# Patient Record
Sex: FEMALE | Race: WHITE | Employment: OTHER | URBAN - METROPOLITAN AREA
[De-identification: names, ages, dates, MRNs, and addresses within clinical notes are randomized per-mention and may not be internally consistent; named-entity substitution may affect disease eponyms.]

---

## 2018-08-25 ENCOUNTER — HOSPITAL ENCOUNTER (INPATIENT)
Age: 83
LOS: 5 days | Discharge: NURSING, REHAB OR LONG-TERM CARE | DRG: 544 | End: 2018-08-30
Attending: EMERGENCY MEDICINE | Admitting: INTERNAL MEDICINE
Payer: MEDICARE

## 2018-08-25 ENCOUNTER — APPOINTMENT (OUTPATIENT)
Dept: GENERAL RADIOLOGY | Age: 83
DRG: 544 | End: 2018-08-25
Attending: NURSE PRACTITIONER
Payer: MEDICARE

## 2018-08-25 ENCOUNTER — APPOINTMENT (OUTPATIENT)
Dept: CT IMAGING | Age: 83
DRG: 544 | End: 2018-08-25
Attending: NURSE PRACTITIONER
Payer: MEDICARE

## 2018-08-25 DIAGNOSIS — M25.561 CHRONIC PAIN OF BOTH KNEES: Primary | ICD-10-CM

## 2018-08-25 DIAGNOSIS — G89.29 CHRONIC PAIN OF BOTH KNEES: Primary | ICD-10-CM

## 2018-08-25 DIAGNOSIS — S82.141A CLOSED FRACTURE OF RIGHT TIBIAL PLATEAU, INITIAL ENCOUNTER: ICD-10-CM

## 2018-08-25 DIAGNOSIS — M25.562 CHRONIC PAIN OF BOTH KNEES: Primary | ICD-10-CM

## 2018-08-25 PROBLEM — Z72.0 TOBACCO ABUSE: Status: ACTIVE | Noted: 2018-08-25

## 2018-08-25 PROBLEM — I10 HYPERTENSION: Status: ACTIVE | Noted: 2018-08-25

## 2018-08-25 PROBLEM — S82.143A TIBIAL PLATEAU FRACTURE: Status: ACTIVE | Noted: 2018-08-25

## 2018-08-25 LAB
ALBUMIN SERPL-MCNC: 3.3 G/DL (ref 3.5–5)
ALBUMIN/GLOB SERPL: 0.8 {RATIO} (ref 1.1–2.2)
ALP SERPL-CCNC: 56 U/L (ref 45–117)
ALT SERPL-CCNC: 12 U/L (ref 12–78)
ANION GAP SERPL CALC-SCNC: 5 MMOL/L (ref 5–15)
AST SERPL-CCNC: 17 U/L (ref 15–37)
BASOPHILS # BLD: 0.1 K/UL (ref 0–0.1)
BASOPHILS NFR BLD: 1 % (ref 0–1)
BILIRUB SERPL-MCNC: 0.8 MG/DL (ref 0.2–1)
BUN SERPL-MCNC: 14 MG/DL (ref 6–20)
BUN/CREAT SERPL: 18 (ref 12–20)
CALCIUM SERPL-MCNC: 9.3 MG/DL (ref 8.5–10.1)
CHLORIDE SERPL-SCNC: 100 MMOL/L (ref 97–108)
CO2 SERPL-SCNC: 29 MMOL/L (ref 21–32)
COMMENT, HOLDF: NORMAL
CREAT SERPL-MCNC: 0.77 MG/DL (ref 0.55–1.02)
DIFFERENTIAL METHOD BLD: NORMAL
EOSINOPHIL # BLD: 0.1 K/UL (ref 0–0.4)
EOSINOPHIL NFR BLD: 1 % (ref 0–7)
ERYTHROCYTE [DISTWIDTH] IN BLOOD BY AUTOMATED COUNT: 14.4 % (ref 11.5–14.5)
GLOBULIN SER CALC-MCNC: 4.3 G/DL (ref 2–4)
GLUCOSE SERPL-MCNC: 101 MG/DL (ref 65–100)
HCT VFR BLD AUTO: 39.4 % (ref 35–47)
HGB BLD-MCNC: 12.9 G/DL (ref 11.5–16)
IMM GRANULOCYTES # BLD: 0 K/UL (ref 0–0.04)
IMM GRANULOCYTES NFR BLD AUTO: 0 % (ref 0–0.5)
LYMPHOCYTES # BLD: 2.1 K/UL (ref 0.8–3.5)
LYMPHOCYTES NFR BLD: 19 % (ref 12–49)
MCH RBC QN AUTO: 30.6 PG (ref 26–34)
MCHC RBC AUTO-ENTMCNC: 32.7 G/DL (ref 30–36.5)
MCV RBC AUTO: 93.4 FL (ref 80–99)
MONOCYTES # BLD: 0.9 K/UL (ref 0–1)
MONOCYTES NFR BLD: 8 % (ref 5–13)
NEUTS SEG # BLD: 7.8 K/UL (ref 1.8–8)
NEUTS SEG NFR BLD: 71 % (ref 32–75)
NRBC # BLD: 0 K/UL (ref 0–0.01)
NRBC BLD-RTO: 0 PER 100 WBC
PLATELET # BLD AUTO: 294 K/UL (ref 150–400)
PMV BLD AUTO: 9.8 FL (ref 8.9–12.9)
POTASSIUM SERPL-SCNC: 4.1 MMOL/L (ref 3.5–5.1)
PROT SERPL-MCNC: 7.6 G/DL (ref 6.4–8.2)
RBC # BLD AUTO: 4.22 M/UL (ref 3.8–5.2)
SAMPLES BEING HELD,HOLD: NORMAL
SODIUM SERPL-SCNC: 134 MMOL/L (ref 136–145)
WBC # BLD AUTO: 11 K/UL (ref 3.6–11)

## 2018-08-25 PROCEDURE — 74011250636 HC RX REV CODE- 250/636: Performed by: INTERNAL MEDICINE

## 2018-08-25 PROCEDURE — 85025 COMPLETE CBC W/AUTO DIFF WBC: CPT | Performed by: EMERGENCY MEDICINE

## 2018-08-25 PROCEDURE — 73562 X-RAY EXAM OF KNEE 3: CPT

## 2018-08-25 PROCEDURE — 65270000029 HC RM PRIVATE

## 2018-08-25 PROCEDURE — 74011250637 HC RX REV CODE- 250/637: Performed by: INTERNAL MEDICINE

## 2018-08-25 PROCEDURE — 73700 CT LOWER EXTREMITY W/O DYE: CPT

## 2018-08-25 PROCEDURE — 99284 EMERGENCY DEPT VISIT MOD MDM: CPT

## 2018-08-25 PROCEDURE — 36415 COLL VENOUS BLD VENIPUNCTURE: CPT | Performed by: EMERGENCY MEDICINE

## 2018-08-25 PROCEDURE — 74011250637 HC RX REV CODE- 250/637: Performed by: NURSE PRACTITIONER

## 2018-08-25 PROCEDURE — 80053 COMPREHEN METABOLIC PANEL: CPT | Performed by: EMERGENCY MEDICINE

## 2018-08-25 RX ORDER — ENOXAPARIN SODIUM 100 MG/ML
40 INJECTION SUBCUTANEOUS EVERY 24 HOURS
Status: DISCONTINUED | OUTPATIENT
Start: 2018-08-25 | End: 2018-08-25

## 2018-08-25 RX ORDER — NALOXONE HYDROCHLORIDE 0.4 MG/ML
0.4 INJECTION, SOLUTION INTRAMUSCULAR; INTRAVENOUS; SUBCUTANEOUS AS NEEDED
Status: DISCONTINUED | OUTPATIENT
Start: 2018-08-25 | End: 2018-08-30 | Stop reason: HOSPADM

## 2018-08-25 RX ORDER — SODIUM CHLORIDE 0.9 % (FLUSH) 0.9 %
5-10 SYRINGE (ML) INJECTION EVERY 8 HOURS
Status: DISCONTINUED | OUTPATIENT
Start: 2018-08-25 | End: 2018-08-30 | Stop reason: HOSPADM

## 2018-08-25 RX ORDER — METHOTREXATE 2.5 MG/1
10 TABLET ORAL
Status: DISCONTINUED | OUTPATIENT
Start: 2018-08-28 | End: 2018-08-26

## 2018-08-25 RX ORDER — ONDANSETRON 2 MG/ML
4 INJECTION INTRAMUSCULAR; INTRAVENOUS
Status: DISCONTINUED | OUTPATIENT
Start: 2018-08-25 | End: 2018-08-30 | Stop reason: HOSPADM

## 2018-08-25 RX ORDER — ACETAMINOPHEN 500 MG
2 TABLET ORAL
Status: DISCONTINUED | OUTPATIENT
Start: 2018-08-25 | End: 2018-08-29

## 2018-08-25 RX ORDER — HYDROCODONE BITARTRATE AND ACETAMINOPHEN 10; 325 MG/1; MG/1
1 TABLET ORAL
Status: DISCONTINUED | OUTPATIENT
Start: 2018-08-25 | End: 2018-08-30 | Stop reason: HOSPADM

## 2018-08-25 RX ORDER — HYDROMORPHONE HYDROCHLORIDE 2 MG/ML
1 INJECTION, SOLUTION INTRAMUSCULAR; INTRAVENOUS; SUBCUTANEOUS
Status: DISCONTINUED | OUTPATIENT
Start: 2018-08-25 | End: 2018-08-30 | Stop reason: HOSPADM

## 2018-08-25 RX ORDER — HYDROCODONE BITARTRATE AND ACETAMINOPHEN 5; 325 MG/1; MG/1
1 TABLET ORAL
Status: DISCONTINUED | OUTPATIENT
Start: 2018-08-25 | End: 2018-08-25

## 2018-08-25 RX ORDER — AMLODIPINE BESYLATE 5 MG/1
5 TABLET ORAL DAILY
COMMUNITY
End: 2018-12-10 | Stop reason: SDUPTHER

## 2018-08-25 RX ORDER — DOCUSATE SODIUM 100 MG/1
100 CAPSULE, LIQUID FILLED ORAL 2 TIMES DAILY
Status: DISCONTINUED | OUTPATIENT
Start: 2018-08-25 | End: 2018-08-26

## 2018-08-25 RX ORDER — FOLIC ACID 1 MG/1
1 TABLET ORAL DAILY
Status: DISCONTINUED | OUTPATIENT
Start: 2018-08-26 | End: 2018-08-30 | Stop reason: HOSPADM

## 2018-08-25 RX ORDER — ACETAMINOPHEN 325 MG/1
650 TABLET ORAL
Status: DISCONTINUED | OUTPATIENT
Start: 2018-08-25 | End: 2018-08-30 | Stop reason: HOSPADM

## 2018-08-25 RX ORDER — SODIUM CHLORIDE 0.9 % (FLUSH) 0.9 %
5-10 SYRINGE (ML) INJECTION AS NEEDED
Status: DISCONTINUED | OUTPATIENT
Start: 2018-08-25 | End: 2018-08-30 | Stop reason: HOSPADM

## 2018-08-25 RX ORDER — MORPHINE SULFATE 4 MG/ML
2 INJECTION, SOLUTION INTRAMUSCULAR; INTRAVENOUS
Status: DISCONTINUED | OUTPATIENT
Start: 2018-08-25 | End: 2018-08-25

## 2018-08-25 RX ORDER — OXYCODONE HYDROCHLORIDE 5 MG/1
10 TABLET ORAL
Status: COMPLETED | OUTPATIENT
Start: 2018-08-25 | End: 2018-08-25

## 2018-08-25 RX ORDER — ENOXAPARIN SODIUM 100 MG/ML
40 INJECTION SUBCUTANEOUS EVERY 24 HOURS
Status: DISCONTINUED | OUTPATIENT
Start: 2018-08-26 | End: 2018-08-30 | Stop reason: HOSPADM

## 2018-08-25 RX ORDER — DIGOXIN 125 MCG
0.12 TABLET ORAL DAILY
Status: DISCONTINUED | OUTPATIENT
Start: 2018-08-26 | End: 2018-08-30 | Stop reason: HOSPADM

## 2018-08-25 RX ORDER — ZOLPIDEM TARTRATE 5 MG/1
5 TABLET ORAL
Status: DISCONTINUED | OUTPATIENT
Start: 2018-08-25 | End: 2018-08-30 | Stop reason: HOSPADM

## 2018-08-25 RX ORDER — AMLODIPINE BESYLATE 5 MG/1
5 TABLET ORAL DAILY
Status: DISCONTINUED | OUTPATIENT
Start: 2018-08-26 | End: 2018-08-30 | Stop reason: HOSPADM

## 2018-08-25 RX ORDER — ASPIRIN 325 MG
50000 TABLET, DELAYED RELEASE (ENTERIC COATED) ORAL
COMMUNITY
End: 2020-02-13

## 2018-08-25 RX ADMIN — HYDROCODONE BITARTRATE AND ACETAMINOPHEN 1 TABLET: 10; 325 TABLET ORAL at 20:11

## 2018-08-25 RX ADMIN — HYDROMORPHONE HYDROCHLORIDE 1 MG: 2 INJECTION, SOLUTION INTRAMUSCULAR; INTRAVENOUS; SUBCUTANEOUS at 17:54

## 2018-08-25 RX ADMIN — OXYCODONE HYDROCHLORIDE 10 MG: 5 TABLET ORAL at 14:41

## 2018-08-25 RX ADMIN — DOCUSATE SODIUM 100 MG: 100 CAPSULE, LIQUID FILLED ORAL at 17:53

## 2018-08-25 RX ADMIN — HYDROMORPHONE HYDROCHLORIDE 1 MG: 2 INJECTION, SOLUTION INTRAMUSCULAR; INTRAVENOUS; SUBCUTANEOUS at 22:20

## 2018-08-25 NOTE — ED PROVIDER NOTES
HPI Comments: Pt is a 81 y/o female with a h/o severe RA who presents today with c/o bilateral knee pain. Pt states that she was walking down the steps and heard a cracking sensation. States her knees gave out on her due to weakness and she sat down on the steps. Has been unable to stand up and had to call EMS to help her get up. She has been unable to move or bend her knees since then due to the pain. Did not hit her head. NO other complaints. Patient is a 80 y.o. female presenting with knee pain. The history is provided by the patient. Knee Pain    Pertinent negatives include no numbness and no back pain. Past Medical History:   Diagnosis Date    AAA (abdominal aortic aneurysm) (HCC)     Arthritis     ra, osteoporosis, osteoarthritis    Atrial fibrillation (HCC)     COPD     HTN (hypertension)     [facility]oid [facility]) ([facility] [address]     Smoker     TIA (transient ischemic attack)     TIA 9/30/16       Past Surgical History:   Procedure Laterality Date    HX CHOLECYSTECTOMY      HX HYSTERECTOMY      HX ORTHOPAEDIC      carpal tunnel, wrist surgery         Family History:   Problem Relation Age of Onset    Hypertension Father        Social History     Social History    Marital status: SINGLE     Spouse name: N/A    Number of children: N/A    Years of education: N/A     Occupational History    Not on file. Social History Main Topics    Smoking status: Former Smoker    Smokeless tobacco: Not on file    Alcohol use No    Drug use: No    Sexual activity: Not on file     Other Topics Concern    Not on file     Social History Narrative         ALLERGIES: Codeine    Review of Systems   Constitutional: Negative for chills and fever. Musculoskeletal: Negative for back pain. Bilateral knee and foot pain   Skin: Negative. Neurological: Negative for light-headedness, numbness and headaches. All other systems reviewed and are negative.       Vitals:    08/25/18 1333 08/25/18 1400 08/25/18 1431   BP: 146/71 133/65 146/82   Pulse: 98     Resp: 20     Temp: 97.8 °F (36.6 °C)     SpO2: 96% 96% 94%   Weight: 61.2 kg (135 lb)     Height: 5' 7\" (1.702 m)              Physical Exam   Constitutional: She is oriented to person, place, and time. She appears well-developed and well-nourished. Neck: Normal range of motion. Neck supple. Cardiovascular: Normal rate, regular rhythm and normal heart sounds. Pulmonary/Chest: Effort normal.   Abdominal: Soft. Bowel sounds are normal. There is no tenderness. Musculoskeletal:   Bilateral knees with effusions noted. Right worse than left. Difficult to examine due to the pain. Bilateral feet painful to touch. Pain with any rom. Neurological: She is alert and oriented to person, place, and time. Skin: Skin is warm and dry. Psychiatric: She has a normal mood and affect. Her behavior is normal. Judgment and thought content normal.   Nursing note and vitals reviewed. MDM  Number of Diagnoses or Management Options     Amount and/or Complexity of Data Reviewed  Clinical lab tests: ordered and reviewed  Tests in the radiology section of CPT®: ordered and reviewed    Patient Progress  Patient progress: stable        ED Course     5:26 PM  Pt is a 81 y/o female with RA and severe osteopenia who presented to the ED with c/o bilateral knee pain and unable to bear weight. Did not fall onto her knees. XRs shows severe osteopenia and possible bilateral tibial plateaus fractures. Given her immobility and pain, d/w medicine for admission. Orthopedic surgery consulted and at bedside. Labs ordered for admission and CT scans ordered of BLE to eval the fractures further.    Procedures

## 2018-08-25 NOTE — ED TRIAGE NOTES
Pt arrives via EMS from home after her knees gave out on the stairs and she was unable to get back up. Pt has PMH of Rheumatoid arthritis. Pt denies any falls; daughter and son-in-law caught her and gently set her down.

## 2018-08-25 NOTE — CONSULTS
ORTHOPEDIC CONSULT    Subjective:     Date of Consultation:  August 25, 2018    Referring Physician:  Dr. Tarsha Rhodes is a 80 y.o. female who is being seen for bilateral knee pain. She has a significant past medical history of TIA, paroxysmal A-fib, chronic pain on opioids, smoker, RA, and COPD. She was at home today with her daughter walking down the steps when she felt a pop in her bilateral knees. She was unable to ambulate following this pain and was brought to the ED for further workup. She complains of severe pain in the left and right knee worse with motion. She is on methotrexate weekly and takes hydrocodone/apap 7.5 3-4 x daily. Xrays in the ED show a possibly nondisplaced lateral tibial plateau fracture of the right knee. Patient Active Problem List    Diagnosis Date Noted    Tibial plateau fracture 36/07/3975    TIA (transient ischemic attack) 09/30/2016    Slurred speech 09/30/2016    Paroxysmal A-fib ([address] 09/30/2016    RA (rheumatoid arthritis) ([facility] [address] 09/30/2016    COPD (chronic obstructive pulmonary disease) ([facility] [address] 09/30/2016     Family History   Problem Relation Age of Onset    Hypertension Father       Social History   Substance Use Topics    Smoking status: Former Smoker    Smokeless tobacco: Not on file    Alcohol use No     Past Medical History:   Diagnosis Date    AAA (abdominal aortic aneurysm) (HCC)     Arthritis     ra, osteoporosis, osteoarthritis    Atrial fibrillation (HCC)     COPD     HTN (hypertension)     RA (rheumatoid arthritis) ([facility] [address]     Smoker     TIA (transient ischemic attack)     TIA 9/30/16      Past Surgical History:   Procedure Laterality Date    HX CHOLECYSTECTOMY      HX HYSTERECTOMY      HX ORTHOPAEDIC      carpal tunnel, wrist surgery      Prior to Admission medications    Medication Sig Start Date End Date Taking? Authorizing Provider   amLODIPine (NORVASC) 2.5 mg tablet Take 1 Tab by mouth daily.  10/1/16   Bar Christopher MD   apixaban (ELIQUIS) 2.5 mg tablet Take 1 Tab by mouth every twelve (12) hours. 10/1/16   Abel Bruno MD   folic acid (FOLVITE) 1 mg tablet Take 1 mg by mouth daily. Rayshawn Dorsey MD   methotrexate (RHEUMATREX) 2.5 mg tablet Take 10 mg by mouth every Tuesday. Rayshawn Dorsey MD   ibuprofen (MOTRIN) 400 mg tablet Take 1 Tab by mouth every six (6) hours as needed for Pain. Patient not taking: Reported on 10/4/2016 9/14/13   Constantine Card MD   digoxin (LANOXIN) 0.125 mg tablet Take 0.125 mg by mouth daily. Rayshawn Dorsey MD   HYDROcodone-acetaminophen (NORCO) 7.5-325 mg per tablet Take 1 Tab by mouth every six (6) hours as needed. Rayshawn Dorsey MD     Current Facility-Administered Medications   Medication Dose Route Frequency    sodium chloride (NS) flush 5-10 mL  5-10 mL IntraVENous Q8H    sodium chloride (NS) flush 5-10 mL  5-10 mL IntraVENous PRN    naloxone (NARCAN) injection 0.4 mg  0.4 mg IntraVENous PRN    zolpidem (AMBIEN) tablet 5 mg  5 mg Oral QHS PRN    acetaminophen (TYLENOL) tablet 650 mg  650 mg Oral Q4H PRN    ondansetron (ZOFRAN) injection 4 mg  4 mg IntraVENous Q4H PRN    docusate sodium (COLACE) capsule 100 mg  100 mg Oral BID    [START ON 8/26/2018] enoxaparin (LOVENOX) injection 40 mg  40 mg SubCUTAneous Q24H    HYDROmorphone (PF) (DILAUDID) injection 1 mg  1 mg IntraVENous Q4H PRN    HYDROcodone-acetaminophen (NORCO)  mg tablet 1 Tab  1 Tab Oral Q4H PRN     Current Outpatient Prescriptions   Medication Sig    amLODIPine (NORVASC) 2.5 mg tablet Take 1 Tab by mouth daily.  apixaban (ELIQUIS) 2.5 mg tablet Take 1 Tab by mouth every twelve (12) hours.  folic acid (FOLVITE) 1 mg tablet Take 1 mg by mouth daily.  methotrexate (RHEUMATREX) 2.5 mg tablet Take 10 mg by mouth every Tuesday.  ibuprofen (MOTRIN) 400 mg tablet Take 1 Tab by mouth every six (6) hours as needed for Pain.  (Patient not taking: Reported on 10/4/2016)    digoxin (LANOXIN) 0.125 mg tablet Take 0.125 mg by mouth daily.  HYDROcodone-acetaminophen (NORCO) 7.5-325 mg per tablet Take 1 Tab by mouth every six (6) hours as needed. Allergies   Allergen Reactions    Codeine Nausea and Vomiting        Review of Systems:  Pertinent items are noted in HPI. Objective:     Patient Vitals for the past 8 hrs:   BP Temp Pulse Resp SpO2 Height Weight   18 1431 146/82 - - - 94 % - -   18 1400 133/65 - - - 96 % - -   18 1333 146/71 97.8 °F (36.6 °C) 98 20 96 % 5' 7\" (1.702 m) 61.2 kg (135 lb)     Temp (24hrs), Av.8 °F (36.6 °C), Min:97.8 °F (36.6 °C), Max:97.8 °F (36.6 °C)        EXAM: GEN: Well appearing female. PSYCH:  AAO x 3  MUSC: Examination is extremely difficult due to patient's pain. I am unable to test ROM of the knees or hips. I did a pelvic compression without pain. She has a palpable DP pulse distally bilaterally. Large right knee effusion. Mild left knee effusion. Ligamentous examination can not be tested due to patient apprehension. IMAGING:  LEFT KNEE:  IMPRESSION  IMPRESSION:    1. Severe osteopenia. Subtle subchondral sclerosis and subchondral bone lateral  tibial plateau. Insufficiency fracture cannot be excluded. Palma Gearing RIGHT KNEE:   IMPRESSION:       Severe osteopenia. Possible insufficiency fracture of the lateral tibial  plateau. Data Review   Recent Results (from the past 24 hour(s))   SAMPLES BEING HELD    Collection Time: 18  5:39 PM   Result Value Ref Range    SAMPLES BEING HELD 1SST,1BL,1RED     COMMENT        Add-on orders for these samples will be processed based on acceptable specimen integrity and analyte stability, which may vary by analyte.    CBC WITH AUTOMATED DIFF    Collection Time: 18  5:39 PM   Result Value Ref Range    WBC 11.0 3.6 - 11.0 K/uL    RBC 4.22 3.80 - 5.20 M/uL    HGB 12.9 11.5 - 16.0 g/dL    HCT 39.4 35.0 - 47.0 %    MCV 93.4 80.0 - 99.0 FL    MCH 30.6 26.0 - 34.0 PG    MCHC 32.7 30.0 - 36.5 g/dL    RDW 14.4 11.5 - 14.5 %    PLATELET 342 168 - 703 K/uL    MPV 9.8 8.9 - 12.9 FL    NRBC 0.0 0  WBC    ABSOLUTE NRBC 0.00 0.00 - 0.01 K/uL    NEUTROPHILS 71 32 - 75 %    LYMPHOCYTES 19 12 - 49 %    MONOCYTES 8 5 - 13 %    EOSINOPHILS 1 0 - 7 %    BASOPHILS 1 0 - 1 %    IMMATURE GRANULOCYTES 0 0.0 - 0.5 %    ABS. NEUTROPHILS 7.8 1.8 - 8.0 K/UL    ABS. LYMPHOCYTES 2.1 0.8 - 3.5 K/UL    ABS. MONOCYTES 0.9 0.0 - 1.0 K/UL    ABS. EOSINOPHILS 0.1 0.0 - 0.4 K/UL    ABS. BASOPHILS 0.1 0.0 - 0.1 K/UL    ABS. IMM. GRANS. 0.0 0.00 - 0.04 K/UL    DF AUTOMATED           Assessment/Plan:   A: 1. Rheumatoid arthritis      2. Possible nondisplaced lateral tibial plateau fracture, right knee      3. Possible nondisplaced lateral tibial plateau fracture, left knee      4. Chronic pain    P: 1. Bilateral knee lateral tibial plateau insufficiency fractures: I am going to get CT scans of both of her knees. If there is less than 4mm of displacement this will be treated nonoperatively with knee immobilizer and NWB. We will followup once the CT scan is done. For now she can have a regular diet. She will be made NPO after midnight in case there is displacement greater than above. Regardless of the operative or nonoperative treatment she will likely need SNF placement. We will followup once the CT scans are complete. Pain control will be tough due to chronic opioid use. Ice to both knees. Discussed case with Dr. Duran Hennessy who agrees with plan.      Erika Márquez, [address] [facility]   [facility]  [address] [facility]

## 2018-08-25 NOTE — H&P
[facility]    Hospitalist Admission Note      NAME:  Hernandez Louie   :   1931   MRN:  [id number]     PCP:  Nicole Barnett MD     Date/Time:  2018 7:21 PM          Subjective:     CHIEF COMPLAINT: bilateral knee pain     HISTORY OF PRESENT ILLNESS:     Ms. Chemo Pearson is a 80 y.o.  female with a hx of RA, tobacco abuse, TIA, PAF who presented to the Emergency Department complaining of sudden onset bilateral knee pain. Patient was being moved out of her second floor apartment to live with family due to debility. While walking down stairs, patient had sudden \"pop\" in right knee, took a step and had similar \"pop\" in left knee. Patient was unable to bear weight, was lowered to ground and EMS called. In ED, had questionable insufficiency fractures of both knees. We will admit in concert with our orthopaedic colleagues. Past Medical History:   Diagnosis Date    AAA (abdominal aortic aneurysm) (HCC)     Arthritis     ra, osteoporosis, osteoarthritis    Atrial fibrillation (HCC)     COPD     HTN (hypertension)     RA [facility]oid [facility]) ([facility] [address]     Smoker     TIA (transient ischemic attack)     TIA 16        Past Surgical History:   Procedure Laterality Date    HX CHOLECYSTECTOMY      HX HYSTERECTOMY      HX ORTHOPAEDIC      carpal tunnel, wrist surgery       Social History   Substance Use Topics    Smoking status: Former Smoker    Smokeless tobacco: Not on file    Alcohol use No        Family History   Problem Relation Age of Onset    Hypertension Father       Family hx cannot be fully assessed, due to the admitting conditions    Allergies   Allergen Reactions    Codeine Nausea and Vomiting        Prior to Admission medications    Medication Sig Start Date End Date Taking? Authorizing Provider   amLODIPine (NORVASC) 2.5 mg tablet Take 1 Tab by mouth daily.  10/1/16   David Walton MD   apixaban (ELIQUIS) 2.5 mg tablet Take 1 Tab by mouth every twelve (12) hours. 10/1/16   Carolyne Garcia MD   folic acid (FOLVITE) 1 mg tablet Take 1 mg by mouth daily. Rayshawn Dorsey MD   methotrexate (RHEUMATREX) 2.5 mg tablet Take 10 mg by mouth every Tuesday. Rayshawn Dorsey MD   ibuprofen (MOTRIN) 400 mg tablet Take 1 Tab by mouth every six (6) hours as needed for Pain. Patient not taking: Reported on 10/4/2016 9/14/13   Yvrose Bledsoe MD   digoxin (LANOXIN) 0.125 mg tablet Take 0.125 mg by mouth daily. Rayshawn Dorsey MD   HYDROcodone-acetaminophen (NORCO) 7.5-325 mg per tablet Take 1 Tab by mouth every six (6) hours as needed.     Rayshawn Dorsey MD       Review of Systems:  (bold if positive, if negative)    Gen:  Eyes:  ENT:  CVS:  Pulm:  GI:    :    MS:  Pain, weakness, swellingSkin:  Psych:  Endo:    Hem:  Renal:    Neuro:        Objective:      VITALS:    Vital signs reviewed; most recent are:    Visit Vitals    /72 (BP 1 Location: Left arm, BP Patient Position: At rest)    Pulse (!) 103    Temp 97.6 °F (36.4 °C)    Resp 20    Ht 5' 7\" (1.702 m)    Wt 61.2 kg (135 lb)    SpO2 91%    BMI 21.14 kg/m2     SpO2 Readings from Last 6 Encounters:   08/25/18 91%   10/04/16 97%   10/01/16 94%   09/13/13 94%        No intake or output data in the 24 hours ending 08/25/18 1921     Exam:     Physical Exam:    Gen:  Well-developed, well-nourished, in no acute distress  HEENT:  Pink conjunctivae, PERRL, hearing intact to voice, moist mucous membranes  Neck:  Supple, without masses, thyroid non-tender  Resp:  No accessory muscle use, clear breath sounds without wheezes rales or rhonchi  Card:  No murmurs, normal S1, S2 without thrills, bruits or peripheral edema  Abd:  Soft, non-tender, non-distended, normoactive bowel sounds are present, no palpable organomegaly and no detectable hernias  Lymph:  No cervical or inguinal adenopathy  Musc:  Severe bilateral knee pain preventing any exam of ROM  Skin:  No rashes or ulcers, skin turgor is good  Neuro:  Cranial nerves are grossly intact, no focal motor weakness, follows commands appropriately  Psych:  Good insight, oriented to person, place and time, alert     Labs:    Recent Labs      08/25/18   1739   WBC  11.0   HGB  12.9   HCT  39.4   PLT  294     Recent Labs      08/25/18   1739   NA  134*   K  4.1   CL  100   CO2  29   GLU  101*   BUN  14   CREA  0.77   CA  9.3   ALB  3.3*   TBILI  0.8   SGOT  17   ALT  12     Lab Results   Component Value Date/Time    Glucose (POC) 93 10/01/2016 11:33 AM    Glucose (POC) 91 10/01/2016 07:33 AM     No results for input(s): PH, PCO2, PO2, HCO3, FIO2 in the last 72 hours. No results for input(s): INR in the last 72 hours. No lab exists for component: INREXT  All Micro Results     None          I have reviewed previous records       Assessment and Plan: Active Problems:    Questionable bilateral tibial plateau fractures / Severe bilateral knee pain / Debility. POA. Admit to medicine. Ortho consult to review images and assist with management. CT bilateral lower extremities ordered. Pain control. PT/OT after okay from Ortho. Will likely need SNF. Osteopenia. Likely from smoking/under weight/RA. May benefit from bisphos. Defer to PCP    Paroxysmal A-fib / Hx of transient ischemic attack. On eliquis. Hold this until Ortho determines no intervention. Continue digoxin. RA (rheumatoid arthritis) ([facility] [address] (9/30/2016). Continue folic acid. MTX every tuesday    COPD (chronic obstructive pulmonary disease) ([facility] [address] (9/30/2016). Breathing fine. On RA. Not on any baseline meds. Monitor    Hypertension (8/25/2018). Continue amlodipine    Tobacco abuse (8/25/2018).  Counseled on cessation      Telemetry reviewed:   normal sinus rhythm    Risk of deterioration: medium      Total time spent with patient: 48 [address] [facility] discussed with: Patient, Family, Nursing Staff, Consultant/Specialist and >50% of time spent in counseling and coordination of care    Discussed:  Care Plan and D/C Planning       ___________________________________________________    Attending Physician: Elsy Herrera DO

## 2018-08-25 NOTE — ED NOTES
TRANSFER - OUT REPORT:    Verbal report given to Sylvia(name) on [facility]  being transferred to [facility](unit) for routine progression of care       Report consisted of patients Situation, Background, Assessment and   Recommendations(SBAR). Information from the following report(s) SBAR, Kardex, ED Summary, [facility] and Recent Results was reviewed with the receiving nurse. Lines:   Peripheral IV 08/25/18 Left Forearm (Active)   Site Assessment Clean, dry, & intact 8/25/2018  5:45 PM   Phlebitis Assessment 0 8/25/2018  5:45 PM   Infiltration Assessment 0 8/25/2018  5:45 PM   Dressing Status Clean, dry, & intact 8/25/2018  5:45 PM   Dressing Type Tape;Transparent 8/25/2018  5:45 PM   Hub Color/Line Status Pink;Flushed;Patent 8/25/2018  5:45 PM   Action Taken Blood drawn 8/25/2018  5:45 PM        Opportunity for questions and clarification was provided.       Patient transported with:   [employer]

## 2018-08-25 NOTE — PROGRESS NOTES
Bedside shift change report given to [facility] (oncoming nurse) by Jose Selby RN (offgoing nurse). Report included the following information SBAR, Kardex, Intake/Output, MAR and Recent Results.

## 2018-08-26 ENCOUNTER — APPOINTMENT (OUTPATIENT)
Dept: MRI IMAGING | Age: 83
DRG: 544 | End: 2018-08-26
Attending: PHYSICIAN ASSISTANT
Payer: MEDICARE

## 2018-08-26 PROBLEM — I10 HYPERTENSION: Chronic | Status: ACTIVE | Noted: 2018-08-25

## 2018-08-26 PROBLEM — Z72.0 TOBACCO ABUSE: Chronic | Status: ACTIVE | Noted: 2018-08-25

## 2018-08-26 LAB
ANION GAP SERPL CALC-SCNC: 8 MMOL/L (ref 5–15)
BASOPHILS # BLD: 0.1 K/UL (ref 0–0.1)
BASOPHILS NFR BLD: 1 % (ref 0–1)
BUN SERPL-MCNC: 16 MG/DL (ref 6–20)
BUN/CREAT SERPL: 20 (ref 12–20)
CALCIUM SERPL-MCNC: 8.8 MG/DL (ref 8.5–10.1)
CHLORIDE SERPL-SCNC: 99 MMOL/L (ref 97–108)
CO2 SERPL-SCNC: 28 MMOL/L (ref 21–32)
CREAT SERPL-MCNC: 0.8 MG/DL (ref 0.55–1.02)
DIFFERENTIAL METHOD BLD: ABNORMAL
EOSINOPHIL # BLD: 0.1 K/UL (ref 0–0.4)
EOSINOPHIL NFR BLD: 1 % (ref 0–7)
ERYTHROCYTE [DISTWIDTH] IN BLOOD BY AUTOMATED COUNT: 14.3 % (ref 11.5–14.5)
GLUCOSE SERPL-MCNC: 104 MG/DL (ref 65–100)
HCT VFR BLD AUTO: 37.6 % (ref 35–47)
HGB BLD-MCNC: 11.9 G/DL (ref 11.5–16)
IMM GRANULOCYTES # BLD: 0 K/UL (ref 0–0.04)
IMM GRANULOCYTES NFR BLD AUTO: 0 % (ref 0–0.5)
LYMPHOCYTES # BLD: 1.9 K/UL (ref 0.8–3.5)
LYMPHOCYTES NFR BLD: 17 % (ref 12–49)
MAGNESIUM SERPL-MCNC: 1.6 MG/DL (ref 1.6–2.4)
MCH RBC QN AUTO: 29.8 PG (ref 26–34)
MCHC RBC AUTO-ENTMCNC: 31.6 G/DL (ref 30–36.5)
MCV RBC AUTO: 94.2 FL (ref 80–99)
MONOCYTES # BLD: 0.9 K/UL (ref 0–1)
MONOCYTES NFR BLD: 8 % (ref 5–13)
NEUTS SEG # BLD: 8.1 K/UL (ref 1.8–8)
NEUTS SEG NFR BLD: 73 % (ref 32–75)
NRBC # BLD: 0 K/UL (ref 0–0.01)
NRBC BLD-RTO: 0 PER 100 WBC
PHOSPHATE SERPL-MCNC: 4.2 MG/DL (ref 2.6–4.7)
PLATELET # BLD AUTO: 253 K/UL (ref 150–400)
PMV BLD AUTO: 9.7 FL (ref 8.9–12.9)
POTASSIUM SERPL-SCNC: 4.3 MMOL/L (ref 3.5–5.1)
RBC # BLD AUTO: 3.99 M/UL (ref 3.8–5.2)
RBC MORPH BLD: ABNORMAL
SODIUM SERPL-SCNC: 135 MMOL/L (ref 136–145)
WBC # BLD AUTO: 11.1 K/UL (ref 3.6–11)

## 2018-08-26 PROCEDURE — 97161 PT EVAL LOW COMPLEX 20 MIN: CPT

## 2018-08-26 PROCEDURE — 83735 ASSAY OF MAGNESIUM: CPT | Performed by: INTERNAL MEDICINE

## 2018-08-26 PROCEDURE — 74011250637 HC RX REV CODE- 250/637: Performed by: INTERNAL MEDICINE

## 2018-08-26 PROCEDURE — 36415 COLL VENOUS BLD VENIPUNCTURE: CPT | Performed by: INTERNAL MEDICINE

## 2018-08-26 PROCEDURE — 84100 ASSAY OF PHOSPHORUS: CPT | Performed by: INTERNAL MEDICINE

## 2018-08-26 PROCEDURE — 85025 COMPLETE CBC W/AUTO DIFF WBC: CPT | Performed by: INTERNAL MEDICINE

## 2018-08-26 PROCEDURE — 74011250636 HC RX REV CODE- 250/636: Performed by: INTERNAL MEDICINE

## 2018-08-26 PROCEDURE — 80048 BASIC METABOLIC PNL TOTAL CA: CPT | Performed by: INTERNAL MEDICINE

## 2018-08-26 PROCEDURE — 74011000250 HC RX REV CODE- 250: Performed by: INTERNAL MEDICINE

## 2018-08-26 PROCEDURE — 65270000029 HC RM PRIVATE

## 2018-08-26 PROCEDURE — 97110 THERAPEUTIC EXERCISES: CPT

## 2018-08-26 PROCEDURE — 51798 US URINE CAPACITY MEASURE: CPT

## 2018-08-26 RX ORDER — LIDOCAINE 4 G/100G
2 PATCH TOPICAL EVERY 24 HOURS
Status: DISCONTINUED | OUTPATIENT
Start: 2018-08-26 | End: 2018-08-30 | Stop reason: HOSPADM

## 2018-08-26 RX ORDER — POLYETHYLENE GLYCOL 3350 17 G/17G
17 POWDER, FOR SOLUTION ORAL 2 TIMES DAILY
Status: DISCONTINUED | OUTPATIENT
Start: 2018-08-26 | End: 2018-08-30 | Stop reason: HOSPADM

## 2018-08-26 RX ORDER — AMOXICILLIN 250 MG
1 CAPSULE ORAL 2 TIMES DAILY
Status: DISCONTINUED | OUTPATIENT
Start: 2018-08-26 | End: 2018-08-30 | Stop reason: HOSPADM

## 2018-08-26 RX ADMIN — Medication 10 ML: at 06:30

## 2018-08-26 RX ADMIN — ENOXAPARIN SODIUM 40 MG: 40 INJECTION, SOLUTION INTRAVENOUS; SUBCUTANEOUS at 08:44

## 2018-08-26 RX ADMIN — HYDROMORPHONE HYDROCHLORIDE 1 MG: 2 INJECTION, SOLUTION INTRAMUSCULAR; INTRAVENOUS; SUBCUTANEOUS at 06:30

## 2018-08-26 RX ADMIN — HYDROCODONE BITARTRATE AND ACETAMINOPHEN 1 TABLET: 10; 325 TABLET ORAL at 08:38

## 2018-08-26 RX ADMIN — Medication 1 TABLET: at 11:07

## 2018-08-26 RX ADMIN — POLYETHYLENE GLYCOL 3350 17 G: 17 POWDER, FOR SOLUTION ORAL at 17:42

## 2018-08-26 RX ADMIN — POLYETHYLENE GLYCOL 3350 17 G: 17 POWDER, FOR SOLUTION ORAL at 11:07

## 2018-08-26 RX ADMIN — AMLODIPINE BESYLATE 5 MG: 5 TABLET ORAL at 08:38

## 2018-08-26 RX ADMIN — HYDROMORPHONE HYDROCHLORIDE 1 MG: 2 INJECTION, SOLUTION INTRAMUSCULAR; INTRAVENOUS; SUBCUTANEOUS at 17:41

## 2018-08-26 RX ADMIN — HYDROCODONE BITARTRATE AND ACETAMINOPHEN 1 TABLET: 10; 325 TABLET ORAL at 00:38

## 2018-08-26 RX ADMIN — HYDROMORPHONE HYDROCHLORIDE 1 MG: 2 INJECTION, SOLUTION INTRAMUSCULAR; INTRAVENOUS; SUBCUTANEOUS at 12:18

## 2018-08-26 RX ADMIN — HYDROMORPHONE HYDROCHLORIDE 1 MG: 2 INJECTION, SOLUTION INTRAMUSCULAR; INTRAVENOUS; SUBCUTANEOUS at 02:18

## 2018-08-26 RX ADMIN — DIGOXIN 0.12 MG: 125 TABLET ORAL at 08:43

## 2018-08-26 RX ADMIN — HYDROCODONE BITARTRATE AND ACETAMINOPHEN 1 TABLET: 10; 325 TABLET ORAL at 14:47

## 2018-08-26 RX ADMIN — FOLIC ACID 1 MG: 1 TABLET ORAL at 08:38

## 2018-08-26 RX ADMIN — DOCUSATE SODIUM 100 MG: 100 CAPSULE, LIQUID FILLED ORAL at 08:38

## 2018-08-26 RX ADMIN — HYDROCODONE BITARTRATE AND ACETAMINOPHEN 1 TABLET: 10; 325 TABLET ORAL at 20:08

## 2018-08-26 RX ADMIN — Medication 1 TABLET: at 17:41

## 2018-08-26 NOTE — PROGRESS NOTES
[facility]: MED RECONCILIATION    Comments/Recommendations:   Patient provides a medication list. Family member present assists with the interview. Pharmacist reviewed prescription refill history with Rx Query  Counseled the patient to consider other options to treat upset stomach as bismuth subsalicylate is a salicylate and she takes apixaban. Bismuth subsalicylate also has a drug interaction with methotrexate. The patient very sparingly uses bismuth subsalicylate and will consider other options in the future. The patient takes her hydrocodone/apap every 6 hours while she is awake for pain from RA. Medications added:     · Vitamin D  · Pepto-bismol    Medications removed:    · ibuprofen    Medications adjusted:    · Methotrexate changed to 20 mg every Saturday  · Amlodipine changed to 5 mg daily    Allergies: Codeine    Prior to Admission Medications:     Prior to Admission Medications   Prescriptions Last Dose Informant Patient Reported? Taking? HYDROcodone-acetaminophen (NORCO) 7.5-325 mg per tablet 8/25/2018 at 1045 Self Yes Yes   Sig: Take 1 Tab by mouth every six (6) hours. Every 6 hours while awake   amLODIPine (NORVASC) 5 mg tablet 8/25/2018 at 8am Self Yes Yes   Sig: Take 5 mg by mouth daily. apixaban (ELIQUIS) 2.5 mg tablet 8/25/2018 at 8am Self No Yes   Sig: Take 1 Tab by mouth every twelve (12) hours. bismuth subsalicylate (PEPTO-BISMOL MAXIMUM STRENGTH) 525 mg/15 mL susp 8/25/2018 at 11am Self Yes Yes   Sig: Take 5 mL by mouth every six (6) hours as needed (upset stomach). cholecalciferol (VITAMIN D3) 50,000 unit capsule  Self Yes Yes   Sig: Take 50,000 Units by mouth every seven (7) days. digoxin (LANOXIN) 0.125 mg tablet 8/25/2018 at 8am Self Yes Yes   Sig: Take 0.125 mg by mouth daily. folic acid (FOLVITE) 1 mg tablet 8/25/2018 at 8am Self Yes Yes   Sig: Take 1 mg by mouth daily. methotrexate (RHEUMATREX) 2.5 mg tablet 8/18/2018 Self Yes Yes   Sig: Take 20 mg by mouth Every Saturday. The patient takes 8 tablets which is 20 mg every Saturday      Facility-Administered Medications: None      Thank you,    Conor Santiago, PharmD, BCPS

## 2018-08-26 NOTE — PROGRESS NOTES
Bedside and Verbal shift change report given to Tea GALICIA (oncoming nurse) by Carlos Crowder (offgoing nurse). Report included the following information SBAR, Kardex, Procedure Summary, Intake/Output, MAR and Recent Results.

## 2018-08-26 NOTE — PROGRESS NOTES
Ty Echeverria [facility] [address]  [address] [facility], [address] [place] [address]  [phone]      Medical Progress Note      NAME: Soila Lund   :  1931  MRM:  [id number]    Date/Time: 2018  9:30 AM         Subjective:     Chief Complaint:  Pain: bilateral knees/ankles, severe, constant, relieved with pain meds but returns    ROS:  (bold if positive, if negative)                        Tolerating PT  Tolerating Diet          Objective:       Vitals:          Last 24hrs VS reviewed since prior progress note.  Most recent are:    Visit Vitals    /77 (BP 1 Location: Left arm, BP Patient Position: At rest)    Pulse 76    Temp 97.8 °F (36.6 °C)    Resp 18    Ht 5' 7\" (1.702 m)    Wt 61.2 kg (135 lb)    SpO2 93%    BMI 21.14 kg/m2     SpO2 Readings from Last 6 Encounters:   18 93%   10/04/16 97%   10/01/16 94%   13 94%        No intake or output data in the 24 hours ending 18 0930       Exam:     Physical Exam:    Gen:  Well-developed, well-nourished, frail, eldelry, chronically ill-appearing, in no acute distress  HEENT:  Pink conjunctivae, PERRL, hearing intact to voice, moist mucous membranes  Neck:  Supple, without masses, thyroid non-tender  Resp:  No accessory muscle use, clear breath sounds without wheezes rales or rhonchi  Card:  No murmurs, normal S1, S2 without thrills, bruits or peripheral edema  Abd:  Soft, non-tender, non-distended, normoactive bowel sounds are present, no palpable organomegaly and no detectable hernias  Lymph:  No cervical or inguinal adenopathy  Musc:  No cyanosis or clubbing  Skin:  No rashes or ulcers, skin turgor is good  Neuro:  Cranial nerves are grossly intact, no focal motor weakness, follows commands appropriately  Psych:  Fair insight, oriented to person, place and time, alert    Medications Reviewed: (see below)    Lab Data Reviewed: (see below)    ______________________________________________________________________    Medications:     Current Facility-Administered Medications   Medication Dose Route Frequency    sodium chloride (NS) flush 5-10 mL  5-10 mL IntraVENous Q8H    sodium chloride (NS) flush 5-10 mL  5-10 mL IntraVENous PRN    naloxone (NARCAN) injection 0.4 mg  0.4 mg IntraVENous PRN    zolpidem (AMBIEN) tablet 5 mg  5 mg Oral QHS PRN    acetaminophen (TYLENOL) tablet 650 mg  650 mg Oral Q4H PRN    ondansetron (ZOFRAN) injection 4 mg  4 mg IntraVENous Q4H PRN    docusate sodium (COLACE) capsule 100 mg  100 mg Oral BID    enoxaparin (LOVENOX) injection 40 mg  40 mg SubCUTAneous Q24H    HYDROmorphone (PF) (DILAUDID) injection 1 mg  1 mg IntraVENous Q4H PRN    HYDROcodone-acetaminophen (NORCO)  mg tablet 1 Tab  1 Tab Oral Q4H PRN    amLODIPine (NORVASC) tablet 5 mg  5 mg Oral DAILY    folic acid (FOLVITE) tablet 1 mg  1 mg Oral DAILY    digoxin (LANOXIN) tablet 0.125 mg  0.125 mg Oral DAILY    [START ON 8/28/2018] methotrexate (RHEUMATREX) tablet 10 mg  10 mg Oral every Tuesday    nicotine (NICORETTE) gum 2 mg  2 mg Oral Q2H PRN            Lab Review:     Recent Labs      08/26/18   0439  08/25/18   1739   WBC  11.1*  11.0   HGB  11.9  12.9   HCT  37.6  39.4   PLT  253  294     Recent Labs      08/26/18   0439  08/25/18   1739   NA  135*  134*   K  4.3  4.1   CL  99  100   CO2  28  29   GLU  104*  101*   BUN  16  14   CREA  0.80  0.77   CA  8.8  9.3   MG  1.6   --    PHOS  4.2   --    ALB   --   3.3*   TBILI   --   0.8   SGOT   --   17   ALT   --   12     Lab Results   Component Value Date/Time    Glucose (POC) 93 10/01/2016 11:33 AM    Glucose (POC) 91 10/01/2016 07:33 AM     No results for input(s): PH, PCO2, PO2, HCO3, FIO2 in the last 72 hours. No results for input(s): INR in the last 72 hours.     No lab exists for component: INREXT  Lab Results   Component Value Date/Time    Specimen Description: URINE 09/13/2013 10:35 PM     Lab Results   Component Value Date/Time    Culture result: NO GROWTH 1 DAY 09/13/2013 10:35 PM            Assessment:     Principal Problem:    Tibial plateau fracture (8/09/9887)    Active Problems:    Paroxysmal A-fib ([facility]) (9/30/2016)      RA (rheumatoid arthritis) ([facility]) (9/30/2016)      COPD (chronic obstructive pulmonary disease) ([facility]) (9/30/2016)      Tobacco abuse (8/25/2018)      Hypertension (8/25/2018)           Plan:     Principal Problem:    Tibial plateau fractures, bilateral (8/25/2018)   - prelim on CTs negative but Dr. Rizwan Sagastume reports insufficiency fractures bilaterally and plans to get MRI to see if the are old or new   - continue pain control in the meantime, add lidocaine patches   - using high doses of opiates, add bowel regimen    Active Problems:    Paroxysmal A-fib ([facility] (9/30/2016)   - monitor heart rate      RA (rheumatoid arthritis) ([facility] (9/30/2016)   - continue methotrexate for now, may need to hold pending work up as above depending on intervention      COPD (chronic obstructive pulmonary disease) ([facility] (9/30/2016)   - stable, continue respiratory meds      Tobacco abuse (8/25/2018)   - counseled on cessation, spent 5 minutes (outside of the time documented for this note) solely focused on tobacco cessation counseling       Hypertension (8/25/2018)   - BPokay on meds as above      Total time spent in patient care: 25 minutes                  Care Plan discussed with: Patient, Family, Nursing Staff and Dr. Rizwan Sagastume    Discussed:  Code Status, Care Plan and D/C Planning    Prophylaxis:  Lovenox    Disposition:  SNF/LTC           ___________________________________________________    Attending Physician: Fabiano Tejeda MD

## 2018-08-26 NOTE — PROGRESS NOTES
1305 pt taken down for MRI. 1335 pt returned. Was not able to tolerate being moved to MRI table due to pain. MRI not done.

## 2018-08-26 NOTE — PROGRESS NOTES
Occupational Therapy Note:  Orders acknowledged, chart reviewed, and spoke with nursing. Patient in a great deal of pain when attempting activity with PT earlier today, yelling out with any B LE movement. Patient currently declining OT evaluation. Will continue to follow.   Padmini Ornelas OTR/L

## 2018-08-26 NOTE — PROGRESS NOTES
Will obtain MRI of both knees to evaluate insufficiency fractures (acute versus chronic). Will make further recommendations following this. For now continue with pain management. She is ok to do PROM and AROM of both knees to 90 degrees. NWB on RLE for now. May be WBAT on LLE, but suspect she will not want to ambulate on that extremity as well.       JENNI LymanC  Orthopaedic Surgery PA  [address] [facility]

## 2018-08-26 NOTE — PROGRESS NOTES
Problem: Mobility Impaired (Adult and Pediatric)  Goal: *Acute Goals and Plan of Care (Insert Text)  Physical Therapy Goals  Initiated 8/26/2018  1. Patient will move from supine to sit and sit to supine , scoot up and down and roll side to side in bed with moderate assistance  within 7 day(s). 2.  Patient will transfer from bed to chair and chair to bed with maximal assistance using the least restrictive device within 7 day(s). 3.  Patient will perform sit to stand with maximal assistance within 7 day(s). 4.  Patient will ambulate with maximal assistance for 5 feet with the least restrictive device within 7 day(s). 5.  Patient will perform LE HEP with moderate assistance within 7 days. physical Therapy EVALUATION  Patient: Rosangela Long (61 y.o. female)  Date: 8/26/2018  Primary Diagnosis: Tibial plateau fracture        Precautions:   Fall, NWB (NWB on the R LE, immbolizer on and PROM 0-90)    ASSESSMENT :  Based on the objective data described below, the patient presents with decreased activity tolerance, severe pain, non-functional strength and ROM of bilateral LEs d/t pain, and functional mobility well below baseline following admission for bilateral tibial plateau fractures s/p fall at home. PTA pt was living with her daughter estevan two story home but had not been downstairs in three months. When pt she feel she was in the process of moving to her sons single story home. Typically pt can ambulate with RW to from bathroom. Pt received supine in bed and agreeable to limited pt evaluation. Pt tolerated minimal 0-15 degrees knee flexion bilaterally PROM. Attempted to transfer to EOB however unsuccessful despite Total A x 2 and maximal time to relax. Pt yelling out in pain with any movement of LEs. Do feel a large portion is fear based vs actual pain for injuries. Tolerated bed in modified chair position and left with ice on knees. Pt will need SNF level of rehab at discharge.      Patient will benefit from skilled intervention to address the above impairments. Patients rehabilitation potential is considered to be Guarded  Factors which may influence rehabilitation potential include:   []         None noted  []         Mental ability/status  [x]         Medical condition  [x]         Home/family situation and support systems  []         Safety awareness  [x]         Pain tolerance/management  []         Other:      PLAN :  Recommendations and Planned Interventions:  [x]           Bed Mobility Training             [x]    Neuromuscular Re-Education  [x]           Transfer Training                   []    Orthotic/Prosthetic Training  [x]           Gait Training                         []    Modalities  [x]           Therapeutic Exercises           []    Edema Management/Control  [x]           Therapeutic Activities            [x]    Patient and Family Training/Education  []           Other (comment):    Frequency/Duration: Patient will be followed by physical therapy  daily to address goals. Discharge Recommendations: Marvin Arreguin  Further Equipment Recommendations for Discharge: None     SUBJECTIVE:   Patient stated Don't touch them.     OBJECTIVE DATA SUMMARY:   HISTORY:    Past Medical History:   Diagnosis Date    AAA (abdominal aortic aneurysm) (HCC)     Arthritis     ra, osteoporosis, osteoarthritis    Atrial fibrillation (HCC)     COPD     HTN (hypertension)     [facility]     Smoker     TIA (transient ischemic attack)     TIA 9/30/16     Past Surgical History:   Procedure Laterality Date    HX CHOLECYSTECTOMY      HX HYSTERECTOMY      HX ORTHOPAEDIC      carpal tunnel, wrist surgery     Prior Level of Function/Home Situation: Lives with daughter. Household mobility with RW.  Pt just got a wheelchair  Personal factors and/or comorbidities impacting plan of care: smoker, afib, COPD, arthritis, osteopenia, debility    Home Situation  Home Environment: Private residence  # Steps to Enter: 0  One/Two Story Residence: Two story (hasnt come downstairs in 3 months)  Living Alone: No  Support Systems: Child(jyoti)  Patient Expects to be Discharged to[de-identified] Skilled nursing facility  Current DME Used/Available at Home: Leidy Freitas, delores, Wheelchair    EXAMINATION/PRESENTATION/DECISION MAKING:   Critical Behavior:  Neurologic State: Alert  Orientation Level: Oriented X4  Cognition: Appropriate safety awareness, Follows commands     Hearing: Auditory  Auditory Impairment: None  Skin:    Edema:   Range Of Motion:  AROM: Grossly decreased, non-functional           PROM: Grossly decreased, non-functional (0-15 degrees)           Strength:    Strength: Grossly decreased, non-functional                    Tone & Sensation:   Tone: Normal              Sensation: Intact               Coordination:  Coordination: Grossly decreased, non-functional  Vision:      Functional Mobility:  Bed Mobility:  Rolling: Total assistance;Assist x2  Supine to Sit: Total assistance;Assist x2 (unable to achieve)        Transfers:                             Balance:   Sitting:  (unable to tolerate)  Ambulation/Gait Training:                                                         Stairs: Therapeutic Exercises:    Ankle pumps    Functional Measure:  Tinetti test:    Sitting Balance: 0  Arises: 0  Attempts to Rise: 0  Immediate Standing Balance: 0  Standing Balance: 0  Nudged: 0  Eyes Closed: 0  Turn 360 Degrees - Continuous/Discontinuous: 0  Turn 360 Degrees - Steady/Unsteady: 0  Sitting Down: 0  Balance Score: 0  Indication of Gait: 0  R Step Length/Height: 0  L Step Length/Height: 0  R Foot Clearance: 0  L Foot Clearance: 0  Step Symmetry: 0  Step Continuity: 0  Path: 0  Trunk: 0  Walking Time: 0  Gait Score: 0  Total Score: 0       Tinetti Test and G-code impairment scale:  Percentage of Impairment CH    0%   CI    1-19% CJ    20-39% CK    40-59% CL    60-79% CM    80-99% CN     100%   Tinetti  Score 0-28 28 23-27 17-22 12-16 6-11 1-5 0       Tinetti Tool Score Risk of Falls  <19 = High Fall Risk  19-24 = Moderate Fall Risk  25-28 = Low Fall Risk  Tinetti ME. Performance-Oriented Assessment of Mobility Problems in Elderly Patients. [facility]; [id number]. (Scoring Description: PT Bulletin Feb. 10, 1993)    Older adults: Hemalatha Dee et al, 2009; n = 1000 [facility] elderly evaluated with ABC, NEHAL, ADL, and IADL)  · Mean NEHAL score for males aged 69-68 years = 26.21(3.40)  · Mean NEHAL score for females age 69-68 years = 25.16(4.30)  · Mean NEHAL score for males over 80 years = 23.29(6.02)  · Mean NEHAL score for females over 80 years = 17.20(8.32)         G codes: In compliance with CMSs Claims Based Outcome Reporting, the following G-code set was chosen for this patient based on their primary functional limitation being treated: The outcome measure chosen to determine the severity of the functional limitation was the Tinetti with a score of 0/28 which was correlated with the impairment scale.     ? Mobility - Walking and Moving Around:     - CURRENT STATUS: CN - 100% impaired, limited or restricted    - GOAL STATUS: CM - 80%-99% impaired, limited or restricted    - D/C STATUS:  ---------------To be determined---------------      Physical Therapy Evaluation Charge Determination   History Examination Presentation Decision-Making   HIGH Complexity :3+ comorbidities / personal factors will impact the outcome/ POC  MEDIUM Complexity : 3 Standardized tests and measures addressing body structure, function, activity limitation and / or participation in recreation  LOW Complexity : Stable, uncomplicated  Other outcome measures Tinetti  LOW       Based on the above components, the patient evaluation is determined to be of the following complexity level: LOW     Pain:  Pain Scale 1: (P) Numeric (0 - 10)  Pain Intensity 1: (P) 8  Pain Location 1: (P) Knee  Pain Orientation 1: (P) Left;Right  Pain Description 1: Aching;Constant; Sore  Pain Intervention(s) 1: Medication (see MAR)  Activity Tolerance:   Poor  Please refer to the flowsheet for vital signs taken during this treatment. After treatment:   []         Patient left in no apparent distress sitting up in chair  []         Patient left in no apparent distress in bed  []         Call bell left within reach  []         Nursing notified  []         Caregiver present  []         Bed alarm activated    COMMUNICATION/EDUCATION:   The patients plan of care was discussed with: Registered Nurse. [x]         Fall prevention education was provided and the patient/caregiver indicated understanding. [x]         Patient/family have participated as able in goal setting and plan of care. [x]         Patient/family agree to work toward stated goals and plan of care. []         Patient understands intent and goals of therapy, but is neutral about his/her participation. []         Patient is unable to participate in goal setting and plan of care.     Thank you for this referral.  Penelope Carver, PT   Time Calculation: 25 mins

## 2018-08-27 ENCOUNTER — APPOINTMENT (OUTPATIENT)
Dept: MRI IMAGING | Age: 83
DRG: 544 | End: 2018-08-27
Attending: PHYSICIAN ASSISTANT
Payer: MEDICARE

## 2018-08-27 PROCEDURE — 97165 OT EVAL LOW COMPLEX 30 MIN: CPT | Performed by: OCCUPATIONAL THERAPIST

## 2018-08-27 PROCEDURE — 74011250637 HC RX REV CODE- 250/637: Performed by: INTERNAL MEDICINE

## 2018-08-27 PROCEDURE — 77030018846 HC SOL IRR STRL H20 ICUM -A

## 2018-08-27 PROCEDURE — 74011250636 HC RX REV CODE- 250/636: Performed by: INTERNAL MEDICINE

## 2018-08-27 PROCEDURE — 74011000250 HC RX REV CODE- 250: Performed by: INTERNAL MEDICINE

## 2018-08-27 PROCEDURE — 65270000029 HC RM PRIVATE

## 2018-08-27 PROCEDURE — 97535 SELF CARE MNGMENT TRAINING: CPT | Performed by: OCCUPATIONAL THERAPIST

## 2018-08-27 PROCEDURE — 77030038269 HC DRN EXT URIN PURWCK BARD -A

## 2018-08-27 PROCEDURE — 77030027138 HC INCENT SPIROMETER -A

## 2018-08-27 PROCEDURE — 73721 MRI JNT OF LWR EXTRE W/O DYE: CPT

## 2018-08-27 RX ORDER — LORAZEPAM 2 MG/ML
0.5 INJECTION INTRAMUSCULAR ONCE
Status: COMPLETED | OUTPATIENT
Start: 2018-08-27 | End: 2018-08-27

## 2018-08-27 RX ADMIN — ZOLPIDEM TARTRATE 5 MG: 5 TABLET ORAL at 00:12

## 2018-08-27 RX ADMIN — AMLODIPINE BESYLATE 5 MG: 5 TABLET ORAL at 08:46

## 2018-08-27 RX ADMIN — HYDROMORPHONE HYDROCHLORIDE 1 MG: 2 INJECTION, SOLUTION INTRAMUSCULAR; INTRAVENOUS; SUBCUTANEOUS at 04:49

## 2018-08-27 RX ADMIN — HYDROCODONE BITARTRATE AND ACETAMINOPHEN 1 TABLET: 10; 325 TABLET ORAL at 00:12

## 2018-08-27 RX ADMIN — Medication 10 ML: at 10:17

## 2018-08-27 RX ADMIN — FOLIC ACID 1 MG: 1 TABLET ORAL at 08:45

## 2018-08-27 RX ADMIN — HYDROCODONE BITARTRATE AND ACETAMINOPHEN 1 TABLET: 10; 325 TABLET ORAL at 08:45

## 2018-08-27 RX ADMIN — HYDROCODONE BITARTRATE AND ACETAMINOPHEN 1 TABLET: 10; 325 TABLET ORAL at 18:53

## 2018-08-27 RX ADMIN — Medication 10 ML: at 21:00

## 2018-08-27 RX ADMIN — Medication 10 ML: at 12:02

## 2018-08-27 RX ADMIN — ENOXAPARIN SODIUM 40 MG: 40 INJECTION, SOLUTION INTRAVENOUS; SUBCUTANEOUS at 08:45

## 2018-08-27 RX ADMIN — LORAZEPAM 0.5 MG: 2 INJECTION INTRAMUSCULAR; INTRAVENOUS at 12:02

## 2018-08-27 RX ADMIN — ACETAMINOPHEN 650 MG: 325 TABLET ORAL at 20:57

## 2018-08-27 RX ADMIN — Medication 1 TABLET: at 08:45

## 2018-08-27 RX ADMIN — DIGOXIN 0.12 MG: 125 TABLET ORAL at 08:46

## 2018-08-27 RX ADMIN — HYDROMORPHONE HYDROCHLORIDE 1 MG: 2 INJECTION, SOLUTION INTRAMUSCULAR; INTRAVENOUS; SUBCUTANEOUS at 10:17

## 2018-08-27 RX ADMIN — POLYETHYLENE GLYCOL 3350 17 G: 17 POWDER, FOR SOLUTION ORAL at 08:45

## 2018-08-27 NOTE — PROGRESS NOTES
Patient will need to be premedicated prior to MRI tomorrow due to inability to tolerate transfer to MRI table. We will coordinate with hospitalist team to premedicate patient.       Theodora Lee PA-C  Orthopaedic Surgery PA  [address] [place] [address]

## 2018-08-27 NOTE — PROGRESS NOTES
Orthopaedic Progress Note  Post Op day: * No surgery found *    2018 11:10 AM     Patient: Sonali Morales MRN: [id number]  SSN: [id number]    YOB: 1931  Age: 80 y.o. Sex: female      Admit date:  2018  Date of Surgery:  * No surgery found *   Procedures:    Admitting Physician:  Debra Gupta DO   Surgeon:  * Surgery not found *    Consulting Physician(s): Treatment Team: Attending Provider: Anirudh Srinivasan MD; Consulting Provider: David Mcfarlane MD; Consulting Provider: JAVIER Briceno; Utilization Review: [address]; Care Manager: Lisa Carson    SUBJECTIVE:  Estela Lopez is a 80 y.o. female with severe bilateral knee pain. Pt is on chronic pain medications for her RA. CT reveals bilateral lateral tibial plateau fx with 2 mm max articular surface depression on right. Left LE without significant surface depression. Daughter at bedside. OBJECTIVE:       Physical Exam:  General: Alert, cooperative, no distress. Respiratory: Respirations unlabored  Neurological:  Neurovascular exam within normal limits. Motor: + DF/PF. Musculoskeletal: Bilateral LE with moderate effusions. Did not range knees due to patients request. Lidoderm patches on knees. Calves tender to palpation. Skin is painful to touch throughout. PP +2. + DP/PF  Dressing/Wound:  Clean, dry and intact. No significant erythema or swelling.       Vital Signs:      Patient Vitals for the past 8 hrs:   BP Temp Pulse Resp SpO2   18 0749 126/68 98.1 °F (36.7 °C) 79 16 96 %   18 0503 129/73 98 °F (36.7 °C) 77 20 93 %                                          Temp (24hrs), Av.1 °F (36.7 °C), Min:97.5 °F (36.4 °C), Max:98.6 °F (37 °C)      Labs:        Recent Labs      18   0439   HCT  37.6   HGB  11.9     Lab Results   Component Value Date/Time    Sodium 135 (L) 2018 04:39 AM    Potassium 4.3 2018 04:39 AM    Chloride 99 08/26/2018 04:39 AM    CO2 28 08/26/2018 04:39 AM    Glucose 104 (H) 08/26/2018 04:39 AM    BUN 16 08/26/2018 04:39 AM    Creatinine 0.80 08/26/2018 04:39 AM    Calcium 8.8 08/26/2018 04:39 AM       PT/OT:                Patient mobility  Bed Mobility Training  Rolling: Total assistance  Supine to Sit: Total assistance (unable to tolerate)  Transfer Training  Sit to Stand:  (unable to tolerate sitting)                   ASSESSMENT / PLAN:   Principal Problem:    Tibial plateau fracture (2/39/4296)    Active Problems:    Paroxysmal A-fib ([facility]) (9/30/2016)      RA (rheumatoid arthritis) ([facility]) (9/30/2016)      [facility] (9/30/2016)      Tobacco abuse (8/25/2018)      Hypertension (8/25/2018)          A/P:  Bilateral lateral tibial plateau fx;   Right with 2 mm of articular surface depression/ Left without significant surface depression  Dr. Agnes German suggested asp/inj and subchondroplasty tomorrow afternoon. Discussed in detail with patient/ daughter. Daughter would like to discuss with Dr. Agnes German about more details of surgery. Per Dr. Agnes German, to proceed with MRI of bilateral knees for further evaluation of fractures/ pt will need to be premedicated prior to MRI  PT to work with patient to do transfers only at this time due to patients pain level. Continue to follow along.                Signed By:  Amie Curiel NP    Orthopedic Trauma/ Surgery   [address] [place] [address]

## 2018-08-27 NOTE — PROGRESS NOTES
[facility] [address]  [address] [facility], [address] [place] [address]  [phone]      Medical Progress Note      NAME: Bronwyn Ashby   :  1931  MRM:  [id number]    Date/Time: 2018  10:43 AM       Assessment and Plan:   1. Tibial plateau fractures, bilateral (2018). CT scan: Bilateral lateral tibial plateau insufficiency fractures, more conspicuous on the right with 2 mm maximum articular surface depression. Evaluated by ortho. Pain management                            2.  Paroxysmal A-fib ([facility] (2016). On digoxin                          3.  RA (rheumatoid arthritis) ([facility] (2016). Continue to hold methotrexate. 4.  COPD (chronic obstructive pulmonary disease) ([facility] (2016). Stable                           5.  Tobacco abuse (2018). Counseled. 6.  Hypertension (2018). On amlodipine. Subjective:     Chief Complaint:  Follow up of pt who was admitted with tibial plateau fracture. BL leg pain with movement     ROS:  (bold if positive, if negative)      Tolerating PT  Tolerating Diet        Objective:     Last 24hrs VS reviewed since prior progress note.  Most recent are:    Visit Vitals    /68 (BP 1 Location: Left arm, BP Patient Position: At rest)    Pulse 79    Temp 98.1 °F (36.7 °C)    Resp 16    Ht 5' 7\" (1.702 m)    Wt 61.2 kg (135 lb)    SpO2 96%    Breastfeeding No    BMI 21.14 kg/m2     SpO2 Readings from Last 6 Encounters:   18 96%   10/04/16 97%   10/01/16 94%   13 94%        No intake or output data in the 24 hours ending 18 1043     Physical Exam:    Gen:  Well-developed, well-nourished, in no acute distress  HEENT:  Pink conjunctivae, PERRL, hearing intact to voice, moist mucous membranes  Neck:  Supple, without masses, thyroid non-tender  Resp:  No accessory muscle use, clear breath sounds without wheezes rales or rhonchi  Card:  No murmurs, normal S1, S2 without thrills, bruits or peripheral edema  Abd:  Soft, non-tender, non-distended, normoactive bowel sounds are present, no palpable organomegaly and no detectable hernias  Lymph:  No cervical or inguinal adenopathy  Musc:  No cyanosis or clubbing  Skin:  No rashes or ulcers, skin turgor is good  Neuro:  Cranial nerves are grossly intact, no focal motor weakness, follows commands appropriately  Psych:  Good insight, oriented to person, place and time, alert  __________________________________________________________________  Medications Reviewed: (see below)  Medications:     Current Facility-Administered Medications   Medication Dose Route Frequency    lidocaine (SALONPAS/ASPERCREME) 4 % patch 2 Patch  2 Patch TransDERmal Q24H    senna-docusate (PERICOLACE) 8.6-50 mg per tablet 1 Tab  1 Tab Oral BID    polyethylene glycol (MIRALAX) packet 17 g  17 g Oral BID    sodium chloride (NS) flush 5-10 mL  5-10 mL IntraVENous Q8H    sodium chloride (NS) flush 5-10 mL  5-10 mL IntraVENous PRN    naloxone (NARCAN) injection 0.4 mg  0.4 mg IntraVENous PRN    zolpidem (AMBIEN) tablet 5 mg  5 mg Oral QHS PRN    acetaminophen (TYLENOL) tablet 650 mg  650 mg Oral Q4H PRN    ondansetron (ZOFRAN) injection 4 mg  4 mg IntraVENous Q4H PRN    enoxaparin (LOVENOX) injection 40 mg  40 mg SubCUTAneous Q24H    HYDROmorphone (PF) (DILAUDID) injection 1 mg  1 mg IntraVENous Q4H PRN    HYDROcodone-acetaminophen (NORCO)  mg tablet 1 Tab  1 Tab Oral Q4H PRN    amLODIPine (NORVASC) tablet 5 mg  5 mg Oral DAILY    folic acid (FOLVITE) tablet 1 mg  1 mg Oral DAILY    digoxin (LANOXIN) tablet 0.125 mg  0.125 mg Oral DAILY    nicotine (NICORETTE) gum 2 mg  2 mg Oral Q2H PRN        Lab Data Reviewed: (see below)  Lab Review:     Recent Labs      08/26/18   0439  08/25/18   1739   WBC  11.1*  11.0   HGB  11.9  12.9   HCT  37.6  39.4   PLT  253  294     Recent Labs      08/26/18   0439  08/25/18   1739   NA  135*  134*   K  4.3  4.1   CL  99  100   CO2  28 29   GLU  104*  101*   BUN  16  14   CREA  0.80  0.77   CA  8.8  9.3   MG  1.6   --    PHOS  4.2   --    ALB   --   3.3*   TBILI   --   0.8   SGOT   --   17   ALT   --   12     Lab Results   Component Value Date/Time    Glucose (POC) 93 10/01/2016 11:33 AM    Glucose (POC) 91 10/01/2016 07:33 AM     No results for input(s): PH, PCO2, PO2, HCO3, FIO2 in the last 72 hours. No results for input(s): INR in the last 72 hours. No lab exists for component: INREXT  All Micro Results     None          I have reviewed notes of prior 24hr. Other pertinent lab:       Total time spent with patient: [address] discussed with: Patient, Family, Nursing Staff, Consultant/Specialist and >50% of time spent in counseling and coordination of care    Discussed:  Care Plan    Prophylaxis:  Lovenox    Disposition:  Home w/Family           ___________________________________________________    Attending Physician: Joseph Krabbe, MD

## 2018-08-27 NOTE — PROGRESS NOTES
[id number] aggarwal/ radiologist providing final CT report for BL knees. MD stated pt has BL tibial plateau fractures & that MRI will not be necessary. Will notify hospitalist & ortho. MD Jam Cedillo & Deshaun Kemp NP aware of the previous. 1945: Bedside and Verbal shift change report given to [address] (oncoming nurse) by Luna GALICIA (offgoing nurse). Report included the following information SBAR, Kardex, Intake/Output and Recent Results.

## 2018-08-27 NOTE — PROGRESS NOTES
Problem: Self Care Deficits Care Plan (Adult)  Goal: *Acute Goals and Plan of Care (Insert Text)  Occupational Therapy Goals  Initiated 8/27/2018  1. Patient will perform lower body dressing bed level with maximal assistance using adaptive equipment as needed within 7 day(s). 2.  Patient will perform toilet transfers with maximal assistance using best technique and DME for safety within 7 day(s). 3.  Patient will perform all aspects of toileting with maximal assistance within 7 day(s). 4.  Patient will participate in upper extremity therapeutic exercise/activities with independence for 10 minutes within 7 day(s). 5.  Patient will utilize energy conservation techniques during functional activities with verbal and visual cues within 7 day(s). Occupational Therapy EVALUATION  Patient: Scott Jones (64 y.o. female)  Date: 8/27/2018  Primary Diagnosis: Tibial plateau fracture        Precautions:  Fall, NWB (BLEs, immbolizer on and PROM 0-90)    ASSESSMENT :  Based on the objective data described below, the patient presents with c/o 10/10 BLE pain with touch and movement, decreased strength, endurance, mobility, balance and safety. Pt with fall coming down stairs from 2nd floor apartment with resulting B tibial plateau fxs. She currently requires up to min A for UE ADLs, total A for LE ADLs, toileting and bed mobility. After session spoke with MD who stated potential sx by ortho tomorrow. Based on above at this time pt in too much pain to benefit from OT services. RN and MD aware. MD then stated to hold further therapy until pain better managed and medical POC decided by ortho. Recommend rehab at discharge. Patient will benefit from skilled intervention to address the above impairments.   Patients rehabilitation potential is considered to be Guarded  Factors which may influence rehabilitation potential include:   []             None noted  []             Mental ability/status  [] Medical condition  []             Home/family situation and support systems  []             Safety awareness  [x]             Pain tolerance/management  []             Other:      PLAN :  Recommendations and Planned Interventions:  [x]               Self Care Training                  [x]        Therapeutic Activities  [x]               Functional Mobility Training    []        Cognitive Retraining  [x]               Therapeutic Exercises           [x]        Endurance Activities  [x]               Balance Training                   []        Neuromuscular Re-Education  []               Visual/Perceptual Training     [x]   Home Safety Training  [x]               Patient Education                 [x]        Family Training/Education  []               Other (comment):    Frequency/Duration: Patient will be followed by occupational therapy 3 times a week to address goals. Discharge Recommendations: Rehab  Further Equipment Recommendations for Discharge: TBD     SUBJECTIVE:   Patient stated I would rather be wet then have to move.     OBJECTIVE DATA SUMMARY:   HISTORY:   Past Medical History:   Diagnosis Date    AAA (abdominal aortic aneurysm) (HCC)     Arthritis     ra, osteoporosis, osteoarthritis    Atrial fibrillation (HCC)     COPD     HTN (hypertension)     [facility]) ([facility]     Smoker     TIA (transient ischemic attack)     TIA 9/30/16     Past Surgical History:   Procedure Laterality Date    HX CHOLECYSTECTOMY      HX HYSTERECTOMY      HX ORTHOPAEDIC      carpal tunnel, wrist surgery       Prior Level of Function/Environment/Context: Per pt and her daughter, pt resided alone in a 2nd floor apt where she has not left in >3 months. She amb with RW, is mod I with ADLs and light IADLs. She has not fallen in several years.   Pt was moving to son's home when she fell down her stairs  Home Situation  Home Environment: Private residence (was in 2nd floor apartment- moving to son's home)  # Steps to Enter: 3  Rails to Enter: Yes  One/Two Story Residence: Two story, live on 1st floor  Lift Chair Available: No  Living Alone: No (will be living with her son)  Support Systems: Child(jyoti), Family member(s)  Patient Expects to be Discharged to[de-identified] Rehabilitation facility  Current DME Used/Available at Home: Colonel Jest, rolling, Walker, rollator, Grab bars, Raised toilet seat  Tub or Shower Type: Tub/Shower combination    Hand dominance: Right    EXAMINATION OF PERFORMANCE DEFICITS:  Cognitive/Behavioral Status:  Neurologic State: Alert  Orientation Level: Oriented X4  Cognition: Decreased attention/concentration; Follows commands  Perception: Appears intact  Perseveration: No perseveration noted  Safety/Judgement: Awareness of environment;Decreased awareness of need for assistance;Decreased awareness of need for safety; Fall prevention; Insight into deficits    Hearing: Auditory  Auditory Impairment: None    Vision/Perceptual:    Acuity: Able to read clock/calendar on wall without difficulty         Range of Motion:  AROM: Grossly decreased, non-functional (BLEs)  PROM: Grossly decreased, non-functional (BLEs)                      Strength:  Strength: Grossly decreased, non-functional (BLEs)                Coordination:  Coordination: Generally decreased, functional (UEs)  Fine Motor Skills-Upper: Left Impaired;Right Impaired    Gross Motor Skills-Upper: Left Intact; Right Intact    Tone & Sensation:  Tone: Normal  Sensation: Intact                      Balance:  Sitting:  (unable to tolerate)    Functional Mobility and Transfers for ADLs:  Bed Mobility:  Rolling: Total assistance  Supine to Sit: Total assistance (unable to tolerate)    Transfers:  Sit to Stand:  (unable to tolerate sitting)  Toilet Transfer : Total assistance (bed level only)    ADL Assessment and Intervention:  Feeding: Setup; Additional time (seated in bed)    Oral Facial Hygiene/Grooming: Setup; Additional time (seated in bed)    Bathing:  Moderate assistance; Additional time;Assist x1 (A to reach buttocks and LEs)    Upper Body Dressing: Minimum assistance; Additional time;Assist x1 (bed level- A due to constraints of bed)    Lower Body Dressing: Total assistance    Toileting: Total assistance (pt is incontinent of urine)- pt required bathing and bed linen change due to incontinence during session    Cognitive Retraining  Safety/Judgement: Awareness of environment;Decreased awareness of need for assistance;Decreased awareness of need for safety; Fall prevention; Insight into deficits      Functional Measure:  Barthel Index:    Bathin  Bladder: 5  Bowels: 5  Groomin  Dressin  Feedin  Mobility: 0  Stairs: 0  Toilet Use: 0  Transfer (Bed to Chair and Back): 0  Total: 15       Barthel and G-code impairment scale:  Percentage of impairment CH  0% CI  1-19% CJ  20-39% CK  40-59% CL  60-79% CM  80-99% CN  100%   Barthel Score 0-100 100 99-80 79-60 59-40 20-39 1-19   0   Barthel Score 0-20 20 17-19 13-16 9-12 5-8 1-4 0      The Barthel ADL Index: Guidelines  1. The index should be used as a record of what a patient does, not as a record of what a patient could do. 2. The main aim is to establish degree of independence from any help, physical or verbal, however minor and for whatever reason. 3. The need for supervision renders the patient not independent. 4. A patient's performance should be established using the best available evidence. Asking the patient, friends/relatives and nurses are the usual sources, but direct observation and common sense are also important. However direct testing is not needed. 5. Usually the patient's performance over the preceding 24-48 hours is important, but occasionally longer periods will be relevant. 6. Middle categories imply that the patient supplies over 50 per cent of the effort. 7. Use of aids to be independent is allowed. Silvia Frey., Barthel, D.W. ([id number]). Functional evaluation: the Barthel Index.  [place] [address] (14)2. ALFONSO Lemos, Pepe Novka., Shonda Jamison., Khushi, [address] (1999). Measuring the change indisability after inpatient rehabilitation; comparison of the responsiveness of the Barthel Index and Functional [place] Measure. Journal of Neurology, Neurosurgery, and Psychiatry, 66(4), 269-731. MICHAEL Ivory, SANDIP Underwood, & Mercedes Reddy M.A. (2004.) Assessment of post-stroke quality of life in cost-effectiveness studies: The usefulness of the Barthel Index and the EuroQoL-5D. Quality of Life Research, 13, 447-48       G codes: In compliance with CMSs Claims Based Outcome Reporting, the following G-code set was chosen for this patient based on their primary functional limitation being treated: The outcome measure chosen to determine the severity of the functional limitation was the Barthel Index with a score of 15/100 which was correlated with the impairment scale. ? Self Care:     - CURRENT STATUS: CM - 80%-99% impaired, limited or restricted    - GOAL STATUS: CL - 60%-79% impaired, limited or restricted    - D/C STATUS:  ---------------To be determined---------------     Occupational Therapy Evaluation Charge Determination   History Examination Decision-Making   LOW Complexity : Brief history review  HIGH Complexity : 5 or more performance deficits relating to physical, cognitive , or psychosocial skils that result in activity limitations and / or participation restrictions MEDIUM Complexity : Patient may present with comorbidities that affect occupational performnce.  Miniml to moderate modification of tasks or assistance (eg, physical or verbal ) with assesment(s) is necessary to enable patient to complete evaluation       Based on the above components, the patient evaluation is determined to be of the following complexity level: LOW   Pain:  Pain Scale 1: Numeric (0 - 10)  Pain Intensity 1: 10  Pain Location 1: Knee  Pain Orientation 1: Left;Right  Pain Description 1: Aching; Sore  Pain Intervention(s) 1: Medication (see MAR); Ice  Activity Tolerance:   Poor  Please refer to the flowsheet for vital signs taken during this treatment. After treatment:   [] Patient left in no apparent distress sitting up in chair  [x] Patient left in no apparent distress in bed  [x] Call bell left within reach  [x] Nursing notified  [x] Caregiver present - pt's daughter  [] Bed alarm activated    COMMUNICATION/EDUCATION:   The patients plan of care was discussed with: Physical Therapy Assistant, Registered Nurse and Certified Nursing Assistant/Patient Care Technician. [x] Home safety education was provided and the patient/caregiver indicated understanding. [x] Patient/family have participated as able in goal setting and plan of care. [x] Patient/family agree to work toward stated goals and plan of care. [] Patient understands intent and goals of therapy, but is neutral about his/her participation. [] Patient is unable to participate in goal setting and plan of care. This patients plan of care is appropriate for delegation to [facility].     Thank you for this referral.  Selma Rivas OT  Time Calculation: 28 mins

## 2018-08-27 NOTE — PROGRESS NOTES
Physical therapy    1100 chart reviewed, spoke with RN and Ortho PA. CT scan reveals Bilateral lateral tibial plateau insufficiency fractures, more conspicuous on the right with 2 mm maximum articular surface depression. NWB on BLE. Per RN pt with increased pain and does not tolerate bed mobility well with nursing. Per Dr. Francine Dukes PT today. PT will continue to follow. Wendi Kohler Min

## 2018-08-27 NOTE — PROGRESS NOTES
8/27/2018  10:14 AM  Reason for Admission:   Tibial plateau fracture                  RRAT Score:     20             Do you (patient/family) have any concerns for transition/discharge? Plan for utilizing home health:     TBD. Pt will likely require SNF prior to returning home. Likelihood of readmission? Yellow            Transition of Care Plan:          CM met with pt for assessment and pt's DTR, Mary Prado ([id number]) for assessment. Demographics and PCP were confirmed. DTR reported pt primarily uses [facility] as her PCP had moved to this practice and pt needs a new one. CM provided [facility]. CM provided [facility] information. Pt is a 80year old,  female who was living in a 2nd floor apartment alone, but is moving to live with her grandson, Ericka Kumari, at [address], [address] [facility] [address] (3 exterior steps, 0 interior steps). PTA, pt was able to complete ADLs with the use of a RW and rollator. Pt has prescription drug coverage. DTR had made plans to have a private PCA come into the home for a coupe hours 2 times per week. Pt will mostly be alone during the day, but her RICKY is retired and lives nearby if needed. CM spoke with pt and pt's DTR about pt potentially requiring SNF (per PT note) upon discharge. CM provided DTR with education on SNF rehabs and their benefits. CM provided listing and requested 3 choices in the event SNF is final disposition. Pt has no prior hh, rehab or SNF. Awaiting SNF choices and finalized DC recommendations. Ramirez Burciaga MA    Care Management Interventions  PCP Verified by CM: Yes (Needs new PCP. [facility] listing provided.)  Palliative Care Criteria Met (RRAT>21 & CHF Dx)?: No  Mode of Transport at Discharge:  Other (see comment) (TBD)  MyChart Signup: No  Discharge Durable Medical Equipment: No  Physical Therapy Consult: Yes  Occupational Therapy Consult: Yes  Speech Therapy Consult: No  Current Support Network: Family Lives [facility] (Moving in with grandson.  DTR lives [place])  Confirm Follow Up Transport: Family  Plan discussed with Pt/Family/Caregiver: Yes  Freedom of Choice Offered: Yes  Discharge Location  Discharge Placement: Unable to determine at this time

## 2018-08-28 PROCEDURE — 74011250636 HC RX REV CODE- 250/636: Performed by: NURSE PRACTITIONER

## 2018-08-28 PROCEDURE — 65270000029 HC RM PRIVATE

## 2018-08-28 PROCEDURE — 77030038269 HC DRN EXT URIN PURWCK BARD -A

## 2018-08-28 PROCEDURE — 0S9C3ZZ DRAINAGE OF RIGHT KNEE JOINT, PERCUTANEOUS APPROACH: ICD-10-PCS | Performed by: NURSE PRACTITIONER

## 2018-08-28 PROCEDURE — 74011250637 HC RX REV CODE- 250/637: Performed by: INTERNAL MEDICINE

## 2018-08-28 PROCEDURE — 74011250636 HC RX REV CODE- 250/636: Performed by: INTERNAL MEDICINE

## 2018-08-28 PROCEDURE — 3E0U3BZ INTRODUCTION OF ANESTHETIC AGENT INTO JOINTS, PERCUTANEOUS APPROACH: ICD-10-PCS | Performed by: NURSE PRACTITIONER

## 2018-08-28 PROCEDURE — 74011000250 HC RX REV CODE- 250: Performed by: NURSE PRACTITIONER

## 2018-08-28 PROCEDURE — 0S9D3ZZ DRAINAGE OF LEFT KNEE JOINT, PERCUTANEOUS APPROACH: ICD-10-PCS | Performed by: NURSE PRACTITIONER

## 2018-08-28 PROCEDURE — 76010000093 HC SPECIAL PROCEDURE

## 2018-08-28 PROCEDURE — 97110 THERAPEUTIC EXERCISES: CPT

## 2018-08-28 PROCEDURE — 51798 US URINE CAPACITY MEASURE: CPT

## 2018-08-28 PROCEDURE — 77010033678 HC OXYGEN DAILY

## 2018-08-28 PROCEDURE — 74011000250 HC RX REV CODE- 250: Performed by: INTERNAL MEDICINE

## 2018-08-28 PROCEDURE — 3E0U33Z INTRODUCTION OF ANTI-INFLAMMATORY INTO JOINTS, PERCUTANEOUS APPROACH: ICD-10-PCS | Performed by: NURSE PRACTITIONER

## 2018-08-28 RX ORDER — BUPIVACAINE HYDROCHLORIDE 5 MG/ML
5 INJECTION, SOLUTION EPIDURAL; INTRACAUDAL ONCE
Status: COMPLETED | OUTPATIENT
Start: 2018-08-28 | End: 2018-08-28

## 2018-08-28 RX ORDER — LIDOCAINE HYDROCHLORIDE 10 MG/ML
5 INJECTION, SOLUTION EPIDURAL; INFILTRATION; INTRACAUDAL; PERINEURAL ONCE
Status: COMPLETED | OUTPATIENT
Start: 2018-08-28 | End: 2018-08-28

## 2018-08-28 RX ORDER — TRIAMCINOLONE ACETONIDE 40 MG/ML
80 INJECTION, SUSPENSION INTRA-ARTICULAR; INTRAMUSCULAR ONCE
Status: COMPLETED | OUTPATIENT
Start: 2018-08-28 | End: 2018-08-28

## 2018-08-28 RX ADMIN — ENOXAPARIN SODIUM 40 MG: 40 INJECTION, SOLUTION INTRAVENOUS; SUBCUTANEOUS at 08:42

## 2018-08-28 RX ADMIN — LIDOCAINE HYDROCHLORIDE 5 ML: 10 INJECTION, SOLUTION EPIDURAL; INFILTRATION; INTRACAUDAL; PERINEURAL at 11:00

## 2018-08-28 RX ADMIN — HYDROMORPHONE HYDROCHLORIDE 1 MG: 2 INJECTION, SOLUTION INTRAMUSCULAR; INTRAVENOUS; SUBCUTANEOUS at 11:01

## 2018-08-28 RX ADMIN — Medication 10 ML: at 06:28

## 2018-08-28 RX ADMIN — BUPIVACAINE HYDROCHLORIDE 25 MG: 5 INJECTION, SOLUTION EPIDURAL; INTRACAUDAL; PERINEURAL at 11:00

## 2018-08-28 RX ADMIN — POLYETHYLENE GLYCOL 3350 17 G: 17 POWDER, FOR SOLUTION ORAL at 18:27

## 2018-08-28 RX ADMIN — FOLIC ACID 1 MG: 1 TABLET ORAL at 08:42

## 2018-08-28 RX ADMIN — Medication 1 TABLET: at 08:42

## 2018-08-28 RX ADMIN — Medication 10 ML: at 14:00

## 2018-08-28 RX ADMIN — HYDROCODONE BITARTRATE AND ACETAMINOPHEN 1 TABLET: 10; 325 TABLET ORAL at 01:40

## 2018-08-28 RX ADMIN — TRIAMCINOLONE ACETONIDE 80 MG: 40 INJECTION, SUSPENSION INTRA-ARTICULAR; INTRAMUSCULAR at 11:00

## 2018-08-28 RX ADMIN — LIDOCAINE HYDROCHLORIDE 5 ML: 10 INJECTION, SOLUTION EPIDURAL; INFILTRATION; INTRACAUDAL; PERINEURAL at 12:00

## 2018-08-28 RX ADMIN — HYDROCODONE BITARTRATE AND ACETAMINOPHEN 1 TABLET: 10; 325 TABLET ORAL at 18:27

## 2018-08-28 RX ADMIN — NICOTINE POLACRILEX 2 MG: 2 GUM, CHEWING ORAL at 20:13

## 2018-08-28 RX ADMIN — Medication 1 TABLET: at 18:27

## 2018-08-28 RX ADMIN — NICOTINE POLACRILEX 2 MG: 2 GUM, CHEWING ORAL at 15:35

## 2018-08-28 RX ADMIN — HYDROCODONE BITARTRATE AND ACETAMINOPHEN 1 TABLET: 10; 325 TABLET ORAL at 08:42

## 2018-08-28 RX ADMIN — POLYETHYLENE GLYCOL 3350 17 G: 17 POWDER, FOR SOLUTION ORAL at 08:42

## 2018-08-28 RX ADMIN — HYDROCODONE BITARTRATE AND ACETAMINOPHEN 1 TABLET: 10; 325 TABLET ORAL at 06:28

## 2018-08-28 RX ADMIN — AMLODIPINE BESYLATE 5 MG: 5 TABLET ORAL at 08:41

## 2018-08-28 RX ADMIN — DIGOXIN 0.12 MG: 125 TABLET ORAL at 08:42

## 2018-08-28 RX ADMIN — HYDROCODONE BITARTRATE AND ACETAMINOPHEN 1 TABLET: 10; 325 TABLET ORAL at 14:38

## 2018-08-28 RX ADMIN — Medication 10 ML: at 20:15

## 2018-08-28 NOTE — PROGRESS NOTES
Problem: Mobility Impaired (Adult and Pediatric) Goal: *Acute Goals and Plan of Care (Insert Text) Physical Therapy Goals Initiated 8/26/2018 1. Patient will move from supine to sit and sit to supine , scoot up and down and roll side to side in bed with moderate assistance  within 7 day(s). 2.  Patient will transfer from bed to chair and chair to bed with maximal assistance using the least restrictive device within 7 day(s). 3.  Patient will perform sit to stand with maximal assistance within 7 day(s). 4.  Patient will ambulate with maximal assistance for 5 feet with the least restrictive device within 7 day(s). 5.  Patient will perform LE HEP with moderate assistance within 7 days. physical Therapy TREATMENT Patient: Sarah Valdivia (86 y.o. female) Date: 8/28/2018 Diagnosis: Tibial plateau fracture Tibial plateau fracture Precautions: Fall, NWB (BLEs, immbolizer on and PROM 0-90) Chart, physical therapy assessment, plan of care and goals were reviewed. ASSESSMENT: 
Pt agreeable to participate with limited therapy with max encouragement from daughter and therapist. Pt crying out from pain with lowering knees of bed. She did tolerate ankle pumps, quad sets and glute sets x 20 each. Attempted moving legs toward L edge of bed 1inch, with patient to cue therapist to initiate motion she cried out in pain and declined further attempts. Total A x2 to scoot up in bed with patient crying out. Bolstered with pillows to improve patient comfort. RN notified. Progression toward goals: 
[]    Improving appropriately and progressing toward goals [x]    Improving slowly and progressing toward goals 
[]    Not making progress toward goals and plan of care will be adjusted PLAN: 
Patient continues to benefit from skilled intervention to address the above impairments. Continue treatment per established plan of care. Discharge Recommendations:  Rehab Further Equipment Recommendations for Discharge:  TBD SUBJECTIVE:  
Patient stated Marium Bae can we end this.  OBJECTIVE DATA SUMMARY:  
Critical Behavior: 
Neurologic State: Alert Orientation Level: Oriented X4 Cognition: Follows commands Safety/Judgement: Awareness of environment, Decreased awareness of need for assistance, Decreased awareness of need for safety, Fall prevention, Insight into deficits Functional Mobility Training: 
Bed Mobility: 
Rolling: Total assistance Supine to Sit:  (unable to tolerate) Transfers: 
  
  
     
  
     
  
  
  
  
Balance: 
Sitting:  (unable to tolerate) Ambulation/Gait Training: 
  
  
  
  
  
  
  
  
  
  
  
  
  
  
  
  
  
  
Stairs: 
  
  
   
 
Neuro Re-Education: 
 
Therapeutic Exercises:  
 
Pain: 
Pain Scale 1: Numeric (0 - 10) Pain Intensity 1: 9 Pain Location 1: Knee;Leg 
Pain Orientation 1: Right;Left Pain Description 1: Aching Pain Intervention(s) 1: Medication (see MAR) Activity Tolerance:  
Poor secondary to pain Please refer to the flowsheet for vital signs taken during this treatment. After treatment:  
[]    Patient left in no apparent distress sitting up in chair 
[x]    Patient left in no apparent distress in bed 
[x]    Call bell left within reach [x]    Nursing notified 
[x]    Caregiver present [x]    Bed alarm activated COMMUNICATION/COLLABORATION:  
The patients plan of care was discussed with: Registered Nurse Liza Jorge Time Calculation: 30 mins

## 2018-08-28 NOTE — PROGRESS NOTES
Orthopaedic Progress Note Post Op day: * No surgery found * 2018 10:30 AM  
 
Patient: Curt Blake MRN: [id number]  SSN: [id number] YOB: 1931  Age: 80 y.o. Sex: female Admit date:  2018 Date of Surgery:  * No surgery found * Procedures:   
Admitting Physician:  Boone Avendano DO Surgeon:  * Surgery not found * Consulting Physician(s): Treatment Team: Attending Provider: Joseph Krabbe, MD; Consulting Provider: Layne Frederick MD; Consulting Provider: JAVIER Alfonso; Utilization Review: Ramon Fraire; Care Manager: Rocco Christian SUBJECTIVE: 
  
Curt Blake is a 80 y.o. female with severe complaints of pain in bilateral knees. MRI of bilateral knees completed yesterday. Dr. Blane Cuevas reviewed MRI's. No surgical intervention planned, recommend bilateral knee aspiration and injections. MRI reveals bilateral incomplete stress fx of lateral tibial condyle, bilateral medial meniscus tear, bilateral patellofemoral OA. OBJECTIVE: 
 
  
Physical Exam: 
General: Alert, cooperative, no distress. Respiratory: Respirations unlabored Neurological:  Neurovascular exam within normal limits. Motor: + DF/PF. Musculoskeletal: Bilateral knees with moderate sized effusions. Pt with significant pain with ROM of knees bilateral/ pt refused for me to range her knees. No erythema. Calves soft/ nontender. PP+2. Vital Signs:  
  
Patient Vitals for the past 8 hrs: 
 BP Temp Pulse Resp SpO2  
18 0944 - - - - 95 % 18 0828 116/49 - - - -  
18 0812 114/49 98.4 °F (36.9 °C) 61 16 95 % 18 0630 - 97.7 °F (36.5 °C) - - - Temp (24hrs), Av.2 °F (36.8 °C), Min:97.4 °F (36.3 °C), Max:99.1 °F (37.3 °C) Labs:  
  
 
Recent Labs  
   18 
 8331 HCT  37.6 HGB  11.9 Lab Results Component Value Date/Time  Sodium 135 (L) 2018 04:39 AM  
 Potassium 4.3 08/26/2018 04:39 AM  
 Chloride 99 08/26/2018 04:39 AM  
 CO2 28 08/26/2018 04:39 AM  
 Glucose 104 (H) 08/26/2018 04:39 AM  
 BUN 16 08/26/2018 04:39 AM  
 Creatinine 0.80 08/26/2018 04:39 AM  
 Calcium 8.8 08/26/2018 04:39 AM  
 
 
PT/OT:  
 
  
  
  
 
Patient mobility Bed Mobility Training Rolling: Total assistance Supine to Sit: Total assistance (unable to tolerate) Transfer Training Sit to Stand:  (unable to tolerate sitting) Harini Hernandezpool MRI: Right knee 1. Incomplete stress fracture of lateral tibial condyle. 2. Complex tear body of medial meniscus with loss of meniscal hoop tensile 
integrity. 3. Medial and patellofemoral osteoarthrosis. 4. Large lipohemarthrosis. MRI: Left knee Incomplete insufficiency fracture of lateral tibial condyle with large 
lipohemarthrosis. Complex tearing of body and posterior horn of medial meniscus. Probable tearing of lateral meniscus. Medial and patellofemoral osteoarthrosis. ASSESSMENT / PLAN:  
Principal Problem: 
  Tibial plateau fracture (0/12/4202) Active Problems: 
  Paroxysmal A-fib ([facility] [address] (9/30/2016) RA (rheumatoid arthritis) ([facility] [address] (9/30/2016) COPD (chronic obstructive pulmonary disease) ([facility] [address] (9/30/2016) Tobacco abuse (8/25/2018) Hypertension (8/25/2018) Bilateral incomplete stress fx of lateral tibial condyle, bilateral medial meniscus tear, bilateral patellofemoral OA. No surgical intervention recommended Proceeded with bilateral knee aspiration/ injections. Procedure: After verbal consent for aspiration and under sterile conditions, after thoroughly cleansing skin with betadine and alcohol, I injected 10 ml of Lidocaine 1% for local anesthetic. I proceeded with aspiration from the superior lateral approach of right and then the left knee, aspirated 65 ml of bloody fluid from knees bilaterally. No infectious process noted. Bloody synovial fluid discarded. I then injected pt. With 40mg Kenalog mixed with 2ml 0.5% Marcaine and 2 ml of Lidocaine 1% in the intra-articular aspect of the knees bilaterally without difficulty. Pt. Tolerated the Procedure well. Bleeding controlled. Dressing applied. Patient tolerated procedure well. Pt will remain NWB for 6 weeks. Updated and discussed with Dr. Saintclair Gash- he agrees with above plan. Signed By: 
YASMANI Holder [id number] [facility]

## 2018-08-28 NOTE — PROGRESS NOTES
8/28/2018 
2:37 PM 
CM spoke with pt's DTR, Marcus aSlcedo, via p/c who indicated Pending sale to [facility] and [facility] as preference. CM completed referrals and is awaiting responses.

## 2018-08-28 NOTE — PROGRESS NOTES
Bedside and Verbal shift change report given to Traci Page (oncoming nurse) by Kezia Jasmine (offgoing nurse). Report included the following information SBAR, Kardex, Procedure Summary, Intake/Output and MAR. Patient had been refusing to turn and repositioning. 1200- beside procedure being done fluid withdrawn from bilateral knees and injections.

## 2018-08-28 NOTE — PROGRESS NOTES
Ty Jacki [facility] 
[address] [facility], [address] [place] [address] 
[phone] Medical Progress Note NAME: Gracy Avendaño :  1931 MRM:  [id number] Date/Time: 2018  10:43 AM 
 
  
Assessment and Plan: 1. Tibial plateau fractures, bilateral (2018). CT scan: Bilateral lateral tibial plateau insufficiency fractures, more conspicuous on the right with 2 mm maximum articular surface depression. Evaluated by ortho and no plan for surgical intervention. Pain management and rehab 2.  Paroxysmal A-fib ([facility] [address] (2016). On digoxin 3.  RA (rheumatoid arthritis) ([facility] [address] (2016). Continue to hold methotrexate. 4.  COPD (chronic obstructive pulmonary disease) ([facility] [address] (2016). Stable 5.  Tobacco abuse (2018). Counseled. 6.  Hypertension (2018). On amlodipine. Subjective: Chief Complaint:  Follow up of pt who was admitted with tibial plateau fracture. BL leg pain with movement ROS: 
(bold if positive, if negative) Tolerating PT  Tolerating Diet Objective:  
 
Last 24hrs VS reviewed since prior progress note. Most recent are: 
 
Visit Vitals  /49 (BP 1 Location: Right arm, BP Patient Position: At rest)  Pulse 61  Temp 98.4 °F (36.9 °C)  Resp 16  
 Ht 5' 7\" (1.702 m)  Wt 61.2 kg (135 lb)  SpO2 95%  Breastfeeding No  
 BMI 21.14 kg/m2 SpO2 Readings from Last 6 Encounters:  
18 95% 10/04/16 97% 10/01/16 94% 13 94% O2 Flow Rate (L/min): 2 l/min Intake/Output Summary (Last 24 hours) at 18 1105 Last data filed at 18 1039 Gross per 24 hour Intake              120 ml Output              220 ml Net             -100 ml Physical Exam: 
 
Gen:  Well-developed, well-nourished, in no acute distress HEENT:  Pink conjunctivae, PERRL, hearing intact to voice, moist mucous membranes Neck:  Supple, without masses, thyroid non-tender Resp:  No accessory muscle use, clear breath sounds without wheezes rales or rhonchi 
Card:  No murmurs, normal S1, S2 without thrills, bruits or peripheral edema Abd:  Soft, non-tender, non-distended, normoactive bowel sounds are present, no palpable organomegaly and no detectable hernias Lymph:  No cervical or inguinal adenopathy Musc:  No cyanosis or clubbing Skin:  No rashes or ulcers, skin turgor is good Neuro:  Cranial nerves are grossly intact, no focal motor weakness, follows commands appropriately Psych:  Good insight, oriented to person, place and time, alert 
__________________________________________________________________ Medications Reviewed: (see below) Medications:  
 
Current Facility-Administered Medications Medication Dose Route Frequency  bupivacaine (PF) (MARCAINE) 0.5 % (5 mg/mL) injection 25 mg  5 mL SubCUTAneous ONCE  
 lidocaine (SALONPAS/ASPERCREME) 4 % patch 2 Patch  2 Patch TransDERmal Q24H  
 senna-docusate (PERICOLACE) 8.6-50 mg per tablet 1 Tab  1 Tab Oral BID  polyethylene glycol (MIRALAX) packet 17 g  17 g Oral BID  sodium chloride (NS) flush 5-10 mL  5-10 mL IntraVENous Q8H  
 sodium chloride (NS) flush 5-10 mL  5-10 mL IntraVENous PRN  
 naloxone (NARCAN) injection 0.4 mg  0.4 mg IntraVENous PRN  
 zolpidem (AMBIEN) tablet 5 mg  5 mg Oral QHS PRN  
 acetaminophen (TYLENOL) tablet 650 mg  650 mg Oral Q4H PRN  
 ondansetron (ZOFRAN) injection 4 mg  4 mg IntraVENous Q4H PRN  
 enoxaparin (LOVENOX) injection 40 mg  40 mg SubCUTAneous Q24H  
 HYDROmorphone (PF) (DILAUDID) injection 1 mg  1 mg IntraVENous Q4H PRN  
 HYDROcodone-acetaminophen (NORCO)  mg tablet 1 Tab  1 Tab Oral Q4H PRN  
 amLODIPine (NORVASC) tablet 5 mg  5 mg Oral DAILY  folic acid (FOLVITE) tablet 1 mg  1 mg Oral DAILY  digoxin (LANOXIN) tablet 0.125 mg  0.125 mg Oral DAILY  nicotine (NICORETTE) gum 2 mg  2 mg Oral Q2H PRN Lab Data Reviewed: (see below) Lab Review:  
 
Recent Labs  
   08/26/18 
 0439  08/25/18 
 1739 WBC  11.1*  11.0 HGB  11.9  12.9 HCT  37.6  39.4 PLT  253  294 Recent Labs  
   08/26/18 
 0439  08/25/18 
 1739 NA  135*  134* K  4.3  4.1 CL  99  100 CO2  28  29 GLU  104*  101* BUN  16  14 CREA  0.80  0.77 CA  8.8  9.3 MG  1.6   --   
PHOS  4.2   --   
ALB   --   3.3* TBILI   --   0.8 SGOT   --   17 ALT   --   12 Lab Results Component Value Date/Time Glucose (POC) 93 10/01/2016 11:33 AM  
 Glucose (POC) 91 10/01/2016 07:33 AM  
 
No results for input(s): PH, PCO2, PO2, HCO3, FIO2 in the last 72 hours. No results for input(s): INR in the last 72 hours. No lab exists for component: INREXT, INREXT All Micro Results None I have reviewed notes of prior 24hr. Other pertinent lab: Total time spent with patient: 28 Care Plan discussed with: Patient, Family, Nursing Staff, Consultant/Specialist and >50% of time spent in counseling and coordination of care Discussed:  Care Plan Prophylaxis:  Lovenox Disposition:  Home w/Family 
        
___________________________________________________ Attending Physician: David Solomon MD

## 2018-08-29 PROCEDURE — 65270000029 HC RM PRIVATE

## 2018-08-29 PROCEDURE — 74011250636 HC RX REV CODE- 250/636: Performed by: INTERNAL MEDICINE

## 2018-08-29 PROCEDURE — 74011250637 HC RX REV CODE- 250/637: Performed by: INTERNAL MEDICINE

## 2018-08-29 PROCEDURE — 77030038269 HC DRN EXT URIN PURWCK BARD -A

## 2018-08-29 PROCEDURE — 74011000250 HC RX REV CODE- 250: Performed by: INTERNAL MEDICINE

## 2018-08-29 RX ORDER — IBUPROFEN 200 MG
1 TABLET ORAL DAILY
Status: DISCONTINUED | OUTPATIENT
Start: 2018-08-29 | End: 2018-08-29

## 2018-08-29 RX ORDER — ACETAMINOPHEN 500 MG
2 TABLET ORAL
Status: DISCONTINUED | OUTPATIENT
Start: 2018-08-29 | End: 2018-08-30 | Stop reason: HOSPADM

## 2018-08-29 RX ADMIN — Medication 10 ML: at 13:48

## 2018-08-29 RX ADMIN — DIGOXIN 0.12 MG: 125 TABLET ORAL at 09:21

## 2018-08-29 RX ADMIN — NICOTINE POLACRILEX 2 MG: 2 GUM, CHEWING ORAL at 21:38

## 2018-08-29 RX ADMIN — HYDROCODONE BITARTRATE AND ACETAMINOPHEN 1 TABLET: 10; 325 TABLET ORAL at 09:21

## 2018-08-29 RX ADMIN — HYDROCODONE BITARTRATE AND ACETAMINOPHEN 1 TABLET: 10; 325 TABLET ORAL at 04:43

## 2018-08-29 RX ADMIN — HYDROCODONE BITARTRATE AND ACETAMINOPHEN 1 TABLET: 10; 325 TABLET ORAL at 00:28

## 2018-08-29 RX ADMIN — HYDROMORPHONE HYDROCHLORIDE 1 MG: 2 INJECTION, SOLUTION INTRAMUSCULAR; INTRAVENOUS; SUBCUTANEOUS at 20:09

## 2018-08-29 RX ADMIN — Medication 10 ML: at 21:38

## 2018-08-29 RX ADMIN — HYDROMORPHONE HYDROCHLORIDE 1 MG: 2 INJECTION, SOLUTION INTRAMUSCULAR; INTRAVENOUS; SUBCUTANEOUS at 16:29

## 2018-08-29 RX ADMIN — HYDROCODONE BITARTRATE AND ACETAMINOPHEN 1 TABLET: 10; 325 TABLET ORAL at 13:46

## 2018-08-29 RX ADMIN — FOLIC ACID 1 MG: 1 TABLET ORAL at 09:21

## 2018-08-29 RX ADMIN — AMLODIPINE BESYLATE 5 MG: 5 TABLET ORAL at 09:21

## 2018-08-29 RX ADMIN — ENOXAPARIN SODIUM 40 MG: 40 INJECTION, SOLUTION INTRAVENOUS; SUBCUTANEOUS at 09:52

## 2018-08-29 RX ADMIN — POLYETHYLENE GLYCOL 3350 17 G: 17 POWDER, FOR SOLUTION ORAL at 09:23

## 2018-08-29 RX ADMIN — Medication 1 TABLET: at 09:21

## 2018-08-29 RX ADMIN — HYDROCODONE BITARTRATE AND ACETAMINOPHEN 1 TABLET: 10; 325 TABLET ORAL at 21:38

## 2018-08-29 NOTE — PROGRESS NOTES
Orthopaedic Progress Note 2018 11:07 AM  
 
Patient: Scott Jones MRN: [id number]  SSN: [id number] YOB: 1931  Age: 80 y.o. Sex: female Admit date:  2018 Admitting Physician:  Mendel Stout DO Consulting Physician(s): Treatment Team: Attending Provider: Dinorah Lorenzo MD; Consulting Provider: Livan Dailey MD; Consulting Provider: JAVIER Arango; Utilization Review: Sweta Sepulveda; Care Manager: Vic Bashir SUBJECTIVE: 
  
Scott Jones is a 80 y.o. female is sitting in a chair this morning. She states her pain is minimally better with the injections. She still is not tolerating out of bed and trasnfers to a wheelchair with therapy. No complaints of nausea, vomiting, dizziness, lightheadedness, chest pain, or shortness of breath. OBJECTIVE: 
 
  
Physical Exam: 
General: Alert, cooperative, no distress. Respiratory: Respirations unlabored Neurological:  Neurovascular exam within normal limits. Motor: + DF/PF. Musculoskeletal: Calves soft, supple, non-tender upon palpation. BL knees with small effusions vastly improved since previous examinations. I still am not able to test ROM. I can not test valgus or varus stress as well. Vital Signs:  
  
Patient Vitals for the past 8 hrs: 
 BP Temp Pulse Resp SpO2  
18 0921 - - 66 - -  
18 0852 - - - - 94 % 18 0818 137/59 98.2 °F (36.8 °C) 62 17 94 % 18 0353 133/68 97.5 °F (36.4 °C) 74 20 95 % Temp (24hrs), Av °F (36.7 °C), Min:97.5 °F (36.4 °C), Max:98.2 °F (36.8 °C) Labs:  
  
No results for input(s): HCT, HGB, INR, HCTEXT, HGBEXT in the last 72 hours. No lab exists for component: INREXT Lab Results Component Value Date/Time  Sodium 135 (L) 2018 04:39 AM  
 Potassium 4.3 2018 04:39 AM  
 Chloride 99 2018 04:39 AM  
 CO2 28 2018 04:39 AM  
 Glucose 104 (H) 08/26/2018 04:39 AM  
 BUN 16 08/26/2018 04:39 AM  
 Creatinine 0.80 08/26/2018 04:39 AM  
 Calcium 8.8 08/26/2018 04:39 AM  
 
 
PT/OT:  
 
  
  
  
 
Patient mobility Bed Mobility Training Rolling: Total assistance Supine to Sit:  (unable to tolerate) Transfer Training Sit to Stand:  (unable to tolerate sitting) ASSESSMENT / PLAN:  
Principal Problem: 
  Tibial plateau fracture (6/85/5075) Active Problems: 
  Paroxysmal A-fib ([facility] [address] (9/30/2016) RA (rheumatoid arthritis) ([facility] [address] (9/30/2016) COPD (chronic obstructive pulmonary disease) ([facility] [address] (9/30/2016) Tobacco abuse (8/25/2018) Hypertension (8/25/2018) A: 1. Bilateral lateral tibial plateau insufficiency fractures P: 1. BL Knees:  She will be NWB on her BL LE for 6-8 weeks. She can do PROM of her BL knees to tolerance. I again encouraged smoking cessation to promote faster healing. I recommend vitamin D and calcium supplementation and explained the need for this to her and her daughter today. I went over disposition options today and I recommended a SNF. I do not think they will be able to adequately care for her at home. I encouraged her to be OOB as much as possible. I have significant concerns with her immobility and risk for DVT and exacerbation of pulmonary disease. I spoke with nursing and PT today about the above. We will continue to follow. Signed By: 
JAVIER Andrade [id number] [facility]

## 2018-08-29 NOTE — PROGRESS NOTES
Ty Echeverria [facility] [address] 
[address], [place], [address] [facility] 
[phone] Medical Progress Note NAME: Sonali Morales :  1931 MRM:  [id number] Date/Time: 2018  10:43 AM 
 
  
Assessment and Plan: 1. Tibial plateau fractures, bilateral (2018). CT scan: Bilateral lateral tibial plateau insufficiency fractures, more conspicuous on the right with 2 mm maximum articular surface depression. Evaluated by ortho and no plan for surgical intervention. Pain management and SNF. RECOMMENDED: NWB on her BL LE for 6-8 weeks. She can do PROM of her BL knees to tolerance 2.  Paroxysmal A-fib ([facility] [address] (2016). On digoxin 3.  RA (rheumatoid arthritis) ([facility] [address] (2016). Continue to hold methotrexate. 4.  COPD (chronic obstructive pulmonary disease) ([facility] [address] (2016). Stable 5.  Tobacco abuse (2018). Counseled. 6.  Hypertension (2018). On amlodipine. Subjective: Chief Complaint:  Follow up of pt who was admitted with tibial plateau fracture. BL leg pain with movement ROS: 
(bold if positive, if negative) Tolerating PT  Tolerating Diet Objective:  
 
Last 24hrs VS reviewed since prior progress note. Most recent are: 
 
Visit Vitals  /59 (BP 1 Location: Left arm, BP Patient Position: At rest)  Pulse 66  Temp 98.2 °F (36.8 °C)  Resp 17  Ht 5' 7\" (1.702 m)  Wt 61.2 kg (135 lb)  SpO2 94%  Breastfeeding No  
 BMI 21.14 kg/m2 SpO2 Readings from Last 6 Encounters:  
18 94% 10/04/16 97% 10/01/16 94% 13 94% O2 Flow Rate (L/min): 3 l/min Intake/Output Summary (Last 24 hours) at 18 1138 Last data filed at 18 1038 Gross per 24 hour Intake                0 ml Output             1300 ml Net            -1300 ml Physical Exam: Gen:  Well-developed, well-nourished, in no acute distress HEENT:  Pink conjunctivae, PERRL, hearing intact to voice, moist mucous membranes Neck:  Supple, without masses, thyroid non-tender Resp:  No accessory muscle use, clear breath sounds without wheezes rales or rhonchi 
Card:  No murmurs, normal S1, S2 without thrills, bruits or peripheral edema Abd:  Soft, non-tender, non-distended, normoactive bowel sounds are present, no palpable organomegaly and no detectable hernias Lymph:  No cervical or inguinal adenopathy Musc:  No cyanosis or clubbing Skin:  No rashes or ulcers, skin turgor is good Neuro:  Cranial nerves are grossly intact, no focal motor weakness, follows commands appropriately Psych:  Good insight, oriented to person, place and time, alert 
__________________________________________________________________ Medications Reviewed: (see below) Medications:  
 
Current Facility-Administered Medications Medication Dose Route Frequency  lidocaine (SALONPAS/ASPERCREME) 4 % patch 2 Patch  2 Patch TransDERmal Q24H  
 senna-docusate (PERICOLACE) 8.6-50 mg per tablet 1 Tab  1 Tab Oral BID  polyethylene glycol (MIRALAX) packet 17 g  17 g Oral BID  sodium chloride (NS) flush 5-10 mL  5-10 mL IntraVENous Q8H  
 sodium chloride (NS) flush 5-10 mL  5-10 mL IntraVENous PRN  
 naloxone (NARCAN) injection 0.4 mg  0.4 mg IntraVENous PRN  
 zolpidem (AMBIEN) tablet 5 mg  5 mg Oral QHS PRN  
 acetaminophen (TYLENOL) tablet 650 mg  650 mg Oral Q4H PRN  
 ondansetron (ZOFRAN) injection 4 mg  4 mg IntraVENous Q4H PRN  
 enoxaparin (LOVENOX) injection 40 mg  40 mg SubCUTAneous Q24H  
 HYDROmorphone (PF) (DILAUDID) injection 1 mg  1 mg IntraVENous Q4H PRN  
 HYDROcodone-acetaminophen (NORCO)  mg tablet 1 Tab  1 Tab Oral Q4H PRN  
 amLODIPine (NORVASC) tablet 5 mg  5 mg Oral DAILY  folic acid (FOLVITE) tablet 1 mg  1 mg Oral DAILY  digoxin (LANOXIN) tablet 0.125 mg  0.125 mg Oral DAILY  nicotine (NICORETTE) gum 2 mg  2 mg Oral Q2H PRN Lab Data Reviewed: (see below) Lab Review: No results for input(s): WBC, HGB, HCT, PLT, HGBEXT, HCTEXT, PLTEXT, HGBEXT, HCTEXT, PLTEXT in the last 72 hours. No results for input(s): NA, K, CL, CO2, GLU, BUN, CREA, CA, MG, PHOS, ALB, TBIL, TBILI, SGOT, ALT, INR in the last 72 hours. No lab exists for component: INREXT, INREXT Lab Results Component Value Date/Time Glucose (POC) 93 10/01/2016 11:33 AM  
 Glucose (POC) 91 10/01/2016 07:33 AM  
 
No results for input(s): PH, PCO2, PO2, HCO3, FIO2 in the last 72 hours. No results for input(s): INR in the last 72 hours. No lab exists for component: INREXT, INREXT All Micro Results None I have reviewed notes of prior 24hr. Other pertinent lab: Total time spent with patient: 28 Care Plan discussed with: Patient, Family, Nursing Staff, Consultant/Specialist and >50% of time spent in counseling and coordination of care Discussed:  Care Plan Prophylaxis:  Lovenox Disposition:  Home w/Family 
        
___________________________________________________ Attending Physician: Evelio Cruz MD

## 2018-08-29 NOTE — PROGRESS NOTES
Spiritual Care Assessment/Progress Note [address] 
 
 
NAME: Gianluca De La Cruz      MRN: [id number] AGE: 80 y.o. SEX: female Jewish Affiliation: Holiness  
Language: English  
 
8/29/2018     Total Time (in minutes): 26 Spiritual Assessment begun in [facility] 4M POST SURG ORT 1 through conversation with: 
  
    [x]Patient        [] Family    [] Friend(s) Reason for Consult: Request by staff Spiritual beliefs: (Please include comment if needed) [x] Identifies with a reji tradition:   Holiness  
   [] Supported by a reji community:        
   [] Claims no spiritual orientation:       
   [] Seeking spiritual identity:            
   [] Adheres to an individual form of spirituality:       
   [] Not able to assess:                   
 
    
Identified resources for coping:  
   [] Prayer                           
   [] Music                  [] Guided Imagery 
   [] Family/friends                 [] Pet visits [] Devotional reading                         [] Unknown 
   [] Other:                                        
 
 
Interventions offered during this visit: (See comments for more details) Patient Interventions: Affirmation of emotions/emotional suffering, Affirmation of reji, Catharsis/review of pertinent events in supportive environment, Coping skills reviewed/reinforced, Iconic (affirming the presence of God/Higher Power), Initial/Spiritual assessment, patient floor, Normalization of emotional/spiritual concerns, Prayer (assurance of), Prayer (actual) Plan of Care: 
 
 [] Support spiritual and/or cultural needs  
 [] Support AMD and/or advance care planning process    
 [] Support grieving process 
 [] Coordinate Rites and/or Rituals  
 [] Coordination with community clergy [] No spiritual needs identified at this time 
 [] Detailed Plan of Care below (See Comments)  [] Make referral to Music Therapy 
[] Make referral to Pet Therapy [] Make referral to Addiction services 
[] Make referral to [facility] 
[] Make referral to Spiritual Care Partner 
[] No future visits requested       
[x] Follow up visits as needed Comments:  Responded to Spiritual Care Consult for Miss Natalee Deluca on 4 Post surg Ortho. Her daughter was just leaving. Hurtis Bamberger shared she has a wonderful daughter and great support from nieces and nephews as well. She has a strong Buddhist belief in [place] , she considers herself Mahamed.  She has gone to [employer] all her life. She shared tearfully that her son  last year. Provided spiritual presence as she shared her grief. She requested prayer for the pain to be dramatically reduced. She is unclear about the future as she stated she is 80. .. Provided spiritual support and prayer. Visited by: Josselin De Luna [address] [place] [address] ([id number])

## 2018-08-29 NOTE — PROGRESS NOTES
Physical Therapy Pt received in bed, reporting 8/10 pain after received pain medication. As therapist entered room patient began to cry \"because it is going to hurt\" reviewed ankle pumps, glute sets. Patient would not allow therapist to touch her legs in any attempt for PROM. Attempted to place bed in semi chair position, patient did not tolerate. Will need extensive rehab. Edson Lipscomb

## 2018-08-29 NOTE — PROGRESS NOTES
Bedside and Verbal shift change report given to [id number] [facility] (oncoming nurse) by KERI Yusuf (offgoing nurse). Report given with SBAR, Kardex, OR Summary, Intake/Output, MAR and Recent Results.

## 2018-08-29 NOTE — PROGRESS NOTES
Bedside shift change report given to Sabino Quezada RN (oncoming nurse) by Andres Benitez RN (offgoing nurse). Report included the following information SBAR, Kardex, Procedure Summary, Intake/Output, MAR and Recent Results.

## 2018-08-29 NOTE — PROGRESS NOTES
8/29/2018 12:00 PM 
Cm received denial from [facility], and acceptance from [facility]. Potential DC on 3/30/18.

## 2018-08-30 VITALS
WEIGHT: 135 LBS | TEMPERATURE: 98.4 F | HEART RATE: 56 BPM | OXYGEN SATURATION: 93 % | BODY MASS INDEX: 21.19 KG/M2 | DIASTOLIC BLOOD PRESSURE: 60 MMHG | HEIGHT: 67 IN | SYSTOLIC BLOOD PRESSURE: 129 MMHG | RESPIRATION RATE: 14 BRPM

## 2018-08-30 PROCEDURE — 74011250636 HC RX REV CODE- 250/636: Performed by: INTERNAL MEDICINE

## 2018-08-30 PROCEDURE — 74011250637 HC RX REV CODE- 250/637: Performed by: INTERNAL MEDICINE

## 2018-08-30 PROCEDURE — 74011000250 HC RX REV CODE- 250: Performed by: INTERNAL MEDICINE

## 2018-08-30 PROCEDURE — 77030038269 HC DRN EXT URIN PURWCK BARD -A

## 2018-08-30 RX ORDER — POLYETHYLENE GLYCOL 3350 17 G/17G
17 POWDER, FOR SOLUTION ORAL 2 TIMES DAILY
Qty: 10 EACH | Refills: 0 | Status: SHIPPED
Start: 2018-08-30 | End: 2018-11-24

## 2018-08-30 RX ORDER — HYDROCODONE BITARTRATE AND ACETAMINOPHEN 10; 325 MG/1; MG/1
1 TABLET ORAL
Qty: 10 TAB | Refills: 0 | Status: SHIPPED | OUTPATIENT
Start: 2018-08-30 | End: 2018-11-24

## 2018-08-30 RX ADMIN — HYDROMORPHONE HYDROCHLORIDE 1 MG: 2 INJECTION, SOLUTION INTRAMUSCULAR; INTRAVENOUS; SUBCUTANEOUS at 12:35

## 2018-08-30 RX ADMIN — HYDROMORPHONE HYDROCHLORIDE 1 MG: 2 INJECTION, SOLUTION INTRAMUSCULAR; INTRAVENOUS; SUBCUTANEOUS at 00:26

## 2018-08-30 RX ADMIN — HYDROCODONE BITARTRATE AND ACETAMINOPHEN 1 TABLET: 10; 325 TABLET ORAL at 14:06

## 2018-08-30 RX ADMIN — HYDROCODONE BITARTRATE AND ACETAMINOPHEN 1 TABLET: 10; 325 TABLET ORAL at 04:13

## 2018-08-30 RX ADMIN — Medication 1 TABLET: at 09:15

## 2018-08-30 RX ADMIN — FOLIC ACID 1 MG: 1 TABLET ORAL at 09:15

## 2018-08-30 RX ADMIN — AMLODIPINE BESYLATE 5 MG: 5 TABLET ORAL at 09:15

## 2018-08-30 RX ADMIN — ENOXAPARIN SODIUM 40 MG: 40 INJECTION, SOLUTION INTRAVENOUS; SUBCUTANEOUS at 09:15

## 2018-08-30 RX ADMIN — POLYETHYLENE GLYCOL 3350 17 G: 17 POWDER, FOR SOLUTION ORAL at 09:15

## 2018-08-30 RX ADMIN — HYDROCODONE BITARTRATE AND ACETAMINOPHEN 1 TABLET: 10; 325 TABLET ORAL at 09:15

## 2018-08-30 RX ADMIN — DIGOXIN 0.12 MG: 125 TABLET ORAL at 09:15

## 2018-08-30 NOTE — PROGRESS NOTES
Pt refused to turn in the bed with RN and turn team member. According to Maki, RN patient has refused to work with PT and also has refused turns for most of the day. Patient was reeducated on pressure ulcer prevention and risks and verbalized understanding with teach back. Patient was pulled up in the bed with sliding sheet but still refused to turn. She also refused for her heels to be elevated. 1 - Daughter at bedside was updated with the above information on patient progression and plan of care. She too verbalized understanding with teach back and encouraged her mother to work with PT and also turn at least every 2 hours, with assistance of staff members.

## 2018-08-30 NOTE — PROGRESS NOTES
8/30/2018 
10:12 AM 
Pt discharge noted. Pt was accepted by [facility]. [facility] ambulance transport arranged for 2:00pm. Pt's DTR notified. Nurses, please call report to [phone] - . No additional DC service needs indicated.

## 2018-08-30 NOTE — DISCHARGE SUMMARY
Hospitalist Discharge Summary     Patient ID:    Ayleen Lei  [id number]  80 y.o.  12/7/1931    Admit date: 8/25/2018    Discharge date and time: 8/30/2018    Admission Diagnoses: Tibial plateau fracture    Chronic Diagnoses:    Problem List as of 8/30/2018  Date Reviewed: 8/30/2018          Codes Class Noted - Resolved    * (Principal)Tibial plateau fracture QDD-80-DL: Q50.839O  ICD-9-CM: 823.00  8/25/2018 - Present        Tobacco abuse (Chronic) ICD-10-CM: Z72.0  ICD-9-CM: 305.1  8/25/2018 - Present        Hypertension (Chronic) ICD-10-CM: I10  ICD-9-CM: 401.9  8/25/2018 - Present        TIA (transient ischemic attack) ICD-10-CM: G45.9  ICD-9-CM: 435.9  9/30/2016 - Present        Slurred speech ICD-10-CM: R47.81  ICD-9-CM: 784.59  9/30/2016 - Present        Paroxysmal A-fib (HCC) (Chronic) ICD-10-CM: I48.0  ICD-9-CM: 427.31  9/30/2016 - Present        RA (rheumatoid arthritis) (HCC) (Chronic) ICD-10-CM: M06.9  ICD-9-CM: 714.0  9/30/2016 - Present        COPD (chronic obstructive pulmonary disease) ([facility] (Chronic) ICD-10-CM: J44.9  ICD-9-CM: 272  9/30/2016 - Present              Discharge Medications:   Current Discharge Medication List      START taking these medications    Details   HYDROcodone-acetaminophen (NORCO)  mg tablet Take 1 Tab by mouth every four (4) hours as needed. Max Daily Amount: 6 Tabs. Qty: 10 Tab, Refills: 0    Associated Diagnoses: Closed fracture of right tibial plateau, initial encounter      polyethylene glycol (MIRALAX) 17 gram packet Take 1 Packet by mouth two (2) times a day. Qty: 10 Each, Refills: 0         CONTINUE these medications which have NOT CHANGED    Details   amLODIPine (NORVASC) 5 mg tablet Take 5 mg by mouth daily. cholecalciferol (VITAMIN D3) 50,000 unit capsule Take 50,000 Units by mouth every seven (7) days. bismuth subsalicylate (PEPTO-BISMOL MAXIMUM STRENGTH) 525 mg/15 mL susp Take 5 mL by mouth every six (6) hours as needed (upset stomach). apixaban (ELIQUIS) 2.5 mg tablet Take 1 Tab by mouth every twelve (12) hours. Qty: 60 Tab, Refills: 0      folic acid (FOLVITE) 1 mg tablet Take 1 mg by mouth daily. methotrexate (RHEUMATREX) 2.5 mg tablet Take 20 mg by mouth Every Saturday. The patient takes 8 tablets which is 20 mg every Saturday      digoxin (LANOXIN) 0.125 mg tablet Take 0.125 mg by mouth daily. STOP taking these medications       HYDROcodone-acetaminophen (NORCO) 7.5-325 mg per tablet Comments:   Reason for Stopping: Follow up Care:    1. Leesa Rajput MD in 1-2 weeks  2. ortho    Diet:  Regular Diet    Disposition:  Nursing Home. Advanced Directive:    Discharge Exam:  See today's note. CONSULTATIONS: Orthopedic Surgery    Significant Diagnostic Studies:   No results for input(s): WBC, HGB, HCT, PLT, HGBEXT, HCTEXT, PLTEXT in the last 72 hours. No results for input(s): NA, K, CL, CO2, BUN, CREA, GLU, CA, MG, PHOS, URICA in the last 72 hours. No results for input(s): SGOT, GPT, ALT, AP, TBIL, TBILI, TP, ALB, GLOB, GGT, AML, LPSE in the last 72 hours. No lab exists for component: AMYP, HLPSE  No results for input(s): INR, PTP, APTT in the last 72 hours. No lab exists for component: INREXT   No results for input(s): FE, TIBC, PSAT, FERR in the last 72 hours. No results for input(s): PH, PCO2, PO2 in the last 72 hours. No results for input(s): CPK, CKMB in the last 72 hours. No lab exists for component: TROPONINI  Lab Results   Component Value Date/Time    Glucose (POC) 93 10/01/2016 11:33 AM    Glucose (POC) 91 10/01/2016 07:33 AM             HOSPITAL COURSE & TREATMENT RENDERED:   1. Tibial plateau fractures, KWBCVVALS (3/43/4090). CT scan: Bilateral lateral tibial plateau insufficiency fractures, more conspicuous on the right with 2 mm maximum articular surface depression. Evaluated by ortho and no plan for surgical intervention. Pain management and SNF.  RECOMMENDED: NWB on her BL LE for 6-8 weeks. Rosalie Márquez can do PROM of her BL knees to tolerance      2. Paroxysmal A-fib ([facility] (9/30/2016). On digoxin       3.  RA (rheumatoid arthritis) ([facility] (9/30/2016). Continue to hold methotrexate.       4. COPD (chronic obstructive pulmonary disease) ([facility] (9/30/2016). Stable       5. Tobacco abuse (8/25/2018). Counseled.       6. Hypertension (8/25/2018). On amlodipine.      Discharged in stable condition       Signed:  Tammy Cam MD  8/30/2018  9:17 AM

## 2018-08-30 NOTE — DISCHARGE INSTRUCTIONS
ACUTE DIAGNOSES:  Tibial plateau fracture    CHRONIC MEDICAL DIAGNOSES:  Problem List as of 8/30/2018  Date Reviewed: 8/30/2018          Codes Class Noted - Resolved    * (Principal)Tibial plateau fracture HIU-66-GC: Y06.370Y  ICD-9-CM: 823.00  8/25/2018 - Present        Tobacco abuse (Chronic) ICD-10-CM: Z72.0  ICD-9-CM: 305.1  8/25/2018 - Present        Hypertension (Chronic) ICD-10-CM: I10  ICD-9-CM: 401.9  8/25/2018 - Present        TIA (transient ischemic attack) ICD-10-CM: G45.9  ICD-9-CM: 435.9  9/30/2016 - Present        Slurred speech ICD-10-CM: R47.81  ICD-9-CM: 784.59  9/30/2016 - Present        Paroxysmal A-fib (HCC) (Chronic) ICD-10-CM: I48.0  ICD-9-CM: 427.31  9/30/2016 - Present        RA (rheumatoid arthritis) (HCC) (Chronic) ICD-10-CM: M06.9  ICD-9-CM: 714.0  9/30/2016 - Present        COPD [facility]chronic [facility] disease) [facility] (Chronic) ICD-10-CM: J44.9  ICD-9-CM: 467  9/30/2016 - Present              DISCHARGE MEDICATIONS:          · It is important that you take the medication exactly as they are prescribed. · Keep your medication in the bottles provided by the pharmacist and keep a list of the medication names, dosages, and times to be taken in your wallet. · Do not take other medications without consulting your doctor. DIET:  Regular Diet    ACTIVITY: Activity as tolerated    ADDITIONAL INFORMATION: If you experience any of the following symptoms then please call your primary care physician or return to the emergency room if you cannot get hold of your doctor: Fever, chills, nausea, vomiting, diarrhea, change in mentation, falling, bleeding, shortness of breath. FOLLOW UP CARE:  Dr. Silvia Mosher MD  you are to call and set up an appointment to see them in 2 weeks. Follow-up with orthopedics Dr. Zee Root in 2 week      Information obtained by :  I understand that if any problems occur once I am at home I am to contact my physician.     I understand and acknowledge receipt of the instructions indicated above.                                                                                                                                            Physician's or R.N.'s Signature                                                                  Date/Time                                                                                                                                              Patient or Representative Signature                                                          Date/Time

## 2018-08-30 NOTE — PROGRESS NOTES
Ty Echeverria [facility] 
[address] [facility], [address] [place] [address] 
[phone] Medical Progress Note NAME: Kostas Matthews :  1931 MRM:  [id number] Date/Time: 2018  10:43 AM 
 
  
Assessment and Plan: 1. Tibial plateau fractures, bilateral (2018). CT scan: Bilateral lateral tibial plateau insufficiency fractures, more conspicuous on the right with 2 mm maximum articular surface depression. Evaluated by ortho and no plan for surgical intervention. Pain management and SNF. RECOMMENDED: NWB on her BL LE for 6-8 weeks. She can do PROM of her BL knees to tolerance 2.  Paroxysmal A-fib ([facility] (2016). On digoxin 3.  RA (rheumatoid arthritis) ([facility] (2016). Continue to hold methotrexate. 4.  COPD (chronic obstructive pulmonary disease) ([facility] (2016). Stable 5.  Tobacco abuse (2018). Counseled. 6.  Hypertension (2018). On amlodipine. Subjective: Chief Complaint:  Follow up of pt who was admitted with tibial plateau fracture. BL leg pain with movement ROS: 
(bold if positive, if negative) Tolerating PT  Tolerating Diet Objective:  
 
Last 24hrs VS reviewed since prior progress note. Most recent are: 
 
Visit Vitals  /59 (BP 1 Location: Right arm, BP Patient Position: Head of bed elevated (Comment degrees))  Pulse 64  Temp 98.6 °F (37 °C)  Resp 16  
 Ht 5' 7\" (1.702 m)  Wt 61.2 kg (135 lb)  SpO2 97%  Breastfeeding No  
 BMI 21.14 kg/m2 SpO2 Readings from Last 6 Encounters:  
18 97% 10/04/16 97% 10/01/16 94% 13 94% O2 Flow Rate (L/min): 3 l/min Intake/Output Summary (Last 24 hours) at 18 5867 Last data filed at 18 0531 Gross per 24 hour Intake                0 ml Output              500 ml Net             -500 ml Physical Exam: Gen:  Well-developed, well-nourished, in no acute distress HEENT:  Pink conjunctivae, PERRL, hearing intact to voice, moist mucous membranes Neck:  Supple, without masses, thyroid non-tender Resp:  No accessory muscle use, clear breath sounds without wheezes rales or rhonchi 
Card:  No murmurs, normal S1, S2 without thrills, bruits or peripheral edema Abd:  Soft, non-tender, non-distended, normoactive bowel sounds are present, no palpable organomegaly and no detectable hernias Lymph:  No cervical or inguinal adenopathy Musc:  No cyanosis or clubbing Skin:  No rashes or ulcers, skin turgor is good Neuro:  Cranial nerves are grossly intact, no focal motor weakness, follows commands appropriately Psych:  Good insight, oriented to person, place and time, alert 
__________________________________________________________________ Medications Reviewed: (see below) Medications:  
 
Current Facility-Administered Medications Medication Dose Route Frequency  nicotine (NICORETTE) gum 2 mg  2 mg Oral Q1H PRN  
 lidocaine (SALONPAS/ASPERCREME) 4 % patch 2 Patch  2 Patch TransDERmal Q24H  
 senna-docusate (PERICOLACE) 8.6-50 mg per tablet 1 Tab  1 Tab Oral BID  polyethylene glycol (MIRALAX) packet 17 g  17 g Oral BID  sodium chloride (NS) flush 5-10 mL  5-10 mL IntraVENous Q8H  
 sodium chloride (NS) flush 5-10 mL  5-10 mL IntraVENous PRN  
 naloxone (NARCAN) injection 0.4 mg  0.4 mg IntraVENous PRN  
 zolpidem (AMBIEN) tablet 5 mg  5 mg Oral QHS PRN  
 acetaminophen (TYLENOL) tablet 650 mg  650 mg Oral Q4H PRN  
 ondansetron (ZOFRAN) injection 4 mg  4 mg IntraVENous Q4H PRN  
 enoxaparin (LOVENOX) injection 40 mg  40 mg SubCUTAneous Q24H  
 HYDROmorphone (PF) (DILAUDID) injection 1 mg  1 mg IntraVENous Q4H PRN  
 HYDROcodone-acetaminophen (NORCO)  mg tablet 1 Tab  1 Tab Oral Q4H PRN  
 amLODIPine (NORVASC) tablet 5 mg  5 mg Oral DAILY  folic acid (FOLVITE) tablet 1 mg  1 mg Oral DAILY  digoxin (LANOXIN) tablet 0.125 mg  0.125 mg Oral DAILY Lab Data Reviewed: (see below) Lab Review: No results for input(s): WBC, HGB, HCT, PLT, HGBEXT, HCTEXT, PLTEXT, HGBEXT, HCTEXT, PLTEXT in the last 72 hours. No results for input(s): NA, K, CL, CO2, GLU, BUN, CREA, CA, MG, PHOS, ALB, TBIL, TBILI, SGOT, ALT, INR in the last 72 hours. No lab exists for component: INREXT, INREXT Lab Results Component Value Date/Time Glucose (POC) 93 10/01/2016 11:33 AM  
 Glucose (POC) 91 10/01/2016 07:33 AM  
 
No results for input(s): PH, PCO2, PO2, HCO3, FIO2 in the last 72 hours. No results for input(s): INR in the last 72 hours. No lab exists for component: INREXT, INREXT All Micro Results None I have reviewed notes of prior 24hr. Other pertinent lab: Total time spent with patient: 28 Care Plan discussed with: Patient, Family, Nursing Staff, Consultant/Specialist and >50% of time spent in counseling and coordination of care Discussed:  Care Plan Prophylaxis:  Lovenox Disposition:  Home w/Family 
        
___________________________________________________ Attending Physician: Esha Christy MD

## 2018-08-30 NOTE — PROGRESS NOTES
Report called at 1430 hours and ended at 1448 hours to [address] ISBAR report given to Dimitris Lei RN after given report no questions were asked.

## 2018-11-24 ENCOUNTER — HOSPITAL ENCOUNTER (INPATIENT)
Age: 83
LOS: 5 days | Discharge: HOME | DRG: 872 | End: 2018-11-29
Attending: EMERGENCY MEDICINE | Admitting: INTERNAL MEDICINE
Payer: MEDICARE

## 2018-11-24 ENCOUNTER — APPOINTMENT (OUTPATIENT)
Dept: CT IMAGING | Age: 83
DRG: 872 | End: 2018-11-24
Attending: EMERGENCY MEDICINE
Payer: MEDICARE

## 2018-11-24 ENCOUNTER — APPOINTMENT (OUTPATIENT)
Dept: GENERAL RADIOLOGY | Age: 83
DRG: 872 | End: 2018-11-24
Attending: EMERGENCY MEDICINE
Payer: MEDICARE

## 2018-11-24 DIAGNOSIS — R91.8 LUNG MASS: ICD-10-CM

## 2018-11-24 DIAGNOSIS — C34.12 MALIGNANT NEOPLASM OF UPPER LOBE OF LEFT LUNG (HCC): ICD-10-CM

## 2018-11-24 DIAGNOSIS — A41.9 SEPSIS, DUE TO UNSPECIFIED ORGANISM: Primary | ICD-10-CM

## 2018-11-24 PROBLEM — R65.10 SIRS (SYSTEMIC INFLAMMATORY RESPONSE SYNDROME) (HCC): Status: ACTIVE | Noted: 2018-11-24

## 2018-11-24 PROBLEM — E87.20 LACTIC ACID ACIDOSIS: Status: ACTIVE | Noted: 2018-11-24

## 2018-11-24 LAB
ALBUMIN SERPL-MCNC: 2.7 G/DL (ref 3.5–5)
ALBUMIN/GLOB SERPL: 0.7 {RATIO} (ref 1.1–2.2)
ALP SERPL-CCNC: 62 U/L (ref 45–117)
ALT SERPL-CCNC: 10 U/L (ref 12–78)
ANION GAP SERPL CALC-SCNC: 10 MMOL/L (ref 5–15)
APPEARANCE UR: CLEAR
AST SERPL-CCNC: 15 U/L (ref 15–37)
B PERT DNA SPEC QL NAA+PROBE: NOT DETECTED
BACTERIA URNS QL MICRO: NEGATIVE /HPF
BASOPHILS # BLD: 0.1 K/UL (ref 0–0.1)
BASOPHILS NFR BLD: 0 % (ref 0–1)
BILIRUB SERPL-MCNC: 0.8 MG/DL (ref 0.2–1)
BILIRUB UR QL: NEGATIVE
BUN SERPL-MCNC: 12 MG/DL (ref 6–20)
BUN/CREAT SERPL: 16 (ref 12–20)
C PNEUM DNA SPEC QL NAA+PROBE: NOT DETECTED
CALCIUM SERPL-MCNC: 8.8 MG/DL (ref 8.5–10.1)
CHLORIDE SERPL-SCNC: 101 MMOL/L (ref 97–108)
CO2 SERPL-SCNC: 28 MMOL/L (ref 21–32)
COLOR UR: ABNORMAL
COMMENT, HOLDF: NORMAL
CREAT SERPL-MCNC: 0.73 MG/DL (ref 0.55–1.02)
DIFFERENTIAL METHOD BLD: ABNORMAL
EOSINOPHIL # BLD: 0.3 K/UL (ref 0–0.4)
EOSINOPHIL NFR BLD: 1 % (ref 0–7)
EPITH CASTS URNS QL MICRO: ABNORMAL /LPF
ERYTHROCYTE [DISTWIDTH] IN BLOOD BY AUTOMATED COUNT: 17.8 % (ref 11.5–14.5)
FLUAV AG NPH QL IA: NEGATIVE
FLUAV H1 2009 PAND RNA SPEC QL NAA+PROBE: NOT DETECTED
FLUAV H1 RNA SPEC QL NAA+PROBE: NOT DETECTED
FLUAV H3 RNA SPEC QL NAA+PROBE: NOT DETECTED
FLUAV SUBTYP SPEC NAA+PROBE: NOT DETECTED
FLUBV AG NOSE QL IA: NEGATIVE
FLUBV RNA SPEC QL NAA+PROBE: NOT DETECTED
GLOBULIN SER CALC-MCNC: 4.1 G/DL (ref 2–4)
GLUCOSE SERPL-MCNC: 124 MG/DL (ref 65–100)
GLUCOSE UR STRIP.AUTO-MCNC: NEGATIVE MG/DL
HADV DNA SPEC QL NAA+PROBE: NOT DETECTED
HCOV 229E RNA SPEC QL NAA+PROBE: NOT DETECTED
HCOV HKU1 RNA SPEC QL NAA+PROBE: NOT DETECTED
HCOV NL63 RNA SPEC QL NAA+PROBE: NOT DETECTED
HCOV OC43 RNA SPEC QL NAA+PROBE: NOT DETECTED
HCT VFR BLD AUTO: 37.8 % (ref 35–47)
HGB BLD-MCNC: 11.6 G/DL (ref 11.5–16)
HGB UR QL STRIP: NEGATIVE
HMPV RNA SPEC QL NAA+PROBE: NOT DETECTED
HPIV1 RNA SPEC QL NAA+PROBE: NOT DETECTED
HPIV2 RNA SPEC QL NAA+PROBE: NOT DETECTED
HPIV3 RNA SPEC QL NAA+PROBE: NOT DETECTED
HPIV4 RNA SPEC QL NAA+PROBE: NOT DETECTED
IMM GRANULOCYTES # BLD: 0.2 K/UL (ref 0–0.04)
IMM GRANULOCYTES NFR BLD AUTO: 1 % (ref 0–0.5)
KETONES UR QL STRIP.AUTO: ABNORMAL MG/DL
LACTATE BLD-SCNC: 2.49 MMOL/L (ref 0.4–2)
LACTATE BLD-SCNC: 3.62 MMOL/L (ref 0.4–2)
LACTATE SERPL-SCNC: 1.7 MMOL/L (ref 0.4–2)
LEUKOCYTE ESTERASE UR QL STRIP.AUTO: NEGATIVE
LYMPHOCYTES # BLD: 1.6 K/UL (ref 0.8–3.5)
LYMPHOCYTES NFR BLD: 8 % (ref 12–49)
M PNEUMO DNA SPEC QL NAA+PROBE: NOT DETECTED
MCH RBC QN AUTO: 29.7 PG (ref 26–34)
MCHC RBC AUTO-ENTMCNC: 30.7 G/DL (ref 30–36.5)
MCV RBC AUTO: 96.9 FL (ref 80–99)
MONOCYTES # BLD: 1.9 K/UL (ref 0–1)
MONOCYTES NFR BLD: 9 % (ref 5–13)
MUCOUS THREADS URNS QL MICRO: ABNORMAL /LPF
NEUTS SEG # BLD: 17 K/UL (ref 1.8–8)
NEUTS SEG NFR BLD: 81 % (ref 32–75)
NITRITE UR QL STRIP.AUTO: NEGATIVE
NRBC # BLD: 0 K/UL (ref 0–0.01)
NRBC BLD-RTO: 0 PER 100 WBC
PH UR STRIP: 6 [PH] (ref 5–8)
PLATELET # BLD AUTO: 328 K/UL (ref 150–400)
PMV BLD AUTO: 9.5 FL (ref 8.9–12.9)
POTASSIUM SERPL-SCNC: 4.2 MMOL/L (ref 3.5–5.1)
PROT SERPL-MCNC: 6.8 G/DL (ref 6.4–8.2)
PROT UR STRIP-MCNC: ABNORMAL MG/DL
RBC # BLD AUTO: 3.9 M/UL (ref 3.8–5.2)
RBC #/AREA URNS HPF: ABNORMAL /HPF (ref 0–5)
RSV RNA SPEC QL NAA+PROBE: NOT DETECTED
RV+EV RNA SPEC QL NAA+PROBE: NOT DETECTED
SAMPLES BEING HELD,HOLD: NORMAL
SODIUM SERPL-SCNC: 139 MMOL/L (ref 136–145)
SP GR UR REFRACTOMETRY: 1.02 (ref 1–1.03)
TROPONIN I SERPL-MCNC: <0.05 NG/ML
UR CULT HOLD, URHOLD: NORMAL
UROBILINOGEN UR QL STRIP.AUTO: 0.2 EU/DL (ref 0.2–1)
WBC # BLD AUTO: 21 K/UL (ref 3.6–11)
WBC URNS QL MICRO: ABNORMAL /HPF (ref 0–4)

## 2018-11-24 PROCEDURE — 74011250636 HC RX REV CODE- 250/636: Performed by: EMERGENCY MEDICINE

## 2018-11-24 PROCEDURE — 83605 ASSAY OF LACTIC ACID: CPT

## 2018-11-24 PROCEDURE — 74011000250 HC RX REV CODE- 250: Performed by: INTERNAL MEDICINE

## 2018-11-24 PROCEDURE — 96365 THER/PROPH/DIAG IV INF INIT: CPT

## 2018-11-24 PROCEDURE — 71045 X-RAY EXAM CHEST 1 VIEW: CPT

## 2018-11-24 PROCEDURE — 74177 CT ABD & PELVIS W/CONTRAST: CPT

## 2018-11-24 PROCEDURE — 94640 AIRWAY INHALATION TREATMENT: CPT

## 2018-11-24 PROCEDURE — 96367 TX/PROPH/DG ADDL SEQ IV INF: CPT

## 2018-11-24 PROCEDURE — 71275 CT ANGIOGRAPHY CHEST: CPT

## 2018-11-24 PROCEDURE — 74011250636 HC RX REV CODE- 250/636: Performed by: INTERNAL MEDICINE

## 2018-11-24 PROCEDURE — 65270000029 HC RM PRIVATE

## 2018-11-24 PROCEDURE — 77030011943

## 2018-11-24 PROCEDURE — 93005 ELECTROCARDIOGRAM TRACING: CPT

## 2018-11-24 PROCEDURE — 87798 DETECT AGENT NOS DNA AMP: CPT

## 2018-11-24 PROCEDURE — 51701 INSERT BLADDER CATHETER: CPT

## 2018-11-24 PROCEDURE — 96361 HYDRATE IV INFUSION ADD-ON: CPT

## 2018-11-24 PROCEDURE — 80053 COMPREHEN METABOLIC PANEL: CPT

## 2018-11-24 PROCEDURE — 74011636320 HC RX REV CODE- 636/320: Performed by: RADIOLOGY

## 2018-11-24 PROCEDURE — 74011250637 HC RX REV CODE- 250/637: Performed by: INTERNAL MEDICINE

## 2018-11-24 PROCEDURE — 77030013140 HC MSK NEB VYRM -A

## 2018-11-24 PROCEDURE — 87040 BLOOD CULTURE FOR BACTERIA: CPT

## 2018-11-24 PROCEDURE — 74011250637 HC RX REV CODE- 250/637: Performed by: EMERGENCY MEDICINE

## 2018-11-24 PROCEDURE — 74011000258 HC RX REV CODE- 258: Performed by: INTERNAL MEDICINE

## 2018-11-24 PROCEDURE — 74011000258 HC RX REV CODE- 258: Performed by: EMERGENCY MEDICINE

## 2018-11-24 PROCEDURE — 36415 COLL VENOUS BLD VENIPUNCTURE: CPT

## 2018-11-24 PROCEDURE — 85025 COMPLETE CBC W/AUTO DIFF WBC: CPT

## 2018-11-24 PROCEDURE — 99285 EMERGENCY DEPT VISIT HI MDM: CPT

## 2018-11-24 PROCEDURE — 81001 URINALYSIS AUTO W/SCOPE: CPT

## 2018-11-24 PROCEDURE — 84484 ASSAY OF TROPONIN QUANT: CPT

## 2018-11-24 PROCEDURE — 87804 INFLUENZA ASSAY W/OPTIC: CPT

## 2018-11-24 RX ORDER — LEVOFLOXACIN 5 MG/ML
750 INJECTION, SOLUTION INTRAVENOUS EVERY 24 HOURS
Status: DISCONTINUED | OUTPATIENT
Start: 2018-11-24 | End: 2018-11-24

## 2018-11-24 RX ORDER — BUDESONIDE 0.5 MG/2ML
500 INHALANT ORAL
Status: DISCONTINUED | OUTPATIENT
Start: 2018-11-24 | End: 2018-11-29 | Stop reason: HOSPADM

## 2018-11-24 RX ORDER — ACETAMINOPHEN 325 MG/1
650 TABLET ORAL
Status: DISCONTINUED | OUTPATIENT
Start: 2018-11-24 | End: 2018-11-24

## 2018-11-24 RX ORDER — IPRATROPIUM BROMIDE AND ALBUTEROL SULFATE 2.5; .5 MG/3ML; MG/3ML
3 SOLUTION RESPIRATORY (INHALATION)
Status: DISCONTINUED | OUTPATIENT
Start: 2018-11-24 | End: 2018-11-29 | Stop reason: HOSPADM

## 2018-11-24 RX ORDER — AMLODIPINE BESYLATE 5 MG/1
5 TABLET ORAL DAILY
Status: DISCONTINUED | OUTPATIENT
Start: 2018-11-25 | End: 2018-11-29 | Stop reason: HOSPADM

## 2018-11-24 RX ORDER — LEVOFLOXACIN 5 MG/ML
750 INJECTION, SOLUTION INTRAVENOUS
Status: COMPLETED | OUTPATIENT
Start: 2018-11-24 | End: 2018-11-24

## 2018-11-24 RX ORDER — SODIUM CHLORIDE 9 MG/ML
600 INJECTION, SOLUTION INTRAVENOUS ONCE
Status: COMPLETED | OUTPATIENT
Start: 2018-11-24 | End: 2018-11-24

## 2018-11-24 RX ORDER — HYDROCODONE BITARTRATE AND ACETAMINOPHEN 7.5; 325 MG/1; MG/1
1 TABLET ORAL
COMMUNITY
End: 2018-12-04 | Stop reason: SDUPTHER

## 2018-11-24 RX ORDER — HYDROCODONE BITARTRATE AND ACETAMINOPHEN 7.5; 325 MG/1; MG/1
1 TABLET ORAL
Status: DISCONTINUED | OUTPATIENT
Start: 2018-11-24 | End: 2018-11-29 | Stop reason: HOSPADM

## 2018-11-24 RX ORDER — ARFORMOTEROL TARTRATE 15 UG/2ML
15 SOLUTION RESPIRATORY (INHALATION)
Status: DISCONTINUED | OUTPATIENT
Start: 2018-11-24 | End: 2018-11-29 | Stop reason: HOSPADM

## 2018-11-24 RX ORDER — ONDANSETRON 2 MG/ML
4 INJECTION INTRAMUSCULAR; INTRAVENOUS
Status: DISCONTINUED | OUTPATIENT
Start: 2018-11-24 | End: 2018-11-29 | Stop reason: HOSPADM

## 2018-11-24 RX ORDER — SODIUM CHLORIDE 0.9 % (FLUSH) 0.9 %
5-10 SYRINGE (ML) INJECTION EVERY 8 HOURS
Status: DISCONTINUED | OUTPATIENT
Start: 2018-11-24 | End: 2018-11-29 | Stop reason: HOSPADM

## 2018-11-24 RX ORDER — FOLIC ACID 1 MG/1
1 TABLET ORAL DAILY
Status: DISCONTINUED | OUTPATIENT
Start: 2018-11-24 | End: 2018-11-29 | Stop reason: HOSPADM

## 2018-11-24 RX ORDER — SODIUM CHLORIDE 0.9 % (FLUSH) 0.9 %
5-10 SYRINGE (ML) INJECTION AS NEEDED
Status: DISCONTINUED | OUTPATIENT
Start: 2018-11-24 | End: 2018-11-29 | Stop reason: HOSPADM

## 2018-11-24 RX ORDER — DIGOXIN 125 MCG
0.12 TABLET ORAL DAILY
Status: DISCONTINUED | OUTPATIENT
Start: 2018-11-24 | End: 2018-11-29 | Stop reason: HOSPADM

## 2018-11-24 RX ORDER — ACETAMINOPHEN 325 MG/1
650 TABLET ORAL
Status: DISCONTINUED | OUTPATIENT
Start: 2018-11-24 | End: 2018-11-29 | Stop reason: HOSPADM

## 2018-11-24 RX ORDER — HYDROCODONE BITARTRATE AND ACETAMINOPHEN 10; 325 MG/1; MG/1
1 TABLET ORAL
Status: COMPLETED | OUTPATIENT
Start: 2018-11-24 | End: 2018-11-24

## 2018-11-24 RX ADMIN — IOPAMIDOL 100 ML: 755 INJECTION, SOLUTION INTRAVENOUS at 09:00

## 2018-11-24 RX ADMIN — PIPERACILLIN SODIUM AND TAZOBACTAM SODIUM 3.38 G: 3; .375 INJECTION, POWDER, LYOPHILIZED, FOR SOLUTION INTRAVENOUS at 19:43

## 2018-11-24 RX ADMIN — DOXYCYCLINE 100 MG: 100 INJECTION, POWDER, LYOPHILIZED, FOR SOLUTION INTRAVENOUS at 15:45

## 2018-11-24 RX ADMIN — HYDROCODONE BITARTRATE AND ACETAMINOPHEN 1 TABLET: 7.5; 325 TABLET ORAL at 20:59

## 2018-11-24 RX ADMIN — SODIUM CHLORIDE 1000 ML: 900 INJECTION, SOLUTION INTRAVENOUS at 11:01

## 2018-11-24 RX ADMIN — FOLIC ACID 1 MG: 1 TABLET ORAL at 20:59

## 2018-11-24 RX ADMIN — HYDROCODONE BITARTRATE AND ACETAMINOPHEN 1 TABLET: 10; 325 TABLET ORAL at 12:44

## 2018-11-24 RX ADMIN — LEVOFLOXACIN 750 MG: 5 INJECTION, SOLUTION INTRAVENOUS at 12:13

## 2018-11-24 RX ADMIN — IOPAMIDOL 100 ML: 755 INJECTION, SOLUTION INTRAVENOUS at 12:17

## 2018-11-24 RX ADMIN — PIPERACILLIN SODIUM,TAZOBACTAM SODIUM 3.38 G: 3; .375 INJECTION, POWDER, FOR SOLUTION INTRAVENOUS at 11:40

## 2018-11-24 RX ADMIN — ARFORMOTEROL TARTRATE 15 MCG: 15 SOLUTION RESPIRATORY (INHALATION) at 21:31

## 2018-11-24 RX ADMIN — SODIUM CHLORIDE 600 ML: 900 INJECTION, SOLUTION INTRAVENOUS at 11:32

## 2018-11-24 RX ADMIN — APIXABAN 2.5 MG: 2.5 TABLET, FILM COATED ORAL at 20:59

## 2018-11-24 RX ADMIN — DOXYCYCLINE 100 MG: 100 INJECTION, POWDER, LYOPHILIZED, FOR SOLUTION INTRAVENOUS at 21:00

## 2018-11-24 RX ADMIN — DIGOXIN 0.12 MG: 125 TABLET ORAL at 20:59

## 2018-11-24 RX ADMIN — BUDESONIDE 500 MCG: 0.5 INHALANT RESPIRATORY (INHALATION) at 21:31

## 2018-11-24 RX ADMIN — SODIUM CHLORIDE 500 ML: 900 INJECTION, SOLUTION INTRAVENOUS at 15:43

## 2018-11-24 NOTE — ED TRIAGE NOTES
Patient presents with shortness of breath this morning, and family noted O2 sats 80 at home. Patient with dysuria this week and confusion noted by family this morning.

## 2018-11-24 NOTE — PROGRESS NOTES
Advance Care Planning Note Name: Venita Rios YOB: 1931 MRN: [id number] Admission Date: 11/24/2018  9:44 AM 
 
Date of discussion: 11/24/2018 Active Diagnoses: 
 
Hospital Problems  Date Reviewed: 11/24/2018 Codes Class Noted POA * (Principal) Lung mass ICD-10-CM: R91.8 ICD-9-CM: 786.6  11/24/2018 Unknown Lactic acid acidosis ICD-10-CM: E87.2 ICD-9-CM: 276.2  11/24/2018 Unknown SIRS (systemic inflammatory response syndrome) (HCC) ICD-10-CM: R65.10 ICD-9-CM: 995.90  11/24/2018 Unknown Tobacco abuse (Chronic) ICD-10-CM: Z72.0 ICD-9-CM: 305.1  8/25/2018 Yes Hypertension (Chronic) ICD-10-CM: I10 
ICD-9-CM: 401.9  8/25/2018 Yes Paroxysmal A-fib (HCC) (Chronic) ICD-10-CM: I48.0 ICD-9-CM: 427.31  9/30/2016 Yes RA (rheumatoid arthritis) (HCC) (Chronic) ICD-10-CM: M06.9 ICD-9-CM: 714.0  9/30/2016 Yes COPD (chronic obstructive pulmonary disease) (HCC) (Chronic) ICD-10-CM: J44.9 ICD-9-CM: 405  9/30/2016 Yes These active diagnoses are of sufficient risk that focused discussion on advance care planning is indicated in order to allow the patient to thoughtfully consider personal goals of care, and if situations arise that prevent the ability to personally give input, to ensure appropriate representation of their personal desires for different levels and aggressiveness of care. Discussion:  
 
Persons present and participating in discussion: Venita RiosDmitriy MD, pt daughter Melvin Pearl Discussion: Discussed code status with pt and daughter. Pt clearly expresses a wish not to suffer and does not want to be on a ventilator in any circumstances. Daughter is resistant to this but after further discussion she is in agreement. Time Spent:  
 
Total time spent face-to-face in education and discussion: 17 minutes. Dmitriy Subramanian MD 
11/24/2018 3:20 PM

## 2018-11-24 NOTE — PROGRESS NOTES
[facility]: MED RECONCILIATION Comments/Recommendations: ? Med rec performed via interview with patient and daughter who helps manage medications. Daughter had a list of medications and pharmacist confirmed strengths at [facility]. ? Daughter states patient was discharged from [facility] a few weeks ago and was taking [place] lot of medication there. \" Daughter states patient was discharged and was not given scripts for most of her medications so they were not continued. Daughter states patient was given a script for oxycontin but the patient was \"too loopy\" on this so she did not fill this medication. Family reports patient was also on a medication for anxiety, and Lexapro sounded familiar, however this medication was not continued at discharge due to not receiving a script for it. ? Patient states she was supposed to begin taking Fosamax but has not received a script for it so she has not begun taking it. ? Patient believes she is taking ergocalciferol but is unsure which day of the week she takes it Medications removed: 
 
· Pepto bismol · Miralax--pt was taking while at [facility] but this was not continued at home per daughter Medications adjusted: 
 
· Changed norco to 7.5 mg/325 mg Information obtained from: patient, daughter, med list, [facility] Significant PMH/Disease States:  
Past Medical History:  
Diagnosis Date  AAA (abdominal aortic aneurysm) ([facility]  Arthritis   
 ra, osteoporosis, osteoarthritis  Atrial fibrillation ([facility]  COPD   
 HTN (hypertension)  RA (rheumatoid arthritis) ([facility]  Smoker  TIA (transient ischemic attack) TIA 9/30/16 Chief Complaint for this Admission: Chief Complaint Patient presents with  Fatigue  Urinary Pain  Shortness of Breath Allergies: Codeine Prior to Admission Medications:  
Prior to Admission Medications Prescriptions Last Dose Informant Patient Reported? Taking? amLODIPine (NORVASC) 5 mg tablet 11/23/2018 at AM Self Yes Yes Sig: Take 5 mg by mouth daily. apixaban (ELIQUIS) 2.5 mg tablet 11/23/2018 at PM Self No Yes Sig: Take 1 Tab by mouth every twelve (12) hours. cholecalciferol (VITAMIN D3) 50,000 unit capsule  Self Yes Yes Sig: Take 50,000 Units by mouth every seven (7) days. digoxin (LANOXIN) 0.125 mg tablet 11/23/2018 at AM Self Yes Yes Sig: Take 0.125 mg by mouth daily. folic acid (FOLVITE) 1 mg tablet 11/23/2018 at AM Self Yes Yes Sig: Take 1 mg by mouth daily. methotrexate (RHEUMATREX) 2.5 mg tablet 11/17/2018 Self Yes Yes Sig: Take 20 mg by mouth Every Saturday. The patient takes 8 tablets which is 20 mg every Saturday Facility-Administered Medications: None Gasper Henson, PHARMD   Contact: [phone]

## 2018-11-24 NOTE — ED NOTES
TRANSFER - OUT REPORT: 
 
Verbal report given to 5th floor (name) on Aroldo Maravilla  being transferred to 5th floor(unit) for routine progression of care Report consisted of patients Situation, Background, Assessment and  
Recommendations(SBAR). Information from the following report(s) SBAR, ED Summary, MAR, Recent Results and Cardiac Rhythm NSR was reviewed with the receiving nurse. Lines:  
Peripheral IV 11/24/18 Left Forearm (Active) Site Assessment Clean, dry, & intact 11/24/2018 10:51 AM  
Phlebitis Assessment 0 11/24/2018 10:51 AM  
Infiltration Assessment 0 11/24/2018 10:51 AM  
Dressing Status Clean, dry, & intact 11/24/2018 10:51 AM  
Dressing Type Transparent;Tape 11/24/2018 10:51 AM  
Hub Color/Line Status Patent; Flushed;Pink 11/24/2018 10:51 AM  
Action Taken Blood drawn 11/24/2018 10:51 AM  
Alcohol Cap Used Yes 11/24/2018 10:51 AM  
  
 
Opportunity for questions and clarification was provided. Patient transported with: 
 Monitor Registered Nurse O2 2 lpm

## 2018-11-24 NOTE — ED NOTES
Pt's mentation improved, able to carry on a conversation now, conversing easily with the pharmacist.

## 2018-11-24 NOTE — ED PROVIDER NOTES
80 y.o. female with past medical history significant for RA, A-fib, COPD, AAA, HTN, and TIA who presents from home with chief complaint of SOB. Pt's daughter reports Pt was discharged from rehab 1 week ago after a 2-month stay after bilateral leg fractures, and she began feeling ill w/ SOB 2 days ago. Per Pt's daughter, Pt's breathing seems to be more labored since then; her oxygen saturation was ~80% earlier this morning while she was having SOB. Her HR was \"in the low 100's\" at that time. Pt's daughter also reports Pt c/o feeling hot/cold, dysuria, epigastric abdominal pain, and cough. Per daughter, Pt initially began having confusion 3 months ago, but her confusion has recently worsened. Pt denies prior hx of UTI. There are no other acute medical concerns at this time. PCP: Kamilah Adame MD 
 
Note written by Monalisa Deras, as dictated by Yane Cota MD 10:23 AM 
 
 
 
  
 
Past Medical History:  
Diagnosis Date  AAA (abdominal aortic aneurysm) ([facility]  Arthritis   
 ra, osteoporosis, osteoarthritis  Atrial fibrillation ([facility]  COPD   
 HTN (hypertension)  RA (rheumatoid arthritis) ([facility]  Smoker  TIA (transient ischemic attack) TIA 9/30/16 Past Surgical History:  
Procedure Laterality Date  HX CHOLECYSTECTOMY  HX HYSTERECTOMY  HX ORTHOPAEDIC    
 carpal tunnel, wrist surgery Family History:  
Problem Relation Age of Onset  Hypertension Father Social History Socioeconomic History  Marital status: SINGLE Spouse name: Not on file  Number of children: Not on file  Years of education: Not on file  Highest education level: Not on file Social Needs  Financial resource strain: Not on file  Food insecurity - worry: Not on file  Food insecurity - inability: Not on file  Transportation needs - medical: Not on file  Transportation needs - non-medical: Not on file Occupational History  Not on file Tobacco Use  Smoking status: Former Smoker Substance and Sexual Activity  Alcohol use: No  
 Drug use: No  
 Sexual activity: Not on file Other Topics Concern  Not on file Social History Narrative  Not on file ALLERGIES: Codeine Review of Systems Respiratory: Positive for cough and shortness of breath. Gastrointestinal: Positive for abdominal pain. Genitourinary: Positive for dysuria. Psychiatric/Behavioral: Positive for confusion. All other systems reviewed and are negative. Vitals:  
 11/24/18 1026 11/24/18 1028 11/24/18 1101 11/24/18 1127 BP:   (!) 115/31 Pulse: 99 96 92 Resp: 18 23 22 Temp:    (!) 100.8 °F (38.2 °C) SpO2: (!) 88% 97% 94% Weight:      
Height:      
      
 
Physical Exam  
Constitutional: She is oriented to person, place, and time. Appears thin and frail. HENT:  
Head: Normocephalic and atraumatic. Nasal cannula in place. Eyes: Conjunctivae are normal. No scleral icterus. Neck: Neck supple. No tracheal deviation present. Cardiovascular: Normal rate, regular rhythm, normal heart sounds and intact distal pulses. Exam reveals no gallop and no friction rub. No murmur heard. Pulmonary/Chest:  
Mild crackles over L lung base. Abdominal: Soft. She exhibits no distension. There is tenderness (epigastric, mild). There is no rebound and no guarding. Musculoskeletal: She exhibits no edema. Neurological: She is alert and oriented to person, place, and time. Skin: Skin is warm and dry. No rash noted. Psychiatric: She has a normal mood and affect. Nursing note and vitals reviewed. Note written by Monalisa Schmitt, as dictated by Eliane Hudson MD 10:23 AM 
 
MDM Procedures ED EKG interpretation: 
Rhythm: normal sinus rhythm; Rate (approx.): 94; ST/T wave: non-specific changes; occasional PAC's Note written by Monalisa Schmitt, as dictated by Eliane Hudson MD 9:58 PM 
 
CONSULT NOTE: 
 1:20 PM Miranda Ocampo MD spoke with Dr. Sharmila Greene, Consult for Hospitalist.  Discussed available diagnostic tests and clinical findings. Dr. Sharmila Greene will admit Pt. Total critical care time spent exclusive of procedures:  35 min. Stephanie Rudolph MD 
1:56 PM 
 
Repeat Assessment (Within 6 hours of septic shock presentation):  
Repeat Focused Exam 
 VS: 94, 24, 117/55, 98% Heart/Lung Eval: clear, RRR Capillary Refill: < 2 sec Peripheral Pulse: 2+ Skin exam (color/turgor): normal 
 
 
Repeat Lactate:  pending A/P: dyspnea, some confusion, dysuria - w/up remarkable for elevated lactate, WBC - 21, normal CXR and UA; stable BP, 7.3 cm lung mass; getting 30 cc/kg NS and broad-spectrum AB's.   Admit to hospitalist.  Stephanie Rudolph MD 
1:59 PM

## 2018-11-24 NOTE — ED NOTES
Verbal report given to Kayla(name) ana Wright being transferred to 5th floor(unit) for routine progression of care Report consisted of patient's Situation, Background, Assessment and Recommendations (SBAR) Information from the following report(s)  SBAR, ED Summary, MAR, Recent Results and Cardiac Rhythm NSR was reviewed with the receiving nurse. Opportunity for questions and clarification was provided. Patient transported with:  Monitor O2 @ 2 liters Registered Nurse Last Filed Values: 
Temp: 98.2 °F (36.8 °C) (11/24/18 1412) Pulse (Heart Rate): 78 (11/24/18 1600) Resp Rate: 18 (11/24/18 1600) O2 Sat (%): 94 % (11/24/18 1600) BP: 136/47 (11/24/18 1600) MAP (Monitor): 74 (11/24/18 1600) MAP (Calculated): 88 (11/24/18 0942) Level of Consciousness: Alert (11/24/18 1657) Lab Results Component Value Date/Time WBC 21.0 (H) 11/24/2018 10:50 AM  
 
 
Repeat LA:  Time Due will draw before leaving the ED Blood Cultures Drawn:  yes Fluid Resuscitation:  Total needed 2100, Status completed, amount 2100 All Antibiotics Started:  yes, Dose Due see MAR 
 
VS x 2 post-fluid resuscitation:   yes Vasopressor Infusion:  no  
 
Provider Reassessment needed and notified:  no , Due

## 2018-11-24 NOTE — ED NOTES
Pt very confused during assessment. Per daughter, the pt has no hx of dementia. Pt oriented x3 but continues to repeat questions, asking why she is in the hospital. Pt requires continuous re-orientation.

## 2018-11-24 NOTE — ED NOTES
Code purple called at 1055 due to elevated lactic, elevated WBCs, elevated HR, AMS, and suspected source (urinary pain and recent pneumonia).

## 2018-11-24 NOTE — ED NOTES
Unable to obtain IV access due to pt's confusion, pt pulling oxygen off, tugging at monitors. Will attempt to obtain additional labs and IV access with another staff member.

## 2018-11-24 NOTE — ED NOTES
O2 sats at 89% on room air. Per daughter, pt is supposed to be on home oxygen 2 lpm but the pt \"refuses to wear it. \" Oxygen 2 lpm applied via NC.

## 2018-11-24 NOTE — H&P
[address] [address] 
[phone] Admission History and Physical 
 
 
NAME:  Jonathon Steele :   1931 MRN:  [id number] PCP:  Michael Bronson MD  
 
Date/Time:  2018 Subjective: CHIEF COMPLAINT: sob HISTORY OF PRESENT ILLNESS:    
Ms. Denton Orta is a 80 y.o. with h/o copd, tobacco abuse, AAA, afib, htn who presents with shortness of breath. Pt was recently discharged from the [facility] after a rehab stay for tibial plateau fractures. She notes increased sob and family notes hypoxia down to the 80s at home. Family and pt report a mass was found on imaging (unclear if CT or CXR) about one month ago. States they saw pulmonary and were told this is not cancer. She adamantly refuses to believe findings on CT chest might be cancer. Pt denies cough, sputum, fever. No chest pain Past Medical History:  
Diagnosis Date  AAA (abdominal aortic aneurysm) ([facility] [address]  Arthritis   
 ra, osteoporosis, osteoarthritis  Atrial fibrillation ([facility] [address]  COPD   
 HTN (hypertension)  RA (rheumatoid arthritis) ([facility] [address]  Smoker  TIA (transient ischemic attack) TIA 16 Past Surgical History:  
Procedure Laterality Date  HX CHOLECYSTECTOMY  HX HYSTERECTOMY  HX ORTHOPAEDIC    
 carpal tunnel, wrist surgery Social History Tobacco Use  Smoking status: Former Smoker  Smokeless tobacco: Never Used Substance Use Topics  Alcohol use: No  
  
 
Family History Problem Relation Age of Onset  Hypertension Father Allergies Allergen Reactions  Codeine Nausea and Vomiting Prior to Admission medications Medication Sig Start Date End Date Taking? Authorizing Provider HYDROcodone-acetaminophen (NORCO)  mg tablet Take 1 Tab by mouth every four (4) hours as needed. Max Daily Amount: 6 Tabs.  18   Isabela West MD  
 polyethylene glycol (MIRALAX) 17 gram packet Take 1 Packet by mouth two (2) times a day. 8/30/18   Lucio Mckeon MD  
amLODIPine (NORVASC) 5 mg tablet Take 5 mg by mouth daily. Provider, Historical  
cholecalciferol (VITAMIN D3) 50,000 unit capsule Take 50,000 Units by mouth every seven (7) days. Provider, Historical  
bismuth subsalicylate (PEPTO-BISMOL MAXIMUM STRENGTH) 525 mg/15 mL susp Take 5 mL by mouth every six (6) hours as needed (upset stomach). Provider, Historical  
apixaban (ELIQUIS) 2.5 mg tablet Take 1 Tab by mouth every twelve (12) hours. 10/1/16   Bridge, Gerhardt Anon, MD  
folic acid (FOLVITE) 1 mg tablet Take 1 mg by mouth daily. Rayshawn Dorsey MD  
methotrexate (RHEUMATREX) 2.5 mg tablet Take 20 mg by mouth Every Saturday. The patient takes 8 tablets which is 20 mg every Saturday    Rayshawn Dorsey MD  
digoxin (LANOXIN) 0.125 mg tablet Take 0.125 mg by mouth daily. Rayshawn Dorsey MD  
 
 
 
Review of Systems: 
  
 
Gen:  Eyes:  ENT:  CVS:  Pulm:  dyspneaGI:   
:   
MS:  Skin:  Psych:  Endo:   
Hem:  Renal:   
Neuro:    
 
 
  
Objective: VITALS:   
Vital signs reviewed; most recent are: 
 
Visit Vitals /63 Pulse 84 Temp 98.2 °F (36.8 °C) Resp 15 Ht 5' 7\" (1.702 m) Wt 52.6 kg (116 lb) SpO2 93% BMI 18.17 kg/m² SpO2 Readings from Last 6 Encounters:  
11/24/18 93% 08/30/18 93% 10/04/16 97% 10/01/16 94% 09/13/13 94% No intake or output data in the 24 hours ending 11/24/18 0147 Exam:  
 
Physical Exam: 
 
Gen:  Elderly chronically ill appearing, in no acute distress HEENT:  Pink conjunctivae, PERRL, hearing intact to voice, moist mucous membranes Neck:  Supple, without masses, thyroid non-tender Resp:  No accessory muscle use, clear breath sounds without wheezes rales or rhonchi 
Card:  No murmurs, normal S1, S2 without thrills, bruits or peripheral edema Abd:  Soft, non-tender, non-distended, normoactive bowel sounds are present, no palpable organomegaly Lymph:  No cervical adenopathy Musc:  No cyanosis or clubbing Skin:  No rashes or ulcers, skin turgor is good Neuro:  Cranial nerves 3-12 are grossly intact,  strength is 5/5 bilaterally, dorsi / plantarflexion strength is 5/5 bilaterally, follows commands appropriately Psych:  Alert with good insight. Oriented to person, place, and time Labs: 
 
Recent Labs  
  11/24/18 
1050 WBC 21.0* HGB 11.6 HCT 37.8  Recent Labs  
  11/24/18 
1050   
K 4.2  CO2 28 * BUN 12  
CREA 0.73 CA 8.8 ALB 2.7* SGOT 15 ALT 10* No components found for: [place] No results for input(s): PH, PCO2, PO2, HCO3, FIO2 in the last 72 hours. No results for input(s): INR in the last 72 hours. No lab exists for component: INREXT Ct chest:No evidence of central pulmonary embolus. Suboptimal evaluation of segmental and 
subsegmental pulmonary arteries secondary to respiratory motion artifact. 7.3 
cm left upper lobe pulmonary mass is compatible with malignancy. Associated 
cortical destruction of the left fourth posterior rib. 2.6 cm right adrenal 
nodule. Indeterminate bilateral renal lesions. 5.4 cm abdominal aortic aneurysm, 
with no evidence of rupture. Mild diffuse urinary bladder wall thickening. 
 
--images visualized personally Assessment/Plan:   
  
Principal Problem: 
  Lung mass: 7cm x 4cm mass found on CT chest. Per pt and family a mass was found ~1 month ago; they saw Dr. Chapincito Pierce in clinic and were told this was not cancer. Consult pulmonary Severe sepsis / elevated lactate: 3/4 SIRs criteria POA. With elevated lactic acid; will trend lactic acid. No pna seen on CT chest however will tx empirically for pna. UA normal. Blood cultures sent. Send resp culture if able. ?pneumonia: see above. Send influenza, viral panel and respiratory culture. Start empiric tx for HCAP with zosyn and doxy. Paroxysmal A-fib: rate well controlled currently. Resume digoxin and eliquis RA (rheumatoid arthritis): hold methotrexate while treating for severe sepsis COPD (chronic obstructive pulmonary disease): does not appear to be in acute exacerbation. Start LABA/ICS and duonebs. Hold off on steroids for now Tobacco abuse: quit after last hospital stay. Advised cessation Hypertension: hold norvasc in setting of severe sepsis Surrogate decision maker: daughter Total time spent with patient: 70 Minutes Care Plan discussed with: Patient and Family Discussed:  Care Plan Prophylaxis:  eliquis Probable Disposition:  SNF/LTC 
        
___________________________________________________ Attending Physician: Shilo Ramsey MD

## 2018-11-25 LAB
ANION GAP SERPL CALC-SCNC: 8 MMOL/L (ref 5–15)
ATRIAL RATE: 94 BPM
BASOPHILS # BLD: 0 K/UL (ref 0–0.1)
BASOPHILS NFR BLD: 0 % (ref 0–1)
BUN SERPL-MCNC: 9 MG/DL (ref 6–20)
BUN/CREAT SERPL: 15 (ref 12–20)
CALCIUM SERPL-MCNC: 8 MG/DL (ref 8.5–10.1)
CALCULATED P AXIS, ECG09: 39 DEGREES
CALCULATED R AXIS, ECG10: -16 DEGREES
CALCULATED T AXIS, ECG11: 76 DEGREES
CHLORIDE SERPL-SCNC: 104 MMOL/L (ref 97–108)
CO2 SERPL-SCNC: 27 MMOL/L (ref 21–32)
CREAT SERPL-MCNC: 0.62 MG/DL (ref 0.55–1.02)
DIAGNOSIS, 93000: NORMAL
DIFFERENTIAL METHOD BLD: ABNORMAL
EOSINOPHIL # BLD: 0.3 K/UL (ref 0–0.4)
EOSINOPHIL NFR BLD: 2 % (ref 0–7)
ERYTHROCYTE [DISTWIDTH] IN BLOOD BY AUTOMATED COUNT: 17.2 % (ref 11.5–14.5)
GLUCOSE SERPL-MCNC: 99 MG/DL (ref 65–100)
HCT VFR BLD AUTO: 31 % (ref 35–47)
HGB BLD-MCNC: 9.6 G/DL (ref 11.5–16)
IMM GRANULOCYTES # BLD: 0.1 K/UL (ref 0–0.04)
IMM GRANULOCYTES NFR BLD AUTO: 1 % (ref 0–0.5)
LYMPHOCYTES # BLD: 1.8 K/UL (ref 0.8–3.5)
LYMPHOCYTES NFR BLD: 11 % (ref 12–49)
MCH RBC QN AUTO: 29.5 PG (ref 26–34)
MCHC RBC AUTO-ENTMCNC: 31 G/DL (ref 30–36.5)
MCV RBC AUTO: 95.4 FL (ref 80–99)
MONOCYTES # BLD: 1.1 K/UL (ref 0–1)
MONOCYTES NFR BLD: 7 % (ref 5–13)
NEUTS SEG # BLD: 12.4 K/UL (ref 1.8–8)
NEUTS SEG NFR BLD: 79 % (ref 32–75)
NRBC # BLD: 0 K/UL (ref 0–0.01)
NRBC BLD-RTO: 0 PER 100 WBC
P-R INTERVAL, ECG05: 148 MS
PLATELET # BLD AUTO: 253 K/UL (ref 150–400)
PMV BLD AUTO: 9.3 FL (ref 8.9–12.9)
POTASSIUM SERPL-SCNC: 3.8 MMOL/L (ref 3.5–5.1)
Q-T INTERVAL, ECG07: 330 MS
QRS DURATION, ECG06: 82 MS
QTC CALCULATION (BEZET), ECG08: 412 MS
RBC # BLD AUTO: 3.25 M/UL (ref 3.8–5.2)
SODIUM SERPL-SCNC: 139 MMOL/L (ref 136–145)
VENTRICULAR RATE, ECG03: 94 BPM
WBC # BLD AUTO: 15.7 K/UL (ref 3.6–11)

## 2018-11-25 PROCEDURE — 94640 AIRWAY INHALATION TREATMENT: CPT

## 2018-11-25 PROCEDURE — 74011000250 HC RX REV CODE- 250: Performed by: INTERNAL MEDICINE

## 2018-11-25 PROCEDURE — 74011250636 HC RX REV CODE- 250/636: Performed by: INTERNAL MEDICINE

## 2018-11-25 PROCEDURE — 85025 COMPLETE CBC W/AUTO DIFF WBC: CPT

## 2018-11-25 PROCEDURE — 65270000029 HC RM PRIVATE

## 2018-11-25 PROCEDURE — 74011250637 HC RX REV CODE- 250/637: Performed by: INTERNAL MEDICINE

## 2018-11-25 PROCEDURE — 36415 COLL VENOUS BLD VENIPUNCTURE: CPT

## 2018-11-25 PROCEDURE — 77030038269 HC DRN EXT URIN PURWCK BARD -A

## 2018-11-25 PROCEDURE — 80048 BASIC METABOLIC PNL TOTAL CA: CPT

## 2018-11-25 PROCEDURE — 94664 DEMO&/EVAL PT USE INHALER: CPT

## 2018-11-25 PROCEDURE — 74011000258 HC RX REV CODE- 258: Performed by: INTERNAL MEDICINE

## 2018-11-25 RX ADMIN — PIPERACILLIN SODIUM AND TAZOBACTAM SODIUM 3.38 G: 3; .375 INJECTION, POWDER, LYOPHILIZED, FOR SOLUTION INTRAVENOUS at 12:00

## 2018-11-25 RX ADMIN — Medication 10 ML: at 14:00

## 2018-11-25 RX ADMIN — DOXYCYCLINE 100 MG: 100 INJECTION, POWDER, LYOPHILIZED, FOR SOLUTION INTRAVENOUS at 21:43

## 2018-11-25 RX ADMIN — ARFORMOTEROL TARTRATE 15 MCG: 15 SOLUTION RESPIRATORY (INHALATION) at 07:32

## 2018-11-25 RX ADMIN — PIPERACILLIN SODIUM AND TAZOBACTAM SODIUM 3.38 G: 3; .375 INJECTION, POWDER, LYOPHILIZED, FOR SOLUTION INTRAVENOUS at 21:43

## 2018-11-25 RX ADMIN — ARFORMOTEROL TARTRATE 15 MCG: 15 SOLUTION RESPIRATORY (INHALATION) at 20:18

## 2018-11-25 RX ADMIN — HYDROCODONE BITARTRATE AND ACETAMINOPHEN 1 TABLET: 7.5; 325 TABLET ORAL at 11:08

## 2018-11-25 RX ADMIN — APIXABAN 2.5 MG: 2.5 TABLET, FILM COATED ORAL at 08:20

## 2018-11-25 RX ADMIN — AMLODIPINE BESYLATE 5 MG: 5 TABLET ORAL at 08:20

## 2018-11-25 RX ADMIN — HYDROCODONE BITARTRATE AND ACETAMINOPHEN 1 TABLET: 7.5; 325 TABLET ORAL at 04:06

## 2018-11-25 RX ADMIN — BUDESONIDE 500 MCG: 0.5 INHALANT RESPIRATORY (INHALATION) at 07:32

## 2018-11-25 RX ADMIN — BUDESONIDE 500 MCG: 0.5 INHALANT RESPIRATORY (INHALATION) at 20:18

## 2018-11-25 RX ADMIN — HYDROCODONE BITARTRATE AND ACETAMINOPHEN 1 TABLET: 7.5; 325 TABLET ORAL at 19:02

## 2018-11-25 RX ADMIN — Medication 10 ML: at 21:45

## 2018-11-25 RX ADMIN — PIPERACILLIN SODIUM AND TAZOBACTAM SODIUM 3.38 G: 3; .375 INJECTION, POWDER, LYOPHILIZED, FOR SOLUTION INTRAVENOUS at 03:26

## 2018-11-25 RX ADMIN — DIGOXIN 0.12 MG: 125 TABLET ORAL at 21:44

## 2018-11-25 RX ADMIN — DOXYCYCLINE 100 MG: 100 INJECTION, POWDER, LYOPHILIZED, FOR SOLUTION INTRAVENOUS at 08:20

## 2018-11-25 NOTE — ROUTINE PROCESS
Bedside and Verbal shift change report given to John Zarate RN (oncoming nurse) by Shilo River RN (offgoing nurse). Report included the following information SBAR, Kardex, MAR and Accordion.

## 2018-11-25 NOTE — PROGRESS NOTES
[facility]ver[facility] [address] 
[address] [facility], [address] [place] [address] 
[phone] Medical Progress Note NAME:         Edwin Elaine :        1931 MRM:        [id number] Date:          2018 Subjective: Patient has been seen and examined as a follow up for SOB, lung mass. Chart, labs, diagnostics reviewed. Patient says she feels better. Denies any chest pain or SOB. Minimal cough. No fever Objective: 
 
Vital Signs: 
 
Visit Vitals /62 (BP 1 Location: Right arm, BP Patient Position: At rest) Pulse (!) 56 Temp 98.3 °F (36.8 °C) Resp 18 Ht 5' 7\" (1.702 m) Wt 52.6 kg (116 lb) SpO2 95% BMI 18.17 kg/m² Intake/Output Summary (Last 24 hours) at 2018 1249 Last data filed at 2018 2100 Gross per 24 hour Intake 200 ml Output  Net 200 ml Physical Examination: 
General:   Weak and cachectic looking female, not in acute distress Eyes:   pink conjunctivae, PERRLA with no discharge. ENT:   no ottorrhea or rhinorrhea with dry mucous membranes Neck: no masses, thyroid non-tender and trachea central. 
Pulm:  no accessory muscle use, decreased breath sounds without crackles or wheezes Card:  no JVD or murmurs, has sinus bradycardia, without thrills, bruits or peripheral edema Abd:  Soft, non-tender, non-distended, normoactive bowel sounds with no palpable organomegaly Musc:  No cyanosis, clubbing, atrophy or deformities. Skin:  No rashes, bruising or ulcers. Neuro: Awake and alert. Generally a non focal exam. Follows commands appropriately Psych:  Has a fair insight and is oriented x 3 Current Facility-Administered Medications Medication Dose Route Frequency  sodium chloride (NS) flush 5-10 mL  5-10 mL IntraVENous Q8H  
 sodium chloride (NS) flush 5-10 mL  5-10 mL IntraVENous PRN  
 acetaminophen (TYLENOL) tablet 650 mg  650 mg Oral Q4H PRN  
  ondansetron (ZOFRAN) injection 4 mg  4 mg IntraVENous Q4H PRN  piperacillin-tazobactam (ZOSYN) 3.375 g in 0.9% sodium chloride (MBP/ADV) 100 mL  3.375 g IntraVENous Q8H  
 doxycycline (VIBRAMYCIN) 100 mg in 0.9% sodium chloride (MBP/ADV) 100 mL  100 mg IntraVENous Q12H  
 budesonide (PULMICORT) 500 mcg/2 ml nebulizer suspension  500 mcg Nebulization BID RT  
 arformoterol (BROVANA) neb solution 15 mcg  15 mcg Nebulization BID RT  
 albuterol-ipratropium (DUO-NEB) 2.5 MG-0.5 MG/3 ML  3 mL Nebulization Q6H PRN  
 amLODIPine (NORVASC) tablet 5 mg  5 mg Oral DAILY  apixaban (ELIQUIS) tablet 2.5 mg  2.5 mg Oral Q12H  digoxin (LANOXIN) tablet 0.125 mg  0.125 mg Oral DAILY  folic acid (FOLVITE) tablet 1 mg  1 mg Oral DAILY  HYDROcodone-acetaminophen (NORCO) 7.5-325 mg per tablet 1 Tab  1 Tab Oral Q6H PRN Laboratory data and review: 
 
Recent Labs  
  11/25/18 
0051 11/24/18 
1050 WBC 15.7* 21.0* HGB 9.6* 11.6 HCT 31.0* 37.8  328 Recent Labs  
  11/25/18 
0051 11/24/18 
1050  139  
K 3.8 4.2  101 CO2 27 28 GLU 99 124* BUN 9 12 CREA 0.62 0.73 CA 8.0* 8.8 ALB  --  2.7* SGOT  --  15 ALT  --  10* No components found for: [place] 
 
CTA chest, abdomen - No evidence of central pulmonary embolus. Suboptimal evaluation of segmental and subsegmental pulmonary arteries secondary to respiratory motion artifact. 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 Associated cortical destruction of the left fourth posterior rib. 2.6 cm right adrenal nodule. Indeterminate bilateral renal lesions. 5.4 cm abdominal aortic aneurysm, with no evidence of rupture. Mild diffuse urinary bladder wall thickening. 
  
Assessment and Plan: 
 
Lung mass left upper lung POA: 7cm x 4cm mass found on CT chest concerning for malignancy. Will need a tissue biopsy although the patient is very hesitant to have this done. Hold Eliquis. Pulmonology to review. Severe sepsis / elevated lactate: 3/4 SIRs criteria POA. Suspected due to lung mass above vs underlying infection. Lactate now normal. Resp viral panel neg. Blood cultures neg. Continue empiric IV Doxycycline and Zosyn and follow clinically.  
   
Paroxysmal A-fib: rate well controlled currently. Continue digoxin. May need to hold Eliquis for lung biopsy RA (rheumatoid arthritis): hold methotrexate while treating for suspected. Pain control as needed. COPD (chronic obstructive pulmonary disease): stable. Continue neds as scheduled. Supplemental oxygen as needed. Hypertension: BP low normal. Agree with holding Norvasc in setting of severe sepsis Tobacco abuse: quit after last hospital stay. Advised cessation Total time spent for the patient's care: 35 Minutes Care Plan discussed with: Patient, Family, Nursing Staff and Consultant/Specialist 
 
Discussed:  Care Plan Prophylaxis:  Eliquis Anticipated Disposition:  Home w/Family 
        
___________________________________________________ Attending Physician:   Alhaji Prescott MD

## 2018-11-25 NOTE — CONSULTS
[facility]     Name: Lakia Mistry MRN: [id number]   : 1931 Hospital: [address]   Date: 2018        Impression Plan   1. Abnormal chest CT- likely malignancy  2. COPD  3. Hypoxia  4. Anxiety  5. Tobacco abuse               · Recommend bone biopsy at this point. The mass is larger than seen on [facility] CT  per my measurement and now has bone invasion. Even in the remote chance this is not cancer, she still needs a bx to stain for AFB and fungal elements. A bronch would be very limiting in this case. Pt is in complete denial and states she knows that this is not cancer and that she is petrified of the idea of having a needle stuck in her lung. Her daughter is in the room with her and would like to have the biopsy done. Pt states she will think about it. · Will treat with abx for now- currently on Doxy and Zosyn  · Wean O2 as tolerated  · Pulmicort/Brovana  · Hold eliquis while pt decides  · Discussed with Dr. Citlali Blue               Radiology  ( personally reviewed) CT chest: large YESIKA posterior consolidation with questionable bone invasion. Larger than  at [facility]. ABG No results for input(s): PHI, PO2I, PCO2I in the last 72 hours. Subjective     Cc: abnormal chest CT    81 yo with PMHx of tobacco abuse and COPD presenting with complaints of shortness of breath. CTA showed a left upper lobe posterior mass with posterior rib invasion. Pt did have an abnormal CXR at the [facility] about a month ago and received abx at that time. She saw Dr. Tony Palma with Pulmonary who sent her for a CT chest on  in [facility] system. At that time it showed a left upper lobe consolidation with some air bronchograms through it and Dr. Tony Palma did inform patient that the plan was to treat for infection, but that she would need a follow up CT chest in 6 weeks due to ongoing concerns that there was underlying malignancy.  Pt does not remember this and states that she thought \"everything was fine\". She ended up not taking the course of abx perscribed by Dr. Sunshine Rios for more than 1 day due to stomach upset. She states she has a mild, dry cough. No fevers/chills/sputum production. She did lose wt while at the [employer] due to not liking the food, but appetite is back. Review of Systems:  A comprehensive review of systems was negative except for that written in the HPI. Past Medical History:   Diagnosis Date    AAA (abdominal aortic aneurysm) (HCC)     Arthritis     ra, osteoporosis, osteoarthritis    Atrial fibrillation (HCC)     COPD     HTN (hypertension)     RA [facility]rhe[facility]oid arthritis) ([facility] [address]     Smoker     TIA (transient ischemic attack)     TIA 9/30/16      Past Surgical History:   Procedure Laterality Date    HX CHOLECYSTECTOMY      HX HYSTERECTOMY      HX ORTHOPAEDIC      carpal tunnel, wrist surgery      Prior to Admission medications    Medication Sig Start Date End Date Taking? Authorizing Provider   HYDROcodone-acetaminophen (NORCO) 7.5-325 mg per tablet Take 1 Tab by mouth every six (6) hours as needed for Pain. Yes Provider, Historical   amLODIPine (NORVASC) 5 mg tablet Take 5 mg by mouth daily. Yes Provider, Historical   cholecalciferol (VITAMIN D3) 50,000 unit capsule Take 50,000 Units by mouth every seven (7) days. Yes Provider, Historical   apixaban (ELIQUIS) 2.5 mg tablet Take 1 Tab by mouth every twelve (12) hours. 10/1/16  Yes Amilcar Chau MD   folic acid (FOLVITE) 1 mg tablet Take 1 mg by mouth daily. Yes Rayshawn Dorsey MD   methotrexate (RHEUMATREX) 2.5 mg tablet Take 20 mg by mouth Every Saturday. The patient takes 8 tablets which is 20 mg every Saturday   Yes Rayshawn Dorsey MD   digoxin (LANOXIN) 0.125 mg tablet Take 0.125 mg by mouth daily.    Yes Rayshawn Dorsey MD     Current Facility-Administered Medications   Medication Dose Route Frequency    sodium chloride (NS) flush 5-10 mL  5-10 mL IntraVENous Q8H    piperacillin-tazobactam (ZOSYN) 3.375 g in 0.9% sodium chloride (MBP/ADV) 100 mL  3.375 g IntraVENous Q8H    doxycycline (VIBRAMYCIN) 100 mg in 0.9% sodium chloride (MBP/ADV) 100 mL  100 mg IntraVENous Q12H    budesonide (PULMICORT) 500 mcg/2 ml nebulizer suspension  500 mcg Nebulization BID RT    arformoterol (BROVANA) neb solution 15 mcg  15 mcg Nebulization BID RT    amLODIPine (NORVASC) tablet 5 mg  5 mg Oral DAILY    digoxin (LANOXIN) tablet 0.125 mg  0.125 mg Oral DAILY    folic acid (FOLVITE) tablet 1 mg  1 mg Oral DAILY     Allergies   Allergen Reactions    Codeine Nausea and Vomiting      Social History     Tobacco Use    Smoking status: Former Smoker    Smokeless tobacco: Never Used   Substance Use Topics    Alcohol use: No      Family History   Problem Relation Age of Onset    Hypertension Father           Laboratory: I have personally reviewed the critical care flowsheet and labs. Recent Labs     11/25/18  0051 11/24/18  1050   WBC 15.7* 21.0*   HGB 9.6* 11.6   HCT 31.0* 37.8    328     Recent Labs     11/25/18  0051 11/24/18  1050    139   K 3.8 4.2    101   CO2 27 28   GLU 99 124*   BUN 9 12   CREA 0.62 0.73   CA 8.0* 8.8   ALB  --  2.7*   SGOT  --  15   ALT  --  10*       Objective:     Mode Rate Tidal Volume Pressure FiO2 PEEP                    Vital Signs:     TMAX(24)      Intake/Output:   Last shift:         Last 3 shifts: No intake/output data recorded. RRIOLAST3    Intake/Output Summary (Last 24 hours) at 11/25/2018 1300  Last data filed at 11/24/2018 2100  Gross per 24 hour   Intake 200 ml   Output    Net 200 ml     EXAM:   GENERAL: well developed and in no distress, HEENT:  PERRL, EOMI, no alar flaring or epistaxis, oral mucosa moist without cyanosis, NECK:  no jugular vein distention, no retractions, no thyromegaly or masses, LUNGS: decreased breath sounds billaterally and with rhonchi , HEART:  Regular rate and rhythm with no MGR; no edema is present, ABDOMEN:  soft with no tenderness, bowel sounds present, EXTREMITIES:  warm with no cyanosis, SKIN:  no jaundice or ecchymosis and NEUROLOGIC:  alert and oriented, grossly non-focal    Aakash Locke MD  [facility]

## 2018-11-26 LAB
BASOPHILS # BLD: 0 K/UL (ref 0–0.1)
BASOPHILS NFR BLD: 0 % (ref 0–1)
DIFFERENTIAL METHOD BLD: ABNORMAL
EOSINOPHIL # BLD: 0.6 K/UL (ref 0–0.4)
EOSINOPHIL NFR BLD: 6 % (ref 0–7)
ERYTHROCYTE [DISTWIDTH] IN BLOOD BY AUTOMATED COUNT: 17.6 % (ref 11.5–14.5)
HCT VFR BLD AUTO: 29.4 % (ref 35–47)
HGB BLD-MCNC: 9 G/DL (ref 11.5–16)
IMM GRANULOCYTES # BLD: 0 K/UL (ref 0–0.04)
IMM GRANULOCYTES NFR BLD AUTO: 0 % (ref 0–0.5)
LYMPHOCYTES # BLD: 1.6 K/UL (ref 0.8–3.5)
LYMPHOCYTES NFR BLD: 16 % (ref 12–49)
MCH RBC QN AUTO: 29.4 PG (ref 26–34)
MCHC RBC AUTO-ENTMCNC: 30.6 G/DL (ref 30–36.5)
MCV RBC AUTO: 96.1 FL (ref 80–99)
MONOCYTES # BLD: 1 K/UL (ref 0–1)
MONOCYTES NFR BLD: 9 % (ref 5–13)
NEUTS SEG # BLD: 7.2 K/UL (ref 1.8–8)
NEUTS SEG NFR BLD: 69 % (ref 32–75)
NRBC # BLD: 0 K/UL (ref 0–0.01)
NRBC BLD-RTO: 0 PER 100 WBC
PLATELET # BLD AUTO: 262 K/UL (ref 150–400)
PMV BLD AUTO: 9.5 FL (ref 8.9–12.9)
RBC # BLD AUTO: 3.06 M/UL (ref 3.8–5.2)
WBC # BLD AUTO: 10.4 K/UL (ref 3.6–11)

## 2018-11-26 PROCEDURE — 85025 COMPLETE CBC W/AUTO DIFF WBC: CPT

## 2018-11-26 PROCEDURE — 97530 THERAPEUTIC ACTIVITIES: CPT

## 2018-11-26 PROCEDURE — 65270000029 HC RM PRIVATE

## 2018-11-26 PROCEDURE — 74011000250 HC RX REV CODE- 250: Performed by: INTERNAL MEDICINE

## 2018-11-26 PROCEDURE — 97535 SELF CARE MNGMENT TRAINING: CPT

## 2018-11-26 PROCEDURE — 97116 GAIT TRAINING THERAPY: CPT

## 2018-11-26 PROCEDURE — 36415 COLL VENOUS BLD VENIPUNCTURE: CPT

## 2018-11-26 PROCEDURE — 74011000258 HC RX REV CODE- 258: Performed by: INTERNAL MEDICINE

## 2018-11-26 PROCEDURE — 74011250636 HC RX REV CODE- 250/636: Performed by: INTERNAL MEDICINE

## 2018-11-26 PROCEDURE — 74011250637 HC RX REV CODE- 250/637: Performed by: INTERNAL MEDICINE

## 2018-11-26 PROCEDURE — 97162 PT EVAL MOD COMPLEX 30 MIN: CPT

## 2018-11-26 PROCEDURE — 97166 OT EVAL MOD COMPLEX 45 MIN: CPT

## 2018-11-26 PROCEDURE — 94760 N-INVAS EAR/PLS OXIMETRY 1: CPT

## 2018-11-26 PROCEDURE — 94640 AIRWAY INHALATION TREATMENT: CPT

## 2018-11-26 RX ADMIN — HYDROCODONE BITARTRATE AND ACETAMINOPHEN 1 TABLET: 7.5; 325 TABLET ORAL at 16:35

## 2018-11-26 RX ADMIN — ARFORMOTEROL TARTRATE 15 MCG: 15 SOLUTION RESPIRATORY (INHALATION) at 20:15

## 2018-11-26 RX ADMIN — DOXYCYCLINE 100 MG: 100 INJECTION, POWDER, LYOPHILIZED, FOR SOLUTION INTRAVENOUS at 09:22

## 2018-11-26 RX ADMIN — BUDESONIDE 500 MCG: 0.5 INHALANT RESPIRATORY (INHALATION) at 20:15

## 2018-11-26 RX ADMIN — DIGOXIN 0.12 MG: 125 TABLET ORAL at 20:05

## 2018-11-26 RX ADMIN — ARFORMOTEROL TARTRATE 15 MCG: 15 SOLUTION RESPIRATORY (INHALATION) at 07:34

## 2018-11-26 RX ADMIN — DOXYCYCLINE 100 MG: 100 INJECTION, POWDER, LYOPHILIZED, FOR SOLUTION INTRAVENOUS at 20:05

## 2018-11-26 RX ADMIN — Medication 10 ML: at 20:06

## 2018-11-26 RX ADMIN — FOLIC ACID 1 MG: 1 TABLET ORAL at 09:22

## 2018-11-26 RX ADMIN — Medication 10 ML: at 15:35

## 2018-11-26 RX ADMIN — PIPERACILLIN SODIUM AND TAZOBACTAM SODIUM 3.38 G: 3; .375 INJECTION, POWDER, LYOPHILIZED, FOR SOLUTION INTRAVENOUS at 13:03

## 2018-11-26 RX ADMIN — HYDROCODONE BITARTRATE AND ACETAMINOPHEN 1 TABLET: 7.5; 325 TABLET ORAL at 01:01

## 2018-11-26 RX ADMIN — AMLODIPINE BESYLATE 5 MG: 5 TABLET ORAL at 09:22

## 2018-11-26 RX ADMIN — HYDROCODONE BITARTRATE AND ACETAMINOPHEN 1 TABLET: 7.5; 325 TABLET ORAL at 10:23

## 2018-11-26 RX ADMIN — HYDROCODONE BITARTRATE AND ACETAMINOPHEN 1 TABLET: 7.5; 325 TABLET ORAL at 22:27

## 2018-11-26 RX ADMIN — Medication 10 ML: at 05:59

## 2018-11-26 RX ADMIN — BUDESONIDE 500 MCG: 0.5 INHALANT RESPIRATORY (INHALATION) at 07:34

## 2018-11-26 RX ADMIN — PIPERACILLIN SODIUM AND TAZOBACTAM SODIUM 3.38 G: 3; .375 INJECTION, POWDER, LYOPHILIZED, FOR SOLUTION INTRAVENOUS at 05:55

## 2018-11-26 RX ADMIN — PIPERACILLIN SODIUM AND TAZOBACTAM SODIUM 3.38 G: 3; .375 INJECTION, POWDER, LYOPHILIZED, FOR SOLUTION INTRAVENOUS at 21:59

## 2018-11-26 NOTE — PROGRESS NOTES
[facility] Name: Lakia Mistry MRN: [id number] : 1931 Hospital: [address] Date: 2018 Impression Plan 1. Abnormal chest CT- likely malignancy 2. COPD 3. Hypoxia 4. Anxiety 5. Tobacco abuse · Discussed with Pt and daughter. Will proceed with CT guided lung biopsy. Would like for this to be tomorrow. D/w Dr. Becca Paris and Dr. Estiven Martinez, IR and is amenable for CT guided biopsy. The mass is larger than seen on [facility] CT  per my measurement and now has bone invasion. Even in the remote chance this is not cancer, she still needs a bx to stain for AFB and fungal elements. A bronch would be very limiting in this case. Pt is in complete denial and states she knows that this is not cancer and that she is petrified of the idea of having a needle stuck in her lung. Her daughter is in the room with her and would like to have the biopsy done. Pt states she will think about it. · NPO after midnight · Will treat with abx for now- currently on Doxy and Zosyn · BCx NGTD  · RVP negative · Wean O2 as tolerated; currently on 2L, 93% · Pulmicort/Brovana · Hold eliquis while pt decides · Discussed with Dr. Citlali Blue Radiology ( personally reviewed) CT chest: large YESIKA posterior consolidation with questionable bone invasion. Larger than 11/2 at [facility]. ABG No results for input(s): PHI, PO2I, PCO2I in the last 72 hours. Subjective Review of Systems: Afebrile WBC 15.7 better RVP negative BCx  NGTD Feels breathing is some improved. Main complaint is her right wrist and hand pain from RA. She has a cough that is usually non productive, occasionally has grey sputum production. Denies wheezing. Discussed biopsy with patient and with daughter. Pt would like to proceed with CT guided lung bx. Past Medical History:  
Diagnosis Date  AAA (abdominal aortic aneurysm) ([address]  Arthritis ra, osteoporosis, osteoarthritis  Atrial fibrillation ([facility] [address]  COPD   
 HTN (hypertension)  RA (rheumatoid arthritis) ([facility] [address]  Smoker  TIA (transient ischemic attack) TIA 9/30/16 Past Surgical History:  
Procedure Laterality Date  HX CHOLECYSTECTOMY  HX HYSTERECTOMY  HX ORTHOPAEDIC    
 carpal tunnel, wrist surgery Prior to Admission medications Medication Sig Start Date End Date Taking? Authorizing Provider HYDROcodone-acetaminophen (NORCO) 7.5-325 mg per tablet Take 1 Tab by mouth every six (6) hours as needed for Pain. Yes Provider, Historical  
amLODIPine (NORVASC) 5 mg tablet Take 5 mg by mouth daily. Yes Provider, Historical  
cholecalciferol (VITAMIN D3) 50,000 unit capsule Take 50,000 Units by mouth every seven (7) days. Yes Provider, Historical  
apixaban (ELIQUIS) 2.5 mg tablet Take 1 Tab by mouth every twelve (12) hours. 10/1/16  Yes Redd Chau MD  
folic acid (FOLVITE) 1 mg tablet Take 1 mg by mouth daily. Yes Willian, MD Rayshawn  
methotrexate (RHEUMATREX) 2.5 mg tablet Take 20 mg by mouth Every Saturday. The patient takes 8 tablets which is 20 mg every Saturday   Yes Rayshawn Dorsey MD  
digoxin (LANOXIN) 0.125 mg tablet Take 0.125 mg by mouth daily. Yes Rayshawn Dorsey MD  
 
Current Facility-Administered Medications Medication Dose Route Frequency  sodium chloride (NS) flush 5-10 mL  5-10 mL IntraVENous Q8H  piperacillin-tazobactam (ZOSYN) 3.375 g in 0.9% sodium chloride (MBP/ADV) 100 mL  3.375 g IntraVENous Q8H  
 doxycycline (VIBRAMYCIN) 100 mg in 0.9% sodium chloride (MBP/ADV) 100 mL  100 mg IntraVENous Q12H  
 budesonide (PULMICORT) 500 mcg/2 ml nebulizer suspension  500 mcg Nebulization BID RT  
 arformoterol (BROVANA) neb solution 15 mcg  15 mcg Nebulization BID RT  
 amLODIPine (NORVASC) tablet 5 mg  5 mg Oral DAILY  digoxin (LANOXIN) tablet 0.125 mg  0.125 mg Oral DAILY  folic acid (FOLVITE) tablet 1 mg  1 mg Oral DAILY Allergies Allergen Reactions  Codeine Nausea and Vomiting Social History Tobacco Use  Smoking status: Former Smoker  Smokeless tobacco: Never Used Substance Use Topics  Alcohol use: No  
  
Family History Problem Relation Age of Onset  Hypertension Father Laboratory: I have personally reviewed the critical care flowsheet and labs. Recent Labs  
  18 
0124 18 
0051 18 
1050 WBC 10.4 15.7* 21.0* HGB 9.0* 9.6* 11.6 HCT 29.4* 31.0* 37.8  253 328 Recent Labs  
  18 
0051 18 
1050  139  
K 3.8 4.2  101 CO2 27 28 GLU 99 124* BUN 9 12 CREA 0.62 0.73 CA 8.0* 8.8 ALB  --  2.7* SGOT  --  15 ALT  --  10* Objective: Mode Rate Tidal Volume Pressure FiO2 PEEP Vital Signs:   
 TMAX(24) Intake/Output:  
Last shift:        
Last 3 shifts:  0701 -  1900 In: -  
Out: 150 [Urine:150]RRIOLAST3 Intake/Output Summary (Last 24 hours) at 2018 1026 Last data filed at 2018 2702 Gross per 24 hour Intake 960 ml Output 550 ml Net 410 ml EXAM: 
GENERAL: well developed and in no distress, HEENT:  PERRL, EOMI, no alar flaring or epistaxis, oral mucosa moist without cyanosis, NECK:  no jugular vein distention, no retractions, no thyromegaly or masses, LUNGS: decreased breath sounds billaterally and with rhonchi , HEART:  Regular rate and rhythm with no MGR; no edema is present, ABDOMEN:  soft with no tenderness, bowel sounds present, EXTREMITIES:  warm with no cyanosis, SKIN:  no jaundice or ecchymosis and NEUROLOGIC:  alert and oriented, grossly non-focal 
Shyam [place], NP 
[facility]

## 2018-11-26 NOTE — PROGRESS NOTES
Problem: Mobility Impaired (Adult and Pediatric) Goal: *Acute Goals and Plan of Care (Insert Text) Physical Therapy Goals Initiated 11/26/2018 1. Patient will move from supine to sit and sit to supine  in bed with modified independence within 7 day(s). 2.  Patient will transfer from bed to chair and chair to bed with supervision/set-up using the least restrictive device within 7 day(s). 3.  Patient will perform sit to stand with supervision/set-up within 7 day(s). 4.  Patient will ambulate with supervision/set-up for 50 feet with the least restrictive device within 7 day(s). 5.  Patient will ascend/descend ramp using least restrictive assistive device with minimal assistance/contact guard assist within 7 day(s). physical Therapy EVALUATION Patient: Aleah Ramirez (59 y.o. female) Date: 11/26/2018 Primary Diagnosis: Lung mass Precautions:   Fall, WBAT 
 
ASSESSMENT : 
Based on the objective data described below, the patient presents with shortness of breath and found to have lung mass (multiple) and liver mets. Patient past medical history is significant for recent pathological fracture- bilateral LE with a 2 month SNF stay. She has only been home x1 week. She lives with her grandson in a 2 story home where she resides on the first floor, she has been a household Ambulator since discharge from the SNF. The grandson works during the day, but the daughter works from home and comes to stay at the house. The family and patient are only interested in returning home, they are refusing SNF, they indicate they are pursuing hiring personal care aids. Patient initially resistant to therapy, stating RIght hand pain. Patient was premedicated prior to session, daughter present and able to gently encourage participation. Upon standing- patient is incontinent of bladder. She required transfer to bedside commode and linen and hygiene. She was able to advance her LE and use RW with MIN A. Required 2L NC without session with vitals stable. Patient currently below her previous baseline, she does have multiple new medical complexities, please see MD notes and patient and family with decreased insight into her recent events and possible CA diagnosis. She would most benefit from rehab at discharge, family is declining so she would benefit from home health with 24 hour physical assistance for safety. Patient will benefit from skilled intervention to address the above impairments. Patients rehabilitation potential is considered to be Fair Factors which may influence rehabilitation potential include:  
[]         None noted 
[]         Mental ability/status [x]         Medical condition 
[]         Home/family situation and support systems 
[]         Safety awareness 
[]         Pain tolerance/management 
[]         Other: PLAN : 
Recommendations and Planned Interventions: 
[x]           Bed Mobility Training             [x]    Neuromuscular Re-Education 
[x]           Transfer Training                   []    Orthotic/Prosthetic Training 
[x]           Gait Training                         []    Modalities [x]           Therapeutic Exercises           []    Edema Management/Control 
[x]           Therapeutic Activities            [x]    Patient and Family Training/Education 
[]           Other (comment): Frequency/Duration: Patient will be followed by physical therapy  5 times a week to address goals. Discharge Recommendations: Marvin Arreguin- patient and family decline- recommend home health with 24 hour hands on physical assistance Further Equipment Recommendations for Discharge: owns all needed DME SUBJECTIVE:  
Patient stated i am afraid to touch my oxygen.  OBJECTIVE DATA SUMMARY:  
HISTORY:   
Past Medical History:  
Diagnosis Date  AAA [facility]abdominal aortic [facility]) ([facility] [address]  Arthritis ra, osteoporosis, osteoarthritis  Atrial fibrillation ([facility] [place] [address]  COPD   
 HTN (hypertension)  RA (rheumatoid arthritis) ([facility] [place] [address]  Smoker  TIA (transient ischemic attack) TIA 9/30/16 Past Surgical History:  
Procedure Laterality Date  HX CHOLECYSTECTOMY  HX HYSTERECTOMY  HX ORTHOPAEDIC    
 carpal tunnel, wrist surgery Prior Level of Function/Home Situation: see above Personal factors and/or comorbidities impacting plan of care: see below Home Situation Home Environment: Private residence # Steps to Enter: 2 Rails to Enter: Yes Hand Rails : Right Wheelchair Ramp: Yes One/Two Story Residence: Two story, live on 1st floor Living Alone: No 
Support Systems: Family member(s)(lives with grandson, he works, daughter stays during the day) Patient Expects to be Discharged to[de-identified] Private residence Current DME Used/Available at Home: Walker, rolling, Wheelchair, Commode, bedside, Grab bars Tub or Shower Type: Shower EXAMINATION/PRESENTATION/DECISION MAKING: Critical Behavior: 
Neurologic State: Alert Orientation Level: Oriented X4 Cognition: Follows commands Safety/Judgement: Decreased insight into deficits Hearing: Auditory Auditory Impairment: Hard of hearing, bilateralSkin:  All exposed intact Edema: none noted Range Of Motion: 
AROM: Within functional limits PROM: Within functional limits Strength:   
Strength: Generally decreased, functional 
  
  
  
  
  
  
Tone & Sensation:  
Tone: Normal 
  
  
  
  
Sensation: Intact Coordination: 
Coordination: Generally decreased, functional 
Vision:  
  
Functional Mobility: 
Bed Mobility: 
Rolling: Contact guard assistance Supine to Sit: Contact guard assistance Sit to Supine: Contact guard assistance Scooting: Stand-by assistance Transfers: 
Sit to Stand: Minimum assistance Stand to Sit: Minimum assistance Bed to Chair: Minimum assistance Balance: Sitting: Intact Standing: Impaired Standing - Static: Constant support Standing - Dynamic : PoorAmbulation/Gait Training:Distance (ft): 10 Feet (ft) Assistive Device: Walker, rolling;Gait belt Ambulation - Level of Assistance: Minimal assistance;Assist x1;Additional time Gait Description (WDL): Exceptions to [facility] Gait Abnormalities: Decreased step clearance; Step to gait Stairs: Therapeutic Exercises:  
 
 
Functional Measure: 
Barthel Index: 
 
Bathin Bladder: 0 Bowels: 5 Groomin Dressin Feeding: 10 Mobility: 0 Stairs: 0 Toilet Use: 5 Transfer (Bed to Chair and Back): 10 Total: 40 Barthel and G-code impairment scale: 
Percentage of impairment CH 
0% CI 
1-19% CJ 
20-39% CK 
40-59% CL 
60-79% CM 
80-99% CN 
100% Barthel Score 0-100 100 99-80 79-60 59-40 20-39 1-19 
 0 Barthel Score 0-20 20 17-19 13-16 9-12 5-8 1-4 0 The Barthel ADL Index: Guidelines 1. The index should be used as a record of what a patient does, not as a record of what a patient could do. 2. The main aim is to establish degree of independence from any help, physical or verbal, however minor and for whatever reason. 3. The need for supervision renders the patient not independent. 4. A patient's performance should be established using the best available evidence. Asking the patient, friends/relatives and nurses are the usual sources, but direct observation and common sense are also important. However direct testing is not needed. 5. Usually the patient's performance over the preceding 24-48 hours is important, but occasionally longer periods will be relevant. 6. Middle categories imply that the patient supplies over 50 per cent of the effort. 7. Use of aids to be independent is allowed. Jhonny Boudreaux., Barthel, D.W. (2961). Functional evaluation: the Barthel Index. [address] [facility] (14)2. ALFONSO Molina, Boris Foy, Griffin [facility], [address] (1999). Measuring the change indisability after inpatient rehabilitation; comparison of the responsiveness of the Barthel Index and Functional [place] Measure. Journal of Neurology, Neurosurgery, and Psychiatry, 66(4), 305-709. MICHAEL Vivas, SANDIP Underwood, & Memo Grimm M.A. (2004.) Assessment of post-stroke quality of life in cost-effectiveness studies: The usefulness of the Barthel Index and the EuroQoL-5D. [facility], 13, 840-06 G codes: In compliance with CMSs Claims Based Outcome Reporting, the following G-code set was chosen for this patient based on their primary functional limitation being treated: The outcome measure chosen to determine the severity of the functional limitation was the Barthel Index with a score of 40/100 which was correlated with the impairment scale. ? Mobility - Walking and Moving Around:  
  - CURRENT STATUS: CK - 40%-59% impaired, limited or restricted  - GOAL STATUS: CJ - 20%-39% impaired, limited or restricted  - D/C STATUS:  ---------------To be determined--------------- Physical Therapy Evaluation Charge Determination History Examination Presentation Decision-Making HIGH Complexity :3+ comorbidities / personal factors will impact the outcome/ POC  MEDIUM Complexity : 3 Standardized tests and measures addressing body structure, function, activity limitation and / or participation in recreation  MEDIUM Complexity : Evolving with changing characteristics  Other outcome measures Barthel Index  MEDIUM Based on the above components, the patient evaluation is determined to be of the following complexity level: MEDIUM Pain: 
Pain Scale 1: Numeric (0 - 10) Pain Intensity 1: 8 Pain Location 1: Hand Pain Orientation 1: Right Pain Description 1: Aching Pain Intervention(s) 1: Medication (see MAR) Activity Tolerance:  
fair Please refer to the flowsheet for vital signs taken during this treatment. After treatment:  
[]         Patient left in no apparent distress sitting up in chair 
[x]         Patient left in no apparent distress in bed- in chair position 
[x]         Call bell left within reach [x]         Nursing notified 
[x]         Caregiver present [x]         Bed alarm activated COMMUNICATION/EDUCATION:  
The patients plan of care was discussed with: Registered Nurse. [x]         Fall prevention education was provided and the patient/caregiver indicated understanding. [x]         Patient/family have participated as able in goal setting and plan of care. [x]         Patient/family agree to work toward stated goals and plan of care. []         Patient understands intent and goals of therapy, but is neutral about his/her participation. []         Patient is unable to participate in goal setting and plan of care. Thank you for this referral. 
Livan Phillip, PT, DPT Time Calculation: 34 mins

## 2018-11-26 NOTE — PROGRESS NOTES
Bedside and Verbal shift change report given to GRAYSON Cardoso (oncoming nurse) by Edwin Velazco RN (offgoing nurse). Report included the following information SBAR, Kardex, Intake/Output, MAR and Recent Results.

## 2018-11-26 NOTE — PROGRESS NOTES
Nutrition Assessment: 
 
RECOMMENDATIONS/INTERVENTION(S):  
Continue Regular as able- NPO tomorrow for procedure Monitor PO intakes, weight, Add Ensure compact - TID with meals. ASSESSMENT:  
11/26: 80 yr old female admitted for SOB. PMHx: COPD, tobacco abuse, HTN, AAA, TIA, afib. Pt found to have lung mass- bx tomorrow. Weight loss of 19 lbs (14%) over the last 3 month, significant for time frame. Moderate subcu muscle/fat wasting apparent. PO intakes not meeting needs - <50% of needs x >5 days. No edema noted. BMI 18.2. Labs reviewed. SUBJECTIVE/OBJECTIVE:  
Diet Order: Regular 
% Eaten:  No data found. Pertinent Medications: [x] Reviewed Labs reviewed:  [x] Anthropometrics: Height: 5' 7\" (170.2 cm) Weight: 52.6 kg (116 lb) IBW (%IBW):   ( ) UBW (%UBW):   (  %) BMI: Body mass index is 18.17 kg/m². This BMI is indicative of: 
 
 [x] Underweight    [] Normal    [] Overweight    []  Obesity    []  Extreme Obesity (BMI>40) Estimated Nutrition Needs (Based on): 1240 Kcals/day(BMR(999x1.3)) , 63 g(-74g/day(1.2-1.4g/kg)) Protein Carbohydrate: At Least 130 g/day  Fluids: 1300 mL/day (1mL/kg rounded to 50 mL) Last BM: 11/23  []Active     []Hyperactive  []Hypoactive       [] Absent   BS Skin:    [x] Intact   [] Incision  [] Breakdown   [] DTI   [] Tears/Excoriation/Abrasion  []Edema [] Other: Wt Readings from Last 30 Encounters:  
11/24/18 52.6 kg (116 lb)  
08/25/18 61.2 kg (135 lb)  
08/27/18 61.2 kg (135 lb) 10/04/16 64 kg (141 lb) 10/01/16 65.8 kg (145 lb)  
09/13/13 67.4 kg (148 lb 8 oz) NUTRITION DIAGNOSES:  
Problem:  Inadequate energy intake Etiology: related to decreased ability to maintain appropriate BMI for age Signs/Symptoms: as evidenced by BMI < 23, age >71. BMI 18.2. Weight loss. NUTRITION INTERVENTIONS: 
Meals/Snacks: General/healthful diet   Supplements: Commercial supplement GOAL:  
pt will consume >50% of meals and ONS w/i 2-4 days Cultural, Church, or Ethnic Dietary Needs: None LEARNING NEEDS (Diet, Food/Nutrient-Drug Interaction):  
 [x] None Identified 
 [] Identified and Education Provided/Documented 
 [] Identified and Pt declined/was not appropriate [x] Interdisciplinary Care Plan Reviewed/Documented  
 [x] Discharge Needs:    TBD [] No Nutrition Related Discharge Needs NUTRITION RISK:  
Pt Is At Nutrition Risk  [x] No Nutrition Risk Identified  [] PT SEEN FOR:  
 []  MD Consult: []Calorie Count []Diabetic Diet Education []Diet Education []Electrolyte Management []General Nutrition Management and Supplements []Management of Tube Feeding []TPN Recommendations [x]  RN Referral:  [x]MST score >=2 
   []Enteral/Parenteral Nutrition PTA []Pregnant: Gestational DM or Multigestation  
              [] Pressure Ulcer 
   
[x]  Low BMI      []  Length of Stay       [] Dysphagia Diet     [] Ventilator      [] Follow-Up Previous Recommendations: 
 [] Implemented          [] Not Implemented          [x] Not Applicable Previous Goal: 
 [] Met              [] Progressing Towards Goal              [] Not Progressing Towards Goal   [x] Not Applicable Jered Yuen RD Pager: [phone] Office: [phone]

## 2018-11-26 NOTE — CDMP QUERY
Dear Dr. Karla Strickland, Noted \"cachetic female\" in the progress notes. Can this be further specified as: 
 
=> Cachexia in the setting of severe sepsis and lung cancer requiring vitamin supplement, lab monitoring and regular diet 
=> Other explanation of clinical findings  
=> Clinically Undetermined (no explanation for clinical findings) The medical record reflects the following clinical findings, treatment, and risk factors. Risk Factors:  hx TIA Clinical Indicators: per PN-Weak and cachectic looking female, not in acute distress, albumin 2.7, BMI 18.17 Treatment: folic acid, lab monitoring, regular diet Please clarify and document your clinical opinion in the progress notes and discharge summary including the definitive and/or presumptive diagnosis, (suspected or probable), related to the above clinical findings. Please include clinical findings supporting your diagnosis. Thank You 
Justice Hopkins,MSN,BSN,RN,[facility] 
[phone]

## 2018-11-26 NOTE — PROGRESS NOTES
Problem: Pressure Injury - Risk of 
Goal: *Prevention of pressure injury Document Rom Scale and appropriate interventions in the flowsheet. Outcome: Progressing Towards Goal 
Pressure Injury Interventions: 
  
 
Moisture Interventions: Absorbent underpads, Internal/External urinary devices, Limit adult briefs, Maintain skin hydration (lotion/cream), Minimize layers, Moisture barrier Activity Interventions: PT/OT evaluation, Pressure redistribution bed/mattress(bed type) Mobility Interventions: Float heels, Pressure redistribution bed/mattress (bed type), PT/OT evaluation, Turn and reposition approx. every two hours(pillow and wedges) Nutrition Interventions: Document food/fluid/supplement intake Friction and Shear Interventions: Apply protective barrier, creams and emollients, Lift team/patient mobility team, Minimize layers

## 2018-11-26 NOTE — PROGRESS NOTES
Problem: Self Care Deficits Care Plan (Adult) Goal: *Acute Goals and Plan of Care (Insert Text) Occupational Therapy Goals Initiated 11/26/2018 1. Patient will perform lower body dressing with supervision/set-up within 7 day(s). 2.  Patient will perform upper body dressing with supervision/set-up within 7 day(s). 3.  Patient will perform bathing with supervision/set-up within 7 day(s). 4.  Patient will perform toilet transfers with supervision/set-up within 7 day(s). 5.  Patient will perform all aspects of toileting with supervision/set-up within 7 day(s). 6.  Patient will utilize energy conservation techniques during functional activities with verbal cues within 7 day(s). Occupational Therapy EVALUATION Patient: Cody Lawrence (23 y.o. female) Date: 11/26/2018 Primary Diagnosis: Lung mass Precautions: fall ASSESSMENT : 
Based on the objective data described below, the patient presents with hospital admission secondary to SOB and confusion. Patient work up revealed lung mass and planned biopsy later today. Patient received supine in bed, able to answer questions regarding history, bu noted some repetition with conversation. Patient reports recently in rehab setting secondary to bilateral LE fractures. Patient returned home, where she lives with her grandson, x 3 weeks. Patient daughter present in room and reports she lives  4 doors down from patient and has been \"working from [employer]" at [facility] to be with patient. Patient agreeable to activity with encouragement from daughter and therapists. Patient moves to EOB with CGA. Sit to stand with min assist at RW. Upon standing, patient with urinary incontinence from protective brief to floor. Patient transferred to [facility] for clean up and hygiene. Patient requires mod assist for hygiene. Patient returned to bed with min assist for transfer, and stand by assist for sit to supine.   Patient appears/reports feeling SOB, however O2 sats stable from 94-97% O2 on 2L. Patient requires 24hr assistance if she were to return home. Patient and family plan to hire caregiver for support. If unable to provide, recommend SNF, though patient refuses placement. Patient will benefit from skilled intervention to address the above impairments. Patients rehabilitation potential is considered to be Fair Factors which may influence rehabilitation potential include:  
[]             None noted []             Mental ability/status [x]             Medical condition []             Home/family situation and support systems [x]             Safety awareness []             Pain tolerance/management 
[]             Other: PLAN : 
Recommendations and Planned Interventions: 
[x]               Self Care Training                  [x]        Therapeutic Activities [x]               Functional Mobility Training    []        Cognitive Retraining 
[x]               Therapeutic Exercises           [x]        Endurance Activities [x]               Balance Training                   []        Neuromuscular Re-Education []               Visual/Perceptual Training     [x]   Home Safety Training 
[x]               Patient Education                 [x]        Family Training/Education []               Other (comment): Frequency/Duration: Patient will be followed by occupational therapy 5 times a week to address goals. Discharge Recommendations: Home Health with 24hr assist vs [facility] Further Equipment Recommendations for Discharge: TBD SUBJECTIVE:  
Patient stated I don't like these socks. They hurt my feet.  OBJECTIVE DATA SUMMARY:  
HISTORY:  
Past Medical History:  
Diagnosis Date  AAA (abdominal aortic aneurysm) ([facility]  Arthritis   
 ra, osteoporosis, osteoarthritis  Atrial fibrillation ([facility]  COPD   
 HTN (hypertension)  RA (rheumatoid arthritis) ([facility]  Smoker  TIA (transient ischemic attack) TIA 9/30/16 Past Surgical History:  
Procedure Laterality Date  HX CHOLECYSTECTOMY  HX HYSTERECTOMY  HX ORTHOPAEDIC    
 carpal tunnel, wrist surgery Prior Level of Function/Environment/Context: assist with activity since return home from rehab Occupations in which the patient is/was successful, what are the barriers preventing that success:  
Performance Patterns (routines, roles, habits, and rituals):  
Personal Interests and/or values:  
Expanded or extensive additional review of patient history:  
 
Home Situation Home Environment: Private residence # Steps to Enter: 2 Rails to Enter: Yes Hand Rails : Right Wheelchair Ramp: Yes One/Two Story Residence: Two story, live on 1st floor Living Alone: No 
Support Systems: Family member(s)(lives with grandson, he works, daughter stays during the day) Patient Expects to be Discharged to[de-identified] Private residence Current DME Used/Available at Home: Walker, rolling, Wheelchair, Commode, bedside, Grab bars Tub or Shower Type: Shower Hand dominance: RightEXAMINATION OF PERFORMANCE DEFICITS: 
Cognitive/Behavioral Status: 
Neurologic State: Alert Orientation Level: Oriented X4 Cognition: Follows commands Perception: Appears intact Perseveration: No perseveration noted Safety/Judgement: Decreased insight into deficits Skin: intact as seen Edema: none noted Hearing: Auditory Auditory Impairment: Hard of hearing, bilateral 
Vision/Perceptual:   
    
    
    
  
    
    
  
Range of Motion: 
AROM: Generally decreased, functional 
  
  
  
  
  
  
  
Strength: 
Strength: Generally decreased, functional 
  
  
  
  
Coordination: 
Coordination: Generally decreased, functional 
Fine Motor Skills-Upper: Left Intact; Right Intact Gross Motor Skills-Upper: Left Intact; Right Intact Tone & Sensation: 
Tone: Normal 
Sensation: Intact Balance: 
Sitting: Intact Standing: With support Functional Mobility and Transfers for ADLs:Bed Mobility: 
Supine to Sit: Contact guard assistance Sit to Supine: Stand-by assistance Scooting: Stand-by assistance Transfers: 
Sit to Stand: Minimum assistance Stand to Sit: Minimum assistance; Additional time Bed to Chair: Minimum assistance; Additional time Toilet Transfer : Minimum assistance;Assist x2 ADL Assessment: 
Feeding: Supervision Oral Facial Hygiene/Grooming: Supervision Bathing: Minimum assistance Upper Body Dressing: Contact guard assistance Lower Body Dressing: Moderate assistance Toileting: Moderate assistance ADL Intervention and task modifications: 
  
 
  
 
  
 
  
 
  
 
  
 
  
 
Cognitive Retraining Safety/Judgement: Decreased insight into deficits Therapeutic Exercise: 
  
Functional Measure: 
Barthel Index: 
 
Bathin Bladder: 0 Bowels: 5 Groomin Dressin Feeding: 10 Mobility: 0 Stairs: 0 Toilet Use: 5 Transfer (Bed to Chair and Back): 10 Total: 40 Barthel and G-code impairment scale: 
Percentage of impairment CH 
0% CI 
1-19% CJ 
20-39% CK 
40-59% CL 
60-79% CM 
80-99% CN 
100% Barthel Score 0-100 100 99-80 79-60 59-40 20-39 1-19 
 0 Barthel Score 0-20 20 17-19 13-16 9-12 5-8 1-4 0 The Barthel ADL Index: Guidelines 1. The index should be used as a record of what a patient does, not as a record of what a patient could do. 2. The main aim is to establish degree of independence from any help, physical or verbal, however minor and for whatever reason. 3. The need for supervision renders the patient not independent. 4. A patient's performance should be established using the best available evidence. Asking the patient, friends/relatives and nurses are the usual sources, but direct observation and common sense are also important. However direct testing is not needed.  
5. Usually the patient's performance over the preceding 24-48 hours is important, but occasionally longer periods will be relevant. 6. Middle categories imply that the patient supplies over 50 per cent of the effort. 7. Use of aids to be independent is allowed. Saul Santos., Barthel, D.W. (1754). Functional evaluation: the Barthel Index. [facility] (14)2. ALFONSO Soria, Romel Yost., Robbin Monteiro., [place], [address] [place] [address] Ave (1999). Measuring the change indisability after inpatient rehabilitation; comparison of the responsiveness of the Barthel Index and Functional [place] Measure. Journal of Neurology, Neurosurgery, and Psychiatry, 66(4), 169-950. MICHAEL Null, SANDIP Underwood, & Tera Antony MAnastasiyaA. (2004.) Assessment of post-stroke quality of life in cost-effectiveness studies: The usefulness of the Barthel Index and the EuroQoL-5D. [facility], 13, 071-38 G codes: In compliance with CMSs Claims Based Outcome Reporting, the following G-code set was chosen for this patient based on their primary functional limitation being treated: The outcome measure chosen to determine the severity of the functional limitation was the Barthel Index  with a score of 40/100 which was correlated with the impairment scale. ? Self Care:  
  - CURRENT STATUS: CK - 40%-59% impaired, limited or restricted  - GOAL STATUS: CJ - 20%-39% impaired, limited or restricted  - D/C STATUS:  ---------------To be determined--------------- Occupational Therapy Evaluation Charge Determination History Examination Decision-Making LOW Complexity : Brief history review  MEDIUM Complexity : 3-5 performance deficits relating to physical, cognitive , or psychosocial skils that result in activity limitations and / or participation restrictions MEDIUM Complexity : Patient may present with comorbidities that affect occupational performnce.  Miniml to moderate modification of tasks or assistance (eg, physical or verbal ) with assesment(s) is necessary to enable patient to complete evaluation Based on the above components, the patient evaluation is determined to be of the following complexity level: MEDIUM Pain: 
Pain Scale 1: Numeric (0 - 10) Pain Intensity 1: 8 Pain Location 1: Hand Pain Orientation 1: Right Pain Description 1: Aching Pain Intervention(s) 1: Medication (see MAR) Activity Tolerance: VSS Please refer to the flowsheet for vital signs taken during this treatment. After treatment:  
[] Patient left in no apparent distress sitting up in chair 
[x] Patient left in no apparent distress in bed 
[x] Call bell left within reach [x] Nursing notified 
[x] Caregiver present [x] Bed alarm activated COMMUNICATION/EDUCATION:  
The patients plan of care was discussed with: Physical Therapist and Registered Nurse. [x] Home safety education was provided and the patient/caregiver indicated understanding. [x] Patient/family have participated as able in goal setting and plan of care. [x] Patient/family agree to work toward stated goals and plan of care. [] Patient understands intent and goals of therapy, but is neutral about his/her participation. [] Patient is unable to participate in goal setting and plan of care. This patients plan of care is appropriate for delegation to [facility]. Thank you for this referral. 
Elsa Piña OTR/L Time Calculation: 33 mins

## 2018-11-26 NOTE — PROGRESS NOTES
New Patient First Initial PCP DAWIT appt scheduled with Dr. Irina Bowie on 12/4/2018 at 1:30pm Appt added to AVS. A GINGER Yoon Specialist 
 
I was requested to reschedule New patient appt for an earlier date

## 2018-11-26 NOTE — PROGRESS NOTES
Reason for Admission:   Lung mass RRAT Score:  24 Resources/supports as identified by patient/family:  Pt has a supportive family Top Challenges facing patient (as identified by patient/family and CM): Finances/Medication cost?  Pt has Rx coverage. She uses [facility] on [address]. Pt reports no problems affording her medication. Transportation? Pt relies on family Support system or lack thereof? Pt has a good support system. Her daughter, Rosalinda Iverson, and son-in-law live four houses down from her;  RICKY is retired and able to assist and daughter works from home and has some flexibility. Living arrangements? Pt resides with her grandson, Mamadou Mcgee, granddaughter-in-law and their three year old daughter in a two story house with a ramp. Pt's bedroom is located on the first floor. Self-care/ADLs/Cognition? Pt requires assistance with ADLs. She has a rollator, rw, w/c, bsc and shower chair. Pt has O2 that she uses prn.  Pt's daughter plans on hiring someone to be with her mother when no one else is available to be with her. Current Advanced Directive/Advance Care Plan:  Pt reportedly has a living will; pt's daughter will bring a copy to the hospital.  
                       
Plan for utilizing home health:  Pt is currently open with [facility] [address] for nursing only. Will need a resumption order prior to d/c. Likelihood of readmission: High/red Transition of Care Plan:  Home with home health and family assistance. Note: Pt has been to the [facility] in the past and had a negative experience. Pt does not have a PCP; she has a new pt appointment scheduled with Dr. Otf Dominguez on 1/11/19. CM specialist notified to see if a new pt appointment can be scheduled sooner for pt.   Pt and daughter were provided information on [facility]. Jesenia Marin LCSW Care Management Interventions PCP Verified by CM: Yes(No PCP; CM specialist notified) Discharge Durable Medical Equipment: No 
Physical Therapy Consult: Yes Occupational Therapy Consult: Yes Speech Therapy Consult: No 
Current Support Network: Relative's Home Confirm Follow Up Transport: Family Plan discussed with Pt/Family/Caregiver: Yes Discharge Location Discharge Placement: Home with home health

## 2018-11-26 NOTE — PROGRESS NOTES
Ty Echeverria [facility] 
[address] [facility], [address] [place] [address] 
[phone] Medical Progress Note NAME:         Edwin Elaine :        1931 MRM:        [id number] Date:          2018 Subjective: Patient has been seen and examined as a follow up for SOB, lung mass. Chart, labs, diagnostics reviewed. Patient says she feels better. No chest pain or SOB. Afebrile. Objective: 
 
Vital Signs: 
 
Visit Vitals /56 (BP 1 Location: Right arm, BP Patient Position: At rest;Sitting) Pulse 65 Temp 97.9 °F (36.6 °C) Resp 16 Ht 5' 7\" (1.702 m) Wt 52.6 kg (116 lb) SpO2 93% BMI 18.17 kg/m² Intake/Output Summary (Last 24 hours) at 2018 1044 Last data filed at 2018 4784 Gross per 24 hour Intake 960 ml Output 550 ml Net 410 ml Physical Examination: 
General:   Weak and cachectic looking female, not in acute distress Eyes:   pink conjunctivae, PERRLA with no discharge. ENT:   no ottorrhea or rhinorrhea with dry mucous membranes Pulm: decreased breath sounds without crackles or wheezes Card:  no JVD or murmurs, has sinus bradycardia, without thrills, bruits or peripheral edema Abd:  Soft, non-tender, non-distended, normoactive bowel sounds with no palpable organomegaly Musc:  No cyanosis, clubbing, atrophy or deformities. Skin:  No rashes, bruising or ulcers. Neuro: Awake and alert. Generally a non focal exam.  
Psych:  Has a fair insight and is oriented x 3 Current Facility-Administered Medications Medication Dose Route Frequency  sodium chloride (NS) flush 5-10 mL  5-10 mL IntraVENous Q8H  
 sodium chloride (NS) flush 5-10 mL  5-10 mL IntraVENous PRN  
 acetaminophen (TYLENOL) tablet 650 mg  650 mg Oral Q4H PRN  
 ondansetron (ZOFRAN) injection 4 mg  4 mg IntraVENous Q4H PRN  piperacillin-tazobactam (ZOSYN) 3.375 g in 0.9% sodium chloride (MBP/ADV) 100 mL  3.375 g IntraVENous Q8H  
 doxycycline (VIBRAMYCIN) 100 mg in 0.9% sodium chloride (MBP/ADV) 100 mL  100 mg IntraVENous Q12H  
 budesonide (PULMICORT) 500 mcg/2 ml nebulizer suspension  500 mcg Nebulization BID RT  
 arformoterol (BROVANA) neb solution 15 mcg  15 mcg Nebulization BID RT  
 albuterol-ipratropium (DUO-NEB) 2.5 MG-0.5 MG/3 ML  3 mL Nebulization Q6H PRN  
 amLODIPine (NORVASC) tablet 5 mg  5 mg Oral DAILY  digoxin (LANOXIN) tablet 0.125 mg  0.125 mg Oral DAILY  folic acid (FOLVITE) tablet 1 mg  1 mg Oral DAILY  HYDROcodone-acetaminophen (NORCO) 7.5-325 mg per tablet 1 Tab  1 Tab Oral Q6H PRN Laboratory data and review: 
 
Recent Labs  
  11/26/18 
0124 11/25/18 
0051 11/24/18 
1050 WBC 10.4 15.7* 21.0* HGB 9.0* 9.6* 11.6 HCT 29.4* 31.0* 37.8  253 328 Recent Labs  
  11/25/18 
0051 11/24/18 
1050  139  
K 3.8 4.2  101 CO2 27 28 GLU 99 124* BUN 9 12 CREA 0.62 0.73 CA 8.0* 8.8 ALB  --  2.7* SGOT  --  15 ALT  --  10* No components found for: [place] 
 
CTA chest, abdomen - No evidence of central pulmonary embolus. Suboptimal evaluation of segmental and subsegmental pulmonary arteries secondary to respiratory motion artifact. 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 Associated cortical destruction of the left fourth posterior rib. 2.6 cm right adrenal nodule. Indeterminate bilateral renal lesions. 5.4 cm abdominal aortic aneurysm, with no evidence of rupture. Mild diffuse urinary bladder wall thickening. 
  
Assessment and Plan: 
 
Lung mass left upper lung POA: 7cm x 4cm mass found on CT chest concerning for malignancy. Will need a tissue biopsy. Patient now agreeable. Continue to hold Eliquis. Pulmonology to arrange. Severe sepsis / elevated lactate: 3/4 SIRs criteria POA. Suspected due to lung mass above vs underlying infection.  Lactate now normal. Resp viral panel neg. Blood cultures remain neg. Continue empiric IV Doxycycline and Zosyn and monitor.  
   
Paroxysmal A-fib: rate well controlled currently. Continue digoxin. May need to hold Eliquis for pending tissue biopsy RA (rheumatoid arthritis): hold methotrexate while treating for suspected. Pain control as needed. COPD (chronic obstructive pulmonary disease): stable. Continue neds as scheduled. Supplemental oxygen as needed. Hypertension: BP low normal. Agree with holding Norvasc in setting of severe sepsis Tobacco abuse: quit after last hospital stay. Advised cessation Total time spent for the patient's care: 35 Minutes Care Plan discussed with: Patient, Family, Nursing Staff and Consultant/Specialist 
 
Discussed:  Care Plan Prophylaxis:  Eliquis (on hold for biopsy) Anticipated Disposition:  Home w/Family 
        
___________________________________________________ Attending Physician:   Luanne Lennon MD

## 2018-11-26 NOTE — PROGRESS NOTES
Problem: Falls - Risk of 
Goal: *Absence of Falls Document [facility] Fall Risk and appropriate interventions in the flowsheet. Outcome: Progressing Towards Goal 
Fall Risk Interventions: 
Mobility Interventions: Bed/chair exit alarm, Patient to call before getting OOB Mentation Interventions: Bed/chair exit alarm, Door open when patient unattended, Family/sitter at bedside, Increase mobility, Reorient patient, Update white board Medication Interventions: Bed/chair exit alarm, Patient to call before getting OOB Elimination Interventions: Call light in reach, Bed/chair exit alarm, Patient to call for help with toileting needs

## 2018-11-27 ENCOUNTER — APPOINTMENT (OUTPATIENT)
Dept: GENERAL RADIOLOGY | Age: 83
DRG: 872 | End: 2018-11-27
Attending: RADIOLOGY
Payer: MEDICARE

## 2018-11-27 ENCOUNTER — APPOINTMENT (OUTPATIENT)
Dept: CT IMAGING | Age: 83
DRG: 872 | End: 2018-11-27
Attending: NURSE PRACTITIONER
Payer: MEDICARE

## 2018-11-27 PROBLEM — C34.12 MALIGNANT NEOPLASM OF UPPER LOBE OF LEFT LUNG (HCC): Status: ACTIVE | Noted: 2018-11-27

## 2018-11-27 PROCEDURE — 74011250637 HC RX REV CODE- 250/637: Performed by: INTERNAL MEDICINE

## 2018-11-27 PROCEDURE — 88342 IMHCHEM/IMCYTCHM 1ST ANTB: CPT

## 2018-11-27 PROCEDURE — 77030003503 HC NDL BIOP TISS BD -B

## 2018-11-27 PROCEDURE — 65270000029 HC RM PRIVATE

## 2018-11-27 PROCEDURE — 88341 IMHCHEM/IMCYTCHM EA ADD ANTB: CPT

## 2018-11-27 PROCEDURE — 74011250636 HC RX REV CODE- 250/636

## 2018-11-27 PROCEDURE — 77030038269 HC DRN EXT URIN PURWCK BARD -A

## 2018-11-27 PROCEDURE — 94640 AIRWAY INHALATION TREATMENT: CPT

## 2018-11-27 PROCEDURE — 74011000250 HC RX REV CODE- 250: Performed by: INTERNAL MEDICINE

## 2018-11-27 PROCEDURE — 74011250636 HC RX REV CODE- 250/636: Performed by: INTERNAL MEDICINE

## 2018-11-27 PROCEDURE — 71045 X-RAY EXAM CHEST 1 VIEW: CPT

## 2018-11-27 PROCEDURE — 88333 PATH CONSLTJ SURG CYTO XM 1: CPT

## 2018-11-27 PROCEDURE — 0BBJ3ZX EXCISION OF LEFT LOWER LUNG LOBE, PERCUTANEOUS APPROACH, DIAGNOSTIC: ICD-10-PCS | Performed by: RADIOLOGY

## 2018-11-27 PROCEDURE — 77030014115

## 2018-11-27 PROCEDURE — 74011000258 HC RX REV CODE- 258: Performed by: INTERNAL MEDICINE

## 2018-11-27 PROCEDURE — 88173 CYTOPATH EVAL FNA REPORT: CPT

## 2018-11-27 PROCEDURE — 88305 TISSUE EXAM BY PATHOLOGIST: CPT

## 2018-11-27 PROCEDURE — 77012 CT SCAN FOR NEEDLE BIOPSY: CPT

## 2018-11-27 PROCEDURE — 99153 MOD SED SAME PHYS/QHP EA: CPT

## 2018-11-27 PROCEDURE — 99152 MOD SED SAME PHYS/QHP 5/>YRS: CPT

## 2018-11-27 PROCEDURE — 94760 N-INVAS EAR/PLS OXIMETRY 1: CPT

## 2018-11-27 PROCEDURE — 74011250636 HC RX REV CODE- 250/636: Performed by: RADIOLOGY

## 2018-11-27 RX ORDER — MIDAZOLAM HYDROCHLORIDE 1 MG/ML
5 INJECTION, SOLUTION INTRAMUSCULAR; INTRAVENOUS
Status: DISPENSED | OUTPATIENT
Start: 2018-11-27 | End: 2018-11-27

## 2018-11-27 RX ORDER — FENTANYL CITRATE 50 UG/ML
100 INJECTION, SOLUTION INTRAMUSCULAR; INTRAVENOUS
Status: DISPENSED | OUTPATIENT
Start: 2018-11-27 | End: 2018-11-27

## 2018-11-27 RX ORDER — LIDOCAINE HYDROCHLORIDE 10 MG/ML
INJECTION, SOLUTION EPIDURAL; INFILTRATION; INTRACAUDAL; PERINEURAL
Status: COMPLETED
Start: 2018-11-27 | End: 2018-11-27

## 2018-11-27 RX ORDER — LIDOCAINE HYDROCHLORIDE 10 MG/ML
10 INJECTION, SOLUTION EPIDURAL; INFILTRATION; INTRACAUDAL; PERINEURAL
Status: ACTIVE | OUTPATIENT
Start: 2018-11-27 | End: 2018-11-27

## 2018-11-27 RX ADMIN — PIPERACILLIN SODIUM AND TAZOBACTAM SODIUM 3.38 G: 3; .375 INJECTION, POWDER, LYOPHILIZED, FOR SOLUTION INTRAVENOUS at 05:29

## 2018-11-27 RX ADMIN — MIDAZOLAM 0.5 MG: 1 INJECTION INTRAMUSCULAR; INTRAVENOUS at 11:09

## 2018-11-27 RX ADMIN — DIGOXIN 0.12 MG: 125 TABLET ORAL at 21:37

## 2018-11-27 RX ADMIN — DOXYCYCLINE 100 MG: 100 INJECTION, POWDER, LYOPHILIZED, FOR SOLUTION INTRAVENOUS at 23:45

## 2018-11-27 RX ADMIN — HYDROCODONE BITARTRATE AND ACETAMINOPHEN 1 TABLET: 7.5; 325 TABLET ORAL at 14:31

## 2018-11-27 RX ADMIN — BUDESONIDE 500 MCG: 0.5 INHALANT RESPIRATORY (INHALATION) at 19:35

## 2018-11-27 RX ADMIN — HYDROCODONE BITARTRATE AND ACETAMINOPHEN 1 TABLET: 7.5; 325 TABLET ORAL at 21:37

## 2018-11-27 RX ADMIN — LIDOCAINE HYDROCHLORIDE 10 ML: 10 INJECTION, SOLUTION EPIDURAL; INFILTRATION; INTRACAUDAL; PERINEURAL at 11:56

## 2018-11-27 RX ADMIN — PIPERACILLIN SODIUM AND TAZOBACTAM SODIUM 3.38 G: 3; .375 INJECTION, POWDER, LYOPHILIZED, FOR SOLUTION INTRAVENOUS at 15:21

## 2018-11-27 RX ADMIN — FENTANYL CITRATE 25 MCG: 50 INJECTION, SOLUTION INTRAMUSCULAR; INTRAVENOUS at 10:50

## 2018-11-27 RX ADMIN — MIDAZOLAM 1 MG: 1 INJECTION INTRAMUSCULAR; INTRAVENOUS at 10:38

## 2018-11-27 RX ADMIN — DOXYCYCLINE 100 MG: 100 INJECTION, POWDER, LYOPHILIZED, FOR SOLUTION INTRAVENOUS at 14:06

## 2018-11-27 RX ADMIN — FENTANYL CITRATE 25 MCG: 50 INJECTION, SOLUTION INTRAMUSCULAR; INTRAVENOUS at 10:40

## 2018-11-27 RX ADMIN — Medication 10 ML: at 05:30

## 2018-11-27 RX ADMIN — AMLODIPINE BESYLATE 5 MG: 5 TABLET ORAL at 14:06

## 2018-11-27 RX ADMIN — FOLIC ACID 1 MG: 1 TABLET ORAL at 14:06

## 2018-11-27 RX ADMIN — Medication 10 ML: at 22:00

## 2018-11-27 RX ADMIN — FENTANYL CITRATE 25 MCG: 50 INJECTION, SOLUTION INTRAMUSCULAR; INTRAVENOUS at 11:09

## 2018-11-27 RX ADMIN — MIDAZOLAM 1 MG: 1 INJECTION INTRAMUSCULAR; INTRAVENOUS at 10:50

## 2018-11-27 RX ADMIN — MIDAZOLAM 1 MG: 1 INJECTION INTRAMUSCULAR; INTRAVENOUS at 11:04

## 2018-11-27 RX ADMIN — MIDAZOLAM 1 MG: 1 INJECTION INTRAMUSCULAR; INTRAVENOUS at 11:01

## 2018-11-27 RX ADMIN — BUDESONIDE 500 MCG: 0.5 INHALANT RESPIRATORY (INHALATION) at 08:10

## 2018-11-27 RX ADMIN — ARFORMOTEROL TARTRATE 15 MCG: 15 SOLUTION RESPIRATORY (INHALATION) at 19:35

## 2018-11-27 RX ADMIN — FENTANYL CITRATE 25 MCG: 50 INJECTION, SOLUTION INTRAMUSCULAR; INTRAVENOUS at 11:03

## 2018-11-27 RX ADMIN — ARFORMOTEROL TARTRATE 15 MCG: 15 SOLUTION RESPIRATORY (INHALATION) at 08:10

## 2018-11-27 NOTE — ROUTINE PROCESS
Bedside and Verbal shift change report given to Kortney Ruiz (oncoming nurse) by Shilo River RN (offgoing nurse). Report included the following information SBAR, Kardex, MAR and Accordion.

## 2018-11-27 NOTE — PROGRESS NOTES
7100 Patient arrived via stretcher for CT lung biopsy. NPO verified. IV flushed and patent. S1S2, lungs with crackles noted in left base to mid lung fields. Patient's daughter Marion Benitez and grandson Virgil Castro at bedside. Baseline VSS. Consent obtained. [address] Dr. Mane Gaitan at bedside and consent obtained. Patient taken to CT and placed prone on the table and connected to the monitor. Time out 1102 Start time 1102 End time 1117. Versed 4.5 mg and Fentanyl 100 mcg given for sedation and patient tolerated poor. Dressing to left upper posterior lobe CDI. Xray ordered for 1215 per Dr. Mane Gaitan. 1220 Xray taken at bedside. [address]. Mane Gaitan is at bedside speaking to family. Post xray read as no pneumothorax. 1310 Report given to GRAYSON Myers for transfer back to [facility] with questions asked and answered.

## 2018-11-27 NOTE — PROGRESS NOTES
PHYSICAL THERAPY:Pt was off the floor for a procedure earlier today. Pt and family report she is quite fatigued. PT agreed to follow in AM.

## 2018-11-27 NOTE — PROGRESS NOTES
Per chart review, Pt receiving biopsy today. Family refusing SNF would like Pt to return home with [facility] at discharge. Pt recently open to [facility]. CM will continue to follow-up with discharge planning. Karly 45, BS, [address] [facility]

## 2018-11-27 NOTE — PROGRESS NOTES
Occupational therapy note: 
Orders received, chart reviewed. Attempted to see patient for OT treatment. Patient currently off the floor for biopsy. Will follow up with patient at later time. Rosibel Shafer MS OTR/L

## 2018-11-27 NOTE — CONSULTS
[facility] [place] at [address], [address] [facility] [address] [facility]  [place]: [phone]  F: [phone]      Reason for Visit:   Merlyn Fletcher is a 80 y.o. female who is seen in consultation at the request of Dr. Darien Lam for evaluation of suspected lung cancer. Hematology / Oncology Treatment History:   none      History of Present Illness:     Ms Fauzia Velasquez was admitted on 11/24/2018 from the ED when she presented with c/o SOB. Recent discharge from [id number] [place] [address] for rehab for tibial fx. HX of COPD, tobacco abuse, afib, HTN. ED CT scan shows YESIKA pulmonary mass (7.3 cm); left 4 th pos rib cortical destruction. Indeterminate Bilateral renal lesions and abd aortic aneurysm. Therefore was admitted for further eval and management. Ms Brielle Avalos is surprised by her probable cancer dx. States she has confidence in healing herself; is scared but not scared. Shares that she is not ready to lie and die and would like treatment if offered but wants to discuss it with her family. Uses O2 at home only at night. Not SOB during the day. States is up and independent at home; lives with her grandson; is unable to go up stars any more. Reports smoking hx of 50+ yrs but stopped a few months ago. Reports has just gotten out of rehab for breaking both her legs. No family at bedside. Past Medical History:   Diagnosis Date    AAA (abdominal aortic aneurysm) (HCC)     Arthritis     ra, osteoporosis, osteoarthritis    Atrial fibrillation (HCC)     COPD     HTN (hypertension)     RA (rheumatoid arthritis) ([facility]     Smoker     TIA (transient ischemic attack)     TIA 9/30/16      Past Surgical History:   Procedure Laterality Date    HX CHOLECYSTECTOMY      HX HYSTERECTOMY      HX ORTHOPAEDIC      carpal tunnel, wrist surgery      Social History     Tobacco Use    Smoking status: Former Smoker    Smokeless tobacco: Never Used   Substance Use Topics    Alcohol use:  No Family History   Problem Relation Age of Onset    Hypertension Father      Current Facility-Administered Medications   Medication Dose Route Frequency    lidocaine (PF) (XYLOCAINE) 10 mg/mL (1 %) injection 1 mL  10 mg SubCUTAneous RAD ONCE    sodium chloride (NS) flush 5-10 mL  5-10 mL IntraVENous Q8H    sodium chloride (NS) flush 5-10 mL  5-10 mL IntraVENous PRN    acetaminophen (TYLENOL) tablet 650 mg  650 mg Oral Q4H PRN    ondansetron (ZOFRAN) injection 4 mg  4 mg IntraVENous Q4H PRN    piperacillin-tazobactam (ZOSYN) 3.375 g in 0.9% sodium chloride (MBP/ADV) 100 mL  3.375 g IntraVENous Q8H    doxycycline (VIBRAMYCIN) 100 mg in 0.9% sodium chloride (MBP/ADV) 100 mL  100 mg IntraVENous Q12H    budesonide (PULMICORT) 500 mcg/2 ml nebulizer suspension  500 mcg Nebulization BID RT    arformoterol (BROVANA) neb solution 15 mcg  15 mcg Nebulization BID RT    albuterol-ipratropium (DUO-NEB) 2.5 MG-0.5 MG/3 ML  3 mL Nebulization Q6H PRN    amLODIPine (NORVASC) tablet 5 mg  5 mg Oral DAILY    digoxin (LANOXIN) tablet 0.125 mg  0.125 mg Oral DAILY    folic acid (FOLVITE) tablet 1 mg  1 mg Oral DAILY    HYDROcodone-acetaminophen (NORCO) 7.5-325 mg per tablet 1 Tab  1 Tab Oral Q6H PRN      Allergies   Allergen Reactions    Codeine Nausea and Vomiting        Review of Systems: A complete review of systems was obtained, negative except as described above. Physical Exam:     Visit Vitals  BP 96/40 (BP 1 Location: Left arm, BP Patient Position: At rest)   Pulse (!) 57   Temp 98 °F (36.7 °C)   Resp 23   Ht 5' 7\" (1.702 m)   Wt 116 lb (52.6 kg)   SpO2 96%   BMI 18.17 kg/m²     ECOG PS: 2-3  General: elderly, cachetic; frail;  No distress  Eyes: PERRLA, anicteric sclerae  HENT: Atraumatic with normal appearance of ears and nose; OP clear  Neck: Supple; no thyromegaly   Lymphatic: No cervical, supraclavicular, or axillary adenopathy  Respiratory: anterior CTAB, normal respiratory effort; O2 in use  CV: Normal rate, regular rhythm, no murmurs, no peripheral edema  GI: Soft, nontender, nondistended, no masses, no hepatomegaly, no splenomegaly  MS:  Digits without clubbing or cyanosis. Skin: No rashes, ecchymoses, or petechiae. Normal temperature, turgor, and texture. Neuro/Psych: Alert, oriented, appropriate affect, fair judgment/insight      Results:     Lab Results   Component Value Date/Time    WBC 10.4 11/26/2018 01:24 AM    HGB 9.0 (L) 11/26/2018 01:24 AM    HCT 29.4 (L) 11/26/2018 01:24 AM    PLATELET 289 43/18/2910 01:24 AM    MCV 96.1 11/26/2018 01:24 AM    ABS. NEUTROPHILS 7.2 11/26/2018 01:24 AM     Lab Results   Component Value Date/Time    Sodium 139 11/25/2018 12:51 AM    Potassium 3.8 11/25/2018 12:51 AM    Chloride 104 11/25/2018 12:51 AM    CO2 27 11/25/2018 12:51 AM    Glucose 99 11/25/2018 12:51 AM    BUN 9 11/25/2018 12:51 AM    Creatinine 0.62 11/25/2018 12:51 AM    GFR est AA >60 11/25/2018 12:51 AM    GFR est non-AA >60 11/25/2018 12:51 AM    Calcium 8.0 (L) 11/25/2018 12:51 AM    Glucose (POC) 93 10/01/2016 11:33 AM     Lab Results   Component Value Date/Time    Bilirubin, total 0.8 11/24/2018 10:50 AM    ALT (SGPT) 10 (L) 11/24/2018 10:50 AM    AST (SGOT) 15 11/24/2018 10:50 AM    Alk. phosphatase 62 11/24/2018 10:50 AM    Protein, total 6.8 11/24/2018 10:50 AM    Albumin 2.7 (L) 11/24/2018 10:50 AM    Globulin 4.1 (H) 11/24/2018 10:50 AM     Lab Results   Component Value Date/Time    TSH 7.09 (H) 10/01/2016 07:45 AM     Lab Results   Component Value Date/Time    INR 1.1 10/01/2016 07:45 AM    aPTT 31.7 10/01/2016 07:45 AM     11/24/2018 CTA CHEST/ Abd / pelv  W OR W WO CONT  IMPRESSION:  No evidence of central pulmonary embolus. Suboptimal evaluation of segmental and  subsegmental pulmonary arteries secondary to respiratory motion artifact. 7.3  cm left upper lobe pulmonary mass is compatible with malignancy. Associated  cortical destruction of the left fourth posterior rib.  2.6 cm right adrenal  nodule. Indeterminate bilateral renal lesions. 5.4 cm abdominal aortic aneurysm,  with no evidence of rupture. Mild diffuse urinary bladder wall thickening.     11/27/2018 CT guided left lung bx  Final path pending; prelim adenocarcinoma    Assessment and Recommendations:   1. YESIKA pulmonary mass/ rt adrenal nodule / left 4th posterior rib  Imaging is highly concerning for a primary lung cancer with invasion into the chest wall. Additionally there is an indeterminate right adrenal lesion. Lung biopsy has been completed, prelim results are positive for adenocarcinoma, final results are pending. I reviewed with the patient her likely diagnosis of lung cancer. She initially was not receptive to this, as she is quite skeptical of the diagnosis. However, after further discussion, she seemed to become more accepting of the diagnosis. We reviewed options in general terms, including supportive care alone, vs additional imaging studies (MRI brain, PET/CT) and likely systemic therapy +/- radiation. I am uncertain about her ability to tolerate therapy, given her age and frailty, though she reports she had a good performance status prior to admission. She waffled during our discussion between a desire to not be aggressive, and a desire to be as aggressive as possible. \"I don't want to die! \"  We agreed to discuss further in the morning, and I asked her to see if her daughter can be present for that discussion. It would be helpful to discuss with her, so we can better gauge her performance status at home in order to determine treatment options. For now, I recommend focusing on symptom management while we await pathology results. 2. Sepsis  Blood cultures negative  Abx coverage    3. Anemia  Anemia work up labs added for am    4. COPD  Pulmonary following    5. Afib  On Eliquis and dig. Eliquis was held for biopsy, but could consider resuming this now that biopsy completed.     6. RA  On methotrexate at home; currently on hold  Pain management prn      Patient seen in conjunction with [place] NP.       Signed By: Paola Downs MD

## 2018-11-27 NOTE — PROGRESS NOTES
Ty Echever[facility] [address] [place] [address] 
[address] [facility], [address] [place] [address] 
[phone] Medical Progress Note NAME:         Rosemarie Chaudhari :        1931 MRM:        [id number] Date:          2018 Subjective: Patient has been seen and examined as a follow up for SOB, lung mass. Chart, labs, diagnostics reviewed. She feels well this morning. Minimal SOB. No cough or fever. Objective: 
 
Vital Signs: 
 
Visit Vitals /54 (BP 1 Location: Right arm, BP Patient Position: At rest) Pulse 61 Temp 98.4 °F (36.9 °C) Resp 16 Ht 5' 7\" (1.702 m) Wt 52.6 kg (116 lb) SpO2 94% BMI 18.17 kg/m² Intake/Output Summary (Last 24 hours) at 2018 5061 Last data filed at 2018 3003 Gross per 24 hour Intake 680 ml Output 920 ml Net -240 ml Physical Examination: 
General:   Weak and cachectic looking female, not in acute distress Eyes:   pink conjunctivae, PERRLA with no discharge. ENT:   no ottorrhea or rhinorrhea with dry mucous membranes Pulm: decreased breath sounds without crackles or wheezes Card:  no JVD or murmurs, has sinus bradycardia, without thrills, bruits or peripheral edema Abd:  Soft, non-tender, non-distended, normoactive bowel sounds Musc:  No cyanosis, clubbing, atrophy or deformities. Skin:  No rashes, bruising or ulcers. Neuro: Awake and alert. Generally a non focal exam.  
Psych:  Has a fair insight and is oriented x 3 Current Facility-Administered Medications Medication Dose Route Frequency  sodium chloride (NS) flush 5-10 mL  5-10 mL IntraVENous Q8H  
 sodium chloride (NS) flush 5-10 mL  5-10 mL IntraVENous PRN  
 acetaminophen (TYLENOL) tablet 650 mg  650 mg Oral Q4H PRN  
 ondansetron (ZOFRAN) injection 4 mg  4 mg IntraVENous Q4H PRN  piperacillin-tazobactam (ZOSYN) 3.375 g in 0.9% sodium chloride (MBP/ADV) 100 mL  3.375 g IntraVENous Q8H  
 doxycycline (VIBRAMYCIN) 100 mg in 0.9% sodium chloride (MBP/ADV) 100 mL  100 mg IntraVENous Q12H  
 budesonide (PULMICORT) 500 mcg/2 ml nebulizer suspension  500 mcg Nebulization BID RT  
 arformoterol (BROVANA) neb solution 15 mcg  15 mcg Nebulization BID RT  
 albuterol-ipratropium (DUO-NEB) 2.5 MG-0.5 MG/3 ML  3 mL Nebulization Q6H PRN  
 amLODIPine (NORVASC) tablet 5 mg  5 mg Oral DAILY  digoxin (LANOXIN) tablet 0.125 mg  0.125 mg Oral DAILY  folic acid (FOLVITE) tablet 1 mg  1 mg Oral DAILY  HYDROcodone-acetaminophen (NORCO) 7.5-325 mg per tablet 1 Tab  1 Tab Oral Q6H PRN Laboratory data and review: 
 
Recent Labs  
  11/26/18 
0124 11/25/18 
0051 11/24/18 
1050 WBC 10.4 15.7* 21.0* HGB 9.0* 9.6* 11.6 HCT 29.4* 31.0* 37.8  253 328 Recent Labs  
  11/25/18 
0051 11/24/18 
1050  139  
K 3.8 4.2  101 CO2 27 28 GLU 99 124* BUN 9 12 CREA 0.62 0.73 CA 8.0* 8.8 ALB  --  2.7* SGOT  --  15 ALT  --  10* No components found for: [place] 
 
CTA chest, abdomen - No evidence of central pulmonary embolus. Suboptimal evaluation of segmental and subsegmental pulmonary arteries secondary to respiratory motion artifact. 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 Associated cortical destruction of the left fourth posterior rib. 2.6 cm right adrenal nodule. Indeterminate bilateral renal lesions. 5.4 cm abdominal aortic aneurysm, with no evidence of rupture. Mild diffuse urinary bladder wall thickening. 
  
Assessment and Plan: 
 
Lung mass left upper lung POA: 7cm x 4cm mass found on CT chest concerning for malignancy. She is scheduled for a CT guided lung biopsy today. Continue to hold Eliquis. Follow post procedure. Severe sepsis / elevated lactate: 3/4 SIRs criteria POA. Suspected due to lung mass above vs underlying infection.  Lactate now normal. Resp viral panel neg. Blood cultures remain neg. Continue empiric IV Doxycycline and Zosyn and follow.   
   
Paroxysmal A-fib: rate well controlled currently. Continue digoxin. Continue to hold Eliquis for pending tissue biopsy RA (rheumatoid arthritis): hold methotrexate while treating for suspected. Pain control as needed. COPD (chronic obstructive pulmonary disease): stable. Continue neds as scheduled. Supplemental oxygen as needed. Hypertension: BP stable. Agree with holding Norvasc in setting of severe sepsis Cachexia POA: likely due to poor nutrition, medical illness vs suspected malignancy. Diet as tolerated Tobacco abuse: quit after last hospital stay. Advised cessation already Total time spent for the patient's care: 35 Minutes Care Plan discussed with: Patient, Family, Nursing Staff and Consultant/Specialist 
 
Discussed:  Care Plan Prophylaxis:  Eliquis (on hold for biopsy) Anticipated Disposition:  Home w/Family 
        
___________________________________________________ Attending Physician:   Bradley Nguyen MD

## 2018-11-27 NOTE — PROGRESS NOTES
Nutrition: 
 
Pt refusing Ensure compact. Doesn't understand why she is getting them. Pt with recent weight loss. D/C'd as pt does not want to drink them. Encourage PO intakes. Pt ate 6 spring rolls per RN. [facility], [address] [facility]

## 2018-11-27 NOTE — PROGRESS NOTES
[facility] Name: Daniel Sheets MRN: [id number] : 1931 Hospital: [address] Date: 2018 Impression Plan 1. Abnormal chest CT- likely malignancy 2. COPD 3. Hypoxia 4. Anxiety 5. Tobacco abuse · O2 to keep sats >90% · F/U final cytology · Will treat with abx for now- currently on Doxy and Zosyn · RVP negative · Recheck temperature · Pulmicort/Brovana · Resume home Eliquis Discussed with RN. Radiology ( personally reviewed) CT chest: large YESIKA posterior consolidation with questionable bone invasion. Larger than  at [facility]. ABG No results for input(s): PHI, PO2I, PCO2I in the last 72 hours. Subjective Review of Systems: 
 
Temp low this mornin.2 per chart HR 50-60s BP stable O2 sats 96% on 3L NC 
WBC 10.4 Hgb 9 S/p CT guided biopsy this morning: prelim + for adeno Blood cultures negative thus far Reports that she just doesn't feel well. Breathing feels okay. Resting in bed. Denies pain Past Medical History:  
Diagnosis Date  AAA (abdominal aortic aneurysm) ([facility]  Arthritis   
 ra, osteoporosis, osteoarthritis  Atrial fibrillation ([facility]  COPD   
 HTN (hypertension)  RA (rheumatoid arthritis) ([facility]  Smoker  TIA (transient ischemic attack) TIA 16 Past Surgical History:  
Procedure Laterality Date  HX CHOLECYSTECTOMY  HX HYSTERECTOMY  HX ORTHOPAEDIC    
 carpal tunnel, wrist surgery Prior to Admission medications Medication Sig Start Date End Date Taking? Authorizing Provider HYDROcodone-acetaminophen (NORCO) 7.5-325 mg per tablet Take 1 Tab by mouth every six (6) hours as needed for Pain. Yes Provider, Historical  
amLODIPine (NORVASC) 5 mg tablet Take 5 mg by mouth daily.    Yes Provider, Historical  
cholecalciferol (VITAMIN D3) 50,000 unit capsule Take 50,000 Units by mouth every seven (7) days. Yes Provider, Historical  
apixaban (ELIQUIS) 2.5 mg tablet Take 1 Tab by mouth every twelve (12) hours. 10/1/16  Yes Mary Chau MD  
folic acid (FOLVITE) 1 mg tablet Take 1 mg by mouth daily. Yes Rayshawn Dorsey MD  
methotrexate (RHEUMATREX) 2.5 mg tablet Take 20 mg by mouth Every Saturday. The patient takes 8 tablets which is 20 mg every Saturday   Yes Willian, MD Rayshawn  
digoxin (LANOXIN) 0.125 mg tablet Take 0.125 mg by mouth daily. Yes Willian, MD Rayshawn  
 
Current Facility-Administered Medications Medication Dose Route Frequency  lidocaine (PF) (XYLOCAINE) 10 mg/mL (1 %) injection 1 mL  10 mg SubCUTAneous RAD ONCE  
 sodium chloride (NS) flush 5-10 mL  5-10 mL IntraVENous Q8H  piperacillin-tazobactam (ZOSYN) 3.375 g in 0.9% sodium chloride (MBP/ADV) 100 mL  3.375 g IntraVENous Q8H  
 doxycycline (VIBRAMYCIN) 100 mg in 0.9% sodium chloride (MBP/ADV) 100 mL  100 mg IntraVENous Q12H  
 budesonide (PULMICORT) 500 mcg/2 ml nebulizer suspension  500 mcg Nebulization BID RT  
 arformoterol (BROVANA) neb solution 15 mcg  15 mcg Nebulization BID RT  
 amLODIPine (NORVASC) tablet 5 mg  5 mg Oral DAILY  digoxin (LANOXIN) tablet 0.125 mg  0.125 mg Oral DAILY  folic acid (FOLVITE) tablet 1 mg  1 mg Oral DAILY Allergies Allergen Reactions  Codeine Nausea and Vomiting Social History Tobacco Use  Smoking status: Former Smoker  Smokeless tobacco: Never Used Substance Use Topics  Alcohol use: No  
  
Family History Problem Relation Age of Onset  Hypertension Father Laboratory: I have personally reviewed the critical care flowsheet and labs. Recent Labs  
  11/26/18 
0124 11/25/18 
0051 WBC 10.4 15.7* HGB 9.0* 9.6* HCT 29.4* 31.0*  
 253 Recent Labs  
  11/25/18 
0051   
K 3.8  CO2 27 GLU 99 BUN 9  
CREA 0.62  
CA 8.0* Objective: Mode Rate Tidal Volume Pressure FiO2 PEEP  
 Vital Signs:   
 TMAX(24) Intake/Output:  
Last shift:        
Last 3 shifts: No intake/output data recorded. Catie Pelayo Intake/Output Summary (Last 24 hours) at 11/27/2018 1612 Last data filed at 11/27/2018 5248 Gross per 24 hour Intake 200 ml Output 450 ml Net -250 ml EXAM: 
GENERAL: well developed and in no distress, debilitated HEENT:  PERRL, EOMI, no alar flaring or epistaxis, oral mucosa moist without cyanosis, NECK:  no jugular vein distention, no retractions, no thyromegaly or masses, LUNGS: decreased breath sounds billaterally, HEART:  Regular rate and rhythm with no MGR; no edema is present, ABDOMEN:  soft with no tenderness, bowel sounds present, EXTREMITIES:  warm with no cyanosis, SKIN:  no jaundice or ecchymosis and NEUROLOGIC:  alert and oriented, grossly non-focal 
 
Luis Jon NP 
[facility]

## 2018-11-27 NOTE — PROGRESS NOTES
1940- Bedside and Verbal shift change report given to GRAYSON Mccoy (oncoming nurse) by Yvonne Conde (offgoing nurse). Report included the following information SBAR, Kardex, Intake/Output, MAR and Recent Results.

## 2018-11-28 ENCOUNTER — TELEPHONE (OUTPATIENT)
Dept: ONCOLOGY | Age: 83
End: 2018-11-28

## 2018-11-28 ENCOUNTER — APPOINTMENT (OUTPATIENT)
Dept: MRI IMAGING | Age: 83
DRG: 872 | End: 2018-11-28
Attending: NURSE PRACTITIONER
Payer: MEDICARE

## 2018-11-28 DIAGNOSIS — C34.12 MALIGNANT NEOPLASM OF UPPER LOBE OF LEFT LUNG (HCC): Primary | ICD-10-CM

## 2018-11-28 LAB
FERRITIN SERPL-MCNC: 139 NG/ML (ref 8–252)
FOLATE SERPL-MCNC: 22.9 NG/ML (ref 5–21)
IRON SATN MFR SERPL: 10 % (ref 20–50)
IRON SERPL-MCNC: 14 UG/DL (ref 35–150)
RETICS # AUTO: 0.04 M/UL (ref 0.02–0.08)
RETICS/RBC NFR AUTO: 1.3 % (ref 0.7–2.1)
TIBC SERPL-MCNC: 143 UG/DL (ref 250–450)
VIT B12 SERPL-MCNC: 204 PG/ML (ref 193–986)

## 2018-11-28 PROCEDURE — 74011000258 HC RX REV CODE- 258: Performed by: INTERNAL MEDICINE

## 2018-11-28 PROCEDURE — 77030038269 HC DRN EXT URIN PURWCK BARD -A

## 2018-11-28 PROCEDURE — 97530 THERAPEUTIC ACTIVITIES: CPT

## 2018-11-28 PROCEDURE — 36415 COLL VENOUS BLD VENIPUNCTURE: CPT

## 2018-11-28 PROCEDURE — A9575 INJ GADOTERATE MEGLUMI 0.1ML: HCPCS | Performed by: RADIOLOGY

## 2018-11-28 PROCEDURE — 83540 ASSAY OF IRON: CPT

## 2018-11-28 PROCEDURE — 65270000029 HC RM PRIVATE

## 2018-11-28 PROCEDURE — 94760 N-INVAS EAR/PLS OXIMETRY 1: CPT

## 2018-11-28 PROCEDURE — 74011250636 HC RX REV CODE- 250/636: Performed by: NURSE PRACTITIONER

## 2018-11-28 PROCEDURE — 74011250636 HC RX REV CODE- 250/636: Performed by: RADIOLOGY

## 2018-11-28 PROCEDURE — 82746 ASSAY OF FOLIC ACID SERUM: CPT

## 2018-11-28 PROCEDURE — 74011000250 HC RX REV CODE- 250: Performed by: INTERNAL MEDICINE

## 2018-11-28 PROCEDURE — 70553 MRI BRAIN STEM W/O & W/DYE: CPT

## 2018-11-28 PROCEDURE — 74011250637 HC RX REV CODE- 250/637: Performed by: INTERNAL MEDICINE

## 2018-11-28 PROCEDURE — 82728 ASSAY OF FERRITIN: CPT

## 2018-11-28 PROCEDURE — 82607 VITAMIN B-12: CPT

## 2018-11-28 PROCEDURE — 74011250636 HC RX REV CODE- 250/636: Performed by: INTERNAL MEDICINE

## 2018-11-28 PROCEDURE — 97535 SELF CARE MNGMENT TRAINING: CPT

## 2018-11-28 PROCEDURE — 94640 AIRWAY INHALATION TREATMENT: CPT

## 2018-11-28 PROCEDURE — 97116 GAIT TRAINING THERAPY: CPT

## 2018-11-28 PROCEDURE — 85045 AUTOMATED RETICULOCYTE COUNT: CPT

## 2018-11-28 RX ORDER — LORAZEPAM 2 MG/ML
1 INJECTION, SOLUTION INTRAMUSCULAR; INTRAVENOUS ONCE
Status: DISCONTINUED | OUTPATIENT
Start: 2018-11-28 | End: 2018-11-28 | Stop reason: CLARIF

## 2018-11-28 RX ORDER — GADOTERATE MEGLUMINE 376.9 MG/ML
10 INJECTION INTRAVENOUS
Status: COMPLETED | OUTPATIENT
Start: 2018-11-28 | End: 2018-11-28

## 2018-11-28 RX ORDER — LORAZEPAM 2 MG/ML
0.5 INJECTION INTRAMUSCULAR ONCE
Status: COMPLETED | OUTPATIENT
Start: 2018-11-28 | End: 2018-11-28

## 2018-11-28 RX ORDER — LORAZEPAM 2 MG/ML
1 INJECTION INTRAMUSCULAR ONCE
Status: COMPLETED | OUTPATIENT
Start: 2018-11-28 | End: 2018-11-28

## 2018-11-28 RX ADMIN — HYDROCODONE BITARTRATE AND ACETAMINOPHEN 1 TABLET: 7.5; 325 TABLET ORAL at 11:42

## 2018-11-28 RX ADMIN — PIPERACILLIN SODIUM AND TAZOBACTAM SODIUM 3.38 G: 3; .375 INJECTION, POWDER, LYOPHILIZED, FOR SOLUTION INTRAVENOUS at 01:32

## 2018-11-28 RX ADMIN — IPRATROPIUM BROMIDE AND ALBUTEROL SULFATE 3 ML: .5; 3 SOLUTION RESPIRATORY (INHALATION) at 18:35

## 2018-11-28 RX ADMIN — DOXYCYCLINE 100 MG: 100 INJECTION, POWDER, LYOPHILIZED, FOR SOLUTION INTRAVENOUS at 22:17

## 2018-11-28 RX ADMIN — DOXYCYCLINE 100 MG: 100 INJECTION, POWDER, LYOPHILIZED, FOR SOLUTION INTRAVENOUS at 09:01

## 2018-11-28 RX ADMIN — HYDROCODONE BITARTRATE AND ACETAMINOPHEN 1 TABLET: 7.5; 325 TABLET ORAL at 17:57

## 2018-11-28 RX ADMIN — Medication 10 ML: at 22:27

## 2018-11-28 RX ADMIN — LORAZEPAM 0.5 MG: 2 INJECTION INTRAMUSCULAR; INTRAVENOUS at 19:01

## 2018-11-28 RX ADMIN — FOLIC ACID 1 MG: 1 TABLET ORAL at 09:01

## 2018-11-28 RX ADMIN — Medication 10 ML: at 06:00

## 2018-11-28 RX ADMIN — AMLODIPINE BESYLATE 5 MG: 5 TABLET ORAL at 09:01

## 2018-11-28 RX ADMIN — GADOTERATE MEGLUMINE 10 ML: 376.9 INJECTION INTRAVENOUS at 21:00

## 2018-11-28 RX ADMIN — PIPERACILLIN SODIUM AND TAZOBACTAM SODIUM 3.38 G: 3; .375 INJECTION, POWDER, LYOPHILIZED, FOR SOLUTION INTRAVENOUS at 10:14

## 2018-11-28 RX ADMIN — LORAZEPAM 1 MG: 2 INJECTION INTRAMUSCULAR; INTRAVENOUS at 19:51

## 2018-11-28 RX ADMIN — BUDESONIDE 500 MCG: 0.5 INHALANT RESPIRATORY (INHALATION) at 18:35

## 2018-11-28 RX ADMIN — HYDROCODONE BITARTRATE AND ACETAMINOPHEN 1 TABLET: 7.5; 325 TABLET ORAL at 03:59

## 2018-11-28 NOTE — ROUTINE PROCESS
Bedside shift change report given to [facility] (oncoming nurse) by Darwin Camacho (offgoing nurse). Report included the following information SBAR.

## 2018-11-28 NOTE — TELEPHONE ENCOUNTER
[address]ell at [facility]  [phone]    Order placed for PET/CT to be done as outpatient after discharge, for lung cancer staging.

## 2018-11-28 NOTE — PROGRESS NOTES
Ty Echeverria [facility] 
[address] [facility], [address] [place] [address] 
[phone] Medical Progress Note NAME:         Aden Mueller :        1931 MRM:        [id number] Date:          2018 Subjective: Patient has been seen and examined as a follow up for SOB, lung mass. Chart, labs, diagnostics reviewed. She feels well after the biopsy done yesterday. No chest pain or SOB. Weak. Objective: 
 
Vital Signs: 
 
Visit Vitals /58 (BP 1 Location: Right arm, BP Patient Position: At rest) Pulse (!) 56 Temp 98.1 °F (36.7 °C) Resp 19 Ht 5' 7\" (1.702 m) Wt 52.6 kg (116 lb) SpO2 96% BMI 18.17 kg/m² No intake or output data in the 24 hours ending 18 0935 Physical Examination: 
General:   Weak and cachectic looking female, not in acute distress Eyes:   pink conjunctivae, PERRLA with no discharge. ENT:   no ottorrhea or rhinorrhea with dry mucous membranes Pulm: decreased breath sounds without crackles or wheezes Card:  has sinus bradycardia, without thrills, bruits or peripheral edema Abd:  Soft, non-tender, non-distended, normoactive bowel sounds Musc:  No cyanosis, clubbing, atrophy or deformities. Skin:  No rashes, bruising or ulcers. Neuro: Awake and alert. Generally a non focal exam.  
Psych:  Has a fair insight and is oriented x 3 Current Facility-Administered Medications Medication Dose Route Frequency  LORazepam (ATIVAN) injection 0.5 mg  0.5 mg IntraVENous ONCE  
 sodium chloride (NS) flush 5-10 mL  5-10 mL IntraVENous Q8H  
 sodium chloride (NS) flush 5-10 mL  5-10 mL IntraVENous PRN  
 acetaminophen (TYLENOL) tablet 650 mg  650 mg Oral Q4H PRN  
 ondansetron (ZOFRAN) injection 4 mg  4 mg IntraVENous Q4H PRN  piperacillin-tazobactam (ZOSYN) 3.375 g in 0.9% sodium chloride (MBP/ADV) 100 mL  3.375 g IntraVENous Q8H  
  doxycycline (VIBRAMYCIN) 100 mg in 0.9% sodium chloride (MBP/ADV) 100 mL  100 mg IntraVENous Q12H  
 budesonide (PULMICORT) 500 mcg/2 ml nebulizer suspension  500 mcg Nebulization BID RT  
 arformoterol (BROVANA) neb solution 15 mcg  15 mcg Nebulization BID RT  
 albuterol-ipratropium (DUO-NEB) 2.5 MG-0.5 MG/3 ML  3 mL Nebulization Q6H PRN  
 amLODIPine (NORVASC) tablet 5 mg  5 mg Oral DAILY  digoxin (LANOXIN) tablet 0.125 mg  0.125 mg Oral DAILY  folic acid (FOLVITE) tablet 1 mg  1 mg Oral DAILY  HYDROcodone-acetaminophen (NORCO) 7.5-325 mg per tablet 1 Tab  1 Tab Oral Q6H PRN Laboratory data and review: 
 
Recent Labs  
  11/26/18 
0124 WBC 10.4 HGB 9.0*  
HCT 29.4*  
 No results for input(s): NA, K, CL, CO2, GLU, BUN, CREA, CA, MG, PHOS, ALB, TBIL, SGOT, ALT, INR in the last 72 hours. No lab exists for component: INREXT, INREXT No components found for: [place] 
 
CTA chest, abdomen - No evidence of central pulmonary embolus. Suboptimal evaluation of segmental and subsegmental pulmonary arteries secondary to respiratory motion artifact. 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 Associated cortical destruction of the left fourth posterior rib. 2.6 cm right adrenal nodule. Indeterminate bilateral renal lesions. 5.4 cm abdominal aortic aneurysm, with no evidence of rupture. Mild diffuse urinary bladder wall thickening. 
  
Assessment and Plan: 
 
Lung mass left upper lung POA: 7cm x 4cm mass found on CT chest likely primary lung cancer. She is sp a CT guided lung mass biopsy. Path pending. Getting MRI today for staging. Monitor for any new symptoms. Ambulate as tolerated. Severe sepsis / elevated lactate: 3/4 SIRs criteria POA. Suspected due to lung mass above vs underlying infection. Lactate now normal. Resp viral panel neg. Blood cultures remain neg. DC IV Zosyn and continue Doxycycline.   Monitor    
   
 Paroxysmal A-fib: rate well controlled currently. Continue digoxin and resume Eliquis. RA (rheumatoid arthritis): hold methotrexate while treating for suspected. Pain control as needed. COPD (chronic obstructive pulmonary disease): stable. Continue neds as scheduled. Supplemental oxygen as needed. Hypertension: BP stable. Agree with holding Norvasc in setting of severe sepsis Cachexia POA: likely due to poor nutrition, medical illness vs suspected malignancy. Diet as tolerated Tobacco abuse: quit after last hospital stay. Advised cessation already Total time spent for the patient's care: 35 Minutes Care Plan discussed with: Patient, Family, Nursing Staff and Consultant/Specialist 
 
Discussed:  Care Plan Prophylaxis:  Eliquis Anticipated Disposition:  Home w/Family 
        
___________________________________________________ Attending Physician:   Sola Ackerman MD

## 2018-11-28 NOTE — PROGRESS NOTES
Spiritual Care Assessment/Progress Note [address] 
 
 
NAME: Rhett Burnham      MRN: [id number] AGE: 80 y.o. SEX: female Alevism Affiliation: Muslim  
Language: English  
 
11/28/2018     Total Time (in minutes): 40 Spiritual Assessment begun in [facility]  MED SURG 2 through conversation with: 
  
    [x]Patient        [] Family    [] Friend(s) Reason for Consult: Request by staff Spiritual beliefs: (Please include comment if needed) [x] Identifies with a reji tradition: Edwin    
   [] Supported by a reji community:        
   [] Claims no spiritual orientation:       
   [] Seeking spiritual identity:            
   [] Adheres to an individual form of spirituality:       
   [] Not able to assess:                   
 
    
Identified resources for coping:  
   [x] Prayer                           
   [] Music                  [] Guided Imagery [x] Family/friends                 [] Pet visits [] Devotional reading                         [] Unknown 
   [] Other:                                     
 
 
Interventions offered during this visit: (See comments for more details) Patient Interventions: Affirmation of emotions/emotional suffering, Affirmation of reji, Catharsis/review of pertinent events in supportive environment, Iconic (affirming the presence of God/Higher Power), Initial/Spiritual assessment, patient floor, Life review/legacy, Normalization of emotional/spiritual concerns, Prayer (assurance of), Prayer (actual), Alevism beliefs/image of God discussed Plan of Care: 
 
 [] Support spiritual and/or cultural needs  
 [] Support AMD and/or advance care planning process    
 [] Support grieving process 
 [] Coordinate Rites and/or Rituals  
 [] Coordination with community clergy [] No spiritual needs identified at this time 
 [] Detailed Plan of Care below (See Comments)  [] Make referral to Music Therapy [] Make referral to Pet Therapy    
[] Make referral to Addiction services 
[] Make referral to [facility] 
[] Make referral to Spiritual Care Partner 
[] No future visits requested       
[x] Follow up visits as needed Comments: Responded to request for  on 5 Med Surg. Miss Brad Huber shared the difficult diagnosis she received today. She reflected on some of her life's achievement as well as the enormous loss of her son's death last year. She spoke of her brother's challenges with the same disease prior to his death. She is not ready to die. She wants to have more time with her children and grandchildren. She also knows we are born to die. She has strong belief in God and Narendra. She is not a Zoroastrian going person but has strong reji in the healing power of Narendra. We shared about miracles coming in different ways than we expect. She is determined to try and beat this disease. We also began to have a conversation about the holy candace, [employer] and [employer].  She is comfortable trusting in Jade Magnet at this point in her life. Provided spiritual presence and prayer. Advised of  Availability Visited by: Sunil Love., MS., [address] ([id number])

## 2018-11-28 NOTE — PROGRESS NOTES
Problem: Patient Education: Go to Patient Education Activity Goal: Patient/Family Education 
physical Therapy TREATMENT Patient: Marianela Grullon (70 y.o. female) Date: 11/28/2018 Diagnosis: Lung mass Lung mass Precautions: Fall, WBAT Chart, physical therapy assessment, plan of care and goals were reviewed. ASSESSMENT: 
Pt encouraged by nurse to wok with PT. Pt to sit with mod assist.Pt progressed to CGA sitting on EOB. Pt performed LE exercise with cues. Pt declined mobilizing out of bed to chair. Pt mod assist sit to supine. Pt tolerated treatment fairly well. Continue goals. Progression toward goals: 
[]    Improving appropriately and progressing toward goals 
[]    Improving slowly and progressing toward goals 
[]    Not making progress toward goals and plan of care will be adjusted PLAN: 
Patient continues to benefit from skilled intervention to address the above impairments. Continue treatment per established plan of care. Discharge Recommendations:  Marvin Arreguin Further Equipment Recommendations for Discharge:  SNF vs home with 24 hour  assist   
 
SUBJECTIVE:  
 
 
OBJECTIVE DATA SUMMARY:  
Critical Behavior: 
Neurologic State: Confused Orientation Level: Disoriented to time, Oriented to person, Oriented to place, Oriented to situation Cognition: Follows commands Safety/Judgement: Decreased insight into deficits Functional Mobility Training: 
Bed Mobility: 
  
Supine to Sit: Moderate assistance Sit to Supine: Moderate assistance Balance: 
Sitting: High guardAmbulation/Gait Training: 
  
  
 
Therapeutic Exercises:  
Heel slides ankle pumps. Pain: 
Pain Scale 1: Numeric (0 - 10) Pain Intensity 1: 5 Pain Intervention(s) 1: Repositioned Activity Tolerance:  
Pt tolerated treatment  fairly well. Please refer to the flowsheet for vital signs taken during this treatment. After treatment:  
[]    Patient left in no apparent distress sitting up in chair [x]    Patient left in no apparent distress in bed 
[]    Call bell left within reach 
[]    Nursing notified 
[]    Caregiver present 
[]    Bed alarm activated COMMUNICATION/COLLABORATION:  
The patients plan of care was discussed with: Physical Therapist 
 
Geoff Chance PTA Time Calculation: 23 mins

## 2018-11-28 NOTE — PROGRESS NOTES
Pt has had a CT guided biopsy and final path is pending. Oncology is following Pt and devising plan moving forward. Can stop Zosyn and complete empiric course of doxy. No further pulmonary intervention at this time. We will sing off. Thank you.

## 2018-11-28 NOTE — PROGRESS NOTES
Problem: Self Care Deficits Care Plan (Adult) Goal: *Acute Goals and Plan of Care (Insert Text) Occupational Therapy Goals Initiated 11/26/2018 1. Patient will perform lower body dressing with supervision/set-up within 7 day(s). 2.  Patient will perform upper body dressing with supervision/set-up within 7 day(s). 3.  Patient will perform bathing with supervision/set-up within 7 day(s). 4.  Patient will perform toilet transfers with supervision/set-up within 7 day(s). 5.  Patient will perform all aspects of toileting with supervision/set-up within 7 day(s). 6.  Patient will utilize energy conservation techniques during functional activities with verbal cues within 7 day(s). Occupational Therapy TREATMENT Patient: Aroldo Maravilla (28 y.o. female) Date: 11/28/2018 Diagnosis: Lung mass Lung mass Precautions: Fall, WBAT Chart, occupational therapy assessment, plan of care, and goals were reviewed. ASSESSMENT: 
Patient received in bed, with HOB elevated. Patient reports feeling sad/down and reports hard to believe diagnosis. Patient declines activity reporting she sat at EOB with PT earlier, however she is complaining of pain to left shoulder at biopsy site. Noted patient low in bed. She is agreeable to allow scooting up in bed, but requires max assist to complete. Patient tearful throughout limited activity but with gentle encouragement she is agreeable to light UE exercises. Patient perform 5 reps of shoulder flexion, shoulder shrugs and elbow flexion/extension prior to arrival of meal tray. Assisted patient with set up of meal, opening containers, and cutting food. Patient with limited activity today. Will continue to follow. Progression toward goals: 
[]       Improving appropriately and progressing toward goals [x]       Improving slowly and progressing toward goals 
[]       Not making progress toward goals and plan of care will be adjusted PLAN: 
 Patient continues to benefit from skilled intervention to address the above impairments. Continue treatment per established plan of care. Discharge Recommendations:  Marvin Arreguin- patient refusing If she returns home will require 24hr assist in addition to home health Further Equipment Recommendations for Discharge:  TBD SUBJECTIVE:  
Patient stated It just takes me a while to get it together, and I am not there today.  OBJECTIVE DATA SUMMARY:  
Cognitive/Behavioral Status: 
Neurologic State: Alert Orientation Level: Disoriented to time;Oriented to person;Oriented to place;Oriented to situation Cognition: Follows commands Perception: Appears intact Perseveration: No perseveration noted Functional Mobility and Transfers for ADLs:Bed Mobility: 
Supine to Sit: Moderate assistance Sit to Supine: Moderate assistance Scooting: Maximum assistance;Assist x2 Transfers: 
  
  
  
 
Balance: 
Sitting: High guard ADL Intervention: 
Feeding Feeding Assistance: Supervision/set-up Container Management: Minimum assistance(patient requires assist to open meal containers ) Cutting Food: Minimum assistance Food to Mouth: Supervision/set-up Grooming Grooming Assistance: Supervision/set up Washing Face: Supervision/set-up Washing Hands: Supervision/set-up(with cloth in prep for meal) Neuro Re-Education: 
  
  
  
Therapeutic Exercises:  
Pain: 
Pain Scale 1: Numeric (0 - 10) Pain Intensity 1: 5 Pain Intervention(s) 1: Repositioned Activity Tolerance: VSS Please refer to the flowsheet for vital signs taken during this treatment. After treatment:  
[] Patient left in no apparent distress sitting up in chair 
[x] Patient left in no apparent distress in bed 
[x] Call bell left within reach [x] Nursing notified 
[x] Caregiver present [x] Bed alarm activated COMMUNICATION/COLLABORATION:  
The patients plan of care was discussed with: Physical Therapist and Registered Nurse Maggie Irvin, OTR/L Time Calculation: 25 mins

## 2018-11-28 NOTE — PROGRESS NOTES
[facility] [place] [address] [address], [address] W: [phone]  F: [phone] Reason for Visit:  
Arley Galeazzi is a 80 y.o. female who is seen in consultation at the request of Dr. Cornelius Almaguer for evaluation of suspected lung cancer. Hematology / Oncology Treatment History:  
none History of Present Illness: Ms Frankey Shutters was admitted on 11/24/2018 from the ED when she presented with c/o SOB. Recent discharge from [address] [place] [address] for rehab for tibial fx. HX of COPD, tobacco abuse, afib, HTN. ED CT scan shows YESIKA pulmonary mass (7.3 cm); left 4 th pos rib cortical destruction. Indeterminate Bilateral renal lesions and abd aortic aneurysm. Therefore was admitted for further eval and management. Ms Brenda Cooper is surprised by her probable cancer dx. States she has confidence in healing herself; is scared but not scared. Shares that she is not ready to lie and die and would like treatment if offered but wants to discuss it with her family. Uses O2 at home only at night. Not SOB during the day. States is up and independent at home; lives with her grandson; is unable to go up stars any more. Reports smoking hx of 50+ yrs but stopped a few months ago. Reports has just gotten out of rehab for breaking both her legs. No family at bedside. Past Medical History:  
Diagnosis Date  AAA (abdominal aortic aneurysm) ([address]  Arthritis   
 ra, osteoporosis, osteoarthritis  Atrial fibrillation ([address]  COPD   
 HTN (hypertension)  RA (rheumatoid arthritis) ([address]  Smoker  TIA (transient ischemic attack) TIA 9/30/16 Past Surgical History:  
Procedure Laterality Date  HX CHOLECYSTECTOMY  HX HYSTERECTOMY  HX ORTHOPAEDIC    
 carpal tunnel, wrist surgery Social History Tobacco Use  Smoking status: Former Smoker  Smokeless tobacco: Never Used Substance Use Topics  Alcohol use:  No  
  
 Family History Problem Relation Age of Onset  Hypertension Father Current Facility-Administered Medications Medication Dose Route Frequency  LORazepam (ATIVAN) injection 0.5 mg  0.5 mg IntraVENous ONCE  
 sodium chloride (NS) flush 5-10 mL  5-10 mL IntraVENous Q8H  
 sodium chloride (NS) flush 5-10 mL  5-10 mL IntraVENous PRN  
 acetaminophen (TYLENOL) tablet 650 mg  650 mg Oral Q4H PRN  
 ondansetron (ZOFRAN) injection 4 mg  4 mg IntraVENous Q4H PRN  piperacillin-tazobactam (ZOSYN) 3.375 g in 0.9% sodium chloride (MBP/ADV) 100 mL  3.375 g IntraVENous Q8H  
 doxycycline (VIBRAMYCIN) 100 mg in 0.9% sodium chloride (MBP/ADV) 100 mL  100 mg IntraVENous Q12H  
 budesonide (PULMICORT) 500 mcg/2 ml nebulizer suspension  500 mcg Nebulization BID RT  
 arformoterol (BROVANA) neb solution 15 mcg  15 mcg Nebulization BID RT  
 albuterol-ipratropium (DUO-NEB) 2.5 MG-0.5 MG/3 ML  3 mL Nebulization Q6H PRN  
 amLODIPine (NORVASC) tablet 5 mg  5 mg Oral DAILY  digoxin (LANOXIN) tablet 0.125 mg  0.125 mg Oral DAILY  folic acid (FOLVITE) tablet 1 mg  1 mg Oral DAILY  HYDROcodone-acetaminophen (NORCO) 7.5-325 mg per tablet 1 Tab  1 Tab Oral Q6H PRN Allergies Allergen Reactions  Codeine Nausea and Vomiting Review of Systems: A complete review of systems was obtained, negative except as described above. Physical Exam:  
 
Visit Vitals /66 (BP 1 Location: Right arm, BP Patient Position: At rest) Pulse 61 Temp 97.3 °F (36.3 °C) Resp 19 Ht 5' 7\" (1.702 m) Wt 52.6 kg (116 lb) SpO2 94% BMI 18.17 kg/m² ECOG PS: 2-3 General: elderly, cachetic; frail; No distress Eyes: PERRLA, anicteric sclerae HENT: Atraumatic with normal appearance of ears and nose; OP clear Neck: Supple; no thyromegaly Lymphatic: No cervical, supraclavicular, or axillary adenopathy Respiratory: anterior CTAB, normal respiratory effort; O2 in use CV: Normal rate, regular rhythm, no murmurs, no peripheral edema GI: Soft, nontender, nondistended, no masses, no hepatomegaly, no splenomegaly MS:  Digits without clubbing or cyanosis. Skin: No rashes, ecchymoses, or petechiae. Normal temperature, turgor, and texture. Neuro/Psych: Alert, oriented, appropriate affect, fair judgment/insight Results:  
 
Lab Results Component Value Date/Time WBC 10.4 11/26/2018 01:24 AM  
 HGB 9.0 (L) 11/26/2018 01:24 AM  
 HCT 29.4 (L) 11/26/2018 01:24 AM  
 PLATELET 678 89/27/6793 01:24 AM  
 MCV 96.1 11/26/2018 01:24 AM  
 ABS. NEUTROPHILS 7.2 11/26/2018 01:24 AM  
 
Lab Results Component Value Date/Time Sodium 139 11/25/2018 12:51 AM  
 Potassium 3.8 11/25/2018 12:51 AM  
 Chloride 104 11/25/2018 12:51 AM  
 CO2 27 11/25/2018 12:51 AM  
 Glucose 99 11/25/2018 12:51 AM  
 BUN 9 11/25/2018 12:51 AM  
 Creatinine 0.62 11/25/2018 12:51 AM  
 GFR est AA >60 11/25/2018 12:51 AM  
 GFR est non-AA >60 11/25/2018 12:51 AM  
 Calcium 8.0 (L) 11/25/2018 12:51 AM  
 Glucose (POC) 93 10/01/2016 11:33 AM  
 
Lab Results Component Value Date/Time Bilirubin, total 0.8 11/24/2018 10:50 AM  
 ALT (SGPT) 10 (L) 11/24/2018 10:50 AM  
 AST (SGOT) 15 11/24/2018 10:50 AM  
 Alk. phosphatase 62 11/24/2018 10:50 AM  
 Protein, total 6.8 11/24/2018 10:50 AM  
 Albumin 2.7 (L) 11/24/2018 10:50 AM  
 Globulin 4.1 (H) 11/24/2018 10:50 AM  
 
Lab Results Component Value Date/Time Reticulocyte count 1.3 11/28/2018 12:58 AM  
 Iron % saturation 10 (L) 11/28/2018 12:58 AM  
 TIBC 143 (L) 11/28/2018 12:58 AM  
 Ferritin 139 11/28/2018 12:58 AM  
 Vitamin B12 204 11/28/2018 12:58 AM  
 Folate 22.9 (H) 11/28/2018 12:58 AM  
 TSH 7.09 (H) 10/01/2016 07:45 AM  
 
Lab Results Component Value Date/Time INR 1.1 10/01/2016 07:45 AM  
 aPTT 31.7 10/01/2016 07:45 AM  
 
11/24/2018 CTA CHEST/ Abd / pelv  W OR W WO CONT IMPRESSION: 
 No evidence of central pulmonary embolus. Suboptimal evaluation of segmental and 
subsegmental pulmonary arteries secondary to respiratory motion artifact. 7.3 
cm left upper lobe pulmonary mass is compatible with malignancy. Associated 
cortical destruction of the left fourth posterior rib. 2.6 cm right adrenal 
nodule. Indeterminate bilateral renal lesions. 5.4 cm abdominal aortic aneurysm, 
with no evidence of rupture. Mild diffuse urinary bladder wall thickening. 
  
11/27/2018 CT guided left lung bx Final path pending; prelim adenocarcinoma Assessment and Recommendations:  
1. YESIKA pulmonary mass/ rt adrenal nodule / left 4th posterior rib Imaging is highly concerning for a primary lung cancer with invasion into the chest wall. Additionally there is an indeterminate right adrenal lesion. (11/27) Lung biopsy has been completed, prelim results are positive for adenocarcinoma, final results are pending. Dr Malu Snyder reviewed with the patient and her daughter this am likely diagnosis of lung cancer and treatment options. Ms Fauzia Velasquez would like to pursue treatment, therefore we will proceed on with additional imaging; will obtain MRI brain today and PET/CT as out patient. Remain concerned about her ability to tolerate therapy, given her age and frailty, though she reports and her daughter confirms she had a good performance status prior to admission.  
--11/27 lung bx:await final path 
--11/28 Brain MRI; pre-med ordered per pt 
--will obtain PET/CT as outpatient 
--Follow up appt made for our clinic and placed in discharge summary 2. Sepsis Blood cultures negative Abx coverage 3. Anemia Likely anemia of chronic disease Continue to monitor and transfuse for Hgb < 7 
 
 
4. COPD Pulmonary following 5. Afib On Eliquis and dig. Eliquis was held for biopsy, but could consider resuming this now that biopsy completed. 6. RA On methotrexate at home; currently on hold Pain management prn 
 
 Plan reviewed with Dr Gallo Quick Signed By: Francesco Azar, YASMANI

## 2018-11-28 NOTE — ROUTINE PROCESS
Bedside shift change report given to [facility] (oncoming nurse) by Jenny Recio (offgoing nurse). Report included the following information SBAR.

## 2018-11-28 NOTE — PROGRESS NOTES
Please complete MRI History and Safety Screening Form for this patient Using path intelligence only under Orders Requiring a Screening Form: 
Example: 
Orders Requiring a Screening Form Procedure                    Order Status                      Form Status MRI EXAM                   Active                                In Progress Click on blue hyperlink as shown above to launch MRI screening form Answer all questions completely including Weight, Surgery, and Implant History. Document MUST be \"eSigned\" using a \"Signature Pad\" by the person completing form.  (patient and RN or MD completing form with the patient) Patient cannot be scanned until this form is completed and reviewed in MRI to ensure patient is SAFE and eligible for MRI. Call MRI when this has been successfully completed at [phone]. This must be done under [facility]. Do not use the Nursing Flowsheet.

## 2018-11-29 ENCOUNTER — TELEPHONE (OUTPATIENT)
Dept: ONCOLOGY | Age: 83
End: 2018-11-29

## 2018-11-29 VITALS
RESPIRATION RATE: 16 BRPM | OXYGEN SATURATION: 98 % | DIASTOLIC BLOOD PRESSURE: 50 MMHG | SYSTOLIC BLOOD PRESSURE: 115 MMHG | HEIGHT: 67 IN | WEIGHT: 116 LBS | BODY MASS INDEX: 18.21 KG/M2 | HEART RATE: 61 BPM | TEMPERATURE: 97.9 F

## 2018-11-29 PROBLEM — E87.20 LACTIC ACID ACIDOSIS: Status: RESOLVED | Noted: 2018-11-24 | Resolved: 2018-11-29

## 2018-11-29 PROCEDURE — 74011000258 HC RX REV CODE- 258: Performed by: INTERNAL MEDICINE

## 2018-11-29 PROCEDURE — 94760 N-INVAS EAR/PLS OXIMETRY 1: CPT

## 2018-11-29 PROCEDURE — 77010033678 HC OXYGEN DAILY

## 2018-11-29 PROCEDURE — 74011250637 HC RX REV CODE- 250/637: Performed by: INTERNAL MEDICINE

## 2018-11-29 PROCEDURE — 74011250636 HC RX REV CODE- 250/636: Performed by: INTERNAL MEDICINE

## 2018-11-29 PROCEDURE — 77030038269 HC DRN EXT URIN PURWCK BARD -A

## 2018-11-29 RX ORDER — CITALOPRAM 20 MG/1
10 TABLET, FILM COATED ORAL DAILY
Status: DISCONTINUED | OUTPATIENT
Start: 2018-11-29 | End: 2018-11-29 | Stop reason: HOSPADM

## 2018-11-29 RX ORDER — CITALOPRAM 10 MG/1
10 TABLET ORAL DAILY
Qty: 30 TAB | Refills: 2 | Status: SHIPPED | OUTPATIENT
Start: 2018-11-29 | End: 2018-12-29

## 2018-11-29 RX ADMIN — HYDROCODONE BITARTRATE AND ACETAMINOPHEN 1 TABLET: 7.5; 325 TABLET ORAL at 08:18

## 2018-11-29 RX ADMIN — Medication 10 ML: at 06:00

## 2018-11-29 RX ADMIN — DOXYCYCLINE 100 MG: 100 INJECTION, POWDER, LYOPHILIZED, FOR SOLUTION INTRAVENOUS at 08:18

## 2018-11-29 RX ADMIN — CITALOPRAM HYDROBROMIDE 10 MG: 20 TABLET ORAL at 11:47

## 2018-11-29 RX ADMIN — FOLIC ACID 1 MG: 1 TABLET ORAL at 08:18

## 2018-11-29 RX ADMIN — AMLODIPINE BESYLATE 5 MG: 5 TABLET ORAL at 08:18

## 2018-11-29 RX ADMIN — APIXABAN 2.5 MG: 2.5 TABLET, FILM COATED ORAL at 08:24

## 2018-11-29 RX ADMIN — DIGOXIN 0.12 MG: 125 TABLET ORAL at 01:00

## 2018-11-29 NOTE — DISCHARGE SUMMARY
[facility] [address]  [address] [facility], [address] [place] [address]  Tel: [phone]    Physician Discharge Summary    Patient ID:    Lauren Maza  Age:              80 y.o.    : 1931  MRN:             [id number]     PCP: Zurdo Santana MD     Admit date: 2018    Discharge date: 2018    Principal admission Diagnosis:   Lung mass    Discharge Diagnoses:  Principal Problem:    Malignant neoplasm of upper lobe of left lung ([address] (2018)    COPD (chronic obstructive pulmonary disease) ([facility] (2016)    Paroxysmal A-fib ([facility] (2016)    RA (rheumatoid arthritis) ([facility] (2016)    Tobacco abuse (2018)    Hypertension (2018)    Consults: Pulmonary/Intensive care and Hematology/Oncology    Hospital Course:     Ms. Can Belle is a 80 y.o. admitted to [facility] and treated for the following:    CTA chest, abdomen - No evidence of central pulmonary embolus. Suboptimal evaluation of segmental and subsegmental pulmonary arteries secondary to respiratory motion artifact.  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 Associated cortical destruction of the left fourth posterior rib. 2.6 cm right adrenal nodule. Indeterminate bilateral renal lesions. 5.4 cm abdominal aortic aneurysm, with no evidence of rupture. Mild diffuse urinary bladder wall thickening.     Assessment and Plan:     Malignant neoplasm of upper lobe of left lung ([address] (2018) POA: 7cm x 4cm mass found on CT chest presumed to malignant. She sp a CT guided lung mass biopsy and prelim path showed squamous cell carcinoma. MRI brain showed no metastasis. Seen by Oncology who will follow her up to review final path report and make treatment recommendations.         Severe sepsis / elevated lactate: 3/4 SIRs criteria POA. Suspected due to lung mass above vs underlying infection. Resp viral panel neg. Blood cultures remain neg.  She had completed IV antibiotic course at discharge          Paroxysmal A-fib: rate well controlled currently. Continue digoxin and Eliquis.      RA (rheumatoid arthritis): resume methotrexate, pain control as needed.      COPD (chronic obstructive pulmonary disease): stable.       Hypertension: BP stable. Continue Norvasc      Cachexia POA: likely due to poor nutrition, medical illness vs suspected malignancy. Diet as tolerated     Tobacco abuse: quit after last hospital stay. Advised cessation    Discharge Exam:    Visit Vitals  /58 (BP 1 Location: Right arm, BP Patient Position: At rest)   Pulse (!) 56   Temp 97.9 °F (36.6 °C)   Resp 16   Ht 5' 7\" (1.702 m)   Wt 52.6 kg (116 lb)   SpO2 92%   BMI 18.17 kg/m²        General: weak looking but stable. In no acute distress  Pulm: clear breath sounds without wheezes  Card: no murmurs, normal S1, S2 without thrills, bruits   Abd:    soft, non-tender, normoactive bowel sounds  Skin: no rashes or ulcers, skin turgor is good  Neuro: awake, alert and has a non focal     Activity: Activity as tolerated    Diet: Regular Diet    Current Discharge Medication List      START taking these medications    Details   citalopram (CELEXA) 10 mg tablet Take 1 Tab by mouth daily for 30 days. Qty: 30 Tab, Refills: 2         CONTINUE these medications which have NOT CHANGED    Details   HYDROcodone-acetaminophen (NORCO) 7.5-325 mg per tablet Take 1 Tab by mouth every six (6) hours as needed for Pain. amLODIPine (NORVASC) 5 mg tablet Take 5 mg by mouth daily. cholecalciferol (VITAMIN D3) 50,000 unit capsule Take 50,000 Units by mouth every seven (7) days. apixaban (ELIQUIS) 2.5 mg tablet Take 1 Tab by mouth every twelve (12) hours. Qty: 60 Tab, Refills: 0      folic acid (FOLVITE) 1 mg tablet Take 1 mg by mouth daily. methotrexate (RHEUMATREX) 2.5 mg tablet Take 20 mg by mouth Every Saturday.  The patient takes 8 tablets which is 20 mg every Saturday      digoxin (LANOXIN) 0.125 mg tablet Take 0.125 mg by mouth daily. Follow-up Information     Follow up With Specialties Details Why Contact Info    Ananth Fregoso MD Internal Medicine Go on 12/4/2018 New Patient Hospital follow-up scheduled at 1:30pm ( If you have questions or need to reschedule please call [phone] [place] [address] [place] [address]  [facility] [address] [facility] [address]  [phone]      Elijah Esquivel MD Hematology and Oncology On 12/6/2018 appt at 3 pm [address]  [address] [facility]  [phone]      Dash Aguirre MD Family Practice Go on 11/29/2018 Hospital follow-up scheduled at time ( If you have questions or need to reschedule please call  [place] [address]  [phone]       [address]  This is your Home Health provider. It you do not hear from them within 24-48 hours please contact them directly. [address] [place] [address]  [phone]          Follow-up tests or labs: None    Discharge Condition: Stable    Disposition: home with home health - she declined SNF    Time taken to arrange discharge:  35 minutes.     Signed:  Kim Diaz MD     [facility]  11/29/2018   9:00 AM

## 2018-11-29 NOTE — PROGRESS NOTES
Bedside shift change report given to julia (oncoming nurse) by Soha Kohli (offgoing nurse). Report included the following information SBAR, Procedure Summary, Intake/Output, MAR, Accordion and Recent Results.

## 2018-11-29 NOTE — PROGRESS NOTES
Pharmacist Discharge Medication Reconciliation Discharge Provider:  Dr. Andrew Bloom Discharge Medications: My Medications START taking these medications Instructions Each Dose to Equal Morning Noon Evening Bedtime  
citalopram 10 mg tablet Commonly known as:  Guerda Gil Take 1 Tab by mouth daily for 30 days. 10 mg CONTINUE taking these medications Instructions Each Dose to Equal Morning Noon Evening Bedtime  
amLODIPine 5 mg tablet Commonly known as:  Achilles Bouillon Take 5 mg by mouth daily. 5 mg 
      
apixaban 2.5 mg tablet Commonly known as:  Katia Kos Take 1 Tab by mouth every twelve (12) hours. 2.5 mg 
      
cholecalciferol 50,000 unit capsule Commonly known as:  VITAMIN D3 Take 50,000 Units by mouth every seven (7) days. 95689 Units 
      
digoxin 0.125 mg tablet Commonly known as:  LANOXIN Notes to patient:  Last dose given at 1am on Thursday. Previous hospital doses given at 8pm-10pm in the evenings. Take 0.125 mg by mouth daily. 0.205 mg 
      
folic acid 1 mg tablet Commonly known as:  Romana Take 1 mg by mouth daily. 1 mg 
      
methotrexate 2.5 mg tablet Commonly known as:  Demetra Simons Notes to patient:  Take on Saturday. Take 20 mg by mouth Every Saturday. The patient takes 8 tablets which is 20 mg every Saturday 20 mg NORCO 7.5-325 mg per tablet Generic drug:  HYDROcodone-acetaminophen Take 1 Tab by mouth every six (6) hours as needed for Pain. 1 Tab Where to Get Your Medications Information on where to get these meds will be given to you by the nurse or doctor. Ask your nurse or doctor about these medications · citalopram 10 mg tablet The patient's chart, MAR, and AVS were reviewed by Kristy Ramesh, PharmD Contact: [phone]

## 2018-11-29 NOTE — TELEPHONE ENCOUNTER
[address] at [facility]  [phone]     11/29/18- PDL1- Dylan Bustillo rebobbyested from [id number]- fax confirmation delivery successful. Spoke to Fady Chen confirmed new request received and will be processed.

## 2018-11-29 NOTE — PROGRESS NOTES
1000: Patient's daughter, Jevon Patel, called to discuss transportation home for patient. Per Ms. Smita Gonzalez, she will come  patient at 1pm, her earliest availability. Patient made aware of this plan. 1300: Patient's daughter arrived. Patient cleaned and dressed, patient eating lunch. Patient's daughter and patient given discharge instructions and prescription. Patient and daughter verbalize understanding of home care, medication and follow up. PIV removed. Patient out of unit in wheelchair, daughter driving patient to home.

## 2018-11-29 NOTE — DISCHARGE INSTRUCTIONS
HOSPITALIST DISCHARGE INSTRUCTIONS    NAME:    Judy Crews   :  1931   MRN:  [id number]     Date:     2018    ADMIT DATE: 2018     DISCHARGE DATE: 2018     PRINCIPAL ADMITTING DIAGNOSIS:  Lung mass    DISCHARGE DIAGNOSES:  Principal Problem:    Malignant neoplasm of upper lobe of left lung ([facility] (2018)    Paroxysmal A-fib (HCC) (2016)    RA (rheumatoid arthritis) ([facility] (2016)    COPD (chronic obstructive pulmonary disease) ([place] [address].) (2016)    Tobacco abuse (2018)    Hypertension (2018)    MEDICATIONS:    · It is important that medications are taken exactly as they are prescribed on the discharge medication instructions and keep them your  in the bottles provided by the pharmacist.   · Keep a list of the medication names, dosages, and times to be taken at all times. · Do not take other medications without consulting your doctor. Recommended diet:  Regular Diet    Recommended activity: Activity as tolerated    Post discharge care:    Notify follow up health care provider or return to the emergency department if you cannot get hold of your doctor if you feel worse or experience symptoms similar to those that brought you to hospital    Follow-up Information     Follow up With Specialties Details Why Contact Info    Samir Gregory MD Internal Medicine Go on 2018 New Patient Hospital follow-up scheduled at 1:30pm ( If you have questions or need to reschedule please call [phone] [address] [place] [id number] [facility]  [phone]      Peggy Abrams MD Hematology and Oncology On 2018 appt at 3 pm [address]  [address] [facility]  [phone]            Information obtained by :  I understand that if any problems occur once I am at home I am to contact my physician and I understand and acknowledge receipt of the instructions indicated above. Physician's or R.N.'s Signature                                                                  Date/Time                                                                                                                                              Patient or Representative Signature                                                          Date/Time

## 2018-11-29 NOTE — PROGRESS NOTES
CM notified of discharge. Pt is to discharge home with [facility]. At this time there are no further discharge needs. Care Management Interventions PCP Verified by CM: Yes(No PCP; CM specialist notified) Transition of Care Consult (CM Consult): Home Health [address] [place] [address] Road: No 
Reason Outside [place]: Patient already serviced by other home care/hospice agency Discharge Durable Medical Equipment: No 
Physical Therapy Consult: Yes Occupational Therapy Consult: Yes Speech Therapy Consult: No 
Current Support Network: Relative's Home Confirm Follow Up Transport: Family Plan discussed with Pt/Family/Caregiver: Yes Discharge Location Discharge Placement: Home with home health([facility]) Karly 45, BS, [facility]

## 2018-11-29 NOTE — PROGRESS NOTES
[facility] [place] at [address], [address] [place] [address] W: [phone]  F: [phone] Reason for Visit:  
Karol Del Rio is a 80 y.o. female who is seen in consultation at the request of Dr. Andrew Bloom for evaluation of suspected lung cancer. Hematology / Oncology Treatment History:  
none History of Present Illness: Ms Lorin Ruiz was admitted on 11/24/2018 from the ED when she presented with c/o SOB. Recent discharge from [id number] [place] [address] for rehab for tibial fx. HX of COPD, tobacco abuse, afib, HTN. ED CT scan shows YESIKA pulmonary mass (7.3 cm); left 4 th pos rib cortical destruction. Indeterminate Bilateral renal lesions and abd aortic aneurysm. Therefore was admitted for further eval and management. Ms Galina Jeter is surprised by her probable cancer dx. States she has confidence in healing herself; is scared but not scared. Shares that she is not ready to lie and die and would like treatment if offered but wants to discuss it with her family. Uses O2 at home only at night. Not SOB during the day. States is up and independent at home; lives with her grandson; is unable to go up stars any more. Reports smoking hx of 50+ yrs but stopped a few months ago. Reports has just gotten out of rehab for breaking both her legs. No family at bedside. Interval History:  
 Pt resting; no complaints at present. Eyes closed during most of exam and discussion with daughter at bedside. Reviewed with daughter importance of making sure pt is eating and keeping as active as possible once she is home to give her the maximum performance status possible. Verbalized understanding. Reiterates that her mother wants to have treatment and would like to see her granddaughter graduate high school in 3 yrs. Past Medical History:  
Diagnosis Date  AAA (abdominal aortic aneurysm) ([address]  Arthritis   
 ra, osteoporosis, osteoarthritis  Atrial fibrillation ([facility]  COPD   
 HTN (hypertension)  RA (rheumatoid arthritis) ([facility]  Smoker  TIA (transient ischemic attack) TIA 9/30/16 Past Surgical History:  
Procedure Laterality Date  HX CHOLECYSTECTOMY  HX HYSTERECTOMY  HX ORTHOPAEDIC    
 carpal tunnel, wrist surgery Social History Tobacco Use  Smoking status: Former Smoker  Smokeless tobacco: Never Used Substance Use Topics  Alcohol use: No  
  
Family History Problem Relation Age of Onset  Hypertension Father Current Facility-Administered Medications Medication Dose Route Frequency  citalopram (CELEXA) tablet 10 mg  10 mg Oral DAILY  apixaban (ELIQUIS) tablet 2.5 mg  2.5 mg Oral Q12H  
 sodium chloride (NS) flush 5-10 mL  5-10 mL IntraVENous Q8H  
 sodium chloride (NS) flush 5-10 mL  5-10 mL IntraVENous PRN  
 acetaminophen (TYLENOL) tablet 650 mg  650 mg Oral Q4H PRN  
 ondansetron (ZOFRAN) injection 4 mg  4 mg IntraVENous Q4H PRN  
 doxycycline (VIBRAMYCIN) 100 mg in 0.9% sodium chloride (MBP/ADV) 100 mL  100 mg IntraVENous Q12H  
 budesonide (PULMICORT) 500 mcg/2 ml nebulizer suspension  500 mcg Nebulization BID RT  
 arformoterol (BROVANA) neb solution 15 mcg  15 mcg Nebulization BID RT  
 albuterol-ipratropium (DUO-NEB) 2.5 MG-0.5 MG/3 ML  3 mL Nebulization Q6H PRN  
 amLODIPine (NORVASC) tablet 5 mg  5 mg Oral DAILY  digoxin (LANOXIN) tablet 0.125 mg  0.125 mg Oral DAILY  folic acid (FOLVITE) tablet 1 mg  1 mg Oral DAILY  HYDROcodone-acetaminophen (NORCO) 7.5-325 mg per tablet 1 Tab  1 Tab Oral Q6H PRN Allergies Allergen Reactions  Codeine Nausea and Vomiting Review of Systems: A complete review of systems was obtained, negative except as described above. Physical Exam:  
 
Visit Vitals /50 (BP 1 Location: Right arm, BP Patient Position: At rest) Pulse 61 Temp 97.9 °F (36.6 °C) Resp 16 Ht 5' 7\" (1.702 m) Wt 52.6 kg (116 lb) SpO2 98% BMI 18.17 kg/m² ECOG PS: 2-3 General: elderly, cachetic; frail; No distress Eyes: PERRLA, anicteric sclerae HENT: Atraumatic with normal appearance of ears and nose; OP clear Neck: Supple; no thyromegaly Lymphatic: No cervical, supraclavicular, or axillary adenopathy Respiratory: anterior CTAB, normal respiratory effort; O2 in use CV: Normal rate, regular rhythm, no murmurs, no peripheral edema GI: Soft, nontender, nondistended, no masses, no hepatomegaly, no splenomegaly MS:  Digits without clubbing or cyanosis. Skin: No rashes, ecchymoses, or petechiae. Normal temperature, turgor, and texture. Neuro/Psych: Alert, oriented, appropriate affect, fair judgment/insight Results:  
 
Lab Results Component Value Date/Time WBC 10.4 11/26/2018 01:24 AM  
 HGB 9.0 (L) 11/26/2018 01:24 AM  
 HCT 29.4 (L) 11/26/2018 01:24 AM  
 PLATELET 119 11/26/2963 01:24 AM  
 MCV 96.1 11/26/2018 01:24 AM  
 ABS. NEUTROPHILS 7.2 11/26/2018 01:24 AM  
 
Lab Results Component Value Date/Time Sodium 139 11/25/2018 12:51 AM  
 Potassium 3.8 11/25/2018 12:51 AM  
 Chloride 104 11/25/2018 12:51 AM  
 CO2 27 11/25/2018 12:51 AM  
 Glucose 99 11/25/2018 12:51 AM  
 BUN 9 11/25/2018 12:51 AM  
 Creatinine 0.62 11/25/2018 12:51 AM  
 GFR est AA >60 11/25/2018 12:51 AM  
 GFR est non-AA >60 11/25/2018 12:51 AM  
 Calcium 8.0 (L) 11/25/2018 12:51 AM  
 Glucose (POC) 93 10/01/2016 11:33 AM  
 
Lab Results Component Value Date/Time Bilirubin, total 0.8 11/24/2018 10:50 AM  
 ALT (SGPT) 10 (L) 11/24/2018 10:50 AM  
 AST (SGOT) 15 11/24/2018 10:50 AM  
 Alk. phosphatase 62 11/24/2018 10:50 AM  
 Protein, total 6.8 11/24/2018 10:50 AM  
 Albumin 2.7 (L) 11/24/2018 10:50 AM  
 Globulin 4.1 (H) 11/24/2018 10:50 AM  
 
Lab Results Component Value Date/Time  Reticulocyte count 1.3 11/28/2018 12:58 AM  
 Iron % saturation 10 (L) 11/28/2018 12:58 AM  
 TIBC 143 (L) 11/28/2018 12:58 AM  
 Ferritin 139 11/28/2018 12:58 AM  
 Vitamin B12 204 11/28/2018 12:58 AM  
 Folate 22.9 (H) 11/28/2018 12:58 AM  
 TSH 7.09 (H) 10/01/2016 07:45 AM  
 
Lab Results Component Value Date/Time INR 1.1 10/01/2016 07:45 AM  
 aPTT 31.7 10/01/2016 07:45 AM  
 
11/24/2018 CTA CHEST/ Abd / pelv  W OR W WO CONT IMPRESSION: 
No evidence of central pulmonary embolus. Suboptimal evaluation of segmental and 
subsegmental pulmonary arteries secondary to respiratory motion artifact. 7.3 
cm left upper lobe pulmonary mass is compatible with malignancy. Associated 
cortical destruction of the left fourth posterior rib. 2.6 cm right adrenal 
nodule. Indeterminate bilateral renal lesions. 5.4 cm abdominal aortic aneurysm, 
with no evidence of rupture. Mild diffuse urinary bladder wall thickening. 
  
11/27/2018 CT guided left lung bx Final path pending; prelim path reported adenocarcinoma; however final path squamous cell Specimen Source 1: Left Lung, Core Biopsy w/Touch Preps CYTOLOGIC INTERPRETATION:  
Left Lung, Core Biopsy w/Touch Preps:  
Squamous cell carcinoma (see Comment). General Categorization Positive for malignancy. 11/28/2019 MRI BRAIN 
IMPRESSION:  
1. No evidence of acute intracranial abnormality. No evidence of intracranial metastases. Assessment and Recommendations:  
1. YESIKA pulmonary mass/ rt adrenal nodule / left 4th posterior rib Imaging is highly concerning for a primary lung cancer with invasion into the chest wall. Additionally there is an indeterminate right adrenal lesion. (11/27) Lung biopsy has been completed, prelim results are positive for adenocarcinoma, final results are pending. Dr Kevin Valadez reviewed with the patient and her daughter this am likely diagnosis of lung cancer and treatment options. Ms Donnie Padilla would like to pursue treatment, therefore we will proceed on with additional imaging; will obtain MRI brain today and PET/CT as out patient. Remain concerned about her ability to tolerate therapy, given her age and frailty, though she reports and her daughter confirms she had a good performance status prior to admission.  
--11/27 lung bx:await final path squamous cell  
--11/28 Brain MRI; no evidence of metastatic disease 
--will obtain PET/CT as outpatient 
--Follow up appt made for our clinic and placed in discharge summary 2. Sepsis Blood cultures negative Abx coverage 3. Anemia Likely anemia of chronic disease Continue to monitor and transfuse for Hgb < 7 
 
 
4. COPD Pulmonary following 5. Afib On Eliquis and dig. Eliquis was held for biopsy, but could consider resuming this now that biopsy completed. 6. RA On methotrexate at home; currently on hold Pain management prn Plan reviewed with Dr Itz Pereira Signed By: Ruthy Ayon NP

## 2018-11-30 ENCOUNTER — TELEPHONE (OUTPATIENT)
Dept: ONCOLOGY | Age: 83
End: 2018-11-30

## 2018-11-30 LAB
BACTERIA SPEC CULT: NORMAL
SERVICE CMNT-IMP: NORMAL

## 2018-11-30 NOTE — TELEPHONE ENCOUNTER
[address] at [place] [address]  [phone]    11/30/18- TCM call for hospital discharge 11/29, patient's son-in-law answered, stated he's not with patient right now, but will have her or his wife call the office back. 12:50 PM- Spoke to patient, stated she's doing well since discharge from the hospital.  She confirmed that she's getting up and moving around the house every so often to get exercise, and showered this morning which made her feel better. Patient denies SOB, wearing oxygen at night only. Reviewed that we want her to have a PET scan done Tuesday or Wednesday, then follow up in the office on 12/6 to discuss results. Patient verbalized understanding, no further questions or concerns. Message sent to [employer] requesting that they call patient's daughter, Suze Garcia, to schedule PET.

## 2018-12-01 LAB
BACTERIA SPEC CULT: NORMAL
SERVICE CMNT-IMP: NORMAL

## 2018-12-03 ENCOUNTER — TELEPHONE (OUTPATIENT)
Dept: ONCOLOGY | Age: 83
End: 2018-12-03

## 2018-12-03 PROBLEM — R65.10 SIRS (SYSTEMIC INFLAMMATORY RESPONSE SYNDROME) (HCC): Status: RESOLVED | Noted: 2018-11-24 | Resolved: 2018-12-03

## 2018-12-03 NOTE — TELEPHONE ENCOUNTER
[facility]  [phone]    12/03/18- Phone call placed to patient- VM left to return phone call. Per Minerva Bowen. NP needing to confirm she is not taking methotrexate. Waiting on returned phone from patient.

## 2018-12-03 NOTE — PROGRESS NOTES
[facility] [place] at [facility]  [facility], [address]: [phone]  F: [phone]     Reason for Visit:   Viji Bergeron is a 80 y.o. female who is seen for follow up of non small cell lung cancer. Treatment History:   · CTA Chest and CT A/P 11/24/2018:  7.3 cm left upper lobe pulmonary mass is compatible with malignancy. Associated cortical destruction of the left fourth posterior rib. 2.6 cm right adrenal nodule. Indeterminate bilateral renal lesions. 5.4 cm abdominal aortic aneurysm, with no evidence of rupture. Mild diffuse urinary bladder wall thickening. · CT guided lung biopsy 11/27/2018: squamous cell carcinoma, PDL1 5%  · MRI Brain 11/28/2018: No evidence of acute intracranial abnormality. No evidence of intracranial metastases. · PET/CT 12/4/2018: Large hypermetabolic left upper lobe mass. There is metastatic disease present. Mildly hypermetabolic likely neoplastic spiculated nodule in the right upper lobe. Subtly hypermetabolic foci of irregularity in the left chest wall particularly at the T4 level on the left, T11 and T10 abnormalities on the left as well. Possible right hilar metastatic focus. No infradiaphragmatic metastatic disease is identified. · Stage ANDREY (cT4 cN0 M1 ) Non-small cell lung cancer      History of Present Illness:   Viji Bergeron is a pleasant 80 y.o. female who presents today for hospital follow up. See my inpatient consult notes for details. Since discharge, she reports she has been gaining some weight. Appetite still not great. Having some more back pain, though pain at biopsy site is improved. She feels she is breathing ok. Has not been very active. She is accompanied by her daughter and grandson today.       Past Medical History:   Diagnosis Date    AAA (abdominal aortic aneurysm) (HCC)     Arthritis     ra, osteoporosis, osteoarthritis    Atrial fibrillation (HCC)     COPD     HTN (hypertension)  [facility]     RA (rheumatoid arthritis) (HCC)     Smoker     TIA (transient ischemic attack)     TIA 16      Past Surgical History:   Procedure Laterality Date    HX CHOLECYSTECTOMY      HX HYSTERECTOMY      HX ORTHOPAEDIC      carpal tunnel, wrist surgery      Social History     Tobacco Use    Smoking status: Former Smoker     Years: 50.00     Last attempt to quit: 2018     Years since quittin.2    Smokeless tobacco: Never Used   Substance Use Topics    Alcohol use: No      Family History   Problem Relation Age of Onset    Hypertension Father      Current Outpatient Medications   Medication Sig    HYDROcodone-acetaminophen (NORCO) 7.5-325 mg per tablet Take 1 Tab by mouth every six (6) hours as needed for Pain. Max Daily Amount: 4 Tabs.  amLODIPine (NORVASC) 5 mg tablet Take 5 mg by mouth daily.  cholecalciferol (VITAMIN D3) 50,000 unit capsule Take 50,000 Units by mouth every seven (7) days.  apixaban (ELIQUIS) 2.5 mg tablet Take 1 Tab by mouth every twelve (12) hours.  folic acid (FOLVITE) 1 mg tablet Take 1 mg by mouth daily.  digoxin (LANOXIN) 0.125 mg tablet Take 0.125 mg by mouth daily.  citalopram (CELEXA) 10 mg tablet Take 1 Tab by mouth daily for 30 days. No current facility-administered medications for this visit. Allergies   Allergen Reactions    Codeine Nausea and Vomiting        Review of Systems: A complete review of systems was obtained, negative except as described above.     Physical Exam:     Visit Vitals  /49 (BP 1 Location: Left arm, BP Patient Position: Sitting)   Pulse 76   Temp 97.1 °F (36.2 °C) (Oral)   Resp 18   SpO2 91%     ECOG PS: 3  General: No distress, frail, elderly female  Eyes: PERRLA, anicteric sclerae  HENT: Atraumatic, OP clear  Neck: Supple  Lymphatic: No cervical, supraclavicular, or inguinal adenopathy  Respiratory: CTAB, normal respiratory effort  CV: Normal rate, regular rhythm, no murmurs, no peripheral edema  GI: Soft, nontender, nondistended, no masses, no hepatomegaly, no splenomegaly  MS: Sitting in wheelchair  Skin: No rashes, ecchymoses, or petechiae. Normal temperature, turgor, and texture. Psych: Alert, oriented, appropriate affect, normal judgment/insight    Results:     Lab Results   Component Value Date/Time    WBC 8.9 12/04/2018 03:27 PM    HGB 10.5 (L) 12/04/2018 03:27 PM    HCT 33.6 (L) 12/04/2018 03:27 PM    PLATELET 311 (H) 02/53/0060 03:27 PM    MCV 94 12/04/2018 03:27 PM    ABS. NEUTROPHILS 5.0 12/04/2018 03:27 PM     Lab Results   Component Value Date/Time    Sodium 141 12/04/2018 03:27 PM    Potassium 4.9 12/04/2018 03:27 PM    Chloride 96 12/04/2018 03:27 PM    CO2 32 (H) 12/04/2018 03:27 PM    Glucose 84 12/04/2018 03:27 PM    BUN 12 12/04/2018 03:27 PM    Creatinine 0.48 (L) 12/04/2018 03:27 PM    GFR est  12/04/2018 03:27 PM    GFR est non-AA 89 12/04/2018 03:27 PM    Calcium 9.1 12/04/2018 03:27 PM    Glucose (POC) 93 10/01/2016 11:33 AM     Lab Results   Component Value Date/Time    Bilirubin, total 0.8 11/24/2018 10:50 AM    ALT (SGPT) 10 (L) 11/24/2018 10:50 AM    AST (SGOT) 15 11/24/2018 10:50 AM    Alk. phosphatase 62 11/24/2018 10:50 AM    Protein, total 6.8 11/24/2018 10:50 AM    Albumin 2.7 (L) 11/24/2018 10:50 AM    Globulin 4.1 (H) 11/24/2018 10:50 AM       Records reviewed and summarized above. Pathology report(s) reviewed above. Radiology report(s) reviewed above. Assessment:   1) Non-small cell lung cancer  Stage IV, PDL1 5%  Pathology is consistent with squamous cell carcinoma. PDL1 unfortunately with low expression. Staging studies are concerning for metastatic disease. Case reviewed at thoracic tumor board before we had the PET back, and even without the metastatic disease, thoracic surgery felt that her cancer was not resectable. We had a long talk today about her diagnosis, prognosis, and management options.   Her cancer is not curable and management is with palliative intent. We reviewed the option of supportive care alone. However, the patient very strongly desires some form of treatment for her cancer. My recommendation is for referral to radiation oncology for consideration of palliative radiation to the large mass, which is invading ribs and causing pain. Typical first line palliative systemic therapy would involved Carboplatin, Paclitaxel, and Pembrolizumab. We discussed that her performance status is quite borderline and she is not a good candidate for palliative chemotherapy at this time. However, we can regroup after radiation to reassess her performance status and systemic treatment options. Of note, she has significant RA, which could be worsened by immunotherapy. 2) Anemia of chronic disease  Monitor      3) COPD  Pulmonary following     4) Afib  On Eliquis and dig.   Cardiology following.     5) RA  On methotrexate at home; currently on hold  This will need to be held if she receives systemic therapy for her cancer      Plan:     · Referral to radiation oncology  · Referral to palliative care  ·  to meet with patient today, provide resources and counseling  · Return to see me next month      Signed By: Ayden Matias MD

## 2018-12-04 ENCOUNTER — HOSPITAL ENCOUNTER (OUTPATIENT)
Dept: PET IMAGING | Age: 83
Discharge: HOME | End: 2018-12-04
Attending: INTERNAL MEDICINE
Payer: MEDICARE

## 2018-12-04 ENCOUNTER — HOSPITAL ENCOUNTER (OUTPATIENT)
Dept: LAB | Age: 83
Discharge: HOME | End: 2018-12-04
Payer: MEDICARE

## 2018-12-04 ENCOUNTER — OFFICE VISIT (OUTPATIENT)
Dept: INTERNAL MEDICINE CLINIC | Age: 83
End: 2018-12-04

## 2018-12-04 VITALS
DIASTOLIC BLOOD PRESSURE: 60 MMHG | HEIGHT: 67 IN | HEART RATE: 77 BPM | RESPIRATION RATE: 14 BRPM | WEIGHT: 121 LBS | OXYGEN SATURATION: 88 % | BODY MASS INDEX: 18.99 KG/M2 | SYSTOLIC BLOOD PRESSURE: 104 MMHG | TEMPERATURE: 97.6 F

## 2018-12-04 VITALS — WEIGHT: 116 LBS | HEIGHT: 67 IN | BODY MASS INDEX: 18.21 KG/M2

## 2018-12-04 DIAGNOSIS — Z87.81 S/P TIBIAL FRACTURE: ICD-10-CM

## 2018-12-04 DIAGNOSIS — C34.12 MALIGNANT NEOPLASM OF UPPER LOBE OF LEFT LUNG (HCC): ICD-10-CM

## 2018-12-04 DIAGNOSIS — C34.92 NON-SMALL CELL CANCER OF LEFT LUNG (HCC): ICD-10-CM

## 2018-12-04 DIAGNOSIS — I10 HYPERTENSION, UNSPECIFIED TYPE: Chronic | ICD-10-CM

## 2018-12-04 DIAGNOSIS — I48.0 PAROXYSMAL A-FIB (HCC): Chronic | ICD-10-CM

## 2018-12-04 DIAGNOSIS — M06.9 RHEUMATOID ARTHRITIS, INVOLVING UNSPECIFIED SITE, UNSPECIFIED RHEUMATOID FACTOR PRESENCE: Chronic | ICD-10-CM

## 2018-12-04 DIAGNOSIS — Z76.89 ESTABLISHING CARE WITH NEW DOCTOR, ENCOUNTER FOR: Primary | ICD-10-CM

## 2018-12-04 DIAGNOSIS — I71.40 ABDOMINAL AORTIC ANEURYSM (AAA) WITHOUT RUPTURE: ICD-10-CM

## 2018-12-04 DIAGNOSIS — D64.9 ANEMIA, UNSPECIFIED TYPE: ICD-10-CM

## 2018-12-04 DIAGNOSIS — Z72.0 TOBACCO ABUSE: Chronic | ICD-10-CM

## 2018-12-04 DIAGNOSIS — R30.0 DYSURIA: ICD-10-CM

## 2018-12-04 LAB
BILIRUB UR QL STRIP: NORMAL
GLUCOSE UR-MCNC: NEGATIVE MG/DL
KETONES P FAST UR STRIP-MCNC: NORMAL MG/DL
PH UR STRIP: 5.5 [PH] (ref 4.6–8)
PROT UR QL STRIP: NORMAL
SP GR UR STRIP: 1.03 (ref 1–1.03)
UA UROBILINOGEN AMB POC: NORMAL (ref 0.2–1)
URINALYSIS CLARITY POC: CLEAR
URINALYSIS COLOR POC: NORMAL
URINE BLOOD POC: NEGATIVE
URINE LEUKOCYTES POC: NEGATIVE
URINE NITRITES POC: NEGATIVE

## 2018-12-04 PROCEDURE — 80048 BASIC METABOLIC PNL TOTAL CA: CPT

## 2018-12-04 PROCEDURE — A9552 F18 FDG: HCPCS

## 2018-12-04 PROCEDURE — 36415 COLL VENOUS BLD VENIPUNCTURE: CPT

## 2018-12-04 PROCEDURE — 85025 COMPLETE CBC W/AUTO DIFF WBC: CPT

## 2018-12-04 PROCEDURE — 87086 URINE CULTURE/COLONY COUNT: CPT

## 2018-12-04 RX ORDER — HYDROCODONE BITARTRATE AND ACETAMINOPHEN 7.5; 325 MG/1; MG/1
1 TABLET ORAL
Qty: 60 TAB | Refills: 0 | Status: SHIPPED | OUTPATIENT
Start: 2018-12-04 | End: 2018-12-28 | Stop reason: SDUPTHER

## 2018-12-04 RX ORDER — SODIUM CHLORIDE 0.9 % (FLUSH) 0.9 %
10 SYRINGE (ML) INJECTION
Status: COMPLETED | OUTPATIENT
Start: 2018-12-04 | End: 2018-12-04

## 2018-12-04 RX ADMIN — Medication 10 ML: at 07:55

## 2018-12-04 NOTE — PROGRESS NOTES
Rosemarie Chaudhari is a 80 y.o. female who [facility] She has a history of  
Patient Active Problem List  
Diagnosis Code  TIA (transient ischemic attack) G45.9  Slurred speech R47.81  
 Paroxysmal A-fib ([facility]) I48.0  RA (rheumatoid arthritis) ([facility]) M06.9  COPD (chronic obstructive pulmonary disease) ([facility]) J44.9  Tibial plateau fracture R89.135S  Tobacco abuse Z72.0  Hypertension I10  
 Malignant neoplasm of upper lobe of left lung ([facility]) C34.12  
 Abdominal aortic aneurysm (AAA) without rupture ([facility]) I71.4  Anemia D64.9 Alejandro Thorne Today patient is here to establish care. Previous PCP None. she is switching because establish care. Records are not available for me to review. Dysuria: She has had mild burning with urination for the last several days. No fevers chills no CVA tenderness. UA today is somewhat unremarkable other than appearance of dehydration. We will culture this to make sure. Hospitalization: NSCLCa: This was recently diagnosed after hospitalization. Patient was in the hospital from 11/24-11/29. This was due to sepsis likely secondary to lung mass. CTA showed a left upper lobe mass biopsy showed non-small cell lung cancer. CT is also showed likely adrenal met. Also showed likely involvement of rib. MRI of brain was negative for metastasis. Notes that she was seen by pulmonologist Dr. Tila West several months ago and did have a lesion in the left upper lobe that he want to repeat this month. She had a PET scan done today which has not been resulted yet and she will be meeting with the oncologist on the sixth. On imaging patient was found also have a 5.4 cm abdominal aortic aneurysm. Pain controlled with Norco.  She is currently taking this about every 6 hours. She was previously prescribed this by her rheumatologist.   verified. HH coming several times per week. She is able to get up.   Her main limitation is due to her leg pain RA: Patient has a history of rheumatoid arthritis. She has been seeing Dr Randy Srinivasan with [facility] for this. Currently off MTX to to potential need of therapy for her lung cancer Osteoporosis: discovered after bilateral tibial fracture. (nontraumatic). Afib: History of A. fib. Patient is on digoxin and Eliquis. She has been on this for some time. Eliquis started after TIA last year. Anemia: Iron levels were low during recent hospitalization. Anemia has worsened over the last 6 months. Last hemoglobin was at 9. We will repeat this today. Denies any current signs or symptoms of bleeding. HTN: She is on low-dose calcium channel blocker. Blood pressure is borderline low today. Patient denies any orthostatic type symptoms. Tobacco Abuse: Has quit. Has a >50 pack year history. Social: Was living alone, now living with [place] [id number]. 50+ years of smoking. Weight has increased. Is on first floor. No falls since going home. Patient is quite independent for age. Does get great assistance from her son. Ambulates with a walker Family History: reviewed ROS Review of Systems Constitutional: Negative for chills, fever, malaise/fatigue and weight loss. HENT: Negative for congestion, ear pain, hearing loss, nosebleeds, sinus pain and tinnitus. Eyes: Negative for blurred vision, photophobia, pain and discharge. Respiratory: Positive for shortness of breath (Shortness of breath at her baseline. ). Negative for cough. Cardiovascular: Negative for chest pain, palpitations, orthopnea and leg swelling. Gastrointestinal: Negative for abdominal pain, constipation, diarrhea, heartburn, nausea and vomiting. Genitourinary: Positive for dysuria and urgency. Negative for frequency and hematuria. Musculoskeletal: Positive for back pain and joint pain. Negative for myalgias. Falls: None recently. Skin: Negative for itching and rash. Neurological: Negative. Endo/Heme/Allergies: Does not bruise/bleed easily. Psychiatric/Behavioral: Negative for depression. The patient is not nervous/anxious. Visit Vitals /60 (BP 1 Location: Left arm, BP Patient Position: Sitting) Pulse 77 Temp 97.6 °F (36.4 °C) (Oral) Resp 14 Ht 5' 7\" (1.702 m) Wt 121 lb (54.9 kg) SpO2 (!) 88% BMI 18.95 kg/m² Physical Exam  
Constitutional: She is oriented to person, place, and time. No distress. Thin HENT:  
Head: Normocephalic and atraumatic. Mouth/Throat: No oropharyngeal exudate. Eyes: Conjunctivae are normal. Pupils are equal, round, and reactive to light. No scleral icterus. Neck: Normal range of motion. No JVD present. No thyromegaly present. Cardiovascular: Normal rate and regular rhythm. Exam reveals no gallop and no friction rub. No murmur heard. Pulmonary/Chest: Effort normal. No respiratory distress. Poor air movement. Decreased breath sounds. No egophony. No wheezing noted. Abdominal: Soft. Bowel sounds are normal. She exhibits no distension. There is no tenderness. There is no rebound and no guarding. Musculoskeletal: Normal range of motion. She exhibits no edema. Notes bilateral pain to her legs Neurological: She is alert and oriented to person, place, and time. No cranial nerve deficit. Skin: Skin is dry. No rash noted. Psychiatric: Mood, memory, affect and judgment normal.  
 
 
Current Outpatient Medications Medication Sig  
 HYDROcodone-acetaminophen (NORCO) 7.5-325 mg per tablet Take 1 Tab by mouth every six (6) hours as needed for Pain. Max Daily Amount: 4 Tabs.  citalopram (CELEXA) 10 mg tablet Take 1 Tab by mouth daily for 30 days.  amLODIPine (NORVASC) 5 mg tablet Take 5 mg by mouth daily.  cholecalciferol (VITAMIN D3) 50,000 unit capsule Take 50,000 Units by mouth every seven (7) days.  apixaban (ELIQUIS) 2.5 mg tablet Take 1 Tab by mouth every twelve (12) hours.  folic acid (FOLVITE) 1 mg tablet Take 1 mg by mouth daily.  digoxin (LANOXIN) 0.125 mg tablet Take 0.125 mg by mouth daily. No current facility-administered medications for this visit. Past Medical History:  
Diagnosis Date  [facility]  Arthritis   
 ra, osteoporosis, osteoarthritis  Atrial fibrillation ([facility]  COPD   
 HTN (hypertension)  Lung cancer ([facility]  RA (rheumatoid arthritis) ([facility]  Smoker  TIA (transient ischemic attack) TIA 16 Past Surgical History:  
Procedure Laterality Date  HX CHOLECYSTECTOMY  HX HYSTERECTOMY  HX ORTHOPAEDIC    
 carpal tunnel, wrist surgery Social History Tobacco Use  Smoking status: Former Smoker Years: 50.00 Last attempt to quit: 2018 Years since quittin.2  Smokeless tobacco: Never Used Substance Use Topics  Alcohol use: No  
  
Family History Problem Relation Age of Onset  Hypertension Father Allergies Allergen Reactions  Codeine Nausea and Vomiting Assessment/Plan Diagnoses and all orders for this visit: 1. Establishing care with new doctor, encounter for -no previous primary care provider. Will get specialist notes 2. Non-small cell cancer of left lung ([facility] -recent diagnosis. Patient with a PET scan today. Will be seeing oncology on the sixth of this month. Discussed that they will review her options for treatment. Also discussed quality of life 3. Malignant neoplasm of upper lobe of left lung ([facility] 4. Rheumatoid arthritis, involving unspecified site, unspecified rheumatoid factor presence ([facility] -currently off methotrexate. Has been on this for some time. [facility] 5. Tobacco abuse -has quit smoking. But has a very strong history of this 6. Paroxysmal A-fib ([facility]) -on Eliquis and digoxin 7. Hypertension, unspecified type -borderline low blood pressure.   Patient denies any orthostatic symptoms. Discussed monitoring this 8. Anemia, unspecified type -iron levels mildly low during the hospitalization. We will repeat hemoglobin today. No signs or symptoms of bleeding -     METABOLIC PANEL, BASIC 
-     CBC WITH AUTOMATED DIFF 9. Abdominal aortic aneurysm (AAA) without rupture ([facility] -incidental finding on imaging 10. S/p tibial fracture -still with significant pain. Will refill hydrocodone every 6 hours as needed 
-     HYDROcodone-acetaminophen (NORCO) 7.5-325 mg per tablet; Take 1 Tab by mouth every six (6) hours as needed for Pain. Max Daily Amount: 4 Tabs. 11. Dysuria -noted recently. UA unremarkable. Will send for culture. Patient informed -     AMB POC URINALYSIS DIP STICK AUTO W/O MICRO 
-     CULTURE, URINE Follow-up Disposition: Not on File Danae Joseph MD 
12/4/2018

## 2018-12-04 NOTE — PROGRESS NOTES
1. Have you been to the ER, urgent care clinic since your last visit? Hospitalized since your last visit? yes 2. Have you seen or consulted any other health care providers outside of the [address] [place] [address] since your last visit? Include any pap smears or colon screening. Gabby. [facility].

## 2018-12-05 LAB
BASOPHILS # BLD AUTO: 0.1 X10E3/UL (ref 0–0.2)
BASOPHILS NFR BLD AUTO: 1 %
BUN SERPL-MCNC: 12 MG/DL (ref 8–27)
BUN/CREAT SERPL: 25 (ref 12–28)
CALCIUM SERPL-MCNC: 9.1 MG/DL (ref 8.7–10.3)
CHLORIDE SERPL-SCNC: 96 MMOL/L (ref 96–106)
CO2 SERPL-SCNC: 32 MMOL/L (ref 20–29)
CREAT SERPL-MCNC: 0.48 MG/DL (ref 0.57–1)
EOSINOPHIL # BLD AUTO: 1 X10E3/UL (ref 0–0.4)
EOSINOPHIL NFR BLD AUTO: 11 %
ERYTHROCYTE [DISTWIDTH] IN BLOOD BY AUTOMATED COUNT: 17.1 % (ref 12.3–15.4)
GLUCOSE SERPL-MCNC: 84 MG/DL (ref 65–99)
HCT VFR BLD AUTO: 33.6 % (ref 34–46.6)
HGB BLD-MCNC: 10.5 G/DL (ref 11.1–15.9)
IMM GRANULOCYTES # BLD: 0 X10E3/UL (ref 0–0.1)
IMM GRANULOCYTES NFR BLD: 0 %
LYMPHOCYTES # BLD AUTO: 2.1 X10E3/UL (ref 0.7–3.1)
LYMPHOCYTES NFR BLD AUTO: 23 %
MCH RBC QN AUTO: 29.4 PG (ref 26.6–33)
MCHC RBC AUTO-ENTMCNC: 31.3 G/DL (ref 31.5–35.7)
MCV RBC AUTO: 94 FL (ref 79–97)
MONOCYTES # BLD AUTO: 0.7 X10E3/UL (ref 0.1–0.9)
MONOCYTES NFR BLD AUTO: 8 %
NEUTROPHILS # BLD AUTO: 5 X10E3/UL (ref 1.4–7)
NEUTROPHILS NFR BLD AUTO: 57 %
PLATELET # BLD AUTO: 543 X10E3/UL (ref 150–379)
POTASSIUM SERPL-SCNC: 4.9 MMOL/L (ref 3.5–5.2)
RBC # BLD AUTO: 3.57 X10E6/UL (ref 3.77–5.28)
SODIUM SERPL-SCNC: 141 MMOL/L (ref 134–144)
WBC # BLD AUTO: 8.9 X10E3/UL (ref 3.4–10.8)

## 2018-12-06 ENCOUNTER — DOCUMENTATION ONLY (OUTPATIENT)
Dept: ONCOLOGY | Age: 83
End: 2018-12-06

## 2018-12-06 ENCOUNTER — OFFICE VISIT (OUTPATIENT)
Dept: ONCOLOGY | Age: 83
End: 2018-12-06

## 2018-12-06 VITALS
SYSTOLIC BLOOD PRESSURE: 105 MMHG | HEART RATE: 76 BPM | RESPIRATION RATE: 18 BRPM | TEMPERATURE: 97.1 F | OXYGEN SATURATION: 91 % | DIASTOLIC BLOOD PRESSURE: 49 MMHG

## 2018-12-06 DIAGNOSIS — C34.12 MALIGNANT NEOPLASM OF UPPER LOBE OF LEFT LUNG (HCC): Primary | ICD-10-CM

## 2018-12-06 DIAGNOSIS — I48.0 PAROXYSMAL A-FIB (HCC): Chronic | ICD-10-CM

## 2018-12-06 DIAGNOSIS — J43.9 PULMONARY EMPHYSEMA, UNSPECIFIED EMPHYSEMA TYPE (HCC): Chronic | ICD-10-CM

## 2018-12-06 DIAGNOSIS — M06.9 RHEUMATOID ARTHRITIS, INVOLVING UNSPECIFIED SITE, UNSPECIFIED RHEUMATOID FACTOR PRESENCE: Chronic | ICD-10-CM

## 2018-12-06 LAB — BACTERIA UR CULT: NO GROWTH

## 2018-12-06 NOTE — PROGRESS NOTES
Please inform her and her daughter of negative urine culture.   See me of her urinary issues continue

## 2018-12-07 ENCOUNTER — TELEPHONE (OUTPATIENT)
Dept: INTERNAL MEDICINE CLINIC | Age: 83
End: 2018-12-07

## 2018-12-07 ENCOUNTER — TELEPHONE (OUTPATIENT)
Dept: ONCOLOGY | Age: 83
End: 2018-12-07

## 2018-12-07 DIAGNOSIS — M79.672 PAIN IN BOTH FEET: Primary | ICD-10-CM

## 2018-12-07 DIAGNOSIS — M79.671 PAIN IN BOTH FEET: Primary | ICD-10-CM

## 2018-12-07 RX ORDER — DICLOFENAC SODIUM 10 MG/G
2 GEL TOPICAL 4 TIMES DAILY
Qty: 100 G | Refills: 3 | Status: SHIPPED | OUTPATIENT
Start: 2018-12-07 | End: 2019-02-11

## 2018-12-07 NOTE — PROGRESS NOTES
Oncology Navigator  Psychosocial Assessment    Reason for Assessment:    []Depression  []Anxiety  []Caregiver [place]  [x]Maladaptive Coping with Serious Illness   [x]Other: new dx: lung cancer    Sources of Information:    [x]Patient  [x]Family  [x]Staff  [x]Medical Record    Advance Care Planning:  Advance Care Planning 11/26/2018   Patient's Healthcare Decision Maker is: -   Primary Decision Maker Name -   Confirm Advance Directive Yes, not on file   Does the patient have other document types -       Mental Status:    [x]Alert  []Lethargic  []Unresponsive  Oriented to:  [x]Person  [x]Place  [x]Time  [x]Situation      Barriers to Learning:    []Language  []Developmental  []Cognitive  []Altered Mental Status  []Visual/Hearing Impairment  []Unable to Read/Write  []Motivational   [x]No Barriers Identified  []Other:    Relationship Status:  [x]Single  []  []Significant Other/Life Partner  []  []  []      Living Circumstances:  []Lives Alone  [x]Family/Significant Other in Household  []Roommates  []Children in the Home  []Paid Caregivers  []Assisted Living Facility/Group Home  []Skilled [address]  []Homeless  []Incarcerated  []Environmental/Care Concerns  []Other:    Support System:    [x]Strong  []Fair  []Limited    Financial/Legal Concerns:    []Uninsured  []Limited Income/Resources  []Non-Citizen  [x]No Concerns Identified  []Financial POA:    []Other:    Oriental orthodox/Spiritual/Existential:  []Strong Sense of Spirituality  [x]Involved in [employer]  []Request  Visit  []Expressing Spiritual/Existential Angst  []No Concerns Identified    Coping with Illness:         Patient: Family/Caregiver:   Understanding and Acceptance of Illness/Prognosis  [] []   Strong Sense of Resilience [] []   Self Reflection [] []   Engaged Support System [] []   Does not Readily Discuss Illness [] []   Denial of Terminal Status [x] [x]   Anger [] []   Depression [x] []   Anxiety/Fear [x] [] Bargaining [] []   Recent Diagnosis/Prognosis [x] []   Difficulties with Body Image [] []   Loss of Identity [] []   Excessive Substance Use [] []   Mental Health History [] []   Enmeshed Relationships [] []   History of Loss [] []   Anticipatory Grief [] []   Concern for Complicated Grief [] []   Suicidal Ideation or Plan [] []   Unable to assess [] []                  Narrative: Met with patient, her daughter and grandson, to introduce social work navigator role and supports. Patient and family a tearful during the visit. Patient vocalized a strong desire to live as she has goals she still wants to accomplish. She tells me she's shocked to find out her time left on earth is limited. Her family are supportive and provide her with comfort and reassurance. Patient expressed a desire to speak with her  in the community. Family will help her arrange this. Encouraged patient to take this one day at a time. Patient lives with her grandson in his private residence. Family provide patient with transportation to appointments but tell me it can be difficult as they both work. Explored transportation resource but able to utilize more affordable options ([employer] and [facility]) until she is able to independency tranfers in/out of the car. Patient/family voiced understanding. No additional barriers identified at this times. Reviewed supportive resources including support groups, CRC, and caregiver support. Concern for maladaptive coping and adjustment to diagnosis. Patient and family voiced understanding. Encouraged them to contact me as needed. Referrals:     I.   Transportation    Medicaid ([employer]) []   ACS [facility] []                                    Regional organization  []                                      Financial Assistance/Medication Access    Patient assistance program (Care Card) []   Co-pay assistance  []                                    Leukemia & Lymphoma Society []   [address]  []   [address] []   [facility]  []     Emotional support    Peer support group [x]   Local counseling []                                    Online support group []   Coordination of psychiatry consult []     Goals/Plan:  1. Supportive counseling provided to patient and family. Encouraged patient to contact her  for spiritual support. 2. Reviewed supportive resources for caregivers  3. Explore ACP as desired by patient  4.  Ongoing psychosoical support as needed    -JU Lemus

## 2018-12-07 NOTE — TELEPHONE ENCOUNTER
[employer] at [facility]  [phone]    12/07/18- New referral placed to radiation oncology- new appointment scheduled for Tuesday 11th at 2:00pm with . Vm left for patient- waiting on returned phone call.

## 2018-12-07 NOTE — TELEPHONE ENCOUNTER
Trent Hernandez w/ [facility]  request a return call regarding is there anything patient may take topical for ankle & feet pain.  Domonique's best contact [phone]

## 2018-12-10 RX ORDER — AMLODIPINE BESYLATE 5 MG/1
5 TABLET ORAL DAILY
Qty: 30 TAB | Refills: 2 | Status: SHIPPED | OUTPATIENT
Start: 2018-12-10 | End: 2019-02-11 | Stop reason: SDUPTHER

## 2018-12-10 RX ORDER — FOLIC ACID 1 MG/1
1 TABLET ORAL DAILY
Qty: 30 TAB | Refills: 2 | Status: ON HOLD | OUTPATIENT
Start: 2018-12-10 | End: 2019-07-21

## 2018-12-10 NOTE — TELEPHONE ENCOUNTER
Regarding: Prescription Question  Contact: [phone]  ----- Message from [address] [place] [address] [address], Generic sent at 12/10/2018 11:42 AM EST -----    Dr. Beba Khuory,    We need the following prescriptions refilled:    Eliquis Tab 2.5 - need this tomorrow.   Could you call it in to [facility] - [phone]    Amlodipine Tab 5 MG  Folic Acid 1 MG    If you could fax them to [facility] at [phone] or call [phone]    Thanks,  Toshia Jay

## 2018-12-11 ENCOUNTER — HOSPITAL ENCOUNTER (OUTPATIENT)
Dept: RADIATION THERAPY | Age: 83
Discharge: HOME | End: 2018-12-11

## 2018-12-13 ENCOUNTER — TELEPHONE (OUTPATIENT)
Dept: INTERNAL MEDICINE CLINIC | Age: 83
End: 2018-12-13

## 2018-12-13 NOTE — TELEPHONE ENCOUNTER
----- Message from Liban Weller sent at 12/13/2018  3:25 PM EST -----  Regarding: Dr. Gita Correa: [phone]  Jannie Kang with [facility] is calling to report that the Rx Diclofenac could interfere with Rx Eliquis and Rx Methotrexate. She would like a call back if there will be any changes made.

## 2018-12-28 DIAGNOSIS — Z87.81 S/P TIBIAL FRACTURE: ICD-10-CM

## 2018-12-28 RX ORDER — HYDROCODONE BITARTRATE AND ACETAMINOPHEN 7.5; 325 MG/1; MG/1
1 TABLET ORAL
Qty: 60 TAB | Refills: 0 | Status: SHIPPED | OUTPATIENT
Start: 2018-12-28 | End: 2019-01-11 | Stop reason: ALTCHOICE

## 2018-12-31 ENCOUNTER — DOCUMENTATION ONLY (OUTPATIENT)
Dept: INTERNAL MEDICINE CLINIC | Age: 83
End: 2018-12-31

## 2019-01-11 ENCOUNTER — OFFICE VISIT (OUTPATIENT)
Dept: ONCOLOGY | Age: 84
End: 2019-01-11

## 2019-01-11 VITALS
DIASTOLIC BLOOD PRESSURE: 57 MMHG | OXYGEN SATURATION: 92 % | TEMPERATURE: 96.5 F | SYSTOLIC BLOOD PRESSURE: 111 MMHG | HEART RATE: 64 BPM | RESPIRATION RATE: 20 BRPM

## 2019-01-11 DIAGNOSIS — C34.12 MALIGNANT NEOPLASM OF UPPER LOBE OF LEFT LUNG (HCC): Primary | ICD-10-CM

## 2019-01-11 RX ORDER — OXYCODONE HYDROCHLORIDE 5 MG/1
5 TABLET ORAL
Qty: 60 TAB | Refills: 0 | Status: SHIPPED | OUTPATIENT
Start: 2019-01-11 | End: 2019-02-01 | Stop reason: SDUPTHER

## 2019-01-11 NOTE — PROGRESS NOTES
[facility] [place] at [facility]  [address] [facility] [address], [address]  Jimmie Croft: [phone]  F: [phone]     Reason for Visit:   Natty Etienne is a 80 y.o. female who is seen for follow up of non small cell lung cancer. Treatment History:   · CTA Chest and CT A/P 11/24/2018:  7.3 cm left upper lobe pulmonary mass is compatible with malignancy. Associated cortical destruction of the left fourth posterior rib. 2.6 cm right adrenal nodule. Indeterminate bilateral renal lesions. 5.4 cm abdominal aortic aneurysm, with no evidence of rupture. Mild diffuse urinary bladder wall thickening. · CT guided lung biopsy 11/27/2018: squamous cell carcinoma, PDL1 5%  · MRI Brain 11/28/2018: No evidence of acute intracranial abnormality. No evidence of intracranial metastases. · PET/CT 12/4/2018: Large hypermetabolic left upper lobe mass. There is metastatic disease present. Mildly hypermetabolic likely neoplastic spiculated nodule in the right upper lobe. Subtly hypermetabolic foci of irregularity in the left chest wall particularly at the T4 level on the left, T11 and T10 abnormalities on the left as well. Possible right hilar metastatic focus. No infradiaphragmatic metastatic disease is identified. · Stage ANDREY (cT4 cN0 M1 ) Non-small cell lung cancer  · Radiation to chest with Dr. Carson Myers started 12/27/2018      History of Present Illness:   She returns today for follow up. She is a few weeks into radiation with Dr. Carson Myers. She reports tolerating radiation ok, but needs xanax to get through the treatment. Having some increased pain in her shoulder. Pain medication not helping as well, taking hydrocodone every 6 hours. She has not yet made an appointment with palliative, has been rather overwhelmed. Significant fatigue, doing a bit of walking at the urging of her family, but overall quite sedentary. She reports breathing is ok, stable, but using O2 at night.     She is accompanied by her daughter today. PAST HISTORY: The following sections were reviewed and updated in the EMR as appropriate: PMH, SH, FH, Medications, Allergies. Allergies   Allergen Reactions    Codeine Nausea and Vomiting      Review of Systems: A complete review of systems was obtained, reviewed, and scanned into the EMR. Pertinent findings reviewed above. Physical Exam:     Visit Vitals  /57 (BP 1 Location: Right arm, BP Patient Position: Sitting)   Pulse 64   Temp 96.5 °F (35.8 °C) (Temporal)   Resp 20   SpO2 92%     ECOG PS: 3  General: No distress, frail, elderly female  Eyes: PERRLA, anicteric sclerae  HENT: Atraumatic, OP clear  Neck: Supple  Lymphatic: No cervical, supraclavicular, or inguinal adenopathy  Respiratory: CTAB, normal respiratory effort  CV: Normal rate, regular rhythm, no murmurs, no peripheral edema  GI: Soft, nontender, nondistended, no masses, no hepatomegaly, no splenomegaly  MS: Sitting in wheelchair  Skin: No rashes, ecchymoses, or petechiae. Normal temperature, turgor, and texture. Psych: Alert, oriented, appropriate affect, normal judgment/insight    Results:     Lab Results   Component Value Date/Time    WBC 8.9 12/04/2018 03:27 PM    HGB 10.5 (L) 12/04/2018 03:27 PM    HCT 33.6 (L) 12/04/2018 03:27 PM    PLATELET 535 (H) 75/36/9736 03:27 PM    MCV 94 12/04/2018 03:27 PM    ABS.  NEUTROPHILS 5.0 12/04/2018 03:27 PM     Lab Results   Component Value Date/Time    Sodium 141 12/04/2018 03:27 PM    Potassium 4.9 12/04/2018 03:27 PM    Chloride 96 12/04/2018 03:27 PM    CO2 32 (H) 12/04/2018 03:27 PM    Glucose 84 12/04/2018 03:27 PM    BUN 12 12/04/2018 03:27 PM    Creatinine 0.48 (L) 12/04/2018 03:27 PM    GFR est  12/04/2018 03:27 PM    GFR est non-AA 89 12/04/2018 03:27 PM    Calcium 9.1 12/04/2018 03:27 PM    Glucose (POC) 93 10/01/2016 11:33 AM     Lab Results   Component Value Date/Time    Bilirubin, total 0.8 11/24/2018 10:50 AM    ALT (SGPT) 10 (L) 11/24/2018 10:50 AM    AST (SGOT) 15 11/24/2018 10:50 AM    Alk. 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 No concurrent chemotherapy, given her poor performance status. She is still hopeful that she can get chemotherapy after radiation, but I remain concerned about her poor performance status and ongoing weight loss. Not a good candidate for therapy at this time. We will reevaluate after radiation is completed. She is now agreeable to seeing palliative care. Typical first line palliative systemic therapy would involved Carboplatin, Paclitaxel, and Pembrolizumab. If she is not a candidate for this, we could consider single agent immunotherapy, despite her low PDL1. Of note, she has significant RA, which could be worsened by immunotherapy. 2) Anemia of chronic disease  Monitor      3) COPD  Pulmonary following     4) Afib  On Eliquis and dig. Cardiology following.     5) RA  She is back on her methotrexate. May need to hold this if we start systemic therapy for her cancer. 6) Cancer pain  Worsening. Stop norco, try oxycodone. Palliative referral pending    7) Weight loss  Continues to lose weight.  Encouraged her to increase caloric intake    Plan:     · D/c norco  · Start oxycodone 5mg PRN  · Complete radiation  · Referral to palliative care pending  · Return to see me in about 5-6 weeks, after radiation completed      Signed By: Nay Hamilton MD

## 2019-01-11 NOTE — PROGRESS NOTES
Royal Rodríguez is a 80 y.o. female follow up for lung cancer. 1. Have you been to the ER, urgent care clinic since your last visit? Hospitalized since your last visit? No    2. Have you seen or consulted any other health care providers outside of the [address] [place] [address] since your last visit? Include any pap smears or colon screening. No

## 2019-01-15 ENCOUNTER — NURSE NAVIGATOR (OUTPATIENT)
Dept: PALLATIVE CARE | Age: 84
End: 2019-01-15

## 2019-01-15 NOTE — PROGRESS NOTES
[employer] Palliative Medicine Office  Nursing Note  [phone]  Fax [phone]      Name:  Kat Grover  YOB: 1931    Received outpatient Palliative Medicine referral from Dr. Leon Rodriguez to see pt for symptom management and supportive care. Chart  reviewed. Kat Grover is a 80 y.o. female with squamous cell carcinoma of YESIKA with mets to RUL. Pt's most recent office visit with Dr. Jyothi Christianson was on 1/11/19. See his note for complete HPI, treatment history, and plan. Pt started palliative radiation on 12/27/18. ACP:    None on file                                                                                                                                                       Nurse called pt to introduce Palliative Medicine services and to schedule appt.   No answer, left message.   NICOLASA MooneyN, RN-BC  Clinical Referral Navigator  [phone]

## 2019-01-29 ENCOUNTER — TELEPHONE (OUTPATIENT)
Dept: INTERNAL MEDICINE CLINIC | Age: 84
End: 2019-01-29

## 2019-01-29 ENCOUNTER — NURSE NAVIGATOR (OUTPATIENT)
Dept: PALLATIVE CARE | Age: 84
End: 2019-01-29

## 2019-01-29 NOTE — TELEPHONE ENCOUNTER
Patient's daughter states per patient's Radiation oncologist there's a spot in her ear that's of some concern states theres a possible bed sore on her ear, Patient's daughter declined the apts that are available with PCP , requesting PCP work patient in later in the day some time this week.   Yovani Aparicio can be reached at [phone]

## 2019-01-29 NOTE — PROGRESS NOTES
[facility] Palliative Medicine Office  Nursing Note  [phone]  Fax [phone]     Name:  Ricardo Flores  YOB: 1931     Pt's daughter called back to schedule Palliative Medicine appt. Appt scheduled for 1:30pm on 2/14/19.     Justice Mohr, NICOLASAN, RN  Clinical Referral Navigator

## 2019-02-01 ENCOUNTER — TELEPHONE (OUTPATIENT)
Dept: ONCOLOGY | Age: 84
End: 2019-02-01

## 2019-02-01 DIAGNOSIS — C34.12 MALIGNANT NEOPLASM OF UPPER LOBE OF LEFT LUNG (HCC): ICD-10-CM

## 2019-02-01 RX ORDER — OXYCODONE HYDROCHLORIDE 5 MG/1
5 TABLET ORAL
Qty: 90 TAB | Refills: 0 | Status: SHIPPED | OUTPATIENT
Start: 2019-02-01 | End: 2019-02-05 | Stop reason: SDUPTHER

## 2019-02-01 NOTE — TELEPHONE ENCOUNTER
Eleno Gambino called for pt stating pt is on pain meds but it is not helping with the pain and would like to know if she could be given something else.  Call back number for Eleno Gambino or Mauricio Pinon is [phone]

## 2019-02-01 NOTE — TELEPHONE ENCOUNTER
Check at [facility]  [phone]    02/01/19- Spoke to Willow Zavaleta, he stated patient's taking oxycodone 5 mg every 6 hours, but it's only controlling her pain for 2-3 hours. Okay for patient to increase frequency to take 1 tab every 3 hours as needed, per Sugey Mancia. Prescription refilled and placed at the  for Willow Zavaleta to  this afternoon. No further questions or concerns at this time.

## 2019-02-04 ENCOUNTER — TELEPHONE (OUTPATIENT)
Dept: PALLATIVE CARE | Age: 84
End: 2019-02-04

## 2019-02-04 NOTE — TELEPHONE ENCOUNTER
Called patient to advise/confirm upcoming appt with Dr. Ilana Self on  2/5/19    at 3:30pm at [facility]. No answer so left voicemail. Also advised to please bring in your Drivers License and Insurance Card with you to appointment as well as any prescription pain medication in the original container with you to appt.

## 2019-02-05 ENCOUNTER — TELEPHONE (OUTPATIENT)
Dept: PALLATIVE CARE | Age: 84
End: 2019-02-05

## 2019-02-05 ENCOUNTER — NURSE NAVIGATOR (OUTPATIENT)
Dept: PALLATIVE CARE | Age: 84
End: 2019-02-05

## 2019-02-05 ENCOUNTER — OFFICE VISIT (OUTPATIENT)
Dept: PALLATIVE CARE | Age: 84
End: 2019-02-05

## 2019-02-05 VITALS
WEIGHT: 120.4 LBS | HEIGHT: 67 IN | SYSTOLIC BLOOD PRESSURE: 108 MMHG | DIASTOLIC BLOOD PRESSURE: 60 MMHG | HEART RATE: 74 BPM | RESPIRATION RATE: 16 BRPM | BODY MASS INDEX: 18.9 KG/M2 | OXYGEN SATURATION: 93 % | TEMPERATURE: 96.9 F

## 2019-02-05 DIAGNOSIS — R41.3 MEMORY PROBLEM: ICD-10-CM

## 2019-02-05 DIAGNOSIS — F32.9 REACTIVE DEPRESSION: ICD-10-CM

## 2019-02-05 DIAGNOSIS — F41.9 ANXIETY: ICD-10-CM

## 2019-02-05 DIAGNOSIS — R53.83 OTHER FATIGUE: ICD-10-CM

## 2019-02-05 DIAGNOSIS — R07.89 LEFT-SIDED CHEST WALL PAIN: ICD-10-CM

## 2019-02-05 DIAGNOSIS — G89.29 CHRONIC PAIN OF MULTIPLE JOINTS: Primary | ICD-10-CM

## 2019-02-05 DIAGNOSIS — M25.50 CHRONIC PAIN OF MULTIPLE JOINTS: Primary | ICD-10-CM

## 2019-02-05 DIAGNOSIS — R63.0 POOR APPETITE: ICD-10-CM

## 2019-02-05 DIAGNOSIS — C34.12 MALIGNANT NEOPLASM OF UPPER LOBE OF LEFT LUNG (HCC): ICD-10-CM

## 2019-02-05 RX ORDER — OXYCODONE HYDROCHLORIDE 5 MG/1
5 TABLET ORAL
Qty: 90 TAB | Refills: 0 | Status: SHIPPED | OUTPATIENT
Start: 2019-02-12 | End: 2019-02-28 | Stop reason: SDUPTHER

## 2019-02-05 RX ORDER — AMLODIPINE BESYLATE 5 MG/1
5 TABLET ORAL DAILY
COMMUNITY
End: 2019-07-31

## 2019-02-05 RX ORDER — ALPRAZOLAM 0.25 MG/1
0.25 TABLET ORAL
COMMUNITY
End: 2019-02-05 | Stop reason: SDUPTHER

## 2019-02-05 RX ORDER — ALPRAZOLAM 0.25 MG/1
0.25 TABLET ORAL
Qty: 60 TAB | Refills: 0 | Status: SHIPPED | OUTPATIENT
Start: 2019-02-05 | End: 2019-04-30 | Stop reason: SDUPTHER

## 2019-02-05 RX ORDER — SERTRALINE HYDROCHLORIDE 25 MG/1
25 TABLET, FILM COATED ORAL DAILY
Qty: 30 TAB | Refills: 1 | Status: SHIPPED | OUTPATIENT
Start: 2019-02-05 | End: 2019-02-28

## 2019-02-05 NOTE — PROGRESS NOTES
Palliative Medicine Outpatient Services Bee: [phone] Patient Name: Laney Vela YOB: 1931 Date of Current Visit: 02/08/19 Location of Current Visit:   
[] [facility] Office [x] [facility] Office [] [address] [place] [address] 
[] Home 
[] Other:   
 
Date of Initial Visit: 2/5/19 Requesting Physician: Shanita Gregorio MD 
Primary Care Physician: Seven Orellana MD 
  
 SUMMARY:  
Laney Vela is a 80y.o. year old with stage IV non-small cell lung cancer, who was referred to Palliative Medicine by Dr. Ruel Kellogg for symptom management and supportive care. She was diagnosed in 11/2018 and is receiving radiation therapy. She is a poor candidate for systemic chemotherapy due to her performance status. Her other medical history of note includes memory problems, rheumatoid arthritis and  5.4 cm x 5.2 cm AAA The patients social history includes: she is . She has a daughter, Sindhu Powell, grandson, Ashely Lechuga, and his wife and their 1year old daughter, [place]. Palliative Medicine is addressing the following current patient/family concerns: chronic joint pain due to RA; left chest wall pain due to malignancy; anxiety; depression; fatigue; poor appetite with weight loss; memory problem. Shane Hoyt PALLIATIVE DIAGNOSES:  
 
  ICD-10-CM ICD-9-CM 1. Chronic pain of multiple joints M25.50 719.49 G89.29 338.29 2. Left-sided chest wall pain R07.89 786.52   
3. Anxiety F41.9 300.00 ALPRAZolam (XANAX) 0.25 mg tablet 4. Reactive depression F32.9 300.4 5. Other fatigue R53.83 780.79 6. Poor appetite R63.0 783.0 7. Memory problem R41.3 780.93   
8. Malignant neoplasm of upper lobe of left lung (HCC) C34.12 162.3 oxyCODONE IR (ROXICODONE) 5 mg immediate release tablet PLAN:  
Patient Instructions Dear Laney Vela , It was a pleasure seeing you today in [address] [place] [address] Dr. Christopher Jose. Your stated goal: to continue to enjoy your wonderful great-grandchildren, [place] and [facility] [place] Your described symptoms were: Fatigue: 8 Drowsiness: 8 Depression: 8 Pain: 8 Anxiety: 8 Nausea: 0 Anorexia: 8 Dyspnea: 5 Best Well-Bein Constipation: No  
Other Problem (Comment): 0 This is the plan we talked about: 1. Continue oxycodone 5-mg: take 1 to 2 tabs every 3 hours as needed. 2. Blossom Bergeron and Low Lawler will keep a diary of when you take your oxycodone and bring it to your next visit. 3. Most people who take oxycodone on a regular basis need to take a medicine to avoid constipation. 4. Take senna-docusate sodium (laxative and stool softener): 1 to 2 tabs at bedtime as needed to avoid constipation. 5. Start sertraline 25-mg daily for your depression and anxiety. This can take several weeks before you notice the full effects. 6. Continue alprazolam 0.25-mg 1 hour prior to radiation therapy. 7. You can take 1/2 alprazolam tab at bedtime to help you get to sleep. 8. We talked about eating several small meals a day, eating foods you enjoy. 9. You don't like Ensure or Boost. 
 
10. You can try using a powdered instant breakfast mix ([place] Essential Breakfast) mixed in milk, ice cream, pudding or yogurt as a source of additional calories and nutrients. 11. Thank you for sharing your story with us today. We look forward to meeting again with you and Blossom Bergeron. This is what you have shared with us about Advance Care Planning: 
 
Primary Decision [facility] ([address]):  Susi Flynn Relationship to patient:Daughter Phone [phone] [] Named in a scanned document  
[x] Legal Next of Kin 
[] Guardian 
  
Secondary Decision Maker ([address]):  
Relationship to patient: 
Phone number: 
[] Named in a scanned document  
[] Legal Next of Kin 
[] Guardian 
  
ACP documents you current have include: 
[] Advance Directive or Living Will [] Durable Do Not Resuscitate 
[] Physician Orders for Scope of Treatment (POST) [] Medical Power of  
[] Other The Palliative Medicine Team is here to support you and your family. We will see you again in 2 weeks. Sincerely, Harpal Nolan MD and the Palliative Medicine Team 
 
 
Counseling and Coordination: note routed to PCP, Dr. Georgina Bloch NP 
 
 
 GOALS OF CARE / TREATMENT PREFERENCES:  
[====Goals of Care====] GOALS OF CARE: 
Patient / health care proxy stated goals: continue cancer treatment to live as long as possible TREATMENT PREFERENCES:  
Code Status:  [x] Attempt Resuscitation       [] Do Not Attempt Resuscitation Advance Care Planning: 
[x] The [employer] Interdisciplinary Team has updated the ACP Navigator with [address] and Patient Capacity Primary Decision Maker (Health Care Agent):  Thi Ramos Relationship to patient:Daughter Phone [phone] [] Named in a scanned document  
[x] Legal Next of Kin 
[] Guardian 
  
Secondary Decision Maker ([address]):  
Relationship to patient: 
Phone number: 
[] Named in a scanned document  
[] Legal Next of Kin 
[] Guardian 
  
ACP documents you current have include: 
[] Advance Directive or Living Will 
[] Durable Do Not Resuscitate 
[] Physician Orders for Scope of Treatment (POST) [] Medical Power of  
[] Other 
  
 
Other: 
(If patient appropriate for POST, consider using PALLPOST smart phrase here) The palliative care team has discussed with patient / health care proxy about goals of care / treatment preferences for patient. 
[====Goals of Care====] PRESCRIPTIONS GIVEN:  
 
Medications Ordered Today Medications  ALPRAZolam (XANAX) 0.25 mg tablet Sig: Take 1 Tab by mouth daily as needed for Anxiety (Take 1 hour prior to radiation and 1/2 tab at bedtime). Dispense:  60 Tab Refill:  0  
 sertraline (ZOLOFT) 25 mg tablet Sig: Take 1 Tab by mouth daily. Dispense:  30 Tab Refill:  1  
 oxyCODONE IR (ROXICODONE) 5 mg immediate release tablet Sig: Take 1 Tab by mouth every three (3) hours as needed for Pain. Max Daily Amount: 40 mg. Dispense:  90 Tab Refill:  0  
  
 
 
 FOLLOW UP: Future Appointments Date Time Provider Hilario Barba 2/11/2019 11:30 AM Bonny Hall MD [address] [facility] [address]  
2/14/2019  2:45 PM Bonny Hall MD [address] [facility] [address]  
2/21/2019 11:30 AM Boris Willett MD [address]  
3/1/2019 10:30 AM [facility]  
3/13/2019  9:30 AM [facility] CT 2 [facility]  
  
 
 
 PHYSICIANS INVOLVED IN CARE:  
Patient Care Team: 
Bonny Hall MD as PCP - General (Internal Medicine) Humberto Sauceda MD (Internal Medicine) Marek Wahl MD (Hematology and Oncology) Jeri Dangelo MD (Radiation Oncology) Evaristo Bangura MD as Physician (Palliative Medicine) HISTORY:  
Nursing documentation from date of visit reviewed. Reviewed patient-completed ESAS and advance care planning form. Reviewed patient record in prescription monitoring program. 
 
CHIEF COMPLAINT: pain, not sleeping, memory problems HPI/SUBJECTIVE: The patient is: [x] Verbal / [] Nonverbal  
 
She's not doing too well. She has pain in her hands but she's had that a long time. Her shoulders, knees and ankles hurt, too. She's been taking pain medicine for a long time (Norco) which used to work, then stopped. She's now taking a new pain medicine which seems to work better. She was taking her new pain pill 3 times a day but she's had more pain over the past few days. Her daughter has been giving her oxycodone every 3 hours while she's awake. She's not sleeping well. Last night her daughter gave her 1/2 Xanax and she went right off to sleep and slept throughout the night. She's tired and she's not doing much.  She's lost a lot of weight since last summer (~15#). She's not eating much. Her daughter is trying to force her to eat which makes her mad. She just doesn't feel like eating. She eats a few bites, then feels full. She's been having some trouble with her memory. Her daughter has noticed it and she thinks so, too. She's getting radiation for her cancer. She takes a medicine (Xanax) to help her calm down so she can get her treatment. She's not ready to die. She wants to stay alive for her grandchildren. She sometimes feels short of breath, she uses oxygen at night. Clinical Pain Assessment (nonverbal scale for nonverbal patients):  
[++++ Clinical Pain Assessment++++] [++++Pain Severity++++]: Pain: 8 
[++++Pain Character++++]: dull, aching 
[++++Pain Duration++++]: years for joint pain, months for chest wall pain 
[++++Pain Effect++++]: limits activity, causes emotional distress [++++Pain Factors++++]: no identifiable provoking factors, oxycodone helps [++++Pain Frequency++++]: constant with varying intensity [++++Pain Location++++]: multiple joints, left upper chest 
[++++ Clinical Pain Assessment++++] FUNCTIONAL ASSESSMENT:  
 
Palliative Performance Scale (PPS): PPS: 70 PSYCHOSOCIAL/SPIRITUAL SCREENING:  
 
Any spiritual / Anabaptist concerns: 
[] Yes /  [x] No 
 
Caregiver Burnout: 
[] Yes /  [x] No /  [] No Caregiver Present Anticipatory grief assessment:  
[] Normal  / [] Maladaptive ESAS Anxiety: Anxiety: 8 ESAS Depression: Depression: 8 REVIEW OF SYSTEMS:  
 
The following systems were [x] reviewed / [] unable to be reviewed Systems: constitutional, ears/nose/mouth/throat, respiratory, gastrointestinal, genitourinary, musculoskeletal, integumentary, neurologic, psychiatric, endocrine. Positive findings noted below. Modified ESAS Completed by: provider Fatigue: 8 Drowsiness: 8 Depression: 8 Pain: 8 Anxiety: 8 Nausea: 0 Anorexia: 8 Dyspnea: 5 Best Well-Bein Constipation: No  
 Other Problem (Comment): 0 PHYSICAL EXAM:  
 
Wt Readings from Last 3 Encounters:  
19 120 lb 6.4 oz (54.6 kg) 18 116 lb (52.6 kg) 18 121 lb (54.9 kg) Blood pressure 108/60, pulse 74, temperature 96.9 °F (36.1 °C), temperature source Oral, resp. rate 16, height 5' 7\" (1.702 m), weight 120 lb 6.4 oz (54.6 kg), SpO2 93 %. Last bowel movement: See Nursing Note Constitutional: sitting in wheelchair, looks distressed, daughter at side Eyes: pupils equal, anicteric ENMT: no nasal discharge, moist mucous membranes Cardiovascular: regular rhythm, no peripheral edema Respiratory: breathing not labored, symmetric Gastrointestinal: soft non-tender, +bowel sounds Musculoskeletal: bilateral ulnar deviation R>L Skin: warm, dry Neurologic: alert, following commands, moving all extremities Psychiatric: anxious affect, no hallucinations Other: 
 
 
 HISTORY:  
 
Past Medical History:  
Diagnosis Date  AAA [facility]abdominal [facility]) [facility]  Arthritis   
 ra, osteoporosis, osteoarthritis  Atrial fibrillation ([facility]  COPD   
 HTN (hypertension)  Lung cancer ([facility]  RA (rheumatoid arthritis) ([facility]  Smoker  TIA (transient ischemic attack) TIA 16 Past Surgical History:  
Procedure Laterality Date  HX CHOLECYSTECTOMY  HX HYSTERECTOMY  HX ORTHOPAEDIC    
 carpal tunnel, wrist surgery Family History Problem Relation Age of Onset  Hypertension Father History reviewed, no pertinent family history. Social History Tobacco Use  Smoking status: Former Smoker Years: 50.00 Last attempt to quit: 2018 Years since quittin.4  Smokeless tobacco: Never Used Substance Use Topics  Alcohol use: No  
 
Allergies Allergen Reactions  Codeine Nausea and Vomiting Current Outpatient Medications Medication Sig  
 amLODIPine (NORVASC) 5 mg tablet Take 5 mg by mouth daily.  ALPRAZolam (XANAX) 0.25 mg tablet Take 1 Tab by mouth daily as needed for Anxiety (Take 1 hour prior to radiation and 1/2 tab at bedtime).  sertraline (ZOLOFT) 25 mg tablet Take 1 Tab by mouth daily.  [START ON 2/12/2019] oxyCODONE IR (ROXICODONE) 5 mg immediate release tablet Take 1 Tab by mouth every three (3) hours as needed for Pain. Max Daily Amount: 40 mg.  
 digoxin (LANOXIN) 0.125 mg tablet Take 0.125 mg by mouth daily.  apixaban (ELIQUIS) 2.5 mg tablet Take 1 Tab by mouth every twelve (12) hours for 30 days.  amLODIPine (NORVASC) 5 mg tablet Take 1 Tab by mouth daily for 30 days.  folic acid (FOLVITE) 1 mg tablet Take 1 Tab by mouth daily for 30 days.  diclofenac (VOLTAREN) 1 % gel Apply 2 g to affected area four (4) times daily.  cholecalciferol (VITAMIN D3) 50,000 unit capsule Take 50,000 Units by mouth every seven (7) days. No current facility-administered medications for this visit. LAB DATA REVIEWED:  
 
Lab Results Component Value Date/Time WBC 8.9 12/04/2018 03:27 PM  
 HGB 10.5 (L) 12/04/2018 03:27 PM  
 PLATELET 946 (H) 67/80/9820 03:27 PM  
 
Lab Results Component Value Date/Time Sodium 141 12/04/2018 03:27 PM  
 Potassium 4.9 12/04/2018 03:27 PM  
 Chloride 96 12/04/2018 03:27 PM  
 CO2 32 (H) 12/04/2018 03:27 PM  
 BUN 12 12/04/2018 03:27 PM  
 Creatinine 0.48 (L) 12/04/2018 03:27 PM  
 Calcium 9.1 12/04/2018 03:27 PM  
 Magnesium 1.6 08/26/2018 04:39 AM  
 Phosphorus 4.2 08/26/2018 04:39 AM  
  
Lab Results Component Value Date/Time AST (SGOT) 15 11/24/2018 10:50 AM  
 Alk. phosphatase 62 11/24/2018 10:50 AM  
 Protein, total 6.8 11/24/2018 10:50 AM  
 Albumin 2.7 (L) 11/24/2018 10:50 AM  
 Globulin 4.1 (H) 11/24/2018 10:50 AM  
 
Lab Results Component Value Date/Time INR 1.1 10/01/2016 07:45 AM  
 Prothrombin time 11.1 10/01/2016 07:45 AM  
 aPTT 31.7 10/01/2016 07:45 AM  
  
Lab Results Component Value Date/Time Iron 14 (L) 11/28/2018 12:58 AM  
 TIBC 143 (L) 11/28/2018 12:58 AM  
 Iron % saturation 10 (L) 11/28/2018 12:58 AM  
 Ferritin 139 11/28/2018 12:58 AM  
  
PET-CT 11/28/18: 
HEAD/NECK: No apparent foci of abnormal hypermetabolism. Cerebral evaluation is 
limited by normal intense activity. 
  
CHEST: Left upper lobe mass lesion measures approximately 7 cm in transverse by 
4.3 cm in AP dimension. Small SUV is 10.1 bilateral pleural effusions. There is 
a small hypermetabolic and spiculated nodule in the right upper lobe with 
maximal SUV that is difficult to quantify due to the size of lesion. Right hilar 
lymph node with a maximal SUV of 2.2. There is abnormal hypermetabolic 
appearance in the left chest wall without definitive lytic or blastic lesion 
underlying. 
  
ABDOMEN/PELVIS: Right adrenal mass lesion is not hypermetabolic. Bladder 
diverticulum. Aortic atherosclerotic change with aortic aneurysm 
  
SKELETON: Mild increased hypermetabolic characteristics in the left 10th and 
11th ribs there is mildly hypermetabolic nature to the T4 rib on the left as 
well. 
  
IMPRESSION:  
  
Large hypermetabolic left upper lobe mass. 
  
There is metastatic disease present. Mildly hypermetabolic likely neoplastic spiculated nodule in the right upper 
lobe. Subtly hypermetabolic foci of irregularity in the left chest wall particularly 
at the T4 level on the left, T11 and T10 abnormalities on the left as well. Possible right hilar metastatic focus. CT chest/abdomen/pelvis 11/24/18: 
THYROID: No nodule. MEDIASTINUM: No mass or lymphadenopathy. KATHERINE: No mass or lymphadenopathy. THORACIC AORTA: Atherosclerotic. No dissection or aneurysm. PULMONARY ARTERIES: Main pulmonary artery is normal in caliber. No evidence of 
acute pulmonary emboli through the lobar arterial level. Segmental and 
subsegmental pulmonary arteries are suboptimally evaluated secondary to history 
motion artifact. TRACHEA/BRONCHI: Patent. ESOPHAGUS: No wall thickening or dilatation. HEART: Normal in size. Moderate coronary artery calcifications. PLEURA: Small left pleural effusion. LUNGS: Bilateral dependent atelectasis. Mass in the posterior left upper lobe, 
measuring 7.3 x 4.4 cm on series 2, image 118. LIVER: No mass or biliary dilatation. GALLBLADDER: Surgically absent. SPLEEN: Contains multiple calcified granulomata. PANCREAS: No mass or ductal dilatation. ADRENALS: 2.6 x 1.5 cm indeterminate right adrenal nodule (series 2, image 29). KIDNEYS: 1.4 cm complex cyst versus solid mass in the left kidney (series 2, 
image 10). 2.1 cm complex cyst versus solid mass in the right kidney (series 2, 
image 7). Bilateral simple renal cysts. STOMACH: Unremarkable. SMALL BOWEL: No dilatation or wall thickening. COLON: No dilatation or wall thickening. APPENDIX: Not visualized. PERITONEUM: No ascites or pneumoperitoneum. RETROPERITONEUM: 5.4 x 5.2 cm proximal abdominal aortic aneurysm, with no 
evidence of rupture. REPRODUCTIVE ORGANS: Status post hysterectomy. URINARY BLADDER: Mild diffuse wall thickening. No visualized mass or calculus. BONES: Cortical erosion of the left fourth posterior rib, associated with the 
left upper lobe pulmonary mass. Chronic appearing compression deformities at T9 
and T12. IMPRESSION: 
No evidence of central pulmonary embolus. Suboptimal evaluation of segmental and 
subsegmental pulmonary arteries secondary to respiratory motion artifact. 7.3 
cm left upper lobe pulmonary mass is compatible with malignancy. Associated 
cortical destruction of the left fourth posterior rib. 2.6 cm right adrenal 
nodule. Indeterminate bilateral renal lesions. 5.4 cm abdominal aortic aneurysm, 
with no evidence of rupture.  Mild diffuse urinary bladder wall thickening. 
  
 
 CONTROLLED SUBSTANCES SAFETY ASSESSMENT (IF ON CONTROLLED SUBSTANCES):  
 
Reviewed opioid safety handout:  [] Yes   [] No 
 24 hour opioid dose >150mg morphine equivalent/day:  [] Yes   [] No 
Benzodiazepines:  [] Yes   [] No 
Sleep apnea:  [] Yes   [] No 
Urine Toxicology Testing within last 6 months:  [] Yes   [] No 
History of or new aberrant medication taking behaviors:  [] Yes   [] No 
Has Narcan been prescribed [] Yes   [] No 
 
   
 
Total time:  
Counseling / coordination time:  
> 50% counseling / coordination?:

## 2019-02-05 NOTE — PROGRESS NOTES
Palliative Medicine Office Visit Palliative Medicine Nurse Check In 
[phone] Patient Name: Williams Sanders YOB: 1931 Date of Office Visit: 2/5/2019 Patient states:  Patient has increased generalized pain. From Check In Sheet (scanned in Media): 
Has a medical provider talked with you about cardiopulmonary resuscitation (CPR)? [x] Yes   [] No   [] Unable to obtain Nurse reminder to complete or update ACP FlowSheet: 
 
Is ACP on the Problem List?    [] Yes    [x] No 
IF ACP Document is ON FILE; Nurse to place ACP on Problem List  
 
Is there an ACP Note in Chart Review/Note? [] Yes    [x] No  
If NO: ALERT PROVIDER Advance Care Planning 11/26/2018 Patient's Healthcare Decision Maker is: -  
Primary Decision Maker Name -  
Confirm Advance Directive Yes, not on file Does the patient have other document types -  
Primary Decision [facility] ([address]):  Carol Oneil Relationship to patient:Daughter Phone [phone] [] Named in a scanned document  
[x] Legal Next of Kin 
[] Guardian Secondary Decision Maker (First Alternate Health Care Agent):  
Relationship to patient: 
Phone number: 
[] Named in a scanned document  
[] Legal Next of Kin 
[] Guardian ACP documents you current have include: 
[] Advance Directive or Living Will 
[] Durable Do Not Resuscitate 
[] Physician Orders for Scope of Treatment (POST) [] Medical Power of  
[] Other Is there anything that we should know about you as a person in order to provide you the best care possible?  no 
 
Have you been to the ER, urgent care clinic since your last visit? [] Yes   [x] No   [] Unable to obtain Have you been hospitalized since your last visit? [] Yes   [x] No   [] Unable to obtain Have you seen or consulted any other health care providers outside of the [address] [place] [address] since your last visit? [] Yes   [x] No   [] Unable to obtain Functional status (describe):  
 
Last BM:  Unable to remember  accessed (date): 2/4/19 Bottle review (for opioid pain medication): Patient did not bring any medication. Medication 1:  
Date filled:  
Directions:  
# filled: # left: # pills taking per day: 
Last dose taken: 
 
Medication 2:  
Date filled:  
Directions:  
# filled: # left: # pills taking per day: 
Last dose taken: 
 
Medication 3:  
Date filled:  
Directions:  
# filled: # left: # pills taking per day: 
Last dose taken: 
 
Medication 4:  
Date filled:  
Directions:  
# filled: # left: # pills taking per day: 
Last dose taken:

## 2019-02-05 NOTE — PROGRESS NOTES
Palliative Medicine  Nursing Note  [phone])  Fax [phone]        Clinic Office Visit  Patient Name: Eulalio Jalloh  YOB: 1931 2/5/2019      Advance Care Planning 11/26/2018   Patient's Healthcare Decision Maker is: -   Primary Decision Maker Name -   Confirm Advance Directive Yes, not on file   Does the patient have other document types -     Met with patient and daughter for new outpatient office visit. Provided New Patient Welcome Packet: Includes Opioid Safety, PM brochure and flyer, Magnet with Palliative Medicine Phone number. AVS reviewed with patient, verbalized an understanding of material discussed. Instructions given to patient to call the Palliative Medicine Office at [phone] for any questions or concerns 24 hours a day, 7 days a weeks. Follow up appointment scheduled for 2 weeks. Nurse Navigator will provide a follow up phone call as needed.       Shahana Hays, BSN, RN, [facility]  Palliative Medicine

## 2019-02-05 NOTE — PATIENT INSTRUCTIONS
Dear Darnell Serrano , It was a pleasure seeing you today in [address] [facility]. Your stated goal: to continue to enjoy your wonderful great-grandchildren, [place] and [place] Your described symptoms were: Fatigue: 8 Drowsiness: 8 Depression: 8 Pain: 8 Anxiety: 8 Nausea: 0 Anorexia: 8 Dyspnea: 5 Best Well-Bein Constipation: No  
Other Problem (Comment): 0 This is the plan we talked about: 1. Continue oxycodone 5-mg: take 1 to 2 tabs every 3 hours as needed. 2. Chadd Aranda and Letha Davis will keep a diary of when you take your oxycodone and bring it to your next visit. 3. Most people who take oxycodone on a regular basis need to take a medicine to avoid constipation. 4. Take senna-docusate sodium (laxative and stool softener): 1 to 2 tabs at bedtime as needed to avoid constipation. 5. Start sertraline 25-mg daily for your depression and anxiety. This can take several weeks before you notice the full effects. 6. Continue alprazolam 0.25-mg 1 hour prior to radiation therapy. 7. You can take 1/2 alprazolam tab at bedtime to help you get to sleep. 8. We talked about eating several small meals a day, eating foods you enjoy. 9. You don't like Ensure or Boost. 
 
10. You can try using a powdered instant breakfast mix ([place] Essential Breakfast) mixed in milk, ice cream, pudding or yogurt as a source of additional calories and nutrients. 11. Thank you for sharing your story with us today. We look forward to meeting again with you and Chadd Aranda. This is what you have shared with us about Advance Care Planning: 
 
Primary Decision [facility] ([address]):  Iwona Hill Relationship to patient:Daughter Phone [phone] [] Named in a scanned document  
[x] Legal Next of Kin 
[] Guardian 
  
Secondary Decision Maker ([address]):  
Relationship to patient: 
Phone number: 
[] Named in a scanned document [] Legal Next of Kin 
[] Guardian 
  
ACP documents you current have include: 
[] Advance Directive or Living Will 
[] Durable Do Not Resuscitate 
[] Physician Orders for Scope of Treatment (POST) [] Medical Power of  
[] Other The Palliative Medicine Team is here to support you and your family. We will see you again in 2 weeks. Sincerely, Carina Dang MD and the Palliative Medicine Team

## 2019-02-08 ENCOUNTER — DOCUMENTATION ONLY (OUTPATIENT)
Dept: PALLATIVE CARE | Age: 84
End: 2019-02-08

## 2019-02-08 NOTE — PROGRESS NOTES
Palliative Medicine Outpatient Services  [phone]    Interdisciplinary Note    Palliative Medicine Outpatient Services Team Members:  Jevon Bustillo, RN; Anila Almodovar RN; Pool Gan RN; Bakari Da Silva LPN; Juan Traore LPN; MERLYN Newton; AIRAM CubaW; Yale Skiff, MD; Chantale Douglass MD; Loco Josue NP     Diagnosis: lung cancer with mets, receiving radiation possibly to get chemo being seen for symptoms management and supportive care    Current status summary: new clinic patient    Medication Management: continue oxycodone, start senokot, xanax for sleep    Last admission/ED visit: n/a    Last clinic visit: 2/5/2019    Last home visit: n/a    Action Items:   1.  Follow up in clinic 2/21/2019          Advance Care Planning:  Advance Care Planning 11/26/2018   Patient's Healthcare Decision Maker is: -   Primary Decision Maker Name -   Confirm Advance Directive Yes, not on file   Does the patient have other document types -

## 2019-02-11 ENCOUNTER — TELEPHONE (OUTPATIENT)
Dept: PALLATIVE CARE | Age: 84
End: 2019-02-11

## 2019-02-11 ENCOUNTER — OFFICE VISIT (OUTPATIENT)
Dept: INTERNAL MEDICINE CLINIC | Age: 84
End: 2019-02-11

## 2019-02-11 ENCOUNTER — HOSPITAL ENCOUNTER (OUTPATIENT)
Dept: GENERAL RADIOLOGY | Age: 84
Discharge: HOME | End: 2019-02-11
Attending: INTERNAL MEDICINE
Payer: MEDICARE

## 2019-02-11 VITALS
BODY MASS INDEX: 18.21 KG/M2 | WEIGHT: 116 LBS | HEART RATE: 73 BPM | HEIGHT: 67 IN | OXYGEN SATURATION: 92 % | SYSTOLIC BLOOD PRESSURE: 104 MMHG | RESPIRATION RATE: 16 BRPM | DIASTOLIC BLOOD PRESSURE: 52 MMHG | TEMPERATURE: 98.2 F

## 2019-02-11 DIAGNOSIS — R05.9 COUGH: Primary | ICD-10-CM

## 2019-02-11 DIAGNOSIS — L98.491 ULCER OF EXTERNAL EAR, LIMITED TO BREAKDOWN OF SKIN (HCC): ICD-10-CM

## 2019-02-11 DIAGNOSIS — F32.A DEPRESSION, UNSPECIFIED DEPRESSION TYPE: ICD-10-CM

## 2019-02-11 DIAGNOSIS — R21 RASH: ICD-10-CM

## 2019-02-11 DIAGNOSIS — R05.9 COUGH: ICD-10-CM

## 2019-02-11 PROCEDURE — 71046 X-RAY EXAM CHEST 2 VIEWS: CPT

## 2019-02-11 RX ORDER — MUPIROCIN 20 MG/G
OINTMENT TOPICAL
Refills: 0 | Status: ON HOLD | COMMUNITY
Start: 2019-01-30 | End: 2019-07-21

## 2019-02-11 RX ORDER — GUAIFENESIN 600 MG/1
600 TABLET, EXTENDED RELEASE ORAL 2 TIMES DAILY
Qty: 10 TAB | Refills: 0 | Status: SHIPPED | OUTPATIENT
Start: 2019-02-11 | End: 2019-02-16

## 2019-02-11 RX ORDER — NYSTATIN 100000 [USP'U]/G
POWDER TOPICAL
Refills: 0 | Status: ON HOLD | COMMUNITY
Start: 2019-01-30 | End: 2019-07-21

## 2019-02-11 RX ORDER — BENZONATATE 200 MG/1
200 CAPSULE ORAL
Qty: 21 CAP | Refills: 0 | Status: SHIPPED | OUTPATIENT
Start: 2019-02-11 | End: 2019-02-18

## 2019-02-11 NOTE — PROGRESS NOTES
Jose Ruano is a 80 y.o. female who presents today for Cough and Ear Injury Lavada Saucer She has a history of  
Patient Active Problem List  
Diagnosis Code  TIA (transient ischemic attack) G45.9  Slurred speech R47.81  
 Paroxysmal A-fib ([facility]) I48.0  RA (rheumatoid arthritis) ([facility]) M06.9  COPD (chronic obstructive pulmonary disease) ([facility]) J44.9  Tibial plateau fracture P46.929F  Tobacco abuse Z72.0  Hypertension I10  
 Malignant neoplasm of upper lobe of left lung ([facility]) C34.12  
 Abdominal aortic aneurysm (AAA) without rupture ([facility]) I71.4  Anemia D64.9 Today patient is here for an acute visit. Upper respiratory illness: 
Jose Ruano presents with complaints of post nasal drip, productive cough and hoarseness for 1 weeks. no nausea and no vomiting . she has not had  fever and chills. Patient notes that though she is chronically fatigued she has not any worse than normal.  
Last Friday had last XRT treatment. Symptoms are moderate. Over-the-counter remedies including Nothing. Drinking plenty of fluids: yes Asthma?:  no 
non-smoker Contacts with similar infections: no  
 
Depression has been a bit worse. Recently started on zoloft. She has resumed her methotrexate for rheumatoid arthritis She has continued small ulcers to the top part of her ears. Notes that pressure makes these worse. She is currently putting Bactroban on this. Seems that this has improved. ROS Review of Systems Constitutional: Positive for malaise/fatigue and weight loss. Negative for chills and fever. HENT: Positive for congestion. Respiratory: Positive for cough. Negative for hemoptysis, sputum production, shortness of breath and wheezing. Cardiovascular: Negative for chest pain, palpitations, orthopnea and leg swelling. Gastrointestinal: Negative for abdominal pain, nausea and vomiting. Genitourinary: Negative for dysuria, hematuria and urgency. Musculoskeletal: Positive for joint pain. Skin: Positive for rash. Neurological: Negative. Endo/Heme/Allergies: Does not bruise/bleed easily. Psychiatric/Behavioral: Negative for depression. The patient is not nervous/anxious. Visit Vitals /52 (BP 1 Location: Left arm, BP Patient Position: Sitting) Pulse 73 Temp 98.2 °F (36.8 °C) (Oral) Resp 16 Ht 5' 7\" (1.702 m) Wt 116 lb (52.6 kg) SpO2 92% BMI 18.17 kg/m² Physical Exam  
Constitutional: She is oriented to person, place, and time. She appears well-developed and well-nourished. HENT:  
Head: Normocephalic and atraumatic. Mouth/Throat: Oropharynx is clear and moist.  
Small irritated area to the top of left ear. Ulcer to mid right ear. Tympanic membranes are normal  
Neck: Normal range of motion. Neck supple. Cardiovascular: Normal rate and regular rhythm. No murmur heard. Pulmonary/Chest: Effort normal. No respiratory distress. Decreased breath sounds left upper lobe. No egophony. No wheezing. Distant breath sounds. Abdominal: Soft. Bowel sounds are normal. She exhibits no distension and no mass. There is no tenderness. There is no guarding. Lymphadenopathy:  
  She has no cervical adenopathy. Neurological: She is alert and oriented to person, place, and time. Skin: Skin is warm and dry. Dry and irritated skin to left upper chest and left upper back. Likely due to radiation Psychiatric: She has a normal mood and affect. Her behavior is normal.  
 
 
 
Current Outpatient Medications Medication Sig  
 nystatin (MYCOSTATIN) powder APPLY TO AFFECTED AREA TWICE A DAY  mupirocin (BACTROBAN) 2 % ointment APPLY A SMALL AMOUNT TO THE AFFECTED AREA THREE TIMES A DAY  amLODIPine (NORVASC) 5 mg tablet Take 5 mg by mouth daily.  ALPRAZolam (XANAX) 0.25 mg tablet Take 1 Tab by mouth daily as needed for Anxiety (Take 1 hour prior to radiation and 1/2 tab at bedtime).  [START ON 2019] oxyCODONE IR (ROXICODONE) 5 mg immediate release tablet Take 1 Tab by mouth every three (3) hours as needed for Pain. Max Daily Amount: 40 mg.  
 apixaban (ELIQUIS) 2.5 mg tablet Take 1 Tab by mouth every twelve (12) hours for 30 days.  folic acid (FOLVITE) 1 mg tablet Take 1 Tab by mouth daily for 30 days.  cholecalciferol (VITAMIN D3) 50,000 unit capsule Take 50,000 Units by mouth every seven (7) days.  digoxin (LANOXIN) 0.125 mg tablet Take 0.125 mg by mouth daily.  sertraline (ZOLOFT) 25 mg tablet Take 1 Tab by mouth daily.  diclofenac (VOLTAREN) 1 % gel Apply 2 g to affected area four (4) times daily. No current facility-administered medications for this visit. Past Medical History:  
Diagnosis Date  AAA (abdominal aortic aneurysm) [facility]  Arthritis   
 ra, osteoporosis, osteoarthritis  Atrial fibrillation ([facility]  COPD   
 HTN (hypertension)  Lung cancer ([facility]  RA (rheumatoid arthritis) ([facility]  Smoker  TIA (transient ischemic attack) TIA 16 Past Surgical History:  
Procedure Laterality Date  HX CHOLECYSTECTOMY  HX HYSTERECTOMY  HX ORTHOPAEDIC    
 carpal tunnel, wrist surgery Social History Tobacco Use  Smoking status: Former Smoker Years: 50.00 Last attempt to quit: 2018 Years since quittin.4  Smokeless tobacco: Never Used Substance Use Topics  Alcohol use: No  
  
Family History Problem Relation Age of Onset  Hypertension Father Allergies Allergen Reactions  Codeine Nausea and Vomiting Assessment/Plan Diagnoses and all orders for this visit: 1. Cough -no new infectious symptoms but has a known left upper lobe malignancy. This may be changes due to radiation. Will check chest x-ray and will have a low threshold to place on antibiotics given her immunosuppressed state. PRN Tessalon Perles and Mucinex for the coming days. -     XR CHEST PA LAT; Future 
-     guaiFENesin ER (MUCINEX) 600 mg ER tablet; Take 1 Tab by mouth two (2) times a day for 5 days. -     benzonatate (TESSALON) 200 mg capsule; Take 1 Cap by mouth three (3) times daily as needed for Cough for up to 7 days. 2. Rash -likely secondary to radiation. Continue hydrating lotions 3. Ulcer of external ear, limited to breakdown of skin (HCC) -continue Bactroban ointment 4. Depression, unspecified depression type -mood has been a bit low. Patient has been started on Zoloft. Following with palliative care for this. Follow-up Disposition: Not on File Lary Weiss MD 
2/11/2019

## 2019-02-11 NOTE — TELEPHONE ENCOUNTER
Returned call to pharmacist and provided clarification on Xanax 0.25mg prescription stating to take 1 tab daily as needed for anxiety ( take 1 hour prior to radiation and 1/2 tab at bedtime.)  Pharmacist asked if patient could take max daily dose of 1.5 tabs to assist with radiation and bedtime need. Per Dr. David Richter that patient can take max dose of 1.5 tabs/0.37mg daily as needed for anxiety. Pharmacist verbalized understanding.     Melody Barclay RN  Palliative Medicine

## 2019-02-12 ENCOUNTER — TELEPHONE (OUTPATIENT)
Dept: INTERNAL MEDICINE CLINIC | Age: 84
End: 2019-02-12

## 2019-02-12 NOTE — TELEPHONE ENCOUNTER
R/c and notified of results. Chucky Ground states Pt is feeling better and cancelled f/u on 2/14/19.

## 2019-02-12 NOTE — TELEPHONE ENCOUNTER
----- Message from Carlee Gonzalez sent at 2/12/2019  3:58 PM EST -----  Regarding: Mahsa Forrester MD/ telephone  Serene Campos Pts daughter would like a call back in reference to Pt's chest xray 2/11. Pt's daughter's contact [phone].

## 2019-02-12 NOTE — TELEPHONE ENCOUNTER
Patients daughter Kota Murphy would like a call back regarding results. Following up on nurses call from yesterday.        [phone]

## 2019-02-20 ENCOUNTER — TELEPHONE (OUTPATIENT)
Dept: PALLATIVE CARE | Age: 84
End: 2019-02-20

## 2019-02-20 NOTE — TELEPHONE ENCOUNTER
[facility] is calling to r/s patient's appt for 2/21/19 at [facility]. States she does not have her car that patient can ride in, so appt r/s to 2/28/19 at 11:30am at [facility].

## 2019-02-20 NOTE — TELEPHONE ENCOUNTER
Calling Ms. Christian Antolin to remind of patient appt with Dr. Talia Dexter for 2/21/19 at 11:30am.  Phone is not letting me leave voicemail. Calling cell number - no answer so left voicemail.

## 2019-02-20 NOTE — TELEPHONE ENCOUNTER
Called patient to advise/confirm upcoming appt with Dr. Earline Young on 2/21/19      at 11:30am at [facility]. No answer and unable to leave voicemail. Also advised to please bring in your Drivers License and Insurance Card with you to appointment as well as any prescription pain medication in the original container with you to appt.

## 2019-02-27 ENCOUNTER — TELEPHONE (OUTPATIENT)
Dept: PALLATIVE CARE | Age: 84
End: 2019-02-27

## 2019-02-27 NOTE — TELEPHONE ENCOUNTER
Called patient to advise/confirm upcoming appt with Dr. Renzo Ho on  2/28/19    at 11:30am at [facility].  Muriel Fothergill confirmed. Also advised to please bring in your Drivers License and Insurance Card with you to appointment as well as any prescription pain medication in the original container with you to appt.

## 2019-02-28 ENCOUNTER — OFFICE VISIT (OUTPATIENT)
Dept: PALLATIVE CARE | Age: 84
End: 2019-02-28

## 2019-02-28 VITALS
TEMPERATURE: 97 F | BODY MASS INDEX: 17.55 KG/M2 | HEART RATE: 96 BPM | RESPIRATION RATE: 16 BRPM | HEIGHT: 67 IN | WEIGHT: 111.8 LBS | OXYGEN SATURATION: 93 % | SYSTOLIC BLOOD PRESSURE: 130 MMHG | DIASTOLIC BLOOD PRESSURE: 64 MMHG

## 2019-02-28 DIAGNOSIS — Z71.89 GOALS OF CARE, COUNSELING/DISCUSSION: ICD-10-CM

## 2019-02-28 DIAGNOSIS — R63.0 POOR APPETITE: ICD-10-CM

## 2019-02-28 DIAGNOSIS — C34.12 MALIGNANT NEOPLASM OF UPPER LOBE OF LEFT LUNG (HCC): ICD-10-CM

## 2019-02-28 DIAGNOSIS — F41.9 ANXIETY: ICD-10-CM

## 2019-02-28 DIAGNOSIS — R41.3 MEMORY PROBLEM: ICD-10-CM

## 2019-02-28 DIAGNOSIS — R53.83 OTHER FATIGUE: ICD-10-CM

## 2019-02-28 DIAGNOSIS — M25.50 CHRONIC PAIN OF MULTIPLE JOINTS: Primary | ICD-10-CM

## 2019-02-28 DIAGNOSIS — G89.29 CHRONIC PAIN OF MULTIPLE JOINTS: Primary | ICD-10-CM

## 2019-02-28 RX ORDER — DEXAMETHASONE 2 MG/1
2 TABLET ORAL
Qty: 30 TAB | Refills: 1 | Status: SHIPPED | OUTPATIENT
Start: 2019-02-28 | End: 2019-07-13 | Stop reason: SDUPTHER

## 2019-02-28 RX ORDER — OXYCODONE HYDROCHLORIDE 5 MG/1
5 TABLET ORAL
Qty: 90 TAB | Refills: 0 | Status: SHIPPED | OUTPATIENT
Start: 2019-02-28 | End: 2019-03-26 | Stop reason: SDUPTHER

## 2019-02-28 NOTE — ACP (ADVANCE CARE PLANNING)
Advance Care Planning (ACP) Provider Conversation Snapshot    Date of ACP Conversation: 02/28/19  Persons included in Conversation:  POA  Length of ACP Conversation in minutes:  20 minutes    Authorized Decision Maker (if patient is incapable of making informed decisions): This person is: Other Legally Authorized Decision Maker (e.g. Next of Kin)      Primary Decision MakerRondal Flori Womack - [phone]        For Patients with Decision Making Capacity:   Intermittent ability to participate in complex medical decisions    Conversation Outcomes / Follow-Up Plan:   Entered DNR order (If yes, complete Durable DNR form)     I met with patient's daughter/ legal POA, Collin Worthy, to discuss goals-of-care going forward in her mother's care. Her mother has previously stated that she did not want attempts at CPR and was DNR when she was last in the hospital. DDNR was not signed then. Her mother has also stated that she wishes to receive chemotherapy but her poor performance status may preclude that being done. Her mother recently completed palliative radiation therapy. Shauna Chappell and I discussed hospice as an option to help support her mother and her family if chemotherapy is not a feasible option and will follow-up with that discussion after her mother's scans on 3/13/19.

## 2019-02-28 NOTE — PROGRESS NOTES
Palliative Medicine Outpatient Psychosocial Assessment  Carlos Manuel: [phone] ([phone])      Reason for Assessment:    []Depression  [x]Anxiety  []Caregiver [place]  []Maladaptive Coping with Serious Illness   [x]Other: Stage IV NSCLC    Sources of Information:    []Patient  []Family  []Staff  [x]Medical Record    Narrative:   SW present for Dr. Bess Martinez. Ms. Natty Etienne is an 81 y/o woman whose PMH includes: Stage IV NSCLC; COPD; Afib; RA. She lives with her grandson, Juan Pisano, his wife, and their 15 y/o and 2 y/o daughters, with one more on the way this summer. Pt's daughter, Glen Rangel ([phone]) lives nearby, works from home, and makes sure pt gets to her appointments. 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 Today, pt began by saying she doesn't understand why her appetite is off. Daughter said the family tries to get her to eat and brings her anything they think will help, but she won't eat. Daughter said the family argues with pt about eating and moving around more. \"She sleeps almost all the time. \"  Pt and daughter talked about the 30 days of radiation treatment pt had, which daughter said ended 2/8/19. Pt complained about a sore on her right ear which affects the quality of her sleep. Following the medical assessment, Dr. Janeen Bro asked to speak with pt's daughter as SW remained with pt to continue learning her history. Assessment:  1. Pt presented as talkative and saying she is beating the cancer. She lacked cognitive insight into the effects of the radiation and cancer on her appetite. Daughter also did not express this understanding, as evidenced by her comment about arguments with pt over food and moving around.   2. Pt also presented as forgetful during the conversation, not remembering she mentioned the sore on her ear several times, asking for a prescription each time, and each time reminded she already has a prescription at home.  3. While alone with pt, SW worked to establish rapport, encouraging expression while actively listening. 4. Pt enjoyed sharing life history stories about living most of her adult life in [place] until moving to [facility] in  to be near her daughter. 5. Pt said her son, Jose R Cam,  in November (which daughter later clarified as 2017), at age 54 and became tearful sharing about her son whose 24 y/o daughter still lives in [place] where he was living. SW provided grief counseling and presence for pt. 6. Assessed pt's spiritual background, learning of her [facility] background which included several pastors in the family. [place] pt is not involved with a Scientologist in [facility], but will watch services on TV. Pt said she could not have gotten through the death of her son without her reji. 7. SW acknowledged the changes pt has endured over the past several years, providing supportive counseling and listening. 6. With daughter, SW introduced Palliative Social Work as part of the PM supportive team by providing emotional support, resource referrals, advocacy, assistance with AMDs and counseling. 5. Daughter interested in learning about individuals who provide hired care rather than about agencies in order to save money. SW encouraged daughter to learn about such individuals through word of mouth. Also, SW provided one agency name, [facility], which provides good care while costing less, encouraging daughter to check them out by phone and on line.     Advance Care Planning:    Primary Decision Maker: Sky Santos - Daughter - [phone]  Advance Care Planning 2019   Patient's Healthcare Decision Maker is: Legal Next of Kin   Primary Decision Maker Name -   Confirm Advance Directive None   Patient Would Like to Complete Advance Directive Unable   Does the patient have other document types Do Not Resuscitate       Mental Status:    [x]Alert  []Lethargic  []Unresponsive  Oriented to: [x]Person  [x]Place  []Time  []Situation      Barriers to Learning:    []Language  []Developmental  [x]Cognitive  []Altered Mental Status  []Visual/Hearing Impairment  []Unable to Read/Write  []Motivational   []No Barriers Identified  []Other:    Relationship Status:  []Single  []  []Significant Other/Life Partner  [x]  []  [x]      Living Circumstances:  []Lives Alone  [x]Family/Significant Other in Household  []Roommates  []Children in the Home  []Paid Caregivers  []Assisted Living Facility/Group Home  []Skilled [address]  []Homeless  []Incarcerated  []Environmental/Care Concerns  []Transportation Issues  [] Issues  []Domestic Violence []Other:    Support System:    [x]Strong  []Fair  []Limited    Sleep Habits:   []Poor []Unsatisfactory []Satisfactory []Good [x]Very Good   Specific sleep problems?      Financial/Legal Concerns:    []Uninsured  []Limited Income/Resources  []Non-Citizen  []Utility Issues  []Food Insecurity [x]No Concerns Identified  []Financial POA:    []Other:    Yazdanism/Spiritual/Existential:  [x]Strong Sense of Spirituality  []Involved in [employer]  []Request  Visit  []Expressing Romina Ill  []No Concerns Identified    Coping with Illness:         Patient: Family/Caregiver:   Understanding and Acceptance of Illness/Prognosis  [] []   Strong Sense of Resilience [x] [x]   Self Reflection [] [x]   Engaged Support System [] [x]   Does not Readily Discuss Illness [] []   Denial of Terminal Status   []  (doesn't seem to comprehend) []   Anger [] []   Depression [x] []   Anxiety/Fear [] []   Bargaining [] []   Recent Diagnosis/Prognosis [x] []   Difficulties with Body Image [] []   Loss of Identity [] []   Excessive Substance Use [] []   Mental Health History [] []   Enmeshed Relationships [] []   History of Loss [] [x]   Anticipatory Grief [] [x]   Concern for Complicated Grief [] [x]   Suicidal Ideation or Plan [] [] Caregiver Stress [] [x]   Unable to assess [] []     Goals/Plan:   Follow up with daughter by phone re: hiring a caregiver and assess caregiver stress. Continue with psychosocial support through assessments, links with appropriate resources, and counseling.     Kelly Tamez, ISAC, LMSW, [facility]    Palliative   Respecting Choices ® ACP Facilitator  Social Work Supervisee for [employer]  Palliative Medicine  [phone]

## 2019-02-28 NOTE — PATIENT INSTRUCTIONS
Dear Kat Grover ,    It was a pleasure seeing you today in [facility]. Your stated goal: to continue to enjoy your wonderful great-grandchildren, Lesli and [facility]    Your described symptoms were: Fatigue: 8 Drowsiness: 8   Depression: 8 Pain: 7   Anxiety: 4 Nausea: 0   Anorexia: 8 Dyspnea: 0   Best Well-Bein Constipation: No   Other Problem (Comment): 0       This is the plan we talked about:    1. Continue oxycodone 5-mg: take 1 to 2 pills every 3 hours as needed. 2. You're currently taking 1 pain pill every 3 hours during the day and 2 at bedtime and this is working well to relieve your pain. 3. You stopped the sertraline because it made your appetite worse. 4. Start dexamethasone 2-mg in the morning to help with your appetite. 5. Dexamethasone may also help with your pain your energy and your mood. 6. Continue Xanax 1/2 pill at bedtime. You take a whole pill before you go for scans. 7. You sometimes get anxious during the day but not enough to take Xanax. 8. You see Dr Rodriguez tomorrow and get another scan on 3/13/19 to talk about whether you are strong enough to get chemotherapy. 9. If you are too weak to get chemotherapy, it can cause you more harm than good, causing you to feel sicker without helping the cancer. 11. We talked today about some of the things that are important to you: having your pain controlled, increasing your appetite, and having more energy. 12. Those are important things to keep in mind with any additional treatments you may get. 13. Continue senna-docusate sodium (laxative and stool softener): 1 to 2 tabs at bedtime as needed to avoid constipation.     This is what you have shared with us about Advance Care Planning:    Primary Decision Maker ([address]):  Collin Worthy  Relationship to patient:Daughter  Phone [phone]  [] Named in a scanned document   [x] Legal Next of Kin  [] Guardian     Secondary Decision Maker ([address]):   Relationship to patient:  Phone number:  [] Named in a scanned document   [] Legal Next of Kin  [] Guardian     ACP documents you current have include:  [] Advance Directive or Living Will  [] Durable Do Not Resuscitate  [] Physician Orders for Scope of Treatment (POST)  [] Medical Power of   [] Other      The Palliative Medicine Team is here to support you and your family. We will see you again in 3 to 4 weeks.     Sincerely,      Ernestina Fitzpatrick MD and the Palliative Medicine Team

## 2019-02-28 NOTE — PROGRESS NOTES
Palliative Medicine Office Visit  Palliative Medicine Nurse Check In  [phone]    Patient Name: Nae Bianchi  YOB: 1931      Date of Office Visit: 2/28/2019      Patient states: Patient C/O decreased appetite. From Check In Sheet (scanned in Media):  Has a medical provider talked with you about cardiopulmonary resuscitation (CPR)? [x] Yes   [] No   [] Unable to obtain    Nurse reminder to complete or update ACP FlowSheet:    Is ACP on the Problem List?    [] Yes    [x] No  IF ACP Document is ON FILE; Nurse to place ACP on Problem List     Is there an ACP Note in Chart Review/Note? [] Yes    [x] No   If NO: [address] Planning 11/26/2018   Patient's Healthcare Decision Maker is: -   Primary Decision Maker Name -   Confirm Advance Directive Yes, not on file   Does the patient have other document types -        Primary Decision Maker: Lennox Kelly - [place] - [phone]  Advance Care Planning 2/28/2019   Patient's [address] is: Verbal statement (Legal Next of Kin remains as decision maker)   Primary Decision Maker Name -   Confirm Advance Directive None   Patient Would Like to Complete Advance Directive No   Does the patient have other document types -         Is there anything that we should know about you as a person in order to provide you the best care possible? Have you been to the ER, urgent care clinic since your last visit? [] Yes   [x] No   [] Unable to obtain    Have you been hospitalized since your last visit? [] Yes   [x] No   [] Unable to obtain    Have you seen or consulted any other health care providers outside of the [address] [place] [address] since your last visit?    [] Yes   [x] No   [] Unable to obtain    Functional status (describe):     Last BM:2/26/19     accessed (date): 2/27/19    Bottle review (for opioid pain medication):  Medication 1: oxyCODONE IR (ROXICODONE) 5 mg immediate release tablet       Date filled: 2/13/19  Directions: Take 1 Tab by mouth every three (3) hours as needed for Pain.  Max Daily Amount: 40 mg.              # filled: 90  # left:50   # pills taking per day:6  Last dose taken:11AM    Medication 2:   Date filled:   Directions:   # filled:   # left:   # pills taking per day:  Last dose taken:    Medication 3:   Date filled:   Directions:   # filled:   # left:   # pills taking per day:  Last dose taken:    Medication 4:   Date filled:   Directions:   # filled:   # left:   # pills taking per day:  Last dose taken:

## 2019-02-28 NOTE — PROGRESS NOTES
Palliative Medicine Outpatient Services  Effie: [phone])    Patient Name: Jose Ruano  YOB: 1931    Date of Current Visit: 02/28/19  Location of Current Visit:    [] [facility] Office  [x] [facility] Office  [] [facility] Office  [] Home  [] Other:      Date of Initial Visit: 2/5/19   Requesting Physician: Lalo Rosario MD  Primary Care Physician: Ace Downing MD      SUMMARY:   Jose Ruano is a 80y.o. year old with stage IV non-small cell lung cancer, who was referred to Palliative Medicine by Dr. Wolf Schuster for symptom management and supportive care. She was diagnosed in 11/2018 and is receiving radiation therapy. She is a poor candidate for systemic chemotherapy due to her performance status. Her other medical history of note includes memory problems, rheumatoid arthritis and  5.4 cm x 5.2 cm AAA    The patients social history includes: she is . She has a daughter, Mike Fraga. She lives with grandson, Luna Mancia, and his wife and their 1year old daughter, Alex [place]. Palliative Medicine is addressing the following current patient/family concerns: chronic joint pain due to RA; left chest wall pain due to malignancy; anxiety; depression; fatigue; poor appetite with weight loss; memory problem. , family/caregiver stress. PALLIATIVE DIAGNOSES:       ICD-10-CM ICD-9-CM    1. Chronic pain of multiple joints M25.50 719.49     G89.29 338.29    2. Poor appetite R63.0 783.0    3. Anxiety F41.9 300.00    4. Goals of care, counseling/discussion Z71.89 V65.49    5. Other fatigue R53.83 780.79    6. Memory problem R41.3 780.93    7. Malignant neoplasm of upper lobe of left lung (HCC) C34.12 162.3 oxyCODONE IR (ROXICODONE) 5 mg immediate release tablet          PLAN:   Patient Instructions     Dear Jose Ruano ,    It was a pleasure seeing you today in [facility].     Your stated goal: to continue to enjoy your wonderful great-grandchildren, Lesli and [address] described symptoms were: Fatigue: 8 Drowsiness: 8   Depression: 8 Pain: 7   Anxiety: 4 Nausea: 0   Anorexia: 8 Dyspnea: 0   Best Well-Bein Constipation: No   Other Problem (Comment): 0       This is the plan we talked about:    1. Continue oxycodone 5-mg: take 1 to 2 pills every 3 hours as needed. 2. You're currently taking 1 pain pill every 3 hours during the day and 2 at bedtime and this is working well to relieve your pain. 3. You stopped the sertraline because it made your appetite worse. 4. Start dexamethasone 2-mg in the morning to help with your appetite. 5. Dexamethasone may also help with your pain your energy and your mood. 6. Continue Xanax 1/2 pill at bedtime. You take a whole pill before you go for scans. 7. You sometimes get anxious during the day but not enough to take Xanax. 8. You see Dr Rodriguez tomorrow and get another scan on 3/13/19 to talk about whether you are strong enough to get chemotherapy. 9. If you are too weak to get chemotherapy, it can cause you more harm than good, causing you to feel sicker without helping the cancer. 11. We talked today about some of the things that are important to you: having your pain controlled, increasing your appetite, and having more energy. 12. Those are important things to keep in mind with any additional treatments you may get. 13. Continue senna-docusate sodium (laxative and stool softener): 1 to 2 tabs at bedtime as needed to avoid constipation.     This is what you have shared with us about Advance Care Planning:    Primary Decision Maker ([address]):  Yaneth Paul  Relationship to patient:Daughter  Phone [phone]  [] Named in a scanned document   [x] Legal Next of Kin  [] Guardian     Secondary Decision Maker ([address]):   Relationship to patient:  Phone number:  [] Named in a scanned document   [] Legal Next of Kin  [] Guardian     ACP documents you current have include:  [] Advance Directive or Living Will  [] Durable Do Not Resuscitate  [] Physician Orders for Scope of Treatment (POST)  [] Medical Power of   [] Other      The Palliative Medicine Team is here to support you and your family. We will see you again in 3 to 4 weeks.     Sincerely,      Ernestina Fitzpatrick MD and the Palliative Medicine Team    ADDENDUM -  Please see ACP note from today for details of goals-of-care discussion with patient's daugher/legal POA, Shana Pritchett    Counseling and Coordination: note routed to PCP, Pastor Davis NP       [address] / TREATMENT PREFERENCES:   [====Goals of Care====]  GOALS OF CARE:  Patient / health care proxy stated goals: continue cancer treatment to live as long as possible      TREATMENT PREFERENCES:   Code Status:  [] Attempt Resuscitation       [x] Do Not Attempt Resuscitation    Advance Care Planning:  [x] The Pall Med Interdisciplinary Team has updated the ACP Navigator with [address] and Patient Capacity    Primary Decision Maker ([address]):  Shana Pritchett  Relationship to patient:Daughter  Phone [phone]  [] Named in a scanned document   [x] Legal Next of Kin  [] Guardian     Secondary Decision Maker ([address]):   Relationship to patient:  Phone number:  [] Named in a scanned document   [] Legal Next of Kin  [] Guardian     ACP documents you current have include:  [] Advance Directive or Living Will  [x] Durable Do Not Resuscitate  [] Physician Orders for Scope of Treatment (POST)  [] Medical Power of   [] Other       Other:  (If patient appropriate for POST, consider using PALLPOST smart phrase here)    The palliative care team has discussed with patient / health care proxy about goals of care / treatment preferences for patient.  [====Goals of Care====]         PRESCRIPTIONS GIVEN:     Medications Ordered Today   Medications    dexamethasone (DECADRON) 2 mg tablet     Sig: Take 1 Tab by mouth daily (after breakfast). Dispense:  30 Tab     Refill:  1    oxyCODONE IR (ROXICODONE) 5 mg immediate release tablet     Sig: Take 1 Tab by mouth every three (3) hours as needed for Pain for up to 30 days. Max Daily Amount: 40 mg. Dispense:  90 Tab     Refill:  0           FOLLOW UP:     Future Appointments   Date Time Provider Hilario Barba   3/1/2019 10:30 AM Raddin, Watt Lesches, MD [address] [place] [address],  [address]   3/13/2019  9:30 AM [facility]           PHYSICIANS INVOLVED IN CARE:   Patient Care Team:  Rahul Gusman MD as PCP - General (Internal Medicine)  Gage Brito MD (Internal Medicine)  Raddin, Watt Lesches, MD (Hematology and Oncology)  Aaron Robles MD (Radiation Oncology)  Ada Mora MD as Physician (Palliative Medicine)       HISTORY:   Nursing documentation from date of visit reviewed. Reviewed patient-completed ESAS and advance care planning form. Reviewed patient record in prescription monitoring program.    CHIEF COMPLAINT: pain, not sleeping, memory problems    HPI/SUBJECTIVE:    The patient is: [x] Verbal / [] Nonverbal     Her back pain is better. Her family is giving her an oxycodone every 3 hours while she's awake and 2 at bedtime with 1/2 Xanax. Her right ear feels sore from lying on her right side when she sleeps. Her PCP gave her an antibiotic cream and it's healing but it still hurts. She's not eating, really bothers her and her family. Her family buys her all kinds of foods and shakes but she just doesn't feel like eating. She's losing weight, she's not sure why. It really bothers her and her family. Her family stopped sertraline, made appetite worse    She had a cold recently and coughed up blood once. When she was a child, she was told a cold was getting better if you coughed up blood. She sleeps well at night. She would stay in the bed 23 hours if her family didn't get her up.     She feels sad when she thinks about her family and how hard they're working to take care of her. Her grandson, Kurt Street, is having a hard time with her being sick. She's finished radiation. She goes to see Dr. Ja Marx tomorrow and gets scans again in the . Clinical Pain Assessment (nonverbal scale for nonverbal patients):   [++++ Clinical Pain Assessment++++]  [++++Pain Severity++++]: Pain: 7  [++++Pain Character++++]: dull, aching  [++++Pain Duration++++]: years for joint pain, months for chest wall pain  [++++Pain Effect++++]: limits activity, causes emotional distress  [++++Pain Factors++++]: no identifiable provoking factors, oxycodone helps  [++++Pain Frequency++++]: constant with varying intensity  [++++Pain Location++++]: multiple joints, left upper chest  [++++ Clinical Pain Assessment++++]       FUNCTIONAL ASSESSMENT:     Palliative Performance Scale (PPS):  PPS: 70       PSYCHOSOCIAL/SPIRITUAL SCREENING:     Any spiritual / Church concerns:  [] Yes /  [x] No    Caregiver Burnout:  [] Yes /  [x] No /  [] No Caregiver Present      Anticipatory grief assessment:   [] Normal  / [] Maladaptive       ESAS Anxiety: Anxiety: 4    ESAS Depression: Depression: 8       REVIEW OF SYSTEMS:     The following systems were [x] reviewed / [] unable to be reviewed  Systems: constitutional, ears/nose/mouth/throat, respiratory, gastrointestinal, genitourinary, musculoskeletal, integumentary, neurologic, psychiatric, endocrine. Positive findings noted below. Modified ESAS Completed by: provider   Fatigue: 8 Drowsiness: 8   Depression: 8 Pain: 7   Anxiety: 4 Nausea: 0   Anorexia: 8 Dyspnea: 0   Best Well-Bein Constipation: No   Other Problem (Comment): 0          PHYSICAL EXAM:     Wt Readings from Last 3 Encounters:   19 111 lb 12.8 oz (50.7 kg)   19 116 lb (52.6 kg)   19 120 lb 6.4 oz (54.6 kg)     Blood pressure 130/64, pulse 96, temperature 97 °F (36.1 °C), temperature source Oral, resp.  rate 16, height 5' 7\" (1.702 m), weight 111 lb 12.8 oz (50.7 kg), SpO2 93 %. Last bowel movement: See Nursing Note    Constitutional: ill-appearing, sitting in wheelchair, daughter at side  Eyes: pupils equal, anicteric  ENMT: no nasal discharge, moist mucous membranes  Cardiovascular: regular rhythm, no peripheral edema  Respiratory: breathing not labored, symmetric  Gastrointestinal: soft non-tender, +bowel sounds  Musculoskeletal: bilateral ulnar deviation R>L; bilateral temporal muscle wasting  Skin: warm, dry  Neurologic: alert, following commands, moving all extremities  Psychiatric: anxious affect, no hallucinations  Other:       HISTORY:     Past Medical History:   Diagnosis Date    AAA (abdominal aortic aneurysm) (HCC)     Arthritis     ra, osteoporosis, osteoarthritis    Atrial fibrillation (HCC)     COPD     HTN (hypertension)     [facility]     RA [facility]umatoid arthritis[facility]     Smoker     TIA (transient ischemic attack)     TIA 16      Past Surgical History:   Procedure Laterality Date    HX CHOLECYSTECTOMY      HX HYSTERECTOMY      HX ORTHOPAEDIC      carpal tunnel, wrist surgery      Family History   Problem Relation Age of Onset    Hypertension Father       History reviewed, no pertinent family history. Social History     Tobacco Use    Smoking status: Former Smoker     Years: 50.00     Last attempt to quit: 2018     Years since quittin.5    Smokeless tobacco: Never Used   Substance Use Topics    Alcohol use: No     Allergies   Allergen Reactions    Codeine Nausea and Vomiting      Current Outpatient Medications   Medication Sig    mupirocin (BACTROBAN) 2 % ointment APPLY A SMALL AMOUNT TO THE AFFECTED AREA THREE TIMES A DAY    amLODIPine (NORVASC) 5 mg tablet Take 5 mg by mouth daily.  ALPRAZolam (XANAX) 0.25 mg tablet Take 1 Tab by mouth daily as needed for Anxiety (Take 1 hour prior to radiation and 1/2 tab at bedtime).     sertraline (ZOLOFT) 25 mg tablet Take 1 Tab by mouth daily.  oxyCODONE IR (ROXICODONE) 5 mg immediate release tablet Take 1 Tab by mouth every three (3) hours as needed for Pain. Max Daily Amount: 40 mg.    digoxin (LANOXIN) 0.125 mg tablet Take 0.125 mg by mouth daily.  nystatin (MYCOSTATIN) powder APPLY TO AFFECTED AREA TWICE A DAY    apixaban (ELIQUIS) 2.5 mg tablet Take 1 Tab by mouth every twelve (12) hours for 30 days.  folic acid (FOLVITE) 1 mg tablet Take 1 Tab by mouth daily for 30 days.  cholecalciferol (VITAMIN D3) 50,000 unit capsule Take 50,000 Units by mouth every seven (7) days. No current facility-administered medications for this visit. LAB DATA REVIEWED:     Lab Results   Component Value Date/Time    WBC 8.9 12/04/2018 03:27 PM    HGB 10.5 (L) 12/04/2018 03:27 PM    PLATELET 436 (H) 42/76/8543 03:27 PM     Lab Results   Component Value Date/Time    Sodium 141 12/04/2018 03:27 PM    Potassium 4.9 12/04/2018 03:27 PM    Chloride 96 12/04/2018 03:27 PM    CO2 32 (H) 12/04/2018 03:27 PM    BUN 12 12/04/2018 03:27 PM    Creatinine 0.48 (L) 12/04/2018 03:27 PM    Calcium 9.1 12/04/2018 03:27 PM    Magnesium 1.6 08/26/2018 04:39 AM    Phosphorus 4.2 08/26/2018 04:39 AM      Lab Results   Component Value Date/Time    AST (SGOT) 15 11/24/2018 10:50 AM    Alk. phosphatase 62 11/24/2018 10:50 AM    Protein, total 6.8 11/24/2018 10:50 AM    Albumin 2.7 (L) 11/24/2018 10:50 AM    Globulin 4.1 (H) 11/24/2018 10:50 AM     Lab Results   Component Value Date/Time    INR 1.1 10/01/2016 07:45 AM    Prothrombin time 11.1 10/01/2016 07:45 AM    aPTT 31.7 10/01/2016 07:45 AM      Lab Results   Component Value Date/Time    Iron 14 (L) 11/28/2018 12:58 AM    TIBC 143 (L) 11/28/2018 12:58 AM    Iron % saturation 10 (L) 11/28/2018 12:58 AM    Ferritin 139 11/28/2018 12:58 AM      PET-CT 11/28/18:  HEAD/NECK: No apparent foci of abnormal hypermetabolism.  Cerebral evaluation is  limited by normal intense activity.     CHEST: Left upper lobe mass lesion measures approximately 7 cm in transverse by  4.3 cm in AP dimension. Small SUV is 10.1 bilateral pleural effusions. There is  a small hypermetabolic and spiculated nodule in the right upper lobe with  maximal SUV that is difficult to quantify due to the size of lesion. Right hilar  lymph node with a maximal SUV of 2.2. There is abnormal hypermetabolic  appearance in the left chest wall without definitive lytic or blastic lesion  underlying.     ABDOMEN/PELVIS: Right adrenal mass lesion is not hypermetabolic. Bladder  diverticulum. Aortic atherosclerotic change with aortic aneurysm     SKELETON: Mild increased hypermetabolic characteristics in the left 10th and  11th ribs there is mildly hypermetabolic nature to the T4 rib on the left as  well.     IMPRESSION:      Large hypermetabolic left upper lobe mass.     There is metastatic disease present. Mildly hypermetabolic likely neoplastic spiculated nodule in the right upper  lobe. Subtly hypermetabolic foci of irregularity in the left chest wall particularly  at the T4 level on the left, T11 and T10 abnormalities on the left as well. Possible right hilar metastatic focus. CT chest/abdomen/pelvis 11/24/18:  THYROID: No nodule. MEDIASTINUM: No mass or lymphadenopathy. KATHERINE: No mass or lymphadenopathy. THORACIC AORTA: Atherosclerotic. No dissection or aneurysm. PULMONARY ARTERIES: Main pulmonary artery is normal in caliber. No evidence of  acute pulmonary emboli through the lobar arterial level. Segmental and  subsegmental pulmonary arteries are suboptimally evaluated secondary to history  motion artifact. TRACHEA/BRONCHI: Patent. ESOPHAGUS: No wall thickening or dilatation. HEART: Normal in size. Moderate coronary artery calcifications. PLEURA: Small left pleural effusion. LUNGS: Bilateral dependent atelectasis. Mass in the posterior left upper lobe,  measuring 7.3 x 4.4 cm on series 2, image 118.    LIVER: No mass or biliary dilatation. GALLBLADDER: Surgically absent. SPLEEN: Contains multiple calcified granulomata. PANCREAS: No mass or ductal dilatation. ADRENALS: 2.6 x 1.5 cm indeterminate right adrenal nodule (series 2, image 29). KIDNEYS: 1.4 cm complex cyst versus solid mass in the left kidney (series 2,  image 10). 2.1 cm complex cyst versus solid mass in the right kidney (series 2,  image 7). Bilateral simple renal cysts. STOMACH: Unremarkable. SMALL BOWEL: No dilatation or wall thickening. COLON: No dilatation or wall thickening. APPENDIX: Not visualized. PERITONEUM: No ascites or pneumoperitoneum. RETROPERITONEUM: 5.4 x 5.2 cm proximal abdominal aortic aneurysm, with no  evidence of rupture. REPRODUCTIVE ORGANS: Status post hysterectomy. URINARY BLADDER: Mild diffuse wall thickening. No visualized mass or calculus. BONES: Cortical erosion of the left fourth posterior rib, associated with the  left upper lobe pulmonary mass. Chronic appearing compression deformities at T9  and T12. IMPRESSION:  No evidence of central pulmonary embolus. Suboptimal evaluation of segmental and  subsegmental pulmonary arteries secondary to respiratory motion artifact. 7.3  cm left upper lobe pulmonary mass is compatible with malignancy. Associated  cortical destruction of the left fourth posterior rib. 2.6 cm right adrenal  nodule. Indeterminate bilateral renal lesions. 5.4 cm abdominal aortic aneurysm,  with no evidence of rupture.  Mild diffuse urinary bladder wall thickening.        CONTROLLED SUBSTANCES SAFETY ASSESSMENT (IF ON CONTROLLED SUBSTANCES):     Reviewed opioid safety handout:  [x] Yes   [] No  24 hour opioid dose >150mg morphine equivalent/day:  [] Yes   [x] No  Benzodiazepines:  [x] Yes   [] No  Sleep apnea:  [] Yes   [x] No  Urine Toxicology Testing within last 6 months:  [] Yes   [x] No  History of or new aberrant medication taking behaviors:  [] Yes   [x] No  Has Narcan been prescribed [x] Yes   [] No Total time:   Counseling / coordination time:   > 50% counseling / coordination?:

## 2019-03-01 ENCOUNTER — TELEPHONE (OUTPATIENT)
Dept: ONCOLOGY | Age: 84
End: 2019-03-01

## 2019-03-01 ENCOUNTER — DOCUMENTATION ONLY (OUTPATIENT)
Dept: PALLATIVE CARE | Age: 84
End: 2019-03-01

## 2019-03-01 NOTE — PROGRESS NOTES
Call made to the daughter to check on condition. Patient missed her scheduled Oncology appointment. V/M left advised to please call PM back.

## 2019-03-01 NOTE — TELEPHONE ENCOUNTER
Pts son in law, Paddy Poole called and cancelled pts appointment with Dr. Daja Aleman and stated his wife will call back to reschedule.

## 2019-03-04 ENCOUNTER — TELEPHONE (OUTPATIENT)
Dept: PALLATIVE CARE | Age: 84
End: 2019-03-04

## 2019-03-04 NOTE — TELEPHONE ENCOUNTER
Palliative Social Work Telephone Call    Note:  Follow up call with pt's daughter, Marilia Lopez, re: hiring caregivers. Invited daughter to return the call for support with hiring care and for emotional support.     Yoli De La O, ISAC, LMSW, CCM    Palliative   [employer] ® ACP Facilitator  Social Work Supervisee for [employer]  Palliative Medicine  [phone]

## 2019-03-13 ENCOUNTER — HOSPITAL ENCOUNTER (OUTPATIENT)
Dept: CT IMAGING | Age: 84
Discharge: HOME | End: 2019-03-13
Attending: RADIOLOGY
Payer: MEDICARE

## 2019-03-13 DIAGNOSIS — C34.12 PRIMARY MALIGNANT NEOPLASM OF BRONCHUS OF LEFT UPPER LOBE (HCC): ICD-10-CM

## 2019-03-13 LAB — CREAT BLD-MCNC: 0.7 MG/DL (ref 0.6–1.3)

## 2019-03-13 PROCEDURE — 82565 ASSAY OF CREATININE: CPT

## 2019-03-13 PROCEDURE — 71260 CT THORAX DX C+: CPT

## 2019-03-13 PROCEDURE — 74011636320 HC RX REV CODE- 636/320: Performed by: RADIOLOGY

## 2019-03-13 RX ADMIN — IOPAMIDOL 100 ML: 612 INJECTION, SOLUTION INTRAVENOUS at 09:59

## 2019-03-14 ENCOUNTER — HOSPITAL ENCOUNTER (OUTPATIENT)
Dept: RADIATION THERAPY | Age: 84
Discharge: HOME | End: 2019-03-14

## 2019-03-25 NOTE — PROGRESS NOTES
[facility] [place] [facility] [place] [address] [address] [facility]  Eda Beath: [phone]  F: [phone]     Reason for Visit:   Kat Grover is a 80 y.o. female who is seen for follow up of non small cell lung cancer. Treatment History:   · CTA Chest and CT A/P 11/24/2018:  7.3 cm left upper lobe pulmonary mass is compatible with malignancy. Associated cortical destruction of the left fourth posterior rib. 2.6 cm right adrenal nodule. Indeterminate bilateral renal lesions. 5.4 cm abdominal aortic aneurysm, with no evidence of rupture. Mild diffuse urinary bladder wall thickening. · CT guided lung biopsy 11/27/2018: squamous cell carcinoma, PDL1 5%  · MRI Brain 11/28/2018: No evidence of acute intracranial abnormality. No evidence of intracranial metastases. · PET/CT 12/4/2018: Large hypermetabolic left upper lobe mass. There is metastatic disease present. Mildly hypermetabolic likely neoplastic spiculated nodule in the right upper lobe. Subtly hypermetabolic foci of irregularity in the left chest wall particularly at the T4 level on the left, T11 and T10 abnormalities on the left as well. Possible right hilar metastatic focus. No infradiaphragmatic metastatic disease is identified. · Stage ANDREY (cT4 cN0 M1 ) Non-small cell lung cancer  · Radiation to chest with Dr. Cathleen Goldberg started 12/27/2018 - 2/2019    History of Present Illness:   She returns today for follow up. Completed radiation the first week in February. Reports feeling a little tired. Eating well. Dr. Albert Ceja started her on dexamethasone and it's been helping her appetite per daughter. Denies nausea, diarrhea or constipation. Denies fever/ chills. Admits to productive cough, slightly bloody one day. States she \"hurts everywhere\" from rheumatoid arthritis. She is on methotrexate, using Oxycodone every 3 hours. Pt states \"I want to live, and I'll do what I need to\"    She is accompanied by her daughter today. Pt lives with grandson, who has a downstairs bedroom. Her daughter lives next door on same property. PAST HISTORY: The following sections were reviewed and updated in the EMR as appropriate: PMH, SH, FH, Medications, Allergies. Allergies   Allergen Reactions    Codeine Nausea and Vomiting      Review of Systems: A complete review of systems was obtained, reviewed, and scanned into the EMR. Pertinent findings reviewed above. Physical Exam:     Visit Vitals  /52 (BP 1 Location: Right arm, BP Patient Position: Sitting)   Pulse (!) 50   Temp 97.6 °F (36.4 °C) (Temporal)   Resp 20   SpO2 97%     ECOG PS: 3  General: No distress, frail, elderly female  Eyes: PERRLA, anicteric sclerae  HENT: Atraumatic, OP clear  Neck: Supple  Lymphatic: No cervical, supraclavicular, or inguinal adenopathy  Respiratory: CTAB, normal respiratory effort  CV: Normal rate, regular rhythm, no murmurs, no peripheral edema  GI: Soft, nontender, nondistended, no masses, no hepatomegaly, no splenomegaly  MS: Sitting in wheelchair  Skin: No rashes, ecchymoses, or petechiae. Normal temperature, turgor, and texture. Psych: Alert, oriented, appropriate affect, normal judgment/insight. Results:     Lab Results   Component Value Date/Time    WBC 8.9 12/04/2018 03:27 PM    HGB 10.5 (L) 12/04/2018 03:27 PM    HCT 33.6 (L) 12/04/2018 03:27 PM    PLATELET 943 (H) 14/57/9046 03:27 PM    MCV 94 12/04/2018 03:27 PM    ABS.  NEUTROPHILS 5.0 12/04/2018 03:27 PM     Lab Results   Component Value Date/Time    Sodium 141 12/04/2018 03:27 PM    Potassium 4.9 12/04/2018 03:27 PM    Chloride 96 12/04/2018 03:27 PM    CO2 32 (H) 12/04/2018 03:27 PM    Glucose 84 12/04/2018 03:27 PM    BUN 12 12/04/2018 03:27 PM    Creatinine 0.48 (L) 12/04/2018 03:27 PM    GFR est  12/04/2018 03:27 PM    GFR est non-AA 89 12/04/2018 03:27 PM    Calcium 9.1 12/04/2018 03:27 PM    Glucose (POC) 93 10/01/2016 11:33 AM    Creatinine (POC) 0.7 03/13/2019 09:52 AM Lab Results   Component Value Date/Time    Bilirubin, total 0.8 11/24/2018 10:50 AM    ALT (SGPT) 10 (L) 11/24/2018 10:50 AM    AST (SGOT) 15 11/24/2018 10:50 AM    Alk. phosphatase 62 11/24/2018 10:50 AM    Protein, total 6.8 11/24/2018 10:50 AM    Albumin 2.7 (L) 11/24/2018 10:50 AM    Globulin 4.1 (H) 11/24/2018 10:50 AM     CT chest 3/13/2019: Significant decrease in size in the left upper lobe mass, stable associated bone findings. Nonvisualization of the right upper lobe lesion at this time. 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 No concurrent chemotherapy, given her poor performance status. She is still hopeful that she can get chemotherapy, but I remain concerned about her poor performance status. Not a good candidate for therapy at this time, which would likely do more harm than good. CT scans post radiation reviewed and consistent with response to therapy. Reviewed plan to repeat CT scans in 3 months, and if cancer starts growing we can consider further treatment options, although I would imagine she will continue to be a poor candidate for systemic therapy. She once again declines hospice services. She is being followed by palliative care. 2) Anemia of chronic disease  Monitor      3) COPD  Pulmonary following     4) Afib  On Eliquis and dig. Cardiology following.     5) RA  On Methotrexate per rheumatology. 6) Cancer pain  Continue Oxycodone PRN. Management per Dr. Ashley Angel. Last seen 2/29/2019. Note reviewed, no change to Oxycodone dosing. 7) Weight loss  On Dexamethasone daily per Dr. Ashley Angel with subjective improvement in appetite. 8) Emotional Well Being  No psychosocial concerns identified today. Patient has adequate support.      Plan:     · Continue oxycodone 5mg PRN  · Follow up with Palliative Medicine as scheduled  · Repeat imaging in 3 months  · Return to see me in about 3 months with imaging    Patient was seen in conjunction with Magan Bunch NP.     Signed By: Pia Traore MD

## 2019-03-26 ENCOUNTER — TELEPHONE (OUTPATIENT)
Dept: PALLATIVE CARE | Age: 84
End: 2019-03-26

## 2019-03-26 ENCOUNTER — OFFICE VISIT (OUTPATIENT)
Dept: ONCOLOGY | Age: 84
End: 2019-03-26

## 2019-03-26 ENCOUNTER — NURSE NAVIGATOR (OUTPATIENT)
Dept: PALLATIVE CARE | Age: 84
End: 2019-03-26

## 2019-03-26 VITALS
DIASTOLIC BLOOD PRESSURE: 52 MMHG | HEART RATE: 50 BPM | TEMPERATURE: 97.6 F | SYSTOLIC BLOOD PRESSURE: 123 MMHG | RESPIRATION RATE: 20 BRPM | OXYGEN SATURATION: 97 %

## 2019-03-26 DIAGNOSIS — J43.9 PULMONARY EMPHYSEMA, UNSPECIFIED EMPHYSEMA TYPE (HCC): Chronic | ICD-10-CM

## 2019-03-26 DIAGNOSIS — C34.12 MALIGNANT NEOPLASM OF UPPER LOBE OF LEFT LUNG (HCC): ICD-10-CM

## 2019-03-26 DIAGNOSIS — C34.12 MALIGNANT NEOPLASM OF UPPER LOBE OF LEFT LUNG (HCC): Primary | ICD-10-CM

## 2019-03-26 DIAGNOSIS — M06.9 RHEUMATOID ARTHRITIS, INVOLVING UNSPECIFIED SITE, UNSPECIFIED RHEUMATOID FACTOR PRESENCE: Chronic | ICD-10-CM

## 2019-03-26 DIAGNOSIS — I71.40 ABDOMINAL AORTIC ANEURYSM (AAA) WITHOUT RUPTURE: ICD-10-CM

## 2019-03-26 DIAGNOSIS — I48.0 PAROXYSMAL A-FIB (HCC): Chronic | ICD-10-CM

## 2019-03-26 RX ORDER — OXYCODONE HYDROCHLORIDE 5 MG/1
5 TABLET ORAL
Qty: 120 TAB | Refills: 0 | Status: SHIPPED | OUTPATIENT
Start: 2019-03-26 | End: 2019-03-28 | Stop reason: SDUPTHER

## 2019-03-26 NOTE — TELEPHONE ENCOUNTER
Ms. Agustin Kelly is calling to advise that patient only has 5 pills left. She is requesting a refill of pain medication. Please call so she can  today.

## 2019-03-26 NOTE — PROGRESS NOTES
Williams Sanders is a 80 y.o. female follow up for lung cancer. 1. Have you been to the ER, urgent care clinic since your last visit? Hospitalized since your last visit? No    2. Have you seen or consulted any other health care providers outside of the [address] [place] [address] since your last visit? Include any pap smears or colon screening.  No

## 2019-03-26 NOTE — TELEPHONE ENCOUNTER
Called patient to advise/confirm upcoming appt with Dr. Koko Goel on    3/28/19  at 2:30pm at [facility]. Ms. Demetra Ramos confirmed. Also advised to please bring in your Drivers License and Insurance Card with you to appointment as well as any prescription pain medication in the original container with you to appt. Kel Butts(Attending)

## 2019-03-28 ENCOUNTER — OFFICE VISIT (OUTPATIENT)
Dept: PALLATIVE CARE | Age: 84
End: 2019-03-28

## 2019-03-28 VITALS
SYSTOLIC BLOOD PRESSURE: 132 MMHG | HEART RATE: 60 BPM | TEMPERATURE: 97.7 F | HEIGHT: 67 IN | OXYGEN SATURATION: 93 % | DIASTOLIC BLOOD PRESSURE: 74 MMHG | BODY MASS INDEX: 17.51 KG/M2 | RESPIRATION RATE: 16 BRPM

## 2019-03-28 DIAGNOSIS — G89.29 CHRONIC PAIN OF MULTIPLE JOINTS: Primary | ICD-10-CM

## 2019-03-28 DIAGNOSIS — C34.12 MALIGNANT NEOPLASM OF UPPER LOBE OF LEFT LUNG (HCC): ICD-10-CM

## 2019-03-28 DIAGNOSIS — F32.9 REACTIVE DEPRESSION: ICD-10-CM

## 2019-03-28 DIAGNOSIS — R41.3 MEMORY PROBLEM: ICD-10-CM

## 2019-03-28 DIAGNOSIS — R63.0 POOR APPETITE: ICD-10-CM

## 2019-03-28 DIAGNOSIS — F41.9 ANXIETY: ICD-10-CM

## 2019-03-28 DIAGNOSIS — M25.50 CHRONIC PAIN OF MULTIPLE JOINTS: Primary | ICD-10-CM

## 2019-03-28 RX ORDER — OXYCODONE HYDROCHLORIDE 5 MG/1
5 TABLET ORAL
Qty: 180 TAB | Refills: 0 | Status: SHIPPED | OUTPATIENT
Start: 2019-04-06 | End: 2019-04-30 | Stop reason: SDUPTHER

## 2019-03-28 NOTE — PROGRESS NOTES
Palliative Medicine Outpatient Services  [place]: [phone] ([phone])    Patient Name: Natty Etienne  YOB: 1931    Date of Current Visit: 03/28/19  Location of Current Visit:    [] [facility] Office  [x] [facility] Office  [] [facility] Office  [] Home  [] Other:      Date of Initial Visit: 2/5/19   Requesting Physician: Annabel Resendiz MD  Primary Care Physician: Carolina Carey MD      SUMMARY:   Natty Etienne is a 80y.o. year old with stage IV non-small cell lung cancer, who was referred to Palliative Medicine by Dr. Brayan Hunt for symptom management and supportive care. She was diagnosed in 11/2018 and is receiving radiation therapy. She is a poor candidate for systemic chemotherapy due to her performance status. Her other medical history of note includes memory problems, rheumatoid arthritis and  5.4 cm x 5.2 cm AAA    The patients social history includes: she is . She has a daughter, Greg Damian. She lives with grandson, [facility], and his wife and their 1year old daughter, [facility]. Palliative Medicine is addressing the following current patient/family concerns: chronic joint pain due to RA; left chest wall pain due to malignancy; anxiety; depression; fatigue; poor appetite with weight loss; memory problem. , family/caregiver stress. PALLIATIVE DIAGNOSES:       ICD-10-CM ICD-9-CM    1. Chronic pain of multiple joints M25.50 719.49     G89.29 338.29    2. Poor appetite R63.0 783.0    3. Anxiety F41.9 300.00    4. Reactive depression F32.9 300.4    5. Memory problem R41.3 780.93    6. Malignant neoplasm of upper lobe of left lung (HCC) C34.12 162.3 oxyCODONE IR (ROXICODONE) 5 mg immediate release tablet          PLAN:   Patient Instructions     Dear Natty Etienne ,    It was a pleasure seeing you today in [facility]. Your stated goal: to continue to enjoy your wonderful great-grandchildren, [facility] and [facility]    Your described symptoms were:   Fatigue: 8 Drowsiness: 9   Depression: 8 Pain: 9   Anxiety: 8 Nausea: 0   Anorexia: 6 Dyspnea: 0   Best Well-Bein Constipation: No   Other Problem (Comment): 8       This is the plan we talked about:    1. Continue oxycodone 5-mg: take 1 to 2 pills every 3 hours as needed. You currently take 6 pills a day    2. Start tylenol extra-strength 500-mg: take 2 pills three times a day. This is a good medicine for arthritis pain and it is safe to take with your other medicines. 3. Continue dexamethasone 2-mg in the morning. This helps with your pain and with your appetite. 4. Continue Xanax 1/2 pill at bedtime. You take a whole pill before you go for scans. 5. You sometimes feel anxious during the day but not enough to use your Xanax. 6. You feel depressed at times. You tried taking a medicine for your anxiety and depression called sertraline but stopped due to side effects. 7. You enjoy watching [employer] on your computer    8. Continue senna-docusate sodium (laxative and stool softener): 1 to 2 tabs at bedtime as needed to avoid constipation. 9. You had a CT scan which showed your tumor is smaller! 10. You see Dr. Luis Miguel Luke again in a few months. This is what you have shared with us about Advance Care Planning:    Primary Decision Maker ([address]):  Yaneth Paul  Relationship to patient:Daughter  Phone [phone]  [] Named in a scanned document   [x] Legal Next of Kin  [] Guardian     Secondary Decision Maker ([address]):   Relationship to patient:  Phone number:  [] Named in a scanned document   [] Legal Next of Kin  [] Guardian     ACP documents you current have include:  [] Advance Directive or Living Will  [] Durable Do Not Resuscitate  [] Physician Orders for Scope of Treatment (POST)  [] Medical Power of   [] Other      The Palliative Medicine Team is here to support you and your family.      We will see you again in 3 to 4 weeks.    Sincerely,      Edith Milner MD and the Palliative Medicine Team      Counseling and Coordination: note routed to PCP, Dr. Bhargavi Putnam NP       [address] / TREATMENT PREFERENCES:   [====Goals of Care====]  GOALS OF CARE:  Patient / health care proxy stated goals: continue cancer treatment to live as long as possible      TREATMENT PREFERENCES:   Code Status:  [] Attempt Resuscitation       [x] Do Not Attempt Resuscitation    Advance Care Planning:  [x] The Pall [facility] Interdisciplinary Team has updated the ACP Navigator with [address] and Patient Capacity    Primary Decision Maker ([address]):  Hermilo Allison  Relationship to patient:Daughter  Phone [phone]  [] Named in a scanned document   [x] Legal Next of Kin  [] Guardian     Secondary Decision Maker ([address]):   Relationship to patient:  Phone number:  [] Named in a scanned document   [] Legal Next of Kin  [] Guardian     ACP documents you current have include:  [] Advance Directive or Living Will  [x] Durable Do Not Resuscitate  [] Physician Orders for Scope of Treatment (POST)  [] Medical Power of   [] Other       Other:  (If patient appropriate for POST, consider using PALLPOST smart phrase here)    The palliative care team has discussed with patient / health care proxy about goals of care / treatment preferences for patient.  [====Goals of Care====]         PRESCRIPTIONS GIVEN:     Medications Ordered Today   Medications    oxyCODONE IR (ROXICODONE) 5 mg immediate release tablet     Sig: Take 1 Tab by mouth every three (3) hours as needed for Pain for up to 30 days. Max Daily Amount: 40 mg.      Dispense:  180 Tab     Refill:  0           FOLLOW UP:     Future Appointments   Date Time Provider [facility]   7/3/2019 10:30 AM Adelina Rodriguez MD [address] [facility] IN CARE:   Patient Care Team:  Colt Huffman MD as PCP - General (Internal Medicine)  Tyrone Brand MD (Internal Medicine)  Rene Rodriguez MD (Hematology and Oncology)  Agusto Marques MD (Radiation Oncology)  Ayad Molina MD as Physician (Palliative Medicine)       HISTORY:   Nursing documentation from date of visit reviewed. Reviewed patient-completed ESAS and advance care planning form. Reviewed patient record in prescription monitoring program.    CHIEF COMPLAINT: pain, not sleeping, memory problems    HPI/SUBJECTIVE:    The patient is: [x] Verbal / [] Nonverbal     Her hands and feet have been hurting more for the past 2 days. Her back pain is better. Her family is giving her 1 oxycodone every 3 hours during the day and 2 at bedtime. Sometimes she will ask for another pain pill an hour after getting 1 and they sometimes will give her another one. She's not taking anything else for her pain. Her appetite is much better since starting the appetite medicine (dexamethasone). Her grandson, Letha Davis, will buy her anything she wants to eat. She sleeps well at night after her family gives her 2 oxycodone and 1/2 Xanax. She spends her days watching [employer] on her computer. Her daughter, Chucky Werner, gets frustrated with her sometimes because she won't do what she needs to do, like getting some exercise. She gets anxious sometimes during the day, not too bad. She feels depressed sometimes when she thinks about her cancer. She saw Dr. Michael Eubanks recently. Her last scan showed the tumor is smaller. She knows her cancer can't be cured but she's hoping it won't grow any soon. She's taking it one day at a time.       Clinical Pain Assessment (nonverbal scale for nonverbal patients):   [++++ Clinical Pain Assessment++++]  [++++Pain Severity++++]: Pain: 9  [++++Pain Character++++]: dull, aching  [++++Pain Duration++++]: years for joint pain, months for chest wall pain  [++++Pain Effect++++]: limits activity, causes emotional distress  [++++Pain Factors++++]: no identifiable provoking factors, oxycodone helps  [++++Pain Frequency++++]: constant with varying intensity  [++++Pain Location++++]: multiple joints, left upper chest  [++++ Clinical Pain Assessment++++]       FUNCTIONAL ASSESSMENT:     Palliative Performance Scale (PPS):  PPS: 70       PSYCHOSOCIAL/SPIRITUAL SCREENING:     Any spiritual / Tenriism concerns:  [] Yes /  [x] No    Caregiver Burnout:  [] Yes /  [x] No /  [] No Caregiver Present      Anticipatory grief assessment:   [] Normal  / [] Maladaptive       ESAS Anxiety: Anxiety: 8    ESAS Depression: Depression: 8       REVIEW OF SYSTEMS:     The following systems were [x] reviewed / [] unable to be reviewed  Systems: constitutional, ears/nose/mouth/throat, respiratory, gastrointestinal, genitourinary, musculoskeletal, integumentary, neurologic, psychiatric, endocrine. Positive findings noted below. Modified ESAS Completed by: provider   Fatigue: 8 Drowsiness: 9   Depression: 8 Pain: 9   Anxiety: 8 Nausea: 0   Anorexia: 6 Dyspnea: 0   Best Well-Bein Constipation: No   Other Problem (Comment): 8          PHYSICAL EXAM:     Wt Readings from Last 3 Encounters:   19 111 lb 12.8 oz (50.7 kg)   19 116 lb (52.6 kg)   19 120 lb 6.4 oz (54.6 kg)     Blood pressure 132/74, pulse 60, temperature 97.7 °F (36.5 °C), temperature source Oral, resp. rate 16, height 5' 7\" (1.702 m), SpO2 93 %.   Last bowel movement: See Nursing Note    Constitutional: ill-appearing, sitting in wheelchair, daughter at side  Eyes: pupils equal, anicteric  ENMT: no nasal discharge, moist mucous membranes  Cardiovascular: regular rhythm, no peripheral edema  Respiratory: breathing not labored, symmetric  Gastrointestinal: soft non-tender, +bowel sounds  Musculoskeletal: bilateral ulnar deviation R>L; bilateral temporal muscle wasting  Skin: warm, dry  Neurologic: alert, following commands, moving all extremities  Psychiatric: anxious affect, no hallucinations  Other:       HISTORY:     Past Medical History:   Diagnosis Date    AAA (abdominal aortic aneurysm) (HCC)     Arthritis     ra, osteoporosis, osteoarthritis    Atrial fibrillation (HCC)     COPD     HTN (hypertension)     Lung cancer (HCC)     RA [facility]umatoid arthritis) ([facility] [address]     Smoker     TIA (transient ischemic attack)     TIA 16      Past Surgical History:   Procedure Laterality Date    HX CHOLECYSTECTOMY      HX HYSTERECTOMY      HX ORTHOPAEDIC      carpal tunnel, wrist surgery      Family History   Problem Relation Age of Onset    Hypertension Father       History reviewed, no pertinent family history. Social History     Tobacco Use    Smoking status: Former Smoker     Years: 50.00     Last attempt to quit: 2018     Years since quittin.5    Smokeless tobacco: Never Used   Substance Use Topics    Alcohol use: No     Allergies   Allergen Reactions    Codeine Nausea and Vomiting      Current Outpatient Medications   Medication Sig    oxyCODONE IR (ROXICODONE) 5 mg immediate release tablet Take 1 Tab by mouth every three (3) hours as needed for Pain for up to 30 days. Max Daily Amount: 40 mg.    dexamethasone (DECADRON) 2 mg tablet Take 1 Tab by mouth daily (after breakfast).  amLODIPine (NORVASC) 5 mg tablet Take 5 mg by mouth daily.  ALPRAZolam (XANAX) 0.25 mg tablet Take 1 Tab by mouth daily as needed for Anxiety (Take 1 hour prior to radiation and 1/2 tab at bedtime).  digoxin (LANOXIN) 0.125 mg tablet Take 0.125 mg by mouth daily.  nystatin (MYCOSTATIN) powder APPLY TO AFFECTED AREA TWICE A DAY    mupirocin (BACTROBAN) 2 % ointment APPLY A SMALL AMOUNT TO THE AFFECTED AREA THREE TIMES A DAY    apixaban (ELIQUIS) 2.5 mg tablet Take 1 Tab by mouth every twelve (12) hours for 30 days.  folic acid (FOLVITE) 1 mg tablet Take 1 Tab by mouth daily for 30 days.     cholecalciferol (VITAMIN D3) 50,000 unit capsule Take 50,000 Units by mouth every seven (7) days. No current facility-administered medications for this visit. LAB DATA REVIEWED:     Lab Results   Component Value Date/Time    WBC 8.9 12/04/2018 03:27 PM    HGB 10.5 (L) 12/04/2018 03:27 PM    PLATELET 192 (H) 32/95/8176 03:27 PM     Lab Results   Component Value Date/Time    Sodium 141 12/04/2018 03:27 PM    Potassium 4.9 12/04/2018 03:27 PM    Chloride 96 12/04/2018 03:27 PM    CO2 32 (H) 12/04/2018 03:27 PM    BUN 12 12/04/2018 03:27 PM    Creatinine 0.48 (L) 12/04/2018 03:27 PM    Calcium 9.1 12/04/2018 03:27 PM    Magnesium 1.6 08/26/2018 04:39 AM    Phosphorus 4.2 08/26/2018 04:39 AM      Lab Results   Component Value Date/Time    AST (SGOT) 15 11/24/2018 10:50 AM    Alk. phosphatase 62 11/24/2018 10:50 AM    Protein, total 6.8 11/24/2018 10:50 AM    Albumin 2.7 (L) 11/24/2018 10:50 AM    Globulin 4.1 (H) 11/24/2018 10:50 AM     Lab Results   Component Value Date/Time    INR 1.1 10/01/2016 07:45 AM    Prothrombin time 11.1 10/01/2016 07:45 AM    aPTT 31.7 10/01/2016 07:45 AM      Lab Results   Component Value Date/Time    Iron 14 (L) 11/28/2018 12:58 AM    TIBC 143 (L) 11/28/2018 12:58 AM    Iron % saturation 10 (L) 11/28/2018 12:58 AM    Ferritin 139 11/28/2018 12:58 AM      PET-CT 11/28/18:  HEAD/NECK: No apparent foci of abnormal hypermetabolism. Cerebral evaluation is  limited by normal intense activity.     CHEST: Left upper lobe mass lesion measures approximately 7 cm in transverse by  4.3 cm in AP dimension. Small SUV is 10.1 bilateral pleural effusions. There is  a small hypermetabolic and spiculated nodule in the right upper lobe with  maximal SUV that is difficult to quantify due to the size of lesion. Right hilar  lymph node with a maximal SUV of 2.2. There is abnormal hypermetabolic  appearance in the left chest wall without definitive lytic or blastic lesion  underlying.     ABDOMEN/PELVIS: Right adrenal mass lesion is not hypermetabolic. Bladder  diverticulum.  Aortic atherosclerotic change with aortic aneurysm     SKELETON: Mild increased hypermetabolic characteristics in the left 10th and  11th ribs there is mildly hypermetabolic nature to the T4 rib on the left as  well.     IMPRESSION:      Large hypermetabolic left upper lobe mass.     There is metastatic disease present. Mildly hypermetabolic likely neoplastic spiculated nodule in the right upper  lobe. Subtly hypermetabolic foci of irregularity in the left chest wall particularly  at the T4 level on the left, T11 and T10 abnormalities on the left as well. Possible right hilar metastatic focus. CT chest/abdomen/pelvis 11/24/18:  THYROID: No nodule. MEDIASTINUM: No mass or lymphadenopathy. KATHERINE: No mass or lymphadenopathy. THORACIC AORTA: Atherosclerotic. No dissection or aneurysm. PULMONARY ARTERIES: Main pulmonary artery is normal in caliber. No evidence of  acute pulmonary emboli through the lobar arterial level. Segmental and  subsegmental pulmonary arteries are suboptimally evaluated secondary to history  motion artifact. TRACHEA/BRONCHI: Patent. ESOPHAGUS: No wall thickening or dilatation. HEART: Normal in size. Moderate coronary artery calcifications. PLEURA: Small left pleural effusion. LUNGS: Bilateral dependent atelectasis. Mass in the posterior left upper lobe,  measuring 7.3 x 4.4 cm on series 2, image 118. LIVER: No mass or biliary dilatation. GALLBLADDER: Surgically absent. SPLEEN: Contains multiple calcified granulomata. PANCREAS: No mass or ductal dilatation. ADRENALS: 2.6 x 1.5 cm indeterminate right adrenal nodule (series 2, image 29). KIDNEYS: 1.4 cm complex cyst versus solid mass in the left kidney (series 2,  image 10). 2.1 cm complex cyst versus solid mass in the right kidney (series 2,  image 7). Bilateral simple renal cysts. STOMACH: Unremarkable. SMALL BOWEL: No dilatation or wall thickening. COLON: No dilatation or wall thickening. APPENDIX: Not visualized.   PERITONEUM: No ascites or pneumoperitoneum. RETROPERITONEUM: 5.4 x 5.2 cm proximal abdominal aortic aneurysm, with no  evidence of rupture. REPRODUCTIVE ORGANS: Status post hysterectomy. URINARY BLADDER: Mild diffuse wall thickening. No visualized mass or calculus. BONES: Cortical erosion of the left fourth posterior rib, associated with the  left upper lobe pulmonary mass. Chronic appearing compression deformities at T9  and T12. IMPRESSION:  No evidence of central pulmonary embolus. Suboptimal evaluation of segmental and  subsegmental pulmonary arteries secondary to respiratory motion artifact. 7.3  cm left upper lobe pulmonary mass is compatible with malignancy. Associated  cortical destruction of the left fourth posterior rib. 2.6 cm right adrenal  nodule. Indeterminate bilateral renal lesions. 5.4 cm abdominal aortic aneurysm,  with no evidence of rupture.  Mild diffuse urinary bladder wall thickening.        CONTROLLED SUBSTANCES SAFETY ASSESSMENT (IF ON CONTROLLED SUBSTANCES):     Reviewed opioid safety handout:  [x] Yes   [] No  24 hour opioid dose >150mg morphine equivalent/day:  [] Yes   [x] No  Benzodiazepines:  [x] Yes   [] No  Sleep apnea:  [] Yes   [x] No  Urine Toxicology Testing within last 6 months:  [] Yes   [x] No  History of or new aberrant medication taking behaviors:  [] Yes   [x] No  Has Narcan been prescribed [x] Yes   [] No          Total time:   Counseling / coordination time:   > 50% counseling / coordination?:

## 2019-03-28 NOTE — PATIENT INSTRUCTIONS
Dear Jerson Marshall ,    It was a pleasure seeing you today in [facility]. Your stated goal: to continue to enjoy your wonderful great-grandchildren, Lesli and [facility]    Your described symptoms were: Fatigue: 8 Drowsiness: 9   Depression: 8 Pain: 9   Anxiety: 8 Nausea: 0   Anorexia: 6 Dyspnea: 0   Best Well-Bein Constipation: No   Other Problem (Comment): 8       This is the plan we talked about:    1. Continue oxycodone 5-mg: take 1 to 2 pills every 3 hours as needed. You currently take 6 pills a day    2. Start tylenol extra-strength 500-mg: take 2 pills three times a day. This is a good medicine for arthritis pain and it is safe to take with your other medicines. 3. Continue dexamethasone 2-mg in the morning. This helps with your pain and with your appetite. 4. Continue Xanax 1/2 pill at bedtime. You take a whole pill before you go for scans. 5. You sometimes feel anxious during the day but not enough to use your Xanax. 6. You feel depressed at times. You tried taking a medicine for your anxiety and depression called sertraline but stopped due to side effects. 7. You enjoy watching Carlito Monzon on your computer    8. Continue senna-docusate sodium (laxative and stool softener): 1 to 2 tabs at bedtime as needed to avoid constipation. 9. You had a CT scan which showed your tumor is smaller! 10. You see Dr. Ernesto El again in a few months.     This is what you have shared with us about Advance Care Planning:    Primary Decision Maker ([address]):  Logan Luong  Relationship to patient:Daughter  Phone [phone]  [] Named in a scanned document   [x] Legal Next of Kin  [] Guardian     Secondary Decision Maker ([address]):   Relationship to patient:  Phone number:  [] Named in a scanned document   [] Legal Next of Kin  [] Guardian     ACP documents you current have include:  [] Advance Directive or Living Will  [] Durable Do Not Resuscitate  [] Physician Orders for Scope of Treatment (POST)  [] Medical Power of   [] Other      The Palliative Medicine Team is here to support you and your family. We will see you again in 3 to 4 weeks.     Sincerely,      Pradip Fletcher MD and the Palliative Medicine Team

## 2019-03-28 NOTE — PROGRESS NOTES
Palliative Medicine Office Visit  Palliative Medicine Nurse Check In  [phone]    Patient Name: Ayse Billingsley  YOB: 1931      Date of Office Visit:  3/28/2019      Patient states:  Patient has increased pain to the bilateral lower extremities. From Check In Sheet (scanned in Media):  Has a medical provider talked with you about cardiopulmonary resuscitation (CPR)? [x] Yes   [] No   [] Unable to obtain    Nurse reminder to complete or update ACP FlowSheet:    Is ACP on the Problem List?    [] Yes    [x] No  IF ACP Document is ON FILE; Nurse to place ACP on Problem List     Is there an ACP Note in Chart Review/Note? [] Yes    [x] No   If NO: [address] Planning 2/28/2019   Patient's Healthcare Decision Maker is: Legal Next of Kin   Primary Decision Maker Name -   Confirm Advance Directive None   Patient Would Like to Complete Advance Directive Unable   Does the patient have other document types Do Not Resuscitate       Primary Decision MakerDimas Lambert - Daughter - [phone]  Advance Care Planning 2/28/2019   Patient's [address] is: Legal Next of Kin   Primary Decision Maker Name -   Confirm Advance Directive None   Patient Would Like to Complete Advance Directive Unable   Does the patient have other document types Do Not Resuscitate         Is there anything that we should know about you as a person in order to provide you the best care possible? Have you been to the ER, urgent care clinic since your last visit? [] Yes   [x] No   [] Unable to obtain    Have you been hospitalized since your last visit? [] Yes   [x] No   [] Unable to obtain    Have you seen or consulted any other health care providers outside of the [address] [place] [address] since your last visit?    [] Yes   [x] No   [] Unable to obtain    Functional status (describe):     Last BM:  3/28/2019       accessed (date): 3/27/19    Bottle review (for opioid pain medication): Did not bring medication.   Medication 1:   Date filled:   Directions:   # filled:   # left:   # pills taking per day:  Last dose taken:    Medication 2:   Date filled:   Directions:   # filled:   # left:   # pills taking per day:  Last dose taken:    Medication 3:   Date filled:   Directions:   # filled:   # left:   # pills taking per day:  Last dose taken:    Medication 4:   Date filled:   Directions:   # filled:   # left:   # pills taking per day:  Last dose taken:

## 2019-04-22 ENCOUNTER — TELEPHONE (OUTPATIENT)
Dept: PALLATIVE CARE | Age: 84
End: 2019-04-22

## 2019-04-22 NOTE — TELEPHONE ENCOUNTER
Called patient to advise/confirm upcoming appt with Dr. Paula Horan on   4/23/19   at 3:30pm at [facility]. Mr. Hussein Hernandez confirmed date/time. Also advised to please bring in your Drivers License and Insurance Card with you to appointment as well as any prescription pain medication in the original container with you to appt.

## 2019-04-26 ENCOUNTER — TELEPHONE (OUTPATIENT)
Dept: PALLATIVE CARE | Age: 84
End: 2019-04-26

## 2019-04-26 NOTE — TELEPHONE ENCOUNTER
Called patient to advise/confirm upcoming appt with Dr. Saad David on   4/30/19   at 8:30am at [facility]. No answer so left voicemail. Also advised to please bring in your Drivers License and Insurance Card with you to appointment as well as any prescription pain medication in the original container with you to appt.

## 2019-04-30 ENCOUNTER — NURSE NAVIGATOR (OUTPATIENT)
Dept: PALLATIVE CARE | Age: 84
End: 2019-04-30

## 2019-04-30 ENCOUNTER — OFFICE VISIT (OUTPATIENT)
Dept: PALLATIVE CARE | Age: 84
End: 2019-04-30

## 2019-04-30 ENCOUNTER — HOME HEALTH ADMISSION (OUTPATIENT)
Dept: HOME HEALTH SERVICES | Facility: HOME HEALTH | Age: 84
End: 2019-04-30
Payer: MEDICARE

## 2019-04-30 VITALS
RESPIRATION RATE: 16 BRPM | TEMPERATURE: 98.3 F | BODY MASS INDEX: 17.51 KG/M2 | HEART RATE: 82 BPM | OXYGEN SATURATION: 91 % | SYSTOLIC BLOOD PRESSURE: 134 MMHG | DIASTOLIC BLOOD PRESSURE: 86 MMHG | HEIGHT: 67 IN

## 2019-04-30 DIAGNOSIS — F41.9 ANXIETY: ICD-10-CM

## 2019-04-30 DIAGNOSIS — R63.0 POOR APPETITE: ICD-10-CM

## 2019-04-30 DIAGNOSIS — G89.29 CHRONIC PAIN OF MULTIPLE JOINTS: Primary | ICD-10-CM

## 2019-04-30 DIAGNOSIS — R53.83 OTHER FATIGUE: ICD-10-CM

## 2019-04-30 DIAGNOSIS — F32.9 REACTIVE DEPRESSION: ICD-10-CM

## 2019-04-30 DIAGNOSIS — C34.12 MALIGNANT NEOPLASM OF UPPER LOBE OF LEFT LUNG (HCC): ICD-10-CM

## 2019-04-30 DIAGNOSIS — M25.50 CHRONIC PAIN OF MULTIPLE JOINTS: ICD-10-CM

## 2019-04-30 DIAGNOSIS — G89.29 CHRONIC PAIN OF MULTIPLE JOINTS: ICD-10-CM

## 2019-04-30 DIAGNOSIS — R06.02 SHORTNESS OF BREATH: ICD-10-CM

## 2019-04-30 DIAGNOSIS — M25.50 CHRONIC PAIN OF MULTIPLE JOINTS: Primary | ICD-10-CM

## 2019-04-30 DIAGNOSIS — C34.12 MALIGNANT NEOPLASM OF UPPER LOBE OF LEFT LUNG (HCC): Primary | ICD-10-CM

## 2019-04-30 DIAGNOSIS — R53.1 WEAKNESS: ICD-10-CM

## 2019-04-30 RX ORDER — OXYCODONE HCL 10 MG/1
10 TABLET, FILM COATED, EXTENDED RELEASE ORAL EVERY 12 HOURS
Qty: 60 TAB | Refills: 0 | Status: SHIPPED | OUTPATIENT
Start: 2019-04-30 | End: 2019-05-29 | Stop reason: SDUPTHER

## 2019-04-30 RX ORDER — OXYCODONE HYDROCHLORIDE 5 MG/1
5 TABLET ORAL
Qty: 180 TAB | Refills: 0 | Status: SHIPPED | OUTPATIENT
Start: 2019-04-30 | End: 2019-07-02 | Stop reason: SDUPTHER

## 2019-04-30 RX ORDER — ALPRAZOLAM 0.25 MG/1
TABLET ORAL
Qty: 60 TAB | Refills: 1 | Status: SHIPPED | OUTPATIENT
Start: 2019-04-30 | End: 2019-05-29 | Stop reason: SDUPTHER

## 2019-04-30 RX ORDER — ESCITALOPRAM OXALATE 5 MG/1
5 TABLET ORAL DAILY
Qty: 30 TAB | Refills: 5 | Status: SHIPPED | OUTPATIENT
Start: 2019-04-30 | End: 2019-05-29 | Stop reason: DRUGHIGH

## 2019-04-30 NOTE — PROGRESS NOTES
Palliative Medicine  Nursing Note  [phone]  Fax [phone]        Clinic Office Visit  Patient Name: Hollie Moise  YOB: 1931 4/30/2019      Advance Care Planning 4/30/2019   Patient's Healthcare Decision Maker is: Verbal statement (Legal Next of Kin remains as decision maker)   Primary Decision Maker Name -   Confirm Advance Directive None   Patient Would Like to Complete Advance Directive No   Does the patient have other document types Do Not Resuscitate      Met with patient and daughter today. Discussed order for home PT and patient is very excited about that. Instructed on new medication of Oxycontin-take 1 tab at night for 3-4 nights, and if doing well, may start taking every 12 hours. Daughter verbalized understanding. Order placed for [address] [place] [address] PT to assist with weakness and fatigue. Call to [address] [place] [address] to give update on patient. They will call family to arrange date and time, but possibly today if patient and family willing. AVS reviewed with patient, verbalized an understanding of material discussed. Instructions given to patient to call the Palliative Medicine Office at [address] [place] [address] ([phone]) for any questions or concerns 24 hours a day, 7 days a weeks. Follow up appointment scheduled for 4 weeks. Nurse Navigator will provide a follow up phone call as needed.       Lenon Olszewski, BSN, RN, [facility]  Palliative Medicine

## 2019-04-30 NOTE — PROGRESS NOTES
Palliative Medicine Office Visit  Palliative Medicine Nurse Check In  [phone])    Patient Name: Tesfaye Cabrera  YOB: 1931      Date of Office Visit: 4/30/2019      Patient states:  Patient has not desired to do much the past few weeks. She has difficulty using the hands and feet. From Check In Sheet (scanned in Media):  Has a medical provider talked with you about cardiopulmonary resuscitation (CPR)? [x] Yes   [] No   [] Unable to obtain    Nurse reminder to complete or update ACP FlowSheet:    Is ACP on the Problem List?    [] Yes    [x] No  IF ACP Document is ON FILE; Nurse to place ACP on Problem List     Is there an ACP Note in Chart Review/Note? [] Yes    [x] No   If NO: [address] Planning 3/28/2019   Patient's Healthcare Decision Maker is: Verbal statement (Legal Next of Kin remains as decision maker)   Primary Decision Maker Name -   Confirm Advance Directive None   Patient Would Like to Complete Advance Directive Yes   Does the patient have other document types Do Not Resuscitate        [facility] [phone]  Advance Care Planning 4/30/2019   Patient's Healthcare Decision Maker is: Verbal statement (Legal Next of Kin remains as decision maker)   Primary Decision Maker Name -   Confirm Advance Directive None   Patient Would Like to Complete Advance Directive No   Does the patient have other document types Do Not Resuscitate         Is there anything that we should know about you as a person in order to provide you the best care possible? Have you been to the ER, urgent care clinic since your last visit? [] Yes   [x] No   [] Unable to obtain    Have you been hospitalized since your last visit? [] Yes   [x] No   [] Unable to obtain    Have you seen or consulted any other health care providers outside of the [address] [place] [address] since your last visit?    [] Yes   [x] No   [] Unable to obtain    Functional status (describe):     Last BM:  1 week ago.  accessed (date): 4/29/19    Bottle review (for opioid pain medication):  Medication 1: oxyCODONE IR (ROXICODONE) 5 mg immediate release tablet       Date filled: 4/19/19  Directions: Take 1 Tab by mouth every three (3) hours as needed for Pain for up to 30 days.  Max Daily Amount: 40 mg  # filled: 180  # left: 120  # pills taking per day: 6-7  Last dose taken:6AM    Medication 2:   Date filled:   Directions:   # filled:   # left:   # pills taking per day:  Last dose taken:    Medication 3:   Date filled:   Directions:   # filled:   # left:   # pills taking per day:  Last dose taken:    Medication 4:   Date filled:   Directions:   # filled:   # left:   # pills taking per day:  Last dose taken:

## 2019-04-30 NOTE — PATIENT INSTRUCTIONS
Dealora Fuentes ,    It was a pleasure seeing you today in [facility]. Your stated goal: to continue to enjoy your wonderful great-grandchildren, Lesli and [facility] [place]    Your described symptoms were: Fatigue: 8 Drowsiness: 4   Depression: 8 Pain: 10   Anxiety: 8 Nausea: 0   Anorexia: 5 Dyspnea: 5   Best Well-Bein Constipation: No   Other Problem (Comment): 0       This is the plan we talked about:    1. Pain  -Start oxycodone long-acting 10-mg every 12 hours as your baseline pain medicine  -Start this medicine by taking 1 tab at bedtime for 3 days, then increase to 1 tab twice daily  -Continue oxycodone 5-mg: take 1 to 2 pills every 3 hours as needed for breakthrough pain  -Continue extra strength tylenol 500-mg 2 tabs every 8 hours  -Continue dexamethasone 2-mg in the morning.   -Dexamethasone helps with the inflammatory component of your pain as well as with your appetite.  -You have left elbow bursitis: avoid leaning on that elbow when you're using your computer.  -You can also use an ice pack on the elbow for 10-minute periods 3 to 4 times a day    2. Anxiety and depression  -Start Lexapro 5-mg daily  -It takes 3 to 4 weeks before you will notice any effect from this medicine so it's important to keep taking it every day   -Continue alprazolam 0.25-mg: take 1/2 to 1 tab every 8 hours as needed for anxiety     3. Appetite  -You appetite picked up after starting dexamethasone    4. Shortness of breath  -Sometimes you feel very short of breath and your oxygen level is high  -We call this \"air hunger\"  -Take several slow, deep breaths when this occurs and start using your oxygen  -You can also buy a small fan to blow air across the front of your face     5. Weakness  -You've been in bed for the past week  -I'm ordering home physical therapy for you    6.  Bowels  -Continue senna-docusate sodium (laxative and stool softener): 1 to 2 tabs at bedtime as needed to avoid constipation. 7. Lung cancer  -You have a CT scan scheduled in June  -We talked today about moving that up to next month  -I will touch base with Dr. Indra Galo about this  -You see Dr. Indra Galo again in July    This is what you have shared with us about Advance Care Planning:    Primary Decision Maker ([address]):  Sheron Mario  Relationship to patient:Daughter  Phone [phone]  [] Named in a scanned document   [x] Legal Next of Kin  [] Guardian     Secondary Decision Maker ([address]):   Relationship to patient:  Phone number:  [] Named in a scanned document   [] Legal Next of Kin  [] Guardian     ACP documents you current have include:  [] Advance Directive or Living Will  [] Durable Do Not Resuscitate  [] Physician Orders for Scope of Treatment (POST)  [] Medical Power of   [] Other      The Palliative Medicine Team is here to support you and your family. We will see you again in 3 to 4 weeks.     Sincerely,      Gigi Waggoner MD and the Palliative Medicine Team

## 2019-04-30 NOTE — PROGRESS NOTES
Palliative Medicine Outpatient Services  [place]: [phone] ([phone])    Patient Name: Ricardo Flores  YOB: 1931    Date of Current Visit: 04/30/19  Location of Current Visit:    [] [facility] Office  [x] [facility] Office  [] [facility] Office  [] Home  [] Other:      Date of Initial Visit: 2/5/19   Requesting Physician: Juan F Olivo MD  Primary Care Physician: Viv Carey MD      SUMMARY:   Ricardo Flores is a 80y.o. year old with stage IV non-small cell lung cancer, who was referred to Palliative Medicine by Dr. Neha Graham for symptom management and supportive care. She was diagnosed in 11/2018 treated with radiation therapy. She is a poor candidate for systemic chemotherapy due to her performance status. Her other medical history of note includes memory problems, rheumatoid arthritis and  5.4 cm x 5.2 cm AAA    The patients social history includes: she is . She has a daughter, Jo Mobley. She lives with grandson, Colten Caceres, and his wife and their 1year old daughter, Alex [place]. Palliative Medicine is addressing the following current patient/family concerns: chronic joint pain due to RA; left chest wall pain due to malignancy; anxiety; depression; fatigue; poor appetite with weight loss; memory problem. , family/caregiver stress. PALLIATIVE DIAGNOSES:       ICD-10-CM ICD-9-CM    1. Chronic pain of multiple joints M25.50 719.49 oxyCODONE ER (OXYCONTIN) 10 mg ER tablet    G89.29 338.29    2. Anxiety F41.9 300.00 ALPRAZolam (XANAX) 0.25 mg tablet   3. Reactive depression F32.9 300.4    4. Poor appetite R63.0 783.0    5. Shortness of breath R06.02 786.05    6. Weakness R53.1 780.79    7.  Malignant neoplasm of upper lobe of left lung (HCC) C34.12 162.3 oxyCODONE ER (OXYCONTIN) 10 mg ER tablet      oxyCODONE IR (ROXICODONE) 5 mg immediate release tablet          PLAN:   Patient Instructions     Dear Ricardo Flores ,    It was a pleasure seeing you today in [address] [place] [address] Clinic. Your stated goal: to continue to enjoy your wonderful great-grandchildren, Lesli and [facility]    Your described symptoms were: Fatigue: 8 Drowsiness: 4   Depression: 8 Pain: 10   Anxiety: 8 Nausea: 0   Anorexia: 5 Dyspnea: 5   Best Well-Bein Constipation: No   Other Problem (Comment): 0       This is the plan we talked about:    1. Pain  -Start oxycodone long-acting 10-mg every 12 hours as your baseline pain medicine  -Start this medicine by taking 1 tab at bedtime for 3 days, then increase to 1 tab twice daily  -Continue oxycodone 5-mg: take 1 to 2 pills every 3 hours as needed for breakthrough pain  -Continue extra strength tylenol 500-mg 2 tabs every 8 hours  -Continue dexamethasone 2-mg in the morning.   -Dexamethasone helps with the inflammatory component of your pain as well as with your appetite.  -You have left elbow bursitis: avoid leaning on that elbow when you're using your computer.  -You can also use an ice pack on the elbow for 10-minute periods 3 to 4 times a day    2. Anxiety and depression  -Start Lexapro 5-mg daily  -It takes 3 to 4 weeks before you will notice any effect from this medicine so it's important to keep taking it every day   -Continue alprazolam 0.25-mg: take 1/2 to 1 tab every 8 hours as needed for anxiety     3. Appetite  -You appetite picked up after starting dexamethasone    4. Shortness of breath  -Sometimes you feel very short of breath and your oxygen level is high  -We call this \"air hunger\"  -Take several slow, deep breaths when this occurs and start using your oxygen  -You can also buy a small fan to blow air across the front of your face     5. Weakness  -You've been in bed for the past week  -I'm ordering home physical therapy for you    6. Bowels  -Continue senna-docusate sodium (laxative and stool softener): 1 to 2 tabs at bedtime as needed to avoid constipation.     7. Lung cancer  -You have a CT scan scheduled in   -We talked today about moving that up to next month  -I will touch base with Dr. Moses Sharma about this  -You see Dr. Moses Sharma again in July    This is what you have shared with us about Advance Care Planning:    Primary Decision Maker ([address]):  Anayeli Bangura  Relationship to patient:Daughter  Phone [phone]  [] Named in a scanned document   [x] Legal Next of Kin  [] Guardian     Secondary Decision Maker ([address]):   Relationship to patient:  Phone number:  [] Named in a scanned document   [] Legal Next of Kin  [] Guardian     ACP documents you current have include:  [] Advance Directive or Living Will  [] Durable Do Not Resuscitate  [] Physician Orders for Scope of Treatment (POST)  [] Medical Power of   [] Other      The Palliative Medicine Team is here to support you and your family. We will see you again in 3 to 4 weeks.     Sincerely,      Ángela Caal MD and the Palliative Medicine Team      Counseling and Coordination: note routed to PCP, Dr. Viktoriya Orellana NP       [address] / TREATMENT PREFERENCES:   [====Goals of Care====]  GOALS OF CARE:  Patient / health care proxy stated goals: continue cancer treatment to live as long as possible      TREATMENT PREFERENCES:   Code Status:  [] Attempt Resuscitation       [x] Do Not Attempt Resuscitation    Advance Care Planning:  [x] [facility] Interdisciplinary Team has updated the ACP Navigator with [address] and Patient Capacity    Primary Decision Maker ([address]):  Anayeli Bangura  Relationship to patient:Daughter  Phone [phone]  [] Named in a scanned document   [x] Legal Next of Kin  [] Guardian     Secondary Decision Maker ([address]):   Relationship to patient:  Phone number:  [] Named in a scanned document   [] Legal Next of Kin  [] Guardian     ACP documents you current have include:  [] Advance Directive or Living Will  [x] Durable Do Not Resuscitate  [] Physician Orders for Scope of Treatment (POST)  [] Medical Power of   [] Other       Other:  (If patient appropriate for POST, consider using PALLPOST smart phrase here)    The palliative care team has discussed with patient / health care proxy about goals of care / treatment preferences for patient.  [====Goals of Care====]         PRESCRIPTIONS GIVEN:     Medications Ordered Today   Medications    oxyCODONE ER (OXYCONTIN) 10 mg ER tablet     Sig: Take 1 Tab by mouth every twelve (12) hours for 30 days. Max Daily Amount: 20 mg. Dispense:  60 Tab     Refill:  0    oxyCODONE IR (ROXICODONE) 5 mg immediate release tablet     Sig: Take 1 Tab by mouth every three (3) hours as needed for Pain for up to 30 days. Max Daily Amount: 40 mg. Dispense:  180 Tab     Refill:  0    ALPRAZolam (XANAX) 0.25 mg tablet     Sig: Take 1/2 to 1 tab every 8 hours as needed for anxiety. Indications: Anxiousness associated with Depression     Dispense:  60 Tab     Refill:  1    escitalopram oxalate (LEXAPRO) 5 mg tablet     Sig: Take 1 Tab by mouth daily. Dispense:  30 Tab     Refill:  5           FOLLOW UP:     Future Appointments   Date Time Provider Hilario Barba   6/19/2019 11:00 AM [facility] 1 [facility] STAnastasiya Paredes [place]   7/3/2019 10:30 AM Roxanne Rodriguez MD [facility]           PHYSICIANS INVOLVED IN CARE:   Patient Care Team:  Hayley Day MD as PCP - General (Internal Medicine)  Nena Mayo MD (Internal Medicine)  Roxanne Rodriguez MD (Hematology and Oncology)  Niko Hay MD (Radiation Oncology)  Naty Parker MD as Physician (Palliative Medicine)       HISTORY:   Nursing documentation from date of visit reviewed. Reviewed patient-completed ESAS and advance care planning form. Reviewed patient record in prescription monitoring program.    CHIEF COMPLAINT: pain, weakness    HPI/SUBJECTIVE:    The patient is: [x] Verbal / [] Nonverbal     She's not feeling well. She hurts all the time.  Her back hurts. Her hands hurt. Her elbow hurts. Her feet hurt. It hurts so much when she walks on the floor. She hasn't gotten out of bed in a week. Abrahan Santiago gives her oxycodone every 3 hours, she's been giving her 2 pills at a time for the past week because she's been in so much pain. She usually can get up with John Paul's help but hasn't been able to help at all the past week. She cries all the time. She's depressed. She thinks all the time about her son ( years ago). Sometimes she gets very short of breath. Abrahan Santiago checks her oxygen (usually high) and puts her oxygen on and she tries to take some deep breaths until it finally goes away. She was so bad a few days ago that Abrahan Santiago gave her one of her anxiety pills and she felt better after awhile. Abrahan Santiago usually gives her 1/2 anxiety pill (Xanax) at bedtime, none during the day. She was taking an antidepressant for a short while but john paul stopped it because due to her low appetite. Her appetite has been better since she started the appetite medicine (dexamthasone). She feels very weak and shaky on her feet. She's not sure if this is from her pain or not. She won't go into the shower because she's afraid of falling. Abrahan Santiago is feeling frustrated because of her mother's weakness and not working with her to get out of bed and with showering. She questions whether she can continue to care for her in her home.     Clinical Pain Assessment (nonverbal scale for nonverbal patients):   [++++ Clinical Pain Assessment++++]  [++++Pain Severity++++]: Pain: 10  [++++Pain Character++++]: dull, aching  [++++Pain Duration++++]: years for joint pain, months for chest wall pain  [++++Pain Effect++++]: limits activity, causes emotional distress  [++++Pain Factors++++]: no identifiable provoking factors, oxycodone helps  [++++Pain Frequency++++]: constant with varying intensity  [++++Pain Location++++]: multiple joints, left upper chest  [++++ Clinical Pain Assessment++++]       FUNCTIONAL ASSESSMENT:     Palliative Performance Scale (PPS):  PPS: 70       PSYCHOSOCIAL/SPIRITUAL SCREENING:     Any spiritual / Taoist concerns:  [] Yes /  [x] No    Caregiver Burnout:  [] Yes /  [x] No /  [] No Caregiver Present      Anticipatory grief assessment:   [] Normal  / [] Maladaptive       ESAS Anxiety: Anxiety: 8    ESAS Depression: Depression: 8       REVIEW OF SYSTEMS:     The following systems were [x] reviewed / [] unable to be reviewed  Systems: constitutional, ears/nose/mouth/throat, respiratory, gastrointestinal, genitourinary, musculoskeletal, integumentary, neurologic, psychiatric, endocrine. Positive findings noted below. Modified ESAS Completed by: provider   Fatigue: 8 Drowsiness: 4   Depression: 8 Pain: 10   Anxiety: 8 Nausea: 0   Anorexia: 5 Dyspnea: 5   Best Well-Bein Constipation: No   Other Problem (Comment): 0          PHYSICAL EXAM:     Wt Readings from Last 3 Encounters:   19 111 lb 12.8 oz (50.7 kg)   19 116 lb (52.6 kg)   19 120 lb 6.4 oz (54.6 kg)     Blood pressure 134/86, pulse 82, temperature 98.3 °F (36.8 °C), temperature source Oral, resp. rate 16, height 5' 7\" (1.702 m), SpO2 91 %.   Last bowel movement: See Nursing Note    Constitutional: ill-appearing, sitting in wheelchair, daughter at side  Eyes: pupils equal, anicteric  ENMT: no nasal discharge, moist mucous membranes  Cardiovascular: regular rhythm, no peripheral edema  Respiratory: breathing not labored, symmetric  Gastrointestinal: soft non-tender, +bowel sounds  Musculoskeletal: bilateral ulnar deviation R>L; temporal muscle wasting  Skin: warm, dry  Neurologic: alert, following commands, moving all extremities  Psychiatric: anxious affect, no hallucinations  Other:       HISTORY:     Past Medical History:   Diagnosis Date    AAA (abdominal aortic aneurysm) (HCC)     Arthritis     ra, osteoporosis, osteoarthritis    Atrial fibrillation (HCC)     COPD     HTN (hypertension)     Lung cancer (HCC)     RA [facility]umat[facility] arthritis[facility] ([facility] [address]     Smoker     TIA (transient ischemic attack)     TIA 16      Past Surgical History:   Procedure Laterality Date    HX CHOLECYSTECTOMY      HX HYSTERECTOMY      HX ORTHOPAEDIC      carpal tunnel, wrist surgery      Family History   Problem Relation Age of Onset    Hypertension Father       History reviewed, no pertinent family history. Social History     Tobacco Use    Smoking status: Former Smoker     Years: 50.00     Last attempt to quit: 2018     Years since quittin.6    Smokeless tobacco: Never Used   Substance Use Topics    Alcohol use: No     Allergies   Allergen Reactions    Codeine Nausea and Vomiting      Current Outpatient Medications   Medication Sig    apixaban (ELIQUIS) 5 mg tablet Take 5 mg by mouth daily.  oxyCODONE IR (ROXICODONE) 5 mg immediate release tablet Take 1 Tab by mouth every three (3) hours as needed for Pain for up to 30 days. Max Daily Amount: 40 mg.    dexamethasone (DECADRON) 2 mg tablet Take 1 Tab by mouth daily (after breakfast).  amLODIPine (NORVASC) 5 mg tablet Take 5 mg by mouth daily.  ALPRAZolam (XANAX) 0.25 mg tablet Take 1 Tab by mouth daily as needed for Anxiety (Take 1 hour prior to radiation and 1/2 tab at bedtime).  cholecalciferol (VITAMIN D3) 50,000 unit capsule Take 50,000 Units by mouth every seven (7) days.  digoxin (LANOXIN) 0.125 mg tablet Take 0.125 mg by mouth daily.  nystatin (MYCOSTATIN) powder APPLY TO AFFECTED AREA TWICE A DAY    mupirocin (BACTROBAN) 2 % ointment APPLY A SMALL AMOUNT TO THE AFFECTED AREA THREE TIMES A DAY    apixaban (ELIQUIS) 2.5 mg tablet Take 1 Tab by mouth every twelve (12) hours for 30 days.  folic acid (FOLVITE) 1 mg tablet Take 1 Tab by mouth daily for 30 days. No current facility-administered medications for this visit.            LAB DATA REVIEWED:     Lab Results   Component Value Date/Time    WBC 8.9 12/04/2018 03:27 PM    HGB 10.5 (L) 12/04/2018 03:27 PM    PLATELET 805 (H) 64/45/6058 03:27 PM     Lab Results   Component Value Date/Time    Sodium 141 12/04/2018 03:27 PM    Potassium 4.9 12/04/2018 03:27 PM    Chloride 96 12/04/2018 03:27 PM    CO2 32 (H) 12/04/2018 03:27 PM    BUN 12 12/04/2018 03:27 PM    Creatinine 0.48 (L) 12/04/2018 03:27 PM    Calcium 9.1 12/04/2018 03:27 PM    Magnesium 1.6 08/26/2018 04:39 AM    Phosphorus 4.2 08/26/2018 04:39 AM      Lab Results   Component Value Date/Time    AST (SGOT) 15 11/24/2018 10:50 AM    Alk. phosphatase 62 11/24/2018 10:50 AM    Protein, total 6.8 11/24/2018 10:50 AM    Albumin 2.7 (L) 11/24/2018 10:50 AM    Globulin 4.1 (H) 11/24/2018 10:50 AM     Lab Results   Component Value Date/Time    INR 1.1 10/01/2016 07:45 AM    Prothrombin time 11.1 10/01/2016 07:45 AM    aPTT 31.7 10/01/2016 07:45 AM      Lab Results   Component Value Date/Time    Iron 14 (L) 11/28/2018 12:58 AM    TIBC 143 (L) 11/28/2018 12:58 AM    Iron % saturation 10 (L) 11/28/2018 12:58 AM    Ferritin 139 11/28/2018 12:58 AM      PET-CT 11/28/18:  HEAD/NECK: No apparent foci of abnormal hypermetabolism. Cerebral evaluation is  limited by normal intense activity.     CHEST: Left upper lobe mass lesion measures approximately 7 cm in transverse by  4.3 cm in AP dimension. Small SUV is 10.1 bilateral pleural effusions. There is  a small hypermetabolic and spiculated nodule in the right upper lobe with  maximal SUV that is difficult to quantify due to the size of lesion. Right hilar  lymph node with a maximal SUV of 2.2. There is abnormal hypermetabolic  appearance in the left chest wall without definitive lytic or blastic lesion  underlying.     ABDOMEN/PELVIS: Right adrenal mass lesion is not hypermetabolic. Bladder  diverticulum.  Aortic atherosclerotic change with aortic aneurysm     SKELETON: Mild increased hypermetabolic characteristics in the left 10th and  11th ribs there is mildly hypermetabolic nature to the T4 rib on the left as  well.     IMPRESSION:      Large hypermetabolic left upper lobe mass.     There is metastatic disease present. Mildly hypermetabolic likely neoplastic spiculated nodule in the right upper  lobe. Subtly hypermetabolic foci of irregularity in the left chest wall particularly  at the T4 level on the left, T11 and T10 abnormalities on the left as well. Possible right hilar metastatic focus. CT chest/abdomen/pelvis 11/24/18:  THYROID: No nodule. MEDIASTINUM: No mass or lymphadenopathy. KATHERINE: No mass or lymphadenopathy. THORACIC AORTA: Atherosclerotic. No dissection or aneurysm. PULMONARY ARTERIES: Main pulmonary artery is normal in caliber. No evidence of  acute pulmonary emboli through the lobar arterial level. Segmental and  subsegmental pulmonary arteries are suboptimally evaluated secondary to history  motion artifact. TRACHEA/BRONCHI: Patent. ESOPHAGUS: No wall thickening or dilatation. HEART: Normal in size. Moderate coronary artery calcifications. PLEURA: Small left pleural effusion. LUNGS: Bilateral dependent atelectasis. Mass in the posterior left upper lobe,  measuring 7.3 x 4.4 cm on series 2, image 118. LIVER: No mass or biliary dilatation. GALLBLADDER: Surgically absent. SPLEEN: Contains multiple calcified granulomata. PANCREAS: No mass or ductal dilatation. ADRENALS: 2.6 x 1.5 cm indeterminate right adrenal nodule (series 2, image 29). KIDNEYS: 1.4 cm complex cyst versus solid mass in the left kidney (series 2,  image 10). 2.1 cm complex cyst versus solid mass in the right kidney (series 2,  image 7). Bilateral simple renal cysts. STOMACH: Unremarkable. SMALL BOWEL: No dilatation or wall thickening. COLON: No dilatation or wall thickening. APPENDIX: Not visualized. PERITONEUM: No ascites or pneumoperitoneum. RETROPERITONEUM: 5.4 x 5.2 cm proximal abdominal aortic aneurysm, with no  evidence of rupture.   REPRODUCTIVE ORGANS: Status post hysterectomy. URINARY BLADDER: Mild diffuse wall thickening. No visualized mass or calculus. BONES: Cortical erosion of the left fourth posterior rib, associated with the  left upper lobe pulmonary mass. Chronic appearing compression deformities at T9  and T12. IMPRESSION:  No evidence of central pulmonary embolus. Suboptimal evaluation of segmental and  subsegmental pulmonary arteries secondary to respiratory motion artifact. 7.3  cm left upper lobe pulmonary mass is compatible with malignancy. Associated  cortical destruction of the left fourth posterior rib. 2.6 cm right adrenal  nodule. Indeterminate bilateral renal lesions. 5.4 cm abdominal aortic aneurysm,  with no evidence of rupture.  Mild diffuse urinary bladder wall thickening.        CONTROLLED SUBSTANCES SAFETY ASSESSMENT (IF ON CONTROLLED SUBSTANCES):     Reviewed opioid safety handout:  [x] Yes   [] No  24 hour opioid dose >150mg morphine equivalent/day:  [] Yes   [x] No  Benzodiazepines:  [x] Yes   [] No  Sleep apnea:  [] Yes   [x] No  Urine Toxicology Testing within last 6 months:  [] Yes   [x] No  History of or new aberrant medication taking behaviors:  [] Yes   [x] No  Has Narcan been prescribed [x] Yes   [] No          Total time:   Counseling / coordination time:   > 50% counseling / coordination?:

## 2019-05-01 ENCOUNTER — TELEPHONE (OUTPATIENT)
Dept: PALLATIVE CARE | Age: 84
End: 2019-05-01

## 2019-05-01 ENCOUNTER — HOME CARE VISIT (OUTPATIENT)
Dept: SCHEDULING | Facility: HOME HEALTH | Age: 84
End: 2019-05-01
Payer: MEDICARE

## 2019-05-01 VITALS
TEMPERATURE: 97.7 F | OXYGEN SATURATION: 95 % | HEART RATE: 55 BPM | SYSTOLIC BLOOD PRESSURE: 130 MMHG | RESPIRATION RATE: 18 BRPM | DIASTOLIC BLOOD PRESSURE: 60 MMHG

## 2019-05-01 PROCEDURE — 3331090001 HH PPS REVENUE CREDIT

## 2019-05-01 PROCEDURE — G0151 HHCP-SERV OF PT,EA 15 MIN: HCPCS

## 2019-05-01 PROCEDURE — 400013 HH SOC

## 2019-05-01 PROCEDURE — 3331090002 HH PPS REVENUE DEBIT

## 2019-05-02 PROCEDURE — 3331090002 HH PPS REVENUE DEBIT

## 2019-05-02 PROCEDURE — 3331090001 HH PPS REVENUE CREDIT

## 2019-05-03 ENCOUNTER — HOME CARE VISIT (OUTPATIENT)
Dept: SCHEDULING | Facility: HOME HEALTH | Age: 84
End: 2019-05-03
Payer: MEDICARE

## 2019-05-03 PROCEDURE — G0151 HHCP-SERV OF PT,EA 15 MIN: HCPCS

## 2019-05-03 PROCEDURE — 3331090001 HH PPS REVENUE CREDIT

## 2019-05-03 PROCEDURE — 3331090002 HH PPS REVENUE DEBIT

## 2019-05-04 VITALS
SYSTOLIC BLOOD PRESSURE: 130 MMHG | TEMPERATURE: 98.1 F | DIASTOLIC BLOOD PRESSURE: 70 MMHG | RESPIRATION RATE: 18 BRPM | HEART RATE: 77 BPM | OXYGEN SATURATION: 96 %

## 2019-05-04 PROCEDURE — 3331090001 HH PPS REVENUE CREDIT

## 2019-05-04 PROCEDURE — 3331090002 HH PPS REVENUE DEBIT

## 2019-05-05 PROCEDURE — 3331090002 HH PPS REVENUE DEBIT

## 2019-05-05 PROCEDURE — 3331090001 HH PPS REVENUE CREDIT

## 2019-05-06 ENCOUNTER — HOME CARE VISIT (OUTPATIENT)
Dept: SCHEDULING | Facility: HOME HEALTH | Age: 84
End: 2019-05-06
Payer: MEDICARE

## 2019-05-06 PROCEDURE — 3331090002 HH PPS REVENUE DEBIT

## 2019-05-06 PROCEDURE — G0151 HHCP-SERV OF PT,EA 15 MIN: HCPCS

## 2019-05-06 PROCEDURE — 3331090001 HH PPS REVENUE CREDIT

## 2019-05-07 ENCOUNTER — HOME CARE VISIT (OUTPATIENT)
Dept: SCHEDULING | Facility: HOME HEALTH | Age: 84
End: 2019-05-07
Payer: MEDICARE

## 2019-05-07 VITALS
SYSTOLIC BLOOD PRESSURE: 110 MMHG | HEART RATE: 68 BPM | RESPIRATION RATE: 18 BRPM | DIASTOLIC BLOOD PRESSURE: 65 MMHG | OXYGEN SATURATION: 92 %

## 2019-05-07 VITALS
OXYGEN SATURATION: 97 % | RESPIRATION RATE: 18 BRPM | SYSTOLIC BLOOD PRESSURE: 130 MMHG | HEART RATE: 62 BPM | TEMPERATURE: 97.9 F | DIASTOLIC BLOOD PRESSURE: 72 MMHG

## 2019-05-07 PROCEDURE — 3331090001 HH PPS REVENUE CREDIT

## 2019-05-07 PROCEDURE — 3331090002 HH PPS REVENUE DEBIT

## 2019-05-07 PROCEDURE — G0152 HHCP-SERV OF OT,EA 15 MIN: HCPCS

## 2019-05-08 ENCOUNTER — HOME CARE VISIT (OUTPATIENT)
Dept: SCHEDULING | Facility: HOME HEALTH | Age: 84
End: 2019-05-08
Payer: MEDICARE

## 2019-05-08 VITALS
TEMPERATURE: 98.4 F | HEART RATE: 76 BPM | DIASTOLIC BLOOD PRESSURE: 60 MMHG | SYSTOLIC BLOOD PRESSURE: 109 MMHG | RESPIRATION RATE: 18 BRPM | OXYGEN SATURATION: 98 %

## 2019-05-08 PROCEDURE — G0156 HHCP-SVS OF AIDE,EA 15 MIN: HCPCS

## 2019-05-08 PROCEDURE — 3331090002 HH PPS REVENUE DEBIT

## 2019-05-08 PROCEDURE — 3331090001 HH PPS REVENUE CREDIT

## 2019-05-08 PROCEDURE — G0151 HHCP-SERV OF PT,EA 15 MIN: HCPCS

## 2019-05-09 ENCOUNTER — HOME CARE VISIT (OUTPATIENT)
Dept: SCHEDULING | Facility: HOME HEALTH | Age: 84
End: 2019-05-09
Payer: MEDICARE

## 2019-05-09 VITALS
DIASTOLIC BLOOD PRESSURE: 60 MMHG | SYSTOLIC BLOOD PRESSURE: 105 MMHG | OXYGEN SATURATION: 90 % | HEART RATE: 53 BPM | RESPIRATION RATE: 18 BRPM

## 2019-05-09 PROCEDURE — 3331090001 HH PPS REVENUE CREDIT

## 2019-05-09 PROCEDURE — 3331090002 HH PPS REVENUE DEBIT

## 2019-05-09 PROCEDURE — G0152 HHCP-SERV OF OT,EA 15 MIN: HCPCS

## 2019-05-10 ENCOUNTER — HOME CARE VISIT (OUTPATIENT)
Dept: SCHEDULING | Facility: HOME HEALTH | Age: 84
End: 2019-05-10
Payer: MEDICARE

## 2019-05-10 PROCEDURE — G0151 HHCP-SERV OF PT,EA 15 MIN: HCPCS

## 2019-05-10 PROCEDURE — G0156 HHCP-SVS OF AIDE,EA 15 MIN: HCPCS

## 2019-05-10 PROCEDURE — 3331090001 HH PPS REVENUE CREDIT

## 2019-05-10 PROCEDURE — 3331090002 HH PPS REVENUE DEBIT

## 2019-05-11 VITALS
TEMPERATURE: 97.9 F | SYSTOLIC BLOOD PRESSURE: 100 MMHG | DIASTOLIC BLOOD PRESSURE: 60 MMHG | RESPIRATION RATE: 18 BRPM | HEART RATE: 72 BPM | OXYGEN SATURATION: 93 %

## 2019-05-11 PROCEDURE — 3331090001 HH PPS REVENUE CREDIT

## 2019-05-11 PROCEDURE — 3331090002 HH PPS REVENUE DEBIT

## 2019-05-12 PROCEDURE — 3331090001 HH PPS REVENUE CREDIT

## 2019-05-12 PROCEDURE — 3331090002 HH PPS REVENUE DEBIT

## 2019-05-13 ENCOUNTER — HOME CARE VISIT (OUTPATIENT)
Dept: SCHEDULING | Facility: HOME HEALTH | Age: 84
End: 2019-05-13
Payer: MEDICARE

## 2019-05-13 VITALS
DIASTOLIC BLOOD PRESSURE: 60 MMHG | RESPIRATION RATE: 18 BRPM | OXYGEN SATURATION: 93 % | TEMPERATURE: 98.1 F | SYSTOLIC BLOOD PRESSURE: 102 MMHG | HEART RATE: 73 BPM

## 2019-05-13 PROCEDURE — G0151 HHCP-SERV OF PT,EA 15 MIN: HCPCS

## 2019-05-13 PROCEDURE — 3331090002 HH PPS REVENUE DEBIT

## 2019-05-13 PROCEDURE — 3331090001 HH PPS REVENUE CREDIT

## 2019-05-14 ENCOUNTER — HOME CARE VISIT (OUTPATIENT)
Dept: SCHEDULING | Facility: HOME HEALTH | Age: 84
End: 2019-05-14
Payer: MEDICARE

## 2019-05-14 VITALS
OXYGEN SATURATION: 90 % | RESPIRATION RATE: 18 BRPM | HEART RATE: 65 BPM | SYSTOLIC BLOOD PRESSURE: 110 MMHG | DIASTOLIC BLOOD PRESSURE: 58 MMHG

## 2019-05-14 PROCEDURE — 3331090002 HH PPS REVENUE DEBIT

## 2019-05-14 PROCEDURE — G0152 HHCP-SERV OF OT,EA 15 MIN: HCPCS

## 2019-05-14 PROCEDURE — 3331090001 HH PPS REVENUE CREDIT

## 2019-05-15 ENCOUNTER — HOME CARE VISIT (OUTPATIENT)
Dept: SCHEDULING | Facility: HOME HEALTH | Age: 84
End: 2019-05-15
Payer: MEDICARE

## 2019-05-15 VITALS
TEMPERATURE: 98.2 F | HEART RATE: 62 BPM | SYSTOLIC BLOOD PRESSURE: 120 MMHG | RESPIRATION RATE: 18 BRPM | OXYGEN SATURATION: 93 % | DIASTOLIC BLOOD PRESSURE: 70 MMHG

## 2019-05-15 PROCEDURE — 3331090001 HH PPS REVENUE CREDIT

## 2019-05-15 PROCEDURE — 3331090002 HH PPS REVENUE DEBIT

## 2019-05-15 PROCEDURE — G0151 HHCP-SERV OF PT,EA 15 MIN: HCPCS

## 2019-05-16 PROCEDURE — 3331090001 HH PPS REVENUE CREDIT

## 2019-05-16 PROCEDURE — 3331090002 HH PPS REVENUE DEBIT

## 2019-05-17 ENCOUNTER — HOME CARE VISIT (OUTPATIENT)
Dept: SCHEDULING | Facility: HOME HEALTH | Age: 84
End: 2019-05-17
Payer: MEDICARE

## 2019-05-17 VITALS
DIASTOLIC BLOOD PRESSURE: 65 MMHG | RESPIRATION RATE: 18 BRPM | SYSTOLIC BLOOD PRESSURE: 118 MMHG | HEART RATE: 54 BPM | OXYGEN SATURATION: 97 %

## 2019-05-17 VITALS
OXYGEN SATURATION: 93 % | DIASTOLIC BLOOD PRESSURE: 70 MMHG | HEART RATE: 67 BPM | SYSTOLIC BLOOD PRESSURE: 120 MMHG | RESPIRATION RATE: 18 BRPM | TEMPERATURE: 97.9 F

## 2019-05-17 PROCEDURE — 3331090001 HH PPS REVENUE CREDIT

## 2019-05-17 PROCEDURE — G0151 HHCP-SERV OF PT,EA 15 MIN: HCPCS

## 2019-05-17 PROCEDURE — 3331090002 HH PPS REVENUE DEBIT

## 2019-05-17 PROCEDURE — G0152 HHCP-SERV OF OT,EA 15 MIN: HCPCS

## 2019-05-18 PROCEDURE — 3331090001 HH PPS REVENUE CREDIT

## 2019-05-18 PROCEDURE — 3331090002 HH PPS REVENUE DEBIT

## 2019-05-19 PROCEDURE — 3331090001 HH PPS REVENUE CREDIT

## 2019-05-19 PROCEDURE — 3331090002 HH PPS REVENUE DEBIT

## 2019-05-20 ENCOUNTER — HOME CARE VISIT (OUTPATIENT)
Dept: SCHEDULING | Facility: HOME HEALTH | Age: 84
End: 2019-05-20
Payer: MEDICARE

## 2019-05-20 ENCOUNTER — HOME CARE VISIT (OUTPATIENT)
Dept: HOME HEALTH SERVICES | Facility: HOME HEALTH | Age: 84
End: 2019-05-20
Payer: MEDICARE

## 2019-05-20 PROCEDURE — 3331090002 HH PPS REVENUE DEBIT

## 2019-05-20 PROCEDURE — 3331090001 HH PPS REVENUE CREDIT

## 2019-05-20 PROCEDURE — G0151 HHCP-SERV OF PT,EA 15 MIN: HCPCS

## 2019-05-21 ENCOUNTER — HOME CARE VISIT (OUTPATIENT)
Dept: SCHEDULING | Facility: HOME HEALTH | Age: 84
End: 2019-05-21
Payer: MEDICARE

## 2019-05-21 VITALS
OXYGEN SATURATION: 95 % | HEART RATE: 62 BPM | RESPIRATION RATE: 18 BRPM | TEMPERATURE: 98.1 F | DIASTOLIC BLOOD PRESSURE: 62 MMHG | SYSTOLIC BLOOD PRESSURE: 110 MMHG

## 2019-05-21 VITALS
HEART RATE: 52 BPM | DIASTOLIC BLOOD PRESSURE: 60 MMHG | OXYGEN SATURATION: 92 % | SYSTOLIC BLOOD PRESSURE: 100 MMHG | RESPIRATION RATE: 18 BRPM

## 2019-05-21 PROCEDURE — 3331090001 HH PPS REVENUE CREDIT

## 2019-05-21 PROCEDURE — 3331090002 HH PPS REVENUE DEBIT

## 2019-05-21 PROCEDURE — G0152 HHCP-SERV OF OT,EA 15 MIN: HCPCS

## 2019-05-22 ENCOUNTER — HOME CARE VISIT (OUTPATIENT)
Dept: SCHEDULING | Facility: HOME HEALTH | Age: 84
End: 2019-05-22
Payer: MEDICARE

## 2019-05-22 PROCEDURE — 3331090002 HH PPS REVENUE DEBIT

## 2019-05-22 PROCEDURE — G0151 HHCP-SERV OF PT,EA 15 MIN: HCPCS

## 2019-05-22 PROCEDURE — 3331090001 HH PPS REVENUE CREDIT

## 2019-05-23 ENCOUNTER — HOME CARE VISIT (OUTPATIENT)
Dept: SCHEDULING | Facility: HOME HEALTH | Age: 84
End: 2019-05-23
Payer: MEDICARE

## 2019-05-23 VITALS
RESPIRATION RATE: 18 BRPM | HEART RATE: 71 BPM | TEMPERATURE: 98.1 F | SYSTOLIC BLOOD PRESSURE: 120 MMHG | DIASTOLIC BLOOD PRESSURE: 70 MMHG | OXYGEN SATURATION: 93 %

## 2019-05-23 VITALS
DIASTOLIC BLOOD PRESSURE: 70 MMHG | RESPIRATION RATE: 18 BRPM | TEMPERATURE: 96.8 F | OXYGEN SATURATION: 94 % | SYSTOLIC BLOOD PRESSURE: 120 MMHG | HEART RATE: 64 BPM

## 2019-05-23 VITALS
OXYGEN SATURATION: 97 % | HEART RATE: 50 BPM | RESPIRATION RATE: 18 BRPM | TEMPERATURE: 98.4 F | SYSTOLIC BLOOD PRESSURE: 118 MMHG | DIASTOLIC BLOOD PRESSURE: 65 MMHG

## 2019-05-23 DIAGNOSIS — M06.9 RHEUMATOID ARTHRITIS, INVOLVING UNSPECIFIED SITE, UNSPECIFIED RHEUMATOID FACTOR PRESENCE: Primary | ICD-10-CM

## 2019-05-23 PROCEDURE — 3331090001 HH PPS REVENUE CREDIT

## 2019-05-23 PROCEDURE — 3331090002 HH PPS REVENUE DEBIT

## 2019-05-23 PROCEDURE — G0152 HHCP-SERV OF OT,EA 15 MIN: HCPCS

## 2019-05-23 PROCEDURE — G0151 HHCP-SERV OF PT,EA 15 MIN: HCPCS

## 2019-05-23 RX ORDER — METHOTREXATE 2.5 MG/1
20 TABLET ORAL
Qty: 104 TAB | Refills: 1 | Status: SHIPPED | OUTPATIENT
Start: 2019-05-25 | End: 2020-02-13

## 2019-05-24 PROCEDURE — 3331090002 HH PPS REVENUE DEBIT

## 2019-05-24 PROCEDURE — 3331090001 HH PPS REVENUE CREDIT

## 2019-05-25 PROCEDURE — 3331090002 HH PPS REVENUE DEBIT

## 2019-05-25 PROCEDURE — 3331090001 HH PPS REVENUE CREDIT

## 2019-05-26 PROCEDURE — 3331090001 HH PPS REVENUE CREDIT

## 2019-05-26 PROCEDURE — 3331090002 HH PPS REVENUE DEBIT

## 2019-05-27 ENCOUNTER — HOME CARE VISIT (OUTPATIENT)
Dept: SCHEDULING | Facility: HOME HEALTH | Age: 84
End: 2019-05-27
Payer: MEDICARE

## 2019-05-27 PROCEDURE — 3331090002 HH PPS REVENUE DEBIT

## 2019-05-27 PROCEDURE — 3331090001 HH PPS REVENUE CREDIT

## 2019-05-28 ENCOUNTER — HOME CARE VISIT (OUTPATIENT)
Dept: SCHEDULING | Facility: HOME HEALTH | Age: 84
End: 2019-05-28
Payer: MEDICARE

## 2019-05-28 VITALS
OXYGEN SATURATION: 97 % | SYSTOLIC BLOOD PRESSURE: 118 MMHG | RESPIRATION RATE: 18 BRPM | TEMPERATURE: 98 F | DIASTOLIC BLOOD PRESSURE: 70 MMHG | HEART RATE: 41 BPM

## 2019-05-28 VITALS
HEART RATE: 44 BPM | OXYGEN SATURATION: 93 % | TEMPERATURE: 97.9 F | DIASTOLIC BLOOD PRESSURE: 70 MMHG | RESPIRATION RATE: 18 BRPM | SYSTOLIC BLOOD PRESSURE: 120 MMHG

## 2019-05-28 PROCEDURE — 3331090001 HH PPS REVENUE CREDIT

## 2019-05-28 PROCEDURE — G0151 HHCP-SERV OF PT,EA 15 MIN: HCPCS

## 2019-05-28 PROCEDURE — G0152 HHCP-SERV OF OT,EA 15 MIN: HCPCS

## 2019-05-28 PROCEDURE — 3331090002 HH PPS REVENUE DEBIT

## 2019-05-29 ENCOUNTER — OFFICE VISIT (OUTPATIENT)
Dept: PALLATIVE CARE | Age: 84
End: 2019-05-29

## 2019-05-29 VITALS
TEMPERATURE: 98.2 F | SYSTOLIC BLOOD PRESSURE: 140 MMHG | OXYGEN SATURATION: 96 % | BODY MASS INDEX: 19.43 KG/M2 | DIASTOLIC BLOOD PRESSURE: 62 MMHG | WEIGHT: 123.8 LBS | RESPIRATION RATE: 16 BRPM | HEART RATE: 47 BPM | HEIGHT: 67 IN

## 2019-05-29 DIAGNOSIS — R07.89 LEFT-SIDED CHEST WALL PAIN: ICD-10-CM

## 2019-05-29 DIAGNOSIS — F41.9 ANXIETY: ICD-10-CM

## 2019-05-29 DIAGNOSIS — M25.50 CHRONIC PAIN OF MULTIPLE JOINTS: Primary | ICD-10-CM

## 2019-05-29 DIAGNOSIS — F32.9 REACTIVE DEPRESSION: ICD-10-CM

## 2019-05-29 DIAGNOSIS — G89.29 CHRONIC PAIN OF MULTIPLE JOINTS: Primary | ICD-10-CM

## 2019-05-29 DIAGNOSIS — R06.02 SHORTNESS OF BREATH: ICD-10-CM

## 2019-05-29 DIAGNOSIS — R63.0 POOR APPETITE: ICD-10-CM

## 2019-05-29 DIAGNOSIS — C34.12 MALIGNANT NEOPLASM OF UPPER LOBE OF LEFT LUNG (HCC): ICD-10-CM

## 2019-05-29 PROCEDURE — 3331090001 HH PPS REVENUE CREDIT

## 2019-05-29 PROCEDURE — 3331090002 HH PPS REVENUE DEBIT

## 2019-05-29 RX ORDER — ALPRAZOLAM 0.25 MG/1
0.25 TABLET ORAL
Qty: 60 TAB | Refills: 2 | Status: ON HOLD | OUTPATIENT
Start: 2019-05-29 | End: 2019-09-08

## 2019-05-29 RX ORDER — OXYCODONE HCL 10 MG/1
10 TABLET, FILM COATED, EXTENDED RELEASE ORAL EVERY 12 HOURS
Qty: 60 TAB | Refills: 0 | Status: SHIPPED | OUTPATIENT
Start: 2019-05-29 | End: 2019-07-02 | Stop reason: SDUPTHER

## 2019-05-29 RX ORDER — ESCITALOPRAM OXALATE 10 MG/1
10 TABLET ORAL DAILY
Qty: 30 TAB | Refills: 6 | Status: ON HOLD | OUTPATIENT
Start: 2019-05-29 | End: 2019-07-21

## 2019-05-29 NOTE — PROGRESS NOTES
Palliative Medicine Outpatient Services  [place]: [phone] ([phone])    Patient Name: Janett Alvarez  YOB: 1931    Date of Current Visit: 05/29/19  Location of Current Visit:    [] [facility] Office  [x] [address] Office  [] [facility] Office  [] Home  [] Other:      Date of Initial Visit: 2/5/19   Requesting Physician: Cristofer Lopez MD  Primary Care Physician: Orion Hager MD      SUMMARY:   Janett Alvarez is a 80y.o. year old with stage IV non-small cell lung cancer, who was referred to Palliative Medicine by Dr. Cheyenne Jackson for symptom management and supportive care. She was diagnosed in 11/2018 treated with radiation therapy. She is a poor candidate for systemic chemotherapy due to her performance status. Her other medical history of note includes memory problems, rheumatoid arthritis and  5.4 cm x 5.2 cm AAA    The patients social history includes: she is . She has a daughter, Janessa Daniels. She lives with grandson, Cassy Ashford, and his wife and their 1year old daughter, Alex [place]. Palliative Medicine is addressing the following current patient/family concerns: chronic joint pain due to RA; left chest wall pain due to malignancy; anxiety; depression; fatigue; poor appetite with weight loss; memory problem. , family/caregiver stress. PALLIATIVE DIAGNOSES:       ICD-10-CM ICD-9-CM    1. Chronic pain of multiple joints M25.50 719.49 oxyCODONE ER (OXYCONTIN) 10 mg ER tablet    G89.29 338.29    2. Left-sided chest wall pain R07.89 786.52    3. Anxiety F41.9 300.00 ALPRAZolam (XANAX) 0.25 mg tablet   4. Reactive depression F32.9 300.4    5. Poor appetite R63.0 783.0    6. Shortness of breath R06.02 786.05    7. Malignant neoplasm of upper lobe of left lung (HCC) C34.12 162.3 oxyCODONE ER (OXYCONTIN) 10 mg ER tablet          PLAN:   Patient Instructions     Dear Janett Alvarez ,    It was a pleasure seeing you today in [facility].     Your stated goal: to continue to enjoy your wonderful great-grandchildren,  and  Road described symptoms were: Fatigue: 7 Drowsiness: 5   Depression: 8 Pain: 8   Anxiety: 8 Nausea: 0   Anorexia: 0 Dyspnea: 0   Best Well-Bein Constipation: No   Other Problem (Comment): 0       This is the plan we talked about:    1. Pain  -You have pain in many joints from your rheumatoid arthritis  -You also have pain in your left upper back where the lung cancer is  -Continue oxycodone long-acting 10-mg every 12 hours as your baseline pain medicine  -Continue oxycodone 5-mg: take 1 to 2 pills every 3 hours as needed for breakthrough pain  -Continue extra strength tylenol 500-mg 2 tabs every 8 hours  -Continue dexamethasone 2-mg in the morning.  -Use your heating pad for your back and joint pain  -You can wrap your heating pad around your right hand as this is one of your most painful areas       2. Anxiety and depression  -Increase Lexapro to 10-mg daily  -You can take 2 of the 5-mg pills until you run out, then start the 10-mg tabs  -Continue alprazolam 0.25-mg: take 1/2 to 1 tab every 8 hours as needed for anxiety     3. Appetite  -You appetite picked up after starting dexamethasone  -You've gained 10 pounds since February! 4. Shortness of breath  -This hasn't been a problem for you over the past month    5. Weakness  -You're working hard with the physical therapist and the occupational therapist  -You are now able to get out of bed by yourself    6. Bowels  -Continue senna-docusate sodium (laxative and stool softener): 1 to 2 tabs at bedtime as needed to avoid constipation.     7. Lung cancer  -You have a CT scan scheduled in   -You see Dr. Zari Yanes again in July    This is what you have shared with us about Advance Care Planning:    Primary Decision Maker ([address]):  Colin Cohen  Relationship to patient:Daughter  Phone [phone]  [] Named in a scanned document   [x] Legal Next of Kin  [] Guardian     Secondary Decision Maker ([address]):   Relationship to patient:  Phone number:  [] Named in a scanned document   [] Legal Next of Kin  [] Guardian     ACP documents you current have include:  [] Advance Directive or Living Will  [] Durable Do Not Resuscitate  [] Physician Orders for Scope of Treatment (POST)  [] Medical Power of   [] Other      The Palliative Medicine Team is here to support you and your family. We will see you again in 3 to 4 weeks. Sincerely,      Kamini Montes MD and the Palliative Medicine Team     Rheumatoid Arthritis: Care Instructions  Your Care Instructions    Arthritis is a common health problem in which the joints are inflamed. There are many types of arthritis. In rheumatoid arthritis, the body's own immune system attacks the joints. This causes pain, stiffness, and swelling in the joints, especially in the hands and feet. It can become hard to open jars, write, and do other daily tasks. Sometimes rheumatoid arthritis can also cause bumps to form under the skin. Over time, rheumatoid arthritis can damage and deform joints. Early treatment with medicines may reduce your chances of having a lasting disability. Follow-up care is a key part of your treatment and safety. Be sure to make and go to all appointments, and call your doctor if you are having problems. It's also a good idea to know your test results and keep a list of the medicines you take. How can you care for yourself at home? · If your doctor recommends it, get more exercise. Walking is a good choice. If your knees or ankles hurt, try riding a stationary bike or swimming. · Move each joint gently through its full range of motion once or twice a day. · Rest joints when they are sore or overworked. Short rest breaks may help more than staying in bed. · Reach and stay at a healthy weight. Regular exercise and a healthy diet will help you do this.  Extra weight can strain the joints, especially the knees and hips, and make the pain worse. Losing even a few pounds may help. · Get enough calcium and vitamin D to help prevent osteoporosis, which causes thin bones. Talk to your doctor about how much you should take. · Protect your joints from injury. Do not overuse them. Try to limit or avoid activities that cause joint pain or swelling. Use special kitchen tools and other self-help devices as well as walkers, splints, or canes if needed. · Use heat to ease pain. Take warm showers or baths. Use hot packs or a heating pad set on low. Sleep under a warm electric blanket. · Put ice or a cold pack on the area for 10 to 20 minutes at a time. Put a thin cloth between the ice and your skin. · Take pain medicines exactly as directed. ? If the doctor gave you a prescription medicine for pain, take it as prescribed. ? If you are not taking a prescription pain medicine, ask your doctor if you can take an over-the-counter medicine. · Take an active role in managing your condition. Set up a treatment plan with your doctor, and learn as much as you can about rheumatoid arthritis. This will help you control pain and stay active. When should you call for help? Call your doctor now or seek immediate medical care if:    · You have a fever or a rash along with joint pain.     · You have joint pain that is so severe that you cannot use the joint at all.     · You have sudden swelling, redness, or pain in one or more joints, and you do not know why.     · You have back or neck pain along with weakness in your arms or legs.     · You have a loss of bowel or bladder control.    Watch closely for changes in your health, and be sure to contact your doctor if:    · You have joint pain that lasts for more than 6 weeks.     · You have side effects from your arthritis medicines, such as stomach pain, nausea, heartburn, or dark and tarlike stools. Where can you learn more?   Go to http://andra-ana.info/. Enter K205 in the search box to learn more about \"Rheumatoid Arthritis: Care Instructions. \"  Current as of: Esha 10, 2018  Content Version: 11.9  © 8806-2575 [employer]. Care instructions adapted under license by [employer] (which disclaims liability or warranty for this information). If you have questions about a medical condition or this instruction, always ask your healthcare professional. [address] any warranty or liability for your use of this information. Rheumatoid Arthritis: Care Instructions  Your Care Instructions    Arthritis is a common health problem in which the joints are inflamed. There are many types of arthritis. In rheumatoid arthritis, the body's own immune system attacks the joints. This causes pain, stiffness, and swelling in the joints, especially in the hands and feet. It can become hard to open jars, write, and do other daily tasks. Sometimes rheumatoid arthritis can also cause bumps to form under the skin. Over time, rheumatoid arthritis can damage and deform joints. Early treatment with medicines may reduce your chances of having a lasting disability. Follow-up care is a key part of your treatment and safety. Be sure to make and go to all appointments, and call your doctor if you are having problems. It's also a good idea to know your test results and keep a list of the medicines you take. How can you care for yourself at home? · If your doctor recommends it, get more exercise. Walking is a good choice. If your knees or ankles hurt, try riding a stationary bike or swimming. · Move each joint gently through its full range of motion once or twice a day. · Rest joints when they are sore or overworked. Short rest breaks may help more than staying in bed. · Reach and stay at a healthy weight. Regular exercise and a healthy diet will help you do this.  Extra weight can strain the joints, especially the knees and hips, and make the pain worse. Losing even a few pounds may help. · Get enough calcium and vitamin D to help prevent osteoporosis, which causes thin bones. Talk to your doctor about how much you should take. · Protect your joints from injury. Do not overuse them. Try to limit or avoid activities that cause joint pain or swelling. Use special kitchen tools and other self-help devices as well as walkers, splints, or canes if needed. · Use heat to ease pain. Take warm showers or baths. Use hot packs or a heating pad set on low. Sleep under a warm electric blanket. · Put ice or a cold pack on the area for 10 to 20 minutes at a time. Put a thin cloth between the ice and your skin. · Take pain medicines exactly as directed. ? If the doctor gave you a prescription medicine for pain, take it as prescribed. ? If you are not taking a prescription pain medicine, ask your doctor if you can take an over-the-counter medicine. · Take an active role in managing your condition. Set up a treatment plan with your doctor, and learn as much as you can about rheumatoid arthritis. This will help you control pain and stay active. When should you call for help? Call your doctor now or seek immediate medical care if:    · You have a fever or a rash along with joint pain.     · You have joint pain that is so severe that you cannot use the joint at all.     · You have sudden swelling, redness, or pain in one or more joints, and you do not know why.     · You have back or neck pain along with weakness in your arms or legs.     · You have a loss of bowel or bladder control.    Watch closely for changes in your health, and be sure to contact your doctor if:    · You have joint pain that lasts for more than 6 weeks.     · You have side effects from your arthritis medicines, such as stomach pain, nausea, heartburn, or dark and tarlike stools. Where can you learn more?   Go to http://andra-ana.info/. Enter K205 in the search box to learn more about \"Rheumatoid Arthritis: Care Instructions. \"  Current as of: Esha 10, 2018  Content Version: 11.9  © 3601-8018 [employer]. Care instructions adapted under license by [employer] (which disclaims liability or warranty for this information). If you have questions about a medical condition or this instruction, always ask your healthcare professional. [place] [address] any warranty or liability for your use of this information.           Counseling and Coordination: note routed to PCP, Dr. Lorie Harper NP       GOALS OF CARE / TREATMENT PREFERENCES:   [====Goals of Care====]  GOALS OF CARE:  Patient / health care proxy stated goals: continue cancer treatment to live as long as possible      TREATMENT PREFERENCES:   Code Status:  [] Attempt Resuscitation       [x] Do Not Attempt Resuscitation    Advance Care Planning:  [x] The [facility] Interdisciplinary Team has updated the ACP Navigator with [address] and Patient Capacity    Primary Decision Maker ([address]):  Harley Yoon  Relationship to patient:Daughter  Phone [phone]  [] Named in a scanned document   [x] Legal Next of Kin  [] Guardian     Secondary Decision Maker ([address]):   Relationship to patient:  Phone number:  [] Named in a scanned document   [] Legal Next of Kin  [] Guardian     ACP documents you current have include:  [] Advance Directive or Living Will  [x] Durable Do Not Resuscitate  [] Physician Orders for Scope of Treatment (POST)  [] Medical Power of   [] Other       Other:  (If patient appropriate for POST, consider using PALLPOST smart phrase here)    The palliative care team has discussed with patient / health care proxy about goals of care / treatment preferences for patient.  [====Goals of Care====]         PRESCRIPTIONS GIVEN:     Medications Ordered Today   Medications    apixaban (ELIQUIS) 5 mg tablet     Sig: Take 1 Tab by mouth daily. Dispense:  30 Tab     Refill:  0    oxyCODONE ER (OXYCONTIN) 10 mg ER tablet     Sig: Take 1 Tab by mouth every twelve (12) hours for 30 days. Max Daily Amount: 20 mg. Dispense:  60 Tab     Refill:  0    ALPRAZolam (XANAX) 0.25 mg tablet     Sig: Take 1 Tab by mouth two (2) times daily as needed for Anxiety. Max Daily Amount: 0.5 mg. Indications: Anxiousness associated with Depression     Dispense:  60 Tab     Refill:  2    escitalopram oxalate (LEXAPRO) 10 mg tablet     Sig: Take 1 Tab by mouth daily.      Dispense:  30 Tab     Refill:  6           FOLLOW UP:     Future Appointments   Date Time Provider Department Center   [facility] [place] [address] [facility]   5/30/2019 10:30 AM Joel Perales   5/30/2019 11:00 AM [address]   5/30/2019 To Be Determined [address]   6/4/2019 To Be Determined [facility]   6/4/2019 To Be Determined [address]   6/6/2019 To Be Determined [facility]   6/6/2019 To Be Determined [facility]   6/11/2019 To Be Determined [facility]   6/13/2019 To Be Determined [facility] hospital   6/18/2019 To Be Determined [facility] hospital   6/19/2019 11:00 AM [facility]   6/20/2019 To Be Determined [place] [address] [facility] [facility]   7/3/2019 10:30 AM Armaan Rodriguez MD [facility] IN CARE:   Patient Care Team:  Veronica Hart MD as PCP - General (Internal Medicine)  Mikael Velez MD (Internal Medicine)  Armaan Rodriguez MD (Hematology and Oncology)  Rachel Quijano MD (Radiation Oncology)  Rosa Dodd MD as Physician (Palliative Medicine)       HISTORY:   Nursing documentation from date of visit reviewed. Reviewed patient-completed ESAS and advance care planning form. Reviewed patient record in prescription monitoring program.    CHIEF COMPLAINT: pain    HPI/SUBJECTIVE:    The patient is: [x] Verbal / [] Nonverbal     She's in pain. She always hurts. She isn't sure the new pain medicine (OxyContin) helps but Ericka Casey thinks it has, especially at night. She continues to ask for her pain medicine every 3 hours when she's awake. Ericka Casey stopped giving her the dexamethasone for about a week when she thought it was a duplicate depression medicine. Ericka Casey really noticed a difference in her mother that week, she stopped wanting to get out of bed. She's been getting out of bed by herself since restarting it. She's working with home PT three times a week and home OT twice a week. The PT noticed a lot of improvement and willingness to work when she came yesterday. The OT modified her pen and her toothbrush to make them easier to hold. She also brought some painting supplies to the house. She's not crying as much as she used to but she still feels depressed. She's eating like a horse! She enjoyed a holiday gathering last weekend, really stuffed herself. She's moving her bowels regularly. She hasn't had any episodes of shortness of breath since her last visit. She gets a CT scan next month and sees Dr. Leann Randolph in July.     Clinical Pain Assessment (nonverbal scale for nonverbal patients):   [++++ Clinical Pain Assessment++++]  [++++Pain Severity++++]: Pain: 8  [++++Pain Character++++]: dull, aching  [++++Pain Duration++++]: years for joint pain, months for chest wall pain  [++++Pain Effect++++]: limits activity, causes emotional distress  [++++Pain Factors++++]: no identifiable provoking factors, oxycodone helps  [++++Pain Frequency++++]: constant with varying intensity  [++++Pain Location++++]: multiple joints, left upper chest  [++++ Clinical Pain Assessment++++] FUNCTIONAL ASSESSMENT:     Palliative Performance Scale (PPS):  PPS: 70       PSYCHOSOCIAL/SPIRITUAL SCREENING:     Any spiritual / Alevism concerns:  [] Yes /  [x] No    Caregiver Burnout:  [] Yes /  [x] No /  [] No Caregiver Present      Anticipatory grief assessment:   [] Normal  / [] Maladaptive       ESAS Anxiety: Anxiety: 8    ESAS Depression: Depression: 8       REVIEW OF SYSTEMS:     The following systems were [x] reviewed / [] unable to be reviewed  Systems: constitutional, ears/nose/mouth/throat, respiratory, gastrointestinal, genitourinary, musculoskeletal, integumentary, neurologic, psychiatric, endocrine. Positive findings noted below. Modified ESAS Completed by: provider   Fatigue: 7 Drowsiness: 5   Depression: 8 Pain: 8   Anxiety: 8 Nausea: 0   Anorexia: 0 Dyspnea: 0   Best Well-Bein Constipation: No   Other Problem (Comment): 0          PHYSICAL EXAM:     Wt Readings from Last 3 Encounters:   19 123 lb 12.8 oz (56.2 kg)   19 111 lb 12.8 oz (50.7 kg)   19 116 lb (52.6 kg)     Blood pressure 140/62, pulse (!) 47, temperature 98.2 °F (36.8 °C), temperature source Oral, resp. rate 16, height 5' 7\" (1.702 m), weight 123 lb 12.8 oz (56.2 kg), SpO2 96 %.   Last bowel movement: See Nursing Note    Constitutional: ill-appearing, sitting in wheelchair, daughter and great-granddaughter at side  Eyes: pupils equal, anicteric  ENMT: no nasal discharge, moist mucous membranes  Cardiovascular: regular rhythm, no peripheral edema  Respiratory: breathing not labored, symmetric  Gastrointestinal: soft non-tender, +bowel sounds  Musculoskeletal: bilateral ulnar deviation R>L; temporal muscle wasting  Skin: warm, dry; xerosis   Neurologic: alert, following commands, moving all extremities  Psychiatric: full affect, no hallucinations  Other:       HISTORY:     Past Medical History:   Diagnosis Date    AAA (abdominal aortic aneurysm) (HCC)     Arthritis     ra, osteoporosis, osteoarthritis    [facility]     COPD     HTN (hypertension)     Lung cancer (HCC)     RA (rheumatoid arthritis) ([facility]     Smoker     TIA (transient ischemic attack)     TIA 16      Past Surgical History:   Procedure Laterality Date    HX CHOLECYSTECTOMY      HX HYSTERECTOMY      HX ORTHOPAEDIC      carpal tunnel, wrist surgery      Family History   Problem Relation Age of Onset    Hypertension Father       History reviewed, no pertinent family history. Social History     Tobacco Use    Smoking status: Former Smoker     Years: 50.00     Last attempt to quit: 2018     Years since quittin.7    Smokeless tobacco: Never Used   Substance Use Topics    Alcohol use: No     Allergies   Allergen Reactions    Codeine Nausea and Vomiting      Current Outpatient Medications   Medication Sig    apixaban (ELIQUIS) 5 mg tablet Take 1 Tab by mouth daily.  oxyCODONE ER (OXYCONTIN) 10 mg ER tablet Take 1 Tab by mouth every twelve (12) hours for 30 days. Max Daily Amount: 20 mg.    ALPRAZolam (XANAX) 0.25 mg tablet Take 1 Tab by mouth two (2) times daily as needed for Anxiety. Max Daily Amount: 0.5 mg. Indications: Anxiousness associated with Depression    escitalopram oxalate (LEXAPRO) 10 mg tablet Take 1 Tab by mouth daily.  methotrexate (RHEUMATREX) 2.5 mg tablet Take 8 Tabs by mouth Every Saturday. The patient takes 8 tablets which is 20 mg every Saturday    oxyCODONE IR (ROXICODONE) 5 mg immediate release tablet Take 1 Tab by mouth every three (3) hours as needed for Pain for up to 30 days. Max Daily Amount: 40 mg.    dexamethasone (DECADRON) 2 mg tablet Take 1 Tab by mouth daily (after breakfast).  amLODIPine (NORVASC) 5 mg tablet Take 5 mg by mouth daily.  cholecalciferol (VITAMIN D3) 50,000 unit capsule Take 50,000 Units by mouth every seven (7) days.  digoxin (LANOXIN) 0.125 mg tablet Take 0.125 mg by mouth daily.     nystatin (MYCOSTATIN) powder APPLY TO AFFECTED AREA TWICE A DAY  mupirocin (BACTROBAN) 2 % ointment APPLY A SMALL AMOUNT TO THE AFFECTED AREA THREE TIMES A DAY    apixaban (ELIQUIS) 2.5 mg tablet Take 1 Tab by mouth every twelve (12) hours for 30 days.  folic acid (FOLVITE) 1 mg tablet Take 1 Tab by mouth daily for 30 days. No current facility-administered medications for this visit. LAB DATA REVIEWED:     Lab Results   Component Value Date/Time    WBC 8.9 12/04/2018 03:27 PM    HGB 10.5 (L) 12/04/2018 03:27 PM    PLATELET 162 (H) 53/52/7340 03:27 PM     Lab Results   Component Value Date/Time    Sodium 141 12/04/2018 03:27 PM    Potassium 4.9 12/04/2018 03:27 PM    Chloride 96 12/04/2018 03:27 PM    CO2 32 (H) 12/04/2018 03:27 PM    BUN 12 12/04/2018 03:27 PM    Creatinine 0.48 (L) 12/04/2018 03:27 PM    Calcium 9.1 12/04/2018 03:27 PM    Magnesium 1.6 08/26/2018 04:39 AM    Phosphorus 4.2 08/26/2018 04:39 AM      Lab Results   Component Value Date/Time    AST (SGOT) 15 11/24/2018 10:50 AM    Alk. phosphatase 62 11/24/2018 10:50 AM    Protein, total 6.8 11/24/2018 10:50 AM    Albumin 2.7 (L) 11/24/2018 10:50 AM    Globulin 4.1 (H) 11/24/2018 10:50 AM     Lab Results   Component Value Date/Time    INR 1.1 10/01/2016 07:45 AM    Prothrombin time 11.1 10/01/2016 07:45 AM    aPTT 31.7 10/01/2016 07:45 AM      Lab Results   Component Value Date/Time    Iron 14 (L) 11/28/2018 12:58 AM    TIBC 143 (L) 11/28/2018 12:58 AM    Iron % saturation 10 (L) 11/28/2018 12:58 AM    Ferritin 139 11/28/2018 12:58 AM      PET-CT 11/28/18:  HEAD/NECK: No apparent foci of abnormal hypermetabolism. Cerebral evaluation is  limited by normal intense activity.     CHEST: Left upper lobe mass lesion measures approximately 7 cm in transverse by  4.3 cm in AP dimension. Small SUV is 10.1 bilateral pleural effusions. There is  a small hypermetabolic and spiculated nodule in the right upper lobe with  maximal SUV that is difficult to quantify due to the size of lesion.  Right hilar  lymph node with a maximal SUV of 2.2. There is abnormal hypermetabolic  appearance in the left chest wall without definitive lytic or blastic lesion  underlying.     ABDOMEN/PELVIS: Right adrenal mass lesion is not hypermetabolic. Bladder  diverticulum. Aortic atherosclerotic change with aortic aneurysm     SKELETON: Mild increased hypermetabolic characteristics in the left 10th and  11th ribs there is mildly hypermetabolic nature to the T4 rib on the left as  well.     IMPRESSION:      Large hypermetabolic left upper lobe mass.     There is metastatic disease present. Mildly hypermetabolic likely neoplastic spiculated nodule in the right upper  lobe. Subtly hypermetabolic foci of irregularity in the left chest wall particularly  at the T4 level on the left, T11 and T10 abnormalities on the left as well. Possible right hilar metastatic focus. CT chest/abdomen/pelvis 11/24/18:  THYROID: No nodule. MEDIASTINUM: No mass or lymphadenopathy. KATHERINE: No mass or lymphadenopathy. THORACIC AORTA: Atherosclerotic. No dissection or aneurysm. PULMONARY ARTERIES: Main pulmonary artery is normal in caliber. No evidence of  acute pulmonary emboli through the lobar arterial level. Segmental and  subsegmental pulmonary arteries are suboptimally evaluated secondary to history  motion artifact. TRACHEA/BRONCHI: Patent. ESOPHAGUS: No wall thickening or dilatation. HEART: Normal in size. Moderate coronary artery calcifications. PLEURA: Small left pleural effusion. LUNGS: Bilateral dependent atelectasis. Mass in the posterior left upper lobe,  measuring 7.3 x 4.4 cm on series 2, image 118. LIVER: No mass or biliary dilatation. GALLBLADDER: Surgically absent. SPLEEN: Contains multiple calcified granulomata. PANCREAS: No mass or ductal dilatation. ADRENALS: 2.6 x 1.5 cm indeterminate right adrenal nodule (series 2, image 29). KIDNEYS: 1.4 cm complex cyst versus solid mass in the left kidney (series 2,  image 10).  2.1 cm complex cyst versus solid mass in the right kidney (series 2,  image 7). Bilateral simple renal cysts. STOMACH: Unremarkable. SMALL BOWEL: No dilatation or wall thickening. COLON: No dilatation or wall thickening. APPENDIX: Not visualized. PERITONEUM: No ascites or pneumoperitoneum. RETROPERITONEUM: 5.4 x 5.2 cm proximal abdominal aortic aneurysm, with no  evidence of rupture. REPRODUCTIVE ORGANS: Status post hysterectomy. URINARY BLADDER: Mild diffuse wall thickening. No visualized mass or calculus. BONES: Cortical erosion of the left fourth posterior rib, associated with the  left upper lobe pulmonary mass. Chronic appearing compression deformities at T9  and T12. IMPRESSION:  No evidence of central pulmonary embolus. Suboptimal evaluation of segmental and  subsegmental pulmonary arteries secondary to respiratory motion artifact. 7.3  cm left upper lobe pulmonary mass is compatible with malignancy. Associated  cortical destruction of the left fourth posterior rib. 2.6 cm right adrenal  nodule. Indeterminate bilateral renal lesions. 5.4 cm abdominal aortic aneurysm,  with no evidence of rupture.  Mild diffuse urinary bladder wall thickening.        CONTROLLED SUBSTANCES SAFETY ASSESSMENT (IF ON CONTROLLED SUBSTANCES):     Reviewed opioid safety handout:  [x] Yes   [] No  24 hour opioid dose >150mg morphine equivalent/day:  [] Yes   [x] No  Benzodiazepines:  [x] Yes   [] No  Sleep apnea:  [] Yes   [x] No  Urine Toxicology Testing within last 6 months:  [] Yes   [x] No  History of or new aberrant medication taking behaviors:  [] Yes   [x] No  Has Narcan been prescribed [x] Yes   [] No          Total time:   Counseling / coordination time:   > 50% counseling / coordination?:

## 2019-05-29 NOTE — PROGRESS NOTES
Palliative Medicine Office Visit  Palliative Medicine Nurse Check In  [phone]    Patient Name: Ann Marie Lopez  YOB: 1931      Date of Office Visit:  5/29/2019      Patient states: follow up    From Check In Sheet (scanned in Media):  Has a medical provider talked with you about cardiopulmonary resuscitation (CPR)? [x] Yes   [] No   [] Unable to obtain    Nurse reminder to complete or update ACP FlowSheet:    Is ACP on the Problem List?    [] Yes    [x] No  IF ACP Document is ON FILE; Nurse to place ACP on Problem List     Is there an ACP Note in Chart Review/Note? [] Yes    [x] No   If NO: [address] Planning 4/30/2019   Patient's Healthcare Decision Maker is: Verbal statement (Legal Next of Kin remains as decision maker)   Primary Decision Maker Name -   Confirm Advance Directive None   Patient Would Like to Complete Advance Directive No   Does the patient have other document types Do Not Resuscitate       Primary Decision MakerRnuzhat Diana - Shanta - [phone]  Advance Care Planning 5/29/2019   Patient's [address] is: Verbal statement (Legal Next of Kin remains as decision maker)   Primary Decision Maker Name -   Confirm Advance Directive None   Patient Would Like to Complete Advance Directive No   Does the patient have other document types Do Not Resuscitate         Is there anything that we should know about you as a person in order to provide you the best care possible? Have you been to the ER, urgent care clinic since your last visit? [] Yes   [x] No   [] Unable to obtain    Have you been hospitalized since your last visit? [] Yes   [x] No   [] Unable to obtain    Have you seen or consulted any other health care providers outside of the [address] [place] [address] since your last visit?    [] Yes   [x] No   [] Unable to obtain    Functional status (describe):         Last BM: yesterday     accessed (date): 5/28/19    Bottle review (for opioid pain medication):  Medication 1:   Date filled:   Directions:   # filled:   # left:   # pills taking per day:  Last dose taken:    Medication 2:   Date filled:   Directions:   # filled:   # left:   # pills taking per day:  Last dose taken:    Medication 3:   Date filled:   Directions:   # filled:   # left:   # pills taking per day:  Last dose taken:    Medication 4:   Date filled:   Directions:   # filled:   # left:   # pills taking per day:  Last dose taken:

## 2019-05-30 ENCOUNTER — HOME CARE VISIT (OUTPATIENT)
Dept: SCHEDULING | Facility: HOME HEALTH | Age: 84
End: 2019-05-30
Payer: MEDICARE

## 2019-05-30 ENCOUNTER — HOME CARE VISIT (OUTPATIENT)
Dept: HOME HEALTH SERVICES | Facility: HOME HEALTH | Age: 84
End: 2019-05-30
Payer: MEDICARE

## 2019-05-30 VITALS
SYSTOLIC BLOOD PRESSURE: 140 MMHG | HEART RATE: 47 BPM | RESPIRATION RATE: 18 BRPM | OXYGEN SATURATION: 97 % | TEMPERATURE: 97.5 F | DIASTOLIC BLOOD PRESSURE: 70 MMHG

## 2019-05-30 PROCEDURE — G0152 HHCP-SERV OF OT,EA 15 MIN: HCPCS

## 2019-05-30 PROCEDURE — 3331090002 HH PPS REVENUE DEBIT

## 2019-05-30 PROCEDURE — 3331090001 HH PPS REVENUE CREDIT

## 2019-05-30 PROCEDURE — G0151 HHCP-SERV OF PT,EA 15 MIN: HCPCS

## 2019-05-31 VITALS
SYSTOLIC BLOOD PRESSURE: 140 MMHG | OXYGEN SATURATION: 97 % | TEMPERATURE: 97.7 F | DIASTOLIC BLOOD PRESSURE: 70 MMHG | HEART RATE: 42 BPM | RESPIRATION RATE: 18 BRPM

## 2019-05-31 PROCEDURE — 3331090001 HH PPS REVENUE CREDIT

## 2019-05-31 PROCEDURE — 3331090002 HH PPS REVENUE DEBIT

## 2019-06-01 PROCEDURE — 3331090002 HH PPS REVENUE DEBIT

## 2019-06-01 PROCEDURE — 3331090001 HH PPS REVENUE CREDIT

## 2019-06-02 PROCEDURE — 3331090001 HH PPS REVENUE CREDIT

## 2019-06-02 PROCEDURE — 3331090002 HH PPS REVENUE DEBIT

## 2019-06-03 PROCEDURE — 3331090001 HH PPS REVENUE CREDIT

## 2019-06-03 PROCEDURE — 3331090002 HH PPS REVENUE DEBIT

## 2019-06-04 ENCOUNTER — HOME CARE VISIT (OUTPATIENT)
Dept: SCHEDULING | Facility: HOME HEALTH | Age: 84
End: 2019-06-04
Payer: MEDICARE

## 2019-06-04 VITALS
TEMPERATURE: 98.1 F | OXYGEN SATURATION: 97 % | HEART RATE: 46 BPM | SYSTOLIC BLOOD PRESSURE: 112 MMHG | DIASTOLIC BLOOD PRESSURE: 70 MMHG | RESPIRATION RATE: 18 BRPM

## 2019-06-04 PROCEDURE — 3331090002 HH PPS REVENUE DEBIT

## 2019-06-04 PROCEDURE — 3331090001 HH PPS REVENUE CREDIT

## 2019-06-04 PROCEDURE — G0151 HHCP-SERV OF PT,EA 15 MIN: HCPCS

## 2019-06-05 ENCOUNTER — HOME CARE VISIT (OUTPATIENT)
Dept: SCHEDULING | Facility: HOME HEALTH | Age: 84
End: 2019-06-05
Payer: MEDICARE

## 2019-06-05 VITALS
HEART RATE: 40 BPM | SYSTOLIC BLOOD PRESSURE: 105 MMHG | TEMPERATURE: 98.1 F | OXYGEN SATURATION: 97 % | DIASTOLIC BLOOD PRESSURE: 60 MMHG | RESPIRATION RATE: 18 BRPM

## 2019-06-05 PROCEDURE — 3331090002 HH PPS REVENUE DEBIT

## 2019-06-05 PROCEDURE — 3331090001 HH PPS REVENUE CREDIT

## 2019-06-05 PROCEDURE — G0152 HHCP-SERV OF OT,EA 15 MIN: HCPCS

## 2019-06-06 ENCOUNTER — HOME CARE VISIT (OUTPATIENT)
Dept: SCHEDULING | Facility: HOME HEALTH | Age: 84
End: 2019-06-06
Payer: MEDICARE

## 2019-06-06 VITALS
HEART RATE: 85 BPM | RESPIRATION RATE: 18 BRPM | SYSTOLIC BLOOD PRESSURE: 112 MMHG | TEMPERATURE: 98.3 F | OXYGEN SATURATION: 93 % | DIASTOLIC BLOOD PRESSURE: 70 MMHG

## 2019-06-06 PROCEDURE — 3331090002 HH PPS REVENUE DEBIT

## 2019-06-06 PROCEDURE — 3331090001 HH PPS REVENUE CREDIT

## 2019-06-06 PROCEDURE — G0151 HHCP-SERV OF PT,EA 15 MIN: HCPCS

## 2019-06-06 PROCEDURE — G0152 HHCP-SERV OF OT,EA 15 MIN: HCPCS

## 2019-06-07 VITALS
SYSTOLIC BLOOD PRESSURE: 122 MMHG | OXYGEN SATURATION: 93 % | TEMPERATURE: 97.7 F | DIASTOLIC BLOOD PRESSURE: 70 MMHG | RESPIRATION RATE: 16 BRPM | HEART RATE: 51 BPM

## 2019-06-07 PROCEDURE — 3331090002 HH PPS REVENUE DEBIT

## 2019-06-07 PROCEDURE — 3331090001 HH PPS REVENUE CREDIT

## 2019-06-08 PROCEDURE — 3331090002 HH PPS REVENUE DEBIT

## 2019-06-08 PROCEDURE — 3331090001 HH PPS REVENUE CREDIT

## 2019-06-09 PROCEDURE — 3331090001 HH PPS REVENUE CREDIT

## 2019-06-09 PROCEDURE — 3331090002 HH PPS REVENUE DEBIT

## 2019-06-10 ENCOUNTER — HOME CARE VISIT (OUTPATIENT)
Dept: SCHEDULING | Facility: HOME HEALTH | Age: 84
End: 2019-06-10
Payer: MEDICARE

## 2019-06-10 VITALS
DIASTOLIC BLOOD PRESSURE: 70 MMHG | TEMPERATURE: 98.2 F | SYSTOLIC BLOOD PRESSURE: 140 MMHG | OXYGEN SATURATION: 97 % | RESPIRATION RATE: 18 BRPM | HEART RATE: 46 BPM

## 2019-06-10 PROCEDURE — 3331090001 HH PPS REVENUE CREDIT

## 2019-06-10 PROCEDURE — 3331090002 HH PPS REVENUE DEBIT

## 2019-06-10 PROCEDURE — G0151 HHCP-SERV OF PT,EA 15 MIN: HCPCS

## 2019-06-11 PROCEDURE — 3331090001 HH PPS REVENUE CREDIT

## 2019-06-11 PROCEDURE — 3331090002 HH PPS REVENUE DEBIT

## 2019-06-12 ENCOUNTER — HOME CARE VISIT (OUTPATIENT)
Dept: SCHEDULING | Facility: HOME HEALTH | Age: 84
End: 2019-06-12
Payer: MEDICARE

## 2019-06-12 VITALS
RESPIRATION RATE: 16 BRPM | OXYGEN SATURATION: 97 % | TEMPERATURE: 98.1 F | DIASTOLIC BLOOD PRESSURE: 70 MMHG | HEART RATE: 40 BPM | SYSTOLIC BLOOD PRESSURE: 150 MMHG

## 2019-06-12 PROCEDURE — 3331090002 HH PPS REVENUE DEBIT

## 2019-06-12 PROCEDURE — 3331090001 HH PPS REVENUE CREDIT

## 2019-06-12 PROCEDURE — G0151 HHCP-SERV OF PT,EA 15 MIN: HCPCS

## 2019-06-13 PROCEDURE — 3331090001 HH PPS REVENUE CREDIT

## 2019-06-13 PROCEDURE — 3331090002 HH PPS REVENUE DEBIT

## 2019-06-14 PROCEDURE — 3331090001 HH PPS REVENUE CREDIT

## 2019-06-14 PROCEDURE — 3331090002 HH PPS REVENUE DEBIT

## 2019-06-15 PROCEDURE — 3331090001 HH PPS REVENUE CREDIT

## 2019-06-15 PROCEDURE — 3331090002 HH PPS REVENUE DEBIT

## 2019-06-16 PROCEDURE — 3331090001 HH PPS REVENUE CREDIT

## 2019-06-16 PROCEDURE — 3331090002 HH PPS REVENUE DEBIT

## 2019-06-17 ENCOUNTER — HOME CARE VISIT (OUTPATIENT)
Dept: SCHEDULING | Facility: HOME HEALTH | Age: 84
End: 2019-06-17
Payer: MEDICARE

## 2019-06-17 PROCEDURE — 3331090001 HH PPS REVENUE CREDIT

## 2019-06-17 PROCEDURE — G0151 HHCP-SERV OF PT,EA 15 MIN: HCPCS

## 2019-06-17 PROCEDURE — 3331090002 HH PPS REVENUE DEBIT

## 2019-06-18 VITALS
SYSTOLIC BLOOD PRESSURE: 130 MMHG | RESPIRATION RATE: 18 BRPM | TEMPERATURE: 98.6 F | HEART RATE: 54 BPM | DIASTOLIC BLOOD PRESSURE: 60 MMHG | OXYGEN SATURATION: 97 %

## 2019-06-18 PROCEDURE — 3331090001 HH PPS REVENUE CREDIT

## 2019-06-18 PROCEDURE — 3331090002 HH PPS REVENUE DEBIT

## 2019-06-19 ENCOUNTER — HOSPITAL ENCOUNTER (OUTPATIENT)
Dept: CT IMAGING | Age: 84
Discharge: HOME | End: 2019-06-19
Attending: NURSE PRACTITIONER
Payer: MEDICARE

## 2019-06-19 DIAGNOSIS — C34.12 MALIGNANT NEOPLASM OF UPPER LOBE OF LEFT LUNG (HCC): ICD-10-CM

## 2019-06-19 LAB — CREAT BLD-MCNC: 0.7 MG/DL (ref 0.6–1.3)

## 2019-06-19 PROCEDURE — 74011636320 HC RX REV CODE- 636/320: Performed by: RADIOLOGY

## 2019-06-19 PROCEDURE — 3331090002 HH PPS REVENUE DEBIT

## 2019-06-19 PROCEDURE — 74177 CT ABD & PELVIS W/CONTRAST: CPT

## 2019-06-19 PROCEDURE — 3331090001 HH PPS REVENUE CREDIT

## 2019-06-19 PROCEDURE — 82565 ASSAY OF CREATININE: CPT

## 2019-06-19 RX ADMIN — IOPAMIDOL 100 ML: 755 INJECTION, SOLUTION INTRAVENOUS at 13:45

## 2019-06-20 ENCOUNTER — HOME CARE VISIT (OUTPATIENT)
Dept: SCHEDULING | Facility: HOME HEALTH | Age: 84
End: 2019-06-20
Payer: MEDICARE

## 2019-06-20 VITALS
SYSTOLIC BLOOD PRESSURE: 120 MMHG | DIASTOLIC BLOOD PRESSURE: 62 MMHG | TEMPERATURE: 98.4 F | RESPIRATION RATE: 16 BRPM | HEART RATE: 70 BPM | OXYGEN SATURATION: 93 %

## 2019-06-20 PROCEDURE — G0151 HHCP-SERV OF PT,EA 15 MIN: HCPCS

## 2019-06-20 PROCEDURE — 3331090001 HH PPS REVENUE CREDIT

## 2019-06-20 PROCEDURE — 3331090002 HH PPS REVENUE DEBIT

## 2019-06-24 ENCOUNTER — TELEPHONE (OUTPATIENT)
Dept: PALLATIVE CARE | Age: 84
End: 2019-06-24

## 2019-06-24 ENCOUNTER — TELEPHONE (OUTPATIENT)
Dept: ONCOLOGY | Age: 84
End: 2019-06-24

## 2019-06-24 NOTE — TELEPHONE ENCOUNTER
[employer] at [facility]  [phone]    4:38 PM- Angelique Jacobs NP from dispatch help called to give MD update- CXR obtained today and showed left lower lobe pneumonia. She was started on Levaquin-   informed.

## 2019-06-24 NOTE — TELEPHONE ENCOUNTER
Patients daughter, Jacki Knott, called and stated that patient is having back pain, shortness of breath. Daughter stated that [facility] on call is coming out. Daughter would like to know if someone can give her the results of patients scan before on call gets there.  #  [phone]

## 2019-06-24 NOTE — TELEPHONE ENCOUNTER
CT scans look great with further decrease in size of lung lesion following radiation. No new sites of disease and no sign of infection (PNA) in lungs. Nothing on imaging to explain back pain. Is this new pain? Injury? She is being followed by Dr. Munir Tsang for symptom control, may need medication adjustments if chronic concern. Nothing to explain SOB, may need to have CBC repeated.

## 2019-06-24 NOTE — TELEPHONE ENCOUNTER
[employer] at [facility]  [phone]    06/24/19- Spoke to patient's daughter, informed her of CT results per Lucie Adjutant. [facility] is there now evaluating her, spoke to Sushma Gruber, she reports patient is febrile at 100.4 and more confused. She noted crackles in left lung. [employer] doing a urine sample and chest x-ray. Patient's complaining of new thoracic back pain, took a dose of oxycodone, but may need further imaging. She will call back to update us after the visit. 11:31 AM- Call received from [address] [place] [address] with Edmar Bean, urine came back negative. She's starting patient on Levaquin for possible pneumonia. Patient's O2 sats are 91% on RA, encouraged patient to wear oxygen. Chest x-ray results will be sent to our office once completed. She educated patient's grandson of when to call the office or take patient to the ED. Dr. Jamey Elaine and Lucie Adjutant notified.

## 2019-06-24 NOTE — TELEPHONE ENCOUNTER
Palliative Medicine  Nursing Note  [phone]  Fax [phone]      Telephone Call  Patient Name: Falguni Celis  YOB: 1931 6/24/2019        Primary Decision Maker: Jose Drew - Daughter - [phone]   Advance Care Planning 5/29/2019   Patient's Healthcare Decision Maker is: Verbal statement (Legal Next of Kin remains as decision maker)   Primary Decision Maker Name -   Confirm Advance Directive None   Patient Would Like to Complete Advance Directive No   Does the patient have other document types Do Not Resuscitate     Returned call to patient's daughter. She shared that her mother was just diagnosed with pneumonia and is having an increase in her pain. She continues with her Oxycontin 10mg every 12 hours, and is taking her Oxycodone 5mg around every 4 hours. Discussed that Ms. Kimball can take her Oxycodone every 3 hours as needed. She stated she knew she could, but wanted to make sure it wouldn't be too much. Discussed that her mother is accustomed to taking the Opioids regularly, and every 3 hours as needed will hopefully help the pain, but to call if it does not. Reiterated it was a safe dose to take even with the Tylenol extra strength 2 tabs every 8 hours as needed. She was appreciative. Encouraged them to call for any questions or concerns.     Tony Talavera RN  Palliative Medicine

## 2019-06-28 ENCOUNTER — TELEPHONE (OUTPATIENT)
Dept: PALLATIVE CARE | Age: 84
End: 2019-06-28

## 2019-06-28 NOTE — TELEPHONE ENCOUNTER
Called patient to advise/confirm upcoming appt with Dr. Elizabeth De La Paz on 7/2/19     at   12:30pm at [facility]. Called home number no answer so called cell number and MsAnastasiya Gege Clayton confirmed. Also advised to please bring in your Drivers License and Insurance Card with you to appointment as well as any prescription pain medication in the original container with you to appt.

## 2019-07-01 NOTE — PROGRESS NOTES
[facility] at [place] [address] [facility]  Kevan Neas: [phone]  F: [phone]     Reason for Visit:   Wendy Herrera is a 80 y.o. female who is seen for follow up of non small cell lung cancer. Treatment History:   · CTA Chest and CT A/P 11/24/2018:  7.3 cm left upper lobe pulmonary mass is compatible with malignancy. Associated cortical destruction of the left fourth posterior rib. 2.6 cm right adrenal nodule. Indeterminate bilateral renal lesions. 5.4 cm abdominal aortic aneurysm, with no evidence of rupture. Mild diffuse urinary bladder wall thickening. · CT guided lung biopsy 11/27/2018: squamous cell carcinoma, PDL1 5%  · MRI Brain 11/28/2018: No evidence of acute intracranial abnormality. No evidence of intracranial metastases. · PET/CT 12/4/2018: Large hypermetabolic left upper lobe mass. There is metastatic disease present. Mildly hypermetabolic likely neoplastic spiculated nodule in the right upper lobe. Subtly hypermetabolic foci of irregularity in the left chest wall particularly at the T4 level on the left, T11 and T10 abnormalities on the left as well. Possible right hilar metastatic focus. No infradiaphragmatic metastatic disease is identified. · Stage ANDREY (cT4 cN0 M1 ) Non-small cell lung cancer  · Radiation to chest with Dr. [address]dulio [address] from 12/27/2018 - 2/2019    History of Present Illness:   She was recently seen by Edmar Bean, with a CXR diagnosing her with PNA. She was started on levofloxacin, and is now feeling much better. Improvement in dyspnea. Some pain in back, hands, and feed. Has RA, takes MTX and oxycodone. She is accompanied by her daughter today. Pt lives with grandson, who has a downstairs bedroom. Her daughter lives next door on same property. PAST HISTORY: The following sections were reviewed and updated in the EMR as appropriate: PMH, SH, FH, Medications, Allergies.     Allergies   Allergen Reactions  Codeine Nausea and Vomiting      Review of Systems: A complete review of systems was obtained, reviewed, and scanned into the EMR. Pertinent findings reviewed above. Physical Exam:     Visit Vitals  /53 (BP 1 Location: Left arm, BP Patient Position: Sitting)   Pulse 60   Temp 97.3 °F (36.3 °C) (Temporal)   Resp 14   Ht 5' 7\" (1.702 m)   SpO2 95%   BMI 19.39 kg/m²     ECOG PS: 3  General: No distress, frail, elderly female  Eyes: PERRLA, anicteric sclerae  HENT: Atraumatic, OP clear  Neck: Supple  Lymphatic: No cervical, supraclavicular, or inguinal adenopathy  Respiratory: CTAB, normal respiratory effort  CV: Normal rate, regular rhythm, no murmurs, no peripheral edema  GI: Soft, nontender, nondistended, no masses, no hepatomegaly, no splenomegaly  MS: Sitting in wheelchair  Skin: No rashes, ecchymoses, or petechiae. Normal temperature, turgor, and texture. Psych: Alert, oriented, appropriate affect, normal judgment/insight. Results:     Lab Results   Component Value Date/Time    WBC 8.9 12/04/2018 03:27 PM    HGB 10.5 (L) 12/04/2018 03:27 PM    HCT 33.6 (L) 12/04/2018 03:27 PM    PLATELET 649 (H) 96/06/7334 03:27 PM    MCV 94 12/04/2018 03:27 PM    ABS. NEUTROPHILS 5.0 12/04/2018 03:27 PM     Lab Results   Component Value Date/Time    Sodium 141 12/04/2018 03:27 PM    Potassium 4.9 12/04/2018 03:27 PM    Chloride 96 12/04/2018 03:27 PM    CO2 32 (H) 12/04/2018 03:27 PM    Glucose 84 12/04/2018 03:27 PM    BUN 12 12/04/2018 03:27 PM    Creatinine 0.48 (L) 12/04/2018 03:27 PM    GFR est  12/04/2018 03:27 PM    GFR est non-AA 89 12/04/2018 03:27 PM    Calcium 9.1 12/04/2018 03:27 PM    Glucose (POC) 93 10/01/2016 11:33 AM    Creatinine (POC) 0.7 06/19/2019 01:32 PM     Lab Results   Component Value Date/Time    Bilirubin, total 0.8 11/24/2018 10:50 AM    ALT (SGPT) 10 (L) 11/24/2018 10:50 AM    AST (SGOT) 15 11/24/2018 10:50 AM    Alk.  phosphatase 62 11/24/2018 10:50 AM    Protein, total 6.8 11/24/2018 10:50 AM    Albumin 2.7 (L) 11/24/2018 10:50 AM    Globulin 4.1 (H) 11/24/2018 10:50 AM       CT chest 3/13/2019: Significant decrease in size in the left upper lobe mass, stable associated bone findings. Nonvisualization of the right upper lobe lesion at this time. CT C/A/P 6/19/2019: Further decrease in the posttreatment changes left upper lobe lesion. No new findings otherwise. 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 No concurrent chemotherapy, given her poor performance status. Her disease has responded to radiation based on serial imaging. She is not a good candidate for palliative chemotherapy, given her poor performance status. She may be a candidate for palliative immunotherapy in the future. She has declined hospice services, but she is following with palliative care. Recommend continued surveillance for now, with repeat CT Chest in 3 months. 2) Anemia of chronic disease  Monitor      3) COPD  Pulmonary following     4) Afib  On Eliquis and dig. Cardiology following.     5) RA  On Methotrexate per rheumatology. 6) Cancer pain  Continue Oxycodone PRN. Management per Dr. Akbar Herrera. 7) Weight loss  Improving, on Dexamethasone daily per Dr. Akbar Herrera. 8) PNA  [facility] Completed course of abx with improvement. 9) Back pain  New. Could be part of her RA. Check bone scan to eval for mets. If this is positive, we will bring her back to review systemic therapy options or palliative radiation. 10) Emotional Well Being  No psychosocial concerns identified today. Patient has adequate support.      Plan:     · Labs before next visit: CBC, CMP, iron profile, ferritin  · Bone scan sometime soon  · CT C/A/P in 3 months  · Return to see me in 3 months      Signed By: Elizabeth Hawley MD

## 2019-07-02 ENCOUNTER — OFFICE VISIT (OUTPATIENT)
Dept: PALLATIVE CARE | Age: 84
End: 2019-07-02

## 2019-07-02 VITALS
HEART RATE: 64 BPM | TEMPERATURE: 97.8 F | OXYGEN SATURATION: 96 % | BODY MASS INDEX: 19.39 KG/M2 | DIASTOLIC BLOOD PRESSURE: 61 MMHG | RESPIRATION RATE: 16 BRPM | SYSTOLIC BLOOD PRESSURE: 108 MMHG | HEIGHT: 67 IN

## 2019-07-02 DIAGNOSIS — F41.9 ANXIETY: ICD-10-CM

## 2019-07-02 DIAGNOSIS — G89.29 CHRONIC PAIN OF MULTIPLE JOINTS: Primary | ICD-10-CM

## 2019-07-02 DIAGNOSIS — R06.02 SHORTNESS OF BREATH: ICD-10-CM

## 2019-07-02 DIAGNOSIS — R63.0 POOR APPETITE: ICD-10-CM

## 2019-07-02 DIAGNOSIS — M25.50 CHRONIC PAIN OF MULTIPLE JOINTS: Primary | ICD-10-CM

## 2019-07-02 DIAGNOSIS — R41.3 MEMORY PROBLEM: ICD-10-CM

## 2019-07-02 DIAGNOSIS — C34.12 MALIGNANT NEOPLASM OF UPPER LOBE OF LEFT LUNG (HCC): ICD-10-CM

## 2019-07-02 DIAGNOSIS — F32.9 REACTIVE DEPRESSION: ICD-10-CM

## 2019-07-02 RX ORDER — OXYCODONE HYDROCHLORIDE 5 MG/1
5 TABLET ORAL
Qty: 180 TAB | Refills: 0 | Status: ON HOLD | OUTPATIENT
Start: 2019-07-02 | End: 2019-07-29 | Stop reason: SDUPTHER

## 2019-07-02 RX ORDER — OXYCODONE HCL 10 MG/1
10 TABLET, FILM COATED, EXTENDED RELEASE ORAL EVERY 12 HOURS
Qty: 60 TAB | Refills: 0 | Status: ON HOLD | OUTPATIENT
Start: 2019-07-02 | End: 2019-07-21

## 2019-07-02 NOTE — PATIENT INSTRUCTIONS
Dear Yvrose Lyman ,    It was a pleasure seeing you today in [facility]. You had a CT scan on 19 which showed further decrease in the post-radiation treatment changes in your left upper lung. This is good news! You had no abnormal findings in your bones. You're having more pain in your upper back which is likely due to your rheumatoid arthritis and muscle strain. You had pneumonia a few weeks ago. You feel better after completing a course of antibiotics. You didn't have much of an appetite when you were sick and when [facility] went away for a week but it's picking back up now. You just got the news that your beloved cousin  recently. You are also celebrating the birth of your new great-granddaughter! Your stated goal: to continue to enjoy your wonderful great-grandchildren, [facility] and [facility]    Your described symptoms were: Fatigue: 7 Drowsiness: 7   Depression: 7 Pain: 8   Anxiety: 8 Nausea: 0   Anorexia: 5 Dyspnea: 0   Best Well-Bein Constipation: No   Other Problem (Comment): 0       This is the plan we talked about:    1. Continue oxycodone long-acting 10-mg every 12 hours as your baseline pain medicine    2. Continue oxycodone 5-mg: take 1 to 2 pills every 3 hours as needed for breakthrough pain    3. Continue extra strength tylenol 500-mg 2 tabs every 8 hours    4. Decrease dexamethasone 2-mg to 1/2 tab in the morning. 5. You can use your heating pad for the pain in your upper back. We talked today about having [facility] buy a re-usable heating pillow which you can warm up in the microwave. 6. Coninue Lexapro to 10-mg daily for your depression and anxiety    7. Continue alprazolam 0.25-mg: take 1/2 to 1 tab every 8 hours as needed for anxiety     8. Continue senna-docusate sodium (laxative and stool softener): 1 to 2 tabs at bedtime as needed to avoid constipation.     9. You see Dr. Michelle Alvarenga again tomorrow for a routine follow-up visit    This is what you have shared with us about Advance Care Planning:    Primary Decision Maker ([address]):  Sarkis Berrios  Relationship to patient:Daughter  Phone [phone]  [] Named in a scanned document   [x] Legal Next of Kin  [] Guardian     Secondary Decision Maker ([address]):   Relationship to patient:  Phone number:  [] Named in a scanned document   [] Legal Next of Kin  [] Guardian     ACP documents you current have include:  [] Advance Directive or Living Will  [] Durable Do Not Resuscitate  [] Physician Orders for Scope of Treatment (POST)  [] Medical Power of   [] Other      The Palliative Medicine Team is here to support you and your family. We will see you again in 4 weeks.     Sincerely,      Jaya Veloz MD and the Palliative Medicine Team

## 2019-07-02 NOTE — PROGRESS NOTES
Palliative Medicine Office Visit  Palliative Medicine Nurse Check In  [phone]    Patient Name: Queen Florina  YOB: 1931      Date of Office Visit: 7/2/2019  Check in time:12:35-12:40    Patient states: follow up no issues    From Check In Sheet (scanned in Media):  Has a medical provider talked with you about cardiopulmonary resuscitation (CPR)? [x] Yes   [] No   [] Unable to obtain    Nurse reminder to complete or update ACP FlowSheet:    Is ACP on the Problem List?    [] Yes    [x] No  IF ACP Document is ON FILE; Nurse to place ACP on Problem List     Is there an ACP Note in Chart Review/Note? [] Yes    [x] No   If NO: [address] Planning 5/29/2019   Patient's Healthcare Decision Maker is: Verbal statement (Legal Next of Kin remains as decision maker)   Primary Decision Maker Name -   Confirm Advance Directive None   Patient Would Like to Complete Advance Directive No   Does the patient have other document types Do Not Resuscitate       Is there anything that we should know about you as a person in order to provide you the best care possible? Have you been to the ER, urgent care clinic since your last visit? [] Yes   [x] No   [] Unable to obtain    Have you been hospitalized since your last visit? [] Yes   [x] No   [] Unable to obtain    Have you seen or consulted any other health care providers outside of the [address] [place] [address] since your last visit?    [] Yes   [x] No   [] Unable to obtain    Functional status (describe):       Last BM:  6/30/19     accessed (date): 7/1/19    Bottle review (for opioid pain medication):  Medication 1: Did not bring medication  Date filled:   Directions:   # filled:   # left:   # pills taking per day:  Last dose taken:    Medication 2:   Date filled:   Directions:   # filled:   # left:   # pills taking per day:  Last dose taken:    Medication 3:   Date filled:   Directions:   # filled:   # left:   # pills taking per day:  Last dose taken:    Medication 4:   Date filled:   Directions:   # filled:   # left:   # pills taking per day:  Last dose taken:

## 2019-07-02 NOTE — PROGRESS NOTES
Palliative Medicine Outpatient Services  [place]: [phone] ([phone])    Patient Name: Rhett Burnham  YOB: 1931    Date of Current Visit: 07/02/19  Location of Current Visit:    [] [facility] Office  [x] [facility] Office  [] [address] [place] [address]  [] Home  [] Other:      Date of Initial Visit: 2/5/19   Requesting Physician: Kenisha Fontana MD  Primary Care Physician: Maeve Ellington MD      SUMMARY:   Rhett Burnham is a 80y.o. year old with stage IV non-small cell lung cancer, who was referred to Palliative Medicine by Dr. Nicolle Aguero for symptom management and supportive care. She was diagnosed in 11/2018 treated with radiation therapy. She is a poor candidate for systemic chemotherapy due to her performance status. Her other medical history of note includes memory problems, rheumatoid arthritis and  5.4 cm x 5.2 cm AAA    The patients social history includes: she is . She has a daughter, Emmy Mcmahon. She lives with grandson, Santana Osorio, and his wife and their 1year old daughter, Alex [place]. Palliative Medicine is addressing the following current patient/family concerns: chronic joint pain due to RA; left chest wall pain due to malignancy; anxiety; depression; fatigue; poor appetite with weight loss; memory problem. , family/caregiver stress. PALLIATIVE DIAGNOSES:       ICD-10-CM ICD-9-CM    1. Chronic pain of multiple joints M25.50 719.49 oxyCODONE ER (OXYCONTIN) 10 mg ER tablet    G89.29 338.29    2. Anxiety F41.9 300.00    3. Reactive depression F32.9 300.4    4. Poor appetite R63.0 783.0    5. Shortness of breath R06.02 786.05    6. Memory problem R41.3 780.93    7. Malignant neoplasm of upper lobe of left lung (HCC) C34.12 162.3 oxyCODONE ER (OXYCONTIN) 10 mg ER tablet      oxyCODONE IR (ROXICODONE) 5 mg immediate release tablet          PLAN:   Patient Instructions     Dear Rhett Burnham ,    It was a pleasure seeing you today in [facility].     You had a CT scan on 19 which showed further decrease in the post-radiation treatment changes in your left upper lung. This is good news! You had no abnormal findings in your bones. You're having more pain in your upper back which is likely due to your rheumatoid arthritis and muscle strain. You had pneumonia a few weeks ago. You feel better after completing a course of antibiotics. You didn't have much of an appetite when you were sick and when Yung Sharma went away for a week but it's picking back up now. You just got the news that your beloved cousin  recently. You are also celebrating the birth of your new great-granddaughter! Your stated goal: to continue to enjoy your wonderful great-grandchildren, Lesli and  [place]    Your described symptoms were: Fatigue: 7 Drowsiness: 7   Depression: 7 Pain: 8   Anxiety: 8 Nausea: 0   Anorexia: 5 Dyspnea: 0   Best Well-Bein Constipation: No   Other Problem (Comment): 0       This is the plan we talked about:    1. Continue oxycodone long-acting 10-mg every 12 hours as your baseline pain medicine    2. Continue oxycodone 5-mg: take 1 to 2 pills every 3 hours as needed for breakthrough pain    3. Continue extra strength tylenol 500-mg 2 tabs every 8 hours    4. Decrease dexamethasone 2-mg to 1/2 tab in the morning. 5. You can use your heating pad for the pain in your upper back. We talked today about having Yung Sharma buy a re-usable heating pillow which you can warm up in the microwave. 6. Coninue Lexapro to 10-mg daily for your depression and anxiety    7. Continue alprazolam 0.25-mg: take 1/2 to 1 tab every 8 hours as needed for anxiety     8. Continue senna-docusate sodium (laxative and stool softener): 1 to 2 tabs at bedtime as needed to avoid constipation.     9. You see Dr. Emery Negron again tomorrow for a routine follow-up visit    This is what you have shared with us about Advance Care Planning:    Primary Decision [facility] ([address]):  Mark [place] [employer]ose  Relationship to patient:Daughter  Phone [phone]  [] Named in a scanned document   [x] Legal Next of Kin  [] Guardian     Secondary Decision Maker ([address]):   Relationship to patient:  Phone number:  [] Named in a scanned document   [] Legal Next of Kin  [] Guardian     ACP documents you current have include:  [] Advance Directive or Living Will  [] Durable Do Not Resuscitate  [] Physician Orders for Scope of Treatment (POST)  [] Medical Power of   [] Other      The Palliative Medicine Team is here to support you and your family. We will see you again in 4 weeks.     Sincerely,      Kavin Jara MD and the Palliative Medicine Team           Counseling and Coordination: note routed to PCP     GOALS OF CARE / TREATMENT PREFERENCES:   [====Goals of Care====]  GOALS OF CARE:  Patient / health care proxy stated goals: continue cancer treatment to live as long as possible      TREATMENT PREFERENCES:   Code Status:  [] Attempt Resuscitation       [x] Do Not Attempt Resuscitation    Advance Care Planning:  [x] [facility] Interdisciplinary Team has updated the ACP Navigator with [address] and Patient Capacity    Primary Decision Maker ([address]):  Martin Mcdaniel  Relationship to patient:Daughter  Phone [phone]  [] Named in a scanned document   [x] Legal Next of Kin  [] Guardian     Secondary Decision Maker ([address]):   Relationship to patient:  Phone number:  [] Named in a scanned document   [] Legal Next of Kin  [] Guardian     ACP documents you current have include:  [] Advance Directive or Living Will  [x] Durable Do Not Resuscitate  [] Physician Orders for Scope of Treatment (POST)  [] Medical Power of   [] Other       Other:  (If patient appropriate for POST, consider using PALLPOST smart phrase here)    The palliative care team has discussed with patient / health care proxy about goals of care / treatment preferences for patient.  [====Goals of Care====]         PRESCRIPTIONS GIVEN:     Medications Ordered Today   Medications    oxyCODONE ER (OXYCONTIN) 10 mg ER tablet     Sig: Take 1 Tab by mouth every twelve (12) hours for 30 days. Max Daily Amount: 20 mg. Dispense:  60 Tab     Refill:  0    oxyCODONE IR (ROXICODONE) 5 mg immediate release tablet     Sig: Take 1 Tab by mouth every three (3) hours as needed for Pain for up to 30 days. Max Daily Amount: 40 mg. Dispense:  180 Tab     Refill:  0           FOLLOW UP:     Future Appointments   Date Time Provider Hilario Freda   7/3/2019 10:30 AM Nick Rodriguez MD [facility] SCHED   2019 12:30 PM Daysi Reyes MD [address] [place] [address] Crossing IN CARE:   Patient Care Team:  Sirisha Nguyen MD as PCP - General (Internal Medicine)  Ami Morales MD (Internal Medicine)  Nick Rodriguez MD (Hematology and Oncology)  Chris Daniels MD (Radiation Oncology)  Bri Saldana MD as Physician (Palliative Medicine)       HISTORY:   Nursing documentation from date of visit reviewed. Reviewed patient-completed ESAS and advance care planning form. Reviewed patient record in prescription monitoring program.    CHIEF COMPLAINT: pain    HPI/SUBJECTIVE:    The patient is: [x] Verbal / [] Nonverbal     She's having more pain in your upper back. She had a CT scan a few weeks ago and it looked good! She had pneumonia a few weeks ago. This was diagnosed when the [facility] NP came and ordered a mobile X-ray. She feels better now after taking antibiotics. She didn't have much of an appetite when she was sick. Ericka Silva 81Beth went away for a week and she didn't eat much then either but it's picking back up now. She's moving your bowels regularly. She isn't always making it to the bathroom when she needs to go, sometimes she wets herself before she knows it. She just got the news today that her cousin . They were close when growing up. She also has a new great-granddaughter who was born yesterday. She sees Dr. Arlene Campbell tomorrow. Clinical Pain Assessment (nonverbal scale for nonverbal patients):   [++++ Clinical Pain Assessment++++]  [++++Pain Severity++++]: Pain: 8  [++++Pain Character++++]: dull, aching  [++++Pain Duration++++]: years for joint pain, months for chest wall pain  [++++Pain Effect++++]: limits activity, causes emotional distress  [++++Pain Factors++++]: no identifiable provoking factors, oxycodone helps  [++++Pain Frequency++++]: constant with varying intensity  [++++Pain Location++++]: multiple joints, left upper chest  [++++ Clinical Pain Assessment++++]       FUNCTIONAL ASSESSMENT:     Palliative Performance Scale (PPS):  PPS: 70       PSYCHOSOCIAL/SPIRITUAL SCREENING:     Any spiritual / Sabianism concerns:  [] Yes /  [x] No    Caregiver Burnout:  [] Yes /  [x] No /  [] No Caregiver Present      Anticipatory grief assessment:   [x] Normal  / [] Maladaptive       ESAS Anxiety: Anxiety: 8    ESAS Depression: Depression: 7       REVIEW OF SYSTEMS:     The following systems were [x] reviewed / [] unable to be reviewed  Systems: constitutional, ears/nose/mouth/throat, respiratory, gastrointestinal, genitourinary, musculoskeletal, integumentary, neurologic, psychiatric, endocrine. Positive findings noted below. Modified ESAS Completed by: provider   Fatigue: 7 Drowsiness: 7   Depression: 7 Pain: 8   Anxiety: 8 Nausea: 0   Anorexia: 5 Dyspnea: 0   Best Well-Bein Constipation: No   Other Problem (Comment): 0          PHYSICAL EXAM:     Wt Readings from Last 3 Encounters:   19 123 lb 12.8 oz (56.2 kg)   19 111 lb 12.8 oz (50.7 kg)   19 116 lb (52.6 kg)     Blood pressure 108/61, pulse 64, temperature 97.8 °F (36.6 °C), temperature source Oral, resp. rate 16, height 5' 7\" (1.702 m), SpO2 96 %.   Last bowel movement: See Nursing Note    Constitutional: ill-appearing, sitting in wheelchair, daughter at side  Eyes: pupils equal, anicteric  ENMT: no nasal discharge, moist mucous membranes  Cardiovascular: regular rhythm, no peripheral edema  Respiratory: breathing not labored, symmetric  Gastrointestinal: soft non-tender, +bowel sounds  Musculoskeletal: bilateral ulnar deviation R>L; temporal muscle wasting  Skin: warm, dry; xerosis   Neurologic: alert, following commands, moving all extremities  Psychiatric: full affect, no hallucinations  Other:       HISTORY:     Past Medical History:   Diagnosis Date    AAA (abdominal aortic aneurysm) (HCC)     Arthritis     ra, osteoporosis, osteoarthritis    Atrial fibrillation (HCC)     COPD     HTN (hypertension)     [facility] ([facility] [address]     [facility]     Smoker     TIA (transient ischemic attack)     TIA 16      Past Surgical History:   Procedure Laterality Date    HX CHOLECYSTECTOMY      HX HYSTERECTOMY      HX ORTHOPAEDIC      carpal tunnel, wrist surgery      Family History   Problem Relation Age of Onset    Hypertension Father       History reviewed, no pertinent family history. Social History     Tobacco Use    Smoking status: Former Smoker     Years: 50.00     Last attempt to quit: 2018     Years since quittin.8    Smokeless tobacco: Never Used   Substance Use Topics    Alcohol use: No     Allergies   Allergen Reactions    Codeine Nausea and Vomiting      Current Outpatient Medications   Medication Sig    oxyCODONE ER (OXYCONTIN) 10 mg ER tablet Take 1 Tab by mouth every twelve (12) hours for 30 days. Max Daily Amount: 20 mg.    oxyCODONE IR (ROXICODONE) 5 mg immediate release tablet Take 1 Tab by mouth every three (3) hours as needed for Pain for up to 30 days. Max Daily Amount: 40 mg.    apixaban (ELIQUIS) 5 mg tablet Take 1 Tab by mouth daily.  ALPRAZolam (XANAX) 0.25 mg tablet Take 1 Tab by mouth two (2) times daily as needed for Anxiety. Max Daily Amount: 0.5 mg.  Indications: Anxiousness associated with Depression    escitalopram oxalate (LEXAPRO) 10 mg tablet Take 1 Tab by mouth daily.  methotrexate (RHEUMATREX) 2.5 mg tablet Take 8 Tabs by mouth Every Saturday. The patient takes 8 tablets which is 20 mg every Saturday    dexamethasone (DECADRON) 2 mg tablet Take 1 Tab by mouth daily (after breakfast).  amLODIPine (NORVASC) 5 mg tablet Take 5 mg by mouth daily.  cholecalciferol (VITAMIN D3) 50,000 unit capsule Take 50,000 Units by mouth every seven (7) days.  digoxin (LANOXIN) 0.125 mg tablet Take 0.125 mg by mouth daily.  nystatin (MYCOSTATIN) powder APPLY TO AFFECTED AREA TWICE A DAY    mupirocin (BACTROBAN) 2 % ointment APPLY A SMALL AMOUNT TO THE AFFECTED AREA THREE TIMES A DAY    apixaban (ELIQUIS) 2.5 mg tablet Take 1 Tab by mouth every twelve (12) hours for 30 days.  folic acid (FOLVITE) 1 mg tablet Take 1 Tab by mouth daily for 30 days. No current facility-administered medications for this visit. LAB DATA REVIEWED:     Lab Results   Component Value Date/Time    WBC 8.9 12/04/2018 03:27 PM    HGB 10.5 (L) 12/04/2018 03:27 PM    PLATELET 402 (H) 77/90/9512 03:27 PM     Lab Results   Component Value Date/Time    Sodium 141 12/04/2018 03:27 PM    Potassium 4.9 12/04/2018 03:27 PM    Chloride 96 12/04/2018 03:27 PM    CO2 32 (H) 12/04/2018 03:27 PM    BUN 12 12/04/2018 03:27 PM    Creatinine 0.48 (L) 12/04/2018 03:27 PM    Calcium 9.1 12/04/2018 03:27 PM    Magnesium 1.6 08/26/2018 04:39 AM    Phosphorus 4.2 08/26/2018 04:39 AM      Lab Results   Component Value Date/Time    AST (SGOT) 15 11/24/2018 10:50 AM    Alk.  phosphatase 62 11/24/2018 10:50 AM    Protein, total 6.8 11/24/2018 10:50 AM    Albumin 2.7 (L) 11/24/2018 10:50 AM    Globulin 4.1 (H) 11/24/2018 10:50 AM     Lab Results   Component Value Date/Time    INR 1.1 10/01/2016 07:45 AM    Prothrombin time 11.1 10/01/2016 07:45 AM    aPTT 31.7 10/01/2016 07:45 AM      Lab Results   Component Value Date/Time    Iron 14 (L) 11/28/2018 12:58 AM    TIBC 143 (L) 11/28/2018 12:58 AM    Iron % saturation 10 (L) 11/28/2018 12:58 AM    Ferritin 139 11/28/2018 12:58 AM      PET-CT 11/28/18:  HEAD/NECK: No apparent foci of abnormal hypermetabolism. Cerebral evaluation is  limited by normal intense activity.     CHEST: Left upper lobe mass lesion measures approximately 7 cm in transverse by  4.3 cm in AP dimension. Small SUV is 10.1 bilateral pleural effusions. There is  a small hypermetabolic and spiculated nodule in the right upper lobe with  maximal SUV that is difficult to quantify due to the size of lesion. Right hilar  lymph node with a maximal SUV of 2.2. There is abnormal hypermetabolic  appearance in the left chest wall without definitive lytic or blastic lesion  underlying.     ABDOMEN/PELVIS: Right adrenal mass lesion is not hypermetabolic. Bladder  diverticulum. Aortic atherosclerotic change with aortic aneurysm     SKELETON: Mild increased hypermetabolic characteristics in the left 10th and  11th ribs there is mildly hypermetabolic nature to the T4 rib on the left as  well.     IMPRESSION:      Large hypermetabolic left upper lobe mass.     There is metastatic disease present. Mildly hypermetabolic likely neoplastic spiculated nodule in the right upper  lobe. Subtly hypermetabolic foci of irregularity in the left chest wall particularly  at the T4 level on the left, T11 and T10 abnormalities on the left as well. Possible right hilar metastatic focus. CT chest/abdomen/pelvis 11/24/18:  THYROID: No nodule. MEDIASTINUM: No mass or lymphadenopathy. KATHERINE: No mass or lymphadenopathy. THORACIC AORTA: Atherosclerotic. No dissection or aneurysm. PULMONARY ARTERIES: Main pulmonary artery is normal in caliber. No evidence of  acute pulmonary emboli through the lobar arterial level.  Segmental and  subsegmental pulmonary arteries are suboptimally evaluated secondary to history  motion artifact. TRACHEA/BRONCHI: Patent. ESOPHAGUS: No wall thickening or dilatation. HEART: Normal in size. Moderate coronary artery calcifications. PLEURA: Small left pleural effusion. LUNGS: Bilateral dependent atelectasis. Mass in the posterior left upper lobe,  measuring 7.3 x 4.4 cm on series 2, image 118. LIVER: No mass or biliary dilatation. GALLBLADDER: Surgically absent. SPLEEN: Contains multiple calcified granulomata. PANCREAS: No mass or ductal dilatation. ADRENALS: 2.6 x 1.5 cm indeterminate right adrenal nodule (series 2, image 29). KIDNEYS: 1.4 cm complex cyst versus solid mass in the left kidney (series 2,  image 10). 2.1 cm complex cyst versus solid mass in the right kidney (series 2,  image 7). Bilateral simple renal cysts. STOMACH: Unremarkable. SMALL BOWEL: No dilatation or wall thickening. COLON: No dilatation or wall thickening. APPENDIX: Not visualized. PERITONEUM: No ascites or pneumoperitoneum. RETROPERITONEUM: 5.4 x 5.2 cm proximal abdominal aortic aneurysm, with no  evidence of rupture. REPRODUCTIVE ORGANS: Status post hysterectomy. URINARY BLADDER: Mild diffuse wall thickening. No visualized mass or calculus. BONES: Cortical erosion of the left fourth posterior rib, associated with the  left upper lobe pulmonary mass. Chronic appearing compression deformities at T9  and T12. IMPRESSION:  No evidence of central pulmonary embolus. Suboptimal evaluation of segmental and  subsegmental pulmonary arteries secondary to respiratory motion artifact. 7.3  cm left upper lobe pulmonary mass is compatible with malignancy. Associated  cortical destruction of the left fourth posterior rib. 2.6 cm right adrenal  nodule. Indeterminate bilateral renal lesions. 5.4 cm abdominal aortic aneurysm,  with no evidence of rupture.  Mild diffuse urinary bladder wall thickening.        CONTROLLED SUBSTANCES SAFETY ASSESSMENT (IF ON CONTROLLED SUBSTANCES):     Reviewed opioid safety handout:  [x] Yes   [] No  24 hour opioid dose >150mg morphine equivalent/day:  [] Yes   [x] No  Benzodiazepines:  [x] Yes   [] No  Sleep apnea:  [] Yes   [x] No  Urine Toxicology Testing within last 6 months:  [] Yes   [x] No  History of or new aberrant medication taking behaviors:  [] Yes   [x] No  Has Narcan been prescribed [x] Yes   [] No          Total time:   Counseling / coordination time:   > 50% counseling / coordination?:

## 2019-07-03 ENCOUNTER — OFFICE VISIT (OUTPATIENT)
Dept: ONCOLOGY | Age: 84
End: 2019-07-03

## 2019-07-03 VITALS
RESPIRATION RATE: 14 BRPM | HEIGHT: 67 IN | BODY MASS INDEX: 19.39 KG/M2 | HEART RATE: 60 BPM | DIASTOLIC BLOOD PRESSURE: 53 MMHG | SYSTOLIC BLOOD PRESSURE: 112 MMHG | TEMPERATURE: 97.3 F | OXYGEN SATURATION: 95 %

## 2019-07-03 DIAGNOSIS — C34.12 MALIGNANT NEOPLASM OF UPPER LOBE OF LEFT LUNG (HCC): Primary | ICD-10-CM

## 2019-07-03 DIAGNOSIS — D64.9 ANEMIA, UNSPECIFIED TYPE: ICD-10-CM

## 2019-07-15 RX ORDER — APIXABAN 5 MG/1
TABLET, FILM COATED ORAL
Qty: 30 TAB | Refills: 0 | Status: SHIPPED | OUTPATIENT
Start: 2019-07-15 | End: 2019-07-31

## 2019-07-15 RX ORDER — DEXAMETHASONE 2 MG/1
TABLET ORAL
Qty: 30 TAB | Refills: 1 | Status: SHIPPED | OUTPATIENT
Start: 2019-07-15 | End: 2020-02-13

## 2019-07-20 ENCOUNTER — HOSPITAL ENCOUNTER (EMERGENCY)
Dept: GENERAL RADIOLOGY | Age: 84
Discharge: HOME | DRG: 982 | End: 2019-07-20
Attending: EMERGENCY MEDICINE
Payer: MEDICARE

## 2019-07-20 ENCOUNTER — HOSPITAL ENCOUNTER (INPATIENT)
Age: 84
LOS: 11 days | Discharge: NURSING, REHAB OR LONG-TERM CARE | DRG: 982 | End: 2019-07-31
Attending: EMERGENCY MEDICINE | Admitting: INTERNAL MEDICINE
Payer: MEDICARE

## 2019-07-20 ENCOUNTER — APPOINTMENT (OUTPATIENT)
Dept: CT IMAGING | Age: 84
DRG: 982 | End: 2019-07-20
Attending: EMERGENCY MEDICINE
Payer: MEDICARE

## 2019-07-20 DIAGNOSIS — S30.0XXA TRAUMATIC HEMATOMA OF BUTTOCK, INITIAL ENCOUNTER: ICD-10-CM

## 2019-07-20 DIAGNOSIS — R00.1 BRADYCARDIA: ICD-10-CM

## 2019-07-20 DIAGNOSIS — C34.12 MALIGNANT NEOPLASM OF UPPER LOBE OF LEFT LUNG (HCC): ICD-10-CM

## 2019-07-20 DIAGNOSIS — S70.01XA CONTUSION OF RIGHT HIP, INITIAL ENCOUNTER: ICD-10-CM
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
## 2019-07-20 PROBLEM — D72.829 LEUKOCYTOSIS: Status: ACTIVE | Noted: 2019-07-20

## 2019-07-20 PROBLEM — T14.8XXA HEMATOMA: Status: ACTIVE | Noted: 2019-07-20

## 2019-07-20 LAB
ALBUMIN SERPL-MCNC: 3.4 G/DL (ref 3.5–5)
ALBUMIN/GLOB SERPL: 0.8 {RATIO} (ref 1.1–2.2)
ALP SERPL-CCNC: 50 U/L (ref 45–117)
ALT SERPL-CCNC: 13 U/L (ref 12–78)
AMYLASE SERPL-CCNC: 30 U/L (ref 25–115)
ANION GAP SERPL CALC-SCNC: 8 MMOL/L (ref 5–15)
APTT PPP: 28.7 SEC (ref 22.1–32)
AST SERPL-CCNC: 15 U/L (ref 15–37)
BASOPHILS # BLD: 0.1 K/UL (ref 0–0.1)
BASOPHILS NFR BLD: 0 % (ref 0–1)
BILIRUB SERPL-MCNC: 0.4 MG/DL (ref 0.2–1)
BUN SERPL-MCNC: 24 MG/DL (ref 6–20)
BUN/CREAT SERPL: 29 (ref 12–20)
CALCIUM SERPL-MCNC: 9 MG/DL (ref 8.5–10.1)
CHLORIDE SERPL-SCNC: 105 MMOL/L (ref 97–108)
CO2 SERPL-SCNC: 27 MMOL/L (ref 21–32)
COMMENT, HOLDF: NORMAL
CREAT SERPL-MCNC: 0.83 MG/DL (ref 0.55–1.02)
DIFFERENTIAL METHOD BLD: ABNORMAL
EOSINOPHIL # BLD: 0 K/UL (ref 0–0.4)
EOSINOPHIL NFR BLD: 0 % (ref 0–7)
ERYTHROCYTE [DISTWIDTH] IN BLOOD BY AUTOMATED COUNT: 17.5 % (ref 11.5–14.5)
GLOBULIN SER CALC-MCNC: 4.2 G/DL (ref 2–4)
GLUCOSE SERPL-MCNC: 113 MG/DL (ref 65–100)
HCT VFR BLD AUTO: 37 % (ref 35–47)
HGB BLD-MCNC: 11.5 G/DL (ref 11.5–16)
IMM GRANULOCYTES # BLD AUTO: 0.2 K/UL (ref 0–0.04)
IMM GRANULOCYTES NFR BLD AUTO: 1 % (ref 0–0.5)
INR PPP: 1.1 (ref 0.9–1.1)
LIPASE SERPL-CCNC: 45 U/L (ref 73–393)
LYMPHOCYTES # BLD: 2 K/UL (ref 0.8–3.5)
LYMPHOCYTES NFR BLD: 16 % (ref 12–49)
MAGNESIUM SERPL-MCNC: 1.8 MG/DL (ref 1.6–2.4)
MCH RBC QN AUTO: 29.9 PG (ref 26–34)
MCHC RBC AUTO-ENTMCNC: 31.1 G/DL (ref 30–36.5)
MCV RBC AUTO: 96.1 FL (ref 80–99)
MONOCYTES # BLD: 1.5 K/UL (ref 0–1)
MONOCYTES NFR BLD: 12 % (ref 5–13)
NEUTS SEG # BLD: 8.7 K/UL (ref 1.8–8)
NEUTS SEG NFR BLD: 71 % (ref 32–75)
NRBC # BLD: 0 K/UL (ref 0–0.01)
NRBC BLD-RTO: 0 PER 100 WBC
PHOSPHATE SERPL-MCNC: 3.4 MG/DL (ref 2.6–4.7)
PLATELET # BLD AUTO: 471 K/UL (ref 150–400)
PMV BLD AUTO: 9.4 FL (ref 8.9–12.9)
POTASSIUM SERPL-SCNC: 4.3 MMOL/L (ref 3.5–5.1)
PROT SERPL-MCNC: 7.6 G/DL (ref 6.4–8.2)
PROTHROMBIN TIME: 11 SEC (ref 9–11.1)
RBC # BLD AUTO: 3.85 M/UL (ref 3.8–5.2)
SAMPLES BEING HELD,HOLD: NORMAL
SODIUM SERPL-SCNC: 140 MMOL/L (ref 136–145)
THERAPEUTIC RANGE,PTTT: NORMAL SECS (ref 58–77)
WBC # BLD AUTO: 12.4 K/UL (ref 3.6–11)

## 2019-07-20 PROCEDURE — 85025 COMPLETE CBC W/AUTO DIFF WBC: CPT

## 2019-07-20 PROCEDURE — 83690 ASSAY OF LIPASE: CPT

## 2019-07-20 PROCEDURE — 74011636320 HC RX REV CODE- 636/320: Performed by: RADIOLOGY

## 2019-07-20 PROCEDURE — 93005 ELECTROCARDIOGRAM TRACING: CPT

## 2019-07-20 PROCEDURE — 96375 TX/PRO/DX INJ NEW DRUG ADDON: CPT

## 2019-07-20 PROCEDURE — 86900 BLOOD TYPING SEROLOGIC ABO: CPT

## 2019-07-20 PROCEDURE — 70450 CT HEAD/BRAIN W/O DYE: CPT

## 2019-07-20 PROCEDURE — 74011250636 HC RX REV CODE- 250/636: Performed by: EMERGENCY MEDICINE

## 2019-07-20 PROCEDURE — 74177 CT ABD & PELVIS W/CONTRAST: CPT

## 2019-07-20 PROCEDURE — 65270000029 HC RM PRIVATE

## 2019-07-20 PROCEDURE — 85610 PROTHROMBIN TIME: CPT

## 2019-07-20 PROCEDURE — 86923 COMPATIBILITY TEST ELECTRIC: CPT

## 2019-07-20 PROCEDURE — 82150 ASSAY OF AMYLASE: CPT

## 2019-07-20 PROCEDURE — 85730 THROMBOPLASTIN TIME PARTIAL: CPT

## 2019-07-20 PROCEDURE — 96374 THER/PROPH/DIAG INJ IV PUSH: CPT

## 2019-07-20 PROCEDURE — 96361 HYDRATE IV INFUSION ADD-ON: CPT

## 2019-07-20 PROCEDURE — 71045 X-RAY EXAM CHEST 1 VIEW: CPT

## 2019-07-20 PROCEDURE — 99285 EMERGENCY DEPT VISIT HI MDM: CPT

## 2019-07-20 PROCEDURE — 83735 ASSAY OF MAGNESIUM: CPT

## 2019-07-20 PROCEDURE — 73502 X-RAY EXAM HIP UNI 2-3 VIEWS: CPT

## 2019-07-20 PROCEDURE — 84100 ASSAY OF PHOSPHORUS: CPT

## 2019-07-20 PROCEDURE — 80053 COMPREHEN METABOLIC PANEL: CPT

## 2019-07-20 RX ORDER — FENTANYL CITRATE 50 UG/ML
100 INJECTION, SOLUTION INTRAMUSCULAR; INTRAVENOUS
Status: COMPLETED | OUTPATIENT
Start: 2019-07-20 | End: 2019-07-20

## 2019-07-20 RX ORDER — OXYCODONE AND ACETAMINOPHEN 5; 325 MG/1; MG/1
2 TABLET ORAL
Status: DISCONTINUED | OUTPATIENT
Start: 2019-07-20 | End: 2019-07-21

## 2019-07-20 RX ORDER — ONDANSETRON 2 MG/ML
8 INJECTION INTRAMUSCULAR; INTRAVENOUS
Status: COMPLETED | OUTPATIENT
Start: 2019-07-20 | End: 2019-07-20

## 2019-07-20 RX ORDER — MORPHINE SULFATE 4 MG/ML
2 INJECTION INTRAVENOUS
Status: DISCONTINUED | OUTPATIENT
Start: 2019-07-20 | End: 2019-07-25

## 2019-07-20 RX ADMIN — SODIUM CHLORIDE 1000 ML: 900 INJECTION, SOLUTION INTRAVENOUS at 19:43

## 2019-07-20 RX ADMIN — ONDANSETRON 8 MG: 2 INJECTION INTRAMUSCULAR; INTRAVENOUS at 19:42

## 2019-07-20 RX ADMIN — IOPAMIDOL 100 ML: 755 INJECTION, SOLUTION INTRAVENOUS at 21:13

## 2019-07-20 RX ADMIN — FENTANYL CITRATE 100 MCG: 50 INJECTION, SOLUTION INTRAMUSCULAR; INTRAVENOUS at 19:42

## 2019-07-20 NOTE — ED TRIAGE NOTES
Pt arrives via EMS c/o R hip pain post GLF, per EMS appears to be dislocated. Pt was bending over and states that she fell Pt took 2 oxycodone PTA. Pt does take blood thinners.

## 2019-07-20 NOTE — ED PROVIDER NOTES
80 y.o. female with past medical history significant for RA, OA, osteoporosis, COPD, AAA, HTN, h/o TIA, lung cancer (in remission), s/p hysterectomy, s/p cholecystectomy presents via EMS from home with chief complaint of right hip and buttock pain, onset just PTA after taking a GLF. The pt explains that she was trying to open a mini refridgerator in her bedroom when she suddenly fell and landed forcefully on her right hip/buttock. No head trauma or loss of consciousness. She also reports associated right hip/buttock swelling and right arm stiffness. Per EMS, the pt was found sitting in a chair on arrival. Pt includes that she is on Eliquis. Of note, the pt indicates that she currently lives with her grandson. Pt denies any abd pain, chest pain, headaches, back pain, neck pain, or any other symptoms at this time. There are no other acute medical concerns at this time. Social hx: Patient denies current Tobacco use; quit in 2018. Denies EtOH use. Denies illicit drug abuse. PCP: Vladimir Olguin MD    Note written by Elvin Clayton, as dictated by Lorna Perkins MD 7:30 PM      The history is provided by the patient and the EMS personnel. No  was used.         Past Medical History:   Diagnosis Date    AAA (abdominal aortic aneurysm) (HCC)     Arthritis     ra, osteoporosis, osteoarthritis    Atrial fibrillation (HCC)     COPD     HTN (hypertension)     Lung cancer (HCC)     RA [facility]oid [facility]) [facility] [address]     Smoker     TIA (transient ischemic attack)     TIA 9/30/16       Past Surgical History:   Procedure Laterality Date    HX CHOLECYSTECTOMY      HX HYSTERECTOMY      HX ORTHOPAEDIC      carpal tunnel, wrist surgery         Family History:   Problem Relation Age of Onset    Hypertension Father        Social History     Socioeconomic History    Marital status: SINGLE     Spouse name: Not on file    Number of children: Not on file    Years of education: Not on file    Highest education level: Not on file   Occupational History    Not on file   Social Needs    Financial resource strain: Not on file    Food insecurity:     Worry: Not on file     Inability: Not on file    Transportation needs:     Medical: Not on file     Non-medical: Not on file   Tobacco Use    Smoking status: Former Smoker     Years: 50.00     Last attempt to quit: 2018     Years since quittin.8    Smokeless tobacco: Never Used   Substance and Sexual Activity    Alcohol use: No    Drug use: No    Sexual activity: Not Currently   Lifestyle    Physical activity:     Days per week: Not on file     Minutes per session: Not on file    Stress: Not on file   Relationships    Social connections:     Talks on phone: Not on file     Gets together: Not on file     Attends Jainism service: Not on file     Active member of club or organization: Not on file     Attends meetings of clubs or organizations: Not on file     Relationship status: Not on file    Intimate partner violence:     Fear of current or ex partner: Not on file     Emotionally abused: Not on file     Physically abused: Not on file     Forced sexual activity: Not on file   Other Topics Concern    Not on file   Social History Narrative    Not on file         ALLERGIES: Codeine    Review of Systems   Constitutional: Negative for chills and fever. Respiratory: Negative for shortness of breath. Cardiovascular: Negative for chest pain and leg swelling. Gastrointestinal: Negative for abdominal pain. Musculoskeletal: Positive for arthralgias (right hip), joint swelling (right hip/buttock) and myalgias (right buttock pain and right upper extremity stiffness). Negative for back pain and neck pain. Neurological: Negative for dizziness, syncope (no LOC), light-headedness and headaches. All other systems reviewed and are negative.       Vitals:    19 1922   Resp: 18   Temp: 97.9 °F (36.6 °C)   SpO2: 93%            Physical Exam Nursing note and vitals reviewed. CONSTITUTIONAL: Frail elderly female; in mild distress  HEAD: Normocephalic; atraumatic  EYES: PERRL; EOM intact; conjunctiva and sclera are clear bilaterally. ENT: No rhinorrhea; normal pharynx with no tonsillar hypertrophy; mucous membranes pink/moist, no erythema, no exudate. NECK: Supple; non-tender; no cervical lymphadenopathy  CARD: Normal S1, S2; no murmurs, rubs, or gallops. Regular rate and rhythm. RESP: Normal respiratory effort; breath sounds clear and equal bilaterally; no wheezes, rhonchi, or rales. ABD: Normal bowel sounds; non-distended; non-tender; no palpable organomegaly, no masses, no bruits. Back Exam: moderate swelling and hematoma in right buttock; no vertebral point tenderness, no CVA tenderness. Normal range of motion. EXT: Normal ROM in all four extremities; tender to palpation and swelling with hematoma in right buttock; no deformity; distal pulses are normal, no edema. SKIN: Warm; dry; no rash. NEURO:Alert and oriented x 3, coherent, UCHE-XII grossly intact, sensory and motor are non-focal.        MDM  Number of Diagnoses or Management Options  Contusion of right hip, initial encounter:   Fall, initial encounter:   Traumatic hematoma of buttock, initial encounter:   Diagnosis management comments: Assessment: 19-year-old female who presents to the ED status post fall on chronic anticoagulation with Eliquis for evaluation for left hip injury with gluteal hematoma and pain. She appears hemodynamically stable but in mild to moderate discomfort      Plan: EKG/chest x-ray/lab/x-ray of the right hip/analgesia/CT scan of the head/CT scan of the abdomen and pelvis/serial exam/consult interventional radiology/consult hospitalist/ Monitor and Reevaluate.        Amount and/or Complexity of Data Reviewed  Clinical lab tests: ordered and reviewed  Tests in the radiology section of CPT®: ordered and reviewed  Tests in the medicine section of CPT®: reviewed and ordered  Discussion of test results with the performing providers: yes  Decide to obtain previous medical records or to obtain history from someone other than the patient: yes  Obtain history from someone other than the patient: yes  Review and summarize past medical records: yes  Discuss the patient with other providers: yes  Independent visualization of images, tracings, or specimens: yes    Risk of Complications, Morbidity, and/or Mortality  Presenting problems: moderate  Diagnostic procedures: moderate  Management options: moderate    Critical Care  Total time providing critical care: (Total critical care time spent exclusive of procedures: 45 minutes)    Patient Progress  Patient progress: stable         Procedures    ED EKG interpretation:  Rhythm: atrial fib; and regular . Rate (approx.): 86; Axis: left axis deviation; QRS interval: normal ; ST/T wave: non-specific changes; in  Lead: Diffusely; Other findings: abnormal ekg. This EKG was interpreted by Carisa Velasco MD,ED Provider. XRAY INTERPRETATION (ED MD)  Chest Xray  No acute process seen. Normal heart size. No bony abnormalities. No infiltrate. Deyanira Del Rio MD 08:36 PM    XRAY INTERPRETATION (ED MD)  Xray of Right hip shows no fracture. No subluxation/dislocation. No bony abnormality. Deyanira Del Rio MD 08:36 PM    PROGRESS NOTE:  Pt has been reexamined by Deyanira Del Rio MD all available results have been reviewed with pt and any available family. Pt understands sx, dx, and tx in ED. Care plan has been outlined and questions have been answered. Pt and any available family understands and agrees to need for admission to hospital for further tx not available in ED. Pt is ready for admission. Written by Carisa Velasco MD,  09:37 PM    CONSULT NOTE:  Deyanira Del Rio MD spoke with Dr. Kait Rashid of IR.  1 unit discussed patient's presentation, history, physical assessment, and available diagnostic results.   States that the extravasation in the right buttock is likely secondary to venous phase. Recommends admission and observation with serial H&H. If the patient experience is significant drop or swelling, will do an arteriogram.       CONSULT NOTE:  Gudelia Ann MD spoke with Dr. Fredis Galvan of the adult hospitalist team. Discussed patient's presentation, history, physical assessment, and available diagnostic results.  He will evaluate, write orders and admit the patient to the hospital. 10:37 PM    .

## 2019-07-20 NOTE — Clinical Note
TRANSFER - IN REPORT:  
 
Verbal report received from: floor rn . Report consisted of patient's Situation, Background, Assessment and  
Recommendations(SBAR). Opportunity for questions and clarification was provided. Assessment completed upon patient's arrival to unit and care assumed.

## 2019-07-21 LAB
ANION GAP SERPL CALC-SCNC: 8 MMOL/L (ref 5–15)
APPEARANCE UR: CLEAR
ATRIAL RATE: 87 BPM
BACTERIA URNS QL MICRO: NEGATIVE /HPF
BILIRUB UR QL: NEGATIVE
BUN SERPL-MCNC: 27 MG/DL (ref 6–20)
BUN/CREAT SERPL: 25 (ref 12–20)
CALCIUM SERPL-MCNC: 7.8 MG/DL (ref 8.5–10.1)
CALCULATED R AXIS, ECG10: -17 DEGREES
CALCULATED T AXIS, ECG11: 62 DEGREES
CHLORIDE SERPL-SCNC: 106 MMOL/L (ref 97–108)
CO2 SERPL-SCNC: 25 MMOL/L (ref 21–32)
COLOR UR: NORMAL
CREAT SERPL-MCNC: 1.09 MG/DL (ref 0.55–1.02)
DIAGNOSIS, 93000: NORMAL
EPITH CASTS URNS QL MICRO: NORMAL /LPF
ERYTHROCYTE [DISTWIDTH] IN BLOOD BY AUTOMATED COUNT: 17.6 % (ref 11.5–14.5)
GLUCOSE SERPL-MCNC: 113 MG/DL (ref 65–100)
GLUCOSE UR STRIP.AUTO-MCNC: NEGATIVE MG/DL
HCT VFR BLD AUTO: 24.3 % (ref 35–47)
HCT VFR BLD AUTO: 25 % (ref 35–47)
HGB BLD-MCNC: 7.6 G/DL (ref 11.5–16)
HGB BLD-MCNC: 7.7 G/DL (ref 11.5–16)
HGB BLD-MCNC: 8.4 G/DL (ref 11.5–16)
HGB UR QL STRIP: NEGATIVE
HYALINE CASTS URNS QL MICRO: NORMAL /LPF (ref 0–5)
KETONES UR QL STRIP.AUTO: NEGATIVE MG/DL
LEUKOCYTE ESTERASE UR QL STRIP.AUTO: NEGATIVE
MCH RBC QN AUTO: 29.6 PG (ref 26–34)
MCHC RBC AUTO-ENTMCNC: 30.8 G/DL (ref 30–36.5)
MCV RBC AUTO: 96.2 FL (ref 80–99)
NITRITE UR QL STRIP.AUTO: NEGATIVE
NRBC # BLD: 0 K/UL (ref 0–0.01)
NRBC BLD-RTO: 0 PER 100 WBC
PH UR STRIP: 5 [PH] (ref 5–8)
PLATELET # BLD AUTO: 344 K/UL (ref 150–400)
PMV BLD AUTO: 9.7 FL (ref 8.9–12.9)
POTASSIUM SERPL-SCNC: 4.7 MMOL/L (ref 3.5–5.1)
PROT UR STRIP-MCNC: NEGATIVE MG/DL
Q-T INTERVAL, ECG07: 352 MS
QRS DURATION, ECG06: 72 MS
QTC CALCULATION (BEZET), ECG08: 421 MS
RBC # BLD AUTO: 2.6 M/UL (ref 3.8–5.2)
RBC #/AREA URNS HPF: NORMAL /HPF (ref 0–5)
SODIUM SERPL-SCNC: 139 MMOL/L (ref 136–145)
SP GR UR REFRACTOMETRY: <1.005 (ref 1–1.03)
UA: UC IF INDICATED,UAUC: NORMAL
UROBILINOGEN UR QL STRIP.AUTO: 1 EU/DL (ref 0.2–1)
VENTRICULAR RATE, ECG03: 86 BPM
WBC # BLD AUTO: 12.5 K/UL (ref 3.6–11)
WBC URNS QL MICRO: NORMAL /HPF (ref 0–4)

## 2019-07-21 PROCEDURE — 85018 HEMOGLOBIN: CPT

## 2019-07-21 PROCEDURE — 77030011256 HC DRSG MEPILEX <16IN NO BORD MOLN -A

## 2019-07-21 PROCEDURE — 81001 URINALYSIS AUTO W/SCOPE: CPT

## 2019-07-21 PROCEDURE — 80048 BASIC METABOLIC PNL TOTAL CA: CPT

## 2019-07-21 PROCEDURE — 36415 COLL VENOUS BLD VENIPUNCTURE: CPT

## 2019-07-21 PROCEDURE — 85027 COMPLETE CBC AUTOMATED: CPT

## 2019-07-21 PROCEDURE — 74011250636 HC RX REV CODE- 250/636: Performed by: INTERNAL MEDICINE

## 2019-07-21 PROCEDURE — 74011250637 HC RX REV CODE- 250/637: Performed by: INTERNAL MEDICINE

## 2019-07-21 PROCEDURE — 65270000029 HC RM PRIVATE

## 2019-07-21 RX ORDER — ESCITALOPRAM OXALATE 10 MG/1
10 TABLET ORAL DAILY
Status: DISCONTINUED | OUTPATIENT
Start: 2019-07-22 | End: 2019-07-31 | Stop reason: HOSPADM

## 2019-07-21 RX ORDER — OXYCODONE HYDROCHLORIDE 5 MG/1
5 TABLET ORAL
Status: DISCONTINUED | OUTPATIENT
Start: 2019-07-21 | End: 2019-07-31 | Stop reason: HOSPADM

## 2019-07-21 RX ORDER — FOLIC ACID 1 MG/1
1 TABLET ORAL DAILY
COMMUNITY
End: 2020-02-13

## 2019-07-21 RX ORDER — OXYCODONE HCL 10 MG/1
10 TABLET, FILM COATED, EXTENDED RELEASE ORAL DAILY
Status: DISCONTINUED | OUTPATIENT
Start: 2019-07-22 | End: 2019-07-31 | Stop reason: HOSPADM

## 2019-07-21 RX ORDER — OXYCODONE HCL 10 MG/1
10 TABLET, FILM COATED, EXTENDED RELEASE ORAL
Status: ON HOLD | COMMUNITY
End: 2019-07-29 | Stop reason: SDUPTHER

## 2019-07-21 RX ORDER — AMLODIPINE BESYLATE 5 MG/1
5 TABLET ORAL DAILY
Status: DISCONTINUED | OUTPATIENT
Start: 2019-07-22 | End: 2019-07-21

## 2019-07-21 RX ORDER — OXYCODONE HCL 10 MG/1
10 TABLET, FILM COATED, EXTENDED RELEASE ORAL EVERY 12 HOURS
Status: DISCONTINUED | OUTPATIENT
Start: 2019-07-21 | End: 2019-07-21

## 2019-07-21 RX ORDER — DIGOXIN 125 MCG
0.12 TABLET ORAL DAILY
Status: DISCONTINUED | OUTPATIENT
Start: 2019-07-21 | End: 2019-07-23

## 2019-07-21 RX ORDER — SODIUM CHLORIDE 0.9 % (FLUSH) 0.9 %
5-40 SYRINGE (ML) INJECTION AS NEEDED
Status: DISCONTINUED | OUTPATIENT
Start: 2019-07-21 | End: 2019-07-31 | Stop reason: HOSPADM

## 2019-07-21 RX ORDER — ALPRAZOLAM 0.25 MG/1
0.25 TABLET ORAL
Status: DISCONTINUED | OUTPATIENT
Start: 2019-07-21 | End: 2019-07-31 | Stop reason: HOSPADM

## 2019-07-21 RX ORDER — METHOTREXATE 2.5 MG/1
20 TABLET ORAL
Status: DISCONTINUED | OUTPATIENT
Start: 2019-07-27 | End: 2019-07-27

## 2019-07-21 RX ORDER — SODIUM CHLORIDE 0.9 % (FLUSH) 0.9 %
5-40 SYRINGE (ML) INJECTION EVERY 8 HOURS
Status: DISCONTINUED | OUTPATIENT
Start: 2019-07-21 | End: 2019-07-31 | Stop reason: HOSPADM

## 2019-07-21 RX ORDER — DEXAMETHASONE 4 MG/1
2 TABLET ORAL
Status: DISCONTINUED | OUTPATIENT
Start: 2019-07-21 | End: 2019-07-31 | Stop reason: HOSPADM

## 2019-07-21 RX ADMIN — Medication 10 ML: at 01:57

## 2019-07-21 RX ADMIN — Medication 10 ML: at 22:14

## 2019-07-21 RX ADMIN — OXYCODONE HYDROCHLORIDE AND ACETAMINOPHEN 2 TABLET: 5; 325 TABLET ORAL at 07:57

## 2019-07-21 RX ADMIN — OXYCODONE HYDROCHLORIDE AND ACETAMINOPHEN 2 TABLET: 5; 325 TABLET ORAL at 01:57

## 2019-07-21 RX ADMIN — OXYCODONE HYDROCHLORIDE 5 MG: 5 TABLET ORAL at 23:15

## 2019-07-21 RX ADMIN — DEXAMETHASONE 2 MG: 4 TABLET ORAL at 15:02

## 2019-07-21 RX ADMIN — Medication 10 ML: at 14:00

## 2019-07-21 RX ADMIN — OXYCODONE HYDROCHLORIDE 10 MG: 10 TABLET, FILM COATED, EXTENDED RELEASE ORAL at 15:02

## 2019-07-21 RX ADMIN — MORPHINE SULFATE 2 MG: 4 INJECTION, SOLUTION INTRAMUSCULAR; INTRAVENOUS at 00:36

## 2019-07-21 RX ADMIN — SODIUM CHLORIDE 500 ML: 900 INJECTION, SOLUTION INTRAVENOUS at 12:58

## 2019-07-21 RX ADMIN — DIGOXIN 0.12 MG: 125 TABLET ORAL at 15:41

## 2019-07-21 RX ADMIN — Medication 10 ML: at 06:24

## 2019-07-21 RX ADMIN — ALPRAZOLAM 0.25 MG: 0.25 TABLET ORAL at 23:15

## 2019-07-21 NOTE — PROGRESS NOTES
Bedside and Verbal shift change report given to GRAYSON Cortez (oncoming nurse) by Poly Johnson (offgoing nurse). Report included the following information SBAR, Kardex, ED Summary, Intake/Output, MAR and Recent Results.

## 2019-07-21 NOTE — H&P
[facility]  [address], [facility] [address]  [phone]    Admission History and Physical      NAME:  Katih Baker   :   1931   MRN:  [id number]     PCP:  Dulce Chaparro MD     Date/Time:  2019         Subjective:     CHIEF COMPLAINT: RT gluteal area pain and swelling      HISTORY OF PRESENT ILLNESS:     Ms. [facility] is a 80 y.o.  female who is admitted with RT gluteal hematoma. Ms. [facility] with PMH of afib, AAA, COPD, lung cancer presented to ER c/o RT gluteal pain and swelling which started after a fall this evening. Pt tried to get something from her mini fridge and she fell landing on her RT side. Pt started severe pain on her RT gluteal area, which severe and sharp. Movement makes the pain worse. Past Medical History:   Diagnosis Date    AAA (abdominal aortic aneurysm) (HCC)     Arthritis     ra, osteoporosis, osteoarthritis    Atrial fibrillation (HCC)     COPD     HTN (hypertension)     Lung cancer (HCC)     RA (rheumatoid arthritis) ([facility] [address]     Smoker     TIA (transient ischemic attack)     TIA 16        Past Surgical History:   Procedure Laterality Date    HX CHOLECYSTECTOMY      HX HYSTERECTOMY      HX ORTHOPAEDIC      carpal tunnel, wrist surgery       Social History     Tobacco Use    Smoking status: Former Smoker     Years: 50.00     Last attempt to quit: 2018     Years since quittin.8    Smokeless tobacco: Never Used   Substance Use Topics    Alcohol use: No        Family History   Problem Relation Age of Onset    Hypertension Father         Allergies   Allergen Reactions    Codeine Nausea and Vomiting        Prior to Admission medications    Medication Sig Start Date End Date Taking?  Authorizing Provider   ELIQUIS 5 mg tablet take 1 tablet by mouth once daily 7/15/19  Yes Theron Reyes MD   dexAMETHasone (DECADRON) 2 mg tablet take 1 tablet by mouth once daily after breakfast 7/15/19  Yes Bri Saldana MD   oxyCODONE ER (OXYCONTIN) 10 mg ER tablet Take 1 Tab by mouth every twelve (12) hours for 30 days. Max Daily Amount: 20 mg. 7/2/19 8/1/19 Yes Theron Reyes MD   oxyCODONE IR (ROXICODONE) 5 mg immediate release tablet Take 1 Tab by mouth every three (3) hours as needed for Pain for up to 30 days. Max Daily Amount: 40 mg. 7/2/19 8/1/19 Yes Domingo Reyes MD   ALPRAZolam Latisha Tricia) 0.25 mg tablet Take 1 Tab by mouth two (2) times daily as needed for Anxiety. Max Daily Amount: 0.5 mg. Indications: Anxiousness associated with Depression 5/29/19  Yes Theron Reyes MD   escitalopram oxalate (LEXAPRO) 10 mg tablet Take 1 Tab by mouth daily. 5/29/19  Yes Domingo Reyes MD   methotrexate (RHEUMATREX) 2.5 mg tablet Take 8 Tabs by mouth Every Saturday. The patient takes 8 tablets which is 20 mg every Saturday 5/25/19  Yes Sirisha Nguyen MD   amLODIPine (NORVASC) 5 mg tablet Take 5 mg by mouth daily. Yes Provider, Historical   cholecalciferol (VITAMIN D3) 50,000 unit capsule Take 50,000 Units by mouth every seven (7) days. Yes Provider, Historical   digoxin (LANOXIN) 0.125 mg tablet Take 0.125 mg by mouth daily. Yes Other, MD Rayshawn   nystatin (MYCOSTATIN) powder APPLY TO AFFECTED AREA TWICE A DAY 1/30/19   Provider, Historical   mupirocin (BACTROBAN) 2 % ointment APPLY A SMALL AMOUNT TO THE AFFECTED AREA THREE TIMES A DAY 1/30/19   Provider, Historical   folic acid (FOLVITE) 1 mg tablet Take 1 Tab by mouth daily for 30 days.  12/10/18 2/11/19  iSrisha Nguyen MD         Review of Systems:  (bold if positive, if negative)    Gen:  Eyes:  ENT:  CVS:  Pulm:  GI:    :    MS:  Pain, wSkin:  Psych:  Endo:    Hem:  Renal:    Neuro:            Objective:      VITALS:    Vital signs reviewed; most recent are:    Visit Vitals  /58   Pulse 90   Temp 97.9 °F (36.6 °C)   Resp 18   Ht 5' 7\" (1.702 m)   Wt 56.7 kg (125 lb)   SpO2 93%   BMI 19.58 kg/m²     SpO2 Readings from Last 6 Encounters:   07/20/19 93%   07/03/19 95%   07/02/19 96%   06/20/19 93%   06/17/19 97%   06/12/19 97%        No intake or output data in the 24 hours ending 07/20/19 2565         Exam:     Physical Exam:    Gen:  Well-developed, well-nourished, in no acute distress  HEENT:  Pink conjunctivae, PERRL, hearing intact to voice, moist mucous membranes  Neck:  Supple, without masses, thyroid non-tender  Resp:  No accessory muscle use, clear breath sounds without wheezes rales or rhonchi  Card:  No murmurs, normal S1, S2 without thrills, bruits or peripheral edema  Abd:  Soft, non-tender, non-distended, normoactive bowel sounds are present, no palpable organomegaly and no detectable hernias  Lymph:  No cervical or inguinal adenopathy  Musc:  RT gluteal area swelling and tenderness. Skin:  No rashes or ulcers, skin turgor is good  Neuro:  Cranial nerves are grossly intact, no focal motor weakness, follows commands appropriately  Psych:  Good insight, oriented to person, place and time, alert       Labs:    Recent Labs     07/20/19 1930   WBC 12.4*   HGB 11.5   HCT 37.0   *     Recent Labs     07/20/19 1930      K 4.3      CO2 27   *   BUN 24*   CREA 0.83   CA 9.0   MG 1.8   PHOS 3.4   ALB 3.4*   TBILI 0.4   SGOT 15   ALT 13     Lab Results   Component Value Date/Time    Glucose (POC) 93 10/01/2016 11:33 AM    Glucose (POC) 91 10/01/2016 07:33 AM     No results for input(s): PH, PCO2, PO2, HCO3, FIO2 in the last 72 hours. Recent Labs     07/20/19 1930   INR 1.1       Telemetry reviewed:   AFIB       Assessment/Plan:    1. Hematoma on RT gluteal area(7/20/2019)/ Fall (7/20/2019). Admit to medical. Monitor clinically, pain management. Monitor H/H. Consult general surgery. Fall precaution. PT/OT     2. Paroxysmal A-fib ([facility] [address] (9/30/2016). Hold eliquis. Continue digoxin     3. COPD (chronic obstructive pulmonary disease) ([address] (9/30/2016). Stable. Not wheezing. Continue nebs PRN     4. Hypertension (8/25/2018). Continue home meds after med rec     5. Malignant neoplasm of upper lobe of left lung ([facility] [address] (11/27/2018). S/p RT. Follows with Dr Rodriguez     6. Abdominal aortic aneurysm (AAA) without rupture ([facility] [address] (12/4/2018). outpatient FU     7.  Leukocytosis (7/20/2019). Doubt infection. Likely due to stress.  Check UA       Code status: DNR( advanced directive in chart)          Previous medical records reviewed     Risk of deterioration: medium      Total time spent with patient: [address] discussed with: Patient, Family, Nursing Staff and >50% of time spent in counseling and coordination of care    Discussed:  Care Plan    Prophylaxis:  SCD's    Probable Disposition:  Home w/Family           ___________________________________________________    Attending Physician: Cristina Garces MD

## 2019-07-21 NOTE — PROGRESS NOTES
Spiritual Care Assessment/Progress Note  [address]      NAME: Ankita Winters      MRN: [id number]  AGE: 80 y.o.  SEX: female  Hindu Affiliation: Presybeterian   Language: English     7/21/2019     Total Time (in minutes): 35     Spiritual Assessment begun in [facility] 5M1 MED SURG 1 through conversation with:         [x]Patient        [] Family    [] Friend(s)        Reason for Consult: Palliative Care, Initial/Spiritual Assessment     Spiritual beliefs: (Please include comment if needed)     [x] Identifies with a reji tradition: Latter-day         [] Supported by a reji community:            [] Claims no spiritual orientation:           [] Seeking spiritual identity:                [] Adheres to an individual form of spirituality:           [] Not able to assess:                           Identified resources for coping:      [] Prayer                               [] Music                  [] Guided Imagery     [] Family/friends                 [] Pet visits     [] Devotional reading                         [x] Unknown     [] Other:                                           Interventions offered during this visit: (See comments for more details)    Patient Interventions: Affirmation of emotions/emotional suffering, Affirmation of reji, Catharsis/review of pertinent events in supportive environment, Iconic (affirming the presence of God/Higher Power), Initial/Spiritual assessment, patient floor, Prayer (assurance of), Prayer (actual)           Plan of Care:     [] Support spiritual and/or cultural needs    [] Support AMD and/or advance care planning process      [] Support grieving process   [] Coordinate Rites and/or Rituals    [] Coordination with community clergy   [] No spiritual needs identified at this time   [] Detailed Plan of Care below (See Comments)  [] Make referral to Music Therapy  [] Make referral to Pet Therapy     [] Make referral to Addiction services  [] Make referral to [facility] Passages  [] Make referral to Spiritual Care Partner  [] No future visits requested        [x] Follow up visits as needed     Comments: Initial Palliaitve Care spiritual assessment in 5 Med Surg. Miss Rodger Broderick appeared to recognize me as I have seen her before in the hospital.  She shared about her fall and how uncomfortable she is. RN come in to work with her during visit. Miss Rodger Broderick share belief in God. She tearfully mentioned her son's death a couple of years ago. That has been difficult for her. She ended up repeating herself several times. Provided calming spiritual presence and at her request provided prayer. Consulted with RN's. Chaplains available as needed. Visited by: Lico Moody, MS., [address] [facility] [place] ([phone])       Provided spiritual presence and prayer. Advised of  Availability.

## 2019-07-21 NOTE — PROGRESS NOTES
[facility]: MED RECONCILIATION    Comments/Recommendations:   Patient able to confirm name, , allergies, and preferred pharmacy  Daughter, Cady Kendall was reached via phone to discuss her mother's medications. Patient stated she would feel more comfortable if meds were reviewed with daughter due to her confusion at times. Discussed home medications with daughter; Well aware of medications, doses, and when they were last administered. Medications added:     None    Medications removed:    Escitalopram 10mg PO daily; Stopped taking ~2-3 weeks ago. Bactroban - not using (Rx in )  Nystatin - not using (Rx in )     Medications adjusted:    Oxycodone ER 10mg PO QHS; Change from Oxycodone ER 10mg PO BID as previously reported. Information obtained from: Patient, Daughter, Rx Query    Allergies: Codeine    Prior to Admission Medications:   Prior to Admission Medications   Prescriptions Last Dose Informant Patient Reported? Taking? ALPRAZolam (XANAX) 0.25 mg tablet 2019 at Unknown time  No Yes   Sig: Take 1 Tab by mouth two (2) times daily as needed for Anxiety. Max Daily Amount: 0.5 mg. Indications: Anxiousness associated with Depression   ELIQUIS 5 mg tablet 2019 at Unknown time  No Yes   Sig: take 1 tablet by mouth once daily   amLODIPine (NORVASC) 5 mg tablet 2019 at unknown  Yes Yes   Sig: Take 5 mg by mouth daily. cholecalciferol (VITAMIN D3) 50,000 unit capsule 2019 at Unknown time Self Yes Yes   Sig: Take 50,000 Units by mouth every seven (7) days. Takes on    dexAMETHasone (DECADRON) 2 mg tablet 2019 at Unknown time  No Yes   Sig: take 1 tablet by mouth once daily after breakfast   digoxin (LANOXIN) 0.125 mg tablet 2019 at Unknown time Self Yes Yes   Sig: Take 0.125 mg by mouth daily. folic acid (FOLVITE) 1 mg tablet 2019 at Unknown time  Yes Yes   Sig: Take 1 mg by mouth daily.    methotrexate (RHEUMATREX) 2.5 mg tablet 2019 at Unknown time  No Yes   Sig: Take 8 Tabs by mouth Every Saturday. The patient takes 8 tablets which is 20 mg every Saturday   oxyCODONE ER (OXYCONTIN) 10 mg ER tablet 7/19/2019 at pm  Yes Yes   Sig: Take 10 mg by mouth nightly. oxyCODONE IR (ROXICODONE) 5 mg immediate release tablet 7/20/2019 at Unknown time  No Yes   Sig: Take 1 Tab by mouth every three (3) hours as needed for Pain for up to 30 days. Max Daily Amount: 40 mg. Facility-Administered Medications: None               Tristan Lambert. ALAINA   [address]

## 2019-07-21 NOTE — CONSULTS
Surgery Consult    Subjective:      Queen Florina is a 80 y.o. female who I was asked to evaluate for a right gluteal hematoma. She fell down at home while she was reaching for something in her fridge and landed on her right hip. She came to the ER and had a CT scan which showed a large right gluteal hematoma.      Past Medical History:   Diagnosis Date    AAA (abdominal aortic aneurysm) (HCC)     Arthritis     ra, osteoporosis, osteoarthritis    Atrial fibrillation (HCC)     COPD     HTN (hypertension)     Lung cancer (HCC)     RA [facility]rheumatoid arthritis) ([facility]     Smoker     TIA (transient ischemic attack)     TIA 16     Past Surgical History:   Procedure Laterality Date    HX CHOLECYSTECTOMY      HX HYSTERECTOMY      HX ORTHOPAEDIC      carpal tunnel, wrist surgery      Family History   Problem Relation Age of Onset    Hypertension Father      Social History     Socioeconomic History    Marital status: SINGLE     Spouse name: Not on file    Number of children: Not on file    Years of education: Not on file    Highest education level: Not on file   Tobacco Use    Smoking status: Former Smoker     Years: 50.00     Last attempt to quit: 2018     Years since quittin.8    Smokeless tobacco: Never Used   Substance and Sexual Activity    Alcohol use: No    Drug use: No    Sexual activity: Not Currently      Current Facility-Administered Medications   Medication Dose Route Frequency    sodium chloride (NS) flush 5-40 mL  5-40 mL IntraVENous Q8H    sodium chloride (NS) flush 5-40 mL  5-40 mL IntraVENous PRN    morphine injection 2 mg  2 mg IntraVENous Q4H PRN    oxyCODONE-acetaminophen (PERCOCET) 5-325 mg per tablet 2 Tab  2 Tab Oral Q6H PRN      Allergies   Allergen Reactions    Codeine Nausea and Vomiting       Review of Systems:REVIEW OF SYSTEMS:     []     Unable to obtain  ROS due to  []    mental status change  []    sedated   []    intubated   []    Total of 12 systems reviewed as follows:  Constitutional: negative fever, negative chills, negative weight loss  Eyes:   negative visual changes  ENT:   negative sore throat, tongue or lip swelling  Respiratory:  negative cough, negative dyspnea  Cards:  negative for chest pain, palpitations, lower extremity edema  GI:   negative for nausea, vomiting, diarrhea, and abdominal pain  :  negative for frequency, dysuria  Integument:  negative for rash and pruritus  Heme:  negative for easy bruising and gum/nose bleeding  Musculoskel: negative for myalgias,  back pain and muscle weakness  Neuro:  negative for headaches, dizziness, vertigo  Psych:  negative for feelings of anxiety, depression     Objective:      No data found. Temp (24hrs), Av.8 °F (36.6 °C), Min:97.5 °F (36.4 °C), Max:98.1 °F (36.7 °C)      Physical Exam:  General:  Alert, cooperative, no distress, appears stated age. Eyes:  Conjunctivae/corneas clear. PERRL, EOMs intact. Nose: Nares normal. Septum midline. Mucosa normal. No drainage or sinus tenderness. Mouth/Throat: Lips, mucosa, and tongue normal. Teeth and gums normal.   Neck: Supple, symmetrical, trachea midline, no adenopathy, thyroid: no enlargment/tenderness/nodules, no carotid bruit and no JVD. Back:   Symmetric, no curvature. ROM normal. No CVA tenderness. Lungs:   Clear to auscultation bilaterally. Heart:  Regular rate and rhythm, S1, S2 normal, no murmur, click, rub or gallop. Abdomen:   Soft, non-tender. Bowel sounds normal. No masses,  No organomegaly. Extremities: Extremities normal, atraumatic, no cyanosis or edema. On her right gluteal area there is a large gluteal hematoma with marked tenderness - no skin changes. Pulses: 2+ and symmetric all extremities.    Skin: Skin color, texture, turgor normal. No rashes or lesions   Lymph nodes: Cervical, supraclavicular, and axillary nodes normal.     Labs:   Recent Labs     19  0259   WBC 12.5*   HGB 7.7*   HCT 25.0*    Recent Labs     07/21/19  0259 07/20/19 1930    140   K 4.7 4.3    105   CO2 25 27   * 113*   BUN 27* 24*   CREA 1.09* 0.83   CA 7.8* 9.0   MG  --  1.8   PHOS  --  3.4   ALB  --  3.4*   TBILI  --  0.4   SGOT  --  15   ALT  --  13     Recent Labs     07/20/19 1930   INR 1.1         Assessment:     Right gluteal hematoma      Plan:     I discussed my findings and the findings on CT with patient and her daughter  She does have a large hematoma with tenderness and there has a marked drop in her Hb   I suspect she might need drainage of this - patient is very reluctant to have it done  I recommended observation for 24 hours and reassessment tomorrow - if worse she might need drainage under general anesthesia   Will also monitor her Hb     Signed By: Madelon Councilman, MD     July 21, 2019

## 2019-07-21 NOTE — PROGRESS NOTES
[facility]  [address]  [phone]      Medical Progress Note      NAME: Lazarus Hering   :  1931  MRM:  [id number]    Date/Time: 2019          Subjective:     Chief Complaint:  F/u fall    ROS:  (bold if positive, if negative)      Tolerating PT  Tolerating Diet          Objective:       Vitals:          Last 24hrs VS reviewed since prior progress note. Most recent are:    Visit Vitals  BP 95/54 (BP 1 Location: Right arm, BP Patient Position: At rest)   Pulse 99   Temp 97.5 °F (36.4 °C)   Resp 20   Ht 5' 7\" (1.702 m)   Wt 56.7 kg (125 lb)   SpO2 95%   Breastfeeding?  No   BMI 19.58 kg/m²     SpO2 Readings from Last 6 Encounters:   19 95%   19 95%   19 96%   19 93%   19 97%   19 97%        No intake or output data in the 24 hours ending 19 1212       Exam:     Physical Exam:    Gen:  Well-developed, well-nourished, in no acute distress  HEENT:  Pink conjunctivae, PERRL, hearing intact to voice, moist mucous membranes  Neck:  Supple, without masses, thyroid non-tender  Resp:  No accessory muscle use, clear breath sounds without wheezes rales or rhonchi  Card:  No murmurs, normal S1, S2 without thrills, bruits or peripheral edema  Abd:  Soft, non-tender, non-distended, normoactive bowel sounds are present  Musc:  No cyanosis or clubbing  Skin:  No rashes or ulcers, skin turgor is good  Neuro:  Cranial nerves 3-12 are grossly intact,  strength is 5/5 bilaterally and dorsi / plantarflexion is 5/5 bilaterally, follows commands appropriately  Psych:  Good insight, oriented to person, place and time, alert       Medications Reviewed: (see below)    Lab Data Reviewed: (see below)    ______________________________________________________________________    Medications:     Current Facility-Administered Medications   Medication Dose Route Frequency    sodium chloride (NS) flush 5-40 mL  5-40 mL IntraVENous Q8H    sodium chloride (NS) flush 5-40 mL  5-40 mL IntraVENous PRN    morphine injection 2 mg  2 mg IntraVENous Q4H PRN    oxyCODONE-acetaminophen (PERCOCET) 5-325 mg per tablet 2 Tab  2 Tab Oral Q6H PRN            Lab Review:     Recent Labs     07/21/19  0952 07/21/19  0259 07/20/19  1930   WBC  --  12.5* 12.4*   HGB 7.6* 7.7* 11.5   HCT 24.3* 25.0* 37.0   PLT  --  344 471*     Recent Labs     07/21/19  0259 07/20/19  1930    140   K 4.7 4.3    105   CO2 25 27   * 113*   BUN 27* 24*   CREA 1.09* 0.83   CA 7.8* 9.0   MG  --  1.8   PHOS  --  3.4   ALB  --  3.4*   SGOT  --  15   ALT  --  13   INR  --  1.1     No components found for: [place]         Assessment / Plan:     Hematoma on RT gluteal area / acute blood loss anemia: Hg dropped 4 grams. Send repeat Hg this afternoon. Transfusion consent signed. IV morphine for pain control. Reviewed by general surgery      Paroxysmal A-fib ([facility] (9/30/2016). Hold eliquis. Continue digoxin       COPD (chronic obstructive pulmonary disease) ([facility] (9/30/2016). Stable. Not wheezing. Continue nebs PRN       Hypertension (8/25/2018). Continue home meds after med rec       Malignant neoplasm of upper lobe of left lung ([facility] (11/27/2018). S/p RT. Follows with Dr Rodriguez       Abdominal aortic aneurysm (AAA) without rupture ([facility] (12/4/2018). outpatient FU       Leukocytosis (7/20/2019). Doubt infection. Likely due to stress. Check UA     12:50pm notified by nursing of low/normal BP. Give 500cc bolus and send type and screen.  Nursing to send ua as well      Total time spent with patient: 30 [address] [place] discussed with: Patient and Family    Discussed:  Care Plan    Prophylaxis:  SCD's    Disposition:   PT, OT, RN           ___________________________________________________    Attending Physician: Mae White MD

## 2019-07-21 NOTE — ED NOTES
TRANSFER - OUT REPORT:    Verbal report given to Latosha(name) on Henrine Scriver  being transferred to 503(unit) for routine progression of care       Report consisted of patients Situation, Background, Assessment and   Recommendations(SBAR). Information from the following report(s) SBAR, Kardex, ED Summary, Intake/Output, MAR, Recent Results and Cardiac Rhythm a fib was reviewed with the receiving nurse. Lines:   Peripheral IV 07/20/19 Left Antecubital (Active)   Site Assessment Clean, dry, & intact 7/20/2019  7:33 PM   Phlebitis Assessment 0 7/20/2019  7:33 PM   Infiltration Assessment 0 7/20/2019  7:33 PM   Dressing Type Tape;Transparent 7/20/2019  7:33 PM   Hub Color/Line Status Pink;Patent; Flushed 7/20/2019  7:33 PM        Opportunity for questions and clarification was provided.       Patient transported with:   Registered Nurse

## 2019-07-21 NOTE — ED NOTES
Notified 5th floor Charge Nurse of Room Assignment.  Nurse to call back for report     Relayed message to Willie Trujillo RN   ED Charge Nurse

## 2019-07-21 NOTE — ROUTINE PROCESS
Bedside and Verbal shift change report given to Laura Edouard (oncoming nurse) by Samia Cook (offgoing nurse). Report included the following information SBAR, Kardex, MAR and Recent Results.

## 2019-07-21 NOTE — PROGRESS NOTES
Pt. BP 89/58. RN notifies Dr. Melissa Logan. MD states to give 500ml NS bolus now, and to collect type and screen. Will continue to monitor.

## 2019-07-21 NOTE — PROGRESS NOTES
Physical Therapy: Chart reviewed earlier, and pt given clearance to participate by RN. Later on return, noted pt exhibited low BP 89/58, and Hgb 7.7 Spoke again with RN, and deferral of PT Evaluation is appropriate at this time. Will f/u when medically stable to participate.   Thank you for referral,  Cristiana Garrison, PT MS

## 2019-07-22 LAB
ANION GAP SERPL CALC-SCNC: 3 MMOL/L (ref 5–15)
BUN SERPL-MCNC: 31 MG/DL (ref 6–20)
BUN/CREAT SERPL: 31 (ref 12–20)
CALCIUM SERPL-MCNC: 7.4 MG/DL (ref 8.5–10.1)
CHLORIDE SERPL-SCNC: 106 MMOL/L (ref 97–108)
CO2 SERPL-SCNC: 29 MMOL/L (ref 21–32)
CREAT SERPL-MCNC: 1.01 MG/DL (ref 0.55–1.02)
DIGOXIN SERPL-MCNC: 1.1 NG/ML (ref 0.9–2)
ERYTHROCYTE [DISTWIDTH] IN BLOOD BY AUTOMATED COUNT: 17.8 % (ref 11.5–14.5)
GLUCOSE SERPL-MCNC: 106 MG/DL (ref 65–100)
HCT VFR BLD AUTO: 21.5 % (ref 35–47)
HCT VFR BLD AUTO: 23.7 % (ref 35–47)
HGB BLD-MCNC: 6.8 G/DL (ref 11.5–16)
HGB BLD-MCNC: 7.6 G/DL (ref 11.5–16)
MCH RBC QN AUTO: 30.2 PG (ref 26–34)
MCHC RBC AUTO-ENTMCNC: 31.6 G/DL (ref 30–36.5)
MCV RBC AUTO: 95.6 FL (ref 80–99)
NRBC # BLD: 0 K/UL (ref 0–0.01)
NRBC BLD-RTO: 0 PER 100 WBC
PLATELET # BLD AUTO: 293 K/UL (ref 150–400)
PMV BLD AUTO: 9.7 FL (ref 8.9–12.9)
POTASSIUM SERPL-SCNC: 5.5 MMOL/L (ref 3.5–5.1)
RBC # BLD AUTO: 2.25 M/UL (ref 3.8–5.2)
SODIUM SERPL-SCNC: 138 MMOL/L (ref 136–145)
WBC # BLD AUTO: 22.2 K/UL (ref 3.6–11)

## 2019-07-22 PROCEDURE — 80162 ASSAY OF DIGOXIN TOTAL: CPT

## 2019-07-22 PROCEDURE — P9016 RBC LEUKOCYTES REDUCED: HCPCS

## 2019-07-22 PROCEDURE — 85018 HEMOGLOBIN: CPT

## 2019-07-22 PROCEDURE — 36415 COLL VENOUS BLD VENIPUNCTURE: CPT

## 2019-07-22 PROCEDURE — 74011250637 HC RX REV CODE- 250/637: Performed by: INTERNAL MEDICINE

## 2019-07-22 PROCEDURE — 36430 TRANSFUSION BLD/BLD COMPNT: CPT

## 2019-07-22 PROCEDURE — 65660000000 HC RM CCU STEPDOWN

## 2019-07-22 PROCEDURE — 77030018846 HC SOL IRR STRL H20 ICUM -A

## 2019-07-22 PROCEDURE — 80048 BASIC METABOLIC PNL TOTAL CA: CPT

## 2019-07-22 PROCEDURE — 74011250637 HC RX REV CODE- 250/637: Performed by: NURSE PRACTITIONER

## 2019-07-22 PROCEDURE — 30233N1 TRANSFUSION OF NONAUTOLOGOUS RED BLOOD CELLS INTO PERIPHERAL VEIN, PERCUTANEOUS APPROACH: ICD-10-PCS | Performed by: INTERNAL MEDICINE

## 2019-07-22 PROCEDURE — 76450000000

## 2019-07-22 PROCEDURE — 74011250636 HC RX REV CODE- 250/636: Performed by: INTERNAL MEDICINE

## 2019-07-22 PROCEDURE — 85027 COMPLETE CBC AUTOMATED: CPT

## 2019-07-22 RX ORDER — AMOXICILLIN 250 MG
1 CAPSULE ORAL DAILY
Status: DISCONTINUED | OUTPATIENT
Start: 2019-07-22 | End: 2019-07-31 | Stop reason: HOSPADM

## 2019-07-22 RX ORDER — SODIUM CHLORIDE 9 MG/ML
250 INJECTION, SOLUTION INTRAVENOUS AS NEEDED
Status: DISCONTINUED | OUTPATIENT
Start: 2019-07-22 | End: 2019-07-31 | Stop reason: HOSPADM

## 2019-07-22 RX ORDER — ACETAMINOPHEN 500 MG
1000 TABLET ORAL EVERY 8 HOURS
Status: DISCONTINUED | OUTPATIENT
Start: 2019-07-22 | End: 2019-07-31 | Stop reason: HOSPADM

## 2019-07-22 RX ORDER — FOLIC ACID 1 MG/1
1 TABLET ORAL DAILY
Status: DISCONTINUED | OUTPATIENT
Start: 2019-07-23 | End: 2019-07-31 | Stop reason: HOSPADM

## 2019-07-22 RX ORDER — OXYCODONE HYDROCHLORIDE 5 MG/1
10 TABLET ORAL
Status: DISCONTINUED | OUTPATIENT
Start: 2019-07-22 | End: 2019-07-31 | Stop reason: HOSPADM

## 2019-07-22 RX ORDER — NALOXONE HYDROCHLORIDE 0.4 MG/ML
0.4 INJECTION, SOLUTION INTRAMUSCULAR; INTRAVENOUS; SUBCUTANEOUS
Status: DISCONTINUED | OUTPATIENT
Start: 2019-07-22 | End: 2019-07-31 | Stop reason: HOSPADM

## 2019-07-22 RX ADMIN — SENNOSIDES,DOCUSATE SODIUM 1 TABLET: 8.6; 5 TABLET, FILM COATED ORAL at 16:20

## 2019-07-22 RX ADMIN — ALPRAZOLAM 0.25 MG: 0.25 TABLET ORAL at 23:32

## 2019-07-22 RX ADMIN — ESCITALOPRAM OXALATE 10 MG: 10 TABLET ORAL at 09:12

## 2019-07-22 RX ADMIN — Medication 10 ML: at 21:37

## 2019-07-22 RX ADMIN — DEXAMETHASONE 2 MG: 4 TABLET ORAL at 09:13

## 2019-07-22 RX ADMIN — Medication 10 ML: at 13:12

## 2019-07-22 RX ADMIN — OXYCODONE HYDROCHLORIDE 10 MG: 10 TABLET, FILM COATED, EXTENDED RELEASE ORAL at 20:26

## 2019-07-22 RX ADMIN — ACETAMINOPHEN 1000 MG: 500 TABLET ORAL at 23:32

## 2019-07-22 RX ADMIN — OXYCODONE HYDROCHLORIDE 5 MG: 5 TABLET ORAL at 16:20

## 2019-07-22 RX ADMIN — ACETAMINOPHEN 1000 MG: 500 TABLET ORAL at 16:20

## 2019-07-22 RX ADMIN — OXYCODONE HYDROCHLORIDE 5 MG: 5 TABLET ORAL at 08:52

## 2019-07-22 RX ADMIN — Medication 10 ML: at 06:23

## 2019-07-22 RX ADMIN — OXYCODONE HYDROCHLORIDE 5 MG: 5 TABLET ORAL at 21:35

## 2019-07-22 NOTE — PROGRESS NOTES
Patient's admission database done with patient and patient's daughter. Patient complaining of severe arthritic pain, patient crying out due to pain. Patient's heart rate found to be 60-64 on tele. Patient medicated with pain medication. Primary nurse made aware.

## 2019-07-22 NOTE — CONSULTS
Palliative Medicine Consult  Carlos Manuel: [phone])    Patient Name: Megan Vargas  YOB: 1931    Date of Initial Consult: 2019  Reason for Consult: End stage disease  Requesting Provider: Leisa Ludwig MD   Primary Care Physician: Danuta Del Rio MD     SUMMARY:   Megan Vargas is a 80 y.o. with a past history of RA, OA, osteoporosis, COPD, AAA, HTN, h/o TIA, lung cancer (in remission), s/p hysterectomy, s/p cholecystectomy, who was admitted on 2019 from home with a diagnosis of right leg hematoma. Patient presented to the ED after suffering a fall at home. ED eval revealed hip xray with no fraction, subluxation, or dislocation. Patient admitted for observation, pain management, and serial hemoglobins. Hgb found to have dropped 4 gm and patient received one unit of PRBCs    Surgery consulted for large hematoma and drop in hgb. May need drainage under general anesthesia. Recommended trending hgbs. Current medical issues leading to Palliative Medicine involvement include: care decisions due to progressive decline. SH: , lives with grandson and his family. Daughter lives only a few houses away. Son is .  reviewed:  Patient followed by outpatient Palliative Medicine (Dr. Marlo Palma MD)  30 prescriptions  8 prescribers  Chronic pain medication regimen: Oxycontin ER 10 mg at bedtime (last filled 7/10) and oxycodone IR 5 mg 1-2 tabs every three hours as needed for pain (last filled ). PALLIATIVE DIAGNOSES:   1. Acute right hip/gluteal pain from GLF  2. Chronic pain   3. Physical debility  4. Goals of care  5. DNR  6. ACP  7. Traumatic gluteal hematoma       PLAN:   1. Met with patient and daughter, Usman Justice [facility] and introduced the role of Palliative Medicine. 2. Right hip/gluteal pain due to hematoma with history of chronic pain--> currently 5/10, throbbing, unable to ambulate.  Has received 1 dose of IV morphine 2 mg and 2 doses of oxycodone 5 mg in the past 24 hours. 1. Continue OxyContin 10 mg at bedtime  2. Patient can ask for 5 mg oxycodone IR OR 10 mg oxycodone IR every three hours as needed for pain (per last clinic note)  3. Continue IV morphine 2 mg every 4 hours as needed for severe pain/pain not relieved by oral regimen  4. Schedule tylenol 1 gm every 8 hours  5. K-pad to neck and upper back  6. Bowel regimen ordered - tana colace daily  7. Narcan for respiratory depression/overdose  3. Goals of care--> Discussed current hospitalization and prior level of functioning. Prior to suffer GLF, patient reports that she was independent in ADLs and ambulated with a walked. Reports that she has a first floor bedroom at her grandson's house and avoids steps in an effort to avoid falling. She no longer drives and family takes her to her MD appointments, has been less and active and daughter reports she does not leave the house except for MD appointments. Patient and family understand that hemoglobin is being followed and that surgery would like to avoid surgical drainage if at all possible. Explained that PT/OT will be assessing her and patient very anxious about moving and the pain it will illicit. Daughter concerned that because of this fall, patient will be even less active due to fear of falling again, is eager for patient to work with therapy. Encouraged patient to premedication with pain meds prior to working with therapy. Patient and daughter are hopeful that she will continue to recover and return home. Patient was at the [id number] [facility] for rehab at one point, daughter reports a very negative experience and hopes to avoid SNF stay for rehab at discharge. Will continue to follow. 4. Code status--> DNR; completed DDNR on file signed by patient's daughter in 2/2019.  5. ACP--> No AMD on file, surrogate decision maker is legal next of kin (daughter, Ramin Moore)  6.  Initial consult note routed to primary continuity provider and/or primary health care team members  7. Communicated plan of care with: Palliative [facility] STEW[facility] Team     GOALS OF CARE / TREATMENT PREFERENCES:     GOALS OF CARE:  Patient/Health Care Proxy Stated Goals: (Full restorative measures in an effort to recover)    TREATMENT PREFERENCES:   Code Status: DNR    Advance Care Planning:  [x] The [facility] Interdisciplinary Team has updated the ACP Navigator with Health Care Decision Maker and Patient Capacity      Primary Decision Maker (Active): Larry Forrester Daughter - [phone]  Advance Care Planning 7/22/2019   Patient's Healthcare Decision Maker is: Legal Next of Kin   Primary Decision Maker Name -   Confirm Advance Directive None   Patient Would Like to Complete Advance Directive Unable   Does the patient have other document types Do Not Resuscitate       Medical Interventions: Limited additional interventions     Other Instructions: Other:    As far as possible, the palliative care team has discussed with patient / health care proxy about goals of care / treatment preferences for patient. HISTORY:     History obtained from: patient, daughter, chart    CHIEF COMPLAINT: GLF    HPI/SUBJECTIVE:    The patient is:   [x] Verbal and participatory  [] Non-participatory due to:     Patient presented to the ED after suffering a fall at home. ED eval revealed hip xray with no fraction, subluxation, or dislocation. Patient admitted for observation, pain management, and serial hemoglobins. Hgb found to have dropped 4 gm and patient received one unit of PRBCs    Surgery consulted for large hematoma and drop in hgb. May need drainage under general anesthesia. Recommended trending hgbs.        Clinical Pain Assessment (nonverbal scale for severity on nonverbal patients):   Clinical Pain Assessment  Severity: 5  Location: right hip/buttock  Character: throbbing  Duration: acute  Effect: limited mobility  Factors: movement  Frequency: constant          Duration: for how long has pt been experiencing pain (e.g., 2 days, 1 month, years)  Frequency: how often pain is an issue (e.g., several times per day, once every few days, constant)     FUNCTIONAL ASSESSMENT:     Palliative Performance Scale (PPS):  PPS: 50       PSYCHOSOCIAL/SPIRITUAL SCREENING:     Palliative IDT has assessed this patient for cultural preferences / practices and a referral made as appropriate to needs (Cultural Services, Patient Advocacy, Ethics, etc.)    Any spiritual / Spiritism concerns:  [] Yes /  [x] No    Caregiver Burnout:  [] Yes /  [x] No /  [] No Caregiver Present      Anticipatory grief assessment:   [x] Normal  / [] Maladaptive       ESAS Anxiety: Anxiety: 0    ESAS Depression:          REVIEW OF SYSTEMS:     Positive and pertinent negative findings in ROS are noted above in HPI. The following systems were [x] reviewed / [] unable to be reviewed as noted in HPI  Other findings are noted below. Systems: constitutional, ears/nose/mouth/throat, respiratory, gastrointestinal, genitourinary, musculoskeletal, integumentary, neurologic, psychiatric, endocrine. Positive findings noted below. Modified ESAS Completed by: provider   Fatigue: 8 Drowsiness: 0     Pain: 5   Anxiety: 0 Nausea: 0   Anorexia: 0 Dyspnea: 0     Constipation: No              PHYSICAL EXAM:     From RN flowsheet:  Wt Readings from Last 3 Encounters:   07/20/19 125 lb (56.7 kg)   05/29/19 123 lb 12.8 oz (56.2 kg)   02/28/19 111 lb 12.8 oz (50.7 kg)     Blood pressure 109/42, pulse (!) 47, temperature 97.6 °F (36.4 °C), resp. rate 16, height 5' 7\" (1.702 m), weight 125 lb (56.7 kg), SpO2 98 %, not currently breastfeeding.     Pain Scale 1: Numeric (0 - 10)  Pain Intensity 1: 0     Pain Location 1: Hip  Pain Orientation 1: Right  Pain Description 1: Aching  Pain Intervention(s) 1: Declines  Last bowel movement, if known:     Constitutional: NAD  Eyes: pupils equal, anicteric  ENMT: no nasal discharge, moist mucous membranes  Cardiovascular: regular rhythm, distal pulses intact  Respiratory: breathing not labored, symmetric  Gastrointestinal: soft non-tender, +bowel sounds  Musculoskeletal: no deformity, no tenderness to palpation  Skin: warm, dry  Neurologic: following commands, moving all extremities  Psychiatric: full affect, no hallucinations  Other:       HISTORY:     Principal Problem:    Hematoma (2019)    Active Problems:    Paroxysmal A-fib (HCC) (2016)      COPD (chronic obstructive pulmonary disease) ([address] (2016)      Hypertension (2018)      Malignant neoplasm of upper lobe of left lung ([address] (2018)      Abdominal aortic aneurysm (AAA) without rupture ([address] (2018)      Leukocytosis (2019)      Fall (2019)      Acute right hip pain ()      Chronic pain syndrome ()      Physical debility ()      Goals of care, counseling/discussion ()      Past Medical History:   Diagnosis Date    AAA (abdominal aortic aneurysm) (HCC)     Arthritis     ra, osteoporosis, osteoarthritis    Atrial fibrillation ([address]     COPD     HTN (hypertension)     Lung cancer ([address]     RA (rheumatoid arthritis) ([address]     Smoker     TIA (transient ischemic attack)     TIA 16      Past Surgical History:   Procedure Laterality Date    HX CHOLECYSTECTOMY      HX HYSTERECTOMY      HX ORTHOPAEDIC      carpal tunnel, wrist surgery      Family History   Problem Relation Age of Onset    Hypertension Father       History reviewed, no pertinent family history.   Social History     Tobacco Use    Smoking status: Former Smoker     Years: 50.00     Last attempt to quit: 2018     Years since quittin.9    Smokeless tobacco: Never Used   Substance Use Topics    Alcohol use: No     Allergies   Allergen Reactions    Codeine Nausea and Vomiting      Current Facility-Administered Medications   Medication Dose Route Frequency    0.9% sodium chloride infusion 250 mL  250 mL IntraVENous PRN    [START ON 3/56/0971] folic acid (FOLVITE) tablet 1 mg  1 mg Oral DAILY    oxyCODONE IR (ROXICODONE) tablet 10 mg  10 mg Oral Q3H PRN    acetaminophen (TYLENOL) tablet 1,000 mg  1,000 mg Oral Q8H    senna-docusate (PERICOLACE) 8.6-50 mg per tablet 1 Tab  1 Tab Oral DAILY    naloxone (NARCAN) injection 0.4 mg  0.4 mg IntraVENous EVERY 2 MINUTES AS NEEDED    sodium chloride (NS) flush 5-40 mL  5-40 mL IntraVENous Q8H    sodium chloride (NS) flush 5-40 mL  5-40 mL IntraVENous PRN    ALPRAZolam (XANAX) tablet 0.25 mg  0.25 mg Oral BID PRN    dexAMETHasone (DECADRON) tablet 2 mg  2 mg Oral DAILY AFTER BREAKFAST    digoxin (LANOXIN) tablet 0.125 mg  0.125 mg Oral DAILY    escitalopram oxalate (LEXAPRO) tablet 10 mg  10 mg Oral DAILY    [START ON 7/27/2019] methotrexate (RHEUMATREX) tablet 20 mg  20 mg Oral every Saturday    oxyCODONE IR (ROXICODONE) tablet 5 mg  5 mg Oral Q3H PRN    oxyCODONE ER (OxyCONTIN) tablet 10 mg  10 mg Oral DAILY    morphine injection 2 mg  2 mg IntraVENous Q4H PRN          LAB AND IMAGING FINDINGS:     Lab Results   Component Value Date/Time    WBC 22.2 (H) 07/22/2019 12:45 AM    HGB 6.8 (L) 07/22/2019 12:45 AM    PLATELET 804 58/27/8386 12:45 AM     Lab Results   Component Value Date/Time    Sodium 138 07/22/2019 12:45 AM    Potassium 5.5 (H) 07/22/2019 12:45 AM    Chloride 106 07/22/2019 12:45 AM    CO2 29 07/22/2019 12:45 AM    BUN 31 (H) 07/22/2019 12:45 AM    Creatinine 1.01 07/22/2019 12:45 AM    Calcium 7.4 (L) 07/22/2019 12:45 AM    Magnesium 1.8 07/20/2019 07:30 PM    Phosphorus 3.4 07/20/2019 07:30 PM      Lab Results   Component Value Date/Time    AST (SGOT) 15 07/20/2019 07:30 PM    Alk.  phosphatase 50 07/20/2019 07:30 PM    Protein, total 7.6 07/20/2019 07:30 PM    Albumin 3.4 (L) 07/20/2019 07:30 PM    Globulin 4.2 (H) 07/20/2019 07:30 PM     Lab Results   Component Value Date/Time    INR 1.1 07/20/2019 07:30 PM    Prothrombin time 11.0 07/20/2019 07:30 PM    aPTT 28.7 07/20/2019 07:30 PM      Lab Results   Component Value Date/Time    Iron 14 (L) 11/28/2018 12:58 AM    TIBC 143 (L) 11/28/2018 12:58 AM    Iron % saturation 10 (L) 11/28/2018 12:58 AM    Ferritin 139 11/28/2018 12:58 AM      No results found for: PH, PCO2, PO2  No components found for: GLPOC   No results found for: CPK, CKMB             Total time: 70 min  Counseling / coordination time, spent as noted above: 50 min  > 50% counseling / coordination?: yes    Prolonged service was provided for  []30 min   []75 min in face to face time in the presence of the patient, spent as noted above. Time Start:   Time End:   Note: this can only be billed with 49210 (initial) or 46377 (follow up). If multiple start / stop times, list each separately.

## 2019-07-22 NOTE — ACP (ADVANCE CARE PLANNING)
Advance Care Planning 2019   Patient's Healthcare Decision Maker is: Legal Next of Devin 69   Primary Decision Maker Name Sammytariq Derek, dtr, [phone]   Confirm Advance Directive None   Patient Would Like to Complete Advance Directive Unable   Does the patient have other document types Durable Do Not Resuscitate     Pt does not have AMD in place, dtr Minh Lopez is legal NOK/surrogate decision maker, son  in 2017. DDNR form was completed with outpt Palliative Medicine physician; copy has been scanned into medical record.

## 2019-07-22 NOTE — PROGRESS NOTES
Palliative Medicine      Code Status: DNR      Advance Care Planning 2019   Patient's Healthcare Decision Maker is: Legal Next of Devin 69   Primary Decision Maker Name Yulissa Kelly, dtr, [phone]   Confirm Advance Directive None   Patient Would Like to Complete Advance Directive Unable (periods of confusion/memory impairment, difficulty retaining information)   Does the patient have other document types Durable Do Not Resuscitate       Patient / Family Encounter Documentation    Participants (names): Pt, dtr Catie Manjarrez, Palliative Medicine (NP Anita Aden)    Narrative:  Pt was awake in bed with dtr at bedside. Pt is , has a dtr Catie Manjarrez who lives several houses away, son Edis Daley  in 2017. Pt moved to [facility] from [place] in , worked for an [employer]. Pt lives with grandarnol Bourne, his wife, and their 3 children (including a three week old infant). Andre works during the day, Gloria Connell works from home and has been able to assist as needed, did express concern re: the stress level in the home with the addition of a new baby. Pt has been followed in outpt Palliative Medicine clinic since February, was last seen on 19, stated at that time that her goal was to continue enjoying her great-grandchildren. Pt today was in significant pain, was momentarily tearful, stated she did not want to suffer. Pt has history of pain, reports lidocaine patches have not been effective in the past, dtr reports pt had a cream but did not like the smell, pt reports pain meds given earlier today were not effective. Pt expressed fear/anxiety re: idea of getting out of bed and walking, expressed fear of another fall.   Dtr stated pt will need to be ambulatory before she returns home but shared that they did not have a positive experience at SNF ([facility]) in the past.  Dtr stated Gustavo 61 had provided information re: private caregivers but dtr had misplaced the information. Pt does not have AMD in place, dtr Lenora Childs is legal NOK/surrogate decision maker. DDNR form was completed with outpt Palliative Medicine physician; copy has been scanned into medical record.       Psychosocial Issues Identified/ Resilience Factors:  Pt is having a difficult time coping with her physical pain but also shared of the emotional stress of having to rely on others, expressed fear of being a burden on her family. Pt did not mention son during this visit but has spoken with various staff members in the past about her grief as she chidi with his death. Pt does have good support in place from dtr and grandson. Goals of Care / Plan:  Possible need for short term stay at SNF for strength building; pt was ambulatory prior to admission, is now hesitant to get out of bed due to pain and fear of falling. PT has not yet been able to work with pt (was being transfused earlier). SW left information in room later in day (dtr was no longer present) re: private caregivers/agencies. Will follow for support. Thank you for including Palliative Medicine in the care of Ms. Kimball.     Janis Huff LCSW, [facility]  [phone])

## 2019-07-22 NOTE — PROGRESS NOTES
Ty Echeverria [facility]  [address] [facility], [place], [address] [place] [address]  [phone]      Medical Progress Note      NAME: Bharath Mohan   :  1931  MRM:  [id number]    Date/Time: 2019          Subjective:     Chief Complaint:  F/u fall    ROS:  (bold if positive, if negative)      Tolerating PT  Tolerating Diet          Objective:       Vitals:          Last 24hrs VS reviewed since prior progress note. Most recent are:    Visit Vitals  /48 (BP 1 Location: Right arm)   Pulse (!) 50   Temp 98.6 °F (37 °C)   Resp 17   Ht 5' 7\" (1.702 m)   Wt 56.7 kg (125 lb)   SpO2 96%   Breastfeeding?  No   BMI 19.58 kg/m²     SpO2 Readings from Last 6 Encounters:   19 96%   19 95%   19 96%   19 93%   19 97%   19 97%        No intake or output data in the 24 hours ending 19 1027       Exam:     Physical Exam:    Gen:  Well-developed, well-nourished, in no acute distress  HEENT:  Pink conjunctivae, PERRL, hearing intact to voice, moist mucous membranes  Neck:  Supple, without masses, thyroid non-tender  Resp:  No accessory muscle use, clear breath sounds without wheezes rales or rhonchi  Card:  No murmurs, normal S1, S2 without thrills, bruits or peripheral edema  Abd:  Soft, non-tender, non-distended, normoactive bowel sounds are present  Musc:  No cyanosis or clubbing  Skin:  No rashes or ulcers, skin turgor is good  Neuro:  Cranial nerves 3-12 are grossly intact,  strength is 5/5 bilaterally and dorsi / plantarflexion is 5/5 bilaterally, follows commands appropriately  Psych:  Good insight, oriented to person, alert       Medications Reviewed: (see below)    Lab Data Reviewed: (see below)    ______________________________________________________________________    Medications:     Current Facility-Administered Medications   Medication Dose Route Frequency    0.9% sodium chloride infusion 250 mL  250 mL IntraVENous PRN    sodium chloride (NS) flush 5-40 mL  5-40 mL IntraVENous Q8H    sodium chloride (NS) flush 5-40 mL  5-40 mL IntraVENous PRN    ALPRAZolam (XANAX) tablet 0.25 mg  0.25 mg Oral BID PRN    dexAMETHasone (DECADRON) tablet 2 mg  2 mg Oral DAILY AFTER BREAKFAST    digoxin (LANOXIN) tablet 0.125 mg  0.125 mg Oral DAILY    escitalopram oxalate (LEXAPRO) tablet 10 mg  10 mg Oral DAILY    [START ON 7/27/2019] methotrexate (RHEUMATREX) tablet 20 mg  20 mg Oral every Saturday    oxyCODONE IR (ROXICODONE) tablet 5 mg  5 mg Oral Q3H PRN    oxyCODONE ER (OxyCONTIN) tablet 10 mg  10 mg Oral DAILY    morphine injection 2 mg  2 mg IntraVENous Q4H PRN            Lab Review:     Recent Labs     07/22/19  0045 07/21/19  1357 07/21/19  0952 07/21/19  0259 07/20/19  1930   WBC 22.2*  --   --  12.5* 12.4*   HGB 6.8* 8.4* 7.6* 7.7* 11.5   HCT 21.5*  --  24.3* 25.0* 37.0     --   --  344 471*     Recent Labs     07/22/19  0045 07/21/19  0259 07/20/19  1930    139 140   K 5.5* 4.7 4.3    106 105   CO2 29 25 27   * 113* 113*   BUN 31* 27* 24*   CREA 1.01 1.09* 0.83   CA 7.4* 7.8* 9.0   MG  --   --  1.8   PHOS  --   --  3.4   ALB  --   --  3.4*   SGOT  --   --  15   ALT  --   --  13   INR  --   --  1.1     No components found for: [place]         Assessment / Plan:   88yo F    Hematoma on RT gluteal area / acute blood loss anemia:   Hgb dropped 4 grams. Transfusion consent signed. - Txf 1U and monitor  - IV morphine for pain control.   - general surgery c/s and following      Paroxysmal A-fib ([facility] (9/30/2016). Hold eliquis, digoxin for HR<50       COPD (chronic obstructive pulmonary disease) ([address] (9/30/2016). Stable. Not wheezing.   - Continue nebs PRN       Hypertension (8/25/2018). - Continue home meds       [facility] (11/27/2018). S/p RT.   - Follow up with Dr Rodriguez       Abdominal aortic aneurysm (AAA) without rupture ([facility] (12/4/2018). - outpatient FU       Leukocytosis (7/20/2019). Doubt infection. Likely due to stress.    - monitor        Total time spent with patient: [address] discussed with: Patient and Family    Discussed:  Care Plan    DNR    DVT Prophylaxis:  SCD's     Disposition:   PT, OT, RN           ___________________________________________________    Attending Physician: Lauren Man MD

## 2019-07-22 NOTE — CDMP QUERY
Pt admitted for R gluteal hematoma s/p fall, and noted drop in hemoglobin from admission of 11.5 on 7/20 to 6.8 on 7/22. Surgery notes \"hematoma is large\". Currently she is scheduled for transfusion of one unit of PRBC's. Please further specify if you are treating any of the following.    => Acute blood loss anemia 2/2 large gluteal hematoma  => Dilutional anemia  => Other explanation (please specify)  => Clinically unable to determine. Risk factors: large hematoma, is on Eliquis at home. Clinical Indicators:  Hemoglobin on admission is 11.5, 28 hours later is 6.8. It appears in the interim she has received total of 1.5 L IV fluids for hydration and hypotension. Treatment:  Transfusion of one unit of PRBC's, serial monitoring of lab values. Please clarify and document your clinical opinion in the progress notes and discharge summary including the definitive and/or presumptive diagnosis, (suspected or probable), related to the above clinical findings. Please include clinical findings supporting your diagnosis. Thank you for your time.     Kin Rivera RN, BSN  [phone]

## 2019-07-22 NOTE — PROGRESS NOTES
Physical Therapy:  Orders received and chart reviewed. Patient currently about to start blood transfusion due to Hgb 6.8. We will continue to follow and re-attempt later as able. Thank you.   Malika Portillo PT,DPT,NCS

## 2019-07-22 NOTE — PROGRESS NOTES
General Surgery Daily Progress Note    Patient: Heath Warner MRN: [id number]  SSN: [id number]    YOB: 1931  Age: 80 y.o. Sex: female      Admit Date: 7/20/2019    Subjective:   Patient still very sleepy drowsy - not sure if this is early morning sleepiness. She had received pain meds at 2 am and none since. .    Current Facility-Administered Medications   Medication Dose Route Frequency    0.9% sodium chloride infusion 250 mL  250 mL IntraVENous PRN    sodium chloride (NS) flush 5-40 mL  5-40 mL IntraVENous Q8H    sodium chloride (NS) flush 5-40 mL  5-40 mL IntraVENous PRN    ALPRAZolam (XANAX) tablet 0.25 mg  0.25 mg Oral BID PRN    dexAMETHasone (DECADRON) tablet 2 mg  2 mg Oral DAILY AFTER BREAKFAST    digoxin (LANOXIN) tablet 0.125 mg  0.125 mg Oral DAILY    escitalopram oxalate (LEXAPRO) tablet 10 mg  10 mg Oral DAILY    [START ON 7/27/2019] methotrexate (RHEUMATREX) tablet 20 mg  20 mg Oral every Saturday    oxyCODONE IR (ROXICODONE) tablet 5 mg  5 mg Oral Q3H PRN    oxyCODONE ER (OxyCONTIN) tablet 10 mg  10 mg Oral DAILY    morphine injection 2 mg  2 mg IntraVENous Q4H PRN        Objective:   No intake/output data recorded. No intake/output data recorded. Patient Vitals for the past 8 hrs:   BP Temp Pulse Resp SpO2   07/22/19 0708 106/48 98.6 °F (37 °C) (!) 48 17 96 %   07/22/19 0538 92/58       07/22/19 0414 (!) 99/92 98 °F (36.7 °C) 76 16 97 %       Physical Exam:  General: Alert, cooperative, no distress, appears stated age. Neck:  Supple, symmetrical, trachea midline, no adenopathy, thyroid: no                           enlargement/tenderness/nodules, no carotid bruit and no JVD. Lungs: Clear to auscultation bilaterally. Heart:  Regular rate and rhythm, S1, S2 normal, no murmur, click, rub or gallop. Abdomen: Soft, non-tender. Bowel sounds normal. No masses,  No organomegaly. Extremities: Extremities normal, atraumatic, no cyanosis or edema.  Gluteal area - large hematoma - it now appears more dispersed with discoloration of skin - but not tense -   Skin:  Skin color, texture, turgor normal. No rashes or lesions    Labs:   Recent Labs     07/22/19 0045   WBC 22.2*   HGB 6.8*   HCT 21.5*        Recent Labs     07/22/19 0045 07/20/19  1930      < > 140   K 5.5*   < > 4.3      < > 105   CO2 29   < > 27   *   < > 113*   BUN 31*   < > 24*   CREA 1.01   < > 0.83   CA 7.4*   < > 9.0   MG  --   --  1.8   PHOS  --   --  3.4   ALB  --   --  3.4*   TBILI  --   --  0.4   SGOT  --   --  15   ALT  --   --  13    < > = values in this interval not displayed.      X-ray   Assessment / Plan:   · Gluteal hematoma  · She has dropped her Hb to 6.8  · Agree with transfusion one unit  · Will follow Hb  · No indication for surgery now  · But will need close follow up       Principal Problem:    Hematoma (7/20/2019)    Active Problems:    Paroxysmal A-fib ([address] (9/30/2016)      COPD (chronic obstructive pulmonary disease) ([address] (9/30/2016)      Hypertension (8/25/2018)      Malignant neoplasm of upper lobe of left lung ([address] (11/27/2018)      Abdominal aortic aneurysm (AAA) without rupture ([address] (12/4/2018)      Leukocytosis (7/20/2019)      Fall (7/20/2019)

## 2019-07-22 NOTE — PROGRESS NOTES
Bedside and Verbal shift change report given to GRAYSON Mendoza (oncoming nurse) by Poly Johnson (offgoing nurse). Report included the following information SBAR, Kardex, Procedure Summary, Intake/Output, MAR and Recent Results.

## 2019-07-22 NOTE — PROGRESS NOTES
Nutrition Assessment:    RECOMMENDATIONS/INTERVENTION(S):   1. Continue heart healthy diet. 2. Add Ensure Clear BID. 3. Continue to monitor po intake, wt changes, labs. ASSESSMENT:   7/22: Pt assessed for low BMI. Admitted for hematoma. PMH includes COPD, HTN, a. fib, lung CA (in remission), cholecystectomy. BMI 19.6, underweight for age. No recent wt loss recorded. Pt said she loved the food and ate well for lunch, but lunch tray was in the room and was only ~25% eaten. Discussed intake with RN, says pt is picky and eats <50% of her meals. Per pt, daughter is ordering meals for her. RN says pt loves juice and may benefit from a fortified juice. Will add Ensure Clear BID. No n/v/d or c/s issues. Labs- K 5.5, Ca 7.4, Glu 106. Meds- decadron, OxyContin. Diet Order: Cardiac  % Eaten:    Patient Vitals for the past 72 hrs:   % Diet Eaten   07/22/19 1403 20 %       Pertinent Medications: [x] Reviewed    Labs: [x] Reviewed    Anthropometrics: Height: 5' 7\" (170.2 cm) Weight: 56.7 kg (125 lb)    IBW (%IBW):   ( ) UBW (%UBW):   (  %)      BMI: Body mass index is 19.58 kg/m². This BMI is indicative of:   [x] Underweight    [] Normal    [] Overweight    []  Obesity    []  Extreme Obesity (BMI>40)  Estimated Nutrition Needs (Based on): 9173 Kcals/day(1033 x 1.3 AF (+250)) , 57 g(0.8-1 g/kg) Protein  Carbohydrate: At Least 130 g/day  Fluids: 1594 mL/day (1 ml/kcal)    Last BM:    [x]Active     []Hyperactive  []Hypoactive       [] Absent   BS  Skin:    [x] Intact   [] Incision  [] Breakdown   [] DTI   [] Tears/Excoriation/Abrasion  [x]Edema: 3+ non-pitting RLE [] Other:      Wt Readings from Last 30 Encounters:   07/20/19 56.7 kg (125 lb)   05/29/19 56.2 kg (123 lb 12.8 oz)   02/28/19 50.7 kg (111 lb 12.8 oz)   02/11/19 52.6 kg (116 lb)   02/05/19 54.6 kg (120 lb 6.4 oz)   12/04/18 52.6 kg (116 lb)   12/04/18 54.9 kg (121 lb)   11/24/18 52.6 kg (116 lb)   11/28/18 52.6 kg (115 lb 15.4 oz)   08/25/18 61.2 kg (135 lb) 08/27/18 61.2 kg (135 lb)   10/04/16 64 kg (141 lb)   10/01/16 65.8 kg (145 lb)   09/13/13 67.4 kg (148 lb 8 oz)      NUTRITION DIAGNOSES:   Problem:  Underweight     Etiology: related to hx of inadequate energy intake     Signs/Symptoms: as evidenced by BMI 19.6 (underweight for age)      NUTRITION INTERVENTIONS:  Meals/Snacks: General/healthful diet   Supplements: Commercial supplement              GOAL:   Pt will consume >50% meals/ONS with 0.5 lb wt gain/week next 3-5 days    Cultural, Oriental orthodox, or Ethnic Dietary Needs: None     EDUCATION & DISCHARGE NEEDS:    [x] None Identified   [] Identified and Education Provided/Documented   [] Identified and Pt declined/was not appropriate      [] Interdisciplinary Care Plan Reviewed/Documented    [x] Discharge Needs: Continue heart healthy diet   [] No Nutrition Related Discharge Needs    NUTRITION RISK:   Pt Is At Nutrition Risk  [x]     No Nutrition Risk Identified  []       PT SEEN FOR:    []  MD Consult: []Calorie Count      []Diabetic Diet Education        []Diet Education     []Electrolyte Management     []General Nutrition Management and Supplements     []Management of Tube Feeding     []TPN Recommendations    []  RN Referral:  []MST score >=2     []Enteral/Parenteral Nutrition PTA     []Pregnant: Gestational DM or Multigestation                 [] Pressure Ulcer    [x]  Low BMI      []  Length of Stay       [] Dysphagia Diet         [] Ventilator  []  Follow-up     Previous Recommendations:   [] Implemented          [] Not Implemented          [x] Not Applicable    Previous Goal:   [] Met              [] Progressing Towards Goal              [] Not Progressing Towards Goal   [x] Not Applicable            Lakshmi Medrano  Phone [phone]

## 2019-07-22 NOTE — PROGRESS NOTES
Bedside and Verbal shift change report given to GRAYSON ruiz (oncoming nurse) by Ed Mendoza (offgoing nurse). Report included the following information SBAR, Kardex, ED Summary, Intake/Output, MAR and Recent Results.

## 2019-07-22 NOTE — PROGRESS NOTES
7143:  Orders for blood received. Blood consent on chart was signed yesterday but has not been witnessed. Patient does not know where she is, why she is here, and does not recall discussions about blood or falling at home. Daughter at bedside concerned with her level of confusion. Daughter unaware of discussions regarding blood transfusion and does want to speak to MD. MD contacted for concerns about consent. 0915: At this time, patient is now oriented to self, place, and situation. Daughter present at bedside who does have questions regarding blood transfusion. Awaiting completion of consent document by MD.  Will administer blood once completed.

## 2019-07-22 NOTE — PROGRESS NOTES
Heart rate of 50 this am.  Dr. Cade Marquez notified. Digoxin to be held and patient to be placed on remote telemetry for further monitoring.

## 2019-07-22 NOTE — PROGRESS NOTES
Bedside and Verbal shift change report given to Jelena RN (oncoming nurse) by Wilbur Ferrera RN (offgoing nurse). Report included the following information SBAR, Kardex, MAR, Accordion and Recent Results.

## 2019-07-22 NOTE — PROGRESS NOTES
The BP was rechecked manually at this time and found to be 92/58. The patient at times wants to be uncooperative with vs and turning.

## 2019-07-23 LAB
ABO + RH BLD: NORMAL
ANION GAP SERPL CALC-SCNC: 4 MMOL/L (ref 5–15)
BLD PROD TYP BPU: NORMAL
BLOOD GROUP ANTIBODIES SERPL: NORMAL
BPU ID: NORMAL
BUN SERPL-MCNC: 33 MG/DL (ref 6–20)
BUN/CREAT SERPL: 40 (ref 12–20)
CALCIUM SERPL-MCNC: 7.7 MG/DL (ref 8.5–10.1)
CHLORIDE SERPL-SCNC: 105 MMOL/L (ref 97–108)
CO2 SERPL-SCNC: 30 MMOL/L (ref 21–32)
CREAT SERPL-MCNC: 0.82 MG/DL (ref 0.55–1.02)
CROSSMATCH RESULT,%XM: NORMAL
GLUCOSE SERPL-MCNC: 115 MG/DL (ref 65–100)
POTASSIUM SERPL-SCNC: 4.8 MMOL/L (ref 3.5–5.1)
SODIUM SERPL-SCNC: 139 MMOL/L (ref 136–145)
SPECIMEN EXP DATE BLD: NORMAL
STATUS OF UNIT,%ST: NORMAL
UNIT DIVISION, %UDIV: 0

## 2019-07-23 PROCEDURE — 74011000250 HC RX REV CODE- 250: Performed by: INTERNAL MEDICINE

## 2019-07-23 PROCEDURE — 77030038269 HC DRN EXT URIN PURWCK BARD -A

## 2019-07-23 PROCEDURE — 97530 THERAPEUTIC ACTIVITIES: CPT

## 2019-07-23 PROCEDURE — 94664 DEMO&/EVAL PT USE INHALER: CPT

## 2019-07-23 PROCEDURE — 80048 BASIC METABOLIC PNL TOTAL CA: CPT

## 2019-07-23 PROCEDURE — 94640 AIRWAY INHALATION TREATMENT: CPT

## 2019-07-23 PROCEDURE — 65660000000 HC RM CCU STEPDOWN

## 2019-07-23 PROCEDURE — 74011250637 HC RX REV CODE- 250/637: Performed by: NURSE PRACTITIONER

## 2019-07-23 PROCEDURE — 36415 COLL VENOUS BLD VENIPUNCTURE: CPT

## 2019-07-23 PROCEDURE — 97161 PT EVAL LOW COMPLEX 20 MIN: CPT

## 2019-07-23 PROCEDURE — 74011250637 HC RX REV CODE- 250/637: Performed by: INTERNAL MEDICINE

## 2019-07-23 PROCEDURE — 74011250636 HC RX REV CODE- 250/636: Performed by: INTERNAL MEDICINE

## 2019-07-23 PROCEDURE — 77030029684 HC NEB SM VOL KT MONA -A

## 2019-07-23 RX ORDER — DIGOXIN 125 MCG
0.06 TABLET ORAL DAILY
Status: DISCONTINUED | OUTPATIENT
Start: 2019-07-24 | End: 2019-07-24

## 2019-07-23 RX ADMIN — OXYCODONE HYDROCHLORIDE 5 MG: 5 TABLET ORAL at 14:11

## 2019-07-23 RX ADMIN — ESCITALOPRAM OXALATE 10 MG: 10 TABLET ORAL at 09:17

## 2019-07-23 RX ADMIN — ALPRAZOLAM 0.25 MG: 0.25 TABLET ORAL at 20:25

## 2019-07-23 RX ADMIN — Medication 10 ML: at 21:23

## 2019-07-23 RX ADMIN — ACETAMINOPHEN 1000 MG: 500 TABLET ORAL at 06:45

## 2019-07-23 RX ADMIN — ALBUTEROL SULFATE 1 DOSE: 2.5 SOLUTION RESPIRATORY (INHALATION) at 11:36

## 2019-07-23 RX ADMIN — ACETAMINOPHEN 1000 MG: 500 TABLET ORAL at 23:31

## 2019-07-23 RX ADMIN — FOLIC ACID 1 MG: 1 TABLET ORAL at 09:16

## 2019-07-23 RX ADMIN — DEXAMETHASONE 2 MG: 4 TABLET ORAL at 09:16

## 2019-07-23 RX ADMIN — ACETAMINOPHEN 1000 MG: 500 TABLET ORAL at 14:11

## 2019-07-23 RX ADMIN — SENNOSIDES,DOCUSATE SODIUM 1 TABLET: 8.6; 5 TABLET, FILM COATED ORAL at 09:16

## 2019-07-23 RX ADMIN — OXYCODONE HYDROCHLORIDE 10 MG: 10 TABLET, FILM COATED, EXTENDED RELEASE ORAL at 21:23

## 2019-07-23 RX ADMIN — Medication 10 ML: at 06:45

## 2019-07-23 RX ADMIN — OXYCODONE HYDROCHLORIDE 5 MG: 5 TABLET ORAL at 19:56

## 2019-07-23 RX ADMIN — Medication 10 ML: at 14:11

## 2019-07-23 NOTE — PROGRESS NOTES
Problem: Mobility Impaired (Adult and Pediatric)  Goal: *Acute Goals and Plan of Care (Insert Text)  Description  Physical Therapy Goals  Initiated 7/23/2019  1. Patient will move from supine to sit and sit to supine  in bed with independence within 7 day(s). 2.  Patient will transfer from bed to chair and chair to bed with minimal assistance/contact guard assist using the least restrictive device within 7 day(s). 3.  Patient will perform sit to stand with minimal assistance/contact guard assist within 7 day(s). 4.  Patient will ambulate with minimal assistance/contact guard assist for 25 feet with the least restrictive device within 7 day(s). Outcome: Progressing Towards Goal   PHYSICAL THERAPY EVALUATION  Patient: Lazarus Hering (89 y.o. female)  Date: 7/23/2019  Primary Diagnosis: Hematoma [T14. 8XXA]  Hematoma [T14. 8XXA]        Precautions:   Fall    ASSESSMENT :  Based on the objective data described below, the patient presents with high anxiety/fear, hip pain, generalized weakness, impaired standing balance and functional mobility well below baseline following admission for fall with R hip hematoma. General surgery following closely. Pt has had bradycardia and low BPs since admission. Today pt displayed high fear avoidance and bargaining to avoid any mobility. With maximal time given between transitions and verbal reassurance able to eventually reach sitting EOB with Mod A x 1. Sat with intact balance and able to perform LAQ bilaterally with full ROM. Stood with Mod A x 2 and RW. Strong posterior lean which patient is unable to correct. Requesting to return to bed after brief standing trial. Mod A x 2 to safely transition to supine. At this time pt is not safe to return home and anticipate need for rehab at discharge. Pt has a terrible experience at the [facility] and became tearful when rehab was mentioned. Motivated to regain mobility however anxiety is the biggest rajendra at this time.      Patient will benefit from skilled intervention to address the above impairments. Patients rehabilitation potential is considered to be Fair  Factors which may influence rehabilitation potential include:   ? None noted  ? Mental ability/status  ? Medical condition  ? Home/family situation and support systems  ? Safety awareness  ? Pain tolerance/management  ? Other:      PLAN :  Recommendations and Planned Interventions:  ?           Bed Mobility Training             ? Neuromuscular Re-Education  ? Transfer Training                   ? Orthotic/Prosthetic Training  ? Gait Training                         ? Modalities  ? Therapeutic Exercises           ? Edema Management/Control  ? Therapeutic Activities            ? Patient and Family Training/Education  ? Other (comment):    Frequency/Duration: Patient will be followed by physical therapy  5 times a week to address goals. Discharge Recommendations: Marvin Arreguin and To Be Determined  Further Equipment Recommendations for Discharge: TBD      SUBJECTIVE:   Patient stated I can't do it I just can't do it. Please don't make me.     OBJECTIVE DATA SUMMARY:   HISTORY:    Past Medical History:   Diagnosis Date    AAA (abdominal aortic aneurysm) (HCC)     Arthritis     ra, osteoporosis, osteoarthritis    Atrial fibrillation (HCC)     COPD     HTN (hypertension)     Lung cancer (HCC)     RA (rheumatoid arthritis) ([facility]     Smoker     TIA (transient ischemic attack)     TIA 9/30/16     Past Surgical History:   Procedure Laterality Date    HX CHOLECYSTECTOMY      HX HYSTERECTOMY      HX ORTHOPAEDIC      carpal tunnel, wrist surgery     Prior Level of Function/Home Situation: Lives with family members. Was alone at home during the day as grandson worked.  Ambulated with RW  Personal factors and/or comorbidities impacting plan of care: arthritis, COPD, HTN, RA    Home Situation  Home Environment: Private residence  One/Two Story Residence: Two story, live on 1st floor  Living Alone: No  Support Systems: Family member(s)  Patient Expects to be Discharged to[de-identified] Private residence  Current DME Used/Available at Home: Elana Oas, rolling, Wheelchair    EXAMINATION/PRESENTATION/DECISION MAKING:   Critical Behavior:  Neurologic State: Confused  Orientation Level: Oriented to person, Oriented to place, Disoriented to time, Disoriented to situation        Hearing: Auditory  Auditory Impairment: None  Skin:    Edema:   Range Of Motion:  AROM: Generally decreased, functional                       Strength:    Strength: Generally decreased, functional                    Tone & Sensation:   Tone: Normal              Sensation: Intact               Coordination:  Coordination: Generally decreased, functional  Vision:      Functional Mobility:  Bed Mobility:  Rolling: Moderate assistance  Supine to Sit: Moderate assistance  Sit to Supine: Moderate assistance;Assist x2     Transfers:  Sit to Stand: Moderate assistance;Assist x2  Stand to Sit: Moderate assistance                       Balance:   Sitting: Intact  Standing: Impaired  Standing - Static: Poor  Standing - Dynamic : Poor  Ambulation/Gait Training:                                                         Stairs:               Therapeutic Exercises:       Functional Measure:  Tinetti test:    Sitting Balance: 1  Arises: 0  Attempts to Rise: 0  Immediate Standing Balance: 0  Standing Balance: 0  Nudged: 0  Eyes Closed: 0  Turn 360 Degrees - Continuous/Discontinuous: 0  Turn 360 Degrees - Steady/Unsteady: 0  Sitting Down: 1  Balance Score: 2  Indication of Gait: 0  R Step Length/Height: 0  L Step Length/Height: 0  R Foot Clearance: 0  L Foot Clearance: 0  Step Symmetry: 0  Step Continuity: 0  Path: 0  Trunk: 0  Walking Time: 0  Gait Score: 0  Total Score: 2         Tinetti Tool Score Risk of Falls  <19 = High Fall Risk  19-24 = Moderate Fall Risk  25-28 = Low Fall Risk  Matt MEJIA. Performance-Oriented Assessment of Mobility Problems in Elderly Patients. [facility]; [id number]. (Scoring Description: PT Bulletin Feb. 10, 1993)    Older adults: Dom Chacko et al, 2009; n = 1000 [facility] elderly evaluated with ABC, NEHAL, ADL, and IADL)  · Mean NEHAL score for males aged 69-68 years = 26.21(3.40)  · Mean NEHAL score for females age 69-68 years = 25.16(4.30)  · Mean NEHAL score for males over 80 years = 23.29(6.02)  · Mean NEHAL score for females over 80 years = 17.20(8.32)            Physical Therapy Evaluation Charge Determination   History Examination Presentation Decision-Making   MEDIUM  Complexity : 1-2 comorbidities / personal factors will impact the outcome/ POC  MEDIUM Complexity : 3 Standardized tests and measures addressing body structure, function, activity limitation and / or participation in recreation  LOW Complexity : Stable, uncomplicated  Other outcome measures tinetti  LOW       Based on the above components, the patient evaluation is determined to be of the following complexity level: LOW     Pain:  Pain Scale 1: Numeric (0 - 10)  Pain Intensity 1: 6  Pain Location 1: Hip;Generalized  Pain Orientation 1: Right     Pain Intervention(s) 1: Medication (see MAR)  Activity Tolerance:   Good  Please refer to the flowsheet for vital signs taken during this treatment. After treatment:   ?         Patient left in no apparent distress sitting up in chair  ? Patient left in no apparent distress in bed  ? Call bell left within reach  ? Nursing notified  ? Caregiver present  ? Bed alarm activated    COMMUNICATION/EDUCATION:   The patients plan of care was discussed with: Registered Nurse. ?         Fall prevention education was provided and the patient/caregiver indicated understanding. ? Patient/family have participated as able in goal setting and plan of care.   ?         Patient/family agree to work toward stated goals and plan of care. ?         Patient understands intent and goals of therapy, but is neutral about his/her participation. ? Patient is unable to participate in goal setting and plan of care.     Thank you for this referral.  Kan Shanks, PT   Time Calculation: 40 mins

## 2019-07-23 NOTE — PROGRESS NOTES
Received order for aerosol treatment with Albuterol and Duoneb. Patient awake and in no respiratory distress. Attempted treatment with aerosol mask and patient refuses. Attempted treatment using mouthpiece and after several breaths refuses treatment stating, \"I do not need this treatment & I will not do it\".

## 2019-07-23 NOTE — PROGRESS NOTES
Bedside and Verbal shift change report given to Jelena RN (oncoming nurse) by Carri Traore RN (offgoing nurse). Report included the following information SBAR, Kardex, MAR, Accordion and Recent Results.

## 2019-07-23 NOTE — PROGRESS NOTES
[address] [place] [address]  [phone]      Medical Progress Note      NAME: Ginette Lara   :  1931  MRM:  [id number]    Date/Time: 2019          Subjective:     Chief Complaint:  F/u fall    ROS:  (bold if positive, if negative)      Tolerating PT  Tolerating Diet          Objective:       Vitals:          Last 24hrs VS reviewed since prior progress note. Most recent are:    Visit Vitals  /64   Pulse (!) 42   Temp 97.5 °F (36.4 °C)   Resp 17   Ht 5' 7\" (1.702 m)   Wt 56.7 kg (125 lb)   SpO2 96%   Breastfeeding?  No   BMI 19.58 kg/m²     SpO2 Readings from Last 6 Encounters:   19 96%   19 95%   19 96%   19 93%   19 97%   19 97%            Intake/Output Summary (Last 24 hours) at 2019 0950  Last data filed at 2019 4815  Gross per 24 hour   Intake 322.5 ml   Output 750 ml   Net -427.5 ml          Exam:     Physical Exam:    Gen:  Well-developed, well-nourished, in no acute distress  HEENT:  Pink conjunctivae, PERRL, hearing intact to voice, moist mucous membranes  Neck:  Supple, without masses, thyroid non-tender  Resp:  No accessory muscle use, clear breath sounds without wheezes rales or rhonchi  Card:  No murmurs, normal S1, S2 without thrills, bruits or peripheral edema  Abd:  Soft, non-tender, non-distended, normoactive bowel sounds are present  Musc:  No cyanosis or clubbing  Skin:  No rashes or ulcers, skin turgor is good  Neuro:  Cranial nerves 3-12 are grossly intact,  strength is 5/5 bilaterally and dorsi / plantarflexion is 5/5 bilaterally, follows commands appropriately  Psych:  Good insight, oriented to person, alert       Medications Reviewed: (see below)    Lab Data Reviewed: (see below)    ______________________________________________________________________    Medications:     Current Facility-Administered Medications   Medication Dose Route Frequency    0.9% sodium chloride infusion 250 mL  250 mL IntraVENous PRN    folic acid (FOLVITE) tablet 1 mg  1 mg Oral DAILY    oxyCODONE IR (ROXICODONE) tablet 10 mg  10 mg Oral Q3H PRN    acetaminophen (TYLENOL) tablet 1,000 mg  1,000 mg Oral Q8H    senna-docusate (PERICOLACE) 8.6-50 mg per tablet 1 Tab  1 Tab Oral DAILY    naloxone (NARCAN) injection 0.4 mg  0.4 mg IntraVENous EVERY 2 MINUTES AS NEEDED    sodium chloride (NS) flush 5-40 mL  5-40 mL IntraVENous Q8H    sodium chloride (NS) flush 5-40 mL  5-40 mL IntraVENous PRN    ALPRAZolam (XANAX) tablet 0.25 mg  0.25 mg Oral BID PRN    dexAMETHasone (DECADRON) tablet 2 mg  2 mg Oral DAILY AFTER BREAKFAST    digoxin (LANOXIN) tablet 0.125 mg  0.125 mg Oral DAILY    escitalopram oxalate (LEXAPRO) tablet 10 mg  10 mg Oral DAILY    [START ON 7/27/2019] methotrexate (RHEUMATREX) tablet 20 mg  20 mg Oral every Saturday    oxyCODONE IR (ROXICODONE) tablet 5 mg  5 mg Oral Q3H PRN    oxyCODONE ER (OxyCONTIN) tablet 10 mg  10 mg Oral DAILY    morphine injection 2 mg  2 mg IntraVENous Q4H PRN            Lab Review:     Recent Labs     07/22/19  1509 07/22/19  0045 07/21/19  1357 07/21/19  0952 07/21/19  0259 07/20/19 1930   WBC  --  22.2*  --   --  12.5* 12.4*   HGB 7.6* 6.8* 8.4* 7.6* 7.7* 11.5   HCT 23.7* 21.5*  --  24.3* 25.0* 37.0   PLT  --  293  --   --  344 471*     Recent Labs     07/23/19  0307 07/22/19  0045 07/21/19  0259 07/20/19  1930    138 139 140   K 4.8 5.5* 4.7 4.3    106 106 105   CO2 30 29 25 27   * 106* 113* 113*   BUN 33* 31* 27* 24*   CREA 0.82 1.01 1.09* 0.83   CA 7.7* 7.4* 7.8* 9.0   MG  --   --   --  1.8   PHOS  --   --   --  3.4   ALB  --   --   --  3.4*   SGOT  --   --   --  15   ALT  --   --   --  13   INR  --   --   --  1.1     No components found for: [place]         Assessment / Plan:   88yo F    Hematoma on RT gluteal area / acute blood loss anemia:   Hgb dropped 4 grams. Transfusion consent signed.    S/p Txf 1U and monitor  - IV morphine for pain control.   - general surgery c/s and following      Paroxysmal A-fib ([facility] [address] (9/30/2016). Hold eliquis, digoxin for HR<50       COPD (chronic obstructive pulmonary disease) ([facility] [address] (9/30/2016). Stable. Not wheezing.   - Continue nebs PRN       Hypertension (8/25/2018). - Continue home meds       Malignant neoplasm of upper lobe of left lung ([facility] [address] (11/27/2018). S/p RT.   - Follow up with Dr Rodriguez       Abdominal aortic aneurysm (AAA) without rupture ([facility] [address] (12/4/2018). - outpatient FU       Leukocytosis (7/20/2019). Doubt infection. Likely due to stress.    - monitor        Total time spent with patient: 30 [address] discussed with: Patient and Family    Discussed:  Care Plan    DNR    DVT Prophylaxis:  SCD's     Disposition:   PT, OT, RN           ___________________________________________________    Attending Physician: Lindsey Funez MD

## 2019-07-23 NOTE — PROGRESS NOTES
General Surgery Daily Progress Note    Patient: Otha Spurling MRN: [id number]  SSN: [id number]    YOB: 1931  Age: 80 y.o. Sex: female      Admit Date: 7/20/2019    Subjective:   Patient seen with her daughter. She says her pain is better - slept well last night. Awake and alert today. Current Facility-Administered Medications   Medication Dose Route Frequency    0.9% sodium chloride infusion 250 mL  250 mL IntraVENous PRN    folic acid (FOLVITE) tablet 1 mg  1 mg Oral DAILY    oxyCODONE IR (ROXICODONE) tablet 10 mg  10 mg Oral Q3H PRN    acetaminophen (TYLENOL) tablet 1,000 mg  1,000 mg Oral Q8H    senna-docusate (PERICOLACE) 8.6-50 mg per tablet 1 Tab  1 Tab Oral DAILY    naloxone (NARCAN) injection 0.4 mg  0.4 mg IntraVENous EVERY 2 MINUTES AS NEEDED    sodium chloride (NS) flush 5-40 mL  5-40 mL IntraVENous Q8H    sodium chloride (NS) flush 5-40 mL  5-40 mL IntraVENous PRN    ALPRAZolam (XANAX) tablet 0.25 mg  0.25 mg Oral BID PRN    dexAMETHasone (DECADRON) tablet 2 mg  2 mg Oral DAILY AFTER BREAKFAST    digoxin (LANOXIN) tablet 0.125 mg  0.125 mg Oral DAILY    escitalopram oxalate (LEXAPRO) tablet 10 mg  10 mg Oral DAILY    [START ON 7/27/2019] methotrexate (RHEUMATREX) tablet 20 mg  20 mg Oral every Saturday    oxyCODONE IR (ROXICODONE) tablet 5 mg  5 mg Oral Q3H PRN    oxyCODONE ER (OxyCONTIN) tablet 10 mg  10 mg Oral DAILY    morphine injection 2 mg  2 mg IntraVENous Q4H PRN        Objective:   No intake/output data recorded. 07/21 1901 - 07/23 0700  In: 322.5 [P.O.:40]  Out: 750 [Urine:750]  Patient Vitals for the past 8 hrs:   BP Temp Pulse Resp SpO2   07/23/19 0727   (!) 42     07/23/19 0720 123/64  (!) 41 17 96 %   07/23/19 0700   (!) 39     07/23/19 0340  97.5 °F (36.4 °C)      07/23/19 0337 111/59  (!) 47 18 98 %       Physical Exam:  General: Alert, cooperative, no distress, appears stated age.   Neck:  Supple, symmetrical, trachea midline, no adenopathy, thyroid: no                           enlargement/tenderness/nodules, no carotid bruit and no JVD. Lungs: Clear to auscultation bilaterally. Heart:  Regular rate and rhythm, S1, S2 normal, no murmur, click, rub or gallop. Abdomen: Soft, non-tender. Bowel sounds normal. No masses,  No organomegaly. Extremities: Extremities normal, atraumatic, no cyanosis or edema. Gluteal area - diffuse ecchymosis - not tense - very tender but no evidence of infection or pressure effect on skin   Skin:  Skin color, texture, turgor normal. No rashes or lesions    Labs:   Recent Labs     07/22/19  1509 07/22/19  0045   WBC  --  22.2*   HGB 7.6* 6.8*   HCT 23.7* 21.5*   PLT  --  293     Recent Labs     07/23/19  0307  07/20/19  1930      < > 140   K 4.8   < > 4.3      < > 105   CO2 30   < > 27   *   < > 113*   BUN 33*   < > 24*   CREA 0.82   < > 0.83   CA 7.7*   < > 9.0   MG  --   --  1.8   PHOS  --   --  3.4   ALB  --   --  3.4*   TBILI  --   --  0.4   SGOT  --   --  15   ALT  --   --  13    < > = values in this interval not displayed. X-ray   Assessment / Plan:   · Had a long discussion with patient and her daughter regarding her current situation  · Her pain is better and there is no evidence of pressure effect.    · No indication for surgery  · Hopefully she will improve and will not need surgery  ·     Principal Problem:    Hematoma (7/20/2019)    Active Problems:    Paroxysmal A-fib ([address] (9/30/2016)      COPD (chronic obstructive pulmonary disease) ([address] (9/30/2016)      Hypertension (8/25/2018)      Malignant neoplasm of upper lobe of left lung ([address] (11/27/2018)      Abdominal aortic aneurysm (AAA) without rupture ([address] (12/4/2018)      Leukocytosis (7/20/2019)      Fall (7/20/2019)      Acute right hip pain ()      Chronic pain syndrome ()      Physical debility ()      Goals of care, counseling/discussion ()

## 2019-07-23 NOTE — PROGRESS NOTES
Music Therapy Assessment  [address] [place] [address] [phone]     12/7/1931  80 y.o.  female    Patient Telephone Number: [phone] (home)   Orthodox Affiliation: [facility]   Language: English   Patient Active Problem List    Diagnosis Date Noted    Acute right hip pain     Chronic pain syndrome     Physical debility     Goals of care, counseling/discussion     Hematoma 07/20/2019    Leukocytosis 07/20/2019    Fall 07/20/2019    Abdominal aortic aneurysm (AAA) without rupture ([facility] 12/04/2018    Anemia 12/04/2018    Malignant neoplasm of upper lobe of left lung ([facility] [address] 11/27/2018    Tibial plateau fracture 41/22/4714    Tobacco abuse 08/25/2018    Hypertension 08/25/2018    TIA (transient ischemic attack) 09/30/2016    Slurred speech 09/30/2016    Paroxysmal A-fib ([facility] [address] 09/30/2016    [facility] 09/30/2016    COPD (chronic obstructive pulmonary disease) ([facility] 09/30/2016        Date: 7/23/2019            Total Time (in minutes): 8          SFM 5M1 MED SURG 1    Mental Status:   [x] Alert [  ] Sharonda Osgood [  ]  Confused  [  ] Minimally responsive  [  ] Sleeping    Communication Status: [  ] Impaired Speech [  ] Nonverbal -N/A    Physical Status:   [x] Oxygen in use  [  ] Hard of Hearing [  ] Vision Impaired  [  ] Ambulatory  [  ] Ambulatory with assistance [  ] Non-ambulatory     Music Preferences, Background: Country including [place] [id number]; Joseph Gonzalez; [place]. Pt mentioned about a song called Behind The Closed Door before MT intern exited her room. Clinical Problem to be addressed: Support healthy coping and self-expression, Improve mood    Goal(s) met in session:  Physical/Pain management (Scale of 1-10):    Pre-session rating: Pt denied pain. Post-session rating: N/A: Please see Session Observations below.     [  ] Increased relaxation   [  ] Affected breathing patterns  [  ] Decreased muscle tension   [  ] Decreased agitation  [  ] Affected heart rate    [  ] Increased alertness     Emotional/Psychological:  [  ] Increased self-expression   [  ] Decreased aggressive behavior   [  ] Decreased feelings of stress  [  ] Discussed healthy coping skills     [  ] Improved mood    [  ] Decreased withdrawn behavior     Social:  [  ] Decreased feelings of isolation/loneliness [x] Positive social interaction   [  ] Provided support and/or comfort for family/friends    Spiritual:  [  ] Spiritual support    [  ] Expressed peace  [  ] Expressed reji    [  ] Discussed beliefs    Techniques Utilized (Check all that apply): N/A: Please see Session Observations below. [  ] Procedural support MT [  ] Music for relaxation [  ] Patient preferred music  [  ] Daly analysis  [  ] Song choice  [  ] Music for validation  [  ] Entrainment  [  ] Movement to music [  ] Guided visualization  [  ] Irma Plume  [  ] Patient instrument playing [  ] Candice Ceja writing  [  ] Rozanne Shone along   [  ] Justin Delgado  [  ] Sensory stimulation  [  ] Active Listening  [  ] Music for spiritual support [  ] Making of CDs as gifts    Session Observations: Referral from Anirudh Jay, Palliative NP. Patient (pt) was lying in bed with her eyes closed. This music therapy intern (MT intern) spoke pt's name and gently touch on her shoulder and she appeared to become awake in response to this. MT intern asked pt how she was feeling and about her music preferences and background. Pt shared these saying she likes country and jazz music and that [employer] is her favorite vang. Pt shared that she did not have musical talent but her relative did. MT intern provide active listening and words of validation. Pt declined having music therapy saying this was not a good time though was open to a follow up visit at another day. MT intern expressed understanding and asked pt if MT intern can assist her with anything. Pt asked to have a sip of water and to have the newspaper move to the table, which MT intern assisted her with these. Pt asked MT intern to leave the door open and mentioned that it reminded her of the song called Behind The Closed Door. Pt expressed gratitude for the visit and assistance. Will follow as able.      Alex \"Tamy\" Segun Beltre Music Therapy Intern  Spiritual Care Department  Referral-based service

## 2019-07-23 NOTE — PROGRESS NOTES
CM Note:  Reason for Admission:   Hematoma               RRAT Score:     24             Resources/supports as identified by patient/family:   Very supportive family                Top Challenges facing patient (as identified by patient/family and CM): Finances/Medication cost?    Pt has Medicare and Rx coverage. She gets her medications from [facility] on [address]. Transportation? Family drives              Support system or lack thereof? See above                     Living arrangements? Pt lives with her family on the ground floor of a 2 story house. There are 2 steps with railing to enter. She has her own bathroom  She has a shower chair. Self-care/ADLs/Cognition? PTA pt was independent with ADL's and walked with a RW. She also has nocturnal O2 and a w/c. Pt is alert and oriented with some difficulty recalling some names. Current Advanced Directive/Advance Care Plan: Yes                          Plan for utilizing home health:   She had [facility]                 Transition of Care Plan:                1. PT/OT consults  2. Surgery following  3. F/u with oncology  4. Family to transport to home at d/c  L. Monalisa Law RN    Care Management Interventions  PCP Verified by CM: Yes(Dr. Dawson. NN notified.)  Palliative Care Criteria Met (RRAT>21 & CHF Dx)?: Yes  Palliative Consult Recommended?: Yes  Mode of Transport at Discharge: Other (see comment)(family)  Cjhart Signup: No  Discharge Durable Medical Equipment: No  Physical Therapy Consult: Yes  Occupational Therapy Consult: No  Speech Therapy Consult: No  Current Support Network:  Other(family)  Confirm Follow Up Transport: Family  Plan discussed with Pt/Family/Caregiver: Yes  Discharge Location  Discharge Placement: Home with family assistance

## 2019-07-23 NOTE — ROUTINE PROCESS
Interdisciplinary team rounds were held 7/23/2019 with the following team members:Care Management, Nursing, Nutrition, Pharmacy, Physical Therapy and Physician. Plan of care discussed. See clinical pathway and/or care plan for interventions and desired outcomes. Plan: lower digoxin dose and continue to monitor.

## 2019-07-23 NOTE — PROGRESS NOTES
0730  Bedside and Verbal shift change report given to Claudio Gipson RN (oncoming nurse) by Ese Benson RN (offgoing nurse). Report included the following information SBAR, Kardex, Intake/Output, MAR, Recent Results and Med Rec Status. 0050  Pt bradycardic and heart rate sustaining in the 40s. /58. Pt asymptomatic and no other complaints but pain in her hip. Dr. Marilu Alonso made aware. Will continue to monitor.

## 2019-07-23 NOTE — PROGRESS NOTES
Patient's heart rate has been sustaining most of the morning in the low 40s. Dr. Yong Perales notified; no new orders received.

## 2019-07-24 LAB
ANION GAP SERPL CALC-SCNC: 4 MMOL/L (ref 5–15)
BUN SERPL-MCNC: 31 MG/DL (ref 6–20)
BUN/CREAT SERPL: 38 (ref 12–20)
CALCIUM SERPL-MCNC: 7.6 MG/DL (ref 8.5–10.1)
CHLORIDE SERPL-SCNC: 107 MMOL/L (ref 97–108)
CO2 SERPL-SCNC: 30 MMOL/L (ref 21–32)
COMMENT, HOLDF: NORMAL
CREAT SERPL-MCNC: 0.81 MG/DL (ref 0.55–1.02)
GLUCOSE SERPL-MCNC: 118 MG/DL (ref 65–100)
POTASSIUM SERPL-SCNC: 4.4 MMOL/L (ref 3.5–5.1)
SAMPLES BEING HELD,HOLD: NORMAL
SODIUM SERPL-SCNC: 141 MMOL/L (ref 136–145)

## 2019-07-24 PROCEDURE — 74011250637 HC RX REV CODE- 250/637: Performed by: INTERNAL MEDICINE

## 2019-07-24 PROCEDURE — 80048 BASIC METABOLIC PNL TOTAL CA: CPT

## 2019-07-24 PROCEDURE — 74011250637 HC RX REV CODE- 250/637: Performed by: NURSE PRACTITIONER

## 2019-07-24 PROCEDURE — 36415 COLL VENOUS BLD VENIPUNCTURE: CPT

## 2019-07-24 PROCEDURE — 77030038269 HC DRN EXT URIN PURWCK BARD -A

## 2019-07-24 PROCEDURE — 65660000000 HC RM CCU STEPDOWN

## 2019-07-24 PROCEDURE — 74011250636 HC RX REV CODE- 250/636: Performed by: INTERNAL MEDICINE

## 2019-07-24 RX ADMIN — SENNOSIDES,DOCUSATE SODIUM 1 TABLET: 8.6; 5 TABLET, FILM COATED ORAL at 08:13

## 2019-07-24 RX ADMIN — Medication 10 ML: at 14:57

## 2019-07-24 RX ADMIN — OXYCODONE HYDROCHLORIDE 5 MG: 5 TABLET ORAL at 05:36

## 2019-07-24 RX ADMIN — Medication 10 ML: at 05:36

## 2019-07-24 RX ADMIN — OXYCODONE HYDROCHLORIDE 10 MG: 5 TABLET ORAL at 08:32

## 2019-07-24 RX ADMIN — OXYCODONE HYDROCHLORIDE 5 MG: 5 TABLET ORAL at 00:41

## 2019-07-24 RX ADMIN — ACETAMINOPHEN 1000 MG: 500 TABLET ORAL at 06:46

## 2019-07-24 RX ADMIN — ACETAMINOPHEN 1000 MG: 500 TABLET ORAL at 23:30

## 2019-07-24 RX ADMIN — DEXAMETHASONE 2 MG: 4 TABLET ORAL at 08:13

## 2019-07-24 RX ADMIN — ALPRAZOLAM 0.25 MG: 0.25 TABLET ORAL at 23:29

## 2019-07-24 RX ADMIN — OXYCODONE HYDROCHLORIDE 10 MG: 5 TABLET ORAL at 12:11

## 2019-07-24 RX ADMIN — Medication 10 ML: at 23:30

## 2019-07-24 RX ADMIN — OXYCODONE HYDROCHLORIDE 10 MG: 10 TABLET, FILM COATED, EXTENDED RELEASE ORAL at 20:58

## 2019-07-24 RX ADMIN — FOLIC ACID 1 MG: 1 TABLET ORAL at 08:13

## 2019-07-24 RX ADMIN — ESCITALOPRAM OXALATE 10 MG: 10 TABLET ORAL at 08:13

## 2019-07-24 RX ADMIN — ACETAMINOPHEN 1000 MG: 500 TABLET ORAL at 14:57

## 2019-07-24 NOTE — PROGRESS NOTES
Interdisciplinary team rounds were held 7/24/2019 with the following team members:Care Management, Nursing, Pharmacy, Dietition, Physical Therapy and Physician. Plan of care discussed.    Plan: Home 7/25

## 2019-07-24 NOTE — PROGRESS NOTES
General Surgery Daily Progress Note    Patient: Cherry Burton MRN: [id number]  SSN: [id number]    YOB: 1931  Age: 80 y.o. Sex: female      Admit Date: 7/20/2019    Subjective:   Patient states that she is still having pain and is unable to move on her own. The pain is no better over the last 48 hours. Current Facility-Administered Medications   Medication Dose Route Frequency    albuterol 5mg / ipratropium 0.5mg neb solution  1 Dose Nebulization BID RT    digoxin (LANOXIN) tablet 0.0625 mg  0.0625 mg Oral DAILY    0.9% sodium chloride infusion 250 mL  250 mL IntraVENous PRN    folic acid (FOLVITE) tablet 1 mg  1 mg Oral DAILY    oxyCODONE IR (ROXICODONE) tablet 10 mg  10 mg Oral Q3H PRN    acetaminophen (TYLENOL) tablet 1,000 mg  1,000 mg Oral Q8H    senna-docusate (PERICOLACE) 8.6-50 mg per tablet 1 Tab  1 Tab Oral DAILY    naloxone (NARCAN) injection 0.4 mg  0.4 mg IntraVENous EVERY 2 MINUTES AS NEEDED    sodium chloride (NS) flush 5-40 mL  5-40 mL IntraVENous Q8H    sodium chloride (NS) flush 5-40 mL  5-40 mL IntraVENous PRN    ALPRAZolam (XANAX) tablet 0.25 mg  0.25 mg Oral BID PRN    dexAMETHasone (DECADRON) tablet 2 mg  2 mg Oral DAILY AFTER BREAKFAST    escitalopram oxalate (LEXAPRO) tablet 10 mg  10 mg Oral DAILY    [START ON 7/27/2019] methotrexate (RHEUMATREX) tablet 20 mg  20 mg Oral every Saturday    oxyCODONE IR (ROXICODONE) tablet 5 mg  5 mg Oral Q3H PRN    oxyCODONE ER (OxyCONTIN) tablet 10 mg  10 mg Oral DAILY    morphine injection 2 mg  2 mg IntraVENous Q4H PRN        Objective:   No intake/output data recorded. 07/22 1901 - 07/24 0700  In: 240 [P.O.:240]  Out: 200 [Urine:200]  Patient Vitals for the past 8 hrs:   BP Temp Pulse Resp SpO2   07/24/19 0759 112/53 98 °F (36.7 °C) (!) 49 18 96 %   07/24/19 0700   (!) 48         Physical Exam:  General: Alert, cooperative, no distress, appears stated age.   Neck:  Supple, symmetrical, trachea midline, no adenopathy, thyroid: no                           enlargement/tenderness/nodules, no carotid bruit and no JVD. Lungs: Clear to auscultation bilaterally. Heart:  Regular rate and rhythm, S1, S2 normal, no murmur, click, rub or gallop. Abdomen: Soft, non-tender. Bowel sounds normal. No masses,  No organomegaly. Extremities: Extremities normal, atraumatic, no cyanosis or edema. Large gluteal hematoma with ecchymosis and discoloration of skin - no bogginiess on skin  Skin:  Skin color, texture, turgor normal. No rashes or lesions    Labs:   Recent Labs     07/22/19  1509 07/22/19  0045   WBC  --  22.2*   HGB 7.6* 6.8*   HCT 23.7* 21.5*   PLT  --  293     Recent Labs     07/24/19  0047      K 4.4      CO2 30   *   BUN 31*   CREA 0.81   CA 7.6*     X-ray   Assessment / Plan:   · Gluteal hematoma from fall  · The hematoma has dispersed diffusely through the tissues  · She is not very keen on surgery - and surgery would require general anesthesia and exploration of deep intra muscular planes. In addition, the bleeding diffuses through the different anatomical planes and may not be confined to a specific anatomic plane.  Further primary closure may not be feasible - and she might end up with an open wound   Taking all of these into consideration, it might be best to manage her expectantly,   Will follow her as needed or if these are clinical changes     Principal Problem:    Hematoma (7/20/2019)    Active Problems:    Paroxysmal A-fib ([address] (9/30/2016)      COPD (chronic obstructive pulmonary disease) ([address] (9/30/2016)      Hypertension (8/25/2018)      Malignant neoplasm of upper lobe of left lung ([address] (11/27/2018)      Abdominal aortic aneurysm (AAA) without rupture ([address] (12/4/2018)      Leukocytosis (7/20/2019)      Fall (7/20/2019)      Acute right hip pain ()      Chronic pain syndrome ()      Physical debility ()      Goals of care, counseling/discussion ()

## 2019-07-24 NOTE — PROGRESS NOTES
0730  Bedside and Verbal shift change report given to Lis Elmore RN (oncoming nurse) by Stephanie Han RN (offgoing nurse). Report included the following information SBAR, Kardex, Intake/Output, MAR, Recent Results and Med Rec Status.

## 2019-07-24 NOTE — PROGRESS NOTES
Physical Therapy    1330 Pt received in bed, one hour after receiving pain medications. Pt reports increased pain throughout the day, and declining to participate with OOB activity. Attempted bed level exercises, pt declined after secondary to pain stating \" hurts too much I can't do it today\" PT will continue to follow    Og Romero. Dwight Vargas

## 2019-07-24 NOTE — PROGRESS NOTES
[facility]ver[facility] [address]  [address], [place], [address] [facility]  [phone]      Medical Progress Note      NAME: Bharath Mohan   :  1931  MRM:  [id number]    Date/Time: 2019          Subjective:     Chief Complaint:  F/u fall    Comfortable in bed and HR still low 40, overall she denies any complaints. No cbc today she does not want to be stuck again. Objective:       Vitals:          Last 24hrs VS reviewed since prior progress note. Most recent are:    Visit Vitals  /54 (BP 1 Location: Right arm, BP Patient Position: At rest;Head of bed elevated (Comment degrees))   Pulse (!) 48   Temp 98 °F (36.7 °C)   Resp 18   Ht 5' 7\" (1.702 m)   Wt 56.7 kg (125 lb)   SpO2 91%   Breastfeeding?  No   BMI 19.58 kg/m²     SpO2 Readings from Last 6 Encounters:   19 91%   19 95%   19 96%   19 93%   19 97%   19 97%            Intake/Output Summary (Last 24 hours) at 2019 1250  Last data filed at 2019 0539  Gross per 24 hour   Intake 240 ml   Output    Net 240 ml          Exam:     Physical Exam:    Gen:  Chr ill looking, in no acute distress  HEENT:   moist mucous membranes  Neck:  Supple, without masses, thyroid non-tender  Resp:  No accessory muscle use,    Card:  without thrills, bruits or peripheral edema  Abd:  Soft,   non-distended,    Musc:  No cyanosis or clubbing  Skin:  No rashes or ulcers, skin turgor is good  Neuro:   follows commands appropriately  Psych:    oriented to person and place       Medications Reviewed: (see below)    Lab Data Reviewed: (see below)    ______________________________________________________________________    Medications:     Current Facility-Administered Medications   Medication Dose Route Frequency    albuterol 5mg / ipratropium 0.5mg neb solution  1 Dose Nebulization BID RT    0.9% sodium chloride infusion 250 mL  250 mL IntraVENous PRN    folic acid (FOLVITE) tablet 1 mg  1 mg Oral DAILY    oxyCODONE IR (ROXICODONE) tablet 10 mg  10 mg Oral Q3H PRN    acetaminophen (TYLENOL) tablet 1,000 mg  1,000 mg Oral Q8H    senna-docusate (PERICOLACE) 8.6-50 mg per tablet 1 Tab  1 Tab Oral DAILY    naloxone (NARCAN) injection 0.4 mg  0.4 mg IntraVENous EVERY 2 MINUTES AS NEEDED    sodium chloride (NS) flush 5-40 mL  5-40 mL IntraVENous Q8H    sodium chloride (NS) flush 5-40 mL  5-40 mL IntraVENous PRN    ALPRAZolam (XANAX) tablet 0.25 mg  0.25 mg Oral BID PRN    dexAMETHasone (DECADRON) tablet 2 mg  2 mg Oral DAILY AFTER BREAKFAST    escitalopram oxalate (LEXAPRO) tablet 10 mg  10 mg Oral DAILY    [START ON 7/27/2019] methotrexate (RHEUMATREX) tablet 20 mg  20 mg Oral every Saturday    oxyCODONE IR (ROXICODONE) tablet 5 mg  5 mg Oral Q3H PRN    oxyCODONE ER (OxyCONTIN) tablet 10 mg  10 mg Oral DAILY    morphine injection 2 mg  2 mg IntraVENous Q4H PRN            Lab Review:     Recent Labs     07/22/19  1509 07/22/19  0045 07/21/19  1357   WBC  --  22.2*  --    HGB 7.6* 6.8* 8.4*   HCT 23.7* 21.5*  --    PLT  --  293  --      Recent Labs     07/24/19  0047 07/23/19  0307 07/22/19  0045    139 138   K 4.4 4.8 5.5*    105 106   CO2 30 30 29   * 115* 106*   BUN 31* 33* 31*   CREA 0.81 0.82 1.01   CA 7.6* 7.7* 7.4*     No components found for: [place]         Assessment / Plan:   81yo F admitted with following issues    Hematoma on RT gluteal area / acute blood loss anemia:   Hgb dropped 4 grams. S/p PRBC    monitor hgb    - general surgery recomend conservative care     Paroxysmal A-fib ([facility] (9/30/2016). Hold eliquis,   Hold digoxin as HR consistently <50       COPD (chronic obstructive pulmonary disease) ([facility] (9/30/2016). Stable. Not wheezing.   - Continue nebs PRN       Hypertension (8/25/2018). - Continue home meds       Malignant neoplasm of upper lobe of left lung ([facility] (11/27/2018).  S/p RT.   - Follow up with Dr Rodriguez       Abdominal aortic aneurysm (AAA) without rupture ([address] (12/4/2018). - outpatient FU       Leukocytosis (7/20/2019). Doubt infection. Likely due to stress.    - monitor periodically                         Care Plan discussed with: Patient and Family daughter on phone    DNR    DVT Prophylaxis:  SCD's     Disposition:   PT, OT, RN once medically stable           ___________________________________________________    Attending Physician: Elicia Frey MD

## 2019-07-24 NOTE — PROGRESS NOTES
Bedside shift change report given to [facility] Sonia Martell (oncoming nurse) by Charles Banerjee (offgoing nurse). Report included the following information SBAR, Kardex, MAR, Med Rec Status and Cardiac Rhythm .

## 2019-07-25 ENCOUNTER — DOCUMENTATION ONLY (OUTPATIENT)
Dept: PALLATIVE CARE | Age: 84
End: 2019-07-25

## 2019-07-25 ENCOUNTER — APPOINTMENT (OUTPATIENT)
Dept: NON INVASIVE DIAGNOSTICS | Age: 84
DRG: 982 | End: 2019-07-25
Attending: NURSE PRACTITIONER
Payer: MEDICARE

## 2019-07-25 LAB
ATRIAL RATE: 48 BPM
BASOPHILS # BLD: 0 K/UL (ref 0–0.1)
BASOPHILS NFR BLD: 0 % (ref 0–1)
CALCULATED P AXIS, ECG09: 62 DEGREES
CALCULATED R AXIS, ECG10: 28 DEGREES
CALCULATED T AXIS, ECG11: 86 DEGREES
DIAGNOSIS, 93000: NORMAL
DIFFERENTIAL METHOD BLD: ABNORMAL
ECHO AO ROOT DIAM: 3.71 CM
ECHO AV AREA PEAK VELOCITY: 2.7 CM2
ECHO AV AREA/BSA PEAK VELOCITY: 1.6 CM2/M2
ECHO AV PEAK GRADIENT: 10 MMHG
ECHO AV PEAK VELOCITY: 158.03 CM/S
ECHO LA MAJOR AXIS: 4.64 CM
ECHO LA TO AORTIC ROOT RATIO: 1.25
ECHO LA VOL 2C: 54.21 ML (ref 22–52)
ECHO LA VOL 4C: 33.06 ML (ref 22–52)
ECHO LA VOL BP: 47 ML (ref 22–52)
ECHO LA VOL/BSA BIPLANE: 28.32 ML/M2 (ref 16–28)
ECHO LA VOLUME INDEX A2C: 32.66 ML/M2 (ref 16–28)
ECHO LA VOLUME INDEX A4C: 19.92 ML/M2 (ref 16–28)
ECHO LV INTERNAL DIMENSION DIASTOLIC: 5.11 CM (ref 3.9–5.3)
ECHO LV INTERNAL DIMENSION SYSTOLIC: 2.92 CM
ECHO LV IVSD: 1.21 CM (ref 0.6–0.9)
ECHO LV MASS 2D: 240.9 G (ref 67–162)
ECHO LV MASS INDEX 2D: 145.1 G/M2 (ref 43–95)
ECHO LV POSTERIOR WALL DIASTOLIC: 0.92 CM (ref 0.6–0.9)
ECHO LVOT DIAM: 1.87 CM
ECHO LVOT PEAK GRADIENT: 9.5 MMHG
ECHO LVOT PEAK VELOCITY: 153.92 CM/S
ECHO MV A VELOCITY: 61.73 CM/S
ECHO MV E DECELERATION TIME (DT): 180.9 MS
ECHO MV E VELOCITY: 104.77 CM/S
ECHO MV E/A RATIO: 1.7
ECHO PV MAX VELOCITY: 100.32 CM/S
ECHO PV PEAK GRADIENT: 4 MMHG
ECHO RV INTERNAL DIMENSION: 3.53 CM
ECHO TV REGURGITANT MAX VELOCITY: 375.67 CM/S
ECHO TV REGURGITANT PEAK GRADIENT: 56.5 MMHG
EOSINOPHIL # BLD: 0 K/UL (ref 0–0.4)
EOSINOPHIL NFR BLD: 0 % (ref 0–7)
ERYTHROCYTE [DISTWIDTH] IN BLOOD BY AUTOMATED COUNT: 17.5 % (ref 11.5–14.5)
GLUCOSE BLD STRIP.AUTO-MCNC: 103 MG/DL (ref 65–100)
HCT VFR BLD AUTO: 24.5 % (ref 35–47)
HGB BLD-MCNC: 7.6 G/DL (ref 11.5–16)
IMM GRANULOCYTES # BLD AUTO: 0 K/UL
IMM GRANULOCYTES NFR BLD AUTO: 0 %
LYMPHOCYTES # BLD: 1.7 K/UL (ref 0.8–3.5)
LYMPHOCYTES NFR BLD: 14 % (ref 12–49)
MCH RBC QN AUTO: 29.6 PG (ref 26–34)
MCHC RBC AUTO-ENTMCNC: 31 G/DL (ref 30–36.5)
MCV RBC AUTO: 95.3 FL (ref 80–99)
MONOCYTES # BLD: 0.5 K/UL (ref 0–1)
MONOCYTES NFR BLD: 4 % (ref 5–13)
NEUTS SEG # BLD: 10.2 K/UL (ref 1.8–8)
NEUTS SEG NFR BLD: 82 % (ref 32–75)
NRBC # BLD: 0.03 K/UL (ref 0–0.01)
NRBC BLD-RTO: 0.2 PER 100 WBC
P-R INTERVAL, ECG05: 190 MS
PLATELET # BLD AUTO: 322 K/UL (ref 150–400)
PMV BLD AUTO: 9.2 FL (ref 8.9–12.9)
Q-T INTERVAL, ECG07: 416 MS
QRS DURATION, ECG06: 94 MS
QTC CALCULATION (BEZET), ECG08: 371 MS
RBC # BLD AUTO: 2.57 M/UL (ref 3.8–5.2)
RBC MORPH BLD: ABNORMAL
SERVICE CMNT-IMP: ABNORMAL
TSH SERPL DL<=0.05 MIU/L-ACNC: 2.41 UIU/ML (ref 0.36–3.74)
VENTRICULAR RATE, ECG03: 48 BPM
WBC # BLD AUTO: 12.4 K/UL (ref 3.6–11)

## 2019-07-25 PROCEDURE — 97530 THERAPEUTIC ACTIVITIES: CPT

## 2019-07-25 PROCEDURE — 74011250637 HC RX REV CODE- 250/637: Performed by: INTERNAL MEDICINE

## 2019-07-25 PROCEDURE — 85025 COMPLETE CBC W/AUTO DIFF WBC: CPT

## 2019-07-25 PROCEDURE — 36415 COLL VENOUS BLD VENIPUNCTURE: CPT

## 2019-07-25 PROCEDURE — 97166 OT EVAL MOD COMPLEX 45 MIN: CPT

## 2019-07-25 PROCEDURE — 65660000000 HC RM CCU STEPDOWN

## 2019-07-25 PROCEDURE — 77030038269 HC DRN EXT URIN PURWCK BARD -A

## 2019-07-25 PROCEDURE — 84443 ASSAY THYROID STIM HORMONE: CPT

## 2019-07-25 PROCEDURE — 74011250637 HC RX REV CODE- 250/637: Performed by: NURSE PRACTITIONER

## 2019-07-25 PROCEDURE — 97116 GAIT TRAINING THERAPY: CPT

## 2019-07-25 PROCEDURE — 82962 GLUCOSE BLOOD TEST: CPT

## 2019-07-25 PROCEDURE — 93005 ELECTROCARDIOGRAM TRACING: CPT

## 2019-07-25 PROCEDURE — 93306 TTE W/DOPPLER COMPLETE: CPT

## 2019-07-25 PROCEDURE — 74011250636 HC RX REV CODE- 250/636: Performed by: INTERNAL MEDICINE

## 2019-07-25 RX ADMIN — OXYCODONE HYDROCHLORIDE 10 MG: 5 TABLET ORAL at 05:54

## 2019-07-25 RX ADMIN — ACETAMINOPHEN 1000 MG: 500 TABLET ORAL at 22:13

## 2019-07-25 RX ADMIN — ACETAMINOPHEN 1000 MG: 500 TABLET ORAL at 05:54

## 2019-07-25 RX ADMIN — DEXAMETHASONE 2 MG: 4 TABLET ORAL at 10:28

## 2019-07-25 RX ADMIN — OXYCODONE HYDROCHLORIDE 10 MG: 5 TABLET ORAL at 23:14

## 2019-07-25 RX ADMIN — SENNOSIDES,DOCUSATE SODIUM 1 TABLET: 8.6; 5 TABLET, FILM COATED ORAL at 08:36

## 2019-07-25 RX ADMIN — OXYCODONE HYDROCHLORIDE 5 MG: 5 TABLET ORAL at 17:14

## 2019-07-25 RX ADMIN — ALPRAZOLAM 0.25 MG: 0.25 TABLET ORAL at 08:36

## 2019-07-25 RX ADMIN — FOLIC ACID 1 MG: 1 TABLET ORAL at 08:36

## 2019-07-25 RX ADMIN — OXYCODONE HYDROCHLORIDE 5 MG: 5 TABLET ORAL at 01:05

## 2019-07-25 RX ADMIN — OXYCODONE HYDROCHLORIDE 10 MG: 10 TABLET, FILM COATED, EXTENDED RELEASE ORAL at 20:27

## 2019-07-25 RX ADMIN — ESCITALOPRAM OXALATE 10 MG: 10 TABLET ORAL at 08:36

## 2019-07-25 RX ADMIN — Medication 10 ML: at 22:14

## 2019-07-25 RX ADMIN — Medication 10 ML: at 07:23

## 2019-07-25 RX ADMIN — ALPRAZOLAM 0.25 MG: 0.25 TABLET ORAL at 22:13

## 2019-07-25 RX ADMIN — ACETAMINOPHEN 1000 MG: 500 TABLET ORAL at 14:13

## 2019-07-25 NOTE — PROGRESS NOTES
Palliative Medicine Outpatient Services  [phone]    Interdisciplinary Note    Palliative Medicine Outpatient Services Team Members:  Anu Robertson, RN; aKm Elizabeth RN; Verito Packer RN; Aubree Rivers LPN; Virgil Subramanian LPN; MERLYN Owen; AIRAM KruegerW; Marlen Glez MD; Baudilio Jimenez MD; Víctor Kirby NP     Diagnosis: Stage IV non-small cell lung cancer    Current status summary: Active clinic patient    Medication Management: Medications reviewed    Last admission/ED visit: Admitted on 7/20/19 for hematoma. Last clinic visit: 7/2/19    Last home visit: N/A    Action Items:   1. Follow up with PM on 7/30/19.           Advance Care Planning:  Advance Care Planning 7/22/2019   Patient's Healthcare Decision Maker is: Legal Next of Kin   Primary Decision Maker Name -   Confirm Advance Directive None   Patient Would Like to Complete Advance Directive Unable   Does the patient have other document types Do Not Resuscitate

## 2019-07-25 NOTE — PROGRESS NOTES
I received notification from [facility] that pt was discharged from their services in June,2019. If home health is prescribed,pt will need new orders. I am following pt for home health versus SNF orders.     James Bazzi

## 2019-07-25 NOTE — PROGRESS NOTES
7845: Patient arrives to unit after RRT from 5th floor for asymptomatic bradycardia. HR upon arrival to ICU is 60. Patient remains asymptomatic. /46. Patient denies pain, CP, SOB at this time. Patient is alert to self and place, follows commands. Patient is forgetful, repeats same questions asked after they have been answered. Patient is demanding and talkative at this time. Appears SB 1 degree AV block on monitor. 12 lead EKG done and reviewed at nurses station with cardiology NP. No new orders at this time. Will monitor on telemetry. Assessment complete see flowsheet. No acute distress noted. Primary Nurse Salma Phillips and Kathi Pearson RN performed a dual skin assessment on this patient No impairment noted  Rom score is see flowsheet. Patient noted to have 2 skin tears to Right arm covered with mepilex, large bruise noted to R hip area from fall PTA.   0950: Orthostatics ordered. Patient is unable to stand up at this time due to hip injury from fall PTA. Will attempt to get when PT works with patient. 1025: Bedside shift change report given to Nicol Kennedy RN (oncoming nurse) by Obdulia Mendoza RN (offgoing nurse). Report included the following information SBAR, Intake/Output, MAR, Accordion, Recent Results, Med Rec Status and Cardiac Rhythm SB 1 Av block.

## 2019-07-25 NOTE — PROGRESS NOTES
Palliative Medicine Consult  Carlos Manuel: [phone])    Patient Name: Memo Lee  YOB: 1931    Date of Initial Consult: 2019  Reason for Consult: End stage disease  Requesting Provider: Amanda Guthrie MD   Primary Care Physician: Valdimir Olguin MD     SUMMARY:   Memo Lee is a 80 y.o. with a past history of RA, OA, osteoporosis, COPD, AAA, HTN, h/o TIA, lung cancer (in remission), s/p hysterectomy, s/p cholecystectomy, who was admitted on 2019 from home with a diagnosis of right leg hematoma. Patient presented to the ED after suffering a fall at home. ED eval revealed hip xray with no fraction, subluxation, or dislocation. Patient admitted for observation, pain management, and serial hemoglobins. Hgb found to have dropped 4 gm and patient received one unit of PRBCs    Surgery consulted for large hematoma and drop in hgb. May need drainage under general anesthesia. Recommended trending hgbs. Current medical issues leading to Palliative Medicine involvement include: care decisions due to progressive decline. SH: , lives with grandson and his family. Daughter lives only a few houses away. Son is .  reviewed:  Patient followed by outpatient Palliative Medicine (Dr. Lyndsey Thibodeaux MD)  30 prescriptions  8 prescribers  Chronic pain medication regimen: Oxycontin ER 10 mg at bedtime (last filled 7/10) and oxycodone IR 5 mg 1-2 tabs every three hours as needed for pain (last filled ). Interim History:  --> Hemoglobin stabilized and patient did not require surgery. Pain still limiting participation in PT. RRT called overnight for bradycardia (HR down to the 30's)   PALLIATIVE DIAGNOSES:   1. Acute right hip/gluteal pain from GLF  2. Chronic pain   3. Physical debility  4. Goals of care  5. DNR  6. ACP  7. Traumatic gluteal hematoma       PLAN:   1. Met with patient and daughter, Berta Farrell with Rosario Michel LCSW in follow-up.   2. Right hip/gluteal pain due to hematoma with history of chronic pain--> currently 0/10 at rest but worsens with activity. Has received 2 doses of oxycodone 5 mg and 2 doses of oxycodone 10 mg the past 24 hours. Has not required IV morphine. 1. Continue OxyContin 10 mg at bedtime  2. Continue 5 mg oxycodone IR OR 10 mg oxycodone IR every three hours as needed for pain   3. Will discontinue IV morphine  4. Continue scheduled tylenol 1 gm every 8 hours  5. K-pad to neck and upper back  6. Bowel regimen ordered - tana colace daily  7. Narcan for respiratory depression/overdose  3. Goals of care--> Discussed overnight event and patient and daughter concerned that bradycardia may require pacemaker placement. States \"I am 80years old but I want to live for another 88\". Plan for echo today. Patient still reports that pain and anxiety about falling are the limiting factors with participation with therapy, patient and daughter worried that with continued inability to participate patient will not be able to return home. Encouraged patient to premedicate with pain medication prior to working with therapy, to discuss fears with the therapist so that they understand your concerns. Patient and daughter remain hopeful that patient will recover and be able to return home. 4. Code status--> DNR; completed DDNR on file signed by patient's daughter in 2/2019.  5. ACP--> No AMD on file, surrogate decision maker is legal next of kin (daughter, Hilda Naval)  6. Initial consult note routed to primary continuity provider and/or primary health care team members  7.  Communicated plan of care with: Palliative S[facility] STEWARD [facility] Team     GOALS OF CARE / TREATMENT PREFERENCES:     GOALS OF CARE:  Patient/Health Care Proxy Stated Goals: (Full restorative measures in an effort to recover)    TREATMENT PREFERENCES:   Code Status: DNR    Advance Care Planning:  [x] [facility] Interdisciplinary Team has updated the ACP Navigator with Ga and Patient Capacity      Primary Decision Maker (Active): Shila Chaidez - Daughter - [phone]  Advance Care Planning 7/22/2019   Patient's Healthcare Decision Maker is: Legal Next of Kin   Primary Decision Maker Name -   Confirm Advance Directive None   Patient Would Like to Complete Advance Directive Unable   Does the patient have other document types Do Not Resuscitate       Medical Interventions: Limited additional interventions     Other Instructions: Other:    As far as possible, the palliative care team has discussed with patient / health care proxy about goals of care / treatment preferences for patient. HISTORY:     History obtained from: patient, daughter, chart    CHIEF COMPLAINT: GLF    HPI/SUBJECTIVE:    The patient is:   [x] Verbal and participatory  [] Non-participatory due to:     Patient presented to the ED after suffering a fall at home. ED eval revealed hip xray with no fraction, subluxation, or dislocation. Patient admitted for observation, pain management, and serial hemoglobins. Hgb found to have dropped 4 gm and patient received one unit of PRBCs    Surgery consulted for large hematoma and drop in hgb. May need drainage under general anesthesia. Recommended trending hgbs.     07/25--> Hemoglobin stabilized and patient did not require surgery. Pain still limiting participation in PT. RRT called overnight for bradycardia (HR down to the 30's). Currently sitting in bed eating breakfast. Denies pain or shortness of breath.     Clinical Pain Assessment (nonverbal scale for severity on nonverbal patients):   Clinical Pain Assessment  Severity: 5  Location: right hip/buttock  Character: throbbing  Duration: acute  Effect: limited mobility  Factors: movement  Frequency: constant     Activity (Movement): Lying quietly, normal position    Duration: for how long has pt been experiencing pain (e.g., 2 days, 1 month, years)  Frequency: how often pain is an issue (e.g., several times per day, once every few days, constant)     FUNCTIONAL ASSESSMENT:     Palliative Performance Scale (PPS):  PPS: 50       PSYCHOSOCIAL/SPIRITUAL SCREENING:     Palliative IDT has assessed this patient for cultural preferences / practices and a referral made as appropriate to needs (Cultural Services, Patient Advocacy, Ethics, etc.)    Any spiritual / Jainism concerns:  [] Yes /  [x] No    Caregiver Burnout:  [] Yes /  [x] No /  [] No Caregiver Present      Anticipatory grief assessment:   [x] Normal  / [] Maladaptive       ESAS Anxiety: Anxiety: 0    ESAS Depression:          REVIEW OF SYSTEMS:     Positive and pertinent negative findings in ROS are noted above in HPI. The following systems were [x] reviewed / [] unable to be reviewed as noted in HPI  Other findings are noted below. Systems: constitutional, ears/nose/mouth/throat, respiratory, gastrointestinal, genitourinary, musculoskeletal, integumentary, neurologic, psychiatric, endocrine. Positive findings noted below. Modified ESAS Completed by: provider   Fatigue: 8 Drowsiness: 0     Pain: 5   Anxiety: 0 Nausea: 0   Anorexia: 0 Dyspnea: 0     Constipation: No     Stool Occurrence(s): 0        PHYSICAL EXAM:     From RN flowsheet:  Wt Readings from Last 3 Encounters:   07/20/19 125 lb (56.7 kg)   05/29/19 123 lb 12.8 oz (56.2 kg)   02/28/19 111 lb 12.8 oz (50.7 kg)     Blood pressure 118/41, pulse (!) 45, temperature 97.7 °F (36.5 °C), resp. rate 16, height 5' 7\" (1.702 m), weight 125 lb (56.7 kg), SpO2 (!) 89 %, not currently breastfeeding.     Pain Scale 1: Numeric (0 - 10)  Pain Intensity 1: 0  Pain Onset 1: ongoing  Pain Location 1: Back  Pain Orientation 1: Right  Pain Description 1: Sharp  Pain Intervention(s) 1: Medication (see MAR)  Last bowel movement, if known:     Constitutional: NAD  Eyes: pupils equal, anicteric  ENMT: no nasal discharge, moist mucous membranes  Cardiovascular: regular rhythm, distal pulses intact  Respiratory: breathing not labored, symmetric  Gastrointestinal: soft non-tender, +bowel sounds  Musculoskeletal: no deformity, no tenderness to palpation  Skin: warm, dry  Neurologic: following commands, moving all extremities  Psychiatric: full affect, no hallucinations  Other:       HISTORY:     Principal Problem:    Hematoma (2019)    Active Problems:    Paroxysmal A-fib ([address] (2016)      COPD (chronic obstructive pulmonary disease) ([address] (2016)      Hypertension (2018)      Malignant neoplasm of upper lobe of left lung ([address] (2018)      Abdominal aortic aneurysm (AAA) without rupture ([address] (2018)      Leukocytosis (2019)      Fall (2019)      Acute right hip pain ()      Chronic pain syndrome ()      Physical debility ()      Goals of care, counseling/discussion ()      Past Medical History:   Diagnosis Date    AAA (abdominal aortic aneurysm) (HCC)     Arthritis     ra, osteoporosis, osteoarthritis    Atrial fibrillation ([address]     COPD     HTN (hypertension)     Lung cancer ([address]     RA (rheumatoid arthritis) ([address]     Smoker     TIA (transient ischemic attack)     TIA 16      Past Surgical History:   Procedure Laterality Date    HX CHOLECYSTECTOMY      HX HYSTERECTOMY      HX ORTHOPAEDIC      carpal tunnel, wrist surgery      Family History   Problem Relation Age of Onset    Hypertension Father       History reviewed, no pertinent family history.   Social History     Tobacco Use    Smoking status: Former Smoker     Years: 50.00     Last attempt to quit: 2018     Years since quittin.9    Smokeless tobacco: Never Used   Substance Use Topics    Alcohol use: No     Allergies   Allergen Reactions    Codeine Nausea and Vomiting      Current Facility-Administered Medications   Medication Dose Route Frequency    albuterol 5mg / ipratropium 0.5mg neb solution  1 Dose Nebulization Q4H PRN    0.9% sodium chloride infusion 250 mL  250 mL IntraVENous PRN    folic acid (FOLVITE) tablet 1 mg  1 mg Oral DAILY    oxyCODONE IR (ROXICODONE) tablet 10 mg  10 mg Oral Q3H PRN    acetaminophen (TYLENOL) tablet 1,000 mg  1,000 mg Oral Q8H    senna-docusate (PERICOLACE) 8.6-50 mg per tablet 1 Tab  1 Tab Oral DAILY    naloxone (NARCAN) injection 0.4 mg  0.4 mg IntraVENous EVERY 2 MINUTES AS NEEDED    sodium chloride (NS) flush 5-40 mL  5-40 mL IntraVENous Q8H    sodium chloride (NS) flush 5-40 mL  5-40 mL IntraVENous PRN    ALPRAZolam (XANAX) tablet 0.25 mg  0.25 mg Oral BID PRN    dexAMETHasone (DECADRON) tablet 2 mg  2 mg Oral DAILY AFTER BREAKFAST    escitalopram oxalate (LEXAPRO) tablet 10 mg  10 mg Oral DAILY    [START ON 7/27/2019] methotrexate (RHEUMATREX) tablet 20 mg  20 mg Oral every Saturday    oxyCODONE IR (ROXICODONE) tablet 5 mg  5 mg Oral Q3H PRN    oxyCODONE ER (OxyCONTIN) tablet 10 mg  10 mg Oral DAILY    morphine injection 2 mg  2 mg IntraVENous Q4H PRN          LAB AND IMAGING FINDINGS:     Lab Results   Component Value Date/Time    WBC 12.4 (H) 07/25/2019 01:05 AM    HGB 7.6 (L) 07/25/2019 01:05 AM    PLATELET 657 97/67/5482 01:05 AM     Lab Results   Component Value Date/Time    Sodium 141 07/24/2019 12:47 AM    Potassium 4.4 07/24/2019 12:47 AM    Chloride 107 07/24/2019 12:47 AM    CO2 30 07/24/2019 12:47 AM    BUN 31 (H) 07/24/2019 12:47 AM    Creatinine 0.81 07/24/2019 12:47 AM    Calcium 7.6 (L) 07/24/2019 12:47 AM    Magnesium 1.8 07/20/2019 07:30 PM    Phosphorus 3.4 07/20/2019 07:30 PM      Lab Results   Component Value Date/Time    AST (SGOT) 15 07/20/2019 07:30 PM    Alk.  phosphatase 50 07/20/2019 07:30 PM    Protein, total 7.6 07/20/2019 07:30 PM    Albumin 3.4 (L) 07/20/2019 07:30 PM    Globulin 4.2 (H) 07/20/2019 07:30 PM     Lab Results   Component Value Date/Time    INR 1.1 07/20/2019 07:30 PM    Prothrombin time 11.0 07/20/2019 07:30 PM    aPTT 28.7 07/20/2019 07:30 PM      Lab Results   Component Value Date/Time    Iron 14 (L) 11/28/2018 12:58 AM    TIBC 143 (L) 11/28/2018 12:58 AM    Iron % saturation 10 (L) 11/28/2018 12:58 AM    Ferritin 139 11/28/2018 12:58 AM      No results found for: PH, PCO2, PO2  No components found for: GLPOC   No results found for: CPK, CKMB             Total time: 25 min  Counseling / coordination time, spent as noted above: 20 min  > 50% counseling / coordination?: yes    Prolonged service was provided for  []30 min   []75 min in face to face time in the presence of the patient, spent as noted above. Time Start:   Time End:   Note: this can only be billed with 50147 (initial) or 45642 (follow up). If multiple start / stop times, list each separately.

## 2019-07-25 NOTE — PROGRESS NOTES
07/25/19 0758   Chart and Patient  Assessment   Pulmonary History 3   Surgical History 0   Chest X-ray 0   Respiratory Pattern 0   Mental Status 0   Breath Sounds 0   Cough 0   Level of Activity/Mobility 1   Respiratory Assessment Total Score 4

## 2019-07-25 NOTE — PROGRESS NOTES
Palliative Medicine    Code Status: DNR    Advance Care Planning 2019   Patient's Healthcare Decision Maker is: Legal Next of Devin 69   Primary Decision Maker Name BERNARDO Valladares, [phone]   Confirm Advance Directive None   Patient Would Like to Complete Advance Directive Unable (periods of confusion/memory impairment, difficulty retaining information)   Does the patient have other document types Durable Do Not Resuscitate       Patient / Family Encounter Documentation    Participants (names): Pt, dtr Ramon Hunt, Palliative Medicine (NP Joe Dykes)    Narrative:  Met with pt and dtr in IVCU; Rapid Response had been called earlier this morning for asymptomatic bradycardia, pt transferred from 5th floor. Pt was awake in bed eating breakfast at time of visit, expressed hope that she will not need a pacemaker, shared that her brother had complications with his device 20+ yrs ago. Pt initially stated she has not refused PT/OT but then acknowledged that she had declined to participate yesterday. Questioned whether hesitation was due to pain or anxiety/fear of falling; pt indicated both are contributing factors. Pt reports she typically takes meds for pain and anxiety at bedtime when home, reports dtr or grandson assist with administration of meds. Psychosocial Issues Identified/ Resilience Factors: Stress of current hospitalization/coping with symptoms related to fall (pain, anxiety). Dtr is supportive/encouraging. Goals of Care / Plan: Discussed plan of premedicating for pain/anxiety as needed before working with PT/OT. Cardiology was in earlier. Palliative team will continue to follow. Thank you for including Palliative Medicine in the care of Ms. Kimball.     Janis Huff LCSW, [facility]  [phone]

## 2019-07-25 NOTE — PROGRESS NOTES
Problem: Mobility Impaired (Adult and Pediatric)  Goal: *Acute Goals and Plan of Care (Insert Text)  Description  Physical Therapy Goals  Initiated 7/23/2019  1. Patient will move from supine to sit and sit to supine  in bed with independence within 7 day(s). 2.  Patient will transfer from bed to chair and chair to bed with minimal assistance/contact guard assist using the least restrictive device within 7 day(s). 3.  Patient will perform sit to stand with minimal assistance/contact guard assist within 7 day(s). 4.  Patient will ambulate with minimal assistance/contact guard assist for 25 feet with the least restrictive device within 7 day(s). Outcome: Progressing Towards Goal  Note:   PHYSICAL THERAPY TREATMENT  Patient: Cherry Burton (80 y.o. female)  Date: 7/25/2019  Diagnosis: Hematoma [T14. 8XXA]  Hematoma [T14. 8XXA] Hematoma       Precautions: Fall  Chart, physical therapy assessment, plan of care and goals were reviewed. ASSESSMENT:  Pt received in bed, max encouragement to participate with physical therapy. Max A to come to sitting EOB. Mod A x2 for sit<>stand from elevated surface using RW. Demonstrates severe posterior lean upon standing, slight correction with verbal cues. Side stepping toward HOB with mod A x 2. Pt with increased pain in R hip throughout session, adamantly declined attempt to transfer to chair. Pt returned to supine, bed placed in semi chair position. Pt fearful of falling again with increased axiety with all mobility tasks. Recommend rehab  Progression toward goals:  ?    Improving appropriately and progressing toward goals  ? Improving slowly and progressing toward goals  ? Not making progress toward goals and plan of care will be adjusted     PLAN:  Patient continues to benefit from skilled intervention to address the above impairments. Continue treatment per established plan of care.   Discharge Recommendations:  [address] Recommendations for Discharge:  none      SUBJECTIVE:   Patient stated i am not getting up , I am not going to that chair, therapy always wants you to move.     OBJECTIVE DATA SUMMARY:   Critical Behavior:  Neurologic State: Alert, Confused, Eyes open spontaneously  Orientation Level: Oriented to person, Oriented to place, Disoriented to situation, Disoriented to time  Cognition: Follows commands, Memory loss, Recognition of people/places     Functional Mobility Training:  Bed Mobility:  Rolling: Minimum assistance; Additional time  Supine to Sit: Maximum assistance;Assist x2; Additional time  Sit to Supine: Maximum assistance;Assist x2           Transfers:  Sit to Stand: Moderate assistance;Assist x2; Additional time  Stand to Sit: Moderate assistance;Assist x2                             Balance:  Sitting: Intact  Standing: Impaired; With support  Standing - Static: Poor  Standing - Dynamic : Poor  Ambulation/Gait Training:  Distance (ft): 3 Feet (ft)     Ambulation - Level of Assistance: Moderate assistance;Assist x2; Additional time        Gait Abnormalities: Decreased step clearance; Step to gait                                      Stairs:              Neuro Re-Education:    Therapeutic Exercises:     Pain:  Pain Scale 1: Numeric (0 - 10)  Pain Intensity 1: 0  Pain Location 1: Back     Pain Description 1: Sharp  Pain Intervention(s) 1: Medication (see MAR)  Activity Tolerance:   fair  Please refer to the flowsheet for vital signs taken during this treatment. After treatment:   ?    Patient left in no apparent distress sitting up in chair  ? Patient left in no apparent distress in bed  ? Call bell left within reach  ? Nursing notified  ? Caregiver present  ?     Bed alarm activated    COMMUNICATION/COLLABORATION:   The patients plan of care was discussed with: Occupational Therapist and Registered Nurse [place]ito Lora   Time Calculation: 24 mins

## 2019-07-25 NOTE — PROGRESS NOTES
TRANSFER - OUT REPORT:    Verbal report given to ICU RN (name) on Megan Vargas  being transferred to ICU (unit) for change in patient condition(HR in 30s)       Report consisted of patients Situation, Background, Assessment and   Recommendations(SBAR). Information from the following report(s) SBAR, Kardex and MAR was reviewed with the receiving nurse. Lines:   Peripheral IV 07/21/19 Left Arm (Active)   Site Assessment Clean, dry, & intact 7/24/2019  3:00 PM   Phlebitis Assessment 0 7/24/2019  3:00 PM   Infiltration Assessment 0 7/24/2019  3:00 PM   Dressing Status Clean, dry, & intact 7/24/2019  3:00 PM   Dressing Type Tape;Transparent 7/24/2019  3:00 PM   Hub Color/Line Status Pink 7/24/2019  3:00 PM   Action Taken Open ports on tubing capped 7/24/2019  3:00 PM   Alcohol Cap Used Yes 7/24/2019  3:00 PM        Opportunity for questions and clarification was provided.       Patient transported with:   Monitor  Registered Nurse        8:10 Daughter notified of patient's transfer

## 2019-07-25 NOTE — PROGRESS NOTES
Ty Echeverria [facility]  [address] [facility], [address] [place] [address]  [phone]      Medical Progress Note      NAME: Bharath Mohan   :  1931  MRM:  [id number]    Date/Time: 2019          Subjective:     Chief Complaint:  F/u fall    Rapid response called as her HR went to low 30. When I came she was alert and talking asymptomatic. BP stable. HR later improved to 45. Has not got digoxin last few days. After d/w staff will move her to tele bed and   Get cardiology eval       Objective:       Vitals:          Last 24hrs VS reviewed since prior progress note. Most recent are:    Visit Vitals  /58   Pulse (!) 48   Temp 97.5 °F (36.4 °C)   Resp 16   Ht 5' 7\" (1.702 m)   Wt 56.7 kg (125 lb)   SpO2 91%   Breastfeeding?  No   BMI 19.58 kg/m²     SpO2 Readings from Last 6 Encounters:   19 91%   19 95%   19 96%   19 93%   19 97%   19 97%            Intake/Output Summary (Last 24 hours) at 2019 0758  Last data filed at 2019 1833  Gross per 24 hour   Intake    Output 600 ml   Net -600 ml          Exam:     Physical Exam:    Gen:  Chr ill looking, in no acute distress  HEENT:   moist mucous membranes  Resp:  No accessory muscle use,    Card:  no peripheral edema  Abd:   non-distended,    Musc:  No cyanosis or clubbing  Skin:  No rashes or ulcers,    Neuro:   follows commands appropriately  Psych:   Normal affect       Medications Reviewed: (see below)    Lab Data Reviewed: (see below)    ______________________________________________________________________    Medications:     Current Facility-Administered Medications   Medication Dose Route Frequency    albuterol 5mg / ipratropium 0.5mg neb solution  1 Dose Nebulization BID RT    0.9% sodium chloride infusion 250 mL  250 mL IntraVENous PRN    folic acid (FOLVITE) tablet 1 mg  1 mg Oral DAILY    oxyCODONE IR (ROXICODONE) tablet 10 mg  10 mg Oral Q3H PRN    acetaminophen (TYLENOL) tablet 1,000 mg  1,000 mg Oral Q8H    senna-docusate (PERICOLACE) 8.6-50 mg per tablet 1 Tab  1 Tab Oral DAILY    naloxone (NARCAN) injection 0.4 mg  0.4 mg IntraVENous EVERY 2 MINUTES AS NEEDED    sodium chloride (NS) flush 5-40 mL  5-40 mL IntraVENous Q8H    sodium chloride (NS) flush 5-40 mL  5-40 mL IntraVENous PRN    ALPRAZolam (XANAX) tablet 0.25 mg  0.25 mg Oral BID PRN    dexAMETHasone (DECADRON) tablet 2 mg  2 mg Oral DAILY AFTER BREAKFAST    escitalopram oxalate (LEXAPRO) tablet 10 mg  10 mg Oral DAILY    [START ON 7/27/2019] methotrexate (RHEUMATREX) tablet 20 mg  20 mg Oral every Saturday    oxyCODONE IR (ROXICODONE) tablet 5 mg  5 mg Oral Q3H PRN    oxyCODONE ER (OxyCONTIN) tablet 10 mg  10 mg Oral DAILY    morphine injection 2 mg  2 mg IntraVENous Q4H PRN            Lab Review:     Recent Labs     07/25/19  0105 07/22/19  1509   WBC 12.4*  --    HGB 7.6* 7.6*   HCT 24.5* 23.7*     --      Recent Labs     07/24/19  0047 07/23/19  0307    139   K 4.4 4.8    105   CO2 30 30   * 115*   BUN 31* 33*   CREA 0.81 0.82   CA 7.6* 7.7*     No components found for: [place]         Assessment / Plan:   79yo F admitted with following issues    Hematoma on RT gluteal area / acute blood loss anemia:   Hgb dropped 4 grams. S/p PRBC    monitor hgb  Stable >7   - general surgery recomend conservative care     Paroxysmal A-fib now consistently bradycardic with low as 30   off digoxin last few days  Get cardiology eval   EKG  Off eliquis as hematoma and fall      COPD (chronic obstructive pulmonary disease) ([facility] (9/30/2016). Stable. Not wheezing.   - Continue nebs PRN       Hypertension (8/25/2018). - Continue home meds       Malignant neoplasm of upper lobe of left lung ([facility] (11/27/2018). S/p RT.   - Follow up with Dr Rodriguez       Abdominal aortic aneurysm (AAA) without rupture ([facility] (12/4/2018). - outpatient FU       Leukocytosis (7/20/2019). Doubt infection. Likely due to stress. - monitor periodically wbc 12                     Care Plan discussed with: Patient and Family daughter on phone(answered all her concerns and questions), nursing staff    DNR    DVT Prophylaxis:  SCD's     Disposition:   PT, OT, RN once medically stable           ___________________________________________________    Attending Physician: Mansoor Mazariegos MD

## 2019-07-25 NOTE — PROGRESS NOTES
Problem: Self Care Deficits Care Plan (Adult)  Goal: *Acute Goals and Plan of Care (Insert Text)  Description  Occupational Therapy Goals  Initiated 7/25/2019  1. Patient will perform lower body dressing with moderate assistance  within 7 day(s). 2.  Patient will perform upper body dressing at EOB with supervision/set-up within 7 day(s). 3.  Patient will perform sit <> stand to participate in ADL tasks  with minimal assistancewithin 7 day(s). 4.  Patient will perform toilet transfers to [facility] with moderate assistance  within 7 day(s). 5.  Patient will participate in upper extremity therapeutic exercise/activities with supervision/set-up for 8 minutes within 7 day(s). Outcome: Progressing Towards Goal   OCCUPATIONAL THERAPY EVALUATION  Patient: Sol Abrams (32 y.o. female)  Date: 7/25/2019  Primary Diagnosis: Hematoma [T14. 8XXA]  Hematoma [T14. 8XXA]        Precautions:  Fall    ASSESSMENT :  Based on the objective data described below, the patient presents with impaired functional balance, mobility, activity tolerance, and pain in R hip impacting her ability to complete ADL tasks. She was admitted following a fall at home with R hip hematoma. She lives with grandson who works during the day so she is alone. She ambulated at RW level and reports being mod indep with ADL tasks. Denies previous falls other than one prior to admission. She has poor safety awareness, fearful of falling, and poor insight to the situation. Initially resistant to therapy but with continued encouragement agreeable for assessment of orthostatics. At this time she is min A for UB care, total A for LB care, mod Ax 2 for self care transfers and total A for toileting. Orthostatics stable. Recommend SNF for additional skilled OT services. Patient will benefit from skilled intervention to address the above impairments.   Patients rehabilitation potential is considered to be Good  Factors which may influence rehabilitation potential include:   ? None noted  ? Mental ability/status  ? Medical condition  ? Home/family situation and support systems  ? Safety awareness  ? Pain tolerance/management  ? Other:      PLAN :  Recommendations and Planned Interventions:  ?               Self Care Training                  ? Therapeutic Activities  ? Functional Mobility Training    ? Cognitive Retraining  ? Therapeutic Exercises           ? Endurance Activities  ? Balance Training                   ? Neuromuscular Re-Education  ? Visual/Perceptual Training     ? Home Safety Training  ? Patient Education                 ? Family Training/Education  ? Other (comment):    Frequency/Duration: Patient will be followed by occupational therapy 5 times a week to address goals. Discharge Recommendations: Skilled Nursing Facility  Further Equipment Recommendations for Discharge: TBD SNF      SUBJECTIVE:   Patient stated I don't want to fall.     OBJECTIVE DATA SUMMARY:   HISTORY:   Past Medical History:   Diagnosis Date    AAA (abdominal aortic aneurysm) (HCC)     Arthritis     ra, osteoporosis, osteoarthritis    Atrial fibrillation (HCC)     COPD     HTN (hypertension)     Lung cancer (HCC)     RA (rheumatoid arthritis) ([facility] [address]     Smoker     TIA (transient ischemic attack)     TIA 9/30/16     Past Surgical History:   Procedure Laterality Date    HX CHOLECYSTECTOMY      HX HYSTERECTOMY      HX ORTHOPAEDIC      carpal tunnel, wrist surgery       Prior Level of Function/Environment/Context: Home with grandson.   See above assessment section   Occupations in which the patient is/was successful, what are the barriers preventing that success:   Performance Patterns (routines, roles, habits, and rituals):   Personal Interests and/or values:   Expanded or extensive additional review of patient history:     Home Situation  Home Environment: Private residence  One/Two Story Residence: Two story, live on 1st floor  Living Alone: No  Support Systems: (grandson, works during the day)  Patient Expects to be Discharged to[de-identified] Private residence  Current DME Used/Available at Home: [employer], [address]:  Cognitive/Behavioral Status:  Neurologic State: Alert;Confused  Orientation Level: Oriented to person;Oriented to place; Disoriented to time;Disoriented to situation  Cognition: Memory loss; Follows commands; Impaired decision making     Hearing: Auditory  Auditory Impairment: None    Vision/Perceptual:                                     Range of Motion:  AROM: Generally decreased, functional                         Strength:  Strength: Generally decreased, functional                Coordination:  Coordination: Generally decreased, functional  Fine Motor Skills-Upper: Left Intact; Right Intact(mild impairments with hx of RA)    Gross Motor Skills-Upper: Left Intact; Right Intact    Tone & Sensation:  Tone: Normal  Sensation: Intact                      Balance:  Sitting: Intact  Standing: Impaired; With support  Standing - Static: Poor  Standing - Dynamic : Poor    Functional Mobility and Transfers for ADLs:  Bed Mobility:  Rolling: Minimum assistance; Additional time  Supine to Sit: Maximum assistance;Assist x2; Additional time  Sit to Supine: Maximum assistance;Assist x2    Transfers:  Sit to Stand: Moderate assistance;Assist x2; Additional time  Stand to Sit: Moderate assistance;Assist x2  Bed to Chair: (patientrefused)    ADL Assessment:  Feeding: Setup    Oral Facial Hygiene/Grooming: Setup(bed level only)    Bathing: Maximum assistance    Upper Body Dressing: Minimum assistance    Lower Body Dressing: Total assistance    Toileting:  Total assistance                ADL Intervention and task modifications:  Feeding  Feeding Assistance: Set-up       Lower Body Dressing Assistance  Socks: Total assistance (dependent)         Cognitive Retraining  Orientation Retraining: Situation  Organizing/Sequencing: Breaking task down  Following Commands: Follows one step commands/directions    Patient instructed and indicated understanding the benefits of maintaining activity tolerance, functional mobility, and independence with self care tasks during acute stay  to ensure safe return home and to baseline. Encouraged patient to increase frequency and duration OOB, be out of bed for all meals, perform daily ADLs (as approved by RN/MD regarding bathing etc), and performing functional mobility to/from bathroom. Patient eating lunch, encouraged up to chair to finish. Patient resistant to chair however agreeable with attempting to stand with education and encouragement. Additional time needed through out. Supine > sit with mod Ax 2, static sitting with SBA, and sit <> stand with mod Ax 2 with RW with brief side stepping. Max AX 2 to return to bed. She is limited secondary to poor insight to situation, fear of falling, pain and confusion. Patient able to follow commands and oriented x 2 however inconsistent with basic logic through out session. Returned to bed and set up to complete lunch. Functional Measure:  Barthel Index:    Bathin  Bladder: 5  Bowels: 5  Groomin  Dressin  Feedin  Mobility: 0  Stairs: 0  Toilet Use: 0  Transfer (Bed to Chair and Back): 5  Total: 25/100        Percentage of impairment   0%   1-19%   20-39%   40-59%   60-79%   80-99%   100%   Barthel Score 0-100 100 99-80 79-60 59-40 20-39 1-19   0     The Barthel ADL Index: Guidelines  1. The index should be used as a record of what a patient does, not as a record of what a patient could do. 2. The main aim is to establish degree of independence from any help, physical or verbal, however minor and for whatever reason.   3. The need for supervision renders the patient not independent. 4. A patient's performance should be established using the best available evidence. Asking the patient, friends/relatives and nurses are the usual sources, but direct observation and common sense are also important. However direct testing is not needed. 5. Usually the patient's performance over the preceding 24-48 hours is important, but occasionally longer periods will be relevant. 6. Middle categories imply that the patient supplies over 50 per cent of the effort. 7. Use of aids to be independent is allowed. Barabara Files., Barthel, D.W. ([id number]). Functional evaluation: the Barthel Index. [address] [facility] (14)2. Kishor Hernandez maryan ALFONSO Engel, Juliet Baird., Marylou Carey., Khushi, [address] [place] [address] Ave (1999). Measuring the change indisability after inpatient rehabilitation; comparison of the responsiveness of the Barthel Index and Functional Lane Measure. Journal of Neurology, Neurosurgery, and Psychiatry, 66(4), 496-668. NO BenjaminJ.A, SANDIP Underwood, & Paty Elena M.A. (2004.) Assessment of post-stroke quality of life in cost-effectiveness studies: The usefulness of the Barthel Index and the EuroQoL-5D. Quality of Life Research, 15, 860-26         Occupational Therapy Evaluation Charge Determination   History Examination Decision-Making   MEDIUM Complexity : Expanded review of history including physical, cognitive and psychosocial  history  MEDIUM Complexity : 3-5 performance deficits relating to physical, cognitive , or psychosocial skils that result in activity limitations and / or participation restrictions MEDIUM Complexity : Patient may present with comorbidities that affect occupational performnce.  Miniml to moderate modification of tasks or assistance (eg, physical or verbal ) with assesment(s) is necessary to enable patient to complete evaluation       Based on the above components, the patient evaluation is determined to be of the following complexity level: MEDIUM  Pain:  Pain Scale 1: Numeric (0 - 10)  Pain Intensity 1: 0  Pain Location 1: Back     Pain Description 1: Sharp  Pain Intervention(s) 1: Medication (see MAR)    Activity Tolerance:   Vitals:    07/25/19 1330 07/25/19 1336 07/25/19 1341 07/25/19 1513   BP: 136/43 128/51 138/58 110/41   BP 1 Location:    Right arm   BP Patient Position: Supine Sitting Standing At rest   Pulse: (!) 46 61  (!) 57   Resp:    14   Temp:    Comment: Pt sleeping at this time. SpO2: 95% 93% 95% 96%   Weight:       Height:           After treatment:   ? Patient left in no apparent distress sitting up in chair  ? Patient left in no apparent distress in bed  ? Call bell left within reach  ? Nursing notified  ? Caregiver present  ? Bed alarm activated    COMMUNICATION/EDUCATION:   The patients plan of care was discussed with: Physical Therapy Assistant and Registered Nurse.  ? Home safety education was provided and the patient/caregiver indicated understanding.-- poor carry over and impaired STM  ? Patient/family have participated as able in goal setting and plan of care. ? Patient/family agree to work toward stated goals and plan of care. ? Patient understands intent and goals of therapy, but is neutral about his/her participation. ? Patient is unable to participate in goal setting and plan of care. This patients plan of care is appropriate for delegation to [facility].     Thank you for this referral.  Rosemary Hurtado OT  Time Calculation: 24 mins

## 2019-07-25 NOTE — PROGRESS NOTES
Patient sleeping on and off. C/o with each movement or touch. Continues sarah when sleeping and rising when awake and talking. Denies nausea or dizziness.

## 2019-07-25 NOTE — PROGRESS NOTES
EKG done, HR between 50-55, Dr. Xiong Forward will order transfer to telemetry and cardiology consult

## 2019-07-25 NOTE — CONSULTS
Kristin Carroll MD [facility] [address]  Office [phone]      Date of  Admission: 7/20/2019  7:19 PM       Assessment/Plan:   · Hx PAF/SB w/ PAC's: hemodynamically stable. Has been off digoxin for several days. Dig level 7/22/19 was 1.1  RRT called this am for HR 30's when sleeping. Review of tele shows bradycardia since admission. Does not want to resume eliquis. TSH/ECHO pending. Ck orthostatic BP's. Will get records from Dr Geovanny Meehan office. Continue to monitor on tele. Per discussion with her dtr, she did indicate to her that she did black out. · GLF PTA: per pt she did not experience dizziness,  Lightheadedness. No LOC. · AAA: not interested in surgery. · Anemia: hgb 7.6  · Gluteal hematoma: surgery following, conservative mgt. PT/OT  · DNR:   · Lung ca : followed by Dr. Bola Johns          Thank you for allowing us to participate in Carlee Alfaro care. We will be happy to follow along. Please call for any questions. Consult requested by Manny Barahona MD for *Hematoma [O27. 8XXA]  Hematoma [T14. 8XXA]    Subjective:  Carlee Alfaro is a 80 y.o.   female  with PMH of L lung ca, anemia, PAF followed by Dr. Geovanny Meehan, COPD, HTN, smoker who was admitted for [facility]. She sustained a R gluteal hematoma. Pt tried to get something from her mini fridge and she fell landing on her RT side. Pt started severe pain on her RT gluteal area, which severe and sharp. The patient denies chest pain, dependent edema, diaphoresis, HERMAN, orthopnea, palpitations, PND, shortness of breath, claudication or syncope. Cardiac risk factors    HTN   Smoker  Post menopausal    CT abdomen: Large right gluteal hematoma with active extravasation. Incidental hepatic steatosis, granulomatous disease, AAA, indeterminate thoracic  and lumbar compression fractures.     Cardiographics:   Telemetry:   SB    EKG Results     Procedure 720 Value Units Date/Time    EKG, 12 LEAD, INITIAL [[id number]]     Order Status:  Completed     EKG, 12 LEAD, INITIAL [[id number]] Collected:  07/20/19 2001    Order Status:  Completed Updated:  07/21/19 2117     Ventricular Rate 86 BPM      Atrial Rate 87 BPM      QRS Duration 72 ms      Q-T Interval 352 ms      QTC Calculation (Bezet) 421 ms      Calculated R Axis -17 degrees      Calculated T Axis 62 degrees      Diagnosis --     Atrial fibrillation  Nonspecific ST abnormality  Abnormal ECG  When compared with ECG of 24-NOV-2018 09:53, Atrial fibrillation has replaced   Sinus rhythm  Nonspecific T wave abnormality, improved in Anterolateral leads  Confirmed by Judit SHELTON, Alex Gauthier ([id number]) on 7/21/2019 9:17:00 PM              Cardiac Testing/ Procedures: A. Cardiac Cath/PCI:   B.ECHO/MARIA D:   C.StressNuclear/Stress ECHO/Stress test:   D.Vascular:   E. EP:   F. Miscellaneous    SOCIAL HX:  , lives with grandson, former smoker       Patient Active Problem List    Diagnosis Date Noted    Acute right hip pain     Chronic pain syndrome     Physical debility     Goals of care, counseling/discussion     Hematoma 07/20/2019    Leukocytosis 07/20/2019    Fall 07/20/2019    Abdominal aortic aneurysm (AAA) without rupture ([address] 12/04/2018    Anemia 12/04/2018    Malignant neoplasm of upper lobe of left lung ([address] 11/27/2018    Tibial plateau fracture 05/13/9204    Tobacco abuse 08/25/2018    Hypertension 08/25/2018    TIA (transient ischemic attack) 09/30/2016    Slurred speech 09/30/2016    Paroxysmal A-fib ([address] 09/30/2016    RA (rheumatoid arthritis) ([address] 09/30/2016    COPD (chronic obstructive pulmonary disease) ([address] 09/30/2016      Past Medical History:   Diagnosis Date    AAA (abdominal aortic aneurysm) (HCC)     Arthritis     ra, osteoporosis, osteoarthritis    Atrial fibrillation (HCC)     COPD     HTN (hypertension)     Lung cancer (HCC)     RA (rheumatoid arthritis) ([address]     Smoker     TIA (transient ischemic attack)     TIA 9/30/16      Past Surgical History:   Procedure Laterality Date    HX CHOLECYSTECTOMY      HX HYSTERECTOMY      HX ORTHOPAEDIC      carpal tunnel, wrist surgery     Allergies   Allergen Reactions    Codeine Nausea and Vomiting      Current Facility-Administered Medications   Medication Dose Route Frequency    albuterol 5mg / ipratropium 0.5mg neb solution  1 Dose Nebulization Q4H PRN    0.9% sodium chloride infusion 250 mL  250 mL IntraVENous PRN    folic acid (FOLVITE) tablet 1 mg  1 mg Oral DAILY    oxyCODONE IR (ROXICODONE) tablet 10 mg  10 mg Oral Q3H PRN    acetaminophen (TYLENOL) tablet 1,000 mg  1,000 mg Oral Q8H    senna-docusate (PERICOLACE) 8.6-50 mg per tablet 1 Tab  1 Tab Oral DAILY    naloxone (NARCAN) injection 0.4 mg  0.4 mg IntraVENous EVERY 2 MINUTES AS NEEDED    sodium chloride (NS) flush 5-40 mL  5-40 mL IntraVENous Q8H    sodium chloride (NS) flush 5-40 mL  5-40 mL IntraVENous PRN    ALPRAZolam (XANAX) tablet 0.25 mg  0.25 mg Oral BID PRN    dexAMETHasone (DECADRON) tablet 2 mg  2 mg Oral DAILY AFTER BREAKFAST    escitalopram oxalate (LEXAPRO) tablet 10 mg  10 mg Oral DAILY    [START ON 7/27/2019] methotrexate (RHEUMATREX) tablet 20 mg  20 mg Oral every Saturday    oxyCODONE IR (ROXICODONE) tablet 5 mg  5 mg Oral Q3H PRN    oxyCODONE ER (OxyCONTIN) tablet 10 mg  10 mg Oral DAILY    morphine injection 2 mg  2 mg IntraVENous Q4H PRN          Review of Symptoms:  Constitutional: positive for fatigue  ENT: negative   Respiratory: negative for dyspnea on exertion  Gastrointestinal: negative for nausea and vomiting  Genitourinary: no dysuria, hematuria, frequency   Musculoskeletal:positive for muscle weakness  Neurological: positive for memory problems  Other systems reviewed and negative except as above.   Social History     Socioeconomic History    Marital status: SINGLE     Spouse name: Not on file    Number of children: Not on file    Years of education: Not on file    Highest education level: Not on file   Tobacco Use    Smoking status: Former Smoker     Years: 50.00     Last attempt to quit: 2018     Years since quittin.9    Smokeless tobacco: Never Used   Substance and Sexual Activity    Alcohol use: No    Drug use: No    Sexual activity: Not Currently     Family History   Problem Relation Age of Onset    Hypertension Father          Physical Exam    Visit Vitals  /46   Pulse (!) 57   Temp 97.5 °F (36.4 °C)   Resp 17   Ht 5' 7\" (1.702 m)   Wt 125 lb (56.7 kg)   SpO2 91%   Breastfeeding? No   BMI 19.58 kg/m²     General: awake, alert, and oriented. MAEx4. No acute distress   HEENT Exam:     Normocephalic, atraumatic. Sclera anicteric . Neck: supple  Lung Exam:     Not  dyspneic. Respirations unlabored. O2 @ 91% room air Sat: . Lungs: No wheezes, crackles, rhonchi, or rubs heard on auscultation. Heart Exam:       PMI nondisplaced,  Rate: Rhythm: regular     No JVD, no carotid bruits, No HJR      Abdomen Exam:      soft, nontender. + Bowel sounds. No palpable masses; organomegaly. No bruits or pulsatile mass. : voiding   Extremities Exam:      Atraumatic. No edema.     Vascular Exam:      radial,  dorsalis pedis, posterior tibial, are equal and strong bilaterally     Neuro exam:  No focal deficits   Psy Exam: Normal affect, does not appear anxious or agitatied        Recent Results (from the past 12 hour(s))   CBC WITH AUTOMATED DIFF    Collection Time: 19  1:05 AM   Result Value Ref Range    WBC 12.4 (H) 3.6 - 11.0 K/uL    RBC 2.57 (L) 3.80 - 5.20 M/uL    HGB 7.6 (L) 11.5 - 16.0 g/dL    HCT 24.5 (L) 35.0 - 47.0 %    MCV 95.3 80.0 - 99.0 FL    MCH 29.6 26.0 - 34.0 PG    MCHC 31.0 30.0 - 36.5 g/dL    RDW 17.5 (H) 11.5 - 14.5 %    PLATELET 333 930 - 981 K/uL    MPV 9.2 8.9 - 12.9 FL    NRBC 0.2 (H) 0  WBC    ABSOLUTE NRBC 0.03 (H) 0.00 - 0.01 K/uL    NEUTROPHILS 82 (H) 32 - 75 %    LYMPHOCYTES 14 12 - 49 % MONOCYTES 4 (L) 5 - 13 %    EOSINOPHILS 0 0 - 7 %    BASOPHILS 0 0 - 1 %    IMMATURE GRANULOCYTES 0 %    ABS. NEUTROPHILS 10.2 (H) 1.8 - 8.0 K/UL    ABS. LYMPHOCYTES 1.7 0.8 - 3.5 K/UL    ABS. MONOCYTES 0.5 0.0 - 1.0 K/UL    ABS. EOSINOPHILS 0.0 0.0 - 0.4 K/UL    ABS. BASOPHILS 0.0 0.0 - 0.1 K/UL    ABS. IMM.  GRANS. 0.0 K/UL    DF MANUAL      RBC COMMENTS ANISOCYTOSIS  1+       GLUCOSE, POC    Collection Time: 07/25/19  7:55 AM   Result Value Ref Range    Glucose (POC) 103 (H) 65 - 100 mg/dL    Performed by Patrica Hawkins MS [facility]

## 2019-07-25 NOTE — PROGRESS NOTES
Occupational Therapy    Orders received, chart reviewed and patient evaluated by occupational therapy. Recommend patient to discharge to SNF pending progression with skilled acute occupational therapy. Recommend with nursing patient to complete as able in order to maintain strength, endurance and independence: OOB to chair 2x/day or bed in chair position, bed level ADLs with supervision/setup and mobilizing to the [facility] with x2 assist vs bed pan. Thank you for your assistance. Full evaluation to follow.      Maira Cole OT

## 2019-07-25 NOTE — ROUTINE PROCESS
Bedside and Verbal shift change report given to Radha Gottlieb RN (oncoming nurse) by Luna RN (offgoing nurse).  Report included the following information SBAR, Kardex, Intake/Output, Recent Results and Cardiac Rhythm SB.

## 2019-07-26 ENCOUNTER — HOME HEALTH ADMISSION (OUTPATIENT)
Dept: HOME HEALTH SERVICES | Facility: HOME HEALTH | Age: 84
End: 2019-07-26

## 2019-07-26 ENCOUNTER — TELEPHONE (OUTPATIENT)
Dept: PALLATIVE CARE | Age: 84
End: 2019-07-26

## 2019-07-26 ENCOUNTER — APPOINTMENT (OUTPATIENT)
Dept: GENERAL RADIOLOGY | Age: 84
DRG: 982 | End: 2019-07-26
Attending: INTERNAL MEDICINE
Payer: MEDICARE

## 2019-07-26 LAB
BACTERIA SPEC CULT: NORMAL
BACTERIA SPEC CULT: NORMAL
SERVICE CMNT-IMP: NORMAL

## 2019-07-26 PROCEDURE — C1893 INTRO/SHEATH, FIXED,NON-PEEL: HCPCS | Performed by: INTERNAL MEDICINE

## 2019-07-26 PROCEDURE — 74011000250 HC RX REV CODE- 250: Performed by: INTERNAL MEDICINE

## 2019-07-26 PROCEDURE — 74011636320 HC RX REV CODE- 636/320: Performed by: INTERNAL MEDICINE

## 2019-07-26 PROCEDURE — 02HK3JZ INSERTION OF PACEMAKER LEAD INTO RIGHT VENTRICLE, PERCUTANEOUS APPROACH: ICD-10-PCS | Performed by: INTERNAL MEDICINE

## 2019-07-26 PROCEDURE — 77030002933 HC SUT MCRYL J&J -A: Performed by: INTERNAL MEDICINE

## 2019-07-26 PROCEDURE — C1785 PMKR, DUAL, RATE-RESP: HCPCS | Performed by: INTERNAL MEDICINE

## 2019-07-26 PROCEDURE — 74011250636 HC RX REV CODE- 250/636: Performed by: INTERNAL MEDICINE

## 2019-07-26 PROCEDURE — 77030018673: Performed by: INTERNAL MEDICINE

## 2019-07-26 PROCEDURE — 77030038269 HC DRN EXT URIN PURWCK BARD -A

## 2019-07-26 PROCEDURE — 99153 MOD SED SAME PHYS/QHP EA: CPT | Performed by: INTERNAL MEDICINE

## 2019-07-26 PROCEDURE — 77030018547 HC SUT ETHBND1 J&J -B: Performed by: INTERNAL MEDICINE

## 2019-07-26 PROCEDURE — 71046 X-RAY EXAM CHEST 2 VIEWS: CPT

## 2019-07-26 PROCEDURE — 74011250637 HC RX REV CODE- 250/637: Performed by: INTERNAL MEDICINE

## 2019-07-26 PROCEDURE — 65660000000 HC RM CCU STEPDOWN

## 2019-07-26 PROCEDURE — 77030012935 HC DRSG AQUACEL BMS -B: Performed by: INTERNAL MEDICINE

## 2019-07-26 PROCEDURE — 33208 INSRT HEART PM ATRIAL & VENT: CPT | Performed by: INTERNAL MEDICINE

## 2019-07-26 PROCEDURE — 74011000272 HC RX REV CODE- 272: Performed by: INTERNAL MEDICINE

## 2019-07-26 PROCEDURE — 77030037713 HC CLOSR DEV INCIS ZIP STRY -B: Performed by: INTERNAL MEDICINE

## 2019-07-26 PROCEDURE — 74011250637 HC RX REV CODE- 250/637: Performed by: NURSE PRACTITIONER

## 2019-07-26 PROCEDURE — 65270000029 HC RM PRIVATE

## 2019-07-26 PROCEDURE — 74011250636 HC RX REV CODE- 250/636

## 2019-07-26 PROCEDURE — 02H63JZ INSERTION OF PACEMAKER LEAD INTO RIGHT ATRIUM, PERCUTANEOUS APPROACH: ICD-10-PCS | Performed by: INTERNAL MEDICINE

## 2019-07-26 PROCEDURE — 99152 MOD SED SAME PHYS/QHP 5/>YRS: CPT | Performed by: INTERNAL MEDICINE

## 2019-07-26 PROCEDURE — A4565 SLINGS: HCPCS | Performed by: INTERNAL MEDICINE

## 2019-07-26 PROCEDURE — 77030031139 HC SUT VCRL2 J&J -A: Performed by: INTERNAL MEDICINE

## 2019-07-26 PROCEDURE — 77030018729 HC ELECTRD DEFIB PAD CARD -B: Performed by: INTERNAL MEDICINE

## 2019-07-26 PROCEDURE — C1898 LEAD, PMKR, OTHER THAN TRANS: HCPCS | Performed by: INTERNAL MEDICINE

## 2019-07-26 PROCEDURE — 0JH606Z INSERTION OF PACEMAKER, DUAL CHAMBER INTO CHEST SUBCUTANEOUS TISSUE AND FASCIA, OPEN APPROACH: ICD-10-PCS | Performed by: INTERNAL MEDICINE

## 2019-07-26 PROCEDURE — 77030020186 HC BOOT HL PROTCT SAGE -B

## 2019-07-26 DEVICE — LEAD PCMKR CAPSUR FIX NOVUS 52 --: Type: IMPLANTABLE DEVICE | Status: FUNCTIONAL

## 2019-07-26 DEVICE — IPG W3DR01 AZURE S DR MRI WL USA
Type: IMPLANTABLE DEVICE | Status: FUNCTIONAL
Brand: AZURE™ S DR MRI SURESCAN™

## 2019-07-26 DEVICE — LEAD 5076-58 MRI US RCMCRD
Type: IMPLANTABLE DEVICE | Status: FUNCTIONAL
Brand: CAPSUREFIX NOVUS MRI™ SURESCAN®

## 2019-07-26 RX ORDER — CEFAZOLIN SODIUM 1 G/3ML
INJECTION, POWDER, FOR SOLUTION INTRAMUSCULAR; INTRAVENOUS AS NEEDED
Status: DISCONTINUED | OUTPATIENT
Start: 2019-07-26 | End: 2019-07-26 | Stop reason: HOSPADM

## 2019-07-26 RX ORDER — HYDROCODONE BITARTRATE AND ACETAMINOPHEN 5; 325 MG/1; MG/1
1 TABLET ORAL
Status: DISCONTINUED | OUTPATIENT
Start: 2019-07-26 | End: 2019-07-31 | Stop reason: HOSPADM

## 2019-07-26 RX ORDER — MIDAZOLAM HYDROCHLORIDE 1 MG/ML
INJECTION, SOLUTION INTRAMUSCULAR; INTRAVENOUS AS NEEDED
Status: DISCONTINUED | OUTPATIENT
Start: 2019-07-26 | End: 2019-07-26 | Stop reason: HOSPADM

## 2019-07-26 RX ORDER — CEPHALEXIN 250 MG/1
500 CAPSULE ORAL 3 TIMES DAILY
Status: DISCONTINUED | OUTPATIENT
Start: 2019-07-27 | End: 2019-07-27 | Stop reason: SDUPTHER

## 2019-07-26 RX ORDER — GENTAMICIN SULFATE 80 MG/100ML
80 INJECTION, SOLUTION INTRAVENOUS ONCE
Status: COMPLETED | OUTPATIENT
Start: 2019-07-26 | End: 2019-07-26

## 2019-07-26 RX ORDER — LIDOCAINE HYDROCHLORIDE AND EPINEPHRINE 10; 10 MG/ML; UG/ML
INJECTION, SOLUTION INFILTRATION; PERINEURAL AS NEEDED
Status: DISCONTINUED | OUTPATIENT
Start: 2019-07-26 | End: 2019-07-26 | Stop reason: HOSPADM

## 2019-07-26 RX ORDER — SODIUM CHLORIDE 0.9 % (FLUSH) 0.9 %
5-40 SYRINGE (ML) INJECTION EVERY 8 HOURS
Status: DISCONTINUED | OUTPATIENT
Start: 2019-07-26 | End: 2019-07-31 | Stop reason: HOSPADM

## 2019-07-26 RX ORDER — DIPHENHYDRAMINE HYDROCHLORIDE 50 MG/ML
INJECTION, SOLUTION INTRAMUSCULAR; INTRAVENOUS AS NEEDED
Status: DISCONTINUED | OUTPATIENT
Start: 2019-07-26 | End: 2019-07-26 | Stop reason: HOSPADM

## 2019-07-26 RX ORDER — BUPIVACAINE HYDROCHLORIDE 5 MG/ML
INJECTION, SOLUTION EPIDURAL; INTRACAUDAL AS NEEDED
Status: DISCONTINUED | OUTPATIENT
Start: 2019-07-26 | End: 2019-07-26 | Stop reason: HOSPADM

## 2019-07-26 RX ORDER — CEFAZOLIN SODIUM/WATER 2 G/20 ML
1 SYRINGE (ML) INTRAVENOUS EVERY 8 HOURS
Status: COMPLETED | OUTPATIENT
Start: 2019-07-26 | End: 2019-07-27

## 2019-07-26 RX ORDER — FENTANYL CITRATE 50 UG/ML
INJECTION, SOLUTION INTRAMUSCULAR; INTRAVENOUS AS NEEDED
Status: DISCONTINUED | OUTPATIENT
Start: 2019-07-26 | End: 2019-07-26 | Stop reason: HOSPADM

## 2019-07-26 RX ORDER — SODIUM CHLORIDE 0.9 % (FLUSH) 0.9 %
5-40 SYRINGE (ML) INJECTION AS NEEDED
Status: DISCONTINUED | OUTPATIENT
Start: 2019-07-26 | End: 2019-07-31 | Stop reason: HOSPADM

## 2019-07-26 RX ORDER — ACETAMINOPHEN 325 MG/1
650 TABLET ORAL
Status: DISCONTINUED | OUTPATIENT
Start: 2019-07-26 | End: 2019-07-31 | Stop reason: HOSPADM

## 2019-07-26 RX ORDER — HEPARIN SODIUM 200 [USP'U]/100ML
INJECTION, SOLUTION INTRAVENOUS
Status: COMPLETED | OUTPATIENT
Start: 2019-07-26 | End: 2019-07-26

## 2019-07-26 RX ORDER — ONDANSETRON 2 MG/ML
4 INJECTION INTRAMUSCULAR; INTRAVENOUS
Status: DISCONTINUED | OUTPATIENT
Start: 2019-07-26 | End: 2019-07-31 | Stop reason: HOSPADM

## 2019-07-26 RX ADMIN — DEXAMETHASONE 2 MG: 4 TABLET ORAL at 09:56

## 2019-07-26 RX ADMIN — Medication 10 ML: at 06:23

## 2019-07-26 RX ADMIN — ALPRAZOLAM 0.25 MG: 0.25 TABLET ORAL at 09:55

## 2019-07-26 RX ADMIN — ACETAMINOPHEN 1000 MG: 500 TABLET ORAL at 06:23

## 2019-07-26 RX ADMIN — OXYCODONE HYDROCHLORIDE 5 MG: 5 TABLET ORAL at 09:56

## 2019-07-26 RX ADMIN — SENNOSIDES,DOCUSATE SODIUM 1 TABLET: 8.6; 5 TABLET, FILM COATED ORAL at 09:56

## 2019-07-26 RX ADMIN — FOLIC ACID 1 MG: 1 TABLET ORAL at 09:56

## 2019-07-26 RX ADMIN — Medication 10 ML: at 20:59

## 2019-07-26 RX ADMIN — ACETAMINOPHEN 1000 MG: 500 TABLET ORAL at 16:08

## 2019-07-26 RX ADMIN — OXYCODONE HYDROCHLORIDE 10 MG: 10 TABLET, FILM COATED, EXTENDED RELEASE ORAL at 20:58

## 2019-07-26 RX ADMIN — HYDROCODONE BITARTRATE AND ACETAMINOPHEN 1 TABLET: 5; 325 TABLET ORAL at 18:46

## 2019-07-26 RX ADMIN — ESCITALOPRAM OXALATE 10 MG: 10 TABLET ORAL at 09:56

## 2019-07-26 RX ADMIN — Medication 10 ML: at 15:13

## 2019-07-26 RX ADMIN — Medication 1 G: at 20:58

## 2019-07-26 NOTE — PROGRESS NOTES
Bedside and Verbal shift change report given to Fred Brooks RN (oncoming nurse) by Matilda Bellamy RN (offgoing nurse). Report included the following information SBAR, Kardex, MAR, Accordion and Recent Results.

## 2019-07-26 NOTE — TELEPHONE ENCOUNTER
Admitted in July had pacemaker put in. Nic Baez with [facility] called and left voicemail,    Wanted to know if they can send orders to Dr. Jaycee Best to follow patient for home health orders. Left message asking for nurse to call back to advise.

## 2019-07-26 NOTE — CONSULTS
HISTORY OF PRESENTING ILLNESS      Sameer Reeder is a 80 y.o. female with with irreversible bradycardia despite drug washout, syncope of unclear etiology, hypertension, COPD, lower extremity hematoma. Electrophysiology is consulted due to bradycardia.         ACTIVE PROBLEM LIST     Patient Active Problem List    Diagnosis Date Noted    Acute right hip pain     Chronic pain syndrome     Physical debility     Goals of care, counseling/discussion     Hematoma 07/20/2019    Leukocytosis 07/20/2019    Fall 07/20/2019    Abdominal aortic aneurysm (AAA) without rupture ([facility] [address] 12/04/2018    Anemia 12/04/2018    Malignant neoplasm of upper lobe of left lung ([facility] [address] 11/27/2018    Tibial plateau fracture 54/91/8762    Tobacco abuse 08/25/2018    Hypertension 08/25/2018    TIA (transient ischemic attack) 09/30/2016    Slurred speech 09/30/2016    Paroxysmal A-fib ([facility] [address] 09/30/2016    RA (rheumatoid arthritis) ([facility] 09/30/2016    COPD (chronic obstructive pulmonary disease) (HCC) 09/30/2016           MEDICATIONS     Current Facility-Administered Medications   Medication Dose Route Frequency    albuterol 5mg / ipratropium 0.5mg neb solution  1 Dose Nebulization Q4H PRN    0.9% sodium chloride infusion 250 mL  250 mL IntraVENous PRN    folic acid (FOLVITE) tablet 1 mg  1 mg Oral DAILY    oxyCODONE IR (ROXICODONE) tablet 10 mg  10 mg Oral Q3H PRN    acetaminophen (TYLENOL) tablet 1,000 mg  1,000 mg Oral Q8H    senna-docusate (PERICOLACE) 8.6-50 mg per tablet 1 Tab  1 Tab Oral DAILY    naloxone (NARCAN) injection 0.4 mg  0.4 mg IntraVENous EVERY 2 MINUTES AS NEEDED    sodium chloride (NS) flush 5-40 mL  5-40 mL IntraVENous Q8H    sodium chloride (NS) flush 5-40 mL  5-40 mL IntraVENous PRN    ALPRAZolam (XANAX) tablet 0.25 mg  0.25 mg Oral BID PRN    dexAMETHasone (DECADRON) tablet 2 mg  2 mg Oral DAILY AFTER BREAKFAST    escitalopram oxalate (LEXAPRO) tablet 10 mg  10 mg Oral DAILY    [START ON 2019] methotrexate (RHEUMATREX) tablet 20 mg  20 mg Oral every Saturday    oxyCODONE IR (ROXICODONE) tablet 5 mg  5 mg Oral Q3H PRN    oxyCODONE ER (OxyCONTIN) tablet 10 mg  10 mg Oral DAILY           PAST MEDICAL HISTORY     Past Medical History:   Diagnosis Date    AAA (abdominal aortic aneurysm) (HCC)     Arthritis     ra, osteoporosis, osteoarthritis    Atrial fibrillation (HCC)     COPD     HTN (hypertension)     Lung cancer (HCC)     RA [facility]oid [facility]) [facility]     Smoker     TIA (transient ischemic attack)     TIA 16           PAST SURGICAL HISTORY     Past Surgical History:   Procedure Laterality Date    HX CHOLECYSTECTOMY      HX HYSTERECTOMY      HX ORTHOPAEDIC      carpal tunnel, wrist surgery          ALLERGIES     Allergies   Allergen Reactions    Codeine Nausea and Vomiting          FAMILY HISTORY     Family History   Problem Relation Age of Onset    Hypertension Father     negative for cardiac disease       SOCIAL HISTORY     Social History     Socioeconomic History    Marital status: SINGLE     Spouse name: Not on file    Number of children: Not on file    Years of education: Not on file    Highest education level: Not on file   Tobacco Use    Smoking status: Former Smoker     Years: 50.00     Last attempt to quit: 2018     Years since quittin.9    Smokeless tobacco: Never Used   Substance and Sexual Activity    Alcohol use: No    Drug use: No    Sexual activity: Not Currently         MEDICATIONS     Current Facility-Administered Medications   Medication Dose Route Frequency    albuterol 5mg / ipratropium 0.5mg neb solution  1 Dose Nebulization Q4H PRN    0.9% sodium chloride infusion 250 mL  250 mL IntraVENous PRN    folic acid (FOLVITE) tablet 1 mg  1 mg Oral DAILY    oxyCODONE IR (ROXICODONE) tablet 10 mg  10 mg Oral Q3H PRN    acetaminophen (TYLENOL) tablet 1,000 mg  1,000 mg Oral Q8H    senna-docusate (PERICOLACE) 8.6-50 mg per tablet 1 Tab 1 Tab Oral DAILY    naloxone (NARCAN) injection 0.4 mg  0.4 mg IntraVENous EVERY 2 MINUTES AS NEEDED    sodium chloride (NS) flush 5-40 mL  5-40 mL IntraVENous Q8H    sodium chloride (NS) flush 5-40 mL  5-40 mL IntraVENous PRN    ALPRAZolam (XANAX) tablet 0.25 mg  0.25 mg Oral BID PRN    dexAMETHasone (DECADRON) tablet 2 mg  2 mg Oral DAILY AFTER BREAKFAST    escitalopram oxalate (LEXAPRO) tablet 10 mg  10 mg Oral DAILY    [START ON 7/27/2019] methotrexate (RHEUMATREX) tablet 20 mg  20 mg Oral every Saturday    oxyCODONE IR (ROXICODONE) tablet 5 mg  5 mg Oral Q3H PRN    oxyCODONE ER (OxyCONTIN) tablet 10 mg  10 mg Oral DAILY       I have reviewed the nurses notes, vitals, problem list, allergy list, medical history, family, social history and medications. REVIEW OF SYMPTOMS      General: Pt denies excessive weight gain or loss. Pt is able to conduct ADL's  HEENT: Denies blurred vision, headaches, hearing loss, epistaxis and difficulty swallowing. Respiratory: Denies cough, congestion, shortness of breath, HERMAN, wheezing or stridor. Cardiovascular: Denies precordial pain, palpitations, edema or PND  Gastrointestinal: Denies poor appetite, indigestion, abdominal pain or blood in stool  Genitourinary: Denies hematuria, dysuria, increased urinary frequency  Musculoskeletal: Denies joint pain or swelling from muscles or joints  Neurologic: Denies tremor, paresthesias, headache, or sensory motor disturbance  Psychiatric: Denies confusion, insomnia, depression  Integumentray: Denies rash, itching or ulcers. Hematologic: Denies easy bruising, bleeding       PHYSICAL EXAMINATION      Vitals:    07/26/19 0406 07/26/19 0700 07/26/19 0800 07/26/19 0900   BP: 127/59  137/44    Pulse: (!) 57 (!) 50 (!) 49 (!) 45   Resp: 18  15 12   Temp: 97.9 °F (36.6 °C)  97.7 °F (36.5 °C)    SpO2: 96%  93% 95%   Weight:       Height:         General: Well developed, in no acute distress.   HEENT: No jaundice, oral mucosa moist, no oral ulcers  Neck: Supple, no stiffness, no lymphadenopathy, supple  Heart:  Normal S1/S2 negative S3 or S4. Regular, no murmur, gallop or rub, no jugular venous distention  Respiratory: Clear bilaterally x 4, no wheezing or rales  Abdomen:   Soft, non-tender, bowel sounds are active.   Extremities:  No edema, normal cap refill, no cyanosis. Musculoskeletal: No clubbing, no deformities  Neuro: A&Ox3, speech clear, gait stable, cooperative, no focal neurologic deficits  Skin: Skin color is normal. No rashes or lesions. Non diaphoretic, moist.  Vascular: 2+ pulses symmetric in all extremities       DIAGNOSTIC DATA      TELEMETRY: sinus bradycarda       LABORATORY DATA      Lab Results   Component Value Date/Time    WBC 12.4 (H) 07/25/2019 01:05 AM    HGB 7.6 (L) 07/25/2019 01:05 AM    HCT 24.5 (L) 07/25/2019 01:05 AM    PLATELET 829 52/15/3489 01:05 AM    MCV 95.3 07/25/2019 01:05 AM      Lab Results   Component Value Date/Time    Sodium 141 07/24/2019 12:47 AM    Potassium 4.4 07/24/2019 12:47 AM    Chloride 107 07/24/2019 12:47 AM    CO2 30 07/24/2019 12:47 AM    Anion gap 4 (L) 07/24/2019 12:47 AM    Glucose 118 (H) 07/24/2019 12:47 AM    BUN 31 (H) 07/24/2019 12:47 AM    Creatinine 0.81 07/24/2019 12:47 AM    BUN/Creatinine ratio 38 (H) 07/24/2019 12:47 AM    GFR est AA >60 07/24/2019 12:47 AM    GFR est non-AA >60 07/24/2019 12:47 AM    Calcium 7.6 (L) 07/24/2019 12:47 AM    Bilirubin, total 0.4 07/20/2019 07:30 PM    AST (SGOT) 15 07/20/2019 07:30 PM    Alk. phosphatase 50 07/20/2019 07:30 PM    Protein, total 7.6 07/20/2019 07:30 PM    Albumin 3.4 (L) 07/20/2019 07:30 PM    Globulin 4.2 (H) 07/20/2019 07:30 PM    A-G Ratio 0.8 (L) 07/20/2019 07:30 PM    ALT (SGPT) 13 07/20/2019 07:30 PM           ASSESSMENT      1. Bradycardia  2. Syncope  3. Hematoma  4. COPD  5.  Hypertension         PLAN     Plan for dual chamber pacemaker        Thank you, Audelia Lee MD and Dr. Zain Fairbanks for involving me in the care of this extraordinarily pleasant female. Please do not hesitate to contact me for further questions/concerns.          Laura Griffin MD  Cardiac Electrophysiology / Cardiology    [address] [address], [address] [facility], [address]  [address]  [phone] / [phone] Fax   [phone] / [phone] Fax

## 2019-07-26 NOTE — PROGRESS NOTES
Nutrition Assessment:    RECOMMENDATIONS/INTERVENTION(S):   1. Continue heart healthy diet. 2. Recommend magic cup BID (580 kcal, 18 gm protein) to promote PO intake. 3. Encourage/ monitor PO intake. 4. Continue to monitor GI function, wt changes, labs. ASSESSMENT:   7/26: Pt seen for f/u. PPM today. Spoke with daughter in room, pt asleep. Daughter says pt is a 'fussy eater', has gotten more picky as she gets older. Meal intake ~50% per daughter, per EMR meal intake 20 - 75%. Pt ordered for Ensure clear BID, does not like. Will trial magic cup. Pt without n/v. No note of GI distress. LBM 7/23, on bowel regimen. Skin without PI. Labs - BUN 31 H, Ca 7.6 L. Meds - folic acid, senna-docusate. 7/22: Pt assessed for low BMI. Admitted for hematoma. PMH includes COPD, HTN, a. fib, lung CA (in remission), cholecystectomy. BMI 19.6, underweight for age. No recent wt loss recorded. Pt said she loved the food and ate well for lunch, but lunch tray was in the room and was only ~25% eaten. Discussed intake with RN, says pt is picky and eats <50% of her meals. Per pt, daughter is ordering meals for her. RN says pt loves juice and may benefit from a fortified juice. Will add Ensure Clear BID. No n/v/d or c/s issues. Labs- K 5.5, Ca 7.4, Glu 106. Meds- decadron, OxyContin. Diet Order: Cardiac  % Eaten:    Patient Vitals for the past 72 hrs:   % Diet Eaten   07/25/19 1428 75 %   07/25/19 1110 75 %       Pertinent Medications: [x] Reviewed    Labs: [x] Reviewed    Anthropometrics: Height: 5' 7\" (170.2 cm) Weight: 57 kg (125 lb 10.6 oz)    IBW (%IBW):   ( ) UBW (%UBW):   (  %)      BMI: Body mass index is 19.68 kg/m². This BMI is indicative of:   [x] Underweight    [] Normal    [] Overweight    []  Obesity    []  Extreme Obesity (BMI>40)  Estimated Nutrition Needs (Based on): 7038 Kcals/day(1033 x 1.3 AF (+250)) , 57 g(0.8-1 g/kg) Protein  Carbohydrate:  At Least 130 g/day  Fluids: 1594 mL/day (1 ml/kcal)    Last BM: 7/23   [x]Active     []Hyperactive  []Hypoactive       [] Absent   BS  Skin:    [x] Intact   [] Incision  [] Breakdown   [] DTI   [] Tears/Excoriation/Abrasion  [x]Edema: trace LLE; trace RLE [] Other: Wt Readings from Last 30 Encounters:   07/25/19 57 kg (125 lb 10.6 oz)   05/29/19 56.2 kg (123 lb 12.8 oz)   02/28/19 50.7 kg (111 lb 12.8 oz)   02/11/19 52.6 kg (116 lb)   02/05/19 54.6 kg (120 lb 6.4 oz)   12/04/18 52.6 kg (116 lb)   12/04/18 54.9 kg (121 lb)   11/24/18 52.6 kg (116 lb)   11/28/18 52.6 kg (115 lb 15.4 oz)   08/25/18 61.2 kg (135 lb)   08/27/18 61.2 kg (135 lb)   10/04/16 64 kg (141 lb)   10/01/16 65.8 kg (145 lb)   09/13/13 67.4 kg (148 lb 8 oz)      NUTRITION DIAGNOSES:   Problem:  Underweight     Etiology: related to hx of inadequate energy intake     Signs/Symptoms: as evidenced by BMI 19.6 (underweight for age)       7/26: Nutrition Dx continues.      NUTRITION INTERVENTIONS:  Meals/Snacks: General/healthful diet   Supplements: Commercial supplement              GOAL:   Pt will consume >50% meals + ONS with 0.5 lb wt gain/ week within 3-5 days    Cultural, Caodaism, or Ethnic Dietary Needs: None     EDUCATION & DISCHARGE NEEDS:    [x] None Identified   [] Identified and Education Provided/Documented   [] Identified and Pt declined/was not appropriate      [x] Interdisciplinary Care Plan Reviewed/Documented    [x] Discharge Needs: Continue heart healthy diet   [] No Nutrition Related Discharge Needs    NUTRITION RISK:   Pt Is At Nutrition Risk  [x]     No Nutrition Risk Identified  []       PT SEEN FOR:    []  MD Consult: []Calorie Count      []Diabetic Diet Education        []Diet Education     []Electrolyte Management     []General Nutrition Management and Supplements     []Management of Tube Feeding     []TPN Recommendations    []  RN Referral:  []MST score >=2     []Enteral/Parenteral Nutrition PTA     []Pregnant: Gestational DM or Multigestation                 [] Pressure Ulcer    []  Low BMI      []  Length of Stay       [] Dysphagia Diet         [] Ventilator  [x]  Follow-up     Previous Recommendations:   [x] Implemented          [] Not Implemented          [] Not Applicable    Previous Goal:   [] Met              [x] Progressing Towards Goal              [] Not Progressing Towards Goal   [] Not Applicable            Elayne Warren, RDN  Phone [phone]

## 2019-07-26 NOTE — PROGRESS NOTES
Went twice to see the patient  On both occasions, patient was off the floor  No specific surgical issues documented in the notes  Will see her as needed.

## 2019-07-26 NOTE — PROGRESS NOTES
I met with pt and her daughter,Rosemarie(cell [phone]). Pt lives with her son. Son works M-F until 6 pm.  [facility]. I discussed with daughter that pt will not be able to use her walker post pacemaker placement until she is fully healed. Daughter's [facility] choice for SNF is [facility]. Clinicals faxed through [employer] with choice letter signed. A back-up plan is for daughter to hire assistants from [employer] An agency the outpatient Palliative physician gave her . The aides would assist pt during the day while son is @ work. Then,pt would have [facility] for SN,PT,OT and HHA. Referral with order sent to [facility] through the cc link. Daughter is hopeful for SNF until pt. Heals completely from pacemaker placement. Choice letter placed in chart to be scanned. Case Management will continue to follow pt for discharge needs.     Birdie العراقي

## 2019-07-26 NOTE — PROCEDURES
Cardiac Electrophysiology Report      PATIENT INFORMATION     Patient Name: Queen Florina MRN: [id number]    Study Date: 2019    YOB: 1931  Age: 80 y.o. Gender: female      Procedure:  Cynthia Renate    Referring Physician:  Blanca Miller MD and Dr. Navid Castaneda     Duty Name   Electrophysiologist Mono Collado MD   Monitor Wanda Suh RN; Karlos Hdz RN   Circulator Dragan Dee RN       PATIENT HISTORY     Queen Florina is a 80 y.o. female with with irreversible bradycardia despite drug washout, syncope of unclear etiology, hypertension, COPD, lower extremity hematoma. Electrophysiology is consulted due to bradycardia. PROCEDURE     The patient was brought to the Cardiac Electrophysiology laboratory in a post-absorptive, fasting state. Informed consent was obtained. A peripheral IV was in place. Continuous electrocardiographic, blood pressure, O2 saturation and  CO2 monitoring was initiated. Intravenous antibiotics were administered pre-operatively. Self-adhesive cardioversion patches were positioned on the chest.  Conscious sedation was effectuated according to protocol. The patient was then prepped and draped in the usual sterile fashion. A left subclavian venogram was performed and demonstrated patency of the vein. A 50/50 mixture of lidocaine (1%) with epinephrine and bupivicaine (0.5%) was utilized for local anesthesia. An incision was performed medial to the left delto-pectoral groove. Sharp and blunt dissection was carried down to the level of the pre-pectoralis fascia. Hemostasis was maintained with electrocautery. Extra-thoracic, subclavian venous access was obtained twice and sheaths were placed using the modified Seldinger technique. Permanent pace/sense leads were advanced under fluoroscopic guidance and positioned in the right atrium and ventricle where stable pacing and sensing characteristics were encountered. The sheaths were peeled away and the leads were anchored to the pre-pectoralis fascia using O-ethibond non-absorbable suture material. A device pocket was then fashioned and flushed copiously with antibiotic solution. A pacemaker generator was connected to the leads and the generator was placed into the pocket. The device was secured to the pocket using O-ethibond, non-absorbable suture material. The pocket was then closed in three layers using 2-O vicryl, 3-O monocryl absorbable suture material and a zipline. The skin was closed using a sub-cuticular technique. A bio-occlusive dressing were applied to the skin. The patient remained hemodynamically stable, tolerated the procedure well and was transferred in stable condition. There were no immediate complications encountered during the procedure. There was minimal blood loss and no specimen were removed. LEAD & GENERATOR DATA       Model # Serial #   Generator Medtronic [id number]   Atrial Lead Medtronic 1773 [id number]   Ventricular Lead Medtronic 7709 [id number]       PACE/SENSE DATA      Sensed Wave (mV) Threshold (V) Impedance (Ohms)   Atrium 2.75    Ventricle 9.0 1.6 703       FINAL PROGRAMMING     Mode Lower Rate (ppm) Upper Rate (ppm)   AAIR-DDDR 60 130       MEDICATION SUMMARY     Medication Route Unit Total   Gentamycin IV mg 80   Ancef IV grams 2         CONCLUSIONS     1. Successful implantation of a dual-chamber pacemaker. 2. IV antibiotics x 24 hours for 3 doses followed by Keflex 500 mg po tid x 5 days. 3. Monitor overnight on telemetry. 4. CXR (PA & lateral) and device interrogation in AM.  5. Possible discharge in AM.  6. Wound check in EP clinic in 10 days. 7. Follow up in EP clinic in 1 month or earlier if necessary. 8. Follow up with  Raina Card MD and Dr. Roosevelt Perez as scheduled.         Thank you, Raina Card MD and Dr. Kenyon Hart for involving me in the care of this extraordinarily pleasant female.        Fabian King MD  Cardiac Electrophysiology / Cardiology    [facility]  [address] [facility], [address], [address]  [place]         David Narayanan  [phone] / [phone] Fax      [phone] / [phone] Fax

## 2019-07-26 NOTE — PROGRESS NOTES
TRANSFER - IN REPORT:    Verbal report received from Amanda, [facility] (name) on Munir Doctor  being received from ICU (unit) for routine progression of care      Report consisted of patients Situation, Background, Assessment and   Recommendations(SBAR). Information from the following report(s) SBAR, ED Summary, Intake/Output, MAR and Accordion was reviewed with the receiving nurse. Opportunity for questions and clarification was provided. Assessment completed upon patients arrival to unit and care assumed.

## 2019-07-26 NOTE — PROGRESS NOTES
Problem: Falls - Risk of  Goal: *Absence of Falls  Description  Document Gerardo Rossi Fall Risk and appropriate interventions in the flowsheet. Outcome: Progressing Towards Goal  Note:   Fall Risk Interventions:  Mobility Interventions: Bed/chair exit alarm, Patient to call before getting OOB    Mentation Interventions: Bed/chair exit alarm    Medication Interventions: Bed/chair exit alarm, Patient to call before getting OOB, Teach patient to arise slowly    Elimination Interventions: Bed/chair exit alarm, Call light in reach    History of Falls Interventions: Bed/chair exit alarm         Problem: Patient Education: Go to Patient Education Activity  Goal: Patient/Family Education  Outcome: Progressing Towards Goal     Problem: Pressure Injury - Risk of  Goal: *Prevention of pressure injury  Description  Document Rom Scale and appropriate interventions in the flowsheet. Outcome: Progressing Towards Goal  Note:   Pressure Injury Interventions:  Sensory Interventions: Assess changes in LOC, Assess need for specialty bed, Float heels, Keep linens dry and wrinkle-free, Minimize linen layers, Pressure redistribution bed/mattress (bed type), Turn and reposition approx. every two hours (pillows and wedges if needed)    Moisture Interventions: Absorbent underpads, Apply protective barrier, creams and emollients, Check for incontinence Q2 hours and as needed, Internal/External urinary devices, Limit adult briefs, Maintain skin hydration (lotion/cream), Minimize layers    Activity Interventions: Increase time out of bed, Pressure redistribution bed/mattress(bed type), PT/OT evaluation    Mobility Interventions: HOB 30 degrees or less, Pressure redistribution bed/mattress (bed type), PT/OT evaluation, Turn and reposition approx.  every two hours(pillow and wedges)    Nutrition Interventions: Document food/fluid/supplement intake    Friction and Shear Interventions: Apply protective barrier, creams and emollients, Feet elevated on foot rest, HOB 30 degrees or less, Lift sheet, Lift team/patient mobility team, Minimize layers                Problem: Patient Education: Go to Patient Education Activity  Goal: Patient/Family Education  Outcome: Progressing Towards Goal     Problem: Patient Education: Go to Patient Education Activity  Goal: Patient/Family Education  Outcome: Progressing Towards Goal     Problem: Pain  Goal: *Control of Pain  Outcome: Progressing Towards Goal     Problem: Patient Education: Go to Patient Education Activity  Goal: Patient/Family Education  Outcome: Progressing Towards Goal     Problem: Syncope  Goal: *Absence of injury  Outcome: Progressing Towards Goal  Goal: Decrease or eliminate episodes of syncope  Outcome: Progressing Towards Goal     Problem: Patient Education: Go to Patient Education Activity  Goal: Patient/Family Education  Outcome: Progressing Towards Goal     Problem: Arrhythmia Pathway (Adult)  Goal: *Absence of arrhythmia  Outcome: Progressing Towards Goal  Note:   PPM placed today     Problem: Patient Education: Go to Patient Education Activity  Goal: Patient/Family Education  Outcome: Progressing Towards Goal

## 2019-07-26 NOTE — PROGRESS NOTES
0730 - Bedside and Verbal shift change report given to Randi Haskins RN (oncoming nurse) by Satya Romero RN (offgoing nurse). Report included the following information SBAR, Kardex, Intake/Output, MAR, Accordion, Recent Results, Med Rec Status and Cardiac Rhythm SB 40-50s, dips into 30s, MD aware. 1130 - Pt JUDAH for PPM.    1250 - Per EP lab pt's procedure is almost complete and she will be returning to her room shortly. Dual chamber PPM placed and current rhythm is A Fib.    1400 - Attempted to call report to 5th floor, RN to call back. 1410 - TRANSFER - OUT REPORT:    Verbal report given to [facility] RN (name) on [facility]  being transferred to 5th floor (unit) for routine progression of care       Report consisted of patients Situation, Background, Assessment and   Recommendations(SBAR). Information from the following report(s) SBAR, Kardex, Intake/Output, MAR, Accordion, Recent Results, Med Rec Status and Cardiac Rhythm A Fib s/p dual chamber PPM placement was reviewed with the receiving nurse. Lines:   Peripheral IV 07/21/19 Left Arm (Active)   Site Assessment Clean, dry, & intact 7/26/2019  3:54 AM   Phlebitis Assessment 0 7/26/2019  3:54 AM   Infiltration Assessment 0 7/26/2019  3:54 AM   Dressing Status Clean, dry, & intact 7/26/2019  3:54 AM   Dressing Type Tape;Transparent 7/26/2019  3:54 AM   Hub Color/Line Status Pink;Flushed;Patent 7/26/2019  3:54 AM   Action Taken Open ports on tubing capped 7/26/2019  3:54 AM   Alcohol Cap Used Yes 7/26/2019  3:54 AM        Opportunity for questions and clarification was provided.       Patient transported with:   Monitor

## 2019-07-26 NOTE — TELEPHONE ENCOUNTER
Returned call to Escobar Moreira with [facility]. She wants Dr Sherine Ray to know the patient will benefit from skilled OT PT services once discharged. She would like to know if Dr Sherine Ray would be alright with the plan of care orders like previous. If there are any questions she will be available on Monday. Message sent to Dr Sherine Ray to make aware.

## 2019-07-26 NOTE — PROGRESS NOTES
Bedside and Verbal shift change report given to [facility], RN (oncoming nurse) by Drake Howe RN (offgoing nurse). Report included the following information SBAR, Kardex, MAR, Accordion and Recent Results.

## 2019-07-26 NOTE — PROGRESS NOTES
[facility]chever[facility] [address]  [address] [facility], [address] [place] [address]  [phone]      Medical Progress Note      NAME: Ankita Winters   :  1931  MRM:  [id number]    Date/Time: 2019          Subjective:     Chief Complaint:  F/u fall  For PPM today as remains sarah low 30 overnight. Objective:       Vitals:          Last 24hrs VS reviewed since prior progress note. Most recent are:    Visit Vitals  /44 (BP 1 Location: Right arm, BP Patient Position: At rest)   Pulse (!) 45   Temp 97.7 °F (36.5 °C)   Resp 12   Ht 5' 7\" (1.702 m)   Wt 57 kg (125 lb 10.6 oz)   SpO2 95%   Breastfeeding?  No   BMI 19.68 kg/m²     SpO2 Readings from Last 6 Encounters:   19 95%   19 95%   19 96%   19 93%   19 97%   19 97%            Intake/Output Summary (Last 24 hours) at 2019 1250  Last data filed at 2019 6200  Gross per 24 hour   Intake 750 ml   Output 1500 ml   Net -750 ml          Exam:     Physical Exam:    Gen:  Chr ill looking, in no acute distress  HEENT:   moist mucous membranes  Resp:  No accessory muscle use,    Card:  no peripheral edema  Abd:   non-distended,    Musc:  No cyanosis or clubbing  Skin:  No rashes or ulcers,    Neuro:   follows commands appropriately  Psych:   Normal affect       Medications Reviewed: (see below)    Lab Data Reviewed: (see below)    ______________________________________________________________________    Medications:     Current Facility-Administered Medications   Medication Dose Route Frequency    sodium chloride (NS) flush 5-40 mL  5-40 mL IntraVENous Q8H    sodium chloride (NS) flush 5-40 mL  5-40 mL IntraVENous PRN    acetaminophen (TYLENOL) tablet 650 mg  650 mg Oral Q4H PRN    HYDROcodone-acetaminophen (NORCO) 5-325 mg per tablet 1 Tab  1 Tab Oral Q4H PRN    ondansetron (ZOFRAN) injection 4 mg  4 mg IntraVENous Q4H PRN    ceFAZolin (ANCEF) 2 g/20 mL in sterile water IV syringe  1 g IntraVENous Q8H    ceFAZolin (ANCEF) injection    PRN    diphenhydrAMINE (BENADRYL) injection    PRN    midazolam (VERSED) injection    PRN    fentaNYL citrate (PF) injection    PRN    lidocaine-EPINEPHrine (XYLOCAINE) 1 %-1:100,000 injection    PRN    bupivacaine (PF) (MARCAINE) 0.5 % (5 mg/mL) injection    PRN    iopamidol (ISOVUE-370) 76 % injection    PRN    heparinized saline 2 units/mL infusion    CONTINUOUS    bacitracin 50,000 Units in sodium chloride irrigation 0.9 % 500 mL irrigation solution    PRN    albuterol 5mg / ipratropium 0.5mg neb solution  1 Dose Nebulization Q4H PRN    0.9% sodium chloride infusion 250 mL  250 mL IntraVENous PRN    folic acid (FOLVITE) tablet 1 mg  1 mg Oral DAILY    oxyCODONE IR (ROXICODONE) tablet 10 mg  10 mg Oral Q3H PRN    acetaminophen (TYLENOL) tablet 1,000 mg  1,000 mg Oral Q8H    senna-docusate (PERICOLACE) 8.6-50 mg per tablet 1 Tab  1 Tab Oral DAILY    naloxone (NARCAN) injection 0.4 mg  0.4 mg IntraVENous EVERY 2 MINUTES AS NEEDED    sodium chloride (NS) flush 5-40 mL  5-40 mL IntraVENous Q8H    sodium chloride (NS) flush 5-40 mL  5-40 mL IntraVENous PRN    ALPRAZolam (XANAX) tablet 0.25 mg  0.25 mg Oral BID PRN    dexAMETHasone (DECADRON) tablet 2 mg  2 mg Oral DAILY AFTER BREAKFAST    escitalopram oxalate (LEXAPRO) tablet 10 mg  10 mg Oral DAILY    [START ON 7/27/2019] methotrexate (RHEUMATREX) tablet 20 mg  20 mg Oral every Saturday    oxyCODONE IR (ROXICODONE) tablet 5 mg  5 mg Oral Q3H PRN    oxyCODONE ER (OxyCONTIN) tablet 10 mg  10 mg Oral DAILY            Lab Review:     Recent Labs     07/25/19  0105   WBC 12.4*   HGB 7.6*   HCT 24.5*        Recent Labs     07/24/19  0047      K 4.4      CO2 30   *   BUN 31*   CREA 0.81   CA 7.6*     No components found for: [place]         Assessment / Plan:   81yo F admitted with following issues    Hematoma on RT gluteal area / acute blood loss anemia:   Hgb dropped 4 grams.    S/p PRBC monitor hgb  Stable >7   - general surgery recomend conservative care  Stable now     Paroxysmal A-fib now consistently bradycardic with low as 30   off digoxin last few days  Cards on case  Per EP for PPM today 7/26/19   Off eliquis as hematoma and fall  TSH ok      COPD (chronic obstructive pulmonary disease) ([facility] [address] (9/30/2016). Stable. Not wheezing.   - Continue nebs PRN       Hypertension (8/25/2018). - Continue home meds       Malignant neoplasm of upper lobe of left lung ([facility] [address] (11/27/2018). S/p RT.   - Follow up with Dr Rodriguez       Abdominal aortic aneurysm (AAA) without rupture ([facility] [address] (12/4/2018). - outpatient FU       Leukocytosis (7/20/2019). Doubt infection. Likely due to stress.    - monitor periodically wbc                       DNR    DVT Prophylaxis:  SCD's     Disposition:   PT, OT, RN once medically stable           ___________________________________________________    Attending Physician: Opal Lesches, MD

## 2019-07-26 NOTE — TELEPHONE ENCOUNTER
Called patient to advise/confirm upcoming appt with Dr. Nayely Navas on  7/30/19    at 7/30/19 at 12:30pm at [facility]. No answer so left voicemail on cell number, calling home  No option to leave voicemail. Also advised to please bring in your Drivers License and Insurance Card with you to appointment as well as any prescription pain medication in the original container with you to appt. While putting message in, Ms. Tevin Chadwick called me back stating to cancel appt pt would be either in hospital or in rehab. Advised her to call office to r/s appt when she knew patient was coming out of rehab. Will advised nurse.

## 2019-07-26 NOTE — PROGRESS NOTES
Pt HR in 30s time to time occasionally.  made aware. No new order received at this time. Pt is resting in bed without distress at this time. Sitter at bedside. Will continue to monitor.

## 2019-07-26 NOTE — PROGRESS NOTES
Cardiology Progress Note         NAME:  Nam Arriaga   :   1931   MRN:   [id number]     Assessment/Plan:   1. Symptomatic bradycardia: plan for PPM today. Discussed at length with pt/dtr. 2. HTN: stable   3. COPD  4. R gluteal hematoma s/p fall:        Subjective:   Nam Arriaga is a 80 y.o.   female  with PMH of L lung ca, anemia, PAF followed by Dr. Geri Granger, COPD, HTN, smoker who was admitted for [facility]. She sustained a R gluteal hematoma. Pt tried to get something from her mini fridge and she fell landing on her RT side. Pt started severe pain on her RT gluteal area, which severe and sharp. Per dtr pt told her she \"blacked out\"      Overnight events:  Bradycardic to 30's        Cardiac ROS: Patient denies any exertional chest pain, dyspnea, palpitations, syncope, orthopnea, edema or paroxysmal nocturnal dyspnea. Previous Cardiac Eval  19   ECHO ADULT COMPLETE 2019    Narrative · Left Ventricle: Normal systolic function (ejection fraction normal). Upper normal wall thickness. Estimated left ventricular ejection fraction   is 61 - 65%. · Mitral Valve: Mild mitral annular calcification. Mild mitral valve   regurgitation. · Tricuspid Valve: Mild to moderate tricuspid valve regurgitation is   present. · Pulmonic Valve: Mild pulmonic valve regurgitation is present. · Pericardium: Pericardial effusion. Signed by: Terrance Mckenna MD         Review of Systems: No nausea, indigestion, vomiting, pain, cough, sputum. No bleeding. NPO. Objective:     Visit Vitals  /59 (BP 1 Location: Right arm, BP Patient Position: At rest)   Pulse (!) 50   Temp 97.9 °F (36.6 °C)   Resp 18   Ht 5' 7\" (1.702 m)   Wt 125 lb 10.6 oz (57 kg)   SpO2 96%   Breastfeeding? No   BMI 19.68 kg/m²      O2 Device: Room air    Temp (24hrs), Av °F (36.7 °C), Min:97.8 °F (36.6 °C), Max:98.1 °F (36.7 °C)      No intake/output data recorded.      1901 - 07/26 0700  In: 1470 [P.O.:1470]  Out: 1500 [Urine:1500]  TELE: SB    General: AAOx3 cooperative, no acute distress. [employer]   HEENT: Atraumatic. Pink and moist.  Anicteric sclerae. Neck : Supple, no thyromegaly. Lungs: CTA bilaterally. No wheezing/rhonchi/rales. Heart: Regular rhythm, no murmur, no rubs, no gallops. No JVD. No carotid bruits. Abdomen: Soft, non-distended, non-tender. + Bowel sounds. Extremities: No edema, R gluteal hematoma  Neurologic: Grossly intact. Alert and oriented X 3. No acute neurological distress. Psych: Fair insight. Not anxious or agitated. Care Plan discussed with:    Comments   Patient x    Family  x dtr   RN x    Care Manager                    Consultant:  manuel VILLAGRAN       Data Review:     No lab exists for component: ITNL   No results for input(s): CPK, CKMB, TROIQ in the last 72 hours. Recent Labs     07/25/19  0105 07/24/19  0047   NA  --  141   K  --  4.4   CL  --  107   CO2  --  30   BUN  --  31*   CREA  --  0.81   GLU  --  118*   WBC 12.4*  --    HGB 7.6*  --    HCT 24.5*  --      --      No results for input(s): INR, PTP, APTT in the last 72 hours.     No lab exists for component: INREXT    Medications reviewed  Current Facility-Administered Medications   Medication Dose Route Frequency    albuterol 5mg / ipratropium 0.5mg neb solution  1 Dose Nebulization Q4H PRN    0.9% sodium chloride infusion 250 mL  250 mL IntraVENous PRN    folic acid (FOLVITE) tablet 1 mg  1 mg Oral DAILY    oxyCODONE IR (ROXICODONE) tablet 10 mg  10 mg Oral Q3H PRN    acetaminophen (TYLENOL) tablet 1,000 mg  1,000 mg Oral Q8H    senna-docusate (PERICOLACE) 8.6-50 mg per tablet 1 Tab  1 Tab Oral DAILY    naloxone (NARCAN) injection 0.4 mg  0.4 mg IntraVENous EVERY 2 MINUTES AS NEEDED    sodium chloride (NS) flush 5-40 mL  5-40 mL IntraVENous Q8H    sodium chloride (NS) flush 5-40 mL  5-40 mL IntraVENous PRN    ALPRAZolam (XANAX) tablet 0.25 mg  0.25 mg Oral BID PRN    dexAMETHasone (DECADRON) tablet 2 mg  2 mg Oral DAILY AFTER BREAKFAST    escitalopram oxalate (LEXAPRO) tablet 10 mg  10 mg Oral DAILY    [START ON 7/27/2019] methotrexate (RHEUMATREX) tablet 20 mg  20 mg Oral every Saturday    oxyCODONE IR (ROXICODONE) tablet 5 mg  5 mg Oral Q3H PRN    oxyCODONE ER (OxyCONTIN) tablet 10 mg  10 mg Oral DAILY         Meghan Brewster NP

## 2019-07-27 LAB
ANION GAP SERPL CALC-SCNC: 5 MMOL/L (ref 5–15)
BASOPHILS # BLD: 0 K/UL (ref 0–0.1)
BASOPHILS NFR BLD: 0 % (ref 0–1)
BUN SERPL-MCNC: 23 MG/DL (ref 6–20)
BUN/CREAT SERPL: 28 (ref 12–20)
CALCIUM SERPL-MCNC: 8 MG/DL (ref 8.5–10.1)
CHLORIDE SERPL-SCNC: 105 MMOL/L (ref 97–108)
CO2 SERPL-SCNC: 30 MMOL/L (ref 21–32)
CREAT SERPL-MCNC: 0.81 MG/DL (ref 0.55–1.02)
DIFFERENTIAL METHOD BLD: ABNORMAL
EOSINOPHIL # BLD: 0.3 K/UL (ref 0–0.4)
EOSINOPHIL NFR BLD: 2 % (ref 0–7)
ERYTHROCYTE [DISTWIDTH] IN BLOOD BY AUTOMATED COUNT: 18.3 % (ref 11.5–14.5)
GLUCOSE SERPL-MCNC: 79 MG/DL (ref 65–100)
HCT VFR BLD AUTO: 28.6 % (ref 35–47)
HGB BLD-MCNC: 8.8 G/DL (ref 11.5–16)
IMM GRANULOCYTES # BLD AUTO: 0 K/UL
IMM GRANULOCYTES NFR BLD AUTO: 0 %
LYMPHOCYTES # BLD: 1.9 K/UL (ref 0.8–3.5)
LYMPHOCYTES NFR BLD: 13 % (ref 12–49)
MCH RBC QN AUTO: 29.3 PG (ref 26–34)
MCHC RBC AUTO-ENTMCNC: 30.8 G/DL (ref 30–36.5)
MCV RBC AUTO: 95.3 FL (ref 80–99)
METAMYELOCYTES NFR BLD MANUAL: 3 %
MONOCYTES # BLD: 1.4 K/UL (ref 0–1)
MONOCYTES NFR BLD: 10 % (ref 5–13)
MYELOCYTES NFR BLD MANUAL: 1 %
NEUTS SEG # BLD: 10.2 K/UL (ref 1.8–8)
NEUTS SEG NFR BLD: 71 % (ref 32–75)
NRBC # BLD: 0.09 K/UL (ref 0–0.01)
NRBC BLD-RTO: 0.6 PER 100 WBC
PLATELET # BLD AUTO: 374 K/UL (ref 150–400)
PMV BLD AUTO: 8.9 FL (ref 8.9–12.9)
POTASSIUM SERPL-SCNC: 4.7 MMOL/L (ref 3.5–5.1)
RBC # BLD AUTO: 3 M/UL (ref 3.8–5.2)
RBC MORPH BLD: ABNORMAL
RBC MORPH BLD: ABNORMAL
SODIUM SERPL-SCNC: 140 MMOL/L (ref 136–145)
WBC # BLD AUTO: 14.3 K/UL (ref 3.6–11)

## 2019-07-27 PROCEDURE — 74011250637 HC RX REV CODE- 250/637: Performed by: INTERNAL MEDICINE

## 2019-07-27 PROCEDURE — 85025 COMPLETE CBC W/AUTO DIFF WBC: CPT

## 2019-07-27 PROCEDURE — 74011250637 HC RX REV CODE- 250/637: Performed by: NURSE PRACTITIONER

## 2019-07-27 PROCEDURE — 74011250636 HC RX REV CODE- 250/636: Performed by: INTERNAL MEDICINE

## 2019-07-27 PROCEDURE — 65660000000 HC RM CCU STEPDOWN

## 2019-07-27 PROCEDURE — 80048 BASIC METABOLIC PNL TOTAL CA: CPT

## 2019-07-27 PROCEDURE — 97530 THERAPEUTIC ACTIVITIES: CPT

## 2019-07-27 PROCEDURE — 36415 COLL VENOUS BLD VENIPUNCTURE: CPT

## 2019-07-27 PROCEDURE — 97116 GAIT TRAINING THERAPY: CPT

## 2019-07-27 PROCEDURE — 97164 PT RE-EVAL EST PLAN CARE: CPT

## 2019-07-27 RX ORDER — CEPHALEXIN 250 MG/1
500 CAPSULE ORAL EVERY 8 HOURS
Status: DISCONTINUED | OUTPATIENT
Start: 2019-07-27 | End: 2019-07-31 | Stop reason: HOSPADM

## 2019-07-27 RX ORDER — CEPHALEXIN 250 MG/1
500 CAPSULE ORAL EVERY 6 HOURS
Status: DISCONTINUED | OUTPATIENT
Start: 2019-07-28 | End: 2019-07-27 | Stop reason: DRUGHIGH

## 2019-07-27 RX ADMIN — Medication 10 ML: at 13:54

## 2019-07-27 RX ADMIN — ACETAMINOPHEN 1000 MG: 500 TABLET ORAL at 14:56

## 2019-07-27 RX ADMIN — DEXAMETHASONE 2 MG: 4 TABLET ORAL at 08:35

## 2019-07-27 RX ADMIN — OXYCODONE HYDROCHLORIDE 10 MG: 10 TABLET, FILM COATED, EXTENDED RELEASE ORAL at 21:32

## 2019-07-27 RX ADMIN — HYDROCODONE BITARTRATE AND ACETAMINOPHEN 1 TABLET: 5; 325 TABLET ORAL at 12:07

## 2019-07-27 RX ADMIN — ACETAMINOPHEN 1000 MG: 500 TABLET ORAL at 00:15

## 2019-07-27 RX ADMIN — Medication 10 ML: at 21:33

## 2019-07-27 RX ADMIN — ACETAMINOPHEN 1000 MG: 500 TABLET ORAL at 23:28

## 2019-07-27 RX ADMIN — ESCITALOPRAM OXALATE 10 MG: 10 TABLET ORAL at 08:35

## 2019-07-27 RX ADMIN — Medication 1 G: at 13:53

## 2019-07-27 RX ADMIN — HYDROCODONE BITARTRATE AND ACETAMINOPHEN 1 TABLET: 5; 325 TABLET ORAL at 04:16

## 2019-07-27 RX ADMIN — CEPHALEXIN 500 MG: 250 CAPSULE ORAL at 21:33

## 2019-07-27 RX ADMIN — Medication 10 ML: at 07:11

## 2019-07-27 RX ADMIN — ACETAMINOPHEN 1000 MG: 500 TABLET ORAL at 07:10

## 2019-07-27 RX ADMIN — Medication 1 G: at 04:04

## 2019-07-27 RX ADMIN — FOLIC ACID 1 MG: 1 TABLET ORAL at 08:35

## 2019-07-27 RX ADMIN — SENNOSIDES,DOCUSATE SODIUM 1 TABLET: 8.6; 5 TABLET, FILM COATED ORAL at 08:35

## 2019-07-27 RX ADMIN — OXYCODONE HYDROCHLORIDE 10 MG: 5 TABLET ORAL at 17:50

## 2019-07-27 NOTE — PROGRESS NOTES
Bedside shift change report given to Bernice (oncoming nurse) by Yamil Burgos (offgoing nurse). Report included the following information SBAR, Kardex, OR Summary, Procedure Summary, Intake/Output, MAR and Recent Results.

## 2019-07-27 NOTE — PROGRESS NOTES
Physical Therapy Note    Patient is eating breakfast. Will re-attempt re-evaluation after.     Thank you,  Quincy JOHNSONT

## 2019-07-27 NOTE — PROGRESS NOTES
Cardiology Progress Note         NAME:  Kathi Baker   :   1931   MRN:   [id number]     Assessment/Plan:   1. Symptomatic bradycardia: s/p ppm yesterday. cxr looks good. interogation ok. Mild swelling and ttp, cont to monitor for hematoma. Off eliquis due to gluteal hematoma. Kelflex x 5 days   2. HTN: stable   3. COPD  4. R gluteal hematoma s/p fall: hold eliquis       Subjective:   Kathi Baker is a 80 y.o.   female  with PMH of L lung ca, anemia, PAF followed by Dr. Diana Jj, COPD, HTN, smoker who was admitted for [facility]. She sustained a R gluteal hematoma. Pt tried to get something from her mini fridge and she fell landing on her RT side. Pt started severe pain on her RT gluteal area, which severe and sharp. Per dtr pt told her she \"blacked out\"      S/p ppm.  Pain over left axillary pocket. Cardiac ROS: Patient denies any exertional chest pain, dyspnea, palpitations, syncope, orthopnea, edema or paroxysmal nocturnal dyspnea. Previous Cardiac Eval  19   ECHO ADULT COMPLETE 2019    Narrative · Left Ventricle: Normal systolic function (ejection fraction normal). Upper normal wall thickness. Estimated left ventricular ejection fraction   is 61 - 65%. · Mitral Valve: Mild mitral annular calcification. Mild mitral valve   regurgitation. · Tricuspid Valve: Mild to moderate tricuspid valve regurgitation is   present. · Pulmonic Valve: Mild pulmonic valve regurgitation is present. · Pericardium: Pericardial effusion. Signed by: Dewey Gleason MD         Review of Systems: No nausea, indigestion, vomiting, pain, cough, sputum. No bleeding. NPO. Objective:     Visit Vitals  /69 (BP 1 Location: Right arm, BP Patient Position: At rest)   Pulse 66   Temp 98.8 °F (37.1 °C)   Resp 16   Ht 5' 7\" (1.702 m)   Wt 125 lb 10.6 oz (57 kg)   SpO2 96%   Breastfeeding?  No   BMI 19.68 kg/m²      O2 Device: Room air    Temp (24hrs), Av.9 °F (36.6 °C), Min:97.5 °F (36.4 °C), Max:98.8 °F (37.1 °C)      No intake/output data recorded.  1901 -  0700  In: 270 [P.O.:270]  Out: 1700 [Urine:1700]  TELE: SB    General: AAOx3 cooperative, no acute distress. [place]   HEENT: Atraumatic. Pink and moist.  Anicteric sclerae. Neck : Supple, no thyromegaly. Lungs: CTA bilaterally. No wheezing/rhonchi/rales. Heart: Regular rhythm, no murmur, no rubs, no gallops. No JVD. No carotid bruits. Abdomen: Soft, non-distended, non-tender. + Bowel sounds. Extremities: No edema, R gluteal hematoma  Neurologic: Grossly intact. Alert and oriented X 3. No acute neurological distress. Psych: Fair insight. Not anxious or agitated. Care Plan discussed with:    Comments   Patient x    Family  x dtr   RN x    Care Manager                    Consultant:  x EP       Data Review:     No lab exists for component: ITNL   No results for input(s): CPK, CKMB, TROIQ in the last 72 hours. Recent Labs     19  0410 19  0105     --    K 4.7  --      --    CO2 30  --    BUN 23*  --    CREA 0.81  --    GLU 79  --    WBC 14.3* 12.4*   HGB 8.8* 7.6*   HCT 28.6* 24.5*    322     No results for input(s): INR, PTP, APTT in the last 72 hours.     No lab exists for component: INREXT, INREXT    Medications reviewed  Current Facility-Administered Medications   Medication Dose Route Frequency    cephALEXin (KEFLEX) capsule 500 mg  500 mg Oral Q8H    sodium chloride (NS) flush 5-40 mL  5-40 mL IntraVENous Q8H    sodium chloride (NS) flush 5-40 mL  5-40 mL IntraVENous PRN    acetaminophen (TYLENOL) tablet 650 mg  650 mg Oral Q4H PRN    HYDROcodone-acetaminophen (NORCO) 5-325 mg per tablet 1 Tab  1 Tab Oral Q4H PRN    ondansetron (ZOFRAN) injection 4 mg  4 mg IntraVENous Q4H PRN    albuterol 5mg / ipratropium 0.5mg neb solution  1 Dose Nebulization Q4H PRN    0.9% sodium chloride infusion 250 mL  250 mL IntraVENous PRN    folic acid (FOLVITE) tablet 1 mg  1 mg Oral DAILY    oxyCODONE IR (ROXICODONE) tablet 10 mg  10 mg Oral Q3H PRN    acetaminophen (TYLENOL) tablet 1,000 mg  1,000 mg Oral Q8H    senna-docusate (PERICOLACE) 8.6-50 mg per tablet 1 Tab  1 Tab Oral DAILY    naloxone (NARCAN) injection 0.4 mg  0.4 mg IntraVENous EVERY 2 MINUTES AS NEEDED    sodium chloride (NS) flush 5-40 mL  5-40 mL IntraVENous Q8H    sodium chloride (NS) flush 5-40 mL  5-40 mL IntraVENous PRN    ALPRAZolam (XANAX) tablet 0.25 mg  0.25 mg Oral BID PRN    dexAMETHasone (DECADRON) tablet 2 mg  2 mg Oral DAILY AFTER BREAKFAST    escitalopram oxalate (LEXAPRO) tablet 10 mg  10 mg Oral DAILY    oxyCODONE IR (ROXICODONE) tablet 5 mg  5 mg Oral Q3H PRN    oxyCODONE ER (OxyCONTIN) tablet 10 mg  10 mg Oral DAILY         Albaro Simental DO

## 2019-07-27 NOTE — PROGRESS NOTES
Problem: Mobility Impaired (Adult and Pediatric)  Goal: *Acute Goals and Plan of Care (Insert Text)  Description  Physical Therapy Goals  Re-eval 7/27  1. Patient will move from supine to sit and sit to supine  in bed with independence within 7 day(s). 2.  Patient will transfer from bed to chair and chair to bed with minimal assistance/contact guard assist using the least restrictive device within 7 day(s). 3.  Patient will perform sit to stand with minimal assistance/contact guard assist within 7 day(s). 4.  Patient will ambulate with minimal assistance/contact guard assist for 25 feet with the least restrictive device within 7 day(s). Initiated 7/23/2019  1. Patient will move from supine to sit and sit to supine  in bed with independence within 7 day(s). 2.  Patient will transfer from bed to chair and chair to bed with minimal assistance/contact guard assist using the least restrictive device within 7 day(s). 3.  Patient will perform sit to stand with minimal assistance/contact guard assist within 7 day(s). 4.  Patient will ambulate with minimal assistance/contact guard assist for 25 feet with the least restrictive device within 7 day(s). Outcome: Progressing Towards Goal   PHYSICAL THERAPY REEVALUATION  Patient: Nam Arriaga (73 y.o. female)  Date: 7/27/2019  Primary Diagnosis: Hematoma [T14. 8XXA]  Hematoma [T14. 8XXA]  Procedure(s) (LRB):  INSERT PPM DUAL (N/A) 1 Day Post-Op   Precautions: Fall(L UE pacemaker)  Chart, physical therapy assessment, plan of care and goals were reviewed. ASSESSMENT :  Based on the objective data described below, the patient presents with decreased strength, decreased ROM, and decreased endurance S/P fall with hematoma. Patient is being re-evaluated post-op day 1 pacemaker placement. On evaluation patient is received supine in bed. Per RN patient has refused to wear a sling post-op, per patient she was never offered a sling.  Patient education is provided on L UE pacemaker precautions. Patient requires maximal encouragement to participate in any activity. Patient is SBA for supine to sit adhering to L UE precautions. She is Max A to scoot to EOB secondary to precautions. Patient repeatedly attempts to use L UE to push and is provided VC and tactile cues to prevent weight bearing. Patient reports forgetting precautions despite therapist providing them at the time of contraindicated movements. Patient is Min A x2 for sit to stand with bed elevated. She demonstrates fear of falling. She is provided R UE HHA and Mod A x2 at gait belt in order to stand pivot to the bedside commode. Patient is able to provide her own hygiene with R UE in standing provided supplies and Mod Ax2. She performs a second stand pivot to come to rest in a chair. Extensive and repetitive patient education is provided on the importance of mobility in order to maintain strength, prevent pneumonia, and increase independence. Patient verbalizes understanding well but continues to bargain with therapist for decreased activity. Patient will require SNF rehab prior to returning home secondary to increased need for assistance for all mobility. Patient will benefit from skilled intervention to address the above impairments. Patients rehabilitation potential is considered to be Good  Factors which may influence rehabilitation potential include:   ? None noted  ? Mental ability/status  ? Medical condition  ? Home/family situation and support systems  ? Safety awareness  ? Pain tolerance/management  ? Other:      PLAN :  Recommendations and Planned Interventions:  ?             Bed Mobility Training             ? Neuromuscular Re-Education  ? Transfer Training                   ? Orthotic/Prosthetic Training  ? Gait Training                         ? Modalities  ?              Therapeutic Exercises           ? Edema Management/Control  ? Therapeutic Activities            ? Patient and Family Training/Education  ? Other (comment):  Frequency/Duration: Patient will be followed by physical therapy 5 times a week to address goals. Discharge Recommendations: Skilled Nursing Facility  Further Equipment Recommendations for Discharge: TBD by SNF      SUBJECTIVE:   Patient stated I like to be in control.     OBJECTIVE DATA SUMMARY:     Past Medical History:   Diagnosis Date    AAA (abdominal aortic aneurysm) (HCC)     Arthritis     ra, osteoporosis, osteoarthritis    Atrial fibrillation (HCC)     COPD     HTN (hypertension)     Lung cancer (HCC)     RA (rheumatoid arthritis) ([facility] [address]     Smoker     TIA (transient ischemic attack)     TIA 9/30/16     Past Surgical History:   Procedure Laterality Date    HX CHOLECYSTECTOMY      HX HYSTERECTOMY      HX ORTHOPAEDIC      carpal tunnel, wrist surgery     Hospital course since last seen and reason for reevaluation: pacemaker placement   Critical Behavior:  Neurologic State: Alert  Orientation Level: Oriented to person, Oriented to place  Cognition: Impaired decision making, Memory loss     Skin:    Strength:    Strength: Generally decreased, functional                      Tone & Sensation:                                  Range Of Motion:  AROM: Generally decreased, functional(L side pacemaker)                       Coordination:       Functional Mobility:  Bed Mobility:     Supine to Sit: Modified independent     Scooting: Maximum assistance  Transfers:  Sit to Stand: Moderate assistance;Assist x2  Stand to Sit: Moderate assistance;Assist x2  Stand Pivot Transfers: Moderate assistance                 Balance:   Sitting: Intact  Standing: Intact; With support  Standing - Static: Fair  Standing - Dynamic : Poor  Ambulation/Gait Training:  Distance (ft): 5 Feet (ft)     Ambulation - Level of Assistance:  Moderate assistance        Gait Abnormalities: Decreased step clearance; Step to gait        Base of Support: Widened     Speed/Ev: Pace decreased (<100 feet/min); Shuffled  Step Length: Right shortened;Left shortened                    Therapeutic Exercises:       Functional Measure:  Tinetti test:    Sitting Balance: 1  Arises: 0  Attempts to Rise: 0  Immediate Standing Balance: 1  Standing Balance: 1  Nudged: 0  Eyes Closed: 0  Turn 360 Degrees - Continuous/Discontinuous: 0  Turn 360 Degrees - Steady/Unsteady: 1  Sitting Down: 0  Balance Score: 4  Indication of Gait: 0  R Step Length/Height: 0  L Step Length/Height: 0  R Foot Clearance: 1  L Foot Clearance: 1  Step Symmetry: 1  Step Continuity: 1  Path: 1    Walking Time: 0  Gait Score: 6  Total Score: 10         Tinetti Tool Score Risk of Falls  <19 = High Fall Risk  19-24 = Moderate Fall Risk  25-28 = Low Fall Risk  Tinetti ME. Performance-Oriented Assessment of Mobility Problems in Elderly Patients. [facility]; [id number]. (Scoring Description: PT Bulletin Feb. 10, 1993)    Older adults: Javon Diaz et al, 2009; n = 1000 [facility] elderly evaluated with ABC, NEHAL, ADL, and IADL)  · Mean NEHAL score for males aged 69-68 years = 26.21(3.40)  · Mean NEHAL score for females age 69-68 years = 25.16(4.30)  · Mean NEHAL score for males over 80 years = 23.29(6.02)  · Mean NEHAL score for females over 80 years = 17.20(8.32)         Pain:                    Activity Tolerance:   Patient demonstrates poor activity tolerance with the need for maximal encouragement for limited mobility   Please refer to the flowsheet for vital signs taken during this treatment. After treatment:   ?  Patient left in no apparent distress sitting up in chair  ? Patient left in no apparent distress in bed  ? Call bell left within reach  ? Nursing notified  ? Caregiver present  ?   Chair alarm activated    COMMUNICATION/EDUCATION:   The patients plan of care was discussed with: Registered Nurse and Certified Nursing Assistant/Patient Care Technician. ?  Fall prevention education was provided and the patient/caregiver indicated understanding. ? Patient/family have participated as able in goal setting and plan of care. ?  Patient/family agree to work toward stated goals and plan of care. ?  Patient understands intent and goals of therapy, but is neutral about his/her participation. ? Patient is unable to participate in goal setting and plan of care.     Thank you for this referral.  Mario Lorenzana, PT   Time Calculation: 52 mins

## 2019-07-27 NOTE — PROGRESS NOTES
7/27/2019  4:13 PM  Referral to [facility] still pending. Likely will not receive response until Monday. Cm to continue to monitor.

## 2019-07-27 NOTE — PROGRESS NOTES
[facility]    Medical Progress Note      NAME: Memo Lee   :  1931  MRM:  [id number]    Date/Time: 2019  2:28 PM          Assessment and Plan:     Bradycardia / Paroxysmal A-fib / Hypertension - Nhi Adam despite stopping digoxin. Cardiology consult appreciated. PPM placed . Prerna-op Ancef/Keflex. Pulse better. Off Eliquis due to internal bleeding. Hold methotrexate due to acute wound. Acute blood loss anemia / Hematoma on RT gluteal area / Acute right hip pain - POA, due to trauma on eliquis. Required transfusion to keep Hb>7.  general surgery recomend conservative care    Debilitated patient / Physical debility / Fall / Chronic pain syndrome - Fall precautions. PT/OT eval.  Needs SNF. Decadron. Would benefit from weaning off oxycontin    COPD (chronic obstructive pulmonary disease) - Stable. Not wheezing. Continue nebs PRN      Malignant neoplasm of upper lobe of left lung - Stable. Follow up with Dr Rodriguez      Abdominal aortic aneurysm (AAA) without rupture - Outpatient FU     Leukocytosis - POA, Doubt infection. Dementia / Anxiety - No official Dx but appears to me on interview. Would benefit from outpatient neurosych eval.  Continue lexapro. Xanax is a poor choice. Subjective:     Chief Complaint:  Pain everywhere. High anxiety. Arm with PPM, feet with any tough, stomach without nausea. ROS:  (bold if positive, if negative)    Abd PainTolerating minimal PT Tolerating some Diet        Objective:     Last 24hrs VS reviewed since prior progress note. Most recent are:    Visit Vitals  /69 (BP 1 Location: Right arm, BP Patient Position: At rest)   Pulse 66   Temp 98.8 °F (37.1 °C)   Resp 16   Ht 5' 7\" (1.702 m)   Wt 57 kg (125 lb 10.6 oz)   SpO2 96%   Breastfeeding?  No   BMI 19.68 kg/m²     SpO2 Readings from Last 6 Encounters:   19 96%   19 95%   19 96%   19 93%   19 97%   19 97%            Intake/Output Summary (Last 24 hours) at 7/27/2019 1428  Last data filed at 7/26/2019 1435  Gross per 24 hour   Intake    Output 450 ml   Net -450 ml        Physical Exam:    Gen:  Thin, frail, in no acute distress  HEENT:  Pink conjunctivae, PERRL, hearing intact to voice, moist mucous membranes  Neck:  Supple, without masses, thyroid non-tender  Resp:  No accessory muscle use, clear breath sounds without wheezes rales or rhonchi  Card:  No murmurs, normal S1, S2 without thrills, bruits or peripheral edema  Abd:  Soft, non-tender, non-distended, normoactive bowel sounds are present, no mass  Lymph:  No cervical or inguinal adenopathy  Musc:  No cyanosis or clubbing  Skin:  No rashes or ulcers, skin turgor is good  Neuro:  Cranial nerves are grossly intact, general motor weakness, follows commands vaguely  Psych:  Poor insight, oriented to person, place     Telemetry reviewed:   normal sinus rhythm, paced  __________________________________________________________________  Medications Reviewed: (see below)  Medications:     Current Facility-Administered Medications   Medication Dose Route Frequency    cephALEXin (KEFLEX) capsule 500 mg  500 mg Oral Q8H    sodium chloride (NS) flush 5-40 mL  5-40 mL IntraVENous Q8H    sodium chloride (NS) flush 5-40 mL  5-40 mL IntraVENous PRN    acetaminophen (TYLENOL) tablet 650 mg  650 mg Oral Q4H PRN    HYDROcodone-acetaminophen (NORCO) 5-325 mg per tablet 1 Tab  1 Tab Oral Q4H PRN    ondansetron (ZOFRAN) injection 4 mg  4 mg IntraVENous Q4H PRN    albuterol 5mg / ipratropium 0.5mg neb solution  1 Dose Nebulization Q4H PRN    0.9% sodium chloride infusion 250 mL  250 mL IntraVENous PRN    folic acid (FOLVITE) tablet 1 mg  1 mg Oral DAILY    oxyCODONE IR (ROXICODONE) tablet 10 mg  10 mg Oral Q3H PRN    acetaminophen (TYLENOL) tablet 1,000 mg  1,000 mg Oral Q8H    senna-docusate (PERICOLACE) 8.6-50 mg per tablet 1 Tab  1 Tab Oral DAILY    naloxone (NARCAN) injection 0.4 mg  0.4 mg IntraVENous EVERY 2 MINUTES AS NEEDED    sodium chloride (NS) flush 5-40 mL  5-40 mL IntraVENous Q8H    sodium chloride (NS) flush 5-40 mL  5-40 mL IntraVENous PRN    ALPRAZolam (XANAX) tablet 0.25 mg  0.25 mg Oral BID PRN    dexAMETHasone (DECADRON) tablet 2 mg  2 mg Oral DAILY AFTER BREAKFAST    escitalopram oxalate (LEXAPRO) tablet 10 mg  10 mg Oral DAILY    oxyCODONE IR (ROXICODONE) tablet 5 mg  5 mg Oral Q3H PRN    oxyCODONE ER (OxyCONTIN) tablet 10 mg  10 mg Oral DAILY        Lab Data Reviewed: (see below)  Lab Review:     Recent Labs     07/27/19  0410 07/25/19  0105   WBC 14.3* 12.4*   HGB 8.8* 7.6*   HCT 28.6* 24.5*    322     Recent Labs     07/27/19  0410      K 4.7      CO2 30   GLU 79   BUN 23*   CREA 0.81   CA 8.0*     Lab Results   Component Value Date/Time    Glucose (POC) 103 (H) 07/25/2019 07:55 AM    Glucose (POC) 93 10/01/2016 11:33 AM    Glucose (POC) 91 10/01/2016 07:33 AM     No results for input(s): PH, PCO2, PO2, HCO3, FIO2 in the last 72 hours. No results for input(s): INR in the last 72 hours. No lab exists for component: INREXT  All Micro Results     Procedure Component Value Units Date/Time    CULTURE, MRSA [[id number]] Collected:  07/25/19 0824    Order Status:  Completed Specimen:  Nares Updated:  07/26/19 1252     Special Requests: NO SPECIAL REQUESTS        Culture result: MRSA NOT PRESENT                   Screening of patient nares for MRSA is for surveillance purposes and, if positive, to facilitate isolation considerations in high risk settings. It is not intended for automatic decolonization interventions per se as regimens are not sufficiently effective to warrant routine use. Other pertinent lab: none    Total time spent with patient: 39 Minutes I reviewed chart, notes, data and current medications in the medical record. I have examined and treated the patient at bedside during this period.                  Care Plan discussed with: Patient, Care Manager, Nursing Staff, Consultant/Specialist and >50% of time spent in counseling and coordination of care    Discussed:  Care Plan and D/C Planning    Prophylaxis:  H2B/PPI    Disposition:  SNF/LTC           ___________________________________________________    Attending Physician: Tucker Paredes MD

## 2019-07-27 NOTE — PROGRESS NOTES
Keflex dose changed from 500 mg PO Q6H to 500 mg PO Q8H for CrCl 30-50 mL/min per protocol    Thank you,  Edmar Lindo

## 2019-07-27 NOTE — PROGRESS NOTES
visited patient, Linh Mccarthy, for a follow up Palliative Care Visit. Linh Mccarthy was awake, alert, and sitting up in bed.  introduced herself and extended support through active listening and pastoral presence. Linh Mccarthy openly discussed her health concerns and the fall she had taken at home. She currently lives with her son and his wife.  remained a supportive presence as Linh Mccarthy discussed other concerns she is having.  collaborated with her nurse regarding these needs. Christopher Anthony discussed her family support and how much she is loved by her extended family. Her face and countenance changed to delight and patrick, when she talked about her newest grandchild. Gabriela disengaged in the conversation as the PT came into the room. Gabriela thanked the  for stopping by and is aware of the 's availability.  will follow up as able and/or needed. Page Brown. Jbe Linares.      Paging Service: [phone])

## 2019-07-27 NOTE — PROGRESS NOTES
Bedside and Verbal shift change report given to Kush Manley  (oncoming nurse) by Germain Jimenez  (offgoing nurse). Report included the following information SBAR and Kardex. No bruits; no thyromegaly or nodules

## 2019-07-28 PROCEDURE — 74011250637 HC RX REV CODE- 250/637: Performed by: INTERNAL MEDICINE

## 2019-07-28 PROCEDURE — 65660000000 HC RM CCU STEPDOWN

## 2019-07-28 PROCEDURE — 74011250637 HC RX REV CODE- 250/637: Performed by: NURSE PRACTITIONER

## 2019-07-28 PROCEDURE — 74011250636 HC RX REV CODE- 250/636: Performed by: INTERNAL MEDICINE

## 2019-07-28 RX ADMIN — FOLIC ACID 1 MG: 1 TABLET ORAL at 09:11

## 2019-07-28 RX ADMIN — Medication 10 ML: at 22:02

## 2019-07-28 RX ADMIN — ESCITALOPRAM OXALATE 10 MG: 10 TABLET ORAL at 09:11

## 2019-07-28 RX ADMIN — ALPRAZOLAM 0.25 MG: 0.25 TABLET ORAL at 16:06

## 2019-07-28 RX ADMIN — ALPRAZOLAM 0.25 MG: 0.25 TABLET ORAL at 09:11

## 2019-07-28 RX ADMIN — Medication 10 ML: at 05:24

## 2019-07-28 RX ADMIN — CEPHALEXIN 500 MG: 250 CAPSULE ORAL at 13:16

## 2019-07-28 RX ADMIN — Medication 20 ML: at 22:01

## 2019-07-28 RX ADMIN — ACETAMINOPHEN 1000 MG: 500 TABLET ORAL at 14:35

## 2019-07-28 RX ADMIN — CEPHALEXIN 500 MG: 250 CAPSULE ORAL at 22:01

## 2019-07-28 RX ADMIN — HYDROCODONE BITARTRATE AND ACETAMINOPHEN 1 TABLET: 5; 325 TABLET ORAL at 16:06

## 2019-07-28 RX ADMIN — OXYCODONE HYDROCHLORIDE 5 MG: 5 TABLET ORAL at 03:45

## 2019-07-28 RX ADMIN — CEPHALEXIN 500 MG: 250 CAPSULE ORAL at 05:40

## 2019-07-28 RX ADMIN — OXYCODONE HYDROCHLORIDE 10 MG: 10 TABLET, FILM COATED, EXTENDED RELEASE ORAL at 22:00

## 2019-07-28 RX ADMIN — SENNOSIDES,DOCUSATE SODIUM 1 TABLET: 8.6; 5 TABLET, FILM COATED ORAL at 09:11

## 2019-07-28 RX ADMIN — ACETAMINOPHEN 1000 MG: 500 TABLET ORAL at 06:01

## 2019-07-28 RX ADMIN — Medication 20 ML: at 14:00

## 2019-07-28 RX ADMIN — DEXAMETHASONE 2 MG: 4 TABLET ORAL at 09:11

## 2019-07-28 RX ADMIN — ALPRAZOLAM 0.25 MG: 0.25 TABLET ORAL at 22:01

## 2019-07-28 RX ADMIN — HYDROCODONE BITARTRATE AND ACETAMINOPHEN 1 TABLET: 5; 325 TABLET ORAL at 19:56

## 2019-07-28 NOTE — PROGRESS NOTES
[facility]    Medical Progress Note      NAME: Babs Garrison   :  1931  MRM:  [id number]    Date/Time: 2019  2:28 PM          Assessment and Plan:     Bradycardia / Paroxysmal A-fib / Hypertension - Sujey Chappell despite stopping digoxin. Cardiology consult appreciated. PPM placed . Prerna-op Ancef/Keflex. Pulse better. Off Eliquis due to internal bleeding. Hold methotrexate due to acute wound. Acute blood loss anemia / Hematoma on RT gluteal area / Acute right hip pain - POA, due to trauma on eliquis. Required transfusion to keep Hb>7. General surgery recomend conservative care    Debilitated patient / Physical debility / Fall / Chronic pain syndrome - Fall precautions. PT/OT eval.  Needs SNF. Her discharge has been delayed by 2 days over the weekend, awaiting SNF decision and insurance approval. Continue low dose decadron. Would benefit from weaning off oxycontin, as this worsens her balance and safety    Dementia / Anxiety - No official Dx but appears to me on interview. Narcotics cloud her mentation as well. Would benefit from outpatient neurosych eval.  Continue lexapro. Xanax and narcotics are a poor choice for chronic management. Magali Alexander COPD (chronic obstructive pulmonary disease) - Stable. Not wheezing. Continue nebs PRN      Malignant neoplasm of upper lobe of left lung - Stable. Follow up with Dr Rodriguez      Abdominal aortic aneurysm (AAA) without rupture - Outpatient FU     Leukocytosis - POA, Doubt infection. Subjective:     Chief Complaint:  Persistent mild agitation, delirium. Intermittent complaints of general pain. Anxiety. ROS:  (bold if positive, if negative)    Tolerating minimal PT Tolerating some Diet        Objective:     Last 24hrs VS reviewed since prior progress note.  Most recent are:    Visit Vitals  /77 (BP 1 Location: Right arm, BP Patient Position: At rest)   Pulse 60   Temp 97.5 °F (36.4 °C)   Resp 18   Ht 5' 7\" (1.702 m)   Wt 57 kg (125 lb 10.6 oz)   SpO2 95%   Breastfeeding?  No   BMI 19.68 kg/m²     SpO2 Readings from Last 6 Encounters:   07/28/19 95%   07/03/19 95%   07/02/19 96%   06/20/19 93%   06/17/19 97%   06/12/19 97%            Intake/Output Summary (Last 24 hours) at 7/28/2019 1010  Last data filed at 7/27/2019 2252  Gross per 24 hour   Intake    Output 100 ml   Net -100 ml        Physical Exam:    Gen:  Thin, frail, in no acute distress  HEENT:  Pink conjunctivae, PERRL, hearing intact to voice, moist mucous membranes  Neck:  Supple, without masses, thyroid non-tender  Resp:  No accessory muscle use, clear breath sounds without wheezes rales or rhonchi  Card:  No murmurs, normal S1, S2 without thrills, bruits or peripheral edema  Abd:  Soft, non-tender, non-distended, normoactive bowel sounds are present, no mass  Lymph:  No cervical or inguinal adenopathy  Musc:  No cyanosis or clubbing  Skin:  No rashes or ulcers, skin turgor is good  Neuro:  Cranial nerves are grossly intact, general motor weakness, follows commands vaguely  Psych:  Poor insight, oriented to person, place     Telemetry reviewed:   normal sinus rhythm, paced  __________________________________________________________________  Medications Reviewed: (see below)  Medications:     Current Facility-Administered Medications   Medication Dose Route Frequency    cephALEXin (KEFLEX) capsule 500 mg  500 mg Oral Q8H    sodium chloride (NS) flush 5-40 mL  5-40 mL IntraVENous Q8H    sodium chloride (NS) flush 5-40 mL  5-40 mL IntraVENous PRN    acetaminophen (TYLENOL) tablet 650 mg  650 mg Oral Q4H PRN    HYDROcodone-acetaminophen (NORCO) 5-325 mg per tablet 1 Tab  1 Tab Oral Q4H PRN    ondansetron (ZOFRAN) injection 4 mg  4 mg IntraVENous Q4H PRN    albuterol 5mg / ipratropium 0.5mg neb solution  1 Dose Nebulization Q4H PRN    0.9% sodium chloride infusion 250 mL  250 mL IntraVENous PRN    folic acid (FOLVITE) tablet 1 mg  1 mg Oral DAILY    oxyCODONE IR (ROXICODONE) tablet 10 mg  10 mg Oral Q3H PRN    acetaminophen (TYLENOL) tablet 1,000 mg  1,000 mg Oral Q8H    senna-docusate (PERICOLACE) 8.6-50 mg per tablet 1 Tab  1 Tab Oral DAILY    naloxone (NARCAN) injection 0.4 mg  0.4 mg IntraVENous EVERY 2 MINUTES AS NEEDED    sodium chloride (NS) flush 5-40 mL  5-40 mL IntraVENous Q8H    sodium chloride (NS) flush 5-40 mL  5-40 mL IntraVENous PRN    ALPRAZolam (XANAX) tablet 0.25 mg  0.25 mg Oral BID PRN    dexAMETHasone (DECADRON) tablet 2 mg  2 mg Oral DAILY AFTER BREAKFAST    escitalopram oxalate (LEXAPRO) tablet 10 mg  10 mg Oral DAILY    oxyCODONE IR (ROXICODONE) tablet 5 mg  5 mg Oral Q3H PRN    oxyCODONE ER (OxyCONTIN) tablet 10 mg  10 mg Oral DAILY        Lab Data Reviewed: (see below)  Lab Review:     Recent Labs     07/27/19  0410   WBC 14.3*   HGB 8.8*   HCT 28.6*        Recent Labs     07/27/19  0410      K 4.7      CO2 30   GLU 79   BUN 23*   CREA 0.81   CA 8.0*     Lab Results   Component Value Date/Time    Glucose (POC) 103 (H) 07/25/2019 07:55 AM    Glucose (POC) 93 10/01/2016 11:33 AM    Glucose (POC) 91 10/01/2016 07:33 AM     No results for input(s): PH, PCO2, PO2, HCO3, FIO2 in the last 72 hours. No results for input(s): INR in the last 72 hours. No lab exists for component: Mireille Graham  All Micro Results     Procedure Component Value Units Date/Time    CULTURE, MRSA [[id number]] Collected:  07/25/19 0884    Order Status:  Completed Specimen:  Nares Updated:  07/26/19 1252     Special Requests: NO SPECIAL REQUESTS        Culture result: MRSA NOT PRESENT                   Screening of patient nares for MRSA is for surveillance purposes and, if positive, to facilitate isolation considerations in high risk settings. It is not intended for automatic decolonization interventions per se as regimens are not sufficiently effective to warrant routine use.                 Other pertinent lab: none    Total time spent with patient: 28 Minutes I reviewed chart, notes, data and current medications in the medical record. I have examined and treated the patient at bedside during this period.                  Care Plan discussed with: Patient, Care Manager, Nursing Staff, Consultant/Specialist and >50% of time spent in counseling and coordination of care    Discussed:  Care Plan and D/C Planning    Prophylaxis:  H2B/PPI    Disposition:  SNF/LTC           ___________________________________________________    Attending Physician: Donya Abraham MD

## 2019-07-28 NOTE — PROGRESS NOTES
Bedside and Verbal shift change report given to Raul Armenta  (oncoming nurse) by Tameka Bennett  (offgoing nurse). Report included the following information SBAR and Kardex.

## 2019-07-29 LAB
ANION GAP SERPL CALC-SCNC: 3 MMOL/L (ref 5–15)
BUN SERPL-MCNC: 22 MG/DL (ref 6–20)
BUN/CREAT SERPL: 34 (ref 12–20)
CALCIUM SERPL-MCNC: 7.9 MG/DL (ref 8.5–10.1)
CHLORIDE SERPL-SCNC: 107 MMOL/L (ref 97–108)
CO2 SERPL-SCNC: 30 MMOL/L (ref 21–32)
CREAT SERPL-MCNC: 0.64 MG/DL (ref 0.55–1.02)
ERYTHROCYTE [DISTWIDTH] IN BLOOD BY AUTOMATED COUNT: 19.2 % (ref 11.5–14.5)
GLUCOSE SERPL-MCNC: 94 MG/DL (ref 65–100)
HCT VFR BLD AUTO: 28 % (ref 35–47)
HGB BLD-MCNC: 8.6 G/DL (ref 11.5–16)
MAGNESIUM SERPL-MCNC: 1.9 MG/DL (ref 1.6–2.4)
MCH RBC QN AUTO: 29.9 PG (ref 26–34)
MCHC RBC AUTO-ENTMCNC: 30.7 G/DL (ref 30–36.5)
MCV RBC AUTO: 97.2 FL (ref 80–99)
NRBC # BLD: 0 K/UL (ref 0–0.01)
NRBC BLD-RTO: 0 PER 100 WBC
PLATELET # BLD AUTO: 386 K/UL (ref 150–400)
PMV BLD AUTO: 9.1 FL (ref 8.9–12.9)
POTASSIUM SERPL-SCNC: 4 MMOL/L (ref 3.5–5.1)
PROCALCITONIN SERPL-MCNC: <0.1 NG/ML
RBC # BLD AUTO: 2.88 M/UL (ref 3.8–5.2)
SODIUM SERPL-SCNC: 140 MMOL/L (ref 136–145)
WBC # BLD AUTO: 14.4 K/UL (ref 3.6–11)

## 2019-07-29 PROCEDURE — 74011250637 HC RX REV CODE- 250/637: Performed by: INTERNAL MEDICINE

## 2019-07-29 PROCEDURE — 36415 COLL VENOUS BLD VENIPUNCTURE: CPT

## 2019-07-29 PROCEDURE — 85027 COMPLETE CBC AUTOMATED: CPT

## 2019-07-29 PROCEDURE — 87040 BLOOD CULTURE FOR BACTERIA: CPT

## 2019-07-29 PROCEDURE — 84145 PROCALCITONIN (PCT): CPT

## 2019-07-29 PROCEDURE — 83735 ASSAY OF MAGNESIUM: CPT

## 2019-07-29 PROCEDURE — 97168 OT RE-EVAL EST PLAN CARE: CPT

## 2019-07-29 PROCEDURE — 65660000000 HC RM CCU STEPDOWN

## 2019-07-29 PROCEDURE — 97530 THERAPEUTIC ACTIVITIES: CPT

## 2019-07-29 PROCEDURE — 74011250636 HC RX REV CODE- 250/636: Performed by: INTERNAL MEDICINE

## 2019-07-29 PROCEDURE — 74011250637 HC RX REV CODE- 250/637: Performed by: NURSE PRACTITIONER

## 2019-07-29 PROCEDURE — 80048 BASIC METABOLIC PNL TOTAL CA: CPT

## 2019-07-29 RX ORDER — AMOXICILLIN 250 MG
1 CAPSULE ORAL DAILY
Qty: 20 TAB | Refills: 0 | Status: SHIPPED | OUTPATIENT
Start: 2019-07-29 | End: 2019-08-18

## 2019-07-29 RX ORDER — OXYCODONE HCL 10 MG/1
10 TABLET, FILM COATED, EXTENDED RELEASE ORAL EVERY 12 HOURS
Qty: 20 TAB | Refills: 0 | Status: SHIPPED | OUTPATIENT
Start: 2019-07-29 | End: 2019-08-08

## 2019-07-29 RX ORDER — CEPHALEXIN 500 MG/1
500 CAPSULE ORAL EVERY 8 HOURS
Qty: 9 CAP | Refills: 0 | Status: SHIPPED | OUTPATIENT
Start: 2019-07-29 | End: 2019-07-30

## 2019-07-29 RX ORDER — OXYCODONE HYDROCHLORIDE 5 MG/1
5 TABLET ORAL
Qty: 10 TAB | Refills: 0 | Status: SHIPPED | OUTPATIENT
Start: 2019-07-29 | End: 2019-08-28

## 2019-07-29 RX ORDER — ACETAMINOPHEN 500 MG
500 TABLET ORAL
Qty: 10 TAB | Refills: 0 | Status: SHIPPED | OUTPATIENT
Start: 2019-07-29 | End: 2019-08-08

## 2019-07-29 RX ADMIN — ESCITALOPRAM OXALATE 10 MG: 10 TABLET ORAL at 09:12

## 2019-07-29 RX ADMIN — SENNOSIDES,DOCUSATE SODIUM 1 TABLET: 8.6; 5 TABLET, FILM COATED ORAL at 09:12

## 2019-07-29 RX ADMIN — ACETAMINOPHEN 1000 MG: 500 TABLET ORAL at 22:33

## 2019-07-29 RX ADMIN — CEPHALEXIN 500 MG: 250 CAPSULE ORAL at 22:33

## 2019-07-29 RX ADMIN — OXYCODONE HYDROCHLORIDE 10 MG: 10 TABLET, FILM COATED, EXTENDED RELEASE ORAL at 20:22

## 2019-07-29 RX ADMIN — Medication 10 ML: at 05:33

## 2019-07-29 RX ADMIN — VANCOMYCIN HYDROCHLORIDE 1250 MG: 10 INJECTION, POWDER, LYOPHILIZED, FOR SOLUTION INTRAVENOUS at 11:46

## 2019-07-29 RX ADMIN — ACETAMINOPHEN 1000 MG: 500 TABLET ORAL at 15:31

## 2019-07-29 RX ADMIN — HYDROCODONE BITARTRATE AND ACETAMINOPHEN 1 TABLET: 5; 325 TABLET ORAL at 13:59

## 2019-07-29 RX ADMIN — CEPHALEXIN 500 MG: 250 CAPSULE ORAL at 13:59

## 2019-07-29 RX ADMIN — ACETAMINOPHEN 1000 MG: 500 TABLET ORAL at 07:01

## 2019-07-29 RX ADMIN — Medication 10 ML: at 14:01

## 2019-07-29 RX ADMIN — CEPHALEXIN 500 MG: 250 CAPSULE ORAL at 05:33

## 2019-07-29 RX ADMIN — Medication 10 ML: at 22:32

## 2019-07-29 RX ADMIN — FOLIC ACID 1 MG: 1 TABLET ORAL at 09:13

## 2019-07-29 RX ADMIN — OXYCODONE HYDROCHLORIDE 5 MG: 5 TABLET ORAL at 11:17

## 2019-07-29 RX ADMIN — DEXAMETHASONE 2 MG: 4 TABLET ORAL at 09:13

## 2019-07-29 RX ADMIN — OXYCODONE HYDROCHLORIDE 5 MG: 5 TABLET ORAL at 18:22

## 2019-07-29 NOTE — PROGRESS NOTES
Problem: Mobility Impaired (Adult and Pediatric)  Goal: *Acute Goals and Plan of Care (Insert Text)  Description  Physical Therapy Goals  Re-eval 7/27  1. Patient will move from supine to sit and sit to supine  in bed with independence within 7 day(s). 2.  Patient will transfer from bed to chair and chair to bed with minimal assistance/contact guard assist using the least restrictive device within 7 day(s). 3.  Patient will perform sit to stand with minimal assistance/contact guard assist within 7 day(s). 4.  Patient will ambulate with minimal assistance/contact guard assist for 25 feet with the least restrictive device within 7 day(s). Initiated 7/23/2019  1. Patient will move from supine to sit and sit to supine  in bed with independence within 7 day(s). 2.  Patient will transfer from bed to chair and chair to bed with minimal assistance/contact guard assist using the least restrictive device within 7 day(s). 3.  Patient will perform sit to stand with minimal assistance/contact guard assist within 7 day(s). 4.  Patient will ambulate with minimal assistance/contact guard assist for 25 feet with the least restrictive device within 7 day(s). Outcome: Progressing Towards Goal  Note:   PHYSICAL THERAPY TREATMENT  Patient: Michael Morocho (38 y.o. female)  Date: 7/29/2019  Diagnosis: Hematoma [T14. 8XXA]  Hematoma [T14. 8XXA] Hematoma  Procedure(s) (LRB):  INSERT PPM DUAL (N/A) 3 Days Post-Op  Precautions: Fall(L UE pacemaker)  Chart, physical therapy assessment, plan of care and goals were reviewed. ASSESSMENT:  Pt received in bed, agreeable to sit EOB with CGA. Verbal cues to maintain pacemaker precautions. Max A x2 for sit<>stand, increased anxiety transfers,very fearful of falling, demonstrating severed posterior lean requiring Max A., And max verbal cues for upright posture Pt requested to transfer to [facility]. (+) large BM.  Pt declined to transfer to chair, requesting to return to bed.  Progression toward goals:  ?    Improving appropriately and progressing toward goals  ? Improving slowly and progressing toward goals  ? Not making progress toward goals and plan of care will be adjusted     PLAN:  Patient continues to benefit from skilled intervention to address the above impairments. Continue treatment per established plan of care. Discharge Recommendations:  Skilled Nursing Facility  Further Equipment Recommendations for Discharge:  none      SUBJECTIVE:   Patient stated Vitaly Ty really don;t want to do this.     OBJECTIVE DATA SUMMARY:   Critical Behavior:  Neurologic State: Confused, Alert  Orientation Level: Oriented to person  Cognition: Memory loss     Functional Mobility Training:  Bed Mobility:     Supine to Sit: Contact guard assistance  Sit to Supine: Contact guard assistance           Transfers:  Sit to Stand: Maximum assistance;Assist x2  Stand to Sit: Maximum assistance;Assist x2        Bed to Chair: Maximum assistance; Additional time;Assist x2                    Balance:  Sitting: Intact  Standing: Impaired  Standing - Static: Poor  Standing - Dynamic : Poor  Ambulation/Gait Training:                          Base of Support: Narrowed                             Stairs:              Neuro Re-Education:    Therapeutic Exercises:     Pain:  Pain Scale 1: Numeric (0 - 10)  Pain Intensity 1: 5  Pain Location 1: Leg;Chest(hematoma on right hip and pacemake site on chest)  Pain Orientation 1: Lower;Right;Upper;Left  Pain Description 1: Constant; Throbbing  Pain Intervention(s) 1: Refused; Therapeutic presence; Therapeutic touch  Activity Tolerance:   good  Please refer to the flowsheet for vital signs taken during this treatment. After treatment:   ?    Patient left in no apparent distress sitting up in chair  ? Patient left in no apparent distress in bed  ? Call bell left within reach  ? Nursing notified  ? Caregiver present  ?     Bed alarm activated    COMMUNICATION/COLLABORATION:   The patients plan of care was discussed with: Registered Nurse    Nunu Damon PTA   Time Calculation: 24 mins

## 2019-07-29 NOTE — PROGRESS NOTES
Problem: Self Care Deficits Care Plan (Adult)  Goal: *Acute Goals and Plan of Care (Insert Text)  Description  Occupational Therapy Goals  Re evaluation following pacer placement. 7/29/2019  1. Patient will perform lower body dressing with moderate assistance  within 7 day(s). 2.  Patient will perform upper body dressing at EOB with supervision/set-up within 7 day(s). 3.  Patient will perform sit <> stand to participate in ADL tasks  with minimal assistancewithin 7 day(s). 4.  Patient will perform toilet transfers to [facility] with moderate assistance  within 7 day(s). 5.  Patient will participate in upper extremity therapeutic exercise/activities with supervision/set-up for 8 minutes within 7 day(s). 6. Patient will follow precautions for LUE after pacemaker placement with min verbal cues within 7 days. Initiated 7/25/2019  1. Patient will perform lower body dressing with moderate assistance  within 7 day(s). 2.  Patient will perform upper body dressing at EOB with supervision/set-up within 7 day(s). 3.  Patient will perform sit <> stand to participate in ADL tasks  with minimal assistancewithin 7 day(s). 4.  Patient will perform toilet transfers to [facility] with moderate assistance  within 7 day(s). 5.  Patient will participate in upper extremity therapeutic exercise/activities with supervision/set-up for 8 minutes within 7 day(s). Outcome: Progressing Towards Goal   OCCUPATIONAL THERAPY REEVALUATION  Patient: Yvrose Lyman (71 y.o. female)  Date: 7/29/2019  Diagnosis: Hematoma [T14. 8XXA]  Hematoma [T14. 8XXA] Hematoma  Procedure(s) (LRB):  INSERT PPM DUAL (N/A) 3 Days Post-Op  Precautions: Fall(L UE pacemaker)    ASSESSMENT :  Based on the objective data described below, the patient presents with reevaluation following placement of pacemaker. Patient received supine in bed with HOB elevated slightly. Patient reluctant for activity but with encouragement, agreeable to EOB.   Patient requires max assist x 2 for sit to stand and with increased anxiety and fear of falling. Patient noted with posterior lean and requires max assist to correct. Patient is able to improve activity with distraction. She voices need for toileting and max assist x 2 to [facility]. Patient with large bowel movement and requires max assist for hygiene in standing, but with good effort in sitting for self cleaning. Patient returned to bed, despite encouragement for OOB. Patient will benefit from skilled intervention to address the above impairments. Patients rehabilitation potential is considered to be Good  Factors which may influence rehabilitation potential include:   ? None noted  ? Mental ability/status  ? Medical condition  ? Home/family situation and support systems  ? Safety awareness  ? Pain tolerance/management  ? Other:      PLAN :  Recommendations and Planned Interventions:  ?                  Self Care Training                  ? Therapeutic Activities  ? Functional Mobility Training    ? Cognitive Retraining  ? Therapeutic Exercises           ? Endurance Activities  ? Balance Training                   ? Neuromuscular Re-Education  ? Visual/Perceptual Training     ? Home Safety Training  ? Patient Education                 ? Family Training/Education  ? Other (comment):    Frequency/Duration: Patient will be followed by occupational therapy 5 times a week to address goals. Discharge Recommendations: Skilled Nursing Facility  Further Equipment Recommendations for Discharge: TBD      SUBJECTIVE:   Patient stated I dont want to sit in the chair, but I will gladly sit on the edge of the bed.     OBJECTIVE DATA SUMMARY:   Hospital course since last seen and reason for reevaluation: pacemaker placement   Cognitive/Behavioral Status:  Neurologic State: Alert  Orientation Level: Oriented to person  Cognition: Memory loss;Poor safety awareness;Decreased command following  Perception: Appears intact     Safety/Judgement: Awareness of environment  Skin: bruising R hip  Edema: R hip  Vision/Perceptual:                                     Range of Motion:  UE generally decreased, functional                            Strength:  Generally decreased, functional                 Coordination: Tone & Sensation:  Normal tone, sensation intact                            Balance:  Sitting: Intact  Standing: Impaired  Standing - Static: Poor  Standing - Dynamic : Poor    Functional Mobility and Transfers for ADLs:  Bed Mobility:  Supine to Sit: Contact guard assistance  Sit to Supine: Contact guard assistance    Transfers:  Sit to Stand: Maximum assistance;Assist x2      Bed to Chair: Maximum assistance; Additional time;Assist x2    ADL Assessment:                                          ADL Intervention:  Feeding  Drink to Mouth: Stand-by assistance                        Lower Body Dressing Assistance  Socks: Total assistance (dependent)  Leg Crossed Method Used: No  Position Performed: Supine    Toileting  Toileting Assistance: Maximum assistance  Bowel Hygiene: Maximum assistance  Clothing Management: Maximum assistance    Cognitive Retraining  Safety/Judgement: Awareness of environment    Therapeutic Exercise:    Functional Measure:  Barthel Index:    Bathin  Bladder: 5  Bowels: 5  Groomin  Dressin  Feedin  Mobility: 0  Stairs: 0  Toilet Use: 0  Transfer (Bed to Chair and Back): 5  Total: 25/100        Percentage of impairment   0%   1-19%   20-39%   40-59%   60-79%   80-99%   100%   Barthel Score 0-100 100 99-80 79-60 59-40 20-39 1-19   0     The Barthel ADL Index: Guidelines  1.  The index should be used as a record of what a patient does, not as a record of what a patient could do. 2. The main aim is to establish degree of independence from any help, physical or verbal, however minor and for whatever reason. 3. The need for supervision renders the patient not independent. 4. A patient's performance should be established using the best available evidence. Asking the patient, friends/relatives and nurses are the usual sources, but direct observation and common sense are also important. However direct testing is not needed. 5. Usually the patient's performance over the preceding 24-48 hours is important, but occasionally longer periods will be relevant. 6. Middle categories imply that the patient supplies over 50 per cent of the effort. 7. Use of aids to be independent is allowed. Jaspal Pena., Barthel, D.W. (9856). Functional evaluation: the Barthel Index. [facility] (14)2. Gloria Lim maryan ALFONSO Engel, Mohinder Mac, Kumar Enamorado., [place], [address] [place] [address] (1999). Measuring the change indisability after inpatient rehabilitation; comparison of the responsiveness of the Barthel Index and Functional [place] Measure. Journal of Neurology, Neurosurgery, and Psychiatry, 66(4), 090-578. Alexa Avila, N.J.A, Elena Ford,  RADHA.J.M, & Elsi Nava M.A. (2004.) Assessment of post-stroke quality of life in cost-effectiveness studies: The usefulness of the Barthel Index and the EuroQoL-5D.  Quality of Life Research, 15, 514-10         Occupational Therapy Evaluation Charge Determination   History Examination Decision-Making   LOW Complexity : Brief history review  LOW Complexity : 1-3 performance deficits relating to physical, cognitive , or psychosocial skils that result in activity limitations and / or participation restrictions  LOW Complexity : No comorbidities that affect functional and no verbal or physical assistance needed to complete eval tasks       Based on the above components, the patient evaluation is determined to be of the following complexity level: LOW Pain:  Pain Scale 1: Numeric (0 - 10)  Pain Intensity 1: 5  Pain Location 1: Leg;Chest(hematoma on right hip and pacemake site on chest)  Pain Orientation 1: Lower;Right;Upper;Left  Pain Description 1: Constant; Throbbing  Pain Intervention(s) 1: Refused; Therapeutic presence; Therapeutic touch  Activity Tolerance:   VSS  Please refer to the flowsheet for vital signs taken during this treatment. After treatment:   ? Patient left in no apparent distress sitting up in chair  ? Patient left in no apparent distress in bed  ? Call bell left within reach  ? Nursing notified  ? Caregiver present  ? Bed alarm activated    COMMUNICATION/EDUCATION:   The patients plan of care was discussed with: Physical Therapist and Registered Nurse.  ? Home safety education was provided and the patient/caregiver indicated understanding. ? Patient/family have participated as able in goal setting and plan of care. ?    Patient/family agree to work toward stated goals and plan of care. ?    Patient understands intent and goals of therapy, but is neutral about his/her participation. ? Patient is unable to participate in goal setting and plan of care. This patients plan of care is appropriate for delegation to [facility].     Thank you for this referral.  Yael Somers, OTR/L  Time Calculation: 23 mins

## 2019-07-29 NOTE — PROGRESS NOTES
SUBJECTIVE      Patient reporting significant pain at pacemaker site and noted to have mild-moderate sized hematoma that is firm. Noted to have leukocytosis and started on vancomyin IV empirically.        ACTIVE PROBLEM LIST     Patient Active Problem List    Diagnosis Date Noted    Acute right hip pain     Chronic pain syndrome     Physical debility     Goals of care, counseling/discussion     Hematoma 07/20/2019    Leukocytosis 07/20/2019    Fall 07/20/2019    Abdominal aortic aneurysm (AAA) without rupture ([facility] 12/04/2018    Anemia 12/04/2018    Malignant neoplasm of upper lobe of left lung ([facility] 11/27/2018    Tibial plateau fracture 98/93/8843    Tobacco abuse 08/25/2018    Hypertension 08/25/2018    TIA (transient ischemic attack) 09/30/2016    Slurred speech 09/30/2016    Paroxysmal A-fib ([facility] 09/30/2016    RA (rheumatoid arthritis) ([facility] 09/30/2016    COPD (chronic obstructive pulmonary disease) ([facility]) 09/30/2016          MEDICATIONS     Current Facility-Administered Medications   Medication Dose Route Frequency    vancomycin (VANCOCIN) 1,250 mg in 0.9% sodium chloride 250 mL IVPB  1,250 mg IntraVENous ONCE    [START ON 7/30/2019] vancomycin (VANCOCIN) 1,000 mg in 0.9% sodium chloride (MBP/ADV) 250 mL  1,000 mg IntraVENous Q24H    cephALEXin (KEFLEX) capsule 500 mg  500 mg Oral Q8H    sodium chloride (NS) flush 5-40 mL  5-40 mL IntraVENous Q8H    sodium chloride (NS) flush 5-40 mL  5-40 mL IntraVENous PRN    acetaminophen (TYLENOL) tablet 650 mg  650 mg Oral Q4H PRN    HYDROcodone-acetaminophen (NORCO) 5-325 mg per tablet 1 Tab  1 Tab Oral Q4H PRN    ondansetron (ZOFRAN) injection 4 mg  4 mg IntraVENous Q4H PRN    albuterol 5mg / ipratropium 0.5mg neb solution  1 Dose Nebulization Q4H PRN    0.9% sodium chloride infusion 250 mL  250 mL IntraVENous PRN    folic acid (FOLVITE) tablet 1 mg  1 mg Oral DAILY    oxyCODONE IR (ROXICODONE) tablet 10 mg  10 mg Oral Q3H PRN    acetaminophen (TYLENOL) tablet 1,000 mg  1,000 mg Oral Q8H    senna-docusate (PERICOLACE) 8.6-50 mg per tablet 1 Tab  1 Tab Oral DAILY    naloxone (NARCAN) injection 0.4 mg  0.4 mg IntraVENous EVERY 2 MINUTES AS NEEDED    sodium chloride (NS) flush 5-40 mL  5-40 mL IntraVENous Q8H    sodium chloride (NS) flush 5-40 mL  5-40 mL IntraVENous PRN    ALPRAZolam (XANAX) tablet 0.25 mg  0.25 mg Oral BID PRN    dexAMETHasone (DECADRON) tablet 2 mg  2 mg Oral DAILY AFTER BREAKFAST    escitalopram oxalate (LEXAPRO) tablet 10 mg  10 mg Oral DAILY    oxyCODONE IR (ROXICODONE) tablet 5 mg  5 mg Oral Q3H PRN    oxyCODONE ER (OxyCONTIN) tablet 10 mg  10 mg Oral DAILY          PAST MEDICAL HISTORY     Past Medical History:   Diagnosis Date    AAA (abdominal aortic aneurysm) (HCC)     Arthritis     ra, osteoporosis, osteoarthritis    Atrial fibrillation (HCC)     COPD     HTN (hypertension)     Lung cancer (HCC)     [facility]     Smoker     TIA (transient ischemic attack)     TIA 16           PAST SURGICAL HISTORY     Past Surgical History:   Procedure Laterality Date    HX CHOLECYSTECTOMY      HX HYSTERECTOMY      HX ORTHOPAEDIC      carpal tunnel, wrist surgery          ALLERGIES     Allergies   Allergen Reactions    Codeine Nausea and Vomiting          FAMILY HISTORY     Family History   Problem Relation Age of Onset    Hypertension Father     negative for cardiac disease     SOCIAL HISTORY     Social History     Socioeconomic History    Marital status: SINGLE     Spouse name: Not on file    Number of children: Not on file    Years of education: Not on file    Highest education level: Not on file   Tobacco Use    Smoking status: Former Smoker     Years: 50.00     Last attempt to quit: 2018     Years since quittin.9    Smokeless tobacco: Never Used   Substance and Sexual Activity    Alcohol use: No    Drug use: No    Sexual activity: Not Currently MEDICATIONS     Current Facility-Administered Medications   Medication Dose Route Frequency    vancomycin (VANCOCIN) 1,250 mg in 0.9% sodium chloride 250 mL IVPB  1,250 mg IntraVENous ONCE    [START ON 7/30/2019] vancomycin (VANCOCIN) 1,000 mg in 0.9% sodium chloride (MBP/ADV) 250 mL  1,000 mg IntraVENous Q24H    cephALEXin (KEFLEX) capsule 500 mg  500 mg Oral Q8H    sodium chloride (NS) flush 5-40 mL  5-40 mL IntraVENous Q8H    sodium chloride (NS) flush 5-40 mL  5-40 mL IntraVENous PRN    acetaminophen (TYLENOL) tablet 650 mg  650 mg Oral Q4H PRN    HYDROcodone-acetaminophen (NORCO) 5-325 mg per tablet 1 Tab  1 Tab Oral Q4H PRN    ondansetron (ZOFRAN) injection 4 mg  4 mg IntraVENous Q4H PRN    albuterol 5mg / ipratropium 0.5mg neb solution  1 Dose Nebulization Q4H PRN    0.9% sodium chloride infusion 250 mL  250 mL IntraVENous PRN    folic acid (FOLVITE) tablet 1 mg  1 mg Oral DAILY    oxyCODONE IR (ROXICODONE) tablet 10 mg  10 mg Oral Q3H PRN    acetaminophen (TYLENOL) tablet 1,000 mg  1,000 mg Oral Q8H    senna-docusate (PERICOLACE) 8.6-50 mg per tablet 1 Tab  1 Tab Oral DAILY    naloxone (NARCAN) injection 0.4 mg  0.4 mg IntraVENous EVERY 2 MINUTES AS NEEDED    sodium chloride (NS) flush 5-40 mL  5-40 mL IntraVENous Q8H    sodium chloride (NS) flush 5-40 mL  5-40 mL IntraVENous PRN    ALPRAZolam (XANAX) tablet 0.25 mg  0.25 mg Oral BID PRN    dexAMETHasone (DECADRON) tablet 2 mg  2 mg Oral DAILY AFTER BREAKFAST    escitalopram oxalate (LEXAPRO) tablet 10 mg  10 mg Oral DAILY    oxyCODONE IR (ROXICODONE) tablet 5 mg  5 mg Oral Q3H PRN    oxyCODONE ER (OxyCONTIN) tablet 10 mg  10 mg Oral DAILY       I have reviewed the nurses notes, vitals, problem list, allergy list, medical history, family, social history and medications.        REVIEW OF SYMPTOMS      General: generalized weakness  HEENT: Denies blurred vision, headaches, hearing loss, epistaxis and difficulty swallowing. Respiratory: Denies cough, congestion, shortness of breath, HERMAN, wheezing or stridor. Cardiovascular: Denies precordial pain, palpitations, edema or PND but has pain at pacemaker site  Gastrointestinal: Denies poor appetite, indigestion, abdominal pain or blood in stool  Genitourinary: Denies hematuria, dysuria, increased urinary frequency  Musculoskeletal: Denies joint pain or swelling from muscles or joints  Neurologic: Denies tremor, paresthesias, headache, or sensory motor disturbance  Psychiatric: Denies confusion, insomnia, depression  Integumentray: Denies rash, itching or ulcers. Hematologic: Denies easy bruising, bleeding       PHYSICAL EXAMINATION      Vitals:    07/28/19 2039 07/29/19 0700 07/29/19 0743 07/29/19 1156   BP:   139/68 130/58   Pulse:  63 60 66   Resp: 16  18 18   Temp:   97.9 °F (36.6 °C) 97.9 °F (36.6 °C)   SpO2: 95%  95% 91%   Weight:       Height:         General: Elderly very frail female  HEENT: No jaundice, oral mucosa moist, no oral ulcers  Neck: Supple, no stiffness, no lymphadenopathy, supple  Heart:  Normal S1/S2 negative S3 or S4. Regular, no murmur, gallop or rub, no jugular venous distention; pacemaker implant site with swelling/tenderness to slight touch  Respiratory: Clear bilaterally x 4, no wheezing or rales  Abdomen:   Soft, non-tender, bowel sounds are active.   Extremities:  No edema, normal cap refill, no cyanosis. Musculoskeletal: No clubbing, no deformities  Neuro: A&Ox3, speech clear, gait stable, cooperative, no focal neurologic deficits  Skin: Skin color is normal. No rashes or lesions.  Non diaphoretic, moist.  Vascular: 2+ pulses symmetric in all extremities           LABORATORY DATA      Lab Results   Component Value Date/Time    WBC 14.4 (H) 07/29/2019 02:31 AM    HGB 8.6 (L) 07/29/2019 02:31 AM    HCT 28.0 (L) 07/29/2019 02:31 AM    PLATELET 607 71/44/7997 02:31 AM    MCV 97.2 07/29/2019 02:31 AM      Lab Results   Component Value Date/Time Sodium 140 07/29/2019 02:31 AM    Potassium 4.0 07/29/2019 02:31 AM    Chloride 107 07/29/2019 02:31 AM    CO2 30 07/29/2019 02:31 AM    Anion gap 3 (L) 07/29/2019 02:31 AM    Glucose 94 07/29/2019 02:31 AM    BUN 22 (H) 07/29/2019 02:31 AM    Creatinine 0.64 07/29/2019 02:31 AM    BUN/Creatinine ratio 34 (H) 07/29/2019 02:31 AM    GFR est AA >60 07/29/2019 02:31 AM    GFR est non-AA >60 07/29/2019 02:31 AM    Calcium 7.9 (L) 07/29/2019 02:31 AM    Bilirubin, total 0.4 07/20/2019 07:30 PM    AST (SGOT) 15 07/20/2019 07:30 PM    Alk. phosphatase 50 07/20/2019 07:30 PM    Protein, total 7.6 07/20/2019 07:30 PM    Albumin 3.4 (L) 07/20/2019 07:30 PM    Globulin 4.2 (H) 07/20/2019 07:30 PM    A-G Ratio 0.8 (L) 07/20/2019 07:30 PM    ALT (SGPT) 13 07/20/2019 07:30 PM           ASSESSMENT      1. Bradycardia  2. Syncope  3. Hematoma  4. COPD  5. Hypertension   6. Pacemaker   A. Dual chamber   B. Left sided   C. [employer]  7. Pacemaker pocket hematoma  8. Leukocytosis         PLAN     Timing of symptoms are more consistent with hematoma causing pain rather than acute infection at site. Opted to hold off on removing dressing for now given tenderness at site. Would treat with ice packs to site for now (20 minutes QID) and monitor for continued enlargement. If continues to enlarge, will possibly require hematoma evacuation; hopeful to avoid this however. Will follow closely.          Karon Edmonds MD  Cardiac Electrophysiology / Cardiology    [facility]  [address] [facility], [facility], [address] [place], [place] [address]Linda menjivar[address]  [phone] / [phone] Fax   [phone] / [phone] Fax

## 2019-07-29 NOTE — PROGRESS NOTES
Bedside and Verbal shift change report given to Tian (oncoming nurse) by Nabila Fowler (offgoing nurse). Report included the following information SBAR, Kardex, ED Summary, OR Summary, Procedure Summary, Intake/Output, MAR and Recent Results.

## 2019-07-29 NOTE — PROGRESS NOTES
[facility]    Medical Progress Note      NAME: Rasheeda    :  1931  MRM:  [id number]    Date/Time: 2019  2:28 PM          Assessment and Plan:     Bradycardia / Paroxysmal A-fib / Hypertension - Christine Ortega despite stopping digoxin. Cardiology consult appreciated. PPM placed . Had ivana-op Ancef/Keflex but today severe point tenderness and surrounding erythema. I fear infection and will start vancomycin. Check blood cx and procalcitonin. Rate is better. Off Eliquis due to internal bleeding. Hold methotrexate due to acute wound and potential infection. Acute blood loss anemia / Hematoma on RT gluteal area / Acute right hip pain - POA, due to trauma on eliquis. Required transfusion to keep Hb>7. General surgery recomend conservative care    Debilitated patient / Physical debility / Fall / Chronic pain syndrome - Fall precautions. PT/OT eval.  Needs SNF. Her discharge has been delayed by 2 days over the weekend, awaiting SNF decision and insurance approval. Continue low dose decadron. Would benefit from weaning off oxycontin, as this worsens her balance and safety    Dementia / Anxiety - No official Dx but appears to me on interview. Narcotics cloud her mentation as well. Would benefit from outpatient neurosych eval.  Continue lexapro. Xanax and narcotics are a poor choice for chronic management. Dom Serrano COPD (chronic obstructive pulmonary disease) - Stable. Not wheezing. Continue nebs PRN      Malignant neoplasm of upper lobe of left lung - Stable. Follow up with Dr Rodriguez      Abdominal aortic aneurysm (AAA) without rupture - Outpatient FU     Leukocytosis - POA, Doubt infection. Subjective:     Chief Complaint:  Occasional delirium. Intermittent complaints of general pain, plus worsening pain at [facility] [place] site. Anxiety.     ROS:  (bold if positive, if negative)    Tolerating minimal PT Tolerating some Diet        Objective:     Last 24hrs VS reviewed since prior progress note. Most recent are:    Visit Vitals  /68 (BP 1 Location: Right arm, BP Patient Position: At rest)   Pulse 60   Temp 97.9 °F (36.6 °C)   Resp 18   Ht 5' 7\" (1.702 m)   Wt 57 kg (125 lb 10.6 oz)   SpO2 95%   Breastfeeding?  No   BMI 19.68 kg/m²     SpO2 Readings from Last 6 Encounters:   07/29/19 95%   07/03/19 95%   07/02/19 96%   06/20/19 93%   06/17/19 97%   06/12/19 97%            Intake/Output Summary (Last 24 hours) at 7/29/2019 0919  Last data filed at 7/28/2019 1652  Gross per 24 hour   Intake 540 ml   Output    Net 540 ml        Physical Exam:    Gen:  Thin, frail, in mild acute distress  HEENT:  Pink conjunctivae, PERRL, hearing intact to voice, moist mucous membranes  Neck:  Supple, without masses, thyroid non-tender  Resp:  No accessory muscle use, clear breath sounds without wheezes rales or rhonchi  Card:  No murmurs, normal S1, S2 without thrills, bruits or peripheral edema  Abd:  Soft, non-tender, non-distended, normoactive bowel sounds are present, no mass  Lymph:  No cervical or inguinal adenopathy  Musc:  No cyanosis or clubbing  Skin:  No rashes or ulcers, skin turgor is good  Neuro:  Cranial nerves are grossly intact, general motor weakness, follows commands vaguely  Psych:  Poor insight, oriented to person, place     Telemetry reviewed:   normal sinus rhythm, paced  __________________________________________________________________  Medications Reviewed: (see below)  Medications:     Current Facility-Administered Medications   Medication Dose Route Frequency    vancomycin (VANCOCIN) 1,500 mg in 0.9% sodium chloride 500 mL IVPB  1,500 mg IntraVENous Q24H    cephALEXin (KEFLEX) capsule 500 mg  500 mg Oral Q8H    sodium chloride (NS) flush 5-40 mL  5-40 mL IntraVENous Q8H    sodium chloride (NS) flush 5-40 mL  5-40 mL IntraVENous PRN    acetaminophen (TYLENOL) tablet 650 mg  650 mg Oral Q4H PRN    HYDROcodone-acetaminophen (NORCO) 5-325 mg per tablet 1 Tab  1 Tab Oral Q4H PRN    ondansetron (ZOFRAN) injection 4 mg  4 mg IntraVENous Q4H PRN    albuterol 5mg / ipratropium 0.5mg neb solution  1 Dose Nebulization Q4H PRN    0.9% sodium chloride infusion 250 mL  250 mL IntraVENous PRN    folic acid (FOLVITE) tablet 1 mg  1 mg Oral DAILY    oxyCODONE IR (ROXICODONE) tablet 10 mg  10 mg Oral Q3H PRN    acetaminophen (TYLENOL) tablet 1,000 mg  1,000 mg Oral Q8H    senna-docusate (PERICOLACE) 8.6-50 mg per tablet 1 Tab  1 Tab Oral DAILY    naloxone (NARCAN) injection 0.4 mg  0.4 mg IntraVENous EVERY 2 MINUTES AS NEEDED    sodium chloride (NS) flush 5-40 mL  5-40 mL IntraVENous Q8H    sodium chloride (NS) flush 5-40 mL  5-40 mL IntraVENous PRN    ALPRAZolam (XANAX) tablet 0.25 mg  0.25 mg Oral BID PRN    dexAMETHasone (DECADRON) tablet 2 mg  2 mg Oral DAILY AFTER BREAKFAST    escitalopram oxalate (LEXAPRO) tablet 10 mg  10 mg Oral DAILY    oxyCODONE IR (ROXICODONE) tablet 5 mg  5 mg Oral Q3H PRN    oxyCODONE ER (OxyCONTIN) tablet 10 mg  10 mg Oral DAILY        Lab Data Reviewed: (see below)  Lab Review:     Recent Labs     07/29/19 0231 07/27/19  0410   WBC 14.4* 14.3*   HGB 8.6* 8.8*   HCT 28.0* 28.6*    374     Recent Labs     07/29/19 0231 07/27/19  0410    140   K 4.0 4.7    105   CO2 30 30   GLU 94 79   BUN 22* 23*   CREA 0.64 0.81   CA 7.9* 8.0*   MG 1.9  --      Lab Results   Component Value Date/Time    Glucose (POC) 103 (H) 07/25/2019 07:55 AM    Glucose (POC) 93 10/01/2016 11:33 AM    Glucose (POC) 91 10/01/2016 07:33 AM     No results for input(s): PH, PCO2, PO2, HCO3, FIO2 in the last 72 hours. No results for input(s): INR in the last 72 hours.     No lab exists for component: [place]  All Micro Results     Procedure Component Value Units Date/Time    CULTURE, BLOOD, PAIRED [[id number]]     Order Status:  Sent Specimen:  Blood     CULTURE, MRSA [[id number]] Collected:  07/25/19 0824    Order Status:  Completed Specimen:  Nares Updated:  07/26/19 1252     Special Requests: NO SPECIAL REQUESTS        Culture result: MRSA NOT PRESENT                   Screening of patient nares for MRSA is for surveillance purposes and, if positive, to facilitate isolation considerations in high risk settings. It is not intended for automatic decolonization interventions per se as regimens are not sufficiently effective to warrant routine use. Other pertinent lab: none    Total time spent with patient: 35 Minutes I reviewed chart, notes, data and current medications in the medical record. I have examined and treated the patient at bedside during this period.                  Care Plan discussed with: Patient, Care Manager, Nursing Staff, Consultant/Specialist and >50% of time spent in counseling and coordination of care    Discussed:  Care Plan and D/C Planning    Prophylaxis:  H2B/PPI    Disposition:  SNF/LTC           ___________________________________________________    Attending Physician: Devon Conway MD

## 2019-07-29 NOTE — PROGRESS NOTES
7/29/2019  12:48 PM    [facility] sent on 7/26/19, CM called to check on status of referral, left a voicemail message for admissions coordinator Shilpa Chambers requesting a return call with an update.   JU Horowitz

## 2019-07-29 NOTE — PROGRESS NOTES
[facility] Dosing Services: Antimicrobial Stewardship Daily Doc    Automatic consult for antibiotic dosing of Vancomycin by Dr. Nima Henderson  Indication: Skin and soft tissue infection;  PPM placed 7/26. Had tana-op Ancef/Keflex but today severe point tenderness and surrounding erythema, therefore adding Vanc. Blood cx and procal pending. Day of Therapy: 1    Ht Readings from Last 1 Encounters:   07/25/19 170.2 cm (67\")        Wt Readings from Last 1 Encounters:   07/25/19 57 kg (125 lb 10.6 oz)          Vancomycin therapy:  Loading dose: 1250mg IV x 1 dose at 1100  Maintenance dose: 1000(mg) every 24 hours   Dose calculated to approximate a therapeutic trough of 10-15mcg/mL. Last trough level: New start   Plan for level / Adjustment in Therapy: Plan for trough prior to 3rd dose  Dose administration notes:   New start    Date Dose & Interval Measured (mcg/mL) Extrapolated (mcg/mL)   ? ? ? ?   ? ? ? ?   ? ? ? ? Other Antimicrobial   (not dosed by pharmacist) Keflex 500mg PO Q8hrs to stop 8/1 at 1400   Cultures 7/29 Blood pending   Serum Creatinine Lab Results   Component Value Date/Time    Creatinine 0.64 07/29/2019 02:31 AM    Creatinine (POC) 0.7 06/19/2019 01:32 PM         Creatinine Clearance Estimated Creatinine Clearance: 55.7 mL/min (based on SCr of 0.64 mg/dL).      Temp Temp: 97.9 °F (36.6 °C)       WBC Lab Results   Component Value Date/Time    WBC 14.4 (H) 07/29/2019 02:31 AM        H/H Lab Results   Component Value Date/Time    HGB 8.6 (L) 07/29/2019 02:31 AM        Platelets    Lab Results   Component Value Date/Time    PLATELET 046 45/23/7876 02:31 AM              Pharmacist Kely Reyna

## 2019-07-29 NOTE — PROGRESS NOTES
Bedside and Verbal shift change report given to Jacob Prado RN (oncoming nurse) by Ilana Serrano RN (offgoing nurse). Report included the following information SBAR, Kardex, Procedure Summary, Intake/Output, MAR and Recent Results.

## 2019-07-30 PROCEDURE — 97530 THERAPEUTIC ACTIVITIES: CPT

## 2019-07-30 PROCEDURE — 74011250637 HC RX REV CODE- 250/637: Performed by: INTERNAL MEDICINE

## 2019-07-30 PROCEDURE — 74011250636 HC RX REV CODE- 250/636: Performed by: INTERNAL MEDICINE

## 2019-07-30 PROCEDURE — 74011250637 HC RX REV CODE- 250/637: Performed by: NURSE PRACTITIONER

## 2019-07-30 PROCEDURE — 65660000000 HC RM CCU STEPDOWN

## 2019-07-30 RX ORDER — CEPHALEXIN 500 MG/1
500 CAPSULE ORAL EVERY 8 HOURS
Qty: 6 CAP | Refills: 0 | Status: SHIPPED | OUTPATIENT
Start: 2019-07-30 | End: 2019-08-01

## 2019-07-30 RX ADMIN — ACETAMINOPHEN 1000 MG: 500 TABLET ORAL at 08:49

## 2019-07-30 RX ADMIN — OXYCODONE HYDROCHLORIDE 10 MG: 5 TABLET ORAL at 17:10

## 2019-07-30 RX ADMIN — ACETAMINOPHEN 1000 MG: 500 TABLET ORAL at 15:12

## 2019-07-30 RX ADMIN — SENNOSIDES,DOCUSATE SODIUM 1 TABLET: 8.6; 5 TABLET, FILM COATED ORAL at 08:44

## 2019-07-30 RX ADMIN — Medication 10 ML: at 06:57

## 2019-07-30 RX ADMIN — ALPRAZOLAM 0.25 MG: 0.25 TABLET ORAL at 16:22

## 2019-07-30 RX ADMIN — FOLIC ACID 1 MG: 1 TABLET ORAL at 08:44

## 2019-07-30 RX ADMIN — OXYCODONE HYDROCHLORIDE 10 MG: 5 TABLET ORAL at 10:04

## 2019-07-30 RX ADMIN — ESCITALOPRAM OXALATE 10 MG: 10 TABLET ORAL at 08:44

## 2019-07-30 RX ADMIN — CEPHALEXIN 500 MG: 250 CAPSULE ORAL at 22:51

## 2019-07-30 RX ADMIN — Medication 10 ML: at 16:24

## 2019-07-30 RX ADMIN — CEPHALEXIN 500 MG: 250 CAPSULE ORAL at 06:53

## 2019-07-30 RX ADMIN — OXYCODONE HYDROCHLORIDE 10 MG: 5 TABLET ORAL at 13:48

## 2019-07-30 RX ADMIN — ACETAMINOPHEN 1000 MG: 500 TABLET ORAL at 22:51

## 2019-07-30 RX ADMIN — CEPHALEXIN 500 MG: 250 CAPSULE ORAL at 15:12

## 2019-07-30 RX ADMIN — OXYCODONE HYDROCHLORIDE 10 MG: 10 TABLET, FILM COATED, EXTENDED RELEASE ORAL at 20:39

## 2019-07-30 RX ADMIN — DEXAMETHASONE 2 MG: 4 TABLET ORAL at 08:44

## 2019-07-30 RX ADMIN — VANCOMYCIN HYDROCHLORIDE 1000 MG: 1 INJECTION, POWDER, LYOPHILIZED, FOR SOLUTION INTRAVENOUS at 12:47

## 2019-07-30 RX ADMIN — OXYCODONE HYDROCHLORIDE 10 MG: 5 TABLET ORAL at 02:29

## 2019-07-30 RX ADMIN — OXYCODONE HYDROCHLORIDE 10 MG: 5 TABLET ORAL at 06:59

## 2019-07-30 NOTE — PROGRESS NOTES
[facility]    Medical Progress Note      NAME: Meaghan Patterson   :  1931  MRM:  [id number]    Date/Time: 2019  8:53 AM        Assessment and Plan:     Bradycardia / Paroxysmal A-fib / Hypertension - [place] persisted after admission despite stopping digoxin. Cardiology consult appreciated. PPM placed . Had ivana-op Ancef/Keflex but  severe point tenderness and surrounding erythema. I feared infection and started vancomycin. So far NGTD on blood cx and negative procalcitonin. So hopefully no infection, and she can go to rehab now if cleared by cardiology. Heart ate is stable with pacer. Off Eliquis due to internal bleeding. Holding methotrexate due to acute wound and potential infection. Acute blood loss anemia / Hematoma on RT gluteal area / Acute right hip pain - POA, due to trauma on eliquis. Required transfusion to keep Hb>7. General surgery recomend conservative care. She is gradually improving    Debilitated patient / Physical debility / Fall / Chronic pain syndrome - Fall precautions. PT/OT eval.  Needs SNF. Her discharge has been delayed by 3 days due to the weekend, awaiting SNF decision and insurance approval. Continue low dose decadron. Would benefit from weaning off oxycontin, as this worsens her balance and safety    Dementia / Anxiety - No official Dx but appears to me on interview. Narcotics cloud her mentation as well. Would benefit from outpatient neurosych eval.  Continue lexapro. Xanax and narcotics are a poor choice for chronic management. Carolina Murphy COPD (chronic obstructive pulmonary disease) - Stable. Not wheezing. Continue nebs PRN      Malignant neoplasm of upper lobe of left lung - Stable. Follow up with Dr Rodriguez      Abdominal aortic aneurysm (AAA) without rupture - Outpatient FU     Leukocytosis - POA, Doubt infection. Subjective:     Chief Complaint:  Fewer Intermittent complaints of general pain, plus improvement of pain at [facility] site. Anxiety. ROS:  (bold if positive, if negative)    Tolerating minimal PT Tolerating some Diet        Objective:     Last 24hrs VS reviewed since prior progress note. Most recent are:    Visit Vitals  /67 (BP 1 Location: Right arm)   Pulse 60   Temp 98.3 °F (36.8 °C)   Resp 16   Ht 5' 7\" (1.702 m)   Wt 57 kg (125 lb 10.6 oz)   SpO2 94%   Breastfeeding? No   BMI 19.68 kg/m²     SpO2 Readings from Last 6 Encounters:   07/30/19 94%   07/03/19 95%   07/02/19 96%   06/20/19 93%   06/17/19 97%   06/12/19 97%            Intake/Output Summary (Last 24 hours) at 7/30/2019 0853  Last data filed at 7/30/2019 0152  Gross per 24 hour   Intake 350 ml   Output 150 ml   Net 200 ml        Physical Exam:    Gen:  Thin, frail, in mild acute distress  HEENT:  Pink conjunctivae, PERRL, hearing intact to voice, moist mucous membranes  Neck:  Supple, without masses, thyroid non-tender  Resp:  No accessory muscle use, clear breath sounds without wheezes rales or rhonchi  Card:  No murmurs, normal S1, S2 without thrills, bruits or peripheral edema  Abd:  Soft, non-tender, non-distended, normoactive bowel sounds are present, no mass  Lymph:  No cervical or inguinal adenopathy  Musc:  No cyanosis or clubbing  Skin:  No rashes or ulcers, skin turgor is good  Neuro:  Cranial nerves are grossly intact, general motor weakness, follows commands   Psych:   Moderate insight, oriented to person, place     Telemetry reviewed:   normal sinus rhythm, paced  __________________________________________________________________  Medications Reviewed: (see below)  Medications:     Current Facility-Administered Medications   Medication Dose Route Frequency    vancomycin (VANCOCIN) 1,000 mg in 0.9% sodium chloride (MBP/ADV) 250 mL  1,000 mg IntraVENous Q24H    cephALEXin (KEFLEX) capsule 500 mg  500 mg Oral Q8H    sodium chloride (NS) flush 5-40 mL  5-40 mL IntraVENous Q8H    sodium chloride (NS) flush 5-40 mL  5-40 mL IntraVENous PRN    acetaminophen (TYLENOL) tablet 650 mg  650 mg Oral Q4H PRN    HYDROcodone-acetaminophen (NORCO) 5-325 mg per tablet 1 Tab  1 Tab Oral Q4H PRN    ondansetron (ZOFRAN) injection 4 mg  4 mg IntraVENous Q4H PRN    albuterol 5mg / ipratropium 0.5mg neb solution  1 Dose Nebulization Q4H PRN    0.9% sodium chloride infusion 250 mL  250 mL IntraVENous PRN    folic acid (FOLVITE) tablet 1 mg  1 mg Oral DAILY    oxyCODONE IR (ROXICODONE) tablet 10 mg  10 mg Oral Q3H PRN    acetaminophen (TYLENOL) tablet 1,000 mg  1,000 mg Oral Q8H    senna-docusate (PERICOLACE) 8.6-50 mg per tablet 1 Tab  1 Tab Oral DAILY    naloxone (NARCAN) injection 0.4 mg  0.4 mg IntraVENous EVERY 2 MINUTES AS NEEDED    sodium chloride (NS) flush 5-40 mL  5-40 mL IntraVENous Q8H    sodium chloride (NS) flush 5-40 mL  5-40 mL IntraVENous PRN    ALPRAZolam (XANAX) tablet 0.25 mg  0.25 mg Oral BID PRN    dexAMETHasone (DECADRON) tablet 2 mg  2 mg Oral DAILY AFTER BREAKFAST    escitalopram oxalate (LEXAPRO) tablet 10 mg  10 mg Oral DAILY    oxyCODONE IR (ROXICODONE) tablet 5 mg  5 mg Oral Q3H PRN    oxyCODONE ER (OxyCONTIN) tablet 10 mg  10 mg Oral DAILY        Lab Data Reviewed: (see below)  Lab Review:     Recent Labs     07/29/19  0231   WBC 14.4*   HGB 8.6*   HCT 28.0*        Recent Labs     07/29/19  0231      K 4.0      CO2 30   GLU 94   BUN 22*   CREA 0.64   CA 7.9*   MG 1.9     Lab Results   Component Value Date/Time    Glucose (POC) 103 (H) 07/25/2019 07:55 AM    Glucose (POC) 93 10/01/2016 11:33 AM    Glucose (POC) 91 10/01/2016 07:33 AM     No results for input(s): PH, PCO2, PO2, HCO3, FIO2 in the last 72 hours. No results for input(s): INR in the last 72 hours.     No lab exists for component: [place]  All Micro Results     Procedure Component Value Units Date/Time    CULTURE, BLOOD, PAIRED [[id number]] Collected:  07/29/19 1046    Order Status:  Completed Specimen:  Blood Updated:  07/30/19 5661 Special Requests: NO SPECIAL REQUESTS        Culture result: NO GROWTH AFTER 17 HOURS       CULTURE, MRSA [[id number]] Collected:  07/25/19 0824    Order Status:  Completed Specimen:  Nares Updated:  07/26/19 1252     Special Requests: NO SPECIAL REQUESTS        Culture result: MRSA NOT PRESENT                   Screening of patient nares for MRSA is for surveillance purposes and, if positive, to facilitate isolation considerations in high risk settings. It is not intended for automatic decolonization interventions per se as regimens are not sufficiently effective to warrant routine use. Other pertinent lab: none    Total time spent with patient: 35 Minutes I reviewed chart, notes, data and current medications in the medical record. I have examined and treated the patient at bedside during this period.                  Care Plan discussed with: Patient, Care Manager, Nursing Staff, Consultant/Specialist and >50% of time spent in counseling and coordination of care    Discussed:  Care Plan and D/C Planning    Prophylaxis:  H2B/PPI    Disposition:  SNF/LTC           ___________________________________________________    Attending Physician: Deisy Harvey MD

## 2019-07-30 NOTE — ROUTINE PROCESS
Interdisciplinary team rounds were held 7/30/2019 with the following team members:Care Management, Nursing, Nutrition, Pharmacy, Physical Therapy and Physician. Plan of care discussed. See clinical pathway and/or care plan for interventions and desired outcomes. Plan: waiting for acceptance. Possible dc tomorrow.

## 2019-07-30 NOTE — PROGRESS NOTES
Cardiology Progress Note       5th floor   NAME:  Rhett Burnham   :   1931   MRN:   [id number]     Assessment/Plan:   1. S/P PPM for symptomatic bradycardia: cont po kefelx to complelte 5 day course. Stop Vanc. OP follow up for wound ck as previously scheduled on 19. [address] Gabbie Martell for d/c to rehab           Subjective:   Yesterday patient reporting significant pain at pacemaker site and noted to have mild-moderate sized hematoma that is firm. Noted to have leukocytosis and started on vancomyin IV empirically. Today pain is better per pt localized in axilla and pacer incision.            Cardiac ROS: Patient denies any exertional chest pain, dyspnea, palpitations, syncope, orthopnea, edema or paroxysmal nocturnal dyspnea. Previous Cardiac Eval  19   ECHO ADULT COMPLETE 2019    Narrative · Left Ventricle: Normal systolic function (ejection fraction normal). Upper normal wall thickness. Estimated left ventricular ejection fraction   is 61 - 65%. · Mitral Valve: Mild mitral annular calcification. Mild mitral valve   regurgitation. · Tricuspid Valve: Mild to moderate tricuspid valve regurgitation is   present. · Pulmonic Valve: Mild pulmonic valve regurgitation is present. · Pericardium: Pericardial effusion. Signed by: Feliciano Degroot MD         Review of Systems: No nausea, indigestion, vomiting, cough, sputum. No bleeding. Taking po. Objective:     Visit Vitals  /67 (BP 1 Location: Right arm)   Pulse 60   Temp 98.3 °F (36.8 °C)   Resp 16   Ht 5' 7\" (1.702 m)   Wt 125 lb 10.6 oz (57 kg)   SpO2 94%   Breastfeeding? No   BMI 19.68 kg/m²      O2 Device: Room air    Temp (24hrs), Av.9 °F (36.6 °C), Min:97.7 °F (36.5 °C), Max:98.3 °F (36.8 °C)      No intake/output data recorded.  1901 -  0700  In: 350 [P.O.:100; I.V.:250]  Out: 150 [Urine:150]  TELE: paced 60     General: AAOx3 cooperative, no acute distress.   HEENT: Atraumatic. Pink and moist.  Anicteric sclerae. Neck : Supple,    Lungs: CTA bilaterally. No wheezing/rhonchi/rales. Heart: Regular rhythm, no murmur, Pacer site L chest with mild erythema that extends to L of PPM dressing, mild erythema R axilla, firm hematoma under bioocclusive dsg. Abdomen: Soft, non-distended, non-tender. + Bowel sounds. Extremities: No edema  Neurologic: Grossly intact. Alert and oriented X 3. No acute neurological distress. Psych: Fair insight. Anxious        Care Plan discussed with:    Comments   Patient x    Family      RN     Care Manager                    Consultant:  manuel attending       Data Review:     No lab exists for component: ITNL   No results for input(s): CPK, CKMB, TROIQ in the last 72 hours. Recent Labs     07/29/19  0231      K 4.0      CO2 30   BUN 22*   CREA 0.64   GLU 94   MG 1.9   WBC 14.4*   HGB 8.6*   HCT 28.0*        No results for input(s): INR, PTP, APTT in the last 72 hours.     No lab exists for component: INREXT    Medications reviewed  Current Facility-Administered Medications   Medication Dose Route Frequency    vancomycin (VANCOCIN) 1,000 mg in 0.9% sodium chloride (MBP/ADV) 250 mL  1,000 mg IntraVENous Q24H    cephALEXin (KEFLEX) capsule 500 mg  500 mg Oral Q8H    sodium chloride (NS) flush 5-40 mL  5-40 mL IntraVENous Q8H    sodium chloride (NS) flush 5-40 mL  5-40 mL IntraVENous PRN    acetaminophen (TYLENOL) tablet 650 mg  650 mg Oral Q4H PRN    HYDROcodone-acetaminophen (NORCO) 5-325 mg per tablet 1 Tab  1 Tab Oral Q4H PRN    ondansetron (ZOFRAN) injection 4 mg  4 mg IntraVENous Q4H PRN    albuterol 5mg / ipratropium 0.5mg neb solution  1 Dose Nebulization Q4H PRN    0.9% sodium chloride infusion 250 mL  250 mL IntraVENous PRN    folic acid (FOLVITE) tablet 1 mg  1 mg Oral DAILY    oxyCODONE IR (ROXICODONE) tablet 10 mg  10 mg Oral Q3H PRN    acetaminophen (TYLENOL) tablet 1,000 mg  1,000 mg Oral Q8H    senna-docusate (PERICOLACE) 8.6-50 mg per tablet 1 Tab  1 Tab Oral DAILY    naloxone (NARCAN) injection 0.4 mg  0.4 mg IntraVENous EVERY 2 MINUTES AS NEEDED    sodium chloride (NS) flush 5-40 mL  5-40 mL IntraVENous Q8H    sodium chloride (NS) flush 5-40 mL  5-40 mL IntraVENous PRN    ALPRAZolam (XANAX) tablet 0.25 mg  0.25 mg Oral BID PRN    dexAMETHasone (DECADRON) tablet 2 mg  2 mg Oral DAILY AFTER BREAKFAST    escitalopram oxalate (LEXAPRO) tablet 10 mg  10 mg Oral DAILY    oxyCODONE IR (ROXICODONE) tablet 5 mg  5 mg Oral Q3H PRN    oxyCODONE ER (OxyCONTIN) tablet 10 mg  10 mg Oral DAILY         Hollie Velasco NP

## 2019-07-30 NOTE — DISCHARGE SUMMARY
Physician Discharge Summary     Patient ID:  Bernice De La Rosa  [id number]  62 y.o.  12/7/1931    Admit date: 7/20/2019    Discharge date and time: 7/31/2019    Admission Diagnoses: Hematoma [T14. 8XXA]  Hematoma [T14. 8XXA]    Discharge Diagnoses:    Principal Diagnosis   Hematoma                                             Other Diagnoses    Paroxysmal A-fib ([facility] [address] (9/30/2016)    COPD (chronic obstructive pulmonary disease) ([facility] [address] [facility] (9/30/2016)    Hypertension (8/25/2018)    Malignant neoplasm of upper lobe of left lung ([facility] [address] (11/27/2018)    Abdominal aortic aneurysm (AAA) without rupture ([facility] [address] (12/4/2018)    Leukocytosis (7/20/2019)    Fall (7/20/2019)    Acute right hip pain ()    Chronic pain syndrome ()    Physical debility ()    Goals of care, counseling/discussion ()     Hospital Course:  Bradycardia / Paroxysmal A-fib / Hypertension - Therese Hugo persisted after admission despite stopping digoxin. Cardiology consult appreciated. PPM placed 7/26. Heart rate is stable with pacer. Off Eliquis due to internal bleeding.      Shoulder pain - Post op pain at site of PPM, with redness. Had ivana-op Ancef/Keflex, but 7/29 severe point tenderness and surrounding erythema. I feared infection and started vancomycin. 1/4 bottles with GPC in clusters on blood cx drawn 7/29, but negative procalcitonin. A MRSA screen had been negative. Site less painful and red this AM.  So hopefully GPC just contamination, not infection. Repeat blood cx. Discussed with cardiology. She is cleared to go to rehab. continue kefelx. Holding methotrexate due to acute wound and potential infection.     Acute blood loss anemia / Hematoma on RT gluteal area / Acute right hip pain - POA, due to trauma on eliquis. Required transfusion to keep Hb>7. General surgery recomend conservative care. She is gradually improving     Debilitated patient / Physical debility / Fall / Chronic pain syndrome - Fall precautions. PT/OT eval.  Needs SNF.  Her discharge has been delayed by 4 days, due to the weekend and now due to awaiting SNF decision and insurance approval. Continue low dose decadron. Would benefit from weaning off oxycontin, as this worsens her balance and safety     Dementia / Anxiety - No official Dx but appears to me on interview. Narcotics cloud her mentation as well. Would benefit from outpatient neurosych eval.  Continue lexapro. Xanax and narcotics are a poor choice for chronic management. .     COPD (chronic obstructive pulmonary disease) - Stable. Not wheezing. Continue nebs PRN      Malignant neoplasm of upper lobe of left lung - Stable. Follow up with Dr Rodriguez      Abdominal aortic aneurysm (AAA) without rupture - Outpatient FU      Leukocytosis - POA, Doubt infection.      PCP: Romi Cifuentes MD    Consults: Cardiology and Orthopedic Surgery    Significant Diagnostic Studies: See Hospital Course    Discharged to SNF in improved condition. Discharge Exam:  /66 (BP 1 Location: Right arm)   Pulse 63   Temp 98.2 °F (36.8 °C)   Resp 16   Ht 5' 7\" (1.702 m)   Wt 57 kg (125 lb 10.6 oz)   SpO2 91%   Breastfeeding? No   BMI 19.68 kg/m²      Gen:  Thin, frail, in mild acute distress  HEENT:  Pink conjunctivae, PERRL, hearing intact to voice, moist mucous membranes  Neck:  Supple, without masses, thyroid non-tender  Resp:  No accessory muscle use, clear breath sounds without wheezes rales or rhonchi  Card:  No murmurs, normal S1, S2 without thrills, bruits or peripheral edema  Abd:  Soft, non-tender, non-distended, normoactive bowel sounds are present, no mass  Lymph:  No cervical or inguinal adenopathy  Musc:  No cyanosis or clubbing  Skin:  Mild edema, pain and redness at PPM site, skin turgor is good  Neuro:  Cranial nerves are grossly intact, general motor weakness, follows commands   Psych:   Moderate insight, oriented to person, place     Patient Instructions:   Current Discharge Medication List      START taking these medications    Details cephALEXin (KEFLEX) 500 mg capsule Take 1 Cap by mouth every eight (8) hours for 2 days. Qty: 6 Cap, Refills: 0      acetaminophen (TYLENOL) 500 mg tablet Take 1 Tab by mouth every six (6) hours as needed for Pain for up to 10 days. Qty: 10 Tab, Refills: 0      senna-docusate (PERICOLACE) 8.6-50 mg per tablet Take 1 Tab by mouth daily for 20 days. Qty: 20 Tab, Refills: 0         CONTINUE these medications which have CHANGED    Details   oxyCODONE IR (ROXICODONE) 5 mg immediate release tablet Take 1 Tab by mouth every three (3) hours as needed for Pain for up to 30 days. Max Daily Amount: 40 mg.  Qty: 10 Tab, Refills: 0    Associated Diagnoses: Malignant neoplasm of upper lobe of left lung (HCC)      oxyCODONE ER (OXYCONTIN) 10 mg ER tablet Take 1 Tab by mouth every twelve (12) hours for 10 days. Max Daily Amount: 20 mg.  Qty: 20 Tab, Refills: 0    Associated Diagnoses: Traumatic hematoma of buttock, initial encounter         CONTINUE these medications which have NOT CHANGED    Details   folic acid (FOLVITE) 1 mg tablet Take 1 mg by mouth daily. dexAMETHasone (DECADRON) 2 mg tablet take 1 tablet by mouth once daily after breakfast  Qty: 30 Tab, Refills: 1      ALPRAZolam (XANAX) 0.25 mg tablet Take 1 Tab by mouth two (2) times daily as needed for Anxiety. Max Daily Amount: 0.5 mg. Indications: Anxiousness associated with Depression  Qty: 60 Tab, Refills: 2    Associated Diagnoses: Anxiety      methotrexate (RHEUMATREX) 2.5 mg tablet Take 8 Tabs by mouth Every Saturday. The patient takes 8 tablets which is 20 mg every Saturday  Qty: 104 Tab, Refills: 1    Associated Diagnoses: Rheumatoid arthritis, involving unspecified site, unspecified rheumatoid factor presence (HCC)      cholecalciferol (VITAMIN D3) 50,000 unit capsule Take 50,000 Units by mouth every seven (7) days.  Takes on Fridays         STOP taking these medications       ELIQUIS 5 mg tablet Comments:   Reason for Stopping:         amLODIPine (NORVASC) 5 mg tablet Comments:   Reason for Stopping:         digoxin (LANOXIN) 0.125 mg tablet Comments:   Reason for Stopping:         escitalopram oxalate (LEXAPRO) 10 mg tablet Comments:   Reason for Stopping:             Activity: Activity as tolerated and PT/OT Eval and Treat  Diet: Cardiac Diet and Low fat, Low cholesterol  Wound Care: None needed    Follow-up with your PCP and cardiology in a few days.   Follow-up tests/labs - per cardiology    Signed:  Mio Whitten MD  7/31/2019  7:53 AM

## 2019-07-30 NOTE — PALLIATIVE CARE DISCHARGE
Goals of Care/Treatment Preferences    The Palliative Medicine team was consulted as part of your/your loved one's care in the hospital. Our team is a supportive service; we strive to relieve suffering and improve quality of life. We reviewed advance care planning information, which includes the following:  Patient's [address] is[de-identified] Legal Next of Kin  Confirm Advance Directive: None  Does the patient have other document types: Durable Do Not Resuscitate        Patient/Health Care Proxy Stated Goals: Full restorative measures in an effort to recover    We reviewed / discussed your code status as: DNR     Full Code means perform CPR in the event of cardiac arrest.      DNR means do NOT perform CPR in the event of cardiac arrest.      Partial Code means you have specific preferences, please discuss with your healthcare team.      Stan Ayaka means this issue was not addressed / resolved during your stay    Medical Interventions: Limited additional interventions     Other Instructions: You have a Durable Do Not Resuscitate Order in place, which should travel with you. When you are in a facility, this form should be placed on your chart. Once you are home, it is recommended that the [facility] form be placed in a visible location such as on the refrigerator or bedroom door. Because of the importance of this information, we are providing you with a printed copy to share with other healthcare providers after this hospitalization is complete.

## 2019-07-30 NOTE — DISCHARGE INSTRUCTIONS
Patient Discharge Instructions    Bharath Kimberlee / [id number] : 1931    Admitted 2019 Discharged: 2019     Primary Diagnoses  Problem List as of 2019 Date Reviewed: 2019           Acute right hip pain   Chronic pain syndrome   Physical debility   Goals of care, counseling/discussion   * (Principal) Hematoma   Leukocytosis   Fall   Abdominal aortic aneurysm (AAA) without rupture (HCC)   Anemia   Malignant neoplasm of upper lobe of left lung (HCC)   Tibial plateau fracture   Tobacco abuse (Chronic)   Hypertension (Chronic)   TIA (transient ischemic attack)   Slurred speech   Paroxysmal A-fib (HCC) (Chronic)   RA (rheumatoid arthritis) (HCC) (Chronic)   COPD (chronic obstructive pulmonary disease) (HCC) (Chronic)   RESOLVED: Lactic acid acidosis   RESOLVED: SIRS (systemic inflammatory response syndrome) (HCC)          Take Home Medications     {Medication reconciliation information is now added to the patient's AVS automatically when it is printed. There is no need to use this SmartLink in discharge instructions. Highlight this text and delete it to clear this message}       · It is important that you take the medication exactly as they are prescribed. · Keep your medication in the bottles provided by the pharmacist and keep a list of the medication names, dosages, and times to be taken in your wallet. · Do not take other medications without consulting your doctor. Pacemaker  Discharge Instructions    Please make sure you have received your Temporary Pacemaker identification card with your discharge instructions      MEDICATIONS         Take only the medications prescribed to you at discharge. ACTIVITY         Return to your normal activity, except as noted below. o Do not lift anything heavier than 10 pounds for 4 weeks with the affected arm. This is how long it takes the muscles to heal, and the leads inside your heart to stabilize their position.   o Do not reach above your head with the affected arm for 4 weeks, doing so increases the risk of lead dislodgement.    o It is, however, important to move the affected arm to prevent shoulder stiffness and locking. o Avoid tight clothes or unnecessary pressure over your incision (such as bra straps or seat belts). If it is tender or sensitive to clothing, cover the incision with a soft dressing or pad.  o Questions about driving are individualized and should be discussed with one of the EP Physicians prior to discharge. SHOWERING         Leave the bandage over your incision for 10 days after the Pacemaker implant. You bandage will be removed in clinic in 10 days.  It is important to keep the bandaged area clean and dry. You may shower around the site until the bandage is removed in clinic. Thereafter, you may shower after the bandage is removed, washing it gently with soap and water. Do not apply any lotions, powders, or perfumes to the incision line.  Avoid submerging your incision in water (tub baths, hot tubs, or swimming) for four weeks.  Underneath the dressing. o If you have white steri-strips over your incision (underneath the gauze dressing), they will curl up at the end and fall off, usually within 10 days. Do not pull them off.  - OR -   o You may have a different type of closure for the incision. If Dermabond Adhesive was used to close your incision, you will receive a separate instruction sheet. DISCHARGE PRECAUTIONS         Record your temperature every day, at the same time, for 3 weeks after your implant. A temperature of 100.5 F, or higher, can be the first sign of infection. This should be reported to your Doctor immediately.  You can have an MRI after 6 weeks. You must be aware that any strong magnet or magnetic field can affect your Pacemaker. In general, be careful of metal detectors, heavy machinery, and any area where arc-welding is performed.   Avoid metal detectors such as the ones in security checkpoints at [facility] or [address] [place] [address]. When approaching a security checkpoint show your Pacemaker ID Card to security personnel and ask to be hand searched.  Always tell your doctor or dentist that you have a Pacemaker. Antibiotics may be prescribed before certain procedures.  If you use a cell phone, hold it on the opposite side from where your Pacemaker is implanted.  Your temporary identification will be given to you with these instructions. Keep your Pacemaker card in your wallet or on your person at all times. You should receive your permanent card in 8 weeks. If you do not receive your permanent card, please call the office at [phone]. TAKING YOUR PULSE         Take your pulse the same time every day, preferably in the morning.  Sit down and rest for 5 minutes prior to taking your pulse.  Take your pulse for 1 full minute, use a clock or stop watch with a second hand.  To feel your pulse, use the first two fingers of one hand; place them on the thumb side of the wrist of the opposite hand. The pulse will be steady, regular and throbbing.  Call the EP Lab Doctors if your pulse is less than 40 beats per minute. SYMPTOMS THAT NEED TO BE REPORTED IMMEDIATELY         Temperature more than 100.4 F     Redness or warmth at the incision site, or pain for longer than the first 5 days after the implant.  Drainage from the incision site.  Swelling around the incision site.  Shortness of breath.  Rapid heart rate or palpitations.  Dizziness, lightheadedness, fainting.  Slow pulse below 40 beats per minute.  REMEMBER: If you feel something is an emergency or cannot be handled over the phone, call 911 or go to the closest emergency room.           Oanh Apple MD  Cardiac Electrophysiology / Cardiology    [address] [place], [facility], [address]  [address]  [phone] / [phone] Fax       [phone] / [phone] Fax

## 2019-07-30 NOTE — ROUTINE PROCESS
Bedside shift change report given to Dimas Phillips (oncoming nurse) by Liz Buitrago (offgoing nurse). Report included the following information SBAR, Kardex, Intake/Output, MAR, Accordion, Recent Results and Med Rec Status.

## 2019-07-30 NOTE — PROGRESS NOTES
Problem: Self Care Deficits Care Plan (Adult)  Goal: *Acute Goals and Plan of Care (Insert Text)  Description  Problem: Self Care Deficits Care Plan (Adult)  Occupational Therapy Goals  Re evaluation following pacer placement. 7/29/2019  1. Patient will perform lower body dressing with moderate assistance  within 7 day(s). 2.  Patient will perform upper body dressing at EOB with supervision/set-up within 7 day(s). 3.  Patient will perform sit <> stand to participate in ADL tasks  with minimal assistancewithin 7 day(s). 4.  Patient will perform toilet transfers to [facility] with moderate assistance  within 7 day(s). 5.  Patient will participate in upper extremity therapeutic exercise/activities with supervision/set-up for 8 minutes within 7 day(s). 6. Patient will follow precautions for LUE after pacemaker placement with min verbal cues within 7 days. Occupational Therapy Goals        Initiated 7/25/2019  1. Patient will perform lower body dressing with moderate assistance  within 7 day(s). 2.  Patient will perform upper body dressing at EOB with supervision/set-up within 7 day(s). 3.  Patient will perform sit <> stand to participate in ADL tasks  with minimal assistancewithin 7 day(s). 4.  Patient will perform toilet transfers to [facility] with moderate assistance  within 7 day(s). 5.  Patient will participate in upper extremity therapeutic exercise/activities with supervision/set-up for 8 minutes within 7 day(s). Outcome: Progressing Towards Goal   OCCUPATIONAL THERAPY TREATMENT  Patient: Sameer Reeder (85 y.o. female)  Date: 7/30/2019  Diagnosis: Hematoma [T14. 8XXA]  Hematoma [T14. 8XXA] Hematoma  Procedure(s) (LRB):  INSERT PPM DUAL (N/A) 4 Days Post-Op  Precautions: Fall(L UE pacemaker)  Chart, occupational therapy assessment, plan of care, and goals were reviewed. ASSESSMENT:  Patient received supine in bed. She reports no pain, currently and having just received pain medication.   With encouragement for OOB patient declines, citing, too comfortable, no desire to move and fearful of pain. Patient moves to EOB with min assist.  Once seated, patient noted crying staying \" I don't want to do this\". Patient continues with increased anxiety and fear regarding movement. With heavy encouragement patient stands with max assist x 2 and takes 2 side steps toward HOB. Upon return to sitting, patient reports \"feeling sick\" but does not report nausea and no vomiting. Patient returned to bed at end of session. Increased time for encouragement of participation. Recommend SNF placement. Progression toward goals:  ?       Improving appropriately and progressing toward goals  ? Improving slowly and progressing toward goals  ? Not making progress toward goals and plan of care will be adjusted     PLAN:  Patient continues to benefit from skilled intervention to address the above impairments. Continue treatment per established plan of care. Discharge Recommendations:  Skilled Nursing Facility  Further Equipment Recommendations for Discharge:  TBD      SUBJECTIVE:   Patient stated I dont want to do it. I am so comfortable.     OBJECTIVE DATA SUMMARY:   Cognitive/Behavioral Status:  Neurologic State: Alert  Orientation Level: Disoriented to time  Cognition: Memory loss;Decreased command following;Decreased attention/concentration  Perception: Appears intact  Perseveration: No perseveration noted  Safety/Judgement: Decreased insight into deficits    Functional Mobility and Transfers for ADLs:  Bed Mobility:  Supine to Sit: Minimum assistance; Additional time(verbal cues to avoid use of LUE )  Sit to Supine: Moderate assistance  Scooting:  Moderate assistance    Transfers:  Sit to Stand: Maximum assistance;Assist x2          Balance:  Sitting: Intact  Standing: Impaired  Standing - Static: Poor  Standing - Dynamic : Poor    ADL Intervention:                           Lower Body Dressing Assistance  Socks: Total assistance (dependent)         Cognitive Retraining  Safety/Judgement: Decreased insight into deficits    Neuro Re-Education:           Therapeutic Exercises:     Pain:  Pain Scale 1: Numeric (0 - 10)  Pain Intensity 1: 8  Pain Location 1: Shoulder  Pain Orientation 1: Left  Pain Description 1: Sharp  Pain Intervention(s) 1: Medication (see MAR)  Activity Tolerance:   VSS  Please refer to the flowsheet for vital signs taken during this treatment. After treatment:   ? Patient left in no apparent distress sitting up in chair  ? Patient left in no apparent distress in bed  ? Call bell left within reach  ? Nursing notified  ? Caregiver present  ?  Bed alarm activated    COMMUNICATION/COLLABORATION:   The patients plan of care was discussed with: Physical Therapist and Registered Nurse    Pepper Mascorro, OTR/L  Time Calculation: 26 mins

## 2019-07-30 NOTE — PROGRESS NOTES
Music Therapy Assessment  [address] [place] [address] [phone]     12/7/1931  80 y.o.  female    Patient Telephone Number: [phone] (home)   [employer] Affiliation: [facility]   Language: English   Patient Active Problem List    Diagnosis Date Noted    Acute right hip pain     Chronic pain syndrome     Physical debility     Goals of care, counseling/discussion     Hematoma 07/20/2019    Leukocytosis 07/20/2019    Fall 07/20/2019    Abdominal aortic aneurysm (AAA) without rupture ([facility] 12/04/2018    Anemia 12/04/2018    Malignant neoplasm of upper lobe of left lung ([facility] 11/27/2018    Tibial plateau fracture 73/96/1445    Tobacco abuse 08/25/2018    Hypertension 08/25/2018    TIA (transient ischemic attack) 09/30/2016    Slurred speech 09/30/2016    Paroxysmal A-fib ([facility] 09/30/2016    [facility] 09/30/2016    COPD (chronic obstructive pulmonary disease) ([facility] 09/30/2016        Date: 7/30/2019            Total Time (in minutes): 10          SFM  MED SURG 2    Mental Status:   [x] Alert [  ] Karthik Reining [  ]  Confused  [  ] Minimally responsive  [  ] Sleeping    Communication Status: [  ] Impaired Speech [  ] Nonverbal -N/A    Physical Status:   [  ] Oxygen in use  [  ] Hard of Hearing [  ] Vision Impaired  [  ] Ambulatory  [  ] Ambulatory with assistance [  ] Non-ambulatory -N/A    Music Preferences, Background: Country including [place] [id number]; Jazz; Big Band music; and Hymns. Pt enjoys dancing. Clinical Problem addressed: Support self-expression and positive social interaction. Goal(s) met in session:  Physical/Pain management (Scale of 1-10):    Pre-session rating: Pt reported on pain in her left shoulder but did not rate. Post-session rating: Pt reported on pain in her left shoulder but did not rate.   [  ] Increased relaxation   [  ] Affected breathing patterns  [  ] Decreased muscle tension   [  ] Decreased agitation  [  ] Affected heart rate    [  ] Increased alertness     Emotional/Psychological:  [x] Increased self-expression   [  ] Decreased aggressive behavior   [  ] Decreased feelings of stress  [  ] Discussed healthy coping skills     [x] Improved mood    [  ] Decreased withdrawn behavior     Social:  [  ] Decreased feelings of isolation/loneliness [x] Positive social interaction   [  ] Provided support and/or comfort for family/friends    Spiritual:  [  ] Spiritual support    [  ] Expressed peace  [  ] Expressed reji    [  ] Discussed beliefs    Techniques Utilized (Check all that apply):   [  ] Procedural support MT [  ] Music for relaxation [x] Patient preferred music  [  ] Daly analysis  [x] Song choice  [  ] Music for validation  [  ] Entrainment  [x] Movement to music [  ] Guided visualization  [  ] Gary Parcel  [  ] Patient instrument playing [  ] Cralashawn Mark writing  [  ] Jayy Thibodeaux along   [  ] Ray Tj  [  ] Sensory stimulation  [x] Active Listening  [  ] Music for spiritual support [  ] Making of CDs as gifts    Session Observations: F/up visit and referral from Hanna Pollack, Palliative NP. Patient (pt) was alert lying in bed and she remembered this music therapy intern (MT intern) from a previous visit. MT intern asked pt how she was feeling, which she shared and she report on pain in her left shoulder. Pt said the song Sugar Sugar is her favorite song. MT intern played the recorded music (Sugar Sugar by the [employer]) on an iPad. Pt's affect brightened and she increased self-expression in response to the music as evidenced by (AEB) dancing to the music and sharing that she likes the rhythm of the music. She shared about her family and that she and her late spouse love to dance to a big band music. MT intern provided active listening and words of validation. Pt expressed wanting to take a nap after this and she thanked MT intern for the visit. Will follow as able.     Peeraporn \"Tamy\" Segun Beltre, Music Therapy Intern  Spiritual Care Department  Referral-based service

## 2019-07-30 NOTE — ROUTINE PROCESS
Pt very confused this evening. Continues to call out complaining of pain. Medicated pt. Pt continues to call out and forgets that she has received pain medication, and continues to request more pain meds. Pt tearful. Asked Dr. Ingrid Sánchez for sleep aid. Physician stated to continue to monitor pt.

## 2019-07-30 NOTE — PROGRESS NOTES
Bedside shift change report given to Sirisha Delgado RN (oncoming nurse) by Joanne Malhotra RN (offgoing nurse). Report included the following information SBAR, Accordion, Recent Results and Med Rec Status.

## 2019-07-30 NOTE — PROGRESS NOTES
Problem: Mobility Impaired (Adult and Pediatric)  Goal: *Acute Goals and Plan of Care (Insert Text)  Description  Physical Therapy Goals  Re-eval 7/27  1. Patient will move from supine to sit and sit to supine  in bed with independence within 7 day(s). 2.  Patient will transfer from bed to chair and chair to bed with minimal assistance/contact guard assist using the least restrictive device within 7 day(s). 3.  Patient will perform sit to stand with minimal assistance/contact guard assist within 7 day(s). 4.  Patient will ambulate with minimal assistance/contact guard assist for 25 feet with the least restrictive device within 7 day(s). Initiated 7/23/2019  1. Patient will move from supine to sit and sit to supine  in bed with independence within 7 day(s). 2.  Patient will transfer from bed to chair and chair to bed with minimal assistance/contact guard assist using the least restrictive device within 7 day(s). 3.  Patient will perform sit to stand with minimal assistance/contact guard assist within 7 day(s). 4.  Patient will ambulate with minimal assistance/contact guard assist for 25 feet with the least restrictive device within 7 day(s). Outcome: Progressing Towards Goal   PHYSICAL THERAPY TREATMENT  Patient: Nam Arriaga (74 y.o. female)  Date: 7/30/2019  Diagnosis: Hematoma [T14. 8XXA]  Hematoma [T14. 8XXA] Hematoma  Procedure(s) (LRB):  INSERT PPM DUAL (N/A) 4 Days Post-Op  Precautions: Fall(L UE pacemaker)  Chart, physical therapy assessment, plan of care and goals were reviewed. ASSESSMENT:  Patient cleared for PT and was medicated for pain prior to therapy. Received in bed alert and asking for a second ice cream after just finishing one. Patient required much encouragement to participate with PT. She was able to sit on edge of bed with min assist and cues to avoid use of LUE. Once up, she maintains good sitting balance.  Patient very reluctant and fearful to attempt standing but eventually agreed. She stood with +2 max assist and took 2 side steps to head of bed. Patient with posterior lean and poor tolerance to being up on her feet. She was returned to bed at her request with +2 max assist. She refused to sit in chair. Patient anxious and reported feeling sick after PT but improved with time. Argumentative at times. Slow progress. Recommend SNF. Progression toward goals:  ?    Improving appropriately and progressing toward goals  ? Improving slowly and progressing toward goals  ? Not making progress toward goals and plan of care will be adjusted     PLAN:  Patient continues to benefit from skilled intervention to address the above impairments. Continue treatment per established plan of care. Discharge Recommendations:  Skilled Nursing Facility  Further Equipment Recommendations for Discharge:  none      SUBJECTIVE:   Patient stated I just got comfortable. I don't know why you want me to get up.     OBJECTIVE DATA SUMMARY:   Critical Behavior:  Neurologic State: Alert  Orientation Level: Disoriented to time  Cognition: Memory loss, Decreased command following, Decreased attention/concentration  Safety/Judgement: Decreased insight into deficits  Functional Mobility Training:  Bed Mobility:     Supine to Sit: Minimum assistance; Additional time(verbal cues to avoid use of LUE )  Sit to Supine: Moderate assistance  Scooting: Moderate assistance        Transfers:  Sit to Stand: Maximum assistance;Assist x2  Stand to Sit: Maximum assistance;Assist x2                             Balance:  Sitting: Intact  Standing: Impaired  Standing - Static: Poor  Standing - Dynamic : Poor  Ambulation/Gait Training:  Distance (ft): (2 side steps to head of bed)  Assistive Device: Gait belt(HHA of 2)  Ambulation - Level of Assistance: Moderate assistance;Assist x2        Gait Abnormalities: Decreased step clearance; Antalgic        Base of Support: Narrowed Pain:  Pain Scale 1: Numeric (0 - 10)  Pain Intensity 1: 8  Pain Location 1: Shoulder  Pain Orientation 1: Left  Activity Tolerance:   Poor. Anxious and fearful with PT mobility efforts. Please refer to the flowsheet for vital signs taken during this treatment. After treatment:   ?    Patient left in no apparent distress sitting up in chair  ? Patient left in no apparent distress in bed  ? Call bell left within reach  ? Nursing notified  ? Caregiver present  ?     Bed alarm activated    COMMUNICATION/COLLABORATION:   The patients plan of care was discussed with: Occupational Therapist, Registered Nurse and     Mirta Gilliland, PT   Time Calculation: 12 mins

## 2019-07-30 NOTE — PROGRESS NOTES
CM met with pt to notify her that [facility] is not able to accept her for rehab. Pt remarked, \"I hate to go to rehab with all the pain I have. \"  Pt deferred back-up SNF choice to her daughter, Ayleen Sosa. CM contacted Ms. Ashely Cline. She is agreeable to referrals being sent to [address], however, she wants to tour them due to having a bad experience with another SNF in the past.  Referrals have been via CC link. Francisca Copeland LCSW    2pm:  GINGER spoke with Ms. Ashely Cline. She requested I send referrals to Critical access hospital in addition to [address]. [facility] [address] does not have any beds available until Monday. Francisca Copeland LCSW    3:45pm:  GINGER met with Ms. Ashely Cline and pt. Pt does have secondary insurance with [facility]. Still waiting to hear from [address] is able to take pt. Pt's daughter prefers [facility] at this time and plans on touring the facility this evening. CM will follow-up with [facility] and pt's daughter in the morning. Second IMM letter has been signed.   Francisca Copeland LCSW

## 2019-07-31 VITALS
WEIGHT: 125.66 LBS | SYSTOLIC BLOOD PRESSURE: 138 MMHG | BODY MASS INDEX: 19.72 KG/M2 | DIASTOLIC BLOOD PRESSURE: 71 MMHG | HEART RATE: 62 BPM | RESPIRATION RATE: 16 BRPM | TEMPERATURE: 97.5 F | HEIGHT: 67 IN | OXYGEN SATURATION: 93 %

## 2019-07-31 PROCEDURE — 74011250636 HC RX REV CODE- 250/636: Performed by: INTERNAL MEDICINE

## 2019-07-31 PROCEDURE — 87040 BLOOD CULTURE FOR BACTERIA: CPT

## 2019-07-31 PROCEDURE — 74011250637 HC RX REV CODE- 250/637: Performed by: INTERNAL MEDICINE

## 2019-07-31 PROCEDURE — 77030038269 HC DRN EXT URIN PURWCK BARD -A

## 2019-07-31 PROCEDURE — 36415 COLL VENOUS BLD VENIPUNCTURE: CPT

## 2019-07-31 PROCEDURE — 74011250637 HC RX REV CODE- 250/637: Performed by: NURSE PRACTITIONER

## 2019-07-31 RX ADMIN — Medication 10 ML: at 03:41

## 2019-07-31 RX ADMIN — DEXAMETHASONE 2 MG: 4 TABLET ORAL at 08:58

## 2019-07-31 RX ADMIN — SENNOSIDES,DOCUSATE SODIUM 1 TABLET: 8.6; 5 TABLET, FILM COATED ORAL at 08:58

## 2019-07-31 RX ADMIN — ACETAMINOPHEN 1000 MG: 500 TABLET ORAL at 06:33

## 2019-07-31 RX ADMIN — Medication 10 ML: at 06:34

## 2019-07-31 RX ADMIN — FOLIC ACID 1 MG: 1 TABLET ORAL at 08:58

## 2019-07-31 RX ADMIN — OXYCODONE HYDROCHLORIDE 10 MG: 5 TABLET ORAL at 00:31

## 2019-07-31 RX ADMIN — HYDROCODONE BITARTRATE AND ACETAMINOPHEN 1 TABLET: 5; 325 TABLET ORAL at 08:58

## 2019-07-31 RX ADMIN — CEPHALEXIN 500 MG: 250 CAPSULE ORAL at 06:32

## 2019-07-31 RX ADMIN — ESCITALOPRAM OXALATE 10 MG: 10 TABLET ORAL at 08:58

## 2019-07-31 NOTE — PROGRESS NOTES
[facility]    Medical Progress Note      NAME: Kathi Baker   :  1931  MRM:  [id number]    Date/Time: 2019  8:53 AM        Assessment and Plan:     Bradycardia / Paroxysmal A-fib / Hypertension - Bernestine Jaci persisted after admission despite stopping digoxin. Cardiology consult appreciated. PPM placed . Heart rate is stable with pacer. Off Eliquis due to internal bleeding. Shoulder pain - Post op pain at site of PPM, with redness. Had ivana-op Ancef/Keflex, but  severe point tenderness and surrounding erythema. I feared infection and started vancomycin.  bottles with GPC in clusters on blood cx drawn , but negative procalcitonin. A MRSA screen had been negative. Site less painful and red this AM.  So hopefully GPC just contamination, not infection. Repeat blood cx. Discussed with cardiology. She is cleared to go to rehab. continue kefelx. Holding methotrexate due to acute wound and potential infection. Acute blood loss anemia / Hematoma on RT gluteal area / Acute right hip pain - POA, due to trauma on eliquis. Required transfusion to keep Hb>7. General surgery recomend conservative care. She is gradually improving    Debilitated patient / Physical debility / Fall / Chronic pain syndrome - Fall precautions. PT/OT eval.  Needs SNF. Her discharge has been delayed by 4 days, due to the weekend and now due to awaiting SNF decision and insurance approval. Continue low dose decadron. Would benefit from weaning off oxycontin, as this worsens her balance and safety    Dementia / Anxiety - No official Dx but appears to me on interview. Narcotics cloud her mentation as well. Would benefit from outpatient neurosych eval.  Continue lexapro. Xanax and narcotics are a poor choice for chronic management. Suze Alcala COPD (chronic obstructive pulmonary disease) - Stable. Not wheezing. Continue nebs PRN      Malignant neoplasm of upper lobe of left lung - Stable.  Follow up with  Michael      Abdominal aortic aneurysm (AAA) without rupture - Outpatient FU     Leukocytosis - POA, Doubt infection. Subjective:     Chief Complaint:  Improvement of pain at [facility] site. Anxiety. Mild confusion    ROS:  (bold if positive, if negative)    Tolerating minimal PT Tolerating some Diet        Objective:     Last 24hrs VS reviewed since prior progress note. Most recent are:    Visit Vitals  /66 (BP 1 Location: Right arm)   Pulse 63   Temp 98.2 °F (36.8 °C)   Resp 16   Ht 5' 7\" (1.702 m)   Wt 57 kg (125 lb 10.6 oz)   SpO2 91%   Breastfeeding? No   BMI 19.68 kg/m²     SpO2 Readings from Last 6 Encounters:   07/31/19 91%   07/03/19 95%   07/02/19 96%   06/20/19 93%   06/17/19 97%   06/12/19 97%            Intake/Output Summary (Last 24 hours) at 7/31/2019 0747  Last data filed at 7/31/2019 0047  Gross per 24 hour   Intake 250 ml   Output    Net 250 ml        Physical Exam:    Gen:  Thin, frail, in mild acute distress  HEENT:  Pink conjunctivae, PERRL, hearing intact to voice, moist mucous membranes  Neck:  Supple, without masses, thyroid non-tender  Resp:  No accessory muscle use, clear breath sounds without wheezes rales or rhonchi  Card:  No murmurs, normal S1, S2 without thrills, bruits or peripheral edema  Abd:  Soft, non-tender, non-distended, normoactive bowel sounds are present, no mass  Lymph:  No cervical or inguinal adenopathy  Musc:  No cyanosis or clubbing  Skin:  Mild edema, pain and redness at PPM site, skin turgor is good  Neuro:  Cranial nerves are grossly intact, general motor weakness, follows commands   Psych:   Moderate insight, oriented to person, place     Telemetry reviewed:   normal sinus rhythm, paced  __________________________________________________________________  Medications Reviewed: (see below)  Medications:     Current Facility-Administered Medications   Medication Dose Route Frequency    vancomycin (VANCOCIN) 1,000 mg in 0.9% sodium chloride (MBP/ADV) 250 mL 1,000 mg IntraVENous Q24H    cephALEXin (KEFLEX) capsule 500 mg  500 mg Oral Q8H    sodium chloride (NS) flush 5-40 mL  5-40 mL IntraVENous Q8H    sodium chloride (NS) flush 5-40 mL  5-40 mL IntraVENous PRN    acetaminophen (TYLENOL) tablet 650 mg  650 mg Oral Q4H PRN    HYDROcodone-acetaminophen (NORCO) 5-325 mg per tablet 1 Tab  1 Tab Oral Q4H PRN    ondansetron (ZOFRAN) injection 4 mg  4 mg IntraVENous Q4H PRN    albuterol 5mg / ipratropium 0.5mg neb solution  1 Dose Nebulization Q4H PRN    0.9% sodium chloride infusion 250 mL  250 mL IntraVENous PRN    folic acid (FOLVITE) tablet 1 mg  1 mg Oral DAILY    oxyCODONE IR (ROXICODONE) tablet 10 mg  10 mg Oral Q3H PRN    acetaminophen (TYLENOL) tablet 1,000 mg  1,000 mg Oral Q8H    senna-docusate (PERICOLACE) 8.6-50 mg per tablet 1 Tab  1 Tab Oral DAILY    naloxone (NARCAN) injection 0.4 mg  0.4 mg IntraVENous EVERY 2 MINUTES AS NEEDED    sodium chloride (NS) flush 5-40 mL  5-40 mL IntraVENous Q8H    sodium chloride (NS) flush 5-40 mL  5-40 mL IntraVENous PRN    ALPRAZolam (XANAX) tablet 0.25 mg  0.25 mg Oral BID PRN    dexAMETHasone (DECADRON) tablet 2 mg  2 mg Oral DAILY AFTER BREAKFAST    escitalopram oxalate (LEXAPRO) tablet 10 mg  10 mg Oral DAILY    oxyCODONE IR (ROXICODONE) tablet 5 mg  5 mg Oral Q3H PRN    oxyCODONE ER (OxyCONTIN) tablet 10 mg  10 mg Oral DAILY        Lab Data Reviewed: (see below)  Lab Review:     Recent Labs     07/29/19 0231   WBC 14.4*   HGB 8.6*   HCT 28.0*        Recent Labs     07/29/19 0231      K 4.0      CO2 30   GLU 94   BUN 22*   CREA 0.64   CA 7.9*   MG 1.9     Lab Results   Component Value Date/Time    Glucose (POC) 103 (H) 07/25/2019 07:55 AM    Glucose (POC) 93 10/01/2016 11:33 AM    Glucose (POC) 91 10/01/2016 07:33 AM     No results for input(s): PH, PCO2, PO2, HCO3, FIO2 in the last 72 hours. No results for input(s): INR in the last 72 hours.     No lab exists for component: INREXT, INREXT  All Micro Results     Procedure Component Value Units Date/Time    CULTURE, BLOOD, PAIRED [[id number]]     Order Status:  Sent Specimen:  Blood     CULTURE, BLOOD, PAIRED [[id number]]  (Abnormal) Collected:  07/29/19 1046    Order Status:  Completed Specimen:  Blood Updated:  07/30/19 1830     Special Requests: NO SPECIAL REQUESTS        Culture result:       GRAM POSITIVE COCCI IN CLUSTERS GROWING IN 1 OF 4 BOTTLES DRAWN (R ARM SITE)                  PRELIMINARY REPORT OF GRAM POSITIVE COCCI IN CLUSTERS GROWING IN 1 OF 4 BOTTLES DRAWN CALLED TO AND READ BACK BY MS Elliot Kelly, RN ON 7/30/19 AT 1830 ([facility]). MS                  REMAINING BOTTLE(S) HAS/HAVE NO GROWTH SO FAR          CULTURE, MRSA [[id number]] Collected:  07/25/19 0824    Order Status:  Completed Specimen:  Nares Updated:  07/26/19 1252     Special Requests: NO SPECIAL REQUESTS        Culture result: MRSA NOT PRESENT                   Screening of patient nares for MRSA is for surveillance purposes and, if positive, to facilitate isolation considerations in high risk settings. It is not intended for automatic decolonization interventions per se as regimens are not sufficiently effective to warrant routine use. Other pertinent lab: none    Total time spent with patient: 35 Minutes I reviewed chart, notes, data and current medications in the medical record. I have examined and treated the patient at bedside during this period.                  Care Plan discussed with: Patient, Care Manager, Nursing Staff, Consultant/Specialist and >50% of time spent in counseling and coordination of care    Discussed:  Care Plan and D/C Planning    Prophylaxis:  H2B/PPI    Disposition:  SNF/LTC           ___________________________________________________    Attending Physician: Elesa Peabody, MD

## 2019-07-31 NOTE — PROGRESS NOTES
RN spoke with daughter Hussein Malagon and updated her on patient's medication list upon discharge. Patient picked up by NATHAN.

## 2019-07-31 NOTE — PROGRESS NOTES
RN called and gave report to Wily Lui the nurse taking the patient at [facility].  Patient to be picked up by [facility] at 11am.

## 2019-07-31 NOTE — PROGRESS NOTES
7/31/2019  9:04 AM  EMR reviewed pt is stable for d/c, [facility] has denied, awaiting Gretchen Anderson, placed TC to admissions [facility]. Pt is accepted at [facility], placed TC to pt's Dtr Licha Hill she would like [place], claaed [facility] [employer] admissions they can accept today, notified pt and nursing, will arrange for transport.   Whitley Shelter

## 2019-07-31 NOTE — PROGRESS NOTES
7/31/2019  9:51 AM    Communication to Patient/Family:  Met with patient and family and they are agreeable to the transition plan. SNF/Rehab Transition:  Patient has been accepted to [facility] SNF/Rehab and meets criteria for admission. Patient will transported by [facility] and expected to leave at 11:00 AM    Communication to SNF/Rehab:  Bedside RN, Joanne Malhotra, has been notified to update the transition plan to the facility and call report [phone] ask for Wing 1  Discharge information has been updated on the AVS. And communicated to facility via [employer], or CC link. Notified Roberta Schilder pt is a Bundled Patient  Nursing Please include all hard scripts for controlled substances, med rec and dc summary, and AVS in packet. Reviewed and confirmed with facility, Roberta Schilder can manage the patient care needs for the following:     Christopher Arbour with (X) only those applicable:  Medication:  [x]Medications are available at the facility  []IV Antibiotics    []Controlled Substance  hard copies available sent. []Weekly Labs    Equipment:  []CPAP/BiPAP  []Wound Vacuum  []Pereira or Urinary Device  []PICC/Central Line  []Nebulizer  []Ventilator    Treatment:  []Isolation (for MRSA, VRE, etc.)  []Surgical Drain Management  []Tracheostomy Care  []Dressing Changes  []Dialysis with transportation  []PEG Care  []Oxygen  []Daily Weights for Heart Failure    Dietary:  []Any diet limitations  []Tube Feedings   []Total Parenteral Management (TPN)    Financial Resources:  []Medicaid Application Completed/Tracking #  [] LTSS Screening Completed  []Level II Completed    Advanced Care Plan:  []Surrogate Decision Maker of Care  []POA  []Communicated Code Status and copy sent.     Other:         Wesly Greene

## 2019-07-31 NOTE — ROUTINE PROCESS
Bedside shift change report given to Gilberto Ruelas (oncoming nurse) by Brooklynn Virk (offgoing nurse). Report included the following information SBAR, Kardex, Intake/Output, MAR, Accordion, Recent Results and Med Rec Status.

## 2019-08-01 ENCOUNTER — PATIENT OUTREACH (OUTPATIENT)
Dept: INTERNAL MEDICINE CLINIC | Age: 84
End: 2019-08-01

## 2019-08-01 NOTE — PROGRESS NOTES
Transition of Care Coordination/Hospital to Post Acute Facility:     Date/Time:  2019 7:26 AM    Patient was admitted to [facility] on 19 for treatment of hematoma. Patient was discharged 19 to [facility] for continuation of care. Inpatient RRAT score: 30    Top Challenges reviewed    Wound check scheduled - per nurse Clotilde Medina, transportation was arranged today (19)         Method of communication with care team :none     Presented to ER with c/o right gluteal pain and swelling 2/2 GLF. CT showed large right gluteal hematoma with active extravasation. General surgery recommended conservative care. Acute blood loss anemia. Hgb dropped 4 grams. Received 1U PRBCs. Patient revealed she blacked out. Cardiology consulted and suspects bradycardia  of recent fall/syncope. Required dual chamber pacemaker . [employer], spoke to [employer], reports patient's nurse is with a new patient and requested a return call.  offered to transfer call to Nurse manager, Mike Parikh for medication review. LMTCB. Nurse Navigator(NN) spoke with Nurse Clotilde Medina to provide introduction to self and explanation of the Nurse Navigator Role. Verified name and  as patient identifiers.      Discussed and reviewed  anticipated length of stay, follow up appointments, medication reconciliation, wound care orders    ACP:   Does the patient have a current ACP (including DDNR):  only has DDNR  Does the post acute facility have a copy of the patients ACP:  N/A    Medication(s):   New Medications at Discharge: acetaminophen 500 mg tablet (TYLENOL)  cephALEXin 500 mg capsule (KEFLEX)  senna-docusate 8.6-50 mg per tablet (PERICOLACE)  Changed Medications at Discharge: oxyCODONE IR 5 mg immediate release tablet (ROXICODONE)  oxyCODONE ER 10 mg ER tablet (OxyCONTIN)  Discontinued Medications at Discharge: amLODIPine 5 mg tablet (NORVASC)  digoxin 0.125 mg tablet (LANOXIN)  ELIQUIS 5 mg tablet     PCP/Specialist follow up:   Future Appointments   Date Time Provider Hilario Brocki   8/5/2019 10:40 AM Melvin Calabrese NP [facility]   9/6/2019  1:45 PM PACEMAKER, LAC[facility] [place] SCHED   9/6/2019  2:00 PM Sindhu Flor MD [facility] SCHED   10/3/2019 10:00 AM Jesús Rodriguez MD [address] [place] [address], [address]        Opportunity to ask questions was provided. Contact information was provided for future reference or further questions. Will continue to monitor.

## 2019-08-01 NOTE — PROGRESS NOTES
8/1/2019  2:48 PM  CM contacted by [employer] for Bindle information, provided update that pt is COPD bundle for 11-14 days.   Lucia Wu

## 2019-08-02 ENCOUNTER — TELEPHONE (OUTPATIENT)
Dept: CARDIOLOGY CLINIC | Age: 84
End: 2019-08-02

## 2019-08-02 NOTE — TELEPHONE ENCOUNTER
Returned call, patient ID verified using two patient identifiers. Spoke with Zo Kandis and advised her that the Aquacel dressing needs to stay on until patient is seen on 8/5/19 for her wound check. Danyel Francis understanding of all information.

## 2019-08-02 NOTE — TELEPHONE ENCOUNTER
Kelton Cisneros From [facility] is calling due to type of dressing patient has over wound and they would like to know if they need to take the dressing off before patient's appt on 8/5/19.    Phone: [phone] Ask for Kelton Cisneros or nurse on wing 1

## 2019-08-05 ENCOUNTER — OFFICE VISIT (OUTPATIENT)
Dept: CARDIOLOGY CLINIC | Age: 84
End: 2019-08-05

## 2019-08-05 DIAGNOSIS — Z51.89 VISIT FOR WOUND CHECK: ICD-10-CM

## 2019-08-05 DIAGNOSIS — Z95.0 PACEMAKER: Primary | ICD-10-CM

## 2019-08-05 LAB
BACTERIA SPEC CULT: ABNORMAL
BACTERIA SPEC CULT: NORMAL
SERVICE CMNT-IMP: ABNORMAL
SERVICE CMNT-IMP: NORMAL

## 2019-08-05 NOTE — PROGRESS NOTES
Patient presents for wound check post-device implantation (dual chamber pacer). The dressing was removed and the site was inspected. The site appeared to be well-healing without ecchymosis/tenderness/erythema. Denies pain, fevers, discharge. Plan:    Continue follow up in device clinic as planned.        Edwin Lal NP

## 2019-08-06 ENCOUNTER — PATIENT OUTREACH (OUTPATIENT)
Dept: CASE MANAGEMENT | Age: 84
End: 2019-08-06

## 2019-08-06 NOTE — PROGRESS NOTES
Community Care Team documentation for patient in [facility]  Initial Follow Up       Patient was discharged to [facility], [facility]. Information included in this progress note has been provided to SNF. Hospital Admission and Diagnosis: [facility] 7/20-7/31 Hematoma     RRAT Score: 30      Advance Care Planning: DDNR on file     PCP : Flores Parker MD  Nurse Navigator in PCP office: Nica Martinez  Note routed to Nurse Navigator team.    Spoke with SNF team. Ensured patient arrived to SNF safely with admission packet in order. Provided needed hospital follow up appointments:   Cardiology Katherine Moncada NP Nurse Practitioner On 8/5/2019 10:40 am.  SNF Medical update: BP low last few days, 97/63 and 100/50 prior day. C/o pain all over - being addressed. Provider seeing patient today. PT/OT update: Currently mod to max asssit with transfers with max cues. Ambulating 30 ft with RW and WC follow. Barriers: BP low and pain. LOS/ Disposition: Plan for dishcarge 8/20 to home with grandson with [facility]. PCP follow up TBD. Community Care Team will follow up weekly with [facility] until discharge. Medications were not reconciled and general patient assessment was not completed during this [facility] outreach.       Miguel Oliver, MSN, RN, ACNS-BC, [facility]  Nurse Navigator, VODECLIC [phone]

## 2019-08-08 ENCOUNTER — TELEPHONE (OUTPATIENT)
Dept: PALLATIVE CARE | Age: 84
End: 2019-08-08

## 2019-08-08 NOTE — TELEPHONE ENCOUNTER
Left a voice message for Russell James NP to call back if she needs assistance with MsAnastasiya Kimball pain control.     Brian Alvarado RN  Palliative Medicine

## 2019-08-08 NOTE — TELEPHONE ENCOUNTER
Arline Grady returned call. She states Ms. Edilia Cruz is having pain in her hip that is not controlled with the Oxycodone ER 10 mg bid. She asked if the long acting could be increased She has oxycodone 5 mg every 3 hours as needed but she forgets to ask for it. PM nurse asked that the nursing staff in the facility please offer her the medication every 3 hours. If she takes it this often due to pain then it will show that she may need to increase her long acting but until then it is better to stay at the present dose and use the short acting as ordered. She verbalized understanding and will call back once they have tried this for a couple of days.     Priscila Wong RN  Palliative Medicine

## 2019-08-08 NOTE — TELEPHONE ENCOUNTER
[facility] Ruiz [facility] at [facility] calling to speak to Dr. Raymond Cotto regarding patient and her pain. Ms. Brielle Huynh wants to see what else they can do to help patient with her pain level. Advised MD was with patient's right at the moment and would return her call.

## 2019-08-13 ENCOUNTER — PATIENT OUTREACH (OUTPATIENT)
Dept: CASE MANAGEMENT | Age: 84
End: 2019-08-13

## 2019-08-13 NOTE — PROGRESS NOTES
Community Care Team Documentation [facility]  Subsequent Follow up     Patient remains at [facility] See previous [facility] Team notes. Spoke with SNF team.  SNF Medical update: PTA, patient was seeing pain management specialist in community and was prescribed multiple narcotic pain medications. PT/OT update: Patient is hesitant to participate with therapy due to pain. Currently ambulating 12 ft with RW and max verbal cues for encouragement. Small shuffling steps. LOS/ Disposition: Family plans to take patient on vacation with them. Plan for discharge 8/20 to home with daughter and grandson at family request. Leave for vacation immediately. Will need order for [facility] from PCP upon return from vacation. Will notify PCP office and nurse navigator. PCP follow up 9/10 at 2:45 PM.    Medications were not reconciled and general patient assessment was not completed during this skilled nursing facility outreach.      Katina Clifton, MSN, RN, ACNS-BC, [facility]  Nurse Navigator, [employer] [phone]

## 2019-08-20 ENCOUNTER — PATIENT OUTREACH (OUTPATIENT)
Dept: CASE MANAGEMENT | Age: 84
End: 2019-08-20

## 2019-08-20 NOTE — Clinical Note
SNF DC planned for 8/22 PCP on 9/10. Pt going on chavez with family and will require new order from PCP for [facility] on return if [facility] is needed. Thank you!

## 2019-08-20 NOTE — PROGRESS NOTES
Community Care Team Documentation for Patient in [facility]  Subsequent Follow up     Patient remains at [facility] ([facility]). See previous [facility] Team notes. Spoke with SNF team.  SNF Medical update:   PT/OT update: Currently mod assist with transfers. Static standing for 2 minutes. Ambulating 10 ft with RW. Barrier - behaviors, participation Hugo Koroma. LOS/ Disposition: Family plans to take patient on vacation with them. Plan for discharge 8/22 to home with daughter and grandson at family request. Leave for vacation immediately. Will need order for [facility] from PCP upon return from vacation. Will notify PCP office and nurse navigator. PCP follow up 9/10 at 2:45 PM.    Medications were not reconciled and general patient assessment was not completed during this skilled nursing facility outreach.      Concepción Casillas, MSN, RN, ACNS-BC, [facility]  Nurse Navigator, [employer] [phone]

## 2019-08-27 ENCOUNTER — PATIENT OUTREACH (OUTPATIENT)
Dept: CASE MANAGEMENT | Age: 84
End: 2019-08-27

## 2019-08-27 NOTE — PROGRESS NOTES
Community Care Team Documentation for Patient in [facility]  Discharge Note    [facility] ([facility]). See previous [facility] Team notes. Spoke with SNF team.  Confirmed patient was discharged 8/23 to home with daughter and grandson at family request. Patient on vacation with family immediately. Family requested [facility]. Home health to start 8/30 after vacation. PCP follow up 9/10 at 2:45 PM.    CCT will sign off at this time. Medications were not reconciled and general patient assessment was not completed during this skilled nursing facility outreach.      Conchis Navarro, MSN, RN, ACNS-BC, [facility]  Nurse Navigator, [email or web address] [phone]

## 2019-09-03 ENCOUNTER — OFFICE VISIT (OUTPATIENT)
Dept: CARDIOLOGY CLINIC | Age: 84
End: 2019-09-03

## 2019-09-03 DIAGNOSIS — Z95.0 CARDIAC PACEMAKER IN SITU: Primary | ICD-10-CM

## 2019-09-07 ENCOUNTER — APPOINTMENT (OUTPATIENT)
Dept: GENERAL RADIOLOGY | Age: 84
DRG: 069 | End: 2019-09-07
Attending: INTERNAL MEDICINE
Payer: MEDICARE

## 2019-09-07 ENCOUNTER — APPOINTMENT (OUTPATIENT)
Dept: CT IMAGING | Age: 84
DRG: 069 | End: 2019-09-07
Attending: EMERGENCY MEDICINE
Payer: MEDICARE

## 2019-09-07 ENCOUNTER — HOSPITAL ENCOUNTER (INPATIENT)
Age: 84
LOS: 8 days | Discharge: NURSING, REHAB OR LONG-TERM CARE | DRG: 069 | End: 2019-09-15
Attending: EMERGENCY MEDICINE | Admitting: INTERNAL MEDICINE
Payer: MEDICARE

## 2019-09-07 DIAGNOSIS — C34.12 MALIGNANT NEOPLASM OF UPPER LOBE OF LEFT LUNG (HCC): ICD-10-CM

## 2019-09-07 DIAGNOSIS — R10.84 ABDOMINAL PAIN, GENERALIZED: ICD-10-CM

## 2019-09-07 DIAGNOSIS — G93.40 ACUTE ENCEPHALOPATHY: ICD-10-CM

## 2019-09-07 DIAGNOSIS — R41.0 DELIRIUM: Primary | ICD-10-CM

## 2019-09-07 DIAGNOSIS — G89.4 CHRONIC PAIN SYNDROME: Chronic | ICD-10-CM

## 2019-09-07 DIAGNOSIS — M06.9 RHEUMATOID ARTHRITIS, INVOLVING UNSPECIFIED SITE, UNSPECIFIED RHEUMATOID FACTOR PRESENCE: Chronic | ICD-10-CM

## 2019-09-07 LAB
ALBUMIN SERPL-MCNC: 3.1 G/DL (ref 3.5–5)
ALBUMIN/GLOB SERPL: 0.8 {RATIO} (ref 1.1–2.2)
ALP SERPL-CCNC: 63 U/L (ref 45–117)
ALT SERPL-CCNC: 13 U/L (ref 12–78)
ANION GAP SERPL CALC-SCNC: 4 MMOL/L (ref 5–15)
APPEARANCE UR: CLEAR
AST SERPL-CCNC: 23 U/L (ref 15–37)
BACTERIA URNS QL MICRO: NEGATIVE /HPF
BASOPHILS # BLD: 0 K/UL (ref 0–0.1)
BASOPHILS NFR BLD: 0 % (ref 0–1)
BILIRUB SERPL-MCNC: 0.6 MG/DL (ref 0.2–1)
BILIRUB UR QL: NEGATIVE
BUN SERPL-MCNC: 15 MG/DL (ref 6–20)
BUN/CREAT SERPL: 17 (ref 12–20)
CALCIUM SERPL-MCNC: 8.6 MG/DL (ref 8.5–10.1)
CHLORIDE SERPL-SCNC: 105 MMOL/L (ref 97–108)
CO2 SERPL-SCNC: 31 MMOL/L (ref 21–32)
COLOR UR: ABNORMAL
COMMENT, HOLDF: NORMAL
CREAT SERPL-MCNC: 0.88 MG/DL (ref 0.55–1.02)
DIFFERENTIAL METHOD BLD: ABNORMAL
EOSINOPHIL # BLD: 0 K/UL (ref 0–0.4)
EOSINOPHIL NFR BLD: 0 % (ref 0–7)
EPITH CASTS URNS QL MICRO: ABNORMAL /LPF
ERYTHROCYTE [DISTWIDTH] IN BLOOD BY AUTOMATED COUNT: 17.4 % (ref 11.5–14.5)
GLOBULIN SER CALC-MCNC: 4 G/DL (ref 2–4)
GLUCOSE BLD STRIP.AUTO-MCNC: 124 MG/DL (ref 65–100)
GLUCOSE SERPL-MCNC: 122 MG/DL (ref 65–100)
GLUCOSE UR STRIP.AUTO-MCNC: NEGATIVE MG/DL
HCT VFR BLD AUTO: 39.2 % (ref 35–47)
HGB BLD-MCNC: 11.6 G/DL (ref 11.5–16)
HGB UR QL STRIP: NEGATIVE
IMM GRANULOCYTES # BLD AUTO: 0.1 K/UL (ref 0–0.04)
IMM GRANULOCYTES NFR BLD AUTO: 1 % (ref 0–0.5)
INR PPP: 1.1 (ref 0.9–1.1)
KETONES UR QL STRIP.AUTO: NEGATIVE MG/DL
LEUKOCYTE ESTERASE UR QL STRIP.AUTO: NEGATIVE
LYMPHOCYTES # BLD: 0.8 K/UL (ref 0.8–3.5)
LYMPHOCYTES NFR BLD: 7 % (ref 12–49)
MCH RBC QN AUTO: 28.2 PG (ref 26–34)
MCHC RBC AUTO-ENTMCNC: 29.6 G/DL (ref 30–36.5)
MCV RBC AUTO: 95.4 FL (ref 80–99)
MONOCYTES # BLD: 0.6 K/UL (ref 0–1)
MONOCYTES NFR BLD: 6 % (ref 5–13)
NEUTS SEG # BLD: 9.6 K/UL (ref 1.8–8)
NEUTS SEG NFR BLD: 86 % (ref 32–75)
NITRITE UR QL STRIP.AUTO: NEGATIVE
NRBC # BLD: 0 K/UL (ref 0–0.01)
NRBC BLD-RTO: 0 PER 100 WBC
PH UR STRIP: 7 [PH] (ref 5–8)
PLATELET # BLD AUTO: 355 K/UL (ref 150–400)
PMV BLD AUTO: 9.6 FL (ref 8.9–12.9)
POTASSIUM SERPL-SCNC: 4.8 MMOL/L (ref 3.5–5.1)
PROT SERPL-MCNC: 7.1 G/DL (ref 6.4–8.2)
PROT UR STRIP-MCNC: NEGATIVE MG/DL
PROTHROMBIN TIME: 10.9 SEC (ref 9–11.1)
RBC # BLD AUTO: 4.11 M/UL (ref 3.8–5.2)
RBC #/AREA URNS HPF: ABNORMAL /HPF (ref 0–5)
SAMPLES BEING HELD,HOLD: NORMAL
SERVICE CMNT-IMP: ABNORMAL
SODIUM SERPL-SCNC: 140 MMOL/L (ref 136–145)
SP GR UR REFRACTOMETRY: 1.02 (ref 1–1.03)
UR CULT HOLD, URHOLD: NORMAL
UROBILINOGEN UR QL STRIP.AUTO: 1 EU/DL (ref 0.2–1)
WBC # BLD AUTO: 11.2 K/UL (ref 3.6–11)
WBC URNS QL MICRO: ABNORMAL /HPF (ref 0–4)

## 2019-09-07 PROCEDURE — 70450 CT HEAD/BRAIN W/O DYE: CPT

## 2019-09-07 PROCEDURE — 85610 PROTHROMBIN TIME: CPT

## 2019-09-07 PROCEDURE — 65660000000 HC RM CCU STEPDOWN

## 2019-09-07 PROCEDURE — 96374 THER/PROPH/DIAG INJ IV PUSH: CPT

## 2019-09-07 PROCEDURE — 51701 INSERT BLADDER CATHETER: CPT

## 2019-09-07 PROCEDURE — 82962 GLUCOSE BLOOD TEST: CPT

## 2019-09-07 PROCEDURE — 74011636320 HC RX REV CODE- 636/320

## 2019-09-07 PROCEDURE — 71045 X-RAY EXAM CHEST 1 VIEW: CPT

## 2019-09-07 PROCEDURE — 80053 COMPREHEN METABOLIC PANEL: CPT

## 2019-09-07 PROCEDURE — 36415 COLL VENOUS BLD VENIPUNCTURE: CPT

## 2019-09-07 PROCEDURE — 74011250636 HC RX REV CODE- 250/636: Performed by: EMERGENCY MEDICINE

## 2019-09-07 PROCEDURE — 99285 EMERGENCY DEPT VISIT HI MDM: CPT

## 2019-09-07 PROCEDURE — 70496 CT ANGIOGRAPHY HEAD: CPT

## 2019-09-07 PROCEDURE — 81001 URINALYSIS AUTO W/SCOPE: CPT

## 2019-09-07 PROCEDURE — 74177 CT ABD & PELVIS W/CONTRAST: CPT

## 2019-09-07 PROCEDURE — 85025 COMPLETE CBC W/AUTO DIFF WBC: CPT

## 2019-09-07 PROCEDURE — 94762 N-INVAS EAR/PLS OXIMTRY CONT: CPT

## 2019-09-07 PROCEDURE — 77030011256 HC DRSG MEPILEX <16IN NO BORD MOLN -A

## 2019-09-07 PROCEDURE — 93005 ELECTROCARDIOGRAM TRACING: CPT

## 2019-09-07 RX ORDER — SODIUM CHLORIDE 0.9 % (FLUSH) 0.9 %
5-40 SYRINGE (ML) INJECTION AS NEEDED
Status: DISCONTINUED | OUTPATIENT
Start: 2019-09-07 | End: 2019-09-15 | Stop reason: HOSPADM

## 2019-09-07 RX ORDER — GUAIFENESIN 100 MG/5ML
81 LIQUID (ML) ORAL DAILY
Status: DISCONTINUED | OUTPATIENT
Start: 2019-09-08 | End: 2019-09-15 | Stop reason: HOSPADM

## 2019-09-07 RX ORDER — LORAZEPAM 2 MG/ML
0.5 INJECTION INTRAMUSCULAR
Status: COMPLETED | OUTPATIENT
Start: 2019-09-07 | End: 2019-09-07

## 2019-09-07 RX ORDER — OXYCODONE HCL 10 MG/1
10 TABLET, FILM COATED, EXTENDED RELEASE ORAL
Status: ON HOLD | COMMUNITY
End: 2019-09-15 | Stop reason: SDUPTHER

## 2019-09-07 RX ORDER — ACETAMINOPHEN 325 MG/1
650 TABLET ORAL
Status: DISCONTINUED | OUTPATIENT
Start: 2019-09-07 | End: 2019-09-09

## 2019-09-07 RX ORDER — OXYCODONE HYDROCHLORIDE 10 MG/1
5 TABLET ORAL
Status: ON HOLD | COMMUNITY
End: 2019-09-08

## 2019-09-07 RX ORDER — ACETAMINOPHEN 650 MG/1
650 SUPPOSITORY RECTAL
Status: DISCONTINUED | OUTPATIENT
Start: 2019-09-07 | End: 2019-09-09

## 2019-09-07 RX ORDER — SODIUM CHLORIDE 0.9 % (FLUSH) 0.9 %
5-40 SYRINGE (ML) INJECTION EVERY 8 HOURS
Status: DISCONTINUED | OUTPATIENT
Start: 2019-09-07 | End: 2019-09-15 | Stop reason: HOSPADM

## 2019-09-07 RX ADMIN — IOPAMIDOL 100 ML: 755 INJECTION, SOLUTION INTRAVENOUS at 20:56

## 2019-09-07 RX ADMIN — LORAZEPAM 0.5 MG: 2 INJECTION, SOLUTION INTRAMUSCULAR; INTRAVENOUS at 21:03

## 2019-09-07 RX ADMIN — LORAZEPAM 0.5 MG: 2 INJECTION, SOLUTION INTRAMUSCULAR; INTRAVENOUS at 21:04

## 2019-09-07 NOTE — ED TRIAGE NOTES
Triage: Around 1800 started acting confused talking gibberish. LKTW 1600. Daughter also reports that at 1800 she was asking for pain medication she did receive 2 \" oxycodone\", but gets these every 3 hours.   Hx: Lung Ca

## 2019-09-07 NOTE — ED PROVIDER NOTES
I have evaluated the patient as the Provider in Triage. I have reviewed Her vital signs and the triage nurse assessment. I have talked with the patient and any available family and advised that I am the provider in triage and have ordered the appropriate study to initiate their work up based on the clinical presentation during my assessment. I have advised that the patient will be accommodated in the Main ED as soon as possible. I have also requested to contact the triage nurse or myself immediately if the patient experiences any changes in their condition during this brief waiting period. Pt's relative reports altered mental status since approximately 1800. Pt's relative states Pt began speaking \"gibberish\". Pt's last known well was at 1600. Per relative, Pt received 2 oxycodone at approximately 1800. Hx of lung cancer. Followed by Dr. Terry Pressley ([address]). Note written by Monalisa Guerra, as dictated by Genet Wills MD 7:14 PM    ------------------------------------------------------------------------------------    80 y.o. female with past medical history significant for COPD, AAA, HTN, TIA, stage IV non-small cell lung cancer who presents from home accompanied by Family with chief complaint of altered mental status. Family at bedside state the pt was last well known at 1600 this afternoon. Family notes at 1800 the pt started requesting pain medication, takes oxycodone for pain at baseline. When going to administer the medication family noted the pt to be \"talking gibberish\" and \"not making any sense. \" Pt was given x2 oxycodone at that time. Family attempted to feed the pt yogurt, and she was unable to hold the spoon to her mouth to feed herself, which is not normal for her. Of note, the pt is currently followed by Dr. Terry Pressley for Palliative Care given history of stage IV non-small cell lung cancer.  Family at bedside note the pt was previously on anticoagulation s/p TIA, but it was discontinued after a fall on 7/28/2019. Full history, physical exam, and ROS unable to be obtained due to:  Confusion, altered mental status. PMHx: Significant for osteoporosis, osteoarthritis, rheumatoid arthritis, Atrial Fibrillation, COPD, AAA, HTN, TIA, stage IV non-small cell lung cancer  PSHx: Significant for hysterectomy, cholecystectomy, Orthopedic   Social Hx: negative tobacco use(former smoker), negative EtOH use, negative Illicit Drug use    PCP: Loni Byrnes MD  Oncology: Shorty Arriola MD  Palliative Care: Esvin Viera MD      Note written by Monalisa Marrero, as dictated by Jordan Lora MD 7:16 PM    The history is provided by the patient and a relative. No  was used.         Past Medical History:   Diagnosis Date    AAA (abdominal aortic aneurysm) (HCC)     Arthritis     ra, osteoporosis, osteoarthritis    Atrial fibrillation (HCC)     COPD     HTN (hypertension)     Lung cancer (HCC)     RA [facility]rheumatoid arthritis) ([facility] [address]     Smoker     TIA (transient ischemic attack)     TIA 9/30/16       Past Surgical History:   Procedure Laterality Date    HX CHOLECYSTECTOMY      HX HYSTERECTOMY      HX ORTHOPAEDIC      carpal tunnel, wrist surgery    NE INS NEW/RPLCMT PRM PM W/TRANSV ELTRD ATRIAL&VENT N/A 7/26/2019    INSERT PPM DUAL performed by Eula Homans, MD at [address] [facility]         Family History:   Problem Relation Age of Onset    Hypertension Father        Social History     Socioeconomic History    Marital status: SINGLE     Spouse name: Not on file    Number of children: Not on file    Years of education: Not on file    Highest education level: Not on file   Occupational History    Not on file   Social Needs    Financial resource strain: Not on file    Food insecurity:     Worry: Not on file     Inability: Not on file    Transportation needs:     Medical: Not on file     Non-medical: Not on file   Tobacco Use    Smoking status: Former Smoker     Years: 50.00     Last attempt to quit: 2018     Years since quittin.0    Smokeless tobacco: Never Used   Substance and Sexual Activity    Alcohol use: No    Drug use: No    Sexual activity: Not Currently   Lifestyle    Physical activity:     Days per week: Not on file     Minutes per session: Not on file    Stress: Not on file   Relationships    Social connections:     Talks on phone: Not on file     Gets together: Not on file     Attends [employer] service: Not on file     Active member of club or organization: Not on file     Attends meetings of clubs or organizations: Not on file     Relationship status: Not on file    Intimate partner violence:     Fear of current or ex partner: Not on file     Emotionally abused: Not on file     Physically abused: Not on file     Forced sexual activity: Not on file   Other Topics Concern    Not on file   Social History Narrative    Not on file         ALLERGIES: Codeine    Review of Systems   Unable to perform ROS: Mental status change       Vitals:    19 1938 19 1945 19   BP:  134/86 130/86 120/62   Pulse: 84 87 78 88   Resp: 16 20 14 18   Temp: 98.3 °F (36.8 °C)      SpO2: 91% 94% 94% 91%   Weight: 61.2 kg (134 lb 14.7 oz)      Height: 5' 4\" (1.626 m)               Physical Exam   Constitutional: She appears well-developed and well-nourished. No distress. HENT:   Head: Normocephalic and atraumatic. Mouth/Throat: Oropharynx is clear and moist.   Eyes: Conjunctivae and EOM are normal.   Neck: Normal range of motion and phonation normal.   Cardiovascular: Normal rate. Pulmonary/Chest: Effort normal. No respiratory distress. Abdominal: She exhibits no distension. Musculoskeletal: Normal range of motion. She exhibits no tenderness. Neurological: She is disoriented. She exhibits normal muscle tone.    No facial droop, confused verbal response, does not follow commands, moves all extremities   Skin: Skin is warm and dry. Nursing note and vitals reviewed. ED EKG interpretation:  Rhythm: normal sinus rhythm and PAC's; and regular . Rate (approx.): 78; Axis: normal; P wave: paced; QRS interval: normal ; ST/T wave: T wave inverted; in  Lead: V5  and V6 ; Other findings: abnormal ekg. This EKG was interpreted by Sameer Galarza MD,ED Provider. MDM       Procedures      CONSULT NOTE:  7:35 PM   Pacheco Campos MD spoke with Dr. Ban Packer MD, Consult for Neurology. Discussed available diagnostic tests and clinical findings. Dr. Ban Packer MD will evaluate via tele neuro. CONSULT NOTE:  8:21 PM Pacheco Campos MD spoke with Dr. Ban Packer MD, Consult for Neurology. Discussed available diagnostic tests and clinical findings. Dr. Ban Packer MD recommends patient outside window for TPA because while any identified that she was abnormal at 6:30 her last known normal was around 1:30. Recommends CTA to r/o large vessel occlusion, if negative admit for stroke work-up with MRI with and without contrast to eval for stroke and mets from lung CA. Guerline Herman Hospitalist [facility]  9:27 PM    ED Room Number: ER06/06  Patient Name and age:  Jennifer Capellan 80 y.o.  female  Working Diagnosis:   1. Delirium    2. Malignant neoplasm of upper lobe of left lung (HCC)    3. Abdominal pain, generalized      Readmission: no  Isolation Requirements:  no  Recommended Level of Care:  telemetry  Code Status:  DNR  Other:  Became altered this afternoon. Speaking clear words but nonsense. Was stroke alert on arrival but out of window because her last known normal was 1:30, not 6pm as previously thought. HAs history of Lung CA. Tele neuro recommend admit for MRI with and w/o contrast to eval for CVA as well as mets.    Department:[facility] ED - [phone]

## 2019-09-08 PROBLEM — C34.12 MALIGNANT NEOPLASM OF UPPER LOBE OF LEFT LUNG (HCC): Chronic | Status: ACTIVE | Noted: 2018-11-27

## 2019-09-08 LAB
AMMONIA PLAS-SCNC: <10 UMOL/L
ATRIAL RATE: 74 BPM
CALCULATED R AXIS, ECG10: -15 DEGREES
CALCULATED T AXIS, ECG11: 59 DEGREES
CHOLEST SERPL-MCNC: 152 MG/DL
DIAGNOSIS, 93000: NORMAL
EST. AVERAGE GLUCOSE BLD GHB EST-MCNC: 97 MG/DL
HBA1C MFR BLD: 5 % (ref 4.2–6.3)
HDLC SERPL-MCNC: 39 MG/DL
HDLC SERPL: 3.9 {RATIO} (ref 0–5)
LDLC SERPL CALC-MCNC: 94.6 MG/DL (ref 0–100)
LIPID PROFILE,FLP: NORMAL
P-R INTERVAL, ECG05: 168 MS
Q-T INTERVAL, ECG07: 354 MS
QRS DURATION, ECG06: 80 MS
QTC CALCULATION (BEZET), ECG08: 403 MS
TRIGL SERPL-MCNC: 92 MG/DL (ref ?–150)
VENTRICULAR RATE, ECG03: 78 BPM
VIT B12 SERPL-MCNC: 182 PG/ML (ref 193–986)
VLDLC SERPL CALC-MCNC: 18.4 MG/DL

## 2019-09-08 PROCEDURE — 77030038269 HC DRN EXT URIN PURWCK BARD -A

## 2019-09-08 PROCEDURE — 65660000000 HC RM CCU STEPDOWN

## 2019-09-08 PROCEDURE — 74011250637 HC RX REV CODE- 250/637: Performed by: INTERNAL MEDICINE

## 2019-09-08 PROCEDURE — 82140 ASSAY OF AMMONIA: CPT

## 2019-09-08 PROCEDURE — 83036 HEMOGLOBIN GLYCOSYLATED A1C: CPT

## 2019-09-08 PROCEDURE — 82607 VITAMIN B-12: CPT

## 2019-09-08 PROCEDURE — 80061 LIPID PANEL: CPT

## 2019-09-08 PROCEDURE — 74011250636 HC RX REV CODE- 250/636: Performed by: INTERNAL MEDICINE

## 2019-09-08 PROCEDURE — 77030040361 HC SLV COMPR DVT MDII -B

## 2019-09-08 PROCEDURE — 36415 COLL VENOUS BLD VENIPUNCTURE: CPT

## 2019-09-08 RX ORDER — DEXAMETHASONE 4 MG/1
2 TABLET ORAL DAILY
Status: DISCONTINUED | OUTPATIENT
Start: 2019-09-08 | End: 2019-09-12

## 2019-09-08 RX ORDER — POLYETHYLENE GLYCOL 3350 17 G/17G
17 POWDER, FOR SOLUTION ORAL
Status: DISCONTINUED | OUTPATIENT
Start: 2019-09-08 | End: 2019-09-15 | Stop reason: HOSPADM

## 2019-09-08 RX ORDER — MORPHINE SULFATE 4 MG/ML
2 INJECTION INTRAVENOUS ONCE
Status: COMPLETED | OUTPATIENT
Start: 2019-09-08 | End: 2019-09-08

## 2019-09-08 RX ORDER — ALPRAZOLAM 0.25 MG/1
0.25 TABLET ORAL 2 TIMES DAILY
Status: DISCONTINUED | OUTPATIENT
Start: 2019-09-08 | End: 2019-09-15 | Stop reason: HOSPADM

## 2019-09-08 RX ORDER — OXYCODONE HYDROCHLORIDE 5 MG/1
5 TABLET ORAL
Status: DISCONTINUED | OUTPATIENT
Start: 2019-09-08 | End: 2019-09-15 | Stop reason: HOSPADM

## 2019-09-08 RX ORDER — ACETAMINOPHEN 500 MG
500 TABLET ORAL
Status: ON HOLD | COMMUNITY
End: 2020-02-17 | Stop reason: SDUPTHER

## 2019-09-08 RX ORDER — OXYCODONE HYDROCHLORIDE 5 MG/1
10 TABLET ORAL
COMMUNITY
End: 2019-09-15

## 2019-09-08 RX ORDER — OXYCODONE HCL 10 MG/1
10 TABLET, FILM COATED, EXTENDED RELEASE ORAL DAILY
Status: DISCONTINUED | OUTPATIENT
Start: 2019-09-08 | End: 2019-09-15 | Stop reason: HOSPADM

## 2019-09-08 RX ORDER — ALPRAZOLAM 0.25 MG/1
0.25 TABLET ORAL ONCE
Status: COMPLETED | OUTPATIENT
Start: 2019-09-08 | End: 2019-09-09

## 2019-09-08 RX ORDER — HYDROMORPHONE HYDROCHLORIDE 1 MG/ML
0.5 INJECTION, SOLUTION INTRAMUSCULAR; INTRAVENOUS; SUBCUTANEOUS ONCE
Status: COMPLETED | OUTPATIENT
Start: 2019-09-08 | End: 2019-09-08

## 2019-09-08 RX ORDER — OXYCODONE HCL 10 MG/1
10 TABLET, FILM COATED, EXTENDED RELEASE ORAL EVERY 12 HOURS
Status: DISCONTINUED | OUTPATIENT
Start: 2019-09-08 | End: 2019-09-08

## 2019-09-08 RX ORDER — AMOXICILLIN 250 MG
1 CAPSULE ORAL 2 TIMES DAILY
Status: DISCONTINUED | OUTPATIENT
Start: 2019-09-08 | End: 2019-09-15 | Stop reason: HOSPADM

## 2019-09-08 RX ORDER — ALPRAZOLAM 0.25 MG/1
0.25 TABLET ORAL 2 TIMES DAILY
Status: ON HOLD | COMMUNITY
End: 2019-09-15 | Stop reason: SDUPTHER

## 2019-09-08 RX ADMIN — SENNOSIDES,DOCUSATE SODIUM 1 TABLET: 8.6; 5 TABLET, FILM COATED ORAL at 12:02

## 2019-09-08 RX ADMIN — ASPIRIN 81 MG 81 MG: 81 TABLET ORAL at 07:40

## 2019-09-08 RX ADMIN — ALPRAZOLAM 0.25 MG: 0.25 TABLET ORAL at 12:01

## 2019-09-08 RX ADMIN — OXYCODONE HYDROCHLORIDE 5 MG: 5 TABLET ORAL at 12:02

## 2019-09-08 RX ADMIN — DEXAMETHASONE 2 MG: 4 TABLET ORAL at 12:02

## 2019-09-08 RX ADMIN — ALPRAZOLAM 0.25 MG: 0.25 TABLET ORAL at 17:55

## 2019-09-08 RX ADMIN — Medication 10 ML: at 00:23

## 2019-09-08 RX ADMIN — OXYCODONE HYDROCHLORIDE 5 MG: 5 TABLET ORAL at 17:55

## 2019-09-08 RX ADMIN — OXYCODONE HYDROCHLORIDE 5 MG: 5 TABLET ORAL at 20:57

## 2019-09-08 RX ADMIN — OXYCODONE HYDROCHLORIDE 5 MG: 5 TABLET ORAL at 15:06

## 2019-09-08 RX ADMIN — OXYCODONE HYDROCHLORIDE 10 MG: 10 TABLET, FILM COATED, EXTENDED RELEASE ORAL at 20:57

## 2019-09-08 RX ADMIN — ACETAMINOPHEN 650 MG: 325 TABLET ORAL at 16:36

## 2019-09-08 RX ADMIN — POLYETHYLENE GLYCOL 3350 17 G: 17 POWDER, FOR SOLUTION ORAL at 22:20

## 2019-09-08 RX ADMIN — Medication 10 ML: at 05:32

## 2019-09-08 RX ADMIN — SENNOSIDES,DOCUSATE SODIUM 1 TABLET: 8.6; 5 TABLET, FILM COATED ORAL at 17:56

## 2019-09-08 RX ADMIN — Medication 10 ML: at 13:02

## 2019-09-08 RX ADMIN — HYDROMORPHONE HYDROCHLORIDE 0.5 MG: 1 INJECTION, SOLUTION INTRAMUSCULAR; INTRAVENOUS; SUBCUTANEOUS at 05:32

## 2019-09-08 RX ADMIN — OXYCODONE HYDROCHLORIDE 10 MG: 10 TABLET, FILM COATED, EXTENDED RELEASE ORAL at 07:40

## 2019-09-08 RX ADMIN — OXYCODONE HYDROCHLORIDE 5 MG: 5 TABLET ORAL at 09:00

## 2019-09-08 RX ADMIN — Medication 10 ML: at 19:45

## 2019-09-08 RX ADMIN — MORPHINE SULFATE 2 MG: 4 INJECTION, SOLUTION INTRAMUSCULAR; INTRAVENOUS at 03:57

## 2019-09-08 NOTE — ROUTINE PROCESS
TRANSFER - OUT REPORT:    Verbal report given to Chelsea Durham RN(name) on Dinorah Estevez  being transferred to Lesli CALDERON RN(unit) for routine progression of care       Report consisted of patients Situation, Background, Assessment and   Recommendations(SBAR). Information from the following report(s) SBAR, ED Summary and MAR was reviewed with the receiving nurse. Lines:   Peripheral IV 09/07/19 Left Antecubital (Active)   Site Assessment Clean, dry, & intact 9/7/2019  8:22 PM   Phlebitis Assessment 0 9/7/2019  8:22 PM   Infiltration Assessment 0 9/7/2019  8:22 PM   Dressing Status Clean, dry, & intact 9/7/2019  8:22 PM   Dressing Type Transparent 9/7/2019  8:22 PM   Hub Color/Line Status Patent 9/7/2019  8:22 PM   Action Taken Blood drawn 9/7/2019  8:22 PM        Opportunity for questions and clarification was provided.       Patient transported with:   Monitor  Registered Nurse

## 2019-09-08 NOTE — ED NOTES
Appears asleep, respirations even and unlabored, no signs of distress noted  Daughter remains at bedside

## 2019-09-08 NOTE — PROGRESS NOTES
Physical Therapy: Due to change in mentation and significant pain despite frequent medication, RN, and daughter prefer to defer PT Evaluation today. Will f/u tomorrow and proceed as indicated.  Thank you for referral.

## 2019-09-08 NOTE — ROUTINE PROCESS
2330: Bedside and Verbal shift change report given to Wes Plata RN (oncoming nurse) by Gloria Anderson RN (offgoing nurse). Report included the following information SBAR, Kardex, Procedure Summary, Intake/Output, MAR, Accordion, Recent Results and Cardiac Rhythm NSR, Apaced, PVCs.

## 2019-09-08 NOTE — PROGRESS NOTES
Ty [address]i [address] [place] [address]  [address] [facility], [address] [place] [address]  [phone]      Medical Progress Note      NAME: Munir Gallardo   :  1931  MRM:  [id number]    Date/Time: 2019  9:00 AM         Subjective:     Chief Complaint:  Pain: will not answer specific questions or tell me more about her pain, cannot locate it, just rocks back and forth moaning    ROS:  (bold if positive, if negative)    Unable to obtain          Objective:       Vitals:          Last 24hrs VS reviewed since prior progress note.  Most recent are:    Visit Vitals  /72 (BP 1 Location: Right arm, BP Patient Position: At rest;Lying left side)   Pulse 90   Temp 99.4 °F (37.4 °C)   Resp 22   Ht 5' 4\" (1.626 m)   Wt 60.6 kg (133 lb 11.2 oz)   SpO2 90%   BMI 22.95 kg/m²     SpO2 Readings from Last 6 Encounters:   19 90%   19 93%   19 95%   19 96%   19 93%   19 97%            Intake/Output Summary (Last 24 hours) at 2019 0900  Last data filed at 2019 0532  Gross per 24 hour   Intake 30 ml   Output    Net 30 ml          Exam:     Physical Exam:    Gen:  Well-developed, frail, elderly, chronically ill-appearing, in  acute pain  HEENT:  Pink conjunctivae, PERRL, hearing intact to voice, moist mucous membranes  Neck:  Supple, without masses, thyroid non-tender  Resp:  No accessory muscle use, clear breath sounds without wheezes rales or rhonchi  Card:  No murmurs, normal S1, S2 without thrills, bruits or peripheral edema  Abd:  Soft, non-tender, non-distended, normoactive bowel sounds are present, no palpable organomegaly and no detectable hernias  Lymph:  No cervical or inguinal adenopathy  Musc:  No cyanosis or clubbing  Skin:  No rashes or ulcers, skin turgor is good  Neuro:  Cranial nerves are grossly intact, no focal motor weakness, does not follow commands appropriately  Psych:  Poor insight, oriented to person, but not place or time, alert       Telemetry reviewed: normal sinus rhythm    Medications Reviewed: (see below)    Lab Data Reviewed: (see below)    ______________________________________________________________________    Medications:     Current Facility-Administered Medications   Medication Dose Route Frequency    oxyCODONE ER (OxyCONTIN) tablet 10 mg  10 mg Oral Q12H    oxyCODONE IR (ROXICODONE) tablet 5 mg  5 mg Oral Q3H    sodium chloride (NS) flush 5-40 mL  5-40 mL IntraVENous Q8H    sodium chloride (NS) flush 5-40 mL  5-40 mL IntraVENous PRN    acetaminophen (TYLENOL) tablet 650 mg  650 mg Oral Q4H PRN    Or    acetaminophen (TYLENOL) solution 650 mg  650 mg Per NG tube Q4H PRN    Or    acetaminophen (TYLENOL) suppository 650 mg  650 mg Rectal Q4H PRN    aspirin chewable tablet 81 mg  81 mg Oral DAILY            Lab Review:     Recent Labs     09/07/19 1939   WBC 11.2*   HGB 11.6   HCT 39.2        Recent Labs     09/07/19 1939      K 4.8      CO2 31   *   BUN 15   CREA 0.88   CA 8.6   ALB 3.1*   TBILI 0.6   SGOT 23   ALT 13   INR 1.1     Lab Results   Component Value Date/Time    Glucose (POC) 124 (H) 09/07/2019 07:17 PM    Glucose (POC) 103 (H) 07/25/2019 07:55 AM    Glucose (POC) 93 10/01/2016 11:33 AM    Glucose (POC) 91 10/01/2016 07:33 AM     No results for input(s): PH, PCO2, PO2, HCO3, FIO2 in the last 72 hours. Recent Labs     09/07/19 1939   INR 1.1     Lab Results   Component Value Date/Time    Specimen Description: URINE 09/13/2013 10:35 PM     Lab Results   Component Value Date/Time    Culture result: NO GROWTH 5 DAYS 07/31/2019 09:13 AM    Culture result: (A) 07/29/2019 10:46 AM     STAPHYLOCOCCUS SPECIES, COAGULASE NEGATIVE GROWING IN 1 OF 4 BOTTLES DRAWN (R ARM SITE)    Culture result: (A) 07/29/2019 10:46 AM     PRELIMINARY REPORT OF GRAM POSITIVE COCCI IN CLUSTERS GROWING IN 1 OF 4 BOTTLES DRAWN CALLED TO AND READ BACK BY MS Herb Elaine RN ON 7/30/19 AT 1830 ([facility]).  MS    Culture result: REMAINING BOTTLE(S) HAS/HAVE NO GROWTH IN 5 DAYS 07/29/2019 10:46 AM            Assessment:     Principal Problem:    TIA (transient ischemic attack) (9/30/2016)    Active Problems:    Paroxysmal A-fib ([address] (9/30/2016)      RA (rheumatoid arthritis) (HCC) (9/30/2016)      COPD (chronic obstructive pulmonary disease) ([address] (9/30/2016)      Hypertension (8/25/2018)      Malignant neoplasm of upper lobe of left lung ([address] (11/27/2018)      Leukocytosis (7/20/2019)      Chronic pain syndrome ()      Acute encephalopathy (9/7/2019)           Plan:     Principal Problem:    TIA (transient ischemic attack) (9/30/2016)   - vs CVA   - Neurology to see   - she is not a good candidate for any intervention, in my opinion   - PT/OT/Speech to assess    Active Problems:    Paroxysmal A-fib ([address] (9/30/2016)   - no longer on chronic anticoagulation and agree she is a poor candidate      RA (rheumatoid arthritis) ([address] (9/30/2016)   - on chronic steroids      COPD (chronic obstructive pulmonary disease) ([address] (9/30/2016)   - continue respiratory meds, on chronic steroids      Hypertension (8/25/2018)   - BP okay, not on meds      Malignant neoplasm of upper lobe of left lung ([address] (11/27/2018)   - not a candidate for additional treatment due to poor performance status      Leukocytosis (7/20/2019)   - chronically elevated due to steroid use      Chronic pain syndrome ()   - continue home pain meds      Acute encephalopathy (9/7/2019)   - possible due to TIA/CVA, have found no other reversible cause   - see ACP note for further details, daughter repeatedly asked what else we can do to work this up despite multiple attempts on my part to explain that we often cannot find a specific cause      Total time spent in patient care: 35 minutes                  Care Plan discussed with: Patient, Family and Nursing Staff    Discussed:  Code Status, Care Plan and D/C Planning    Prophylaxis:  SCD's    Disposition: TBD           ___________________________________________________    Attending Physician: MD Jayden Mishra

## 2019-09-08 NOTE — PROGRESS NOTES
Care Plan Summary  Problem: Falls - Risk of  Goal: *Absence of Falls  Description  Document Neelima Silva Fall Risk and appropriate interventions in the flowsheet. Outcome: Progressing Towards Goal  Note:   Fall Risk Interventions:  Mobility Interventions: Assess mobility with egress test, Bed/chair exit alarm, Patient to call before getting OOB, PT Consult for mobility concerns, Utilize walker, cane, or other assistive device    Mentation Interventions: Adequate sleep, hydration, pain control, Bed/chair exit alarm, Door open when patient unattended, Evaluate medications/consider consulting pharmacy, Increase mobility, More frequent rounding, Room close to nurse's station    Medication Interventions: Bed/chair exit alarm, Evaluate medications/consider consulting pharmacy, Teach patient to arise slowly    Elimination Interventions: Bed/chair exit alarm, Call light in reach, Toileting schedule/hourly rounds    History of Falls Interventions: Bed/chair exit alarm, Door open when patient unattended, Investigate reason for fall, Room close to nurse's station         Problem: Patient Education: Go to Patient Education Activity  Goal: Patient/Family Education  Outcome: Progressing Towards Goal     Problem: Pressure Injury - Risk of  Goal: *Prevention of pressure injury  Description  Document Rom Scale and appropriate interventions in the flowsheet. Outcome: Progressing Towards Goal  Note:   Pressure Injury Interventions:  Sensory Interventions: Assess changes in LOC, Check visual cues for pain, Discuss PT/OT consult with provider, Keep linens dry and wrinkle-free    Moisture Interventions: Absorbent underpads, Internal/External urinary devices, Limit adult briefs    Activity Interventions: PT/OT evaluation, Increase time out of bed    Mobility Interventions: Assess need for specialty bed, HOB 30 degrees or less, PT/OT evaluation, Turn and reposition approx.  every two hours(pillow and wedges)    Nutrition Interventions: Document food/fluid/supplement intake, Offer support with meals,snacks and hydration    Friction and Shear Interventions: Apply protective barrier, creams and emollients, Foam dressings/transparent film/skin sealants, Lift sheet, Lift team/patient mobility team, Minimize layers                Problem: Patient Education: Go to Patient Education Activity  Goal: Patient/Family Education  Outcome: Progressing Towards Goal

## 2019-09-08 NOTE — PROGRESS NOTES
0730-  Bedside and Verbal shift change report given to Yolanda RN (oncoming nurse) by Rory Dmoinguez RNs (offgoing nurse). Report included the following information SBAR, Kardex and Recent Results . Pt is confused/ Alert/ EYE open with voice/ moveing all ext without purposeful/ screaming/ crying/ moaning/ talking but hard to understan/ DRT @ bedside. Fernando Ocampo Helping a lot. . Sometime she means pt is listening to her. Fernando OLVERA @ Bedside. .     MD Rosanna Franklin @ bedside to see. . Blood order and sent to lab. She is incontinent. . Checked frequently. .     Called received form MRI Dept. . And said as she a pacer she is the candidate for tommorow. .  Pacer MRI always done in wk day. .    1730-  DRT ask me to page MD Lili Deras regarding any extra pain medicine pt can have or Not? Fernando Ocampo .   He said just wait to see Palliative tomrow morning. 1800- Pt is very anxious. . Medicate. .      1930-  Bedside and Verbal shift change report given to Rory Dominguez RNs  (oncoming nurse) by Geetha Angeles (offgoing nurse). Report included the following information SBAR, Kardex and Recent Results.

## 2019-09-08 NOTE — ACP (ADVANCE CARE PLANNING)
Advance Care Planning Note    Name: Brandon Mak  YOB: 1931  MRN: [id number]  Admission Date: 9/7/2019  7:21 PM    Date of discussion: 9/8/2019    Active Diagnoses:    Hospital Problems  Date Reviewed: 7/20/2019          Codes Class Noted POA    Acute encephalopathy ICD-10-CM: G93.40  ICD-9-CM: 348.30  9/7/2019 Yes        Chronic pain syndrome (Chronic) ICD-10-CM: G89.4  ICD-9-CM: 338.4  Unknown Yes        Leukocytosis ICD-10-CM: D72.829  ICD-9-CM: 288.60  7/20/2019 Yes        Malignant neoplasm of upper lobe of left lung ([facility] [address] (Chronic) ICD-10-CM: C34.12  ICD-9-CM: 162.3  11/27/2018 Yes        Hypertension (Chronic) ICD-10-CM: I10  ICD-9-CM: 401.9  8/25/2018 Yes        * (Principal) TIA (transient ischemic attack) ICD-10-CM: G45.9  ICD-9-CM: 435.9  9/30/2016 Yes        Paroxysmal A-fib (HCC) (Chronic) ICD-10-CM: I48.0  ICD-9-CM: 427.31  9/30/2016 Yes        RA (rheumatoid arthritis) ([facility] [address] (Chronic) ICD-10-CM: M06.9  ICD-9-CM: 714.0  9/30/2016 Yes        COPD (chronic obstructive pulmonary disease) (HCC) (Chronic) ICD-10-CM: J44.9  ICD-9-CM: 861  9/30/2016 Yes               These active diagnoses are of sufficient risk that focused discussion on advance care planning is indicated in order to allow the patient to thoughtfully consider personal goals of care, and if situations arise that prevent the ability to personally give input, to ensure appropriate representation of their personal desires for different levels and aggressiveness of care. Discussion:     Persons present and participating in discussion: Brandon Mak, Ysabel Schuster MD, Ramin Moore - daughter    Discussion: confirmed DNR status, paperwork on file here. I discussed Ms. Kimball progressive decline over the past few months, anticipated continued decline as she approaches the end of her life.   Radha Pesa was not very accepting of this and still wants acute issues worked up and treated, stated she is \"not ready to give up\" and would not acknowledge that at some point the burden of treatment will become greater than any benefit. She is open to talking further with Pallative Care. Time Spent:     Total time spent face-to-face in education and discussion: 16 minutes.        Jaleel Nelson MD  9/8/2019  9:05 AM

## 2019-09-08 NOTE — H&P
[address] 19  [phone]    Admission History and Physical      NAME:  Rhett Burnham   :   1931   MRN:  [id number]     PCP:  Maeve Ellington MD     Date/Time:  2019         Subjective:     CHIEF COMPLAINT: confusion      HISTORY OF PRESENT ILLNESS:     Ms. AMANDA Pulido is a 80 y.o.  female who is admitted with acute encephalopathy. Ms. AMANDA Pulido with PMH of PAF s/p PPM, COPD, HTN, RA, TIA was brought to ER after she became confused suddenly since 6 PM this afternoon. Pt was in her usual stat today afternoon, when she started to have difficulty to express herself and became confused. According to the daughter, pt tried to say something, but didn't make sense. Didn't notice gait abnormality. She was evaluated by tele neurology and workup is negative. Recommended MRI of the brain with contrast as she has lung cancer with mets, but MRI couldn't be done as she had recently pacemaker placed.       Past Medical History:   Diagnosis Date    AAA (abdominal aortic aneurysm) (HCC)     Arthritis     ra, osteoporosis, osteoarthritis    Atrial fibrillation (HCC)     COPD     HTN (hypertension)     Lung cancer (HCC)     [facility]) [facility]     Smoker     TIA (transient ischemic attack)     TIA 16        Past Surgical History:   Procedure Laterality Date    HX CHOLECYSTECTOMY      HX HYSTERECTOMY      HX ORTHOPAEDIC      carpal tunnel, wrist surgery    WI INS NEW/RPLCMT PRM PM W/TRANSV ELTRD ATRIAL&VENT N/A 2019    INSERT PPM DUAL performed by Bharath Cleary MD at [address] [place] [address] LAB       Social History     Tobacco Use    Smoking status: Former Smoker     Years: 50.00     Last attempt to quit: 2018     Years since quittin.0    Smokeless tobacco: Never Used   Substance Use Topics    Alcohol use: No        Family History   Problem Relation Age of Onset    Hypertension Father Allergies   Allergen Reactions    Codeine Nausea and Vomiting        Prior to Admission medications    Medication Sig Start Date End Date Taking? Authorizing Provider   folic acid (FOLVITE) 1 mg tablet Take 1 mg by mouth daily. Provider, Historical   dexAMETHasone (DECADRON) 2 mg tablet take 1 tablet by mouth once daily after breakfast 7/15/19   Theron Reyes MD   ALPALMAZouma Duke) 0.25 mg tablet Take 1 Tab by mouth two (2) times daily as needed for Anxiety. Max Daily Amount: 0.5 mg. Indications: Anxiousness associated with Depression 5/29/19   Domingo Reyes MD   methotrexate (RHEUMATREX) 2.5 mg tablet Take 8 Tabs by mouth Every Saturday. The patient takes 8 tablets which is 20 mg every Saturday 5/25/19   Gisele Joaquin MD   cholecalciferol (VITAMIN D3) 50,000 unit capsule Take 50,000 Units by mouth every seven (7) days.  Takes on Fridays    Provider, Historical         Review of Systems:   unable to provide Hx          Objective:      VITALS:    Vital signs reviewed; most recent are:    Visit Vitals  /62   Pulse 88   Temp 98.3 °F (36.8 °C)   Resp 18   Ht 5' 4\" (1.626 m)   Wt 61.2 kg (134 lb 14.7 oz)   SpO2 91%   BMI 23.16 kg/m²     SpO2 Readings from Last 6 Encounters:   09/07/19 91%   07/31/19 93%   07/03/19 95%   07/02/19 96%   06/20/19 93%   06/17/19 97%        No intake or output data in the 24 hours ending 09/07/19 3185         Exam:     Physical Exam:    Gen:  Well-developed, well-nourished, in no acute distress  HEENT:  Pink conjunctivae, PERRL, hearing intact to voice, moist mucous membranes  Neck:  Supple, without masses, thyroid non-tender  Resp:  No accessory muscle use, clear breath sounds without wheezes rales or rhonchi  Card:  No murmurs, normal S1, S2 without thrills, bruits or peripheral edema  Abd:  Soft, non-tender, non-distended, normoactive bowel sounds are present, no palpable organomegaly and no detectable hernias  Lymph:  No cervical or inguinal adenopathy  Musc: No cyanosis or clubbing  Skin:  No rashes or ulcers, skin turgor is good  Neuro:  sedated  Psych:  Sedated after ativan given before CT scan   time, alert       Labs:    Recent Labs     09/07/19 1939   WBC 11.2*   HGB 11.6   HCT 39.2        Recent Labs     09/07/19 1939      K 4.8      CO2 31   *   BUN 15   CREA 0.88   CA 8.6   ALB 3.1*   TBILI 0.6   SGOT 23   ALT 13     Lab Results   Component Value Date/Time    Glucose (POC) 124 (H) 09/07/2019 07:17 PM    Glucose (POC) 103 (H) 07/25/2019 07:55 AM     No results for input(s): PH, PCO2, PO2, HCO3, FIO2 in the last 72 hours. Recent Labs     09/07/19 1939   INR 1.1       Telemetry reviewed:   paced       Assessment/Plan:    1. Acute encephalopathy (9/7/2019)/ Hx of TIA (transient ischemic attack) (9/30/2016). Pt has difficulty expressing herself. CT scan and CTA of the head are unremarkable. Evaluated by tele neurology and recommended to do MRI of the brain with contrast as she had lung cancer with metastasis, but pt has recent pacemake placed( unknown if it is MRI comparable). Neuro check, consult neurology. ASA. PT/OT evaluation. 2.  Paroxysmal A-fib ([address] (9/30/2016) s/p PPM. Not on anticoagulation due to GI bleeding     3. RA (rheumatoid arthritis) ([address] (9/30/2016). Takes methotrexate     4. COPD (chronic obstructive pulmonary disease) ([address] (9/30/2016). Stable. Not wheezing. Nebs as need     5. Malignant neoplasm of upper lobe of left lung - S/P RT. Not a candidate for chemo therapy. Follow up with Dr Rodriguez     6. Debilitated patient / Physical debility / Fall / Chronic pain syndrome - Fall precautions. PT/OT eval. Recently discharged from SNF. 7.  Hypertension (8/25/2018). Not on medication     8. Leukocytosis (7/20/2019). This is chronic, likely due to steroid. UA is unremarkable. Check CXR. 9.  Dementia / Anxiety - Pt takes Xanax.  Continue supportive care.      code stauts: DNR( discussed with Daughter) Previous medical records reviewed     Risk of deterioration: high      Total time spent with patient: 79 [address] [facility] discussed with: Family, Nursing Staff and >50% of time spent in counseling and coordination of care    Discussed:  Care Plan    Prophylaxis:  SCD's    Probable Disposition:  SNF/LTC           ___________________________________________________    Attending Physician: Jeffery Santiago MD

## 2019-09-08 NOTE — PROGRESS NOTES
Spiritual Care Assessment/Progress Note  [address]      NAME: Wendy Herrera      MRN: [id number]  AGE: 80 y.o. SEX: female  Orthodox Affiliation: Uatsdin   Language: English     9/8/2019     Total Time (in minutes): 15     Spiritual Assessment begun in [facility] 3 PRO CARE TELE 1 through conversation with:         [x]Patient        [x] Family    [] Friend(s)        Reason for Consult: Palliative Care, Initial/Spiritual Assessment     Spiritual beliefs: (Please include comment if needed)     [x] Identifies with a reji tradition: Uatsdin         [] Supported by a reji community:            [] Claims no spiritual orientation:           [] Seeking spiritual identity:                [] Adheres to an individual form of spirituality:           [] Not able to assess:                           Identified resources for coping:      [] Prayer                               [] Music                  [] Guided Imagery     [x] Family/friends                 [] Pet visits     [] Devotional reading                         [] Unknown     [] Other:                                              Interventions offered during this visit: (See comments for more details)    Patient Interventions: Other (comment)(Unable to assess)     Family/Friend(s):  Affirmation of emotions/emotional suffering, Catharsis/review of pertinent events in supportive environment, Affirmation of reji     Plan of Care:     [x] Support spiritual and/or cultural needs    [] Support AMD and/or advance care planning process      [] Support grieving process   [] Coordinate Rites and/or Rituals    [] Coordination with community clergy   [] No spiritual needs identified at this time   [] Detailed Plan of Care below (See Comments)  [] Make referral to Music Therapy  [] Make referral to Pet Therapy     [] Make referral to Addiction services  [] Make referral to [facility]  [] Make referral to Spiritual Care Partner  [] No future visits requested [x] Follow up visits as needed     Comments:      visited patient, Peyton Cavanaugh, for a palliative care initial spiritual assessment in the Post. Surgical Ortho floor. Gabriela was lying in bed and appeared to be resting comfortably. Periodically she would wake up and cry out for a few minutes and then fall back asleep. Her daughter, Babar Mckinney, was sitting at the bedside.  introduced herself and extended support through active listening and pastoral presence. Babar Mckinney discussed her mother's illness journey and her concerns with her mother's decline. She explained that usually Peyton Cavanaugh is very aware of what is going around her and is worried as Peyton Cavanaugh appears to be confused at this time.  continued to be a supportive presence as Babar Mckinney discussed other thoughts and concerns.  inquired what would be comforting to her mother during this time. She expressed that family presence seems to be most comforting. Babar Mckinney thanked  for her visit and expressed no additional needs at this time. Babar Mckinney is aware of the 's availability.  will follow up as able and/or needed  Yi Coles. Angelica Johnson.      Paging Service: [phone]

## 2019-09-08 NOTE — PROGRESS NOTES
Occupational Therapy  Chart reviewed; patient not cleared for tx; has had change in mentation and increased pain; only moaning/unable to communicate needs further at this time; will retry for OT eval tomorrow.  Glen Estes OTR/SARAH

## 2019-09-08 NOTE — CONSULTS
NEUROLOGY IN-PATIENT NEW CONSULTATION      2019    RE: Dinorah Estevez         1931      REFERRED BY:  Marylen Baumann, MD      CHIEF COMPLAINT:  This is Dinorah Estevez is a 80 y.o. female  who had concerns including Altered mental status. HPI:     Patient has history of Lung CA with mets not chemo candidate seeing Dr Kev Collins. Also has baseline dementia. Had a pacemaker placed c/o Dr Ebonie Douglass ([employer] [place] MRI sure scan MR conditional) on 19 for bradycardia. Previously on Eliquis but was stopped in 2019 due to R gluteal hematoma    Patient now comes in for acute confusion yesterday described as problem expressing herself. This morning, patient keeps on saying \"Oh God\" and that she is in pain.     ROS   (-) fever  (-) rash  All other systems reviewed and are negative    Past Medical Hx  Past Medical History:   Diagnosis Date    AAA (abdominal aortic aneurysm) (HCC)     Arthritis     ra, osteoporosis, osteoarthritis    Atrial fibrillation (HCC)     COPD     HTN (hypertension)     Lung cancer (HCC)     RA [facility]oid [facility]) [facility] [address]     Smoker     TIA (transient ischemic attack)     TIA 16       Social Hx  Social History     Socioeconomic History    Marital status: SINGLE     Spouse name: Not on file    Number of children: Not on file    Years of education: Not on file    Highest education level: Not on file   Tobacco Use    Smoking status: Former Smoker     Years: 50.00     Last attempt to quit: 2018     Years since quittin.0    Smokeless tobacco: Never Used   Substance and Sexual Activity    Alcohol use: No    Drug use: No    Sexual activity: Not Currently       Family Hx  Family History   Problem Relation Age of Onset    Hypertension Father        ALLERGIES  Allergies   Allergen Reactions    Codeine Nausea and Vomiting       CURRENT MEDS  Current Facility-Administered Medications   Medication Dose Route Frequency Provider Last Rate Last Dose    oxyCODONE IR (ROXICODONE) tablet 5 mg  5 mg Oral Q3H Cristóbal Ugarte MD   5 mg at 09/08/19 0900    ALPRAZolam (XANAX) tablet 0.25 mg  0.25 mg Oral BID Anirudh Guerra MD        dexAMETHasone (DECADRON) tablet 2 mg  2 mg Oral DAILY Anirudh Guerra MD        oxyCODONE ER (OxyCONTIN) tablet 10 mg  10 mg Oral DAILY Anirudh Guerra MD        polyethylene glycol [facility]) packet 17 g  17 g Oral QHS Anirudh Guerra MD        senna-docusate (PERICOLACE) 8.6-50 mg per tablet 1 Tab  1 Tab Oral BID Anirudh Guerra MD        sodium chloride (NS) flush 5-40 mL  5-40 mL IntraVENous Q8H Bishop Feng MD   10 mL at 09/08/19 0532    sodium chloride (NS) flush 5-40 mL  5-40 mL IntraVENous PRN Cristóbal Noland MD        acetaminophen (TYLENOL) tablet 650 mg  650 mg Oral Q4H PRN Bishop Feng MD        Or    acetaminophen (TYLENOL) solution 650 mg  650 mg Per NG tube Q4H PRN Cristóbal Ugarte MD        Or    acetaminophen (TYLENOL) suppository 650 mg  650 mg Rectal Q4H PRN Bishop Feng MD        aspirin chewable tablet 81 mg  81 mg Oral DAILY Bishop Feng MD   81 mg at 09/08/19 0740           PREVIOUS WORKUP: (reviewed)  IMAGING:    CT Results (recent):  Results from Hospital Encounter encounter on 09/07/19   CTA CODE NEURO HEAD AND NECK W CONT    Narrative Clinical indication: Code S    CTA of the head and neck obtained after intravenous injection 100 cc of  Isovue-370. Review of raw data and MIP reconstructions. No prior. CT dose  reduction was achieved through the use of a standardized protocol tailored for  this examination and automatic exposure control for dose modulation . Lord Torres Significant motion artifact. Diffuse vascular calcification. No significant  carotid bifurcation disease. Vertebral arteries in the neck are patent. There is  diffuse ectasia. There is no definite filling defect, stenosis or vascular  malformation. Impression IMPRESSION: Limited by motion. No significant lesion suspected. MRI Results (recent):  Results from [place] encounter on 11/24/18   MRI BRAIN W WO CONT    Narrative *PRELIMINARY REPORT*    MR the brain demonstrates no acute abnormality. .    Preliminary report was provided by Dr. Amos Nurse, the on-call radiologist, at 940    Final report to follow. *END PRELIMINARY REPORT*    EXAM:  MRI BRAIN W WO CONT    INDICATION:    preliminary dx of lung cancer; eval for metastatic disease    COMPARISON:  MRI brain 10/1/2016. Win Draper CONTRAST: 10 ml Dotarem. TECHNIQUE:    Multiplanar multisequence acquisition without and with contrast of the brain. FINDINGS:  The ventricles are normal in size and position. Scattered white matter T2/FLAIR  hyperintensities, consistent with mild chronic microvascular ischemic disease. There is no acute infarct, hemorrhage, extra-axial fluid collection, or mass  effect. There is no cerebellar tonsillar herniation. Expected arterial  flow-voids are present. No evidence of abnormal enhancement. The paranasal sinuses, mastoid air cells, and middle ears are clear. The orbital  contents are within normal limits. No significant osseous or scalp lesions are  identified. Impression IMPRESSION:   1. No evidence of acute intracranial abnormality. No evidence of intracranial  metastases. IR Results (recent):  No results found for this or any previous visit. VAS/US Results (recent):  Results from Hospital Encounter encounter on 09/30/16   [address] (reviewed)  Results for orders placed or performed during the hospital encounter of 09/07/19   URINE CULTURE HOLD SAMPLE   Result Value Ref Range    Urine culture hold        URINE ON HOLD IN MICROBIOLOGY DEPT FOR 3 DAYS. IF UNPRESERVED URINE IS SUBMITTED, IT CANNOT BE USED FOR ADDITIONAL TESTING AFTER 24 HRS, RECOLLECTION WILL BE REQUIRED.    CBC WITH AUTOMATED DIFF   Result Value Ref Range    WBC 11.2 (H) 3.6 - 11.0 K/uL    RBC 4.11 3.80 - 5.20 M/uL    HGB 11.6 11.5 - 16.0 g/dL    HCT 39.2 35.0 - 47.0 %    MCV 95.4 80.0 - 99.0 FL    MCH 28.2 26.0 - 34.0 PG    MCHC 29.6 (L) 30.0 - 36.5 g/dL    RDW 17.4 (H) 11.5 - 14.5 %    PLATELET 684 796 - 723 K/uL    MPV 9.6 8.9 - 12.9 FL    NRBC 0.0 0  WBC    ABSOLUTE NRBC 0.00 0.00 - 0.01 K/uL    NEUTROPHILS 86 (H) 32 - 75 %    LYMPHOCYTES 7 (L) 12 - 49 %    MONOCYTES 6 5 - 13 %    EOSINOPHILS 0 0 - 7 %    BASOPHILS 0 0 - 1 %    IMMATURE GRANULOCYTES 1 (H) 0.0 - 0.5 %    ABS. NEUTROPHILS 9.6 (H) 1.8 - 8.0 K/UL    ABS. LYMPHOCYTES 0.8 0.8 - 3.5 K/UL    ABS. MONOCYTES 0.6 0.0 - 1.0 K/UL    ABS. EOSINOPHILS 0.0 0.0 - 0.4 K/UL    ABS. BASOPHILS 0.0 0.0 - 0.1 K/UL    ABS. IMM. GRANS. 0.1 (H) 0.00 - 0.04 K/UL    DF AUTOMATED     METABOLIC PANEL, COMPREHENSIVE   Result Value Ref Range    Sodium 140 136 - 145 mmol/L    Potassium 4.8 3.5 - 5.1 mmol/L    Chloride 105 97 - 108 mmol/L    CO2 31 21 - 32 mmol/L    Anion gap 4 (L) 5 - 15 mmol/L    Glucose 122 (H) 65 - 100 mg/dL    BUN 15 6 - 20 MG/DL    Creatinine 0.88 0.55 - 1.02 MG/DL    BUN/Creatinine ratio 17 12 - 20      GFR est AA >60 >60 ml/min/1.73m2    GFR est non-AA >60 >60 ml/min/1.73m2    Calcium 8.6 8.5 - 10.1 MG/DL    Bilirubin, total 0.6 0.2 - 1.0 MG/DL    ALT (SGPT) 13 12 - 78 U/L    AST (SGOT) 23 15 - 37 U/L    Alk.  phosphatase 63 45 - 117 U/L    Protein, total 7.1 6.4 - 8.2 g/dL    Albumin 3.1 (L) 3.5 - 5.0 g/dL    Globulin 4.0 2.0 - 4.0 g/dL    A-G Ratio 0.8 (L) 1.1 - 2.2     PROTHROMBIN TIME + INR   Result Value Ref Range    INR 1.1 0.9 - 1.1      Prothrombin time 10.9 9.0 - 11.1 sec   URINALYSIS W/MICROSCOPIC   Result Value Ref Range    Color YELLOW/STRAW      Appearance CLEAR CLEAR      Specific gravity 1.016 1.003 - 1.030      pH (UA) 7.0 5.0 - 8.0      Protein NEGATIVE  NEG mg/dL    Glucose NEGATIVE  NEG mg/dL    Ketone NEGATIVE  NEG mg/dL    Bilirubin NEGATIVE  NEG      Blood NEGATIVE  NEG      Urobilinogen 1.0 0.2 - 1.0 EU/dL    Nitrites NEGATIVE  NEG Leukocyte Esterase NEGATIVE  NEG      WBC 0-4 0 - 4 /hpf    RBC 0-5 0 - 5 /hpf    Epithelial cells MANY (A) FEW /lpf    Bacteria NEGATIVE  NEG /hpf   SAMPLES BEING HELD   Result Value Ref Range    SAMPLES BEING HELD RED,GOLD,GRN     COMMENT        Add-on orders for these samples will be processed based on acceptable specimen integrity and analyte stability, which may vary by analyte. LIPID PANEL   Result Value Ref Range    LIPID PROFILE          Cholesterol, total 152 <200 MG/DL    Triglyceride 92 <150 MG/DL    HDL Cholesterol 39 MG/DL    LDL, calculated 94.6 0 - 100 MG/DL    VLDL, calculated 18.4 MG/DL    CHOL/HDL Ratio 3.9 0.0 - 5.0     HEMOGLOBIN A1C WITH EAG   Result Value Ref Range    Hemoglobin A1c 5.0 4.2 - 6.3 %    Est. average glucose 97 mg/dL   GLUCOSE, POC   Result Value Ref Range    Glucose (POC) 124 (H) 65 - 100 mg/dL    Performed by NAZ JARRELL    EKG, 12 LEAD, INITIAL   Result Value Ref Range    Ventricular Rate 78 BPM    Atrial Rate 74 BPM    P-R Interval 168 ms    QRS Duration 80 ms    Q-T Interval 354 ms    QTC Calculation (Bezet) 403 ms    Calculated R Axis -15 degrees    Calculated T Axis 59 degrees    Diagnosis       Suspect unspecified pacemaker failure  Sinus rhythm with premature atrial complexes  Left ventricular hypertrophy with repolarization abnormality  Abnormal ECG  When compared with ECG of 25-JUL-2019 07:56,  Vent. rate has increased BY  30 BPM  ST now depressed in Lateral leads  T wave inversion more evident in Lateral leads         Physical Exam:     Visit Vitals  /71 (BP 1 Location: Right arm, BP Patient Position: At rest)   Pulse 99   Temp 97.6 °F (36.4 °C)   Resp 20   Ht 5' 4\" (1.626 m)   Wt 60.6 kg (133 lb 11.2 oz)   SpO2 98%   BMI 22.95 kg/m²     General:  Alert, agitated, keeps on saying \"Oh God\" and \" I am in pain\"   Head:  Normocephalic, without obvious abnormality, atraumatic. Eyes:  Conjunctivae/corneas clear.     Lungs:  Heart:   Non labored breathing  Regular rate and rhythm, no carotid bruits   Abdomen:   Soft, non-distended   Extremities: Extremities normal, atraumatic, no cyanosis or edema. Pulses: 2+ and symmetric all extremities. Skin: Skin color, texture, turgor normal. No rashes or lesions. Neurologic Exam     Gen: Does not know place, her name and the president  Unable to name and repeat  Cranial Nerves:  I: smell Not tested   II: visual fields Full to confrontation   II: pupils Equal, round, reactive to light   II: optic disc No papilledema   III,VII: ptosis none   III,IV,VI: extraocular muscles  Full ROM   V: mastication normal   V: facial light touch sensation  normal   VII: facial muscle function   symmetric   VIII: hearing symmetric   IX: soft palate elevation  normal   XI: trapezius strength  5/5   XI: sternocleidomastoid strength 5/5   XI: neck flexion strength  5/5   XII: tongue  midline     Motor: normal bulk and tone, no tremor              Strength: 5/5 all four extremities  Sensory: intact to LT, PP, vibration, and temperature  Reflexes: 2+ throughout; Down going toes  Coordination: Good FTN and HTS  Gait: deferred           Impression:     Eri Damon is a 80 y.o. female who  has a past medical history of AAA (abdominal aortic aneurysm) ([address] Arthritis, Atrial fibrillation ([address] COPD, HTN (hypertension), Lung cancer ([address] RA (rheumatoid arthritis) ([address] Smoker, and TIA (transient ischemic attack). who has history of Lung CA with mets not chemo candidate seeing Dr Alicja Richardson. Also has baseline dementia. Had a pacemaker placed c/o Dr Remy Peñaloza ([employer] [place] MRI sure scan MR conditional) on 7/22/19 for bradycardia. Previously on Eliquis but was stopped in July 2019 due to R gluteal hematoma. Patient now comes in for acute confusion yesterday described as problem expressing herself. This morning, patient keeps on saying \"Oh God\" and that she is in pain.     Consideration includes acute encephalopathy; evaluate for stroke and brain mets.    RECOMMENDATIONS  1. Will do MRI brain to look for stroke and brain mets (pacemaker [employer] [place] MRI sure scan MR conditional)  2. Blood test for Vit B12 and ammonia  3. ASA 81 for vascula prevention if no contraindication  4. FU with cardiology regarding pacemaker  5.  FU with Dr Rodriguez regarding lung CA    Please call for questions        Thank you for the consultation      Solo Oakley MD  Diplomate, American Board of Psychiatry and Neurology  Diplomate, Neuromuscular Medicine  Diplomate, American Board of Electrodiagnostic Medicine    Greater than 50% of time spent counseling patient        CC: Yashira Flowers MD  Fax: [phone]

## 2019-09-08 NOTE — PHYSICIAN ADVISORY
Letter of Status Determination: Current Status   INPATIENT is Appropriate         Pt Name:  Sameer Reeder   MR#  [id number]   [facility]#   [id number]   [address] [place] [address]  [phone]  @ [facility]   Hospitalization date  9/7/2019  7:21 PM   Current Attending Physician  Zeina Iqbal MD   Principal diagnosis  Acute encephalopathy     Clinicals  80 y.o. y.o  female hospitalized with acute AMS with h/o metastatic malignancy. Working diagnosis CVA vs brain mets. MRI pending as pt has pacemaker, unsure if compatible with MRI and neuro consult pending. [facility] criteria   Does  NOT apply    STATUS DETERMINATION    This patient is at high risk of adverse events and deterioration based on documented clinical data, comorbid conditions and current acute care course. Ms. Sameer Reeder is expected to meet Inpatient Admission status criteria in accordance with CMS regulation Section 43 .3. Specifically, due to medical necessity the patient's stay is expected to exceed Two Midnights. On the basis of clinical data, available documentaion, we believe that the current status of this patient as INPATIENT is Appropriate   The final decision of the patient's hospitalization status depends on the attending physician's judgment. Additional comments     Insurance  Payor: [place] MEDICARE / Plan: VA MEDICARE PART A & B / Product Type: Medicare /    Insurance Information                [facility] Phone:     Subscriber: Lor Cook Subscriber#: [id number]    Group#:  Precert#:         [employer] [address] [facility] Phone:     [email or web address] Subscriber#: [id number]    Group#: [id number] Precert#:           The information in this document is a recommendation to be used for utilization review and utilization management purposes only. This recommendation is not an order.    The recommendation is made based on the information reviewed at the time of the referral, is pursuant to the [facility] Conditions of Participation (42 CFR Part 482), and is neither a judgment nor an assessment with regard to the appropriateness or quality of clinical care. For all Managed Care patients: The Criteria are intended solely for use as screening guidelines with respect to the medical appropriateness of healthcare services and not for final clinical or payment determinations concerning the type or level of medical care provided, or proposed to be provided, to a patient. They help the reviewers determine whether a patient is appropriate for observation or inpatient admission at the time a decision to admit the patient is being made. All efforts are made to apply the pertinent payor criteria ([employer] or [employer]) as well as the clinical judgements based on the information reviewed at the time of the referral.\" Nothing in this document may be used to limit, alter, or affect clinical services provided to the patient named below. Benjamin Banerjee MD  Physician Davie Gonzalez, [address] [place] [address]. [email or web address]      11:22 AM 9/8/2019

## 2019-09-08 NOTE — PROGRESS NOTES
BSHSI: MED RECONCILIATION    Comments/Recommendations:    Med rec performed via interview with patient's daughter who manages patient's medications.  Regarding pain regimen, daughter reports patient taking Oxycontin 10 mg po QHS and states when patient took the morning dose of the medication, it made her \"loopy\" and patient asked for pain medication all day long. Daughter has been giving oxycodone 10 mg IR every 3 hours starting around 0800 daily. Discussed kinetics/dosing of opioids between long acting and breakthrough dosing. Daughter understands and would consider giving Oxycontin BID and seeing how patient does but notified her that we would back off of her short acting medication if this was done. Daughter states patient has tried lidocaine patches in the past but they do not provide any pain relief.  Daughter reports patient was taking xanax PRN but is now taking it BID scheduled. Asked if patient had tried seroquel in the past and she did not believe so. Patient was previously on lexapro but it was stopped due to it affecting patient's appetite (she stopped eating while on this medication).  Regarding afib, patient was taken off of her eliquis by MD.   Elva Benitez Dexamethasone is for appetite and arthritis pain per Dr. Roxana Rojas. Daughter reports they are going to follow up with MD and possibly decrease this at next appointment. Allergies: Codeine    Prior to Admission Medications:   Prior to Admission Medications   Prescriptions Last Dose Informant Patient Reported? Taking? ALPRAZolam (XANAX) 0.25 mg tablet 9/7/2019 at Unknown time Self Yes Yes   Sig: Take 0.25 mg by mouth two (2) times a day. acetaminophen (TYLENOL) 500 mg tablet   Yes Yes   Sig: Take 500 mg by mouth every six (6) hours as needed for Pain. cholecalciferol (VITAMIN D3) 50,000 unit capsule 9/6/2019 Self Yes Yes   Sig: Take 50,000 Units by mouth Every Friday.  Takes on Fridays   dexAMETHasone (DECADRON) 2 mg tablet 9/7/2019 at AM Self No Yes   Sig: take 1 tablet by mouth once daily after breakfast   folic acid (FOLVITE) 1 mg tablet 9/7/2019 at Unknown time Self Yes Yes   Sig: Take 1 mg by mouth daily. methotrexate (RHEUMATREX) 2.5 mg tablet 9/7/2019 at AM Self No Yes   Sig: Take 8 Tabs by mouth Every Saturday. The patient takes 8 tablets which is 20 mg every Saturday   oxyCODONE ER (OXYCONTIN) 10 mg ER tablet 9/7/2019 at 2200 Self Yes Yes   Sig: Take 10 mg by mouth nightly. oxyCODONE IR (ROXICODONE) 5 mg immediate release tablet 9/7/2019 at 1800 Self Yes Yes   Sig: Take 10 mg by mouth every three (3) hours (while awake).       Facility-Administered Medications: None                    JONA Joseph   Contact: [phone]

## 2019-09-08 NOTE — PROGRESS NOTES
Clinical Shift Summary:     2230: TRANSFER - IN REPORT:    Verbal report received from My Lozano RN(name) on Kyle  being received from ED(unit) for routine progression of care      Report consisted of patients Situation, Background, Assessment and   Recommendations(SBAR). Information from the following report(s) SBAR, Kardex, ED Summary, Intake/Output, MAR, Accordion, Recent Results and Cardiac Rhythm A fib was reviewed with the receiving nurse. Opportunity for questions and clarification was provided. Assessment completed upon patients arrival to unit and care assumed. 2240: Patient arrived on the unit. Conducted initial assessment. Patient was sleeping only moaned with repositioning. Daughter was at the bedside. Daughter stated that patient takes Oxycontin and Oxycodone PO for chronic pain. 0100: Notified MD of patient's pain medication regimen. MD ordered Oxycontin Q 12 h PO and Oxycodone Q3h for pain (see MAR). 6151: PCTs provided CHG bath for patient. Patient was moaning, indicative of pain. Since patient's pain resolved with rest. PCTs repositioned and provided emotional support. 0345: Conducted swallow assessment x2 before administering PO pain medication. Patient was unable to complete swallow test r/t inability to follow commands. Notified MD of pain medication alternative. MD ordered IV morphine. 9790: Administered IV morphine for moderate pain (see MAR). 8325: Reassessed patient. Patient is resting. 0440: Patient groaned again per pain. Notified MD that patient's pain is unresolved. MD ordered PRN IV Dilaudid and explained purpose for cautioning IV pain medication regimen. Administered IV Dilaudid (see MAR). Pain remains unresolved. 0700: Bedside and Verbal shift change report given to Lionel Epley, RN (oncoming nurse) by Frances Melendez, GRAYSON and Shelly Simon RN (offgoing nurse).  Report included the following information SBAR, [address], SERENITY Summary, Intake/Output, MAR, Accordion, Recent Results and Cardiac Rhythm A-fib/NSR/Paced.

## 2019-09-09 PROCEDURE — 74011250637 HC RX REV CODE- 250/637: Performed by: INTERNAL MEDICINE

## 2019-09-09 PROCEDURE — 74011250636 HC RX REV CODE- 250/636: Performed by: INTERNAL MEDICINE

## 2019-09-09 PROCEDURE — 97530 THERAPEUTIC ACTIVITIES: CPT

## 2019-09-09 PROCEDURE — 65660000000 HC RM CCU STEPDOWN

## 2019-09-09 PROCEDURE — 77030038269 HC DRN EXT URIN PURWCK BARD -A

## 2019-09-09 PROCEDURE — 97161 PT EVAL LOW COMPLEX 20 MIN: CPT

## 2019-09-09 PROCEDURE — 76450000000

## 2019-09-09 PROCEDURE — 74011250637 HC RX REV CODE- 250/637: Performed by: NURSE PRACTITIONER

## 2019-09-09 PROCEDURE — 97165 OT EVAL LOW COMPLEX 30 MIN: CPT

## 2019-09-09 PROCEDURE — 74011250636 HC RX REV CODE- 250/636: Performed by: NURSE PRACTITIONER

## 2019-09-09 RX ORDER — CYANOCOBALAMIN 1000 UG/ML
1000 INJECTION, SOLUTION INTRAMUSCULAR; SUBCUTANEOUS DAILY
Status: COMPLETED | OUTPATIENT
Start: 2019-09-09 | End: 2019-09-13

## 2019-09-09 RX ORDER — ACETAMINOPHEN 500 MG
1000 TABLET ORAL EVERY 8 HOURS
Status: DISCONTINUED | OUTPATIENT
Start: 2019-09-09 | End: 2019-09-15 | Stop reason: HOSPADM

## 2019-09-09 RX ORDER — NALOXONE HYDROCHLORIDE 0.4 MG/ML
0.4 INJECTION, SOLUTION INTRAMUSCULAR; INTRAVENOUS; SUBCUTANEOUS
Status: DISCONTINUED | OUTPATIENT
Start: 2019-09-09 | End: 2019-09-15 | Stop reason: HOSPADM

## 2019-09-09 RX ADMIN — SENNOSIDES,DOCUSATE SODIUM 1 TABLET: 8.6; 5 TABLET, FILM COATED ORAL at 17:37

## 2019-09-09 RX ADMIN — ALPRAZOLAM 0.25 MG: 0.25 TABLET ORAL at 08:40

## 2019-09-09 RX ADMIN — OXYCODONE HYDROCHLORIDE 5 MG: 5 TABLET ORAL at 22:30

## 2019-09-09 RX ADMIN — Medication 10 ML: at 22:31

## 2019-09-09 RX ADMIN — POLYETHYLENE GLYCOL 3350 17 G: 17 POWDER, FOR SOLUTION ORAL at 22:30

## 2019-09-09 RX ADMIN — Medication 10 ML: at 06:00

## 2019-09-09 RX ADMIN — DEXAMETHASONE 2 MG: 4 TABLET ORAL at 08:41

## 2019-09-09 RX ADMIN — OXYCODONE HYDROCHLORIDE 5 MG: 5 TABLET ORAL at 16:07

## 2019-09-09 RX ADMIN — OXYCODONE HYDROCHLORIDE 5 MG: 5 TABLET ORAL at 02:53

## 2019-09-09 RX ADMIN — OXYCODONE HYDROCHLORIDE 5 MG: 5 TABLET ORAL at 12:17

## 2019-09-09 RX ADMIN — OXYCODONE HYDROCHLORIDE 10 MG: 10 TABLET, FILM COATED, EXTENDED RELEASE ORAL at 22:30

## 2019-09-09 RX ADMIN — ACETAMINOPHEN 650 MG: 325 TABLET ORAL at 10:57

## 2019-09-09 RX ADMIN — OXYCODONE HYDROCHLORIDE 5 MG: 5 TABLET ORAL at 08:42

## 2019-09-09 RX ADMIN — ALPRAZOLAM 0.25 MG: 0.25 TABLET ORAL at 17:40

## 2019-09-09 RX ADMIN — ASPIRIN 81 MG 81 MG: 81 TABLET ORAL at 08:42

## 2019-09-09 RX ADMIN — ALPRAZOLAM 0.25 MG: 0.25 TABLET ORAL at 02:53

## 2019-09-09 RX ADMIN — Medication 10 ML: at 14:00

## 2019-09-09 RX ADMIN — SENNOSIDES,DOCUSATE SODIUM 1 TABLET: 8.6; 5 TABLET, FILM COATED ORAL at 08:42

## 2019-09-09 RX ADMIN — ACETAMINOPHEN 1000 MG: 500 TABLET ORAL at 17:37

## 2019-09-09 RX ADMIN — CYANOCOBALAMIN 1000 MCG: 1000 INJECTION, SOLUTION INTRAMUSCULAR; SUBCUTANEOUS at 12:17

## 2019-09-09 NOTE — PROGRESS NOTES
Patient back to baseline per family with no dysphagia noted by sitter or RN at [facility] or lunch. Evaluation deferred at this time, as patient on regular diet.

## 2019-09-09 NOTE — CONSULTS
Palliative Medicine Consult  Carlos Manuel: [phone] ([phone])    Patient Name: Meaghan Patterson  YOB: 1931    Date of Initial Consult: 09/09/2019  Reason for Consult: Care decisions  Requesting Provider: Chasity Griffin MD   Primary Care Physician: Sirisha Nguyen MD     SUMMARY:   Meaghan Patterson is a 80 y.o. with a past history of COPD, AAA, HTN, TIA, lung CA, afib s/p PPM, arthritis, RA, who was admitted on 9/7/2019 from home with a diagnosis of TIA. Patient presented to the ED with complaints of sudden onset of confusion. ED eval revealed negative head, chest abdomen and pelvis CT and chest xray with Mild left basilar atelectasis. Neurology consulted and patient was outside window for tPA. Recommended admit for stroke work-up. MRI of brain pending. Current medical issues leading to Palliative Medicine involvement include: care decisions. SH: , lives with grandson and his family. Daughter lives next door.  reviewed:  Patient followed by outpatient Palliative Medicine (Dr. Nkechi Benoit MD)  43 prescriptions  11 prescribers  Chronic pain medication regimen: Oxycontin ER 10 mg at bedtime (last filled 8/13) and oxycodone IR 5 mg 1-2 tabs every three hours as needed for pain (last filled 8/14). No signs of overuse, misuse or diversion. PALLIATIVE DIAGNOSES:   1. Chronic pain  2. Physical debility  3. Goals of care  4. DNR  5. ACP  6. TIA       PLAN:   1. Met with patient and introduced the role of Palliative Medicine. Patient well known to PM team from previous admissions and outpatient follow-up. 2. Patient sitting up in bed, oriented to person and place but does not remember the events leading up to admission. Sitter present. Perseverating on the [address]. 3. Chronic pain--> reports that her pain \"isn't that bad\". Unable to give me a pain rating but appears very comfortable. Home regimen ordered (see above), as well as bowel regimen and narcan for over sedation. Carolina Murphy  No changes necessary at this time. Will add tylenol 1gm every 8 hours. 4. Goals of care--> Attempted to discussed current hospitalization and patient continues to be fixated on having to have MRI, stating that she \"doesn't want to go through that\", and that she \"should be able to say I don't want\". Patient lives with her daughter, Ben Aparicio, and at time of last discharge from [facility] on 7/31, patient went to rehab at [facility] and was eventually discharged home on 08/23. Cannot tell me how she was doing at home, merely says \"fine\". Given that patient currently unable to engage in goals of care discussion. I called daughter, Ben Aparicio at the contact number listed and was no answer. Left VM and will follow-up with her to further discuss goals of care  5. Code status--> DNR; completed DDNR on file signed by patient's daughter in 2/2019. 6. ACP--> No AMD on file, surrogate decision maker is legal next of kin (daughter, Alicia Carter). 7. Initial consult note routed to primary continuity provider and/or primary health care team members  8. Communicated plan of care with: Palliative IDTSanya 192 Team     GOALS OF CARE / TREATMENT PREFERENCES:     GOALS OF CARE:  Patient/Health Care Proxy Stated Goals: (continue to address)    TREATMENT PREFERENCES:   Code Status: DNR    Advance Care Planning:  [x] [facility] Interdisciplinary Team has updated the ACP Navigator with Health Care Decision Maker and Patient Capacity      Primary Decision Maker (Active): Nica Robbins - Daughter - [phone]  Advance Care Planning 7/22/2019   Patient's Healthcare Decision Maker is: Legal Next of Kin   Primary Decision Maker Name -   Confirm Advance Directive None   Patient Would Like to Complete Advance Directive Unable   Does the patient have other document types Do Not Resuscitate       Medical Interventions: Limited additional interventions     Other Instructions:          Other:    As far as possible, the palliative care team has discussed with patient / health care proxy about goals of care / treatment preferences for patient. HISTORY:     History obtained from: chart    CHIEF COMPLAINT: confusion    HPI/SUBJECTIVE:    The patient is:   [] Verbal and participatory  [x] Non-participatory due to: confusion  Patient presented to the ED with complaints of sudden onset of confusion. ED eval revealed negative head, chest abdomen and pelvis CT and chest xray with Mild left basilar atelectasis. Neurology consulted and patient was outside window for tPA. Recommended admit for stroke work-up. MRI of brain pending. See ESAS    Clinical Pain Assessment (nonverbal scale for severity on nonverbal patients):   Clinical Pain Assessment  Severity: 0     Activity (Movement): Lying quietly, normal position    Duration: for how long has pt been experiencing pain (e.g., 2 days, 1 month, years)  Frequency: how often pain is an issue (e.g., several times per day, once every few days, constant)     FUNCTIONAL ASSESSMENT:     Palliative Performance Scale (PPS):  PPS: 50       PSYCHOSOCIAL/SPIRITUAL SCREENING:     Palliative IDT has assessed this patient for cultural preferences / practices and a referral made as appropriate to needs (Cultural Services, Patient Advocacy, Ethics, etc.)    Any spiritual / Confucianism concerns:  [] Yes /  [x] No    Caregiver Burnout:  [] Yes /  [x] No /  [x] No Caregiver Present      Anticipatory grief assessment:   [x] Normal  / [] Maladaptive       ESAS Anxiety: Anxiety: 0    ESAS Depression:          REVIEW OF SYSTEMS:     Positive and pertinent negative findings in ROS are noted above in HPI. The following systems were [x] reviewed / [] unable to be reviewed as noted in HPI  Other findings are noted below. Systems: constitutional, ears/nose/mouth/throat, respiratory, gastrointestinal, genitourinary, musculoskeletal, integumentary, neurologic, psychiatric, endocrine. Positive findings noted below.   Modified ESAS Completed by: provider   Fatigue: 0 Drowsiness: 0     Pain: 0   Anxiety: 0 Nausea: 0   Anorexia: 0 Dyspnea: 0     Constipation: No              PHYSICAL EXAM:     From RN flow sheet:  Wt Readings from Last 3 Encounters:   09/09/19 129 lb 11.2 oz (58.8 kg)   07/25/19 125 lb 10.6 oz (57 kg)   05/29/19 123 lb 12.8 oz (56.2 kg)     Blood pressure 99/49, pulse 66, temperature 97.3 °F (36.3 °C), resp. rate 18, height 5' 4\" (1.626 m), weight 129 lb 11.2 oz (58.8 kg), SpO2 90 %.     Pain Scale 1: Visual  Pain Intensity 1: 0  Pain Onset 1: Chronic  Pain Location 1: Foot, Hand  Pain Orientation 1: Posterior, Anterior  Pain Description 1: Aching, Cramping  Pain Intervention(s) 1: Medication (see MAR)  Last bowel movement, if known:     Constitutional: frail, NAD  Eyes: pupils equal, anicteric  ENMT: no nasal discharge, moist mucous membranes  Cardiovascular: regular rhythm, distal pulses intact  Respiratory: breathing not labored, symmetric  Gastrointestinal: soft non-tender, +bowel sounds  Musculoskeletal: no deformity, no tenderness to palpation  Skin: warm, dry  Neurologic: confused, following commands, moving all extremities  Psychiatric: full affect, no hallucinations  Other:       HISTORY:     Principal Problem:    TIA (transient ischemic attack) (9/30/2016)    Active Problems:    Paroxysmal A-fib (HCC) (9/30/2016)      RA (rheumatoid arthritis) (HCC) (9/30/2016)      COPD (chronic obstructive pulmonary disease) ([facility] (9/30/2016)      Hypertension (8/25/2018)      Malignant neoplasm of upper lobe of left lung ([address] (11/27/2018)      Leukocytosis (7/20/2019)      Chronic pain syndrome ()      Acute encephalopathy (9/7/2019)      DNR (do not resuscitate) ()      Past Medical History:   Diagnosis Date    AAA (abdominal aortic aneurysm) (HCC)     Arthritis     ra, osteoporosis, osteoarthritis    Atrial fibrillation (HCC)     COPD     HTN (hypertension)     Lung cancer ([address]     RA (rheumatoid arthritis) ([address]     Smoker     TIA (transient ischemic attack)     TIA 16      Past Surgical History:   Procedure Laterality Date    HX CHOLECYSTECTOMY      HX HYSTERECTOMY      HX ORTHOPAEDIC      carpal tunnel, wrist surgery    NC INS NEW/RPLCMT PRM PM W/TRANSV ELTRD ATRIAL&VENT N/A 2019    INSERT PPM DUAL performed by Dai Pearson MD at [facility]      Family History   Problem Relation Age of Onset    Hypertension Father       History reviewed, no pertinent family history.   Social History     Tobacco Use    Smoking status: Former Smoker     Years: 50.00     Last attempt to quit: 2018     Years since quittin.0    Smokeless tobacco: Never Used   Substance Use Topics    Alcohol use: No     Allergies   Allergen Reactions    Codeine Nausea and Vomiting      Current Facility-Administered Medications   Medication Dose Route Frequency    cyanocobalamin (VITAMIN B12) injection 1,000 mcg  1,000 mcg IntraMUSCular DAILY    acetaminophen (TYLENOL) tablet 1,000 mg  1,000 mg Oral Q8H    naloxone (NARCAN) injection 0.4 mg  0.4 mg IntraVENous EVERY 2 MINUTES AS NEEDED    oxyCODONE IR (ROXICODONE) tablet 5 mg  5 mg Oral Q3H    ALPRAZolam (XANAX) tablet 0.25 mg  0.25 mg Oral BID    dexAMETHasone (DECADRON) tablet 2 mg  2 mg Oral DAILY    oxyCODONE ER (OxyCONTIN) tablet 10 mg  10 mg Oral DAILY    polyethylene glycol (MIRALAX) packet 17 g  17 g Oral QHS    senna-docusate (PERICOLACE) 8.6-50 mg per tablet 1 Tab  1 Tab Oral BID    sodium chloride (NS) flush 5-40 mL  5-40 mL IntraVENous Q8H    sodium chloride (NS) flush 5-40 mL  5-40 mL IntraVENous PRN    aspirin chewable tablet 81 mg  81 mg Oral DAILY          LAB AND IMAGING FINDINGS:     Lab Results   Component Value Date/Time    WBC 11.2 (H) 2019 07:39 PM    HGB 11.6 2019 07:39 PM    PLATELET 359  07:39 PM     Lab Results   Component Value Date/Time    Sodium 140 2019 07:39 PM    Potassium 4.8 2019 07:39 PM    Chloride 105 09/07/2019 07:39 PM    CO2 31 09/07/2019 07:39 PM    BUN 15 09/07/2019 07:39 PM    Creatinine 0.88 09/07/2019 07:39 PM    Calcium 8.6 09/07/2019 07:39 PM    Magnesium 1.9 07/29/2019 02:31 AM    Phosphorus 3.4 07/20/2019 07:30 PM      Lab Results   Component Value Date/Time    AST (SGOT) 23 09/07/2019 07:39 PM    Alk. phosphatase 63 09/07/2019 07:39 PM    Protein, total 7.1 09/07/2019 07:39 PM    Albumin 3.1 (L) 09/07/2019 07:39 PM    Globulin 4.0 09/07/2019 07:39 PM     Lab Results   Component Value Date/Time    INR 1.1 09/07/2019 07:39 PM    Prothrombin time 10.9 09/07/2019 07:39 PM    aPTT 28.7 07/20/2019 07:30 PM      Lab Results   Component Value Date/Time    Iron 14 (L) 11/28/2018 12:58 AM    TIBC 143 (L) 11/28/2018 12:58 AM    Iron % saturation 10 (L) 11/28/2018 12:58 AM    Ferritin 139 11/28/2018 12:58 AM      No results found for: PH, PCO2, PO2  No components found for: GLPOC   No results found for: CPK, CKMB             Total time: 50  Counseling / coordination time, spent as noted above: 35  > 50% counseling / coordination?: yes    Prolonged service was provided for  []30 min   []75 min in face to face time in the presence of the patient, spent as noted above. Time Start:   Time End:   Note: this can only be billed with 53850 (initial) or 85813 (follow up). If multiple start / stop times, list each separately.

## 2019-09-09 NOTE — PROGRESS NOTES
Bedside and Verbal shift change report given to Kanu (oncoming nurse) by Juan Dupree (offgoing nurse). Report included the following information SBAR, Kardex, ED Summary, Procedure Summary, Intake/Output, MAR, Recent Results and Cardiac Rhythm paced, afib. Night RN spoke to daughter, will bring in pacemaker box. Per report MRI screening form completed.       1005 order for daily B12 injections    1107 MRI aware pacer is compatible, will call when pacer rep is available for MRI    MRI scheduled for 1000 tomorrow

## 2019-09-09 NOTE — PROGRESS NOTES
Clinical Shift Summary:     2300: Bedside and Verbal shift change report given to Edward Rich RN (oncoming nurse) by Olga Helms RN (offgoing nurse). Report included the following information SBAR, Kardex, Intake/Output, MAR, Accordion, Recent Results and Cardiac Rhythm Paced. 0017: Conducted initial assessment (see Flowsheet). Patient was resting. Patient did not indicate s/sx of pain. Held One time dose of Xanax and Scheduled Oxycodone (see MAR). 2459: Upon perform tana-care, patient moaned. She did, however, express she was cold and in pain. Administered One time dose of Xanax (see MAR) and Scheduled Oxycodone (see MAR)    0410: Reassessed patient. 1-1 sitter was removed per trial run. Otherwise, no significant changes. 0700: Bedside and Verbal shift change report given to Dania Whitmore, GRAYSON and Jorge Alberto Marx RN (oncoming nurse) by Edward Rich RN and Olga Helms RN (offgoing nurse). Report included the following information SBAR, Kardex, Intake/Output, MAR, Accordion, Recent Results and Cardiac Rhythm Paced.

## 2019-09-09 NOTE — PROGRESS NOTES
ALYSSA SECOURS: [id number] [address] Neurology  Christ[address]  [phone]        Name:   [address] [place] [address] record #: [id number]  Admission Date: 9/7/2019   Reason for Consult:  AMS    Subjective:   Overnight events:    Much more awake today, conversant          Objective:   EEG:          Care Plan discussed with:  Patient x   Family    RN    Care Manager    Consultant/Specialist:         Thank you for allowing the Neurology Service the pleasure of participating in the care of your patient. This patient will be discussed with my collaborating care team physician Dr. Nelly Nguyen and he may have further recommendations regarding this patient's care      Hortencia Howe, ACNP-BC        ====================    Attending Attestation:     NEUROLOGY NOTE ADDENDUM:    9/9/2019      I have reviewed the documentation provided by the nurse practitioner, discussed her findings, clinical impression, and the proposed management plans with regards to this patient's encounter. I have personally performed a face to face diagnostic evaluation on this patient. My findings are as follows:    Dinorah Estevez is a 80 y.o. female who  has a past medical history of AAA (abdominal aortic aneurysm) ([facility] [address] Arthritis, Atrial fibrillation ([address] COPD, HTN (hypertension), Lung cancer ([facility] [address] RA (rheumatoid arthritis) ([facility] [address] Smoker, and TIA (transient ischemic attack). who has history of Lung CA with mets not chemo candidate seeing Dr Kev Collins. Also has baseline dementia. Had a pacemaker placed c/o Dr Ebonie Douglass ([employer] Sujey MRI sure scan MR conditional) on 7/22/19 for bradycardia. Previously on Eliquis but was stopped in July 2019 due to R gluteal hematoma. Patient now comes in for acute confusion yesterday described as problem expressing herself. Patient now back to baseline and able to converse.     Exam:  Visit Vitals  BP 99/49 (BP 1 Location: Right arm, BP Patient Position: At rest)   Pulse 63   Temp 97.3 °F (36.3 °C)   Resp 18   Ht 5' 4\" (1.626 m)   Wt 58.8 kg (129 lb 11.2 oz)   SpO2 90%   BMI 22.26 kg/m²     Gen: Awake, alert, follows commands  Appropriate appearance, normal speech. Oriented to all spheres. Knows the president  No visual field defect on confrontation exam.  Full eyes movement, with no nystagmus, no diplopia, no ptosis. Normal gag and swallow. All remaining cranial nerves were normal  Motor function: 5/5 in all extremities  Sensory: intact to LT, PP and JPS  Good FTN and HTS   Gait: Deferred      Assessment  Acute encephalopathy; resolved  Vit B12 deficiency  Evaluate for stroke and brain mets.     RECOMMENDATIONS  1. Awiating MRI brain to look for stroke and brain mets (pacemaker Medtronic Vivian MRI sure scan MR conditional)  2. Vit B12 182. Start on Vit B12 1000 mcg IM every day for 5 days  3. ASA 81 for vascula prevention if no contraindication  4. FU with cardiology regarding pacemaker  5. FU with Dr Rodriguez regarding lung CA      Thank you for the consultation. Gabriel Vargas MD  Diplomate, American Board of Psychiatry and Neurology  Diplomate, Neuromuscular Medicine  Diplomate, American Board of Electrodiagnostic Medicine                         Impression/ Plan:      1. Altered Mental Status:    · Neurochecks:  Every 4 hours  · B12 and ammonia   · Continue ASA   · MRI brain    2. Mobility:   · Has not been OOB. · PT/OT deferred treatment due to AMS    4. Diet:    · Does not need SLP, passed STAND     5.   VTE Prophylaxes:   · Lovenox 40 mg, SQ daily        Physical Exam    Patient Vitals for the past 12 hrs:   Temp Pulse Resp BP SpO2   09/09/19 0746 97.5 °F (36.4 °C) 67 18 123/72 90 %   09/09/19 0700  66      09/09/19 0410 97.1 °F (36.2 °C) 61 20 99/62 91 %   09/09/19 0017 97.2 °F (36.2 °C) 71 20 93/59 94 %   09/08/19 2302  70   

## 2019-09-09 NOTE — PROGRESS NOTES
Shift Summary  1930: Bedside and Verbal shift change report given to Debbie Palma RN (oncoming nurse) by Amandeep Miller RN (offgoing nurse). Report included the following information SBAR, Kardex, Procedure Summary, Intake/Output, MAR, Accordion, Recent Results and Cardiac Rhythm Apaced, NSR.     PCT sitter at bedside. Patient is moaning out repeatedly, restless/agitated, alert but with poor concentration/attention, little command following, and only focuses to occasionally answer a few words before going back to just saying \"oh god\". Per sitter, patient has been like this all day    2130: Patient's daughter Wolf Signs now at the bedside. Patient a bit better/more calm/ more responsive with daughter at bedside but still has poor attention and goes back into moaning often. Daughter confirms patient has been like this all day regardless of pain or anxiety medicine given. Also emphasizes that this is a big change from the patient's baseline. Daughter says that patient occasionally gets confused but is otherwise oriented and responsive and fairly independent. 2200: Called Dr. Juliana Ford, hospitalist on call, to ask about getting the patient something to help her relax and sleep. Received additional one time order for . 25mg of xanex    5439: Patient sleeping; holding off on giving ativan at this time. Will continue to monitor. Daughter has left bedside    2330: Bedside and Verbal shift change report given to Arnie Calix RN (oncoming nurse) by Debbie Palma RN. Report included the following information SBAR, Kardex, Procedure Summary, Intake/Output, MAR, Accordion and Cardiac Rhythm Apaced, NSR. Assuming dual care of patient with Arnie Calix. 0320: Patient woke up, performed incontinence care. Patient much more oriented and responsive this morning, asking where she is, answering questions, holding focus and eye contact. Was answering questions, then fell asleep in the middle of conversation. Now resting quietly.  Bed alarm on, door open. Sitter no longer in the room for the time being.

## 2019-09-09 NOTE — PROGRESS NOTES
Problem: Mobility Impaired (Adult and Pediatric)  Goal: *Acute Goals and Plan of Care (Insert Text)  Description  FUNCTIONAL STATUS PRIOR TO ADMISSION: Patient was modified independent using a Rolling walker for functional mobility. HOME SUPPORT PRIOR TO ADMISSION: The patient lived with family members on the first floor of a two story home. Physical Therapy Goals  Initiated 9/9/2019  1. Patient will move from supine to sit and sit to supine  in bed with supervision/set-up within 7 day(s). 2.  Patient will transfer from bed to chair and chair to bed with supervision/set-up using the least restrictive device within 7 day(s). 3.  Patient will perform sit to stand with supervision/set-up within 7 day(s). 4.  Patient will ambulate with supervision/set-up for 15 feet with the least restrictive device within 7 day(s). 5.  Patient will ascend/descend 2 stairs with 1 handrail(s) with moderate assistance  within 7 day(s). Outcome: Progressing Towards Goal   PHYSICAL THERAPY EVALUATION  Patient: Queen Florina (15 y.o. female)  Date: 9/9/2019  Primary Diagnosis: Encephalopathy acute [G93.40]  Acute encephalopathy [G93.40]        Precautions:   Fall, Skin      ASSESSMENT  Based on the objective data described below, the patient presents with high anxiety towards mobility, fear of falling, significant weakness, joint pain, decreased activity tolerance and confusion following admission for AMS. Pt becoming tearful multiple times during session regarding mobility. EOB with Mod A and only able to raise hips ~4 inches off bed surface with Max A x 2. Requesting to return to supine and declining all further mobility. Anticipate need for return to SNF rehab. Current Level of Function Impacting Discharge (mobility/balance): Max A x 2 to perform partial stand    Functional Outcome Measure: The patient scored 1 on the Tinetti outcome measure which is indicative of high fall risk.       Other factors to consider for discharge: recent SNF rehab stay, available assistance at home     Patient will benefit from skilled therapy intervention to address the above noted impairments. PLAN :  Recommendations and Planned Interventions: bed mobility training, transfer training, gait training, therapeutic exercises, neuromuscular re-education, patient and family training/education and therapeutic activities      Frequency/Duration: Patient will be followed by physical therapy:  5 times a week to address goals.     Recommendation for discharge: (in order for the patient to meet his/her long term goals)  Therapy up to 5 days/week in SNF setting    This discharge recommendation:  A follow-up discussion with the attending provider and/or case management is planned    Equipment recommendations for successful discharge (if) home: patient owns DME required for discharge         SUBJECTIVE:   Patient stated I don't want these clothes on.    OBJECTIVE DATA SUMMARY:   HISTORY:    Past Medical History:   Diagnosis Date    AAA (abdominal aortic aneurysm) (HCC)     Arthritis     ra, osteoporosis, osteoarthritis    Atrial fibrillation (HCC)     COPD     HTN (hypertension)     Lung cancer ([facility] [address]     RA (rheumatoid arthritis) ([facility] [address]     Smoker     TIA (transient ischemic attack)     TIA 9/30/16     Past Surgical History:   Procedure Laterality Date    HX CHOLECYSTECTOMY      HX HYSTERECTOMY      HX ORTHOPAEDIC      carpal tunnel, wrist surgery    DC INS NEW/RPLCMT PRM PM W/TRANSV ELTRD ATRIAL&VENT N/A 7/26/2019    INSERT PPM DUAL performed by Sukh Lindsey MD at [facility] CATH LAB       Personal factors and/or comorbidities impacting plan of care: a-fib, recent pacemaker placement, AAA< COPD, HTN, TIA    Home Situation  Home Environment: Private residence  # Steps to Enter: 2  Rails to Enter: Yes  One/Two Story Residence: Two story, live on 1st floor  Living Alone: No  Support Systems: Family member(s)  Current DME Used/Available at Home: Walker, rolling, Wheelchair, Shower chair    EXAMINATION/PRESENTATION/DECISION MAKING:   Critical Behavior:  Neurologic State: Alert  Orientation Level: Disoriented to person, Disoriented to place  Cognition: Decreased command following     Hearing:     Skin:    Edema:   Range Of Motion:  AROM: Generally decreased, functional                       Strength:    Strength: Generally decreased, functional                    Tone & Sensation:   Tone: Normal                              Coordination:  Coordination: Within functional limits  Vision:      Functional Mobility:  Bed Mobility:  Rolling: Moderate assistance  Supine to Sit: Moderate assistance  Sit to Supine: Minimum assistance     Transfers:  Sit to Stand: Moderate assistance;Assist x2(unable to come to full stand)                          Balance:   Sitting: Intact  Standing: Impaired  Standing - Static: Poor  Standing - Dynamic : Poor  Ambulation/Gait Training:                                                         Stairs: Therapeutic Exercises:       Functional Measure:  Tinetti test:    Sitting Balance: 1  Arises: 0  Attempts to Rise: 0  Immediate Standing Balance: 0  Standing Balance: 0  Nudged: 0  Eyes Closed: 0  Turn 360 Degrees - Continuous/Discontinuous: 0  Turn 360 Degrees - Steady/Unsteady: 0  Sitting Down: 0  Balance Score: 1 Balance total score  Indication of Gait: 0  R Step Length/Height: 0  L Step Length/Height: 0  R Foot Clearance: 0  L Foot Clearance: 0  Step Symmetry: 0  Step Continuity: 0  Path: 0  Trunk: 0  Walking Time: 0  Gait Score: 0 Gait total score  Total Score: 1/28 Overall total score         Tinetti Tool Score Risk of Falls  <19 = High Fall Risk  19-24 = Moderate Fall Risk  25-28 = Low Fall Risk  Tinetti ME. Performance-Oriented Assessment of Mobility Problems in Elderly Patients. B[address]ude[address] 66; [id number].  (Scoring Description: PT Bulletin Feb. 10, 1993)    Older adults: Luiza Roberts et al, 2009; n = [address] [facility] elderly evaluated with ABC, NEHAL, ADL, and IADL)  · Mean NEHAL score for males aged 69-68 years = 26.21(3.40)  · Mean NEHAL score for females age 69-68 years = 25.16(4.30)  · Mean NEHAL score for males over 80 years = 23.29(6.02)  · Mean NEHAL score for females over 80 years = 17.20(8.32)            Physical Therapy Evaluation Charge Determination   History Examination Presentation Decision-Making   MEDIUM  Complexity : 1-2 comorbidities / personal factors will impact the outcome/ POC  MEDIUM Complexity : 3 Standardized tests and measures addressing body structure, function, activity limitation and / or participation in recreation  LOW Complexity : Stable, uncomplicated  Other outcome measures Tinetti  LOW       Based on the above components, the patient evaluation is determined to be of the following complexity level: LOW     Pain Rating:      Activity Tolerance:   Poor  Please refer to the flowsheet for vital signs taken during this treatment. After treatment patient left in no apparent distress:   Supine in bed, Heels elevated for pressure relief, Call bell within reach, Bed / chair alarm activated and sitter present     COMMUNICATION/EDUCATION:   The patients plan of care was discussed with: Registered Nurse. Fall prevention education was provided and the patient/caregiver indicated understanding., Patient/family have participated as able in goal setting and plan of care. and Patient/family agree to work toward stated goals and plan of care.     Thank you for this referral.  Miriam Robles, PT   Time Calculation: 22 mins

## 2019-09-09 NOTE — PROGRESS NOTES
Shift Change:  Bedside and Verbal shift change report given to Andrea Galo RN (oncoming nurse) by Rocky GALICIA (offgoing nurse). Report included the following information SBAR and Kardex. Shift Summary:  2030: Upon assessment pt bilateral cheeks on face are flushed red, no fever or itching, and there is a red rash underneath right breast. Pt states these aren't typical for her and she isn't in any pain or discomfort at the moment. Will continue to monitor. 0515: Pt voice is becoming hoarse and causing the pt to get slightly anxious. End of Shift Report[de-identified]  Bedside and Verbal shift change report given to Bijan Heck (oncoming nurse) by Andrea Galo RN (offgoing nurse). Report included the following information SBAR and Kardex.

## 2019-09-09 NOTE — PROGRESS NOTES
Ty Echeverria lisa [place] [address]  [address] [facility], [address] [place] [address]  [phone]      Medical Progress Note      NAME: Sol Abrams   :  1931  MRM:  [id number]    Date/Time: 2019  8:54 AM        Subjective:     Chief Complaint:  Pain:  Denies this AM, woke up early this AM, oriented and no longer confused  ROS:  (bold if positive, if negative)                        Tolerating Diet            Objective:       Vitals:          Last 24hrs VS reviewed since prior progress note.  Most recent are:    Visit Vitals  /72 (BP 1 Location: Right arm, BP Patient Position: At rest)   Pulse 67   Temp 97.5 °F (36.4 °C)   Resp 18   Ht 5' 4\" (1.626 m)   Wt 58.8 kg (129 lb 11.2 oz)   SpO2 90%   BMI 22.26 kg/m²     SpO2 Readings from Last 6 Encounters:   19 90%   19 93%   19 95%   19 96%   19 93%   19 97%            Intake/Output Summary (Last 24 hours) at 2019 0854  Last data filed at 2019 0410  Gross per 24 hour   Intake 370 ml   Output 100 ml   Net 270 ml          Exam:     Physical Exam:    Gen:  Well-developed, frail, elderly, chronically ill-appearing, in  acute pain  HEENT:  Pink conjunctivae, PERRL, hearing intact to voice, moist mucous membranes  Neck:  Supple, without masses, thyroid non-tender  Resp:  No accessory muscle use, clear breath sounds without wheezes rales or rhonchi  Card:  No murmurs, normal S1, S2 without thrills, bruits or peripheral edema  Abd:  Soft, non-tender, non-distended, normoactive bowel sounds are present, no palpable organomegaly and no detectable hernias  Lymph:  No cervical or inguinal adenopathy  Musc:  No cyanosis or clubbing  Skin:  No rashes or ulcers, skin turgor is good  Neuro:  Cranial nerves are grossly intact, no focal motor weakness, follows commands appropriately  Psych:  Fair insight, oriented to person, place but not time, alert       Telemetry reviewed:   normal sinus rhythm    Medications Reviewed: (see below)    Lab Data Reviewed: (see below)    ______________________________________________________________________    Medications:     Current Facility-Administered Medications   Medication Dose Route Frequency    oxyCODONE IR (ROXICODONE) tablet 5 mg  5 mg Oral Q3H    ALPRAZolam (XANAX) tablet 0.25 mg  0.25 mg Oral BID    dexAMETHasone (DECADRON) tablet 2 mg  2 mg Oral DAILY    oxyCODONE ER (OxyCONTIN) tablet 10 mg  10 mg Oral DAILY    polyethylene glycol (MIRALAX) packet 17 g  17 g Oral QHS    senna-docusate (PERICOLACE) 8.6-50 mg per tablet 1 Tab  1 Tab Oral BID    sodium chloride (NS) flush 5-40 mL  5-40 mL IntraVENous Q8H    sodium chloride (NS) flush 5-40 mL  5-40 mL IntraVENous PRN    acetaminophen (TYLENOL) tablet 650 mg  650 mg Oral Q4H PRN    Or    acetaminophen (TYLENOL) solution 650 mg  650 mg Per NG tube Q4H PRN    Or    acetaminophen (TYLENOL) suppository 650 mg  650 mg Rectal Q4H PRN    aspirin chewable tablet 81 mg  81 mg Oral DAILY            Lab Review:     Recent Labs     09/07/19 1939   WBC 11.2*   HGB 11.6   HCT 39.2        Recent Labs     09/07/19 1939      K 4.8      CO2 31   *   BUN 15   CREA 0.88   CA 8.6   ALB 3.1*   TBILI 0.6   SGOT 23   ALT 13   INR 1.1     Lab Results   Component Value Date/Time    Glucose (POC) 124 (H) 09/07/2019 07:17 PM    Glucose (POC) 103 (H) 07/25/2019 07:55 AM    Glucose (POC) 93 10/01/2016 11:33 AM    Glucose (POC) 91 10/01/2016 07:33 AM     No results for input(s): PH, PCO2, PO2, HCO3, FIO2 in the last 72 hours.   Recent Labs     09/07/19 1939   INR 1.1     Lab Results   Component Value Date/Time    Specimen Description: URINE 09/13/2013 10:35 PM     Lab Results   Component Value Date/Time    Culture result: NO GROWTH 5 DAYS 07/31/2019 09:13 AM    Culture result: (A) 07/29/2019 10:46 AM     STAPHYLOCOCCUS SPECIES, COAGULASE NEGATIVE GROWING IN 1 OF 4 BOTTLES DRAWN (R ARM SITE)    Culture result: (A) 07/29/2019 10:46 AM     PRELIMINARY REPORT OF GRAM POSITIVE COCCI IN CLUSTERS GROWING IN 1 OF 4 BOTTLES DRAWN CALLED TO AND READ BACK BY MS Herb Elaine RN ON 7/30/19 AT 1830 ([facility]).  MS    Culture result: REMAINING BOTTLE(S) HAS/HAVE NO GROWTH IN 5 DAYS 07/29/2019 10:46 AM            Assessment:     Principal Problem:    TIA (transient ischemic attack) (9/30/2016)    Active Problems:    Paroxysmal A-fib ([address] (9/30/2016)      RA (rheumatoid arthritis) ([address] (9/30/2016)      COPD (chronic obstructive pulmonary disease) ([address] (9/30/2016)      Hypertension (8/25/2018)      Malignant neoplasm of upper lobe of left lung ([address] (11/27/2018)      Leukocytosis (7/20/2019)      Chronic pain syndrome ()      Acute encephalopathy (9/7/2019)           Plan:     Principal Problem:    TIA (transient ischemic attack) (9/30/2016)   - vs CVA   - Neurology input appreciated, for MRI   - she is not a good candidate for any intervention, in my opinion   - PT/OT/Speech to assess    Active Problems:    Paroxysmal A-fib ([address] (9/30/2016)   - no longer on chronic anticoagulation and agree she is a poor candidate      RA (rheumatoid arthritis) ([address] (9/30/2016)   - on chronic steroids and pain meds      COPD (chronic obstructive pulmonary disease) ([address] (9/30/2016)   - continue respiratory meds, on chronic steroids      Hypertension (8/25/2018)   - BP okay, not on meds      Malignant neoplasm of upper lobe of left lung ([address] (11/27/2018)   - not a candidate for additional treatment due to poor performance status      Leukocytosis (7/20/2019)   - chronically elevated due to steroid use      Chronic pain syndrome ()   - continue home pain meds      Acute encephalopathy (9/7/2019)   - possible due to TIA/CVA, have found no other reversible cause and she has improved without any specific intervention   - suspect this is due to delirium superimposed on dementia   - Palliative Care to see      Total time spent in patient care: 25 minutes                  Care Plan discussed with: Patient, Family and Nursing Staff    Discussed:  Code Status, Care Plan and D/C Planning    Prophylaxis:  SCD's    Disposition:  TBD           ___________________________________________________    Attending Physician: Susana Altman MD '

## 2019-09-09 NOTE — PROGRESS NOTES
Stroke Education provided to patient and relative(s) and the following topics were discussed    1. Patients personal risk factors for stroke are hypertension and atrial fibrillation    2. Warning signs of Stroke:        * Sudden numbness or weakness of the face, arm or leg, especially on one side of          The body            * Sudden confusion, trouble speaking or understanding        * Sudden trouble seeing in one or both eyes        * Sudden trouble walking, dizziness, loss of balance or coordination        * Sudden severe headache with no known cause      3. Importance of activation Emergency Medical Services ( 9-1-1 ) immediately if experience any warning signs of stroke. 4. Be sure and schedule a follow-up appointment with your primary care doctor or any specialists as instructed. 5. You must take medicine every day to treat your risk factors for stroke. Be sure to take your medicines exactly as your doctor tells you: no more, no less. Know what your medicines are for , what they do. Anti-thrombotics /anticoagulants can help prevent strokes. You are taking the following medicine(s)  ASA    6. Smoking and second-hand smoke greatly increase your risk of stroke, cardiovascular disease and death. Smoking history never    7. Information provided was [facility] Stroke Education Binder, Stroke Handouts or Verbal Education    8. Documentation of teaching completed in Patient Education Activity and on Care Plan with teaching response noted?  Yes

## 2019-09-09 NOTE — PROGRESS NOTES
Problem: Self Care Deficits Care Plan (Adult)  Goal: *Acute Goals and Plan of Care (Insert Text)  Description  FUNCTIONAL STATUS PRIOR TO ADMISSION: Patient was modified independent using a Rolling walker for functional mobility. HOME SUPPORT PRIOR TO ADMISSION: The patient lived with family members on the first floor of a two story home. Occupational Therapy Goals  Initiated 9/9/2019  1. Patient will perform grooming with supervision/set-up within 7 day(s). 2.  Patient will perform upper body dressing and bathing with supervision/set-up within 7 day(s). 3.  Patient will perform lower body dressing and bathing with moderate assistance  within 7 day(s). 4.  Patient will perform toilet transfers with moderate assistance  within 7 day(s). 5.  Patient will perform all aspects of toileting with moderate assistance  within 7 day(s). 6.  Patient will participate in upper extremity therapeutic exercise/activities with supervision/set-up for 10 minutes within 7 day(s). 7.  Patient will utilize energy conservation techniques during functional activities with verbal cues within 7 day(s). Outcome: Progressing Towards Goal   OCCUPATIONAL THERAPY EVALUATION  Patient: Memo Lee (72 y.o. female)  Date: 9/9/2019  Primary Diagnosis: Encephalopathy acute [G93.40]  Acute encephalopathy [G93.40]        Precautions:  Fall, Skin    ASSESSMENT  Based on the objective data described below, the patient presents with decreased activity tolerance,significant weakness, confusion, anxiety associated with movement/activity, and impaired balance following admission on 9/7 for AMS/acute encephalopathy. Patient following ~25% of commands. She was limited to bed level activity only. Attempted to come to sitting EOB however d/t increased fear of falling and significant weakness, required return to supine. She needs max A x2 overall for bed mobility and bed placed in chair position to facilitate upright posture. Patient engaged in minimal ADLs with significant physical assistance needed. She will require SNF level rehab at discharge based on current level of function. Current Level of Function Impacting Discharge (ADLs/self-care): Patient requires max A x2 for bed mobility, unable to progress to OOB transfers, and mod to total A needed for ADLs. Functional Outcome Measure: The patient scored 10/100 on the Barthel Index outcome measure. Other factors to consider for discharge: Unsure of PLOF or assistance she will have at home; D/t confusion, patient unable to clarify and no family present at time of evaluation     Patient will benefit from skilled therapy intervention to address the above noted impairments. PLAN :  Recommendations and Planned Interventions: self care training, functional mobility training, therapeutic exercise, balance training, therapeutic activities, endurance activities, patient education, home safety training and family training/education    Frequency/Duration: Patient will be followed by occupational therapy 5 times a week to address goals. Recommendation for discharge: (in order for the patient to meet his/her long term goals)  Therapy up to 5 days/week in SNF setting    This discharge recommendation:  A follow-up discussion with the attending provider and/or case management is planned    Equipment recommendations for successful discharge (if) home: none       SUBJECTIVE:   Patient stated I can't do it.  re: attempt sitting EOB    OBJECTIVE DATA SUMMARY:   HISTORY:   Past Medical History:   Diagnosis Date    AAA (abdominal aortic aneurysm) (HCC)     Arthritis     ra, osteoporosis, osteoarthritis    Atrial fibrillation (HCC)     COPD     HTN (hypertension)     Lung cancer (HCC)     RA (rheumatoid arthritis) ([facility] [address]     Smoker     TIA (transient ischemic attack)     TIA 9/30/16     Past Surgical History:   Procedure Laterality Date    HX CHOLECYSTECTOMY      HX HYSTERECTOMY HX ORTHOPAEDIC      carpal tunnel, wrist surgery    RI INS NEW/RPLCMT PRM PM W/TRANSV ELTRD ATRIAL&VENT N/A 7/26/2019    INSERT PPM DUAL performed by Sukh Lindsey MD at [place] [address] LAB       Expanded or extensive additional review of patient history:     Home Situation  Home Environment: Private residence  # Steps to Enter: 2  Rails to Enter: Yes  One/Two Story Residence: Two story, live on 1st floor  Living Alone: No  Support Systems: Family member(s)  Current DME Used/Available at Home: Walker, rolling, Wheelchair, Shower chair    Hand dominance: Right    EXAMINATION OF PERFORMANCE DEFICITS:  Cognitive/Behavioral Status:  Neurologic State: Alert  Orientation Level: Oriented to person;Disoriented to situation;Disoriented to time;Disoriented to place  Cognition: Decreased command following  Perception: Appears intact  Perseveration: No perseveration noted  Safety/Judgement: Decreased awareness of environment    Skin: Intact in the uppers    Edema: None noted in the uppers    Vision/Perceptual:    Tracking: Able to track stimulus in all quadrants w/o difficulty    Diplopia: No    Acuity: Within Defined Limits       Range of Motion:  AROM: Generally decreased, functional in the uppers    Strength:  Strength: Generally decreased, functional in the uppers    Coordination:  Fine Motor Skills-Upper: Left Intact; Right Intact    Gross Motor Skills-Upper: Left Intact; Right Intact    Tone & Sensation:  Tone: Normal    Balance:  Sitting: Intact  Standing: Impaired  Standing - Static: Poor  Standing - Dynamic : Poor    Functional Mobility and Transfers for ADLs:  Bed Mobility:  Rolling: Moderate assistance  Supine to Sit: Moderate assistance  Sit to Supine: Minimum assistance  Scooting: Maximum assistance    Transfers:  Sit to Stand: Moderate assistance;Assist x2(unable to come to full stand)    ADL Assessment:  Feeding: Moderate assistance    Oral Facial Hygiene/Grooming: Moderate assistance    Bathing:  Total assistance    Upper Body Dressing: Maximum assistance    Lower Body Dressing: Total assistance    Toileting: Total assistance     Cognitive Retraining  Safety/Judgement: Decreased awareness of environment    Functional Measure:  Barthel Index:    Bathin  Bladder: 0  Bowels: 5  Groomin  Dressin  Feedin  Mobility: 0  Stairs: 0  Toilet Use: 0  Transfer (Bed to Chair and Back): 5  Total: 10/100        The Barthel ADL Index: Guidelines  1. The index should be used as a record of what a patient does, not as a record of what a patient could do. 2. The main aim is to establish degree of independence from any help, physical or verbal, however minor and for whatever reason. 3. The need for supervision renders the patient not independent. 4. A patient's performance should be established using the best available evidence. Asking the patient, friends/relatives and nurses are the usual sources, but direct observation and common sense are also important. However direct testing is not needed. 5. Usually the patient's performance over the preceding 24-48 hours is important, but occasionally longer periods will be relevant. 6. Middle categories imply that the patient supplies over 50 per cent of the effort. 7. Use of aids to be independent is allowed. Ursula Sinclair., Barthel, D.W. (1411). Functional evaluation: the Barthel Index. [facility] (14)2. Carlie Burt [place] maryan [place], J.J.M.F, Rebecca Ramírez.Emelia., [place], [address] [place] [address] (). Measuring the change indisability after inpatient rehabilitation; comparison of the responsiveness of the Barthel Index and Functional Bennett Measure. Journal of Neurology, Neurosurgery, and Psychiatry, 66(4), 661-080. Wayne Rivera, N.J.A, Elena Ford  W.J.M, & Dinah Oliva, M.A. (2004.) Assessment of post-stroke quality of life in cost-effectiveness studies: The usefulness of the Barthel Index and the EuroQoL-5D.  Quality of Life Research, 13, 136-55        Occupational Therapy Evaluation Charge Determination   History Examination Decision-Making   LOW Complexity : Brief history review  LOW Complexity : 1-3 performance deficits relating to physical, cognitive , or psychosocial skils that result in activity limitations and / or participation restrictions  LOW Complexity : No comorbidities that affect functional and no verbal or physical assistance needed to complete eval tasks       Based on the above components, the patient evaluation is determined to be of the following complexity level: LOW     Activity Tolerance:   Good  Please refer to the flowsheet for vital signs taken during this treatment. After treatment patient left in no apparent distress:    Supine in bed, Call bell within reach and Bed / chair alarm activated    COMMUNICATION/EDUCATION:   The patients plan of care was discussed with: Physical Therapist, Registered Nurse and patient. .    Home safety education was provided and the patient/caregiver indicated understanding., Patient/family have participated as able in goal setting and plan of care. and Patient/family agree to work toward stated goals and plan of care. This patients plan of care is appropriate for delegation to \[facility] [place] [facility]\"".     Thank you for this referral.  Radha Gunn OTR/L  Time Calculation: 21 mins

## 2019-09-09 NOTE — PROGRESS NOTES
Bedside and Verbal shift change report given to Rocky RN (oncoming nurse) by Sam Ramos RN (offgoing nurse). Report included the following information SBAR, Kardex, ED Summary, Intake/Output, MAR, Recent Results and Cardiac Rhythm Paced. 46 pt daughter at bedside    1015 order for B12 injection    1100 MRI aware of pacemaker, representative needs to be present at the MRI. MRI will contact when ready. 1515 MRI scheduled for tomorrow at 1000 per Mauricio Graver in MRI    Bedside and Verbal shift change report given to Jennifer Albert (oncoming nurse) by Rocky RN (offgoing nurse). Report included the following information SBAR, Kardex, ED Summary, Procedure Summary, Intake/Output, MAR, Recent Results and Cardiac Rhythm Paced.

## 2019-09-09 NOTE — PROGRESS NOTES
Care Plan Summary:   Problem: Falls - Risk of  Goal: *Absence of Falls  Description  Document Valentina Payment Fall Risk and appropriate interventions in the flowsheet. Outcome: Progressing Towards Goal  Note:   Fall Risk Interventions:  Mobility Interventions: Assess mobility with egress test, OT consult for ADLs, PT Consult for mobility concerns, PT Consult for assist device competence, Bed/chair exit alarm    Mentation Interventions: Adequate sleep, hydration, pain control, Bed/chair exit alarm, More frequent rounding, Reorient patient, Room close to nurse's station, Update white board, Toileting rounds    Medication Interventions: Bed/chair exit alarm    Elimination Interventions: Bed/chair exit alarm, Call light in reach, Toileting schedule/hourly rounds    History of Falls Interventions: Vital signs minimum Q4HRs X 24 hrs (comment for end date), Room close to nurse's station, Bed/chair exit alarm    Problem: Patient Education: Go to Patient Education Activity  Goal: Patient/Family Education  Outcome: Progressing Towards Goal      Problem: Pressure Injury - Risk of  Goal: *Prevention of pressure injury  Description  Document Rom Scale and appropriate interventions in the flowsheet. Outcome: Progressing Towards Goal  Note:   Pressure Injury Interventions:  Sensory Interventions: Assess changes in LOC, Keep linens dry and wrinkle-free, Minimize linen layers, Maintain/enhance activity level, Turn and reposition approx. every two hours (pillows and wedges if needed), Avoid rigorous massage over bony prominences    Moisture Interventions: Absorbent underpads, Internal/External urinary devices, Limit adult briefs, Maintain skin hydration (lotion/cream), Minimize layers    Activity Interventions: PT/OT evaluation, Increase time out of bed    Mobility Interventions: Assess need for specialty bed, HOB 30 degrees or less, Pressure redistribution bed/mattress (bed type), PT/OT evaluation, Turn and reposition approx.  every two hours(pillow and wedges)    Nutrition Interventions: Document food/fluid/supplement intake, Offer support with meals,snacks and hydration    Friction and Shear Interventions: Lift sheet, HOB 30 degrees or less, Transferring/repositioning devices, Apply protective barrier, creams and emollients

## 2019-09-10 ENCOUNTER — APPOINTMENT (OUTPATIENT)
Dept: MRI IMAGING | Age: 84
DRG: 069 | End: 2019-09-10
Attending: PSYCHIATRY & NEUROLOGY
Payer: MEDICARE

## 2019-09-10 PROBLEM — G93.40 ACUTE ENCEPHALOPATHY: Status: RESOLVED | Noted: 2019-09-07 | Resolved: 2019-09-10

## 2019-09-10 PROCEDURE — 74011250636 HC RX REV CODE- 250/636: Performed by: INTERNAL MEDICINE

## 2019-09-10 PROCEDURE — 77030038269 HC DRN EXT URIN PURWCK BARD -A

## 2019-09-10 PROCEDURE — 65270000029 HC RM PRIVATE

## 2019-09-10 PROCEDURE — 74011250637 HC RX REV CODE- 250/637: Performed by: INTERNAL MEDICINE

## 2019-09-10 PROCEDURE — 97530 THERAPEUTIC ACTIVITIES: CPT

## 2019-09-10 PROCEDURE — 70551 MRI BRAIN STEM W/O DYE: CPT

## 2019-09-10 PROCEDURE — 97116 GAIT TRAINING THERAPY: CPT

## 2019-09-10 PROCEDURE — 74011250637 HC RX REV CODE- 250/637: Performed by: NURSE PRACTITIONER

## 2019-09-10 PROCEDURE — 74011250636 HC RX REV CODE- 250/636: Performed by: NURSE PRACTITIONER

## 2019-09-10 RX ADMIN — CYANOCOBALAMIN 1000 MCG: 1000 INJECTION, SOLUTION INTRAMUSCULAR; SUBCUTANEOUS at 10:57

## 2019-09-10 RX ADMIN — ALPRAZOLAM 0.25 MG: 0.25 TABLET ORAL at 17:00

## 2019-09-10 RX ADMIN — OXYCODONE HYDROCHLORIDE 5 MG: 5 TABLET ORAL at 01:44

## 2019-09-10 RX ADMIN — Medication 10 ML: at 05:16

## 2019-09-10 RX ADMIN — Medication 10 ML: at 21:44

## 2019-09-10 RX ADMIN — OXYCODONE HYDROCHLORIDE 5 MG: 5 TABLET ORAL at 10:57

## 2019-09-10 RX ADMIN — OXYCODONE HYDROCHLORIDE 5 MG: 5 TABLET ORAL at 21:42

## 2019-09-10 RX ADMIN — SENNOSIDES,DOCUSATE SODIUM 1 TABLET: 8.6; 5 TABLET, FILM COATED ORAL at 17:00

## 2019-09-10 RX ADMIN — OXYCODONE HYDROCHLORIDE 10 MG: 10 TABLET, FILM COATED, EXTENDED RELEASE ORAL at 21:42

## 2019-09-10 RX ADMIN — POLYETHYLENE GLYCOL 3350 17 G: 17 POWDER, FOR SOLUTION ORAL at 21:42

## 2019-09-10 RX ADMIN — OXYCODONE HYDROCHLORIDE 5 MG: 5 TABLET ORAL at 14:19

## 2019-09-10 RX ADMIN — ASPIRIN 81 MG 81 MG: 81 TABLET ORAL at 10:56

## 2019-09-10 RX ADMIN — Medication 10 ML: at 14:00

## 2019-09-10 RX ADMIN — OXYCODONE HYDROCHLORIDE 5 MG: 5 TABLET ORAL at 17:00

## 2019-09-10 RX ADMIN — ACETAMINOPHEN 1000 MG: 500 TABLET ORAL at 01:45

## 2019-09-10 RX ADMIN — OXYCODONE HYDROCHLORIDE 5 MG: 5 TABLET ORAL at 05:15

## 2019-09-10 RX ADMIN — ALPRAZOLAM 0.25 MG: 0.25 TABLET ORAL at 10:55

## 2019-09-10 RX ADMIN — ACETAMINOPHEN 1000 MG: 500 TABLET ORAL at 10:56

## 2019-09-10 RX ADMIN — ACETAMINOPHEN 1000 MG: 500 TABLET ORAL at 16:59

## 2019-09-10 RX ADMIN — OXYCODONE HYDROCHLORIDE 5 MG: 5 TABLET ORAL at 04:14

## 2019-09-10 RX ADMIN — DEXAMETHASONE 2 MG: 4 TABLET ORAL at 10:55

## 2019-09-10 RX ADMIN — OXYCODONE HYDROCHLORIDE 5 MG: 5 TABLET ORAL at 23:00

## 2019-09-10 RX ADMIN — SENNOSIDES,DOCUSATE SODIUM 1 TABLET: 8.6; 5 TABLET, FILM COATED ORAL at 10:56

## 2019-09-10 NOTE — PROGRESS NOTES
Music Therapy Assessment  [address] [place] [address] [id number]     12/7/1931  80 y.o.  female    Patient Telephone Number: [phone] (home)   Religion Affiliation: [facility]   Language: English   Patient Active Problem List    Diagnosis Date Noted    DNR (do not resuscitate)     Acute right hip pain     Chronic pain syndrome     Physical debility     Goals of care, counseling/discussion     Hematoma 07/20/2019    Leukocytosis 07/20/2019    Fall 07/20/2019    Abdominal aortic aneurysm (AAA) without rupture ([facility] [address] 12/04/2018    Anemia 12/04/2018    Malignant neoplasm of upper lobe of left lung ([facility] [address] 11/27/2018    Tibial plateau fracture 39/51/0188    Tobacco abuse 08/25/2018    Hypertension 08/25/2018    TIA (transient ischemic attack) 09/30/2016    Slurred speech 09/30/2016    Paroxysmal A-fib ([facility] [address] 09/30/2016    RA (rheumatoid arthritis) ([facility] [address] 09/30/2016    COPD (chronic obstructive pulmonary disease) ([facility] 09/30/2016        Date: 9/10/2019            Total Time (in minutes): 33          SFM 3 PROG CARE TELE 1    Mental Status:   [X] Alert [  ] Francesco Bernadine [  ]  Confused  [  ] Minimally responsive  [  ] Sleeping    Communication Status: [  ] Impaired Speech [  ] Nonverbal     Physical Status:   [  ] Oxygen in use  [  ] Hard of Hearing [  ] Vision Impaired  [  ] Ambulatory  Gage ] Ambulatory with assistance [  ] Non-ambulatory     Music Preferences, Background: _Varied popular, Western, Big Band,     Clinical Problem addressed: Socio-emotional support    Goal(s) met in session:  Physical/Pain management (Scale of 1-10):    Pre-session rating ___________    Post-session rating __________   [ X] Increased relaxation   [  ] Affected breathing patterns  [  ] Decreased muscle tension   [  ] Decreased agitation  [  ] Affected heart rate    [  ] Increased alertness     Emotional/Psychological:  Gage ] Increased self-expression  [  ] Decreased aggressive behavior   [  ] Decreased feelings of stress  [  ] Discussed healthy coping skills     [  ] Improved mood    [  ] Decreased withdrawn behavior     Social:  [  ] Decreased feelings of isolation/loneliness [ X] Positive social interaction   [  ] Provided support and/or comfort for family/friends    Spiritual:  [  ] Spiritual support    [  ] Expressed peace  [  ] Expressed reji    [  ] Discussed beliefs    Techniques Utilized (Check all that apply):   [  ] Procedural support MT [ X ] Music for relaxation Ado.Songster ] Patient preferred music  [  ] Daly analysis  [ X ] Song choice  [  ] Music for validation  [  ] Entrainment  [  ] Movement to music [  ] Guided visualization  [  ] Ilsa Silvio  [  ] Patient instrument playing [  ] FOBO Riff writing  [  ] Niyah Delatorre along   [  ] Roberto Sluder  [  ] Sensory stimulation  [  ] Active Listening  [  ] Music for spiritual support [  ] Making of CDs as gifts    Session Observations:  Referred by palliative care. Ms Morris Collado was unsure about receiving MT at first, and when provided comfort was willing to engage in [place]. She listened attentively and sang along at times, stating that she loved all types of music. Songs used,  Walking After Five9, [place] the [address], [place], [place],  All elicited relaxed facial expression and reminiscing. She shared concerns about a friend of hers who has Alzheimer's, and this brought tearfulness. Ms. Morris Collado expressed gratitude in the conversation and the music. Will follow up as able.     Malachi Drew, [place], MT-BC (Music Therapist-Board Certified)  Spiritual Care Services  Referral- based service

## 2019-09-10 NOTE — PROGRESS NOTES
Problem: Mobility Impaired (Adult and Pediatric)  Goal: *Acute Goals and Plan of Care (Insert Text)  Description  FUNCTIONAL STATUS PRIOR TO ADMISSION: Patient was modified independent using a Rolling walker for functional mobility. HOME SUPPORT PRIOR TO ADMISSION: The patient lived with family members on the first floor of a two story home. Physical Therapy Goals  Initiated 9/9/2019  1. Patient will move from supine to sit and sit to supine  in bed with supervision/set-up within 7 day(s). 2.  Patient will transfer from bed to chair and chair to bed with supervision/set-up using the least restrictive device within 7 day(s). 3.  Patient will perform sit to stand with supervision/set-up within 7 day(s). 4.  Patient will ambulate with supervision/set-up for 15 feet with the least restrictive device within 7 day(s). 5.  Patient will ascend/descend 2 stairs with 1 handrail(s) with moderate assistance  within 7 day(s). Outcome: Progressing Towards Goal   PHYSICAL THERAPY TREATMENT  Patient: Queen Florina (82 y.o. female)  Date: 9/10/2019  Diagnosis: Encephalopathy acute [G93.40]  Acute encephalopathy [G93.40] TIA (transient ischemic attack)       Precautions: Fall, Skin  Chart, physical therapy assessment, plan of care and goals were reviewed. ASSESSMENT  Based on the objective data described below, patient with improved mobility, mentation and less anxiety today. Upset about MRI this morning but agreeable to sitting EOB. Overall Min A x 2 to stand and ambulate short distance around foot of the bed. Delayed gait with impaired balance requiring constant hands on assist and frequent redirection. Pt then ambulate back around foot of bed as she could not get back on bed from the opposite side. Continue to recommend SNF vs 24/7 supervision at home.     Current Level of Function Impacting Discharge (mobility/balance): A x 2  for safety    Other factors to consider for discharge: family was not providing 24/7 support         PLAN :  Patient continues to benefit from skilled intervention to address the above impairments. Continue treatment per established plan of care. to address goals. Recommendation for discharge: (in order for the patient to meet his/her long term goals)  Therapy up to 5 days/week in SNF setting    This discharge recommendation:  Has been made in collaboration with the attending provider and/or case management    Equipment recommendations for successful discharge (if) home: to be determined by rehab facility       SUBJECTIVE:   Patient stated That MRI was just the worst it had a sound and then another one and it never stopped.     OBJECTIVE DATA SUMMARY:   Critical Behavior:  Neurologic State: Alert, Confused  Orientation Level: Disoriented to situation  Cognition: Follows commands  Safety/Judgement: Decreased awareness of environment  Functional Mobility Training:  Bed Mobility:  Rolling: Minimum assistance  Supine to Sit: Minimum assistance  Sit to Supine: Minimum assistance           Transfers:  Sit to Stand: Minimum assistance;Assist x2                                Balance:  Sitting: Intact  Standing: Impaired  Standing - Static: Fair  Standing - Dynamic : Fair  Ambulation/Gait Training:  Distance (ft): 18 Feet (ft)  Assistive Device: (HHA x 2)  Ambulation - Level of Assistance: Minimal assistance;Assist x2        Gait Abnormalities: Decreased step clearance;Shuffling gait;Trunk sway increased        Base of Support: Narrowed     Speed/Ev: Slow;Delayed  Step Length: Right shortened;Left shortened                     Activity Tolerance:   Good  Please refer to the flowsheet for vital signs taken during this treatment.     After treatment patient left in no apparent distress:   Supine in bed, Heels elevated for pressure relief, Call bell within reach, Bed / chair alarm activated, Side rails x 3 and sitter present     COMMUNICATION/COLLABORATION:   The patients plan of care was discussed with: Registered Nurse    Mariajose Hale, PT   Time Calculation: 26 mins

## 2019-09-10 NOTE — PROGRESS NOTES
[facility]: [id number] [address] Neurology  [address]  [phone]        Name:   [address] [place] [address] record #: [id number]  Admission Date: 9/7/2019   Reason for Consult:  AMS    Subjective:   Overnight events:    Much more awake today, conversant          Objective:   EEG:          Care Plan discussed with:  Patient x   Family    RN    Care Manager    Consultant/Specialist:         Thank you for allowing the Neurology Service the pleasure of participating in the care of your patient. This patient will be discussed with my collaborating care team physician Dr. Vik Weiss and he may have further recommendations regarding this patient's care      Hortencia Ponce, ACNP-BC        ====================    Attending Attestation:     NEUROLOGY NOTE ADDENDUM:    9/10/2019      I have reviewed the documentation provided by the nurse practitioner, discussed her findings, clinical impression, and the proposed management plans with regards to this patient's encounter. I have personally performed a face to face diagnostic evaluation on this patient. My findings are as follows:    Nola Chou is a 80 y.o. female who  has a past medical history of AAA (abdominal aortic aneurysm) ([facility] [address] Arthritis, Atrial fibrillation ([facility] [address] COPD, HTN (hypertension), Lung cancer ([facility] [address] RA (rheumatoid arthritis) ([facility] [address] Smoker, and TIA (transient ischemic attack). who has history of Lung CA with mets not chemo candidate seeing Dr Buck Edwards. Also has baseline dementia. Had a pacemaker placed c/o Dr Jake Briones ([employer] [place] MRI sure scan MR conditional) on 7/22/19 for bradycardia. Previously on Eliquis but was stopped in July 2019 due to R gluteal hematoma. Patient now comes in for acute confusion yesterday described as problem expressing herself. Patient now back to baseline and able to converse.     Exam:  Visit Vitals  /60 (BP 1 Location: Right arm, BP Patient Position: At rest)   Pulse 68 Temp 98.2 °F (36.8 °C)   Resp 18   Ht 5' 4\" (1.626 m)   Wt 59.5 kg (131 lb 3.2 oz)   SpO2 91%   BMI 22.52 kg/m²     Gen: Awake, alert, follows commands  Appropriate appearance, normal speech. Oriented to all spheres. Knows the president  No visual field defect on confrontation exam.  Full eyes movement, with no nystagmus, no diplopia, no ptosis. Normal gag and swallow. All remaining cranial nerves were normal  Motor function: 5/5 in all extremities  Sensory: intact to LT, PP and JPS  Good FTN and HTS   Gait: Deferred      Assessment  Acute encephalopathy; resolved  Vit B12 deficiency  MRI brain unremarkable for stroke      RECOMMENDATIONS  1. MRI brain unremarkable  2. Vit B12 182. Continue Vit B12 1000 mcg IM every day for 5 days  3. ASA 81 for vascula prevention if no contraindication  4. FU with cardiology regarding pacemaker  5. FU with Dr Rodriguez regarding lung CA      Thank you for the consultation. Maureen Chinchilla NP  Diplomate, American Board of Psychiatry and Neurology  Diplomate, Neuromuscular Medicine  Diplomate, American Board of Electrodiagnostic Medicine                         Impression/ Plan:      1. Altered Mental Status:    · Neurochecks:  Every 4 hours  · B12 and ammonia   · Continue ASA   · MRI brain without evidence of ischemia    2. Mobility:   · Has not been OOB. · PT/OT deferred treatment due to AMS    4. Diet:    · Does not need SLP, passed STAND     5.   VTE Prophylaxes:   · Lovenox 40 mg, SQ daily        Physical Exam    Patient Vitals for the past 12 hrs:   Temp Pulse Resp BP SpO2   09/10/19 1049 98.2 °F (36.8 °C) 68 18 146/60 91 %   09/10/19 0745 97.5 °F (36.4 °C) 62 20 114/69 94 %   09/10/19 0700  63      09/10/19 0331 97.6 °F (36.4 °C) 60 20 121/65 95 %

## 2019-09-10 NOTE — PROGRESS NOTES
Ty Echeverria [facility] [address], [place], [address] [facility]  [phone]      Medical Progress Note      NAME: Yvrose Lyman   :  1931  MRM:  [id number]    Date/Time: 9/10/2019  9:04 AM        Subjective:     Chief Complaint:  Pain:  Denies this AM, does not remember being admitted, slurred speech, confusion    ROS:  (bold if positive, if negative)                        Tolerating Diet  Tolerating PT            Objective:       Vitals:          Last 24hrs VS reviewed since prior progress note.  Most recent are:    Visit Vitals  /65 (BP 1 Location: Right arm, BP Patient Position: At rest)   Pulse 63   Temp 97.6 °F (36.4 °C)   Resp 20   Ht 5' 4\" (1.626 m)   Wt 59.5 kg (131 lb 3.2 oz)   SpO2 95%   BMI 22.52 kg/m²     SpO2 Readings from Last 6 Encounters:   09/10/19 95%   19 93%   19 95%   19 96%   19 93%   19 97%            Intake/Output Summary (Last 24 hours) at 9/10/2019 0904  Last data filed at 9/10/2019 9431  Gross per 24 hour   Intake 240 ml   Output 500 ml   Net -260 ml          Exam:     Physical Exam:    Gen:  Well-developed, frail, elderly, chronically ill-appearing, in  acute pain  HEENT:  Pink conjunctivae, PERRL, hearing intact to voice, moist mucous membranes  Neck:  Supple, without masses, thyroid non-tender  Resp:  No accessory muscle use, clear breath sounds without wheezes rales or rhonchi  Card:  No murmurs, normal S1, S2 without thrills, bruits or peripheral edema  Abd:  Soft, non-tender, non-distended, normoactive bowel sounds are present, no palpable organomegaly and no detectable hernias  Lymph:  No cervical or inguinal adenopathy  Musc:  No cyanosis or clubbing  Skin:  No rashes or ulcers, skin turgor is good  Neuro:  Cranial nerves are grossly intact, no focal motor weakness, follows commands appropriately  Psych:  Fair insight, oriented to person, place but not time, alert       Telemetry reviewed:   normal sinus rhythm    Medications Reviewed: (see below)    Lab Data Reviewed: (see below)    ______________________________________________________________________    Medications:     Current Facility-Administered Medications   Medication Dose Route Frequency    cyanocobalamin (VITAMIN B12) injection 1,000 mcg  1,000 mcg IntraMUSCular DAILY    acetaminophen (TYLENOL) tablet 1,000 mg  1,000 mg Oral Q8H    naloxone (NARCAN) injection 0.4 mg  0.4 mg IntraVENous EVERY 2 MINUTES AS NEEDED    oxyCODONE IR (ROXICODONE) tablet 5 mg  5 mg Oral Q3H    ALPRAZolam (XANAX) tablet 0.25 mg  0.25 mg Oral BID    dexAMETHasone (DECADRON) tablet 2 mg  2 mg Oral DAILY    oxyCODONE ER (OxyCONTIN) tablet 10 mg  10 mg Oral DAILY    polyethylene glycol (MIRALAX) packet 17 g  17 g Oral QHS    senna-docusate (PERICOLACE) 8.6-50 mg per tablet 1 Tab  1 Tab Oral BID    sodium chloride (NS) flush 5-40 mL  5-40 mL IntraVENous Q8H    sodium chloride (NS) flush 5-40 mL  5-40 mL IntraVENous PRN    aspirin chewable tablet 81 mg  81 mg Oral DAILY            Lab Review:     Recent Labs     09/07/19 1939   WBC 11.2*   HGB 11.6   HCT 39.2        Recent Labs     09/07/19 1939      K 4.8      CO2 31   *   BUN 15   CREA 0.88   CA 8.6   ALB 3.1*   TBILI 0.6   SGOT 23   ALT 13   INR 1.1     Lab Results   Component Value Date/Time    Glucose (POC) 124 (H) 09/07/2019 07:17 PM    Glucose (POC) 103 (H) 07/25/2019 07:55 AM    Glucose (POC) 93 10/01/2016 11:33 AM    Glucose (POC) 91 10/01/2016 07:33 AM     No results for input(s): PH, PCO2, PO2, HCO3, FIO2 in the last 72 hours.   Recent Labs     09/07/19 1939   INR 1.1     Lab Results   Component Value Date/Time    Specimen Description: URINE 09/13/2013 10:35 PM     Lab Results   Component Value Date/Time    Culture result: NO GROWTH 5 DAYS 07/31/2019 09:13 AM    Culture result: (A) 07/29/2019 10:46 AM     STAPHYLOCOCCUS SPECIES, COAGULASE NEGATIVE GROWING IN 1 OF 4 BOTTLES DRAWN (R ARM SITE)    Culture result: (A) 07/29/2019 10:46 AM     PRELIMINARY REPORT OF GRAM POSITIVE COCCI IN CLUSTERS GROWING IN 1 OF 4 BOTTLES DRAWN CALLED TO AND READ BACK BY MS Pauline Collins RN ON 7/30/19 AT 1830 ([facility]).  MS    Culture result: REMAINING BOTTLE(S) HAS/HAVE NO GROWTH IN 5 DAYS 07/29/2019 10:46 AM            Assessment:     Principal Problem:    TIA (transient ischemic attack) (9/30/2016)    Active Problems:    Paroxysmal A-fib ([address] (9/30/2016)      RA (rheumatoid arthritis) ([address] (9/30/2016)      COPD (chronic obstructive pulmonary disease) ([address] (9/30/2016)      Hypertension (8/25/2018)      Malignant neoplasm of upper lobe of left lung ([address] (11/27/2018)      Leukocytosis (7/20/2019)      Chronic pain syndrome ()      Acute encephalopathy (9/7/2019)      DNR (do not resuscitate) ()           Plan:     Principal Problem:    TIA (transient ischemic attack) (9/30/2016)   - vs CVA   - patient refuses MRI   - PT/OT evaluation appreciated, agree she needs 24 hour care, family apparently not providing that   - CM aware, will need 24 hour care at discharge, will work with family    Active Problems:    Paroxysmal A-fib ([address] (9/30/2016)   - no longer on chronic anticoagulation and agree she is a poor candidate      RA (rheumatoid arthritis) ([address] (9/30/2016)   - on chronic steroids and pain meds      COPD (chronic obstructive pulmonary disease) ([address] (9/30/2016)   - continue respiratory meds, on chronic steroids      Hypertension (8/25/2018)   - BP okay, not on meds      Malignant neoplasm of upper lobe of left lung ([address] (11/27/2018)   - not a candidate for additional treatment due to poor performance status      Leukocytosis (7/20/2019)   - chronically elevated due to steroid use      Chronic pain syndrome ()   - continue home pain meds      Acute encephalopathy (9/7/2019)   - resolved possibly due to TIA/CVA, have found no other reversible cause and she has improved without any specific intervention   - more likely this is due to delirium superimposed on dementia   - Palliative Care input appreciated, to speak with daughter      Total time spent in patient care: 25 minutes                  Care Plan discussed with: Patient, Family and Nursing Staff    Discussed:  Code Status, Care Plan and D/C Planning    Prophylaxis:  SCD's    Disposition:  TBD           ___________________________________________________    Attending Physician: Anita Dover MD '

## 2019-09-10 NOTE — PROGRESS NOTES
0730-Bedside and Verbal shift change report given to [address] [place] [address] (oncoming nurse) by Jack Martin (offgoing nurse). Report included the following information SBAR, Kardex, Intake/Output, MAR, Accordion, Recent Results and Med Rec Status. 1400- Stroke Education provided to patient and the following topics were discussed    1. Patients personal risk factors for stroke are hypertension    2. Warning signs of Stroke:        * Sudden numbness or weakness of the face, arm or leg, especially on one side of          The body            * Sudden confusion, trouble speaking or understanding        * Sudden trouble seeing in one or both eyes        * Sudden trouble walking, dizziness, loss of balance or coordination        * Sudden severe headache with no known cause      3. Importance of activation Emergency Medical Services ( 9-1-1 ) immediately if experience any warning signs of stroke. 4. Be sure and schedule a follow-up appointment with your primary care doctor or any specialists as instructed. 5. You must take medicine every day to treat your risk factors for stroke. Be sure to take your medicines exactly as your doctor tells you: no more, no less. Know what your medicines are for , what they do. Anti-thrombotics /anticoagulants can help prevent strokes. You are taking the following medicine(s)  Aspirin 81mg     6. Smoking and second-hand smoke greatly increase your risk of stroke, cardiovascular disease and death. Smoking history never or cigarettes, 0 per day    7. Information provided was [facility] Stroke Education Binder, Stroke Handouts or Verbal Education    8. Documentation of teaching completed in Patient Education Activity and on Care Plan with teaching response noted? yes    1915- Bedside and Verbal shift change report given to Jack Martin (oncoming nurse) by [address] [place] [address] (offgoing nurse).  Report included the following information SBAR, Kardex, Intake/Output, MAR, Accordion, Recent Results and Med Rec Status.

## 2019-09-10 NOTE — PROGRESS NOTES
Bedside and Verbal shift change report given to [facility] RN(oncoming nurse) by Kashif Bailey (offgoing nurse). Report included the following information SBAR, Kardex, Intake/Output, Accordion and Recent Results. SHIFT REPORT:            Bedside and Verbal shift change report given to ---- (oncoming nurse) by [facility] RN  (offgoing nurse). Report included the following information SBAR, Kardex, Intake/Output, Accordion and Recent Results.

## 2019-09-10 NOTE — PROGRESS NOTES
Nutrition Assessment:    RECOMMENDATIONS/INTERVENTION(S):   1. Continue cardiac diet. 2. Continue to monitor po intake, wt changes, labs. ASSESSMENT:   9/10: Pt assessed for low BMI for age. Admitted with TIA. PMH includes afib, COPD, HTN, lung CA. BMI 22.5, underweight for age. Pt reports good appetite and po intake currently and PTA. Says she ate most of lunch. Recorded intakes 25-50%. Ordering meals and finding foods she likes. Pt reports no recent wt loss. B12 low, currently being repleted. No c/o of n/v/d/c or c/s issues. No concerns at this time. Will continue to monitor po intakes. Labs- -124, B12 182. Meds- Vit. B12, Miralax, pericolace. Diet Order: Cardiac  % Eaten:    Patient Vitals for the past 72 hrs:   % Diet Eaten   09/10/19 1420 50 %   09/10/19 1049 30 %   09/09/19 1743 25 %   09/09/19 1300 25 %   09/09/19 0845 25 %   09/09/19 0410 50 %       Pertinent Medications: [x] Reviewed    Labs: [x] Reviewed    Anthropometrics: Height: 5' 4\" (162.6 cm) Weight: 59.5 kg (131 lb 3.2 oz)    IBW (%IBW):   ( ) UBW (%UBW):   (  %)      BMI: Body mass index is 22.52 kg/m². This BMI is indicative of:   [x] Underweight for age    [] Normal    [] Overweight    []  Obesity    []  Extreme Obesity (BMI>40)  Estimated Nutrition Needs (Based on): 0318 Kcals/day(1017 x 1.3 AF) , 60 g(0.8-1 g/kg) Protein  Carbohydrate: At Least 130 g/day  Fluids: 1323 mL/day (1 ml/kcal)    Last BM: 9/6   [x]Active     []Hyperactive  []Hypoactive       [] Absent   BS  Skin:    [x] Intact   [] Incision  [] Breakdown   [] DTI   [] Tears/Excoriation/Abrasion  []Edema [] Other:      Wt Readings from Last 30 Encounters:   09/10/19 59.5 kg (131 lb 3.2 oz)   07/25/19 57 kg (125 lb 10.6 oz)   05/29/19 56.2 kg (123 lb 12.8 oz)   02/28/19 50.7 kg (111 lb 12.8 oz)   02/11/19 52.6 kg (116 lb)   02/05/19 54.6 kg (120 lb 6.4 oz)   12/04/18 52.6 kg (116 lb)   12/04/18 54.9 kg (121 lb)   11/24/18 52.6 kg (116 lb)   11/28/18 52.6 kg (115 lb 15.4 oz)   08/25/18 61.2 kg (135 lb)   08/27/18 61.2 kg (135 lb)   10/04/16 64 kg (141 lb)   10/01/16 65.8 kg (145 lb)   09/13/13 67.4 kg (148 lb 8 oz)      NUTRITION DIAGNOSES:   Problem:  No nutritional diagnosis at this time     Etiology: related to       Signs/Symptoms: as evidenced by        NUTRITION INTERVENTIONS:  Meals/Snacks: General/healthful diet                  GOAL:   PO intake >75% meals next 5-7 days    Cultural, Mosque, or Ethnic Dietary Needs: None     EDUCATION & DISCHARGE NEEDS:    [x] None Identified   [] Identified and Education Provided/Documented   [] Identified and Pt declined/was not appropriate      [] Interdisciplinary Care Plan Reviewed/Documented    [x] Discharge Needs:    Regular diet   [] No Nutrition Related Discharge Needs    NUTRITION RISK:   Pt Is At Nutrition Risk  [x]     No Nutrition Risk Identified  []       PT SEEN FOR:    []  MD Consult: []Calorie Count      []Diabetic Diet Education        []Diet Education     []Electrolyte Management     []General Nutrition Management and Supplements     []Management of Tube Feeding     []TPN Recommendations    []  RN Referral:  []MST score >=2     []Enteral/Parenteral Nutrition PTA     []Pregnant: Gestational DM or Multigestation                 [] Pressure Ulcer    [x]  Low BMI      []  Length of Stay       [] Dysphagia Diet         [] Ventilator  []  Follow-up     Previous Recommendations:   [] Implemented          [] Not Implemented          [x] Not Applicable    Previous Goal:   [] Met              [] Progressing Towards Goal              [] Not Progressing Towards Goal   [x] Not Applicable            Shaji Damon, [address] [facility]  Pager [phone]  Phone [phone]

## 2019-09-11 LAB
ANION GAP SERPL CALC-SCNC: 8 MMOL/L (ref 5–15)
BUN SERPL-MCNC: 13 MG/DL (ref 6–20)
BUN/CREAT SERPL: 25 (ref 12–20)
CALCIUM SERPL-MCNC: 8.5 MG/DL (ref 8.5–10.1)
CHLORIDE SERPL-SCNC: 106 MMOL/L (ref 97–108)
CO2 SERPL-SCNC: 29 MMOL/L (ref 21–32)
CREAT SERPL-MCNC: 0.52 MG/DL (ref 0.55–1.02)
ERYTHROCYTE [DISTWIDTH] IN BLOOD BY AUTOMATED COUNT: 13.5 % (ref 11.5–14.5)
GLUCOSE SERPL-MCNC: 99 MG/DL (ref 65–100)
HCT VFR BLD AUTO: 30 % (ref 35–47)
HGB BLD-MCNC: 9.9 G/DL (ref 11.5–16)
MAGNESIUM SERPL-MCNC: 1.7 MG/DL (ref 1.6–2.4)
MCH RBC QN AUTO: 28.7 PG (ref 26–34)
MCHC RBC AUTO-ENTMCNC: 33 G/DL (ref 30–36.5)
MCV RBC AUTO: 87 FL (ref 80–99)
NRBC # BLD: 0 K/UL (ref 0–0.01)
NRBC BLD-RTO: 0 PER 100 WBC
PHOSPHATE SERPL-MCNC: 2.8 MG/DL (ref 2.6–4.7)
PLATELET # BLD AUTO: 357 K/UL (ref 150–400)
PMV BLD AUTO: 10.8 FL (ref 8.9–12.9)
POTASSIUM SERPL-SCNC: 3.7 MMOL/L (ref 3.5–5.1)
RBC # BLD AUTO: 3.45 M/UL (ref 3.8–5.2)
SODIUM SERPL-SCNC: 143 MMOL/L (ref 136–145)
WBC # BLD AUTO: 8.1 K/UL (ref 3.6–11)

## 2019-09-11 PROCEDURE — 74011000250 HC RX REV CODE- 250: Performed by: INTERNAL MEDICINE

## 2019-09-11 PROCEDURE — 65660000000 HC RM CCU STEPDOWN

## 2019-09-11 PROCEDURE — 97530 THERAPEUTIC ACTIVITIES: CPT

## 2019-09-11 PROCEDURE — 74011250636 HC RX REV CODE- 250/636: Performed by: NURSE PRACTITIONER

## 2019-09-11 PROCEDURE — 94640 AIRWAY INHALATION TREATMENT: CPT

## 2019-09-11 PROCEDURE — 94760 N-INVAS EAR/PLS OXIMETRY 1: CPT

## 2019-09-11 PROCEDURE — 84100 ASSAY OF PHOSPHORUS: CPT

## 2019-09-11 PROCEDURE — 36415 COLL VENOUS BLD VENIPUNCTURE: CPT

## 2019-09-11 PROCEDURE — 77010033678 HC OXYGEN DAILY

## 2019-09-11 PROCEDURE — 80048 BASIC METABOLIC PNL TOTAL CA: CPT

## 2019-09-11 PROCEDURE — 85027 COMPLETE CBC AUTOMATED: CPT

## 2019-09-11 PROCEDURE — 74011250637 HC RX REV CODE- 250/637: Performed by: NURSE PRACTITIONER

## 2019-09-11 PROCEDURE — 74011250636 HC RX REV CODE- 250/636: Performed by: INTERNAL MEDICINE

## 2019-09-11 PROCEDURE — 74011250637 HC RX REV CODE- 250/637: Performed by: INTERNAL MEDICINE

## 2019-09-11 PROCEDURE — 77030038269 HC DRN EXT URIN PURWCK BARD -A

## 2019-09-11 PROCEDURE — 77030029684 HC NEB SM VOL KT MONA -A

## 2019-09-11 PROCEDURE — 83735 ASSAY OF MAGNESIUM: CPT

## 2019-09-11 PROCEDURE — 65270000029 HC RM PRIVATE

## 2019-09-11 RX ORDER — IPRATROPIUM BROMIDE AND ALBUTEROL SULFATE 2.5; .5 MG/3ML; MG/3ML
3 SOLUTION RESPIRATORY (INHALATION)
Status: DISCONTINUED | OUTPATIENT
Start: 2019-09-11 | End: 2019-09-15 | Stop reason: HOSPADM

## 2019-09-11 RX ADMIN — OXYCODONE HYDROCHLORIDE 5 MG: 5 TABLET ORAL at 01:46

## 2019-09-11 RX ADMIN — OXYCODONE HYDROCHLORIDE 5 MG: 5 TABLET ORAL at 14:15

## 2019-09-11 RX ADMIN — ASPIRIN 81 MG 81 MG: 81 TABLET ORAL at 08:53

## 2019-09-11 RX ADMIN — OXYCODONE HYDROCHLORIDE 10 MG: 10 TABLET, FILM COATED, EXTENDED RELEASE ORAL at 21:04

## 2019-09-11 RX ADMIN — ALPRAZOLAM 0.25 MG: 0.25 TABLET ORAL at 16:46

## 2019-09-11 RX ADMIN — ACETAMINOPHEN 1000 MG: 500 TABLET ORAL at 08:52

## 2019-09-11 RX ADMIN — OXYCODONE HYDROCHLORIDE 5 MG: 5 TABLET ORAL at 04:12

## 2019-09-11 RX ADMIN — Medication 10 ML: at 14:00

## 2019-09-11 RX ADMIN — CYANOCOBALAMIN 1000 MCG: 1000 INJECTION, SOLUTION INTRAMUSCULAR; SUBCUTANEOUS at 08:53

## 2019-09-11 RX ADMIN — OXYCODONE HYDROCHLORIDE 5 MG: 5 TABLET ORAL at 16:45

## 2019-09-11 RX ADMIN — OXYCODONE HYDROCHLORIDE 5 MG: 5 TABLET ORAL at 23:18

## 2019-09-11 RX ADMIN — OXYCODONE HYDROCHLORIDE 5 MG: 5 TABLET ORAL at 10:51

## 2019-09-11 RX ADMIN — Medication 10 ML: at 21:04

## 2019-09-11 RX ADMIN — Medication 10 ML: at 05:04

## 2019-09-11 RX ADMIN — ALPRAZOLAM 0.25 MG: 0.25 TABLET ORAL at 08:52

## 2019-09-11 RX ADMIN — OXYCODONE HYDROCHLORIDE 5 MG: 5 TABLET ORAL at 07:30

## 2019-09-11 RX ADMIN — ACETAMINOPHEN 1000 MG: 500 TABLET ORAL at 16:45

## 2019-09-11 RX ADMIN — POLYETHYLENE GLYCOL 3350 17 G: 17 POWDER, FOR SOLUTION ORAL at 21:04

## 2019-09-11 RX ADMIN — DEXAMETHASONE 2 MG: 4 TABLET ORAL at 08:52

## 2019-09-11 RX ADMIN — IPRATROPIUM BROMIDE AND ALBUTEROL SULFATE 3 ML: .5; 3 SOLUTION RESPIRATORY (INHALATION) at 04:20

## 2019-09-11 RX ADMIN — SENNOSIDES,DOCUSATE SODIUM 1 TABLET: 8.6; 5 TABLET, FILM COATED ORAL at 08:52

## 2019-09-11 RX ADMIN — SENNOSIDES,DOCUSATE SODIUM 1 TABLET: 8.6; 5 TABLET, FILM COATED ORAL at 16:46

## 2019-09-11 RX ADMIN — ACETAMINOPHEN 1000 MG: 500 TABLET ORAL at 00:05

## 2019-09-11 NOTE — PROGRESS NOTES
Ty Echeverria lisa [address]  [facility] [facility], [address] [place] [address]  [phone]      Medical Progress Note      NAME: Sol Abrams   :  1931  MRM:  [id number]    Date/Time: 2019  9:04 AM        Subjective:     Chief Complaint:  SLurred speech TIA    Arranging SNF    ROS:  (bold if positive, if negative)                        Tolerating Diet  Tolerating PT            Objective:       Vitals:          Last 24hrs VS reviewed since prior progress note.  Most recent are:    Visit Vitals  /51 (BP 1 Location: Left arm, BP Patient Position: At rest)   Pulse 73   Temp 97.9 °F (36.6 °C)   Resp 16   Ht 5' 4\" (1.626 m)   Wt 60.8 kg (134 lb)   SpO2 92%   BMI 23.00 kg/m²     SpO2 Readings from Last 6 Encounters:   19 92%   19 93%   19 95%   19 96%   19 93%   19 97%    O2 Flow Rate (L/min): 2 l/min       Intake/Output Summary (Last 24 hours) at 2019 0831  Last data filed at 2019 0342  Gross per 24 hour   Intake 700 ml   Output 675 ml   Net 25 ml          Exam:     Physical Exam:    Gen:  Well-developed, frail, elderly, chronically ill-appearing, in  acute pain  HEENT:  Pink conjunctivae, PERRL, hearing intact to voice, moist mucous membranes  Neck:  Supple, without masses, thyroid non-tender  Resp:  No accessory muscle use, clear breath sounds without wheezes rales or rhonchi  Card:  No murmurs, normal S1, S2 without thrills, bruits or peripheral edema  Abd:  Soft, non-tender, non-distended, normoactive bowel sounds are present, no palpable organomegaly and no detectable hernias  Lymph:  No cervical or inguinal adenopathy  Musc:  No cyanosis or clubbing  Skin:  No rashes or ulcers, skin turgor is good  Neuro:  Cranial nerves are grossly intact, no focal motor weakness, follows commands appropriately  Psych:  Fair insight, oriented to person, place but not time, alert       Telemetry reviewed:   normal sinus rhythm    Medications Reviewed: (see below)    Lab Data Reviewed: (see below)    ______________________________________________________________________    Medications:     Current Facility-Administered Medications   Medication Dose Route Frequency    albuterol-ipratropium (DUO-NEB) 2.5 MG-0.5 MG/3 ML  3 mL Nebulization Q6H PRN    cyanocobalamin (VITAMIN B12) injection 1,000 mcg  1,000 mcg IntraMUSCular DAILY    acetaminophen (TYLENOL) tablet 1,000 mg  1,000 mg Oral Q8H    naloxone (NARCAN) injection 0.4 mg  0.4 mg IntraVENous EVERY 2 MINUTES AS NEEDED    oxyCODONE IR (ROXICODONE) tablet 5 mg  5 mg Oral Q3H    ALPRAZolam (XANAX) tablet 0.25 mg  0.25 mg Oral BID    dexAMETHasone (DECADRON) tablet 2 mg  2 mg Oral DAILY    oxyCODONE ER (OxyCONTIN) tablet 10 mg  10 mg Oral DAILY    polyethylene glycol (MIRALAX) packet 17 g  17 g Oral QHS    senna-docusate (PERICOLACE) 8.6-50 mg per tablet 1 Tab  1 Tab Oral BID    sodium chloride (NS) flush 5-40 mL  5-40 mL IntraVENous Q8H    sodium chloride (NS) flush 5-40 mL  5-40 mL IntraVENous PRN    aspirin chewable tablet 81 mg  81 mg Oral DAILY            Lab Review:     Recent Labs     09/11/19 0221   WBC 8.1   HGB 9.9*   HCT 30.0*        Recent Labs     09/11/19 0221      K 3.7      CO2 29   GLU 99   BUN 13   CREA 0.52*   CA 8.5   MG 1.7   PHOS 2.8     Lab Results   Component Value Date/Time    Glucose (POC) 124 (H) 09/07/2019 07:17 PM    Glucose (POC) 103 (H) 07/25/2019 07:55 AM    Glucose (POC) 93 10/01/2016 11:33 AM    Glucose (POC) 91 10/01/2016 07:33 AM     No results for input(s): PH, PCO2, PO2, HCO3, FIO2 in the last 72 hours. No results for input(s): INR in the last 72 hours.     No lab exists for component: Carlos Smith  Lab Results   Component Value Date/Time    Specimen Description: URINE 09/13/2013 10:35 PM     Lab Results   Component Value Date/Time    Culture result: NO GROWTH 5 DAYS 07/31/2019 09:13 AM    Culture result: (A) 07/29/2019 10:46 AM     STAPHYLOCOCCUS SPECIES, COAGULASE NEGATIVE GROWING IN 1 OF 4 BOTTLES DRAWN (R ARM SITE)    Culture result: (A) 07/29/2019 10:46 AM     PRELIMINARY REPORT OF GRAM POSITIVE COCCI IN CLUSTERS GROWING IN 1 OF 4 BOTTLES DRAWN CALLED TO AND READ BACK BY MS Vicki Enamorado RN ON 7/30/19 AT 1830 ([facility]). MS    Culture result: REMAINING BOTTLE(S) HAS/HAVE NO GROWTH IN 5 DAYS 07/29/2019 10:46 AM            Assessment:     Principal Problem:    TIA (transient ischemic attack) (9/30/2016)    Active Problems:    Paroxysmal A-fib ([address] (9/30/2016)      RA (rheumatoid arthritis) ([address] (9/30/2016)      COPD (chronic obstructive pulmonary disease) ([address] (9/30/2016)      Hypertension (8/25/2018)      Malignant neoplasm of upper lobe of left lung ([address] (11/27/2018)      Leukocytosis (7/20/2019)      Chronic pain syndrome ()      DNR (do not resuscitate) ()           Plan:     Principal Problem:    TIA (transient ischemic attack) (9/30/2016)   MRI unremarkable  - PT/OT evaluation appreciated, agree she needs 24 hour care, family apparently not providing that  - CM aware, will need 24 hour care at discharge, will work with family    Active Problems:    Paroxysmal A-fib ([address] (9/30/2016)  - no longer on chronic anticoagulation and agree she is a poor candidate      RA (rheumatoid arthritis) ([address] (9/30/2016)  - on chronic steroids and pain meds      COPD (chronic obstructive pulmonary disease) ([address] (9/30/2016)  - continue respiratory meds  - on chronic steroids      Hypertension (8/25/2018)  - monitor      Malignant neoplasm of upper lobe of left lung ([address] (11/27/2018)  - not a candidate for additional treatment due to poor performance status      Chronic pain syndrome ()  - continue home pain meds      Acute encephalopathy (9/7/2019)  resolved possibly due to TIA/CVA, have found no other reversible cause and she has improved without any specific intervention.  more likely this is due to delirium superimposed on dementia  - Palliative Care input appreciated, to speak with daughter      Total time spent in patient care: 25 minutes                  Care Plan discussed with: Patient, Family and Nursing Staff    Discussed:  Code Status, Care Plan and D/C Planning    DVT Prophylaxis:  SCD's     DNR    Disposition:  TBD           ___________________________________________________    Attending Physician: Marcel Lewis MD '

## 2019-09-11 NOTE — PROGRESS NOTES
Reason for Admission:   Encephalopathy             RRAT Score:     27             Resources/supports as identified by patient/family:   Patient stated that she has a very supportive family. Top Challenges facing patient (as identified by patient/family and CM): Finances/Medication cost?     Pt has prescription coverage under her insurance plan. She gets her prescriptions filled at [facility]  and [facility]. Transportation? Patient no longer drives, she relies on her family for transportation. Support system or lack thereof? Very supportive family. Living arrangements? She lives with her grandson and his family. Self-care/ADLs/Cognition? Pt needs assistance with some of her ADLs. Current Advanced Directive/Advance Care Plan:  DNR, AD is on file                           Plan for utilizing home health:    Pt has used home health before through [facility]. Transition of Care Plan:      I met with the patient and her daughter Licha Hill. Patient lives with her grandson and his family. Her main contact is her daughter Daksha Ontiveros. Licha Hill lives down the street \"4 houses away\". Licha Hill can be reached on her cell which is [phone]. We discussed disposition plans. PT and OT are recommending a SNF. Licha Hill stated that she does not want her mother to go to [facility]. Patient was released from [facility] approximately three weeks ago. Licha Hill said, \"[place] is nice but it is so far away\". When patient was discharged from [facility], [facility] home health  was set up - PT/OT/RN. DME - pt has a walker and a wheelchair. Plan  1. Referral sent to [address] [place] [address] in [employer]  2. Signed choice letter is on the chart for [address] [place] [address]  3. NN notified of hospital admission   4.  4.CM will continue to follow and assist with discharge planning  Care Management Interventions  PCP Verified by CM: Yes(Mohan Paniagua Signup: No  Discharge Durable Medical Equipment: No  Health Maintenance Reviewed: Yes  Physical Therapy Consult: Yes  Occupational Therapy Consult: Yes  Speech Therapy Consult: Yes  Current Support Network: Relative's Home  Confirm Follow Up Transport: Other (see comment)(Will likely need an Ambulance)  Plan discussed with Pt/Family/Caregiver: Yes  Freedom of Choice Offered: (S) Yes  Discharge Location  Discharge Placement: [address] [place] [address] Rd, [facility]

## 2019-09-11 NOTE — PROGRESS NOTES
Problem: Mobility Impaired (Adult and Pediatric)  Goal: *Acute Goals and Plan of Care (Insert Text)  Description  FUNCTIONAL STATUS PRIOR TO ADMISSION: Patient was modified independent using a Rolling walker for functional mobility. HOME SUPPORT PRIOR TO ADMISSION: The patient lived with family members on the first floor of a two story home. Physical Therapy Goals  Initiated 9/9/2019  1. Patient will move from supine to sit and sit to supine  in bed with supervision/set-up within 7 day(s). 2.  Patient will transfer from bed to chair and chair to bed with supervision/set-up using the least restrictive device within 7 day(s). 3.  Patient will perform sit to stand with supervision/set-up within 7 day(s). 4.  Patient will ambulate with supervision/set-up for 15 feet with the least restrictive device within 7 day(s). 5.  Patient will ascend/descend 2 stairs with 1 handrail(s) with moderate assistance  within 7 day(s). Outcome: Progressing Towards Goal   PHYSICAL THERAPY TREATMENT  Patient: Babs Garrison (2 y.o. female)  Date: 9/11/2019  Diagnosis: Encephalopathy acute [G93.40]  Acute encephalopathy [G93.40] TIA (transient ischemic attack)       Precautions: Fall, Skin  Chart, physical therapy assessment, plan of care and goals were reviewed. ASSESSMENT  Based on the objective data described below, patient limited by confusion and reported pain. More confused today and only able to perform brief side steps at EOB. Stated MD did not want her getting OOB today though no notes to back it up. Continues to decline sitting up in chair/recliner. Sitter present in the room. Of note, pt does not like gripper socks or the gait belt.      Current Level of Function Impacting Discharge (mobility/balance): confusion, 3 steps with Min A x 2    Other factors to consider for discharge: family was not providing 24/7 assistance         PLAN :  Patient continues to benefit from skilled intervention to address the above impairments. Continue treatment per established plan of care. to address goals. Recommendation for discharge: (in order for the patient to meet his/her long term goals)  Therapy up to 5 days/week in SNF setting    This discharge recommendation:  Has been made in collaboration with the attending provider and/or case management    Equipment recommendations for successful discharge (if) home: to be determined by rehab facility       SUBJECTIVE:   Patient stated Don Angulo will do what I can. That chair looks comfortable for you but not me.     OBJECTIVE DATA SUMMARY:   Critical Behavior:  Neurologic State: Confused, Alert, Eyes open spontaneously  Orientation Level: Oriented to person  Cognition: Follows commands  Safety/Judgement: Decreased awareness of environment  Functional Mobility Training:  Bed Mobility:  Rolling: Minimum assistance  Supine to Sit: Minimum assistance  Sit to Supine: Minimum assistance           Transfers:  Sit to Stand: Minimum assistance;Assist x2                                Balance:  Sitting: Intact  Standing: Impaired  Standing - Static: Fair  Standing - Dynamic : Fair  Ambulation/Gait Training:  Distance (ft): 3 Feet (ft)  Assistive Device: (HHA x 2)  Ambulation - Level of Assistance: Minimal assistance;Assist x2        Gait Abnormalities: Decreased step clearance;Shuffling gait        Base of Support: Narrowed     Speed/Ev: Pace decreased (<100 feet/min); Shuffled  Step Length: Right shortened;Left shortened               Activity Tolerance:   Good  Please refer to the flowsheet for vital signs taken during this treatment.     After treatment patient left in no apparent distress:   Supine in bed, Call bell within reach, Bed / chair alarm activated and Side rails x 3    COMMUNICATION/COLLABORATION:   The patients plan of care was discussed with: Registered Nurse    Bro Espinoza, PT   Time Calculation: 18 mins

## 2019-09-11 NOTE — PROGRESS NOTES
0700- Bedside and Verbal shift change report given to JOSLYN Schroeder RN (oncoming nurse) by [address] [place] [address] (offgoing nurse). Report included the following information SBAR, Kardex, ED Summary, MAR, Recent Results, Med Rec Status and Cardiac Rhythm Apaced. 0730- Scheduled dose of rosa given by PM nurse. 0840- Reached out to PM nurse since it appears 0800 dose of rosa was not scanned into MAR. Nurse states she scanned med before giving. This nurse witnessed dose being given at 0730. Pt stated her pain was 10/10 in her back. Documented rosa administration on MAR.    7111- Dr Merly Shelley at bedside at this time. 1900- Bedside and Verbal shift change report given to [address] [place], [place] [address] (oncoming nurse) by Fouzia Swenson RN (offgoing nurse). Report included the following information SBAR, Kardex, ED Summary, Intake/Output, MAR, Recent Results, Med Rec Status and Cardiac Rhythm NSR.

## 2019-09-11 NOTE — PROGRESS NOTES
Shift Change:  Bedside and Verbal shift change report given to Christopher Rodas RN (oncoming nurse) by Jakob Gil RN (offgoing nurse). Report included the following information SBAR and Kardex. Shift Summary:  0345: Pt reports SOB and difficulty breathing. Assessed lungs; rhonchi, crackles, and wheezing heard. Called Dr. Maria Dee gave order for DUO-NEB q6h prn. Will continue to monitor. End of Shift Report[de-identified]  Bedside and Verbal shift change report given to Aaliyah Aguero RN (oncoming nurse) by Christopher Rodas RN (offgoing nurse). Report included the following information SBAR and Kardex.

## 2019-09-12 PROCEDURE — 77030038269 HC DRN EXT URIN PURWCK BARD -A

## 2019-09-12 PROCEDURE — 74011250636 HC RX REV CODE- 250/636: Performed by: INTERNAL MEDICINE

## 2019-09-12 PROCEDURE — 97530 THERAPEUTIC ACTIVITIES: CPT

## 2019-09-12 PROCEDURE — 74011000250 HC RX REV CODE- 250: Performed by: INTERNAL MEDICINE

## 2019-09-12 PROCEDURE — 94640 AIRWAY INHALATION TREATMENT: CPT

## 2019-09-12 PROCEDURE — 74011250636 HC RX REV CODE- 250/636: Performed by: NURSE PRACTITIONER

## 2019-09-12 PROCEDURE — 97535 SELF CARE MNGMENT TRAINING: CPT

## 2019-09-12 PROCEDURE — 74011250637 HC RX REV CODE- 250/637: Performed by: NURSE PRACTITIONER

## 2019-09-12 PROCEDURE — 74011250637 HC RX REV CODE- 250/637: Performed by: INTERNAL MEDICINE

## 2019-09-12 PROCEDURE — 77010033678 HC OXYGEN DAILY

## 2019-09-12 PROCEDURE — 97116 GAIT TRAINING THERAPY: CPT

## 2019-09-12 PROCEDURE — 65660000000 HC RM CCU STEPDOWN

## 2019-09-12 RX ORDER — DEXAMETHASONE 4 MG/1
2 TABLET ORAL
Status: DISCONTINUED | OUTPATIENT
Start: 2019-09-12 | End: 2019-09-15 | Stop reason: HOSPADM

## 2019-09-12 RX ADMIN — Medication 10 ML: at 05:04

## 2019-09-12 RX ADMIN — ACETAMINOPHEN 1000 MG: 500 TABLET ORAL at 01:49

## 2019-09-12 RX ADMIN — OXYCODONE HYDROCHLORIDE 5 MG: 5 TABLET ORAL at 01:50

## 2019-09-12 RX ADMIN — OXYCODONE HYDROCHLORIDE 5 MG: 5 TABLET ORAL at 12:20

## 2019-09-12 RX ADMIN — ACETAMINOPHEN 1000 MG: 500 TABLET ORAL at 08:47

## 2019-09-12 RX ADMIN — ACETAMINOPHEN 1000 MG: 500 TABLET ORAL at 18:13

## 2019-09-12 RX ADMIN — OXYCODONE HYDROCHLORIDE 5 MG: 5 TABLET ORAL at 08:48

## 2019-09-12 RX ADMIN — SENNOSIDES,DOCUSATE SODIUM 1 TABLET: 8.6; 5 TABLET, FILM COATED ORAL at 18:13

## 2019-09-12 RX ADMIN — CYANOCOBALAMIN 1000 MCG: 1000 INJECTION, SOLUTION INTRAMUSCULAR; SUBCUTANEOUS at 10:19

## 2019-09-12 RX ADMIN — OXYCODONE HYDROCHLORIDE 5 MG: 5 TABLET ORAL at 15:10

## 2019-09-12 RX ADMIN — DEXAMETHASONE 2 MG: 4 TABLET ORAL at 12:20

## 2019-09-12 RX ADMIN — OXYCODONE HYDROCHLORIDE 10 MG: 10 TABLET, FILM COATED, EXTENDED RELEASE ORAL at 20:42

## 2019-09-12 RX ADMIN — Medication 10 ML: at 14:37

## 2019-09-12 RX ADMIN — IPRATROPIUM BROMIDE AND ALBUTEROL SULFATE 3 ML: .5; 3 SOLUTION RESPIRATORY (INHALATION) at 14:52

## 2019-09-12 RX ADMIN — OXYCODONE HYDROCHLORIDE 5 MG: 5 TABLET ORAL at 18:13

## 2019-09-12 RX ADMIN — OXYCODONE HYDROCHLORIDE 5 MG: 5 TABLET ORAL at 05:04

## 2019-09-12 RX ADMIN — OXYCODONE HYDROCHLORIDE 5 MG: 5 TABLET ORAL at 23:00

## 2019-09-12 RX ADMIN — SENNOSIDES,DOCUSATE SODIUM 1 TABLET: 8.6; 5 TABLET, FILM COATED ORAL at 08:48

## 2019-09-12 RX ADMIN — ALPRAZOLAM 0.25 MG: 0.25 TABLET ORAL at 18:14

## 2019-09-12 RX ADMIN — ALPRAZOLAM 0.25 MG: 0.25 TABLET ORAL at 08:47

## 2019-09-12 RX ADMIN — Medication 10 ML: at 23:00

## 2019-09-12 RX ADMIN — ASPIRIN 81 MG 81 MG: 81 TABLET ORAL at 08:47

## 2019-09-12 NOTE — PROGRESS NOTES
9/12/2019 4:25 PM Spoke with Alda Karimi at [facility] who reported if pt is sitter free for 48 hours and it is documented in progress notes they can accept pt on 9/15. Alda Trev confirmed pt has 68 Medicare days left with no copay for SNF placement. CM called and relayed to pt's daughter who is agreeable to pt's discharge to SNF. CM relayed pt's Medicare SNF days to pt's daughter. Discharge plan:  [facility] on 9/15 if sitter free. Progress notes will need to be sent to [facility] on 9/15 documenting pt's behaviors being sitter free for their nurse to confirm acceptance. 9/12/2019 3:32 PM Referral still pending with [facility]. Pt will need to be 48 hours sitter free prior to SNF discharge. 9/12/2019  1:36 PM PT and OT recommendation for SNF placement discussed. CM called and spoke with pt's daughter, Kalpana Velázquez regarding continued recommendation for SNF. Pt's daughter was reluctant to SNF placement but was agreeable for referral being sent to [facility] for pt. Pt's daughter asked CM if she would be able to take pt home. CM relayed pt can discharge home but will need 24 hour physical assistance and that recommendation is for SNF placement. Pt's daughter was understanding. Pt's daughter was also questioning pt's coverage for SNF. CM sent referral to [facility] via [employer]ink, called and lvm with admissions. 9/12/2019 9:47 AM Received return phone call from pt's daughter, Kalpana Velázquez regarding alternative SNF placement. Pt's daughter relayed her and her family would prefer to have pt return home with home health through [facility]. Pt's daughter relayed pt recently discharged from rehab and she did not think pt needed to return to rehab at discharge. CM relayed pt will need 24/7 assist at home, pt's daughter was understanding and reported pt's family will be able to provide this assistance to pt at home. CM relayed to treating PT and OT.    CM will follow up with pt's daughter once discharge day has been determined and pt has worked with PT today to confirm home with home health and 24 hour assistance is a safe discharge plan. If not CM will discuss alternative SNF options with pt's daughter. 9/12/2019 9:08 AM Pt transferred from [facility], EMR reviewed. Received message via [employer], [facility] cannot accept pt due to not having a bed available. CM called and lvm with pt's daughter, Ahmet Garza at [phone] to discuss alternative SNF options. CM will follow up.  SANDI Vega

## 2019-09-12 NOTE — CONSULTS
[address] [facility],5Th Floor  [address]   [address] [place] [address] [place] [address] [address]   [phone] Office   [phone] Fax      Neuropsychology    Inpatient Neuropsych Consult Visit Note       Referral:  Tammy Gold NP    Jocelyn Verma is a 80 y.o. right handed  female referred for inpatient neuropsych consult with ? Of dementia versus encephalopathy. I spoke with staff on floor, reviewed the records in chart, and saw the patient. She has a past medical history of AAA (abdominal aortic aneurysm) ([address] Arthritis, Atrial fibrillation ([address] COPD, HTN (hypertension), Lung cancer ([address] RA (rheumatoid arthritis) ([address] Smoker, and TIA (transient ischemic attack). who has history of Lung CA with mets not chemo candidate seeing Dr Janine Ventura. Also has baseline dementia. Had a pacemaker placed c/o Dr Ade Yeager ([facility] sure scan MR conditional) on 7/22/19 for bradycardia. Previously on Eliquis but was stopped in July 2019 due to R gluteal hematoma. She had acute confusion and was admitted. MRI Brain was unremarkable. She has returned to her baseline (presumably) and is significantly less confused today. Impressions are below.       Past Medical History:   Diagnosis Date    AAA (abdominal aortic aneurysm) (HCC)     Arthritis     ra, osteoporosis, osteoarthritis    Atrial fibrillation (HCC)     COPD     HTN (hypertension)     Lung cancer (HCC)     RA (rheumatoid arthritis) ([address]     Smoker     TIA (transient ischemic attack)     TIA 9/30/16       Past Surgical History:   Procedure Laterality Date    HX CHOLECYSTECTOMY      HX HYSTERECTOMY      HX ORTHOPAEDIC      carpal tunnel, wrist surgery    NM INS NEW/RPLCMT PRM PM W/TRANSV ELTRD ATRIAL&VENT N/A 7/26/2019    INSERT PPM DUAL performed by Bam Claros MD at [address] [facility]       Allergies   Allergen Reactions    Codeine Nausea and Vomiting Family History   Problem Relation Age of Onset    Hypertension Father        Social History     Tobacco Use    Smoking status: Former Smoker     Years: 50.00     Last attempt to quit: 2018     Years since quittin.0    Smokeless tobacco: Never Used   Substance Use Topics    Alcohol use: No    Drug use: No       Current Facility-Administered Medications   Medication Dose Route Frequency Provider Last Rate Last Dose    dexAMETHasone (DECADRON) tablet 2 mg  2 mg Oral DAILY WITH LUNCH Jaylyn Knight MD   2 mg at 19 1220    albuterol-ipratropium (DUO-NEB) 2.5 MG-0.5 MG/3 ML  3 mL Nebulization Q6H PRN Jesus Manuel Fuentes MD   3 mL at 19 0420    cyanocobalamin (VITAMIN B12) injection 1,000 mcg  1,000 mcg IntraMUSCular DAILY Hortencia Lambert NP   1,000 mcg at 19 1019    acetaminophen (TYLENOL) tablet 1,000 mg  1,000 mg Oral Q8H Cindy Hawkins NP   1,000 mg at 19 0847    naloxone (NARCAN) injection 0.4 mg  0.4 mg IntraVENous EVERY 2 MINUTES AS NEEDED Cindy Hawkins NP        oxyCODONE IR (ROXICODONE) tablet 5 mg  5 mg Oral Q3H Lizbeth Neely MD   5 mg at 19 1220    ALPRAZolam (XANAX) tablet 0.25 mg  0.25 mg Oral BID Jaylyn Knight MD   0.25 mg at 19 0847    oxyCODONE ER (OxyCONTIN) tablet 10 mg  10 mg Oral DAILY Jaylyn Knight MD   10 mg at 19 210    polyethylene glycol (MIRALAX) packet 17 g  17 g Oral QHS Jaylyn Knight MD   17 g at 19 2104    senna-docusate (PERICOLACE) 8.6-50 mg per tablet 1 Tab  1 Tab Oral BID Jaylyn Knight MD   1 Tab at 19 0848    sodium chloride (NS) flush 5-40 mL  5-40 mL IntraVENous Q8H Lizbeth Neely MD   10 mL at 19 0504    sodium chloride (NS) flush 5-40 mL  5-40 mL IntraVENous PRN Cristóbal Ugarte MD   10 mL at 19 1945    aspirin chewable tablet 81 mg  81 mg Oral DAILY Lizbeth Neely MD   81 mg at 19 0847         On NMSE the patient present as pleasant, alert, and oriented to self and to place. Her gait was not assessed. She evidences a good sense of humor today, and has intact receptive and expressive language. She is anxious appearing and requested the door not be closed due to her claustrophobia. She has significant lower back pain and was seated in a chair when I saw her. She has marked memory problems (0.3 recall) and spelling is 3/5 correct. Three step 2/3 with cues. Clock drawing is impaired. Palm-fist sequencing impaired. Letter number sequencing is impaired. She admits to having a poor memory and it is getting worse, though she attributes much of this to age. She stopped driving one year ago due to cognitive concerns (apparently of her own volition). While anxious today, her performances on other screening today is normal.  She denied depression or other psychopathology. She denied SI/HI, AH/VH, and no evidence of delusional or paranoid thinking. Lives with daughter and grandchild who assist with medications, finances, and such, and a POA is in place. Dementia is likely here, compounded by anxiety and recent medical issues. This is masked by normal range language skills, and acute confusion appears to have resolved or is close to being fully resolved. OUtpatient neurology and neuropsych consults are advised to better clarify and to address her needs from a dementia standpoint. Psychiatric intervention to assist with anxiety also advised. Consider transfer to SNF for further rehab purposes as needed. Supervise medications, finances, and day-to-day household safety. I do not believe she is capable of making informed medical decisions, financial decisions, to vote, to , or to own a firearm. Psychiatric overlay here likely exacerbates underlying memory issues. Follow up outpatient. Thank you for the opportunity to see this pleasantly humorous patient    Moses Sofia.  LORNA Lind  Neuropsychology    [id number] x 1 NSE (inpatient)

## 2019-09-12 NOTE — PROGRESS NOTES
Ty [address]  [address], [address] [place] [address]  [phone]      Medical Progress Note      NAME: Kimberlyn Kat   :  1931  MRM:  [id number]    Date/Time: 2019  9:04 AM        Subjective:     Chief Complaint:  SLurred speech TIA    Arranging SNF    ROS:  (bold if positive, if negative)                        Tolerating Diet  Tolerating PT            Objective:       Vitals:          Last 24hrs VS reviewed since prior progress note.  Most recent are:    Visit Vitals  /76 (BP 1 Location: Right arm, BP Patient Position: At rest)   Pulse 60   Temp 97.9 °F (36.6 °C)   Resp 16   Ht 5' 4\" (1.626 m)   Wt 60.8 kg (134 lb)   SpO2 98%   BMI 23.00 kg/m²     SpO2 Readings from Last 6 Encounters:   19 98%   19 93%   19 95%   19 96%   19 93%   19 97%    O2 Flow Rate (L/min): 2 l/min       Intake/Output Summary (Last 24 hours) at 2019 0837  Last data filed at 2019 2238  Gross per 24 hour   Intake    Output 450 ml   Net -450 ml          Exam:     Physical Exam:    Gen:  Well-developed, frail, elderly, chronically ill-appearing, in  acute pain  HEENT:  Pink conjunctivae, PERRL, hearing intact to voice, moist mucous membranes  Neck:  Supple, without masses, thyroid non-tender  Resp:  No accessory muscle use, clear breath sounds without wheezes rales or rhonchi  Card:  No murmurs, normal S1, S2 without thrills, bruits or peripheral edema  Abd:  Soft, non-tender, non-distended, normoactive bowel sounds are present, no palpable organomegaly and no detectable hernias  Lymph:  No cervical or inguinal adenopathy  Musc:  No cyanosis or clubbing  Skin:  No rashes or ulcers, skin turgor is good  Neuro:  Cranial nerves are grossly intact, no focal motor weakness, follows commands appropriately  Psych:  Fair insight, oriented to person, place but not time, alert       Telemetry reviewed:   normal sinus rhythm    Medications Reviewed: (see below)    Lab Data Reviewed: (see below)    ______________________________________________________________________    Medications:     Current Facility-Administered Medications   Medication Dose Route Frequency    albuterol-ipratropium (DUO-NEB) 2.5 MG-0.5 MG/3 ML  3 mL Nebulization Q6H PRN    cyanocobalamin (VITAMIN B12) injection 1,000 mcg  1,000 mcg IntraMUSCular DAILY    acetaminophen (TYLENOL) tablet 1,000 mg  1,000 mg Oral Q8H    naloxone (NARCAN) injection 0.4 mg  0.4 mg IntraVENous EVERY 2 MINUTES AS NEEDED    oxyCODONE IR (ROXICODONE) tablet 5 mg  5 mg Oral Q3H    ALPRAZolam (XANAX) tablet 0.25 mg  0.25 mg Oral BID    dexAMETHasone (DECADRON) tablet 2 mg  2 mg Oral DAILY    oxyCODONE ER (OxyCONTIN) tablet 10 mg  10 mg Oral DAILY    polyethylene glycol (MIRALAX) packet 17 g  17 g Oral QHS    senna-docusate (PERICOLACE) 8.6-50 mg per tablet 1 Tab  1 Tab Oral BID    sodium chloride (NS) flush 5-40 mL  5-40 mL IntraVENous Q8H    sodium chloride (NS) flush 5-40 mL  5-40 mL IntraVENous PRN    aspirin chewable tablet 81 mg  81 mg Oral DAILY            Lab Review:     Recent Labs     09/11/19 0221   WBC 8.1   HGB 9.9*   HCT 30.0*        Recent Labs     09/11/19 0221      K 3.7      CO2 29   GLU 99   BUN 13   CREA 0.52*   CA 8.5   MG 1.7   PHOS 2.8     Lab Results   Component Value Date/Time    Glucose (POC) 124 (H) 09/07/2019 07:17 PM    Glucose (POC) 103 (H) 07/25/2019 07:55 AM    Glucose (POC) 93 10/01/2016 11:33 AM    Glucose (POC) 91 10/01/2016 07:33 AM     No results for input(s): PH, PCO2, PO2, HCO3, FIO2 in the last 72 hours. No results for input(s): INR in the last 72 hours.     No lab exists for component: Martín Slipper  Lab Results   Component Value Date/Time    Specimen Description: URINE 09/13/2013 10:35 PM     Lab Results   Component Value Date/Time    Culture result: NO GROWTH 5 DAYS 07/31/2019 09:13 AM    Culture result: (A) 07/29/2019 10:46 AM     STAPHYLOCOCCUS SPECIES, COAGULASE NEGATIVE GROWING IN 1 OF 4 BOTTLES DRAWN (R ARM SITE)    Culture result: (A) 07/29/2019 10:46 AM     PRELIMINARY REPORT OF GRAM POSITIVE COCCI IN CLUSTERS GROWING IN 1 OF 4 BOTTLES DRAWN CALLED TO AND READ BACK BY MS Yaneth Bianchi RN ON 7/30/19 AT 1830 ([facility]). MS    Culture result: REMAINING BOTTLE(S) HAS/HAVE NO GROWTH IN 5 DAYS 07/29/2019 10:46 AM            Assessment:     Principal Problem:    TIA (transient ischemic attack) (9/30/2016)    Active Problems:    Paroxysmal A-fib ([address] (9/30/2016)      RA (rheumatoid arthritis) ([address] (9/30/2016)      COPD (chronic obstructive pulmonary disease) ([address] (9/30/2016)      Hypertension (8/25/2018)      Malignant neoplasm of upper lobe of left lung ([address] (11/27/2018)      Leukocytosis (7/20/2019)      Chronic pain syndrome ()      DNR (do not resuscitate) ()           Plan:     Principal Problem:    TIA (transient ischemic attack) (9/30/2016)   MRI unremarkable  - PT/OT evaluation appreciated, agree she needs 24 hour care, family apparently not providing that  - CM aware, will need 24 hour care at discharge, will work with family    Active Problems:    Paroxysmal A-fib ([address] (9/30/2016)  - no longer on chronic anticoagulation and agree she is a poor candidate      RA (rheumatoid arthritis) ([address] (9/30/2016)  - on chronic steroids and pain meds      COPD (chronic obstructive pulmonary disease) ([address] (9/30/2016)  - continue respiratory meds  - on chronic steroids      Hypertension (8/25/2018)  - monitor      Malignant neoplasm of upper lobe of left lung ([address] (11/27/2018)  - not a candidate for additional treatment due to poor performance status      Chronic pain syndrome ()  - continue home pain meds      Acute encephalopathy (9/7/2019)  resolved possibly due to TIA/CVA, have found no other reversible cause and she has improved without any specific intervention.  more likely this is due to delirium superimposed on dementia  - Palliative Care input appreciated, to speak with daughter      Total time spent in patient care: 25 minutes                  Care Plan discussed with: Patient, Family and Nursing Staff    Discussed:  Code Status, Care Plan and D/C Planning    DVT Prophylaxis:  SCD's     DNR    Disposition:  TBD           ___________________________________________________    Attending Physician: Ruby Finley MD '

## 2019-09-12 NOTE — PROGRESS NOTES
TRANSFER - OUT REPORT:    Verbal report given RN on Memo Lee  being transferred to [facility] 4th floor or routine progression of care       Report consisted of patients Situation, Background, Assessment and   Recommendations(SBAR). Information from the following report(s) SBAR and Kardex was reviewed with the receiving nurse. Lines:   Peripheral IV 09/07/19 Left Antecubital (Active)   Site Assessment Clean, dry, & intact 9/11/2019  7:41 AM   Phlebitis Assessment 0 9/11/2019  7:41 AM   Infiltration Assessment 0 9/11/2019  7:41 AM   Dressing Status Clean, dry, & intact 9/11/2019  7:41 AM   Dressing Type Transparent;Tape 9/11/2019  7:41 AM   Hub Color/Line Status Flushed;End cap changed 9/11/2019  7:41 AM   Action Taken Open ports on tubing capped 9/11/2019  7:41 AM   Alcohol Cap Used Yes 9/11/2019  7:41 AM        Opportunity for questions and clarification was provided.       Patient transported with:   Monitor  Registered Nurse  Tech

## 2019-09-12 NOTE — PROGRESS NOTES
Problem: Mobility Impaired (Adult and Pediatric)  Goal: *Acute Goals and Plan of Care (Insert Text)  Description  FUNCTIONAL STATUS PRIOR TO ADMISSION: Patient was modified independent using a Rolling walker for functional mobility. HOME SUPPORT PRIOR TO ADMISSION: The patient lived with family members on the first floor of a two story home. Physical Therapy Goals  Initiated 9/9/2019  1. Patient will move from supine to sit and sit to supine  in bed with supervision/set-up within 7 day(s). 2.  Patient will transfer from bed to chair and chair to bed with supervision/set-up using the least restrictive device within 7 day(s). 3.  Patient will perform sit to stand with supervision/set-up within 7 day(s). 4.  Patient will ambulate with supervision/set-up for 15 feet with the least restrictive device within 7 day(s). 5.  Patient will ascend/descend 2 stairs with 1 handrail(s) with moderate assistance  within 7 day(s). Outcome: Progressing Towards Goal  Note:   PHYSICAL THERAPY TREATMENT  Patient: Wendy Herrera (21 y.o. female)  Date: 9/12/2019  Diagnosis: Encephalopathy acute [G93.40]  Acute encephalopathy [G93.40] TIA (transient ischemic attack)       Precautions: Fall, Skin  Chart, physical therapy assessment, plan of care and goals were reviewed. ASSESSMENT  Based on the objective data described below, alert and confused. Max encouragement to participate with physical therapy. SBA to come to sitting EOB. Mod A x2 for sit<>stand increased anxiety with attempted gait training with HHA x 2, demonstrating forceful posterior lean, chair brought to patient. Pt set up for lunch meal, sitter at bedside, 94% O2 sat stable on Room Air. RN notified.     Current Level of Function Impacting Discharge (mobility/balance): decreased balance, increased need for assistance with two people for safety    Other factors to consider for discharge: cognition         PLAN :  Patient continues to benefit from skilled intervention to address the above impairments. Continue treatment per established plan of care. to address goals. Recommendation for discharge: (in order for the patient to meet his/her long term goals)  Therapy up to 5 days/week in SNF setting    This discharge recommendation:  Has been made in collaboration with the attending provider and/or case management    Equipment recommendations for successful discharge (if) home: to be determined by rehab facility       SUBJECTIVE:   Patient stated Lady Davidson don't want to do anything.     OBJECTIVE DATA SUMMARY:   Critical Behavior:  Neurologic State: Confused  Orientation Level: Oriented to person, Disoriented to time, Disoriented to situation, Disoriented to place  Cognition: Impaired decision making, Poor safety awareness  Safety/Judgement: Lack of insight into deficits  Functional Mobility Training:  Bed Mobility:     Supine to Sit: Stand-by assistance              Transfers:  Sit to Stand: Moderate assistance;Assist x2; Additional time  Stand to Sit: Moderate assistance;Assist x2; Additional time        Bed to Chair: Moderate assistance;Assist x2                    Balance:  Sitting: Intact  Standing: Impaired; With support  Standing - Static: Poor  Standing - Dynamic : Poor  Ambulation/Gait Training:  Distance (ft): 3 Feet (ft)     Ambulation - Level of Assistance: Moderate assistance;Maximum assistance;Assist x2; Additional time        Gait Abnormalities: Decreased step clearance; Step to gait        Base of Support: Narrowed     Speed/Ev: Slow;Shuffled;Delayed                       Stairs: Therapeutic Exercises:     Pain Rating:  Denies pain    Activity Tolerance:   Fair and SpO2 stable on RA  Please refer to the flowsheet for vital signs taken during this treatment.     After treatment patient left in no apparent distress:   Sitting in chair, Call bell within reach and Caregiver / family present    COMMUNICATION/COLLABORATION:   The patients plan of care was discussed with: Registered Nurse    Robert Abt   Time Calculation: 28 mins

## 2019-09-13 PROCEDURE — 74011250636 HC RX REV CODE- 250/636: Performed by: NURSE PRACTITIONER

## 2019-09-13 PROCEDURE — 97116 GAIT TRAINING THERAPY: CPT

## 2019-09-13 PROCEDURE — 74011250636 HC RX REV CODE- 250/636: Performed by: INTERNAL MEDICINE

## 2019-09-13 PROCEDURE — 97530 THERAPEUTIC ACTIVITIES: CPT

## 2019-09-13 PROCEDURE — 74011250637 HC RX REV CODE- 250/637: Performed by: INTERNAL MEDICINE

## 2019-09-13 PROCEDURE — 65660000000 HC RM CCU STEPDOWN

## 2019-09-13 PROCEDURE — 74011250637 HC RX REV CODE- 250/637: Performed by: NURSE PRACTITIONER

## 2019-09-13 RX ORDER — GABAPENTIN 300 MG/1
300 CAPSULE ORAL 3 TIMES DAILY
Status: DISCONTINUED | OUTPATIENT
Start: 2019-09-13 | End: 2019-09-14

## 2019-09-13 RX ADMIN — ALPRAZOLAM 0.25 MG: 0.25 TABLET ORAL at 08:05

## 2019-09-13 RX ADMIN — GABAPENTIN 300 MG: 300 CAPSULE ORAL at 16:10

## 2019-09-13 RX ADMIN — ASPIRIN 81 MG 81 MG: 81 TABLET ORAL at 08:05

## 2019-09-13 RX ADMIN — OXYCODONE HYDROCHLORIDE 5 MG: 5 TABLET ORAL at 16:53

## 2019-09-13 RX ADMIN — ACETAMINOPHEN 1000 MG: 500 TABLET ORAL at 16:10

## 2019-09-13 RX ADMIN — Medication 10 ML: at 15:12

## 2019-09-13 RX ADMIN — GABAPENTIN 300 MG: 300 CAPSULE ORAL at 21:48

## 2019-09-13 RX ADMIN — Medication 10 ML: at 21:49

## 2019-09-13 RX ADMIN — OXYCODONE HYDROCHLORIDE 10 MG: 10 TABLET, FILM COATED, EXTENDED RELEASE ORAL at 21:48

## 2019-09-13 RX ADMIN — OXYCODONE HYDROCHLORIDE 5 MG: 5 TABLET ORAL at 04:49

## 2019-09-13 RX ADMIN — OXYCODONE HYDROCHLORIDE 5 MG: 5 TABLET ORAL at 08:05

## 2019-09-13 RX ADMIN — SENNOSIDES,DOCUSATE SODIUM 1 TABLET: 8.6; 5 TABLET, FILM COATED ORAL at 08:05

## 2019-09-13 RX ADMIN — CYANOCOBALAMIN 1000 MCG: 1000 INJECTION, SOLUTION INTRAMUSCULAR; SUBCUTANEOUS at 09:59

## 2019-09-13 RX ADMIN — ACETAMINOPHEN 1000 MG: 500 TABLET ORAL at 08:05

## 2019-09-13 RX ADMIN — OXYCODONE HYDROCHLORIDE 5 MG: 5 TABLET ORAL at 15:12

## 2019-09-13 RX ADMIN — BENZOCAINE AND MENTHOL 1 LOZENGE: 15; 3.6 LOZENGE ORAL at 16:10

## 2019-09-13 RX ADMIN — OXYCODONE HYDROCHLORIDE 5 MG: 5 TABLET ORAL at 01:10

## 2019-09-13 RX ADMIN — DEXAMETHASONE 2 MG: 4 TABLET ORAL at 12:35

## 2019-09-13 RX ADMIN — OXYCODONE HYDROCHLORIDE 5 MG: 5 TABLET ORAL at 20:13

## 2019-09-13 RX ADMIN — POLYETHYLENE GLYCOL 3350 17 G: 17 POWDER, FOR SOLUTION ORAL at 21:48

## 2019-09-13 RX ADMIN — ALPRAZOLAM 0.25 MG: 0.25 TABLET ORAL at 18:17

## 2019-09-13 RX ADMIN — Medication 10 ML: at 05:32

## 2019-09-13 RX ADMIN — ACETAMINOPHEN 1000 MG: 500 TABLET ORAL at 01:09

## 2019-09-13 RX ADMIN — OXYCODONE HYDROCHLORIDE 5 MG: 5 TABLET ORAL at 12:35

## 2019-09-13 NOTE — PALLIATIVE CARE DISCHARGE
Goals of Care/Treatment Preferences    The Palliative Medicine team was consulted as part of your/your loved one's care in the hospital. Our team is a supportive service; we strive to relieve suffering and improve quality of life. We reviewed advance care planning information, which includes the following:  Patient's [place] is[de-identified] Legal Next of Kin  Confirm Advance Directive: None  Patient Would Like to Complete Advance Directive: Unable  Does the patient have other document types: Do Not Resuscitate    We reviewed / discussed your code status as: DNR     Full Code means perform CPR in the event of cardiac arrest.      DNR means do NOT perform CPR in the event of cardiac arrest.      Partial Code means you have specific preferences, please discuss with your healthcare team.      Mary Perez means this issue was not addressed / resolved during your stay    Medical Interventions: Limited additional interventions     Other Instructions: You have a Durable Do Not Resuscitate Order in place, which should travel with you. When you are in a facility, this form should be placed on your chart. Once you are home, it is recommended that the [facility] form be placed in a visible location such as on the refrigerator or bedroom door. Because of the importance of this information, we are providing you with a printed copy to share with other healthcare providers after this hospitalization is complete.

## 2019-09-13 NOTE — PROGRESS NOTES
Problem: Mobility Impaired (Adult and Pediatric)  Goal: *Acute Goals and Plan of Care (Insert Text)  Description  FUNCTIONAL STATUS PRIOR TO ADMISSION: Patient was modified independent using a Rolling walker for functional mobility. HOME SUPPORT PRIOR TO ADMISSION: The patient lived with family members on the first floor of a two story home. Physical Therapy Goals  Initiated 9/9/2019  1. Patient will move from supine to sit and sit to supine  in bed with supervision/set-up within 7 day(s). 2.  Patient will transfer from bed to chair and chair to bed with supervision/set-up using the least restrictive device within 7 day(s). 3.  Patient will perform sit to stand with supervision/set-up within 7 day(s). 4.  Patient will ambulate with supervision/set-up for 15 feet with the least restrictive device within 7 day(s). 5.  Patient will ascend/descend 2 stairs with 1 handrail(s) with moderate assistance  within 7 day(s). Outcome: Progressing Towards Goal  Note:   PHYSICAL THERAPY TREATMENT  Patient: Bharath Mohan (67 y.o. female)  Date: 9/13/2019  Diagnosis: Encephalopathy acute [G93.40]  Acute encephalopathy [G93.40] TIA (transient ischemic attack)       Precautions: Fall, Skin  Chart, physical therapy assessment, plan of care and goals were reviewed. ASSESSMENT  Based on the objective data described below, pt received on BSC, large BM required min A for hygiene. Mod A x1 with increased time for sit>stand using RW. Gait training x 10' using RW with min/mod A x1 pt declined to sit in chair requesting to return to bed despite attempts to redirect. Pt intermittently tearful during session, stating she lost her son in November and her daughter is all she has. Pt anxious, requesting not to be left alone, Sitter at bedside    Current Level of Function Impacting Discharge (mobility/balance):  Mod A for mobility    Other factors to consider for discharge: confusion, anxiety         PLAN :  Patient continues to benefit from skilled intervention to address the above impairments. Continue treatment per established plan of care. to address goals. Recommendation for discharge: (in order for the patient to meet his/her long term goals)  Therapy up to 5 days/week in SNF setting    This discharge recommendation:  Has been made in collaboration with the attending provider and/or case management    Equipment recommendations for successful discharge (if) home: to be determined by rehab facility       SUBJECTIVE:   Patient stated don't leave me.     OBJECTIVE DATA SUMMARY:   Critical Behavior:  Neurologic State: Alert  Orientation Level: Oriented to person  Cognition: Decreased attention/concentration  Safety/Judgement: Lack of insight into deficits  Functional Mobility Training:  Bed Mobility:        Sit to Supine: Stand-by assistance  Scooting: Stand-by assistance        Transfers:  Sit to Stand: Moderate assistance  Stand to Sit: Minimum assistance                             Balance:  Sitting: Intact  Standing: With support  Standing - Static: Fair  Standing - Dynamic : Fair  Ambulation/Gait Training:  Distance (ft): 10 Feet (ft)     Ambulation - Level of Assistance: Minimal assistance;Assist x1;Additional time        Gait Abnormalities: Decreased step clearance; Step to gait; Path deviations        Base of Support: Narrowed     Speed/Ev: Slow;Shuffled                       Stairs: Therapeutic Exercises:     Pain Rating:      Activity Tolerance:   Fair  Please refer to the flowsheet for vital signs taken during this treatment.     After treatment patient left in no apparent distress:   Supine in bed, Call bell within reach, Bed / chair alarm activated, Caregiver / family present and Side rails x 3    COMMUNICATION/COLLABORATION:   The patients plan of care was discussed with: Registered Nurse    Forest Oppenheim   Time Calculation: 34 mins

## 2019-09-13 NOTE — PROGRESS NOTES
Occupational Therapy Note:  Chart reviewed. Attempted to see pt this pm for OT. Pt perseverating on not wanting to be alone tonight and crying. Requesting assist with calling her daughter. She declined participation stating she was too upset. This OT assisted pt in calling her daughter. Will follow up on later date with OT tx.   Rajinder Chen, OTR/L, CBIS

## 2019-09-13 NOTE — PROGRESS NOTES
Problem: Falls - Risk of  Goal: *Absence of Falls  Description  Document Gary Tirdao Fall Risk and appropriate interventions in the flowsheet.   Outcome: Progressing Towards Goal  Note:   Fall Risk Interventions:  Mobility Interventions: Bed/chair exit alarm, Communicate number of staff needed for ambulation/transfer, Patient to call before getting OOB, PT Consult for mobility concerns, PT Consult for assist device competence, Strengthening exercises (ROM-active/passive), Utilize walker, cane, or other assistive device, Utilize gait belt for transfers/ambulation    Mentation Interventions: Adequate sleep, hydration, pain control, Bed/chair exit alarm, Door open when patient unattended, Evaluate medications/consider consulting pharmacy, Eyeglasses and hearing aids, Family/sitter at bedside, Gait belt with transfers/ambulation, Increase mobility, Reorient patient, More frequent rounding, Room close to nurse's station, Toileting rounds, Update white board    Medication Interventions: Bed/chair exit alarm, Evaluate medications/consider consulting pharmacy, Patient to call before getting OOB, Teach patient to arise slowly, Utilize gait belt for transfers/ambulation, Assess postural VS orthostatic hypotension    Elimination Interventions: Bed/chair exit alarm, Call light in reach, Elevated toilet seat, Patient to call for help with toileting needs, Stay With Me (per policy), Toilet paper/wipes in reach, Toileting schedule/hourly rounds    History of Falls Interventions: Bed/chair exit alarm, Consult care management for discharge planning, Door open when patient unattended, Evaluate medications/consider consulting pharmacy, Investigate reason for fall, Room close to nurse's station, Utilize gait belt for transfer/ambulation, Assess for delayed presentation/identification of injury for 48 hrs (comment for end date)         Problem: Patient Education: Go to Patient Education Activity  Goal: Patient/Family Education  Outcome: Progressing Towards Goal     Problem: Pressure Injury - Risk of  Goal: *Prevention of pressure injury  Description  Document Rom Scale and appropriate interventions in the flowsheet.   Outcome: Progressing Towards Goal  Note:   Pressure Injury Interventions:  Sensory Interventions: Assess changes in LOC, Float heels, Keep linens dry and wrinkle-free, Minimize linen layers, Pressure redistribution bed/mattress (bed type)    Moisture Interventions: Limit adult briefs, Maintain skin hydration (lotion/cream), Check for incontinence Q2 hours and as needed, Absorbent underpads    Activity Interventions: Chair cushion, Increase time out of bed, Pressure redistribution bed/mattress(bed type), PT/OT evaluation    Mobility Interventions: HOB 30 degrees or less, Pressure redistribution bed/mattress (bed type), PT/OT evaluation    Nutrition Interventions: Document food/fluid/supplement intake    Friction and Shear Interventions: Lift sheet, Lift team/patient mobility team                Problem: Patient Education: Go to Patient Education Activity  Goal: Patient/Family Education  Outcome: Progressing Towards Goal

## 2019-09-13 NOTE — ACP (ADVANCE CARE PLANNING)
Advance Care Planning 9/13/2019   Patient's Healthcare Decision Maker is: Legal Next of Devin 69   Primary Decision Maker Name Kayli Samayoa, dtr, [phone]   Confirm Advance Directive None   Patient Would Like to Complete Advance Directive Unable   Does the patient have other document types Durable Do Not Resuscitate     Pt does not have AMD in place, dtlora Munoz is legal NOK/surrogate decision maker. DDNR form was completed with outpt Palliative Medicine physician; copy has been scanned into medical record.

## 2019-09-13 NOTE — PROGRESS NOTES
CM met with pt's daughter Mónica Madden earlier in the day to confirm d/c plan. If pt is discharged over the weekend, nursing  please fax pt's AVS and d/c paperwork to [facility] at [phone] and call report to [phone]. Transition of Care Plan:  1. [facility] on 9/15 if sitter free. **Progress notes will need to be sent to [facility] on 9/15 documenting pt's behaviors being sitter free and their nurse will need to confirm acceptance. 2.  Pt's daughter will likely transport but is agreeable to w/c [facility] transport if needed.     Matilde Aleman, AIRAMW

## 2019-09-13 NOTE — PROGRESS NOTES
Ty Echeverria lisa [address]  [address] [facility], [employer], [address] [place] [address]  [phone]      Medical Progress Note      NAME: Memo Lee   :  1931  MRM:  [id number]    Date/Time: 2019  9:04 AM        Subjective:     Chief Complaint:  Slurred speech TIA    Arranging SNF  [address] and Rehab on 9/15 if sitter free. ROS:  (bold if positive, if negative)                        Tolerating Diet  Tolerating PT            Objective:       Vitals:          Last 24hrs VS reviewed since prior progress note.  Most recent are:    Visit Vitals  /82 (BP 1 Location: Right arm, BP Patient Position: At rest)   Pulse 70   Temp 97.3 °F (36.3 °C)   Resp 18   Ht 5' 4\" (1.626 m)   Wt 60.8 kg (134 lb)   SpO2 93%   BMI 23.00 kg/m²     SpO2 Readings from Last 6 Encounters:   19 93%   19 93%   19 95%   19 96%   19 93%   19 97%    O2 Flow Rate (L/min): 2 l/min       Intake/Output Summary (Last 24 hours) at 2019 0816  Last data filed at 2019 0735  Gross per 24 hour   Intake    Output 1000 ml   Net -1000 ml          Exam:     Physical Exam:    Gen:  Well-developed, frail, elderly, chronically ill-appearing, in  acute pain  HEENT:  Pink conjunctivae, PERRL, hearing intact to voice, moist mucous membranes  Neck:  Supple, without masses, thyroid non-tender  Resp:  No accessory muscle use, clear breath sounds without wheezes rales or rhonchi  Card:  No murmurs, normal S1, S2 without thrills, bruits or peripheral edema  Abd:  Soft, non-tender, non-distended, normoactive bowel sounds are present, no palpable organomegaly and no detectable hernias  Lymph:  No cervical or inguinal adenopathy  Musc:  No cyanosis or clubbing  Skin:  No rashes or ulcers, skin turgor is good  Neuro:  Cranial nerves are grossly intact, no focal motor weakness, follows commands appropriately  Psych:  Anxious, Fair insight, oriented to person, place but not time, alert       Telemetry reviewed:   normal sinus rhythm    Medications Reviewed: (see below)    Lab Data Reviewed: (see below)    ______________________________________________________________________    Medications:     Current Facility-Administered Medications   Medication Dose Route Frequency    dexAMETHasone (DECADRON) tablet 2 mg  2 mg Oral DAILY WITH LUNCH    albuterol-ipratropium (DUO-NEB) 2.5 MG-0.5 MG/3 ML  3 mL Nebulization Q6H PRN    cyanocobalamin (VITAMIN B12) injection 1,000 mcg  1,000 mcg IntraMUSCular DAILY    acetaminophen (TYLENOL) tablet 1,000 mg  1,000 mg Oral Q8H    naloxone (NARCAN) injection 0.4 mg  0.4 mg IntraVENous EVERY 2 MINUTES AS NEEDED    oxyCODONE IR (ROXICODONE) tablet 5 mg  5 mg Oral Q3H    ALPRAZolam (XANAX) tablet 0.25 mg  0.25 mg Oral BID    oxyCODONE ER (OxyCONTIN) tablet 10 mg  10 mg Oral DAILY    polyethylene glycol (MIRALAX) packet 17 g  17 g Oral QHS    senna-docusate (PERICOLACE) 8.6-50 mg per tablet 1 Tab  1 Tab Oral BID    sodium chloride (NS) flush 5-40 mL  5-40 mL IntraVENous Q8H    sodium chloride (NS) flush 5-40 mL  5-40 mL IntraVENous PRN    aspirin chewable tablet 81 mg  81 mg Oral DAILY            Lab Review:     Recent Labs     09/11/19 0221   WBC 8.1   HGB 9.9*   HCT 30.0*        Recent Labs     09/11/19 0221      K 3.7      CO2 29   GLU 99   BUN 13   CREA 0.52*   CA 8.5   MG 1.7   PHOS 2.8     Lab Results   Component Value Date/Time    Glucose (POC) 124 (H) 09/07/2019 07:17 PM    Glucose (POC) 103 (H) 07/25/2019 07:55 AM    Glucose (POC) 93 10/01/2016 11:33 AM    Glucose (POC) 91 10/01/2016 07:33 AM     No results for input(s): PH, PCO2, PO2, HCO3, FIO2 in the last 72 hours. No results for input(s): INR in the last 72 hours.     No lab exists for component: Tess Ott  Lab Results   Component Value Date/Time    Specimen Description: URINE 09/13/2013 10:35 PM     Lab Results   Component Value Date/Time    Culture result: NO GROWTH 5 DAYS 07/31/2019 09:13 AM    Culture result: (A) 07/29/2019 10:46 AM     STAPHYLOCOCCUS SPECIES, COAGULASE NEGATIVE GROWING IN 1 OF 4 BOTTLES DRAWN (R ARM SITE)    Culture result: (A) 07/29/2019 10:46 AM     PRELIMINARY REPORT OF GRAM POSITIVE COCCI IN CLUSTERS GROWING IN 1 OF 4 BOTTLES DRAWN CALLED TO AND READ BACK BY MS Jimmie Mcintyre RN ON 7/30/19 AT 1830 ([address]). MS    Culture result: REMAINING BOTTLE(S) HAS/HAVE NO GROWTH IN 5 DAYS 07/29/2019 10:46 AM            Assessment:     Principal Problem:    TIA (transient ischemic attack) (9/30/2016)    Active Problems:    Paroxysmal A-fib ([address] (9/30/2016)      RA (rheumatoid arthritis) ([address] (9/30/2016)      COPD (chronic obstructive pulmonary disease) ([address] (9/30/2016)      Hypertension (8/25/2018)      Malignant neoplasm of upper lobe of left lung ([address] (11/27/2018)      Leukocytosis (7/20/2019)      Chronic pain syndrome ()      DNR (do not resuscitate) ()           Plan:     Principal Problem:    TIA (transient ischemic attack) (9/30/2016)   MRI unremarkable  - PT/OT evaluation appreciated, agree she needs 24 hour care, family apparently not providing that  - CM aware, will need 24 hour care at discharge, will work with family    Active Problems:    Paroxysmal A-fib ([address] (9/30/2016)  - no longer on chronic anticoagulation and agree she is a poor candidate      RA (rheumatoid arthritis) ([address] (9/30/2016)  - on chronic steroids and pain meds      COPD (chronic obstructive pulmonary disease) ([address] (9/30/2016)  - continue respiratory meds  - on chronic steroids      Hypertension (8/25/2018)  - monitor      Malignant neoplasm of upper lobe of left lung ([address] (11/27/2018)  - not a candidate for additional treatment due to poor performance status      Chronic pain syndrome ()  - continue home pain meds      Acute encephalopathy (9/7/2019)  resolved possibly due to TIA/CVA, have found no other reversible cause and she has improved without any specific intervention.  more likely this is due to delirium superimposed on dementia  - Palliative Care input appreciated, to speak with daughter      Total time spent in patient care: 25 minutes                  Care Plan discussed with: Patient, Family and Nursing Staff    Discussed:  Code Status, Care Plan and D/C Planning    DVT Prophylaxis:  SCD's     DNR    Disposition:  [facility] on 9/15 if sitter free.            ___________________________________________________    Attending Physician: Sagrario Rosas MD '

## 2019-09-14 PROCEDURE — 77030038269 HC DRN EXT URIN PURWCK BARD -A

## 2019-09-14 PROCEDURE — 74011250637 HC RX REV CODE- 250/637: Performed by: NURSE PRACTITIONER

## 2019-09-14 PROCEDURE — 74011250637 HC RX REV CODE- 250/637: Performed by: INTERNAL MEDICINE

## 2019-09-14 PROCEDURE — 74011250636 HC RX REV CODE- 250/636: Performed by: INTERNAL MEDICINE

## 2019-09-14 PROCEDURE — 65660000000 HC RM CCU STEPDOWN

## 2019-09-14 RX ORDER — GABAPENTIN 300 MG/1
300 CAPSULE ORAL 2 TIMES DAILY
Status: DISCONTINUED | OUTPATIENT
Start: 2019-09-14 | End: 2019-09-15 | Stop reason: HOSPADM

## 2019-09-14 RX ADMIN — Medication 10 ML: at 21:33

## 2019-09-14 RX ADMIN — OXYCODONE HYDROCHLORIDE 5 MG: 5 TABLET ORAL at 13:49

## 2019-09-14 RX ADMIN — Medication 10 ML: at 07:01

## 2019-09-14 RX ADMIN — ASPIRIN 81 MG 81 MG: 81 TABLET ORAL at 09:39

## 2019-09-14 RX ADMIN — GABAPENTIN 300 MG: 300 CAPSULE ORAL at 17:28

## 2019-09-14 RX ADMIN — ALPRAZOLAM 0.25 MG: 0.25 TABLET ORAL at 17:28

## 2019-09-14 RX ADMIN — OXYCODONE HYDROCHLORIDE 5 MG: 5 TABLET ORAL at 09:39

## 2019-09-14 RX ADMIN — ACETAMINOPHEN 1000 MG: 500 TABLET ORAL at 09:39

## 2019-09-14 RX ADMIN — OXYCODONE HYDROCHLORIDE 5 MG: 5 TABLET ORAL at 22:23

## 2019-09-14 RX ADMIN — OXYCODONE HYDROCHLORIDE 10 MG: 10 TABLET, FILM COATED, EXTENDED RELEASE ORAL at 21:30

## 2019-09-14 RX ADMIN — ALPRAZOLAM 0.25 MG: 0.25 TABLET ORAL at 09:39

## 2019-09-14 RX ADMIN — DEXAMETHASONE 2 MG: 4 TABLET ORAL at 13:48

## 2019-09-14 RX ADMIN — ACETAMINOPHEN 1000 MG: 500 TABLET ORAL at 17:28

## 2019-09-14 RX ADMIN — SENNOSIDES,DOCUSATE SODIUM 1 TABLET: 8.6; 5 TABLET, FILM COATED ORAL at 17:28

## 2019-09-14 RX ADMIN — Medication 10 ML: at 13:51

## 2019-09-14 RX ADMIN — OXYCODONE HYDROCHLORIDE 5 MG: 5 TABLET ORAL at 17:28

## 2019-09-14 RX ADMIN — SENNOSIDES,DOCUSATE SODIUM 1 TABLET: 8.6; 5 TABLET, FILM COATED ORAL at 09:39

## 2019-09-14 RX ADMIN — GABAPENTIN 300 MG: 300 CAPSULE ORAL at 09:39

## 2019-09-14 NOTE — PROGRESS NOTES
Patient sleeping most of the shift especially after given gabapentin 300 mg PO and oxycodone at bed time. No negative behaviors noted, patient continues to sleep during this assessment. 6162 - Patient continues to sleep.

## 2019-09-14 NOTE — PROGRESS NOTES
Patient awake for breakfast this AM. PO meds given for pain control and  no negative behaviors noted. Patient continues to rest comfortably during this assessment.

## 2019-09-14 NOTE — PROGRESS NOTES
Ty Jacki lisa [place] [address]  380 [facility], [address] [place] [address]  [phone]      Medical Progress Note      NAME: Carlee Alfaro   :  1931  MRM:  [id number]    Date/Time: 2019  9:04 AM        Subjective:     Chief Complaint:  Slurred speech TIA    Arranging SNF  Weaning gabapentin  [facility] on  if sitter free. ROS:  (bold if positive, if negative)                        Tolerating Diet  Tolerating PT            Objective:       Vitals:          Last 24hrs VS reviewed since prior progress note.  Most recent are:    Visit Vitals  /72 (BP 1 Location: Right arm, BP Patient Position: At rest)   Pulse 60   Temp 97.9 °F (36.6 °C)   Resp 18   Ht 5' 4\" (1.626 m)   Wt 60.8 kg (134 lb)   SpO2 94%   BMI 23.00 kg/m²     SpO2 Readings from Last 6 Encounters:   19 94%   19 93%   19 95%   19 96%   19 93%   19 97%    O2 Flow Rate (L/min): 2 l/min       Intake/Output Summary (Last 24 hours) at 2019 0819  Last data filed at 2019 1326  Gross per 24 hour   Intake 240 ml   Output    Net 240 ml          Exam:     Physical Exam:    Gen:  Well-developed, frail, elderly, chronically ill-appearing, in  acute pain  HEENT:  Pink conjunctivae, PERRL, hearing intact to voice, moist mucous membranes  Neck:  Supple, without masses, thyroid non-tender  Resp:  No accessory muscle use, clear breath sounds without wheezes rales or rhonchi  Card:  No murmurs, normal S1, S2 without thrills, bruits or peripheral edema  Abd:  Soft, non-tender, non-distended, normoactive bowel sounds are present, no palpable organomegaly and no detectable hernias  Lymph:  No cervical or inguinal adenopathy  Musc:  No cyanosis or clubbing  Skin:  No rashes or ulcers, skin turgor is good  Neuro:  Cranial nerves are grossly intact, no focal motor weakness, follows commands appropriately  Psych:  Anxious, Fair insight, oriented to person, place but not time, alert       Telemetry reviewed:   normal sinus rhythm    Medications Reviewed: (see below)    Lab Data Reviewed: (see below)    ______________________________________________________________________    Medications:     Current Facility-Administered Medications   Medication Dose Route Frequency    benzocaine-menthol (CEPACOL) lozenge 1 Lozenge  1 Lozenge Mucous Membrane PRN    gabapentin (NEURONTIN) capsule 300 mg  300 mg Oral TID    dexAMETHasone (DECADRON) tablet 2 mg  2 mg Oral DAILY WITH LUNCH    albuterol-ipratropium (DUO-NEB) 2.5 MG-0.5 MG/3 ML  3 mL Nebulization Q6H PRN    acetaminophen (TYLENOL) tablet 1,000 mg  1,000 mg Oral Q8H    naloxone (NARCAN) injection 0.4 mg  0.4 mg IntraVENous EVERY 2 MINUTES AS NEEDED    oxyCODONE IR (ROXICODONE) tablet 5 mg  5 mg Oral Q3H    ALPRAZolam (XANAX) tablet 0.25 mg  0.25 mg Oral BID    oxyCODONE ER (OxyCONTIN) tablet 10 mg  10 mg Oral DAILY    polyethylene glycol (MIRALAX) packet 17 g  17 g Oral QHS    senna-docusate (PERICOLACE) 8.6-50 mg per tablet 1 Tab  1 Tab Oral BID    sodium chloride (NS) flush 5-40 mL  5-40 mL IntraVENous Q8H    sodium chloride (NS) flush 5-40 mL  5-40 mL IntraVENous PRN    aspirin chewable tablet 81 mg  81 mg Oral DAILY            Lab Review:     No results for input(s): WBC, HGB, HCT, PLT, HGBEXT, HCTEXT, PLTEXT, HGBEXT, HCTEXT, PLTEXT in the last 72 hours. No results for input(s): NA, K, CL, CO2, GLU, BUN, CREA, CA, MG, PHOS, ALB, TBIL, TBILI, SGOT, ALT, INR in the last 72 hours. No lab exists for component: INREXT, INREXT  Lab Results   Component Value Date/Time    Glucose (POC) 124 (H) 09/07/2019 07:17 PM    Glucose (POC) 103 (H) 07/25/2019 07:55 AM    Glucose (POC) 93 10/01/2016 11:33 AM    Glucose (POC) 91 10/01/2016 07:33 AM     No results for input(s): PH, PCO2, PO2, HCO3, FIO2 in the last 72 hours. No results for input(s): INR in the last 72 hours.     No lab exists for component: INREXT, INREXT  Lab Results Component Value Date/Time    Specimen Description: URINE 09/13/2013 10:35 PM     Lab Results   Component Value Date/Time    Culture result: NO GROWTH 5 DAYS 07/31/2019 09:13 AM    Culture result: (A) 07/29/2019 10:46 AM     STAPHYLOCOCCUS SPECIES, COAGULASE NEGATIVE GROWING IN 1 OF 4 BOTTLES DRAWN (R ARM SITE)    Culture result: (A) 07/29/2019 10:46 AM     PRELIMINARY REPORT OF GRAM POSITIVE COCCI IN CLUSTERS GROWING IN 1 OF 4 BOTTLES DRAWN CALLED TO AND READ BACK BY MS Pee Bruno RN ON 7/30/19 AT 1830 ([facility] 535).  MS    Culture result: REMAINING BOTTLE(S) HAS/HAVE NO GROWTH IN 5 DAYS 07/29/2019 10:46 AM            Assessment:     Principal Problem:    TIA (transient ischemic attack) (9/30/2016)    Active Problems:    Paroxysmal A-fib ([address] (9/30/2016)      RA (rheumatoid arthritis) ([address] (9/30/2016)      COPD (chronic obstructive pulmonary disease) ([address] (9/30/2016)      Hypertension (8/25/2018)      Malignant neoplasm of upper lobe of left lung ([address] (11/27/2018)      Leukocytosis (7/20/2019)      Chronic pain syndrome ()      DNR (do not resuscitate) ()           Plan:     Principal Problem:    TIA (transient ischemic attack) (9/30/2016)   MRI unremarkable  - PT/OT evaluation appreciated, agree she needs 24 hour care, family apparently not providing that  - CM aware, will need 24 hour care at discharge, will work with family    Active Problems:    Paroxysmal A-fib ([address] (9/30/2016)  - no longer on chronic anticoagulation and agree she is a poor candidate      RA (rheumatoid arthritis) ([address] (9/30/2016)  - on chronic steroids and pain meds  - wean gabapentin      COPD (chronic obstructive pulmonary disease) ([address] (9/30/2016)  - continue respiratory meds  - on chronic steroids      Hypertension (8/25/2018)  - monitor      Malignant neoplasm of upper lobe of left lung ([address] (11/27/2018)  - not a candidate for additional treatment due to poor performance status      Chronic pain syndrome ()  - continue home pain meds      Acute encephalopathy (9/7/2019)  resolved possibly due to TIA/CVA, have found no other reversible cause and she has improved without any specific intervention.  more likely this is due to delirium superimposed on dementia  - Palliative Care input appreciated, to speak with daughter      Total time spent in patient care: 25 minutes                  Care Plan discussed with: Patient, Family and Nursing Staff    Discussed:  Code Status, Care Plan and D/C Planning    DVT Prophylaxis:  SCD's     DNR    Disposition:  [facility] on 9/15 if sitter free.            ___________________________________________________    Attending Physician: Kayli Varner MD '

## 2019-09-15 VITALS
HEART RATE: 60 BPM | OXYGEN SATURATION: 92 % | DIASTOLIC BLOOD PRESSURE: 55 MMHG | RESPIRATION RATE: 14 BRPM | BODY MASS INDEX: 22.88 KG/M2 | TEMPERATURE: 97.6 F | WEIGHT: 134 LBS | SYSTOLIC BLOOD PRESSURE: 103 MMHG | HEIGHT: 64 IN

## 2019-09-15 PROBLEM — F41.9 ANXIETY: Status: ACTIVE | Noted: 2019-09-15

## 2019-09-15 PROBLEM — D72.829 LEUKOCYTOSIS: Status: RESOLVED | Noted: 2019-07-20 | Resolved: 2019-09-15

## 2019-09-15 PROCEDURE — 74011250637 HC RX REV CODE- 250/637: Performed by: INTERNAL MEDICINE

## 2019-09-15 PROCEDURE — 97116 GAIT TRAINING THERAPY: CPT

## 2019-09-15 PROCEDURE — 74011250636 HC RX REV CODE- 250/636: Performed by: INTERNAL MEDICINE

## 2019-09-15 PROCEDURE — 97530 THERAPEUTIC ACTIVITIES: CPT

## 2019-09-15 PROCEDURE — 74011250637 HC RX REV CODE- 250/637: Performed by: NURSE PRACTITIONER

## 2019-09-15 RX ORDER — GUAIFENESIN 100 MG/5ML
81 LIQUID (ML) ORAL DAILY
Qty: 30 TAB | Refills: 0 | Status: SHIPPED
Start: 2019-09-16 | End: 2019-10-16

## 2019-09-15 RX ORDER — OXYCODONE HCL 10 MG/1
10 TABLET, FILM COATED, EXTENDED RELEASE ORAL DAILY
Qty: 10 TAB | Refills: 0 | Status: SHIPPED | OUTPATIENT
Start: 2019-09-15 | End: 2019-09-25

## 2019-09-15 RX ORDER — ALPRAZOLAM 0.25 MG/1
0.25 TABLET ORAL 2 TIMES DAILY
Qty: 10 TAB | Refills: 0 | Status: SHIPPED | OUTPATIENT
Start: 2019-09-15 | End: 2019-09-20

## 2019-09-15 RX ORDER — GABAPENTIN 100 MG/1
CAPSULE ORAL
Qty: 60 CAP | Refills: 0 | Status: SHIPPED | OUTPATIENT
Start: 2019-09-15 | End: 2019-10-05

## 2019-09-15 RX ORDER — AMOXICILLIN 250 MG
1 CAPSULE ORAL 2 TIMES DAILY
Qty: 60 TAB | Refills: 0 | Status: SHIPPED
Start: 2019-09-15 | End: 2019-10-15

## 2019-09-15 RX ORDER — IPRATROPIUM BROMIDE AND ALBUTEROL SULFATE 2.5; .5 MG/3ML; MG/3ML
3 SOLUTION RESPIRATORY (INHALATION)
Qty: 30 NEBULE | Refills: 0 | Status: SHIPPED
Start: 2019-09-15 | End: 2020-02-13

## 2019-09-15 RX ORDER — GABAPENTIN 300 MG/1
300 CAPSULE ORAL 2 TIMES DAILY
Qty: 60 CAP | Refills: 0 | Status: SHIPPED | OUTPATIENT
Start: 2019-09-15 | End: 2019-09-15

## 2019-09-15 RX ORDER — OXYCODONE HYDROCHLORIDE 10 MG/1
5 TABLET ORAL
Qty: 8 TAB | Refills: 0 | Status: SHIPPED | OUTPATIENT
Start: 2019-09-15 | End: 2019-09-17

## 2019-09-15 RX ORDER — POLYETHYLENE GLYCOL 3350 17 G/17G
17 POWDER, FOR SOLUTION ORAL
Qty: 30 PACKET | Refills: 0 | Status: SHIPPED
Start: 2019-09-15 | End: 2019-10-15

## 2019-09-15 RX ADMIN — OXYCODONE HYDROCHLORIDE 5 MG: 5 TABLET ORAL at 08:36

## 2019-09-15 RX ADMIN — ASPIRIN 81 MG 81 MG: 81 TABLET ORAL at 08:36

## 2019-09-15 RX ADMIN — GABAPENTIN 300 MG: 300 CAPSULE ORAL at 08:36

## 2019-09-15 RX ADMIN — ACETAMINOPHEN 1000 MG: 500 TABLET ORAL at 08:36

## 2019-09-15 RX ADMIN — DEXAMETHASONE 2 MG: 4 TABLET ORAL at 12:14

## 2019-09-15 RX ADMIN — ACETAMINOPHEN 1000 MG: 500 TABLET ORAL at 02:00

## 2019-09-15 RX ADMIN — OXYCODONE HYDROCHLORIDE 5 MG: 5 TABLET ORAL at 14:53

## 2019-09-15 RX ADMIN — OXYCODONE HYDROCHLORIDE 5 MG: 5 TABLET ORAL at 06:29

## 2019-09-15 RX ADMIN — ALPRAZOLAM 0.25 MG: 0.25 TABLET ORAL at 08:36

## 2019-09-15 RX ADMIN — OXYCODONE HYDROCHLORIDE 5 MG: 5 TABLET ORAL at 02:00

## 2019-09-15 RX ADMIN — OXYCODONE HYDROCHLORIDE 5 MG: 5 TABLET ORAL at 12:14

## 2019-09-15 RX ADMIN — SENNOSIDES,DOCUSATE SODIUM 1 TABLET: 8.6; 5 TABLET, FILM COATED ORAL at 08:36

## 2019-09-15 NOTE — PROGRESS NOTES
9/15/2019  12:10 PM     CM verified patient has a qualifying hospital stay for [facility]. .      Additionally, CM will contact  via inbasket to alert to transfer.

## 2019-09-15 NOTE — PROGRESS NOTES
12:11 PM  Pt has been accepted by [facility] for admission today. Pt's daughter will transport pt to facility. CM verified patient has a qualifying hospital stay for [facility]. .Dinah Don    11:23 AM  CM spoke to MD and nursing regarding pt's dc status. MD would like pt to dc today to [facility]. CM called [facility] and faxed over documentation showing pt has been sitter free for 48 hours. Spoke to [employer], TAPP who said they would like additional notes, PT and progress note. Sent over PT note from 9/13 and progress note. Nursing called PT to ask if they can see pt this am for a more current visit. Gemini Lopez spoke with [facility] nurse to ask questions on pt's behavior. Melvi reported she will continue to review notes and will let us know if she can accept the pt today.  Dinah Don

## 2019-09-15 NOTE — DISCHARGE SUMMARY
Ty Echeverria [facility] [place] [facility]  [address] [facility], [address] [place] [address]  Tel: [phone]    Physician Discharge Summary    Patient ID:    Nola Chou  Age:              80 y.o.    : 1931  MRN:             [id number]     PCP: Linwood Tesfaye MD     Admit date: 2019    Discharge date: 9/15/2019    Principal admission Diagnosis:  Acute encephalopathy [G93.40]    Discharge Diagnoses:  Principal Problem:    TIA (transient ischemic attack) (2016)    Paroxysmal A-fib ([address] (2016)    Anxiety    RA (rheumatoid arthritis) ([facility] (2016)    COPD (chronic obstructive pulmonary disease) ([facility] (2016)    Hypertension (2018)    Malignant neoplasm of upper lobe of left lung ([facility] (2018)    Chronic pain syndrome     Physical debility     DNR (do not resuscitate)     Consults: Neurology, Palliative Care and psychology    Hospital Course:     Ms. Britni Quijano is a 80 y.o. admitted to [facility] and treated for the following:    TIA (transient ischemic attack) (2016): this was presumed but no suspected cause could be provided for the TIA. Seen by neurology and Psychology. MRI brain neg for any acute changes. Her encephalopathy has resolved and she has not needed a sitter for almost 2 days now. Continue Asprin. Being discharged to [place] for continued therapy. Out patient follow up with psychology and cardiology    Paroxysmal A-fib ([facility] (2016): rate has remained in sinus. No longer on chronic anticoagulation as she is a poor candidate. Continue Asprin. Follow up with cardiology    RA (rheumatoid arthritis) ([facility] (2016): stable. Continue Methotrexate, gabapentin taper and chronic oxycontin    COPD (chronic obstructive pulmonary disease) ([facility] (2016): currently stable. Not wheezing. Continue PRN Duoneb    Hypertension (2018): BP controlled.  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 She has previously completed radiation to chest and there is no concurrent chemotherapy, given her poor performance status. Her disease has responded to radiation based on serial imaging. Follow up with Dr Bola Johns for continued surveillance. She is not a good candidate for palliative chemotherapy, given her poor performance status    Chronic pain syndrome: continue current pain regimen    Acute encephalopathy (9/7/2019)/ Anxiety: resolved. Possibly due to TIA vs other. No other reversible cause established. Seen by palliative care and psychology. On chronic Aprazolam for anxiety. Out patient follow up     Discharge Exam:    Visit Vitals  /65 (BP 1 Location: Right arm, BP Patient Position: At rest)   Pulse 63   Temp 97.3 °F (36.3 °C)   Resp 16   Ht 5' 4\" (1.626 m)   Wt 60.8 kg (134 lb)   SpO2 97%   BMI 23.00 kg/m²      General: well looking and stable patient in no acute distress  Pulm: clear breath sounds without wheezes  Card: no murmurs, normal S1, S2 without thrills, bruits   Abd:    soft, non-tender, normoactive bowel sounds  Skin: no rashes or ulcers, skin turgor is good  Neuro: awake, alert and has a non focal     Activity: Activity as tolerated    Diet: Regular Diet    Current Discharge Medication List      START taking these medications    Details   polyethylene glycol (MIRALAX) 17 gram packet Take 1 Packet by mouth nightly for 30 days. Qty: 30 Packet, Refills: 0      senna-docusate (PERICOLACE) 8.6-50 mg per tablet Take 1 Tab by mouth two (2) times a day for 30 days. Qty: 60 Tab, Refills: 0      aspirin 81 mg chewable tablet Take 1 Tab by mouth daily for 30 days. Qty: 30 Tab, Refills: 0      albuterol-ipratropium (DUO-NEB) 2.5 mg-0.5 mg/3 ml nebu 3 mL by Nebulization route every six (6) hours as needed for Other (SOB) for up to 10 doses.   Qty: 30 Nebule, Refills: 0      gabapentin (NEURONTIN) 100 mg capsule Take 2 Caps by mouth two (2) times a day for 10 days, THEN 1 Cap two (2) times a day for 10 days. Max Daily Amount: 400 mg. Indications: Neuropathic Pain  Qty: 60 Cap, Refills: 0    Associated Diagnoses: Chronic pain syndrome         CONTINUE these medications which have CHANGED    Details   oxyCODONE ER (OXYCONTIN) 10 mg ER tablet Take 1 Tab by mouth daily for 10 days. Max Daily Amount: 10 mg.  Qty: 10 Tab, Refills: 0    Associated Diagnoses: Rheumatoid arthritis, involving unspecified site, unspecified rheumatoid factor presence (HCC)      ALPRAZolam (XANAX) 0.25 mg tablet Take 1 Tab by mouth two (2) times a day for 10 doses. Max Daily Amount: 0.5 mg.  Qty: 10 Tab, Refills: 0    Associated Diagnoses: Chronic pain syndrome      oxyCODONE IR (ROXICODONE) 10 mg tab immediate release tablet Take 0.5 Tabs by mouth every three (3) hours for 15 doses. Max Daily Amount: 40 mg.  Qty: 8 Tab, Refills: 0    Associated Diagnoses: Chronic pain syndrome         CONTINUE these medications which have NOT CHANGED    Details   acetaminophen (TYLENOL) 500 mg tablet Take 500 mg by mouth every six (6) hours as needed for Pain. folic acid (FOLVITE) 1 mg tablet Take 1 mg by mouth daily. dexAMETHasone (DECADRON) 2 mg tablet take 1 tablet by mouth once daily after breakfast  Qty: 30 Tab, Refills: 1      methotrexate (RHEUMATREX) 2.5 mg tablet Take 8 Tabs by mouth Every Saturday. The patient takes 8 tablets which is 20 mg every Saturday  Qty: 104 Tab, Refills: 1    Associated Diagnoses: Rheumatoid arthritis, involving unspecified site, unspecified rheumatoid factor presence (HCC)      cholecalciferol (VITAMIN D3) 50,000 unit capsule Take 50,000 Units by mouth Every Friday.  Takes on Fridays             Follow-up Information     Follow up With Specialties Details Why Florentino Blanco MD Internal Medicine Call to schedule follow up review [address] [place]  [address]  formerly [facility] [phone] Elinor Joyec PsyD Psychology Call to schedule re-evaluation for her memory [facility] [address] 900 [facility] 170      Alex Trejo DO Cardiology Call to confirm follow up for cardiology or follow up as already scheduled [address] [phone]            Follow-up tests or labs: None    Discharge Condition: Stable    Disposition: Rehab at [facility]    Time taken to arrange discharge:  35 minutes.     Signed:  Pat Alvarado MD     [facility]  9/15/2019   9:53 AM

## 2019-09-15 NOTE — PROGRESS NOTES
Patient has met criteria for discharge. AVS, DNR order sheet, Discharge Summary, and prescriptions for Albuterol, aspirin, gabapentin, miralax, pericolace, oxycodone ER and oxycodone IR given to patients daughter and will give to [facility] upon arrival. Patient's daughter verbalized understanding of instructions. IV D/C'd. Patients daughter is transporting patient to [facility].

## 2019-09-15 NOTE — PROGRESS NOTES
Problem: Falls - Risk of  Goal: *Absence of Falls  Description  Document Jaylyn Fleming Fall Risk and appropriate interventions in the flowsheet. Outcome: Progressing Towards Goal  Note:   Fall Risk Interventions:  Mobility Interventions: Bed/chair exit alarm, Patient to call before getting OOB, PT Consult for mobility concerns, PT Consult for assist device competence, Strengthening exercises (ROM-active/passive), Utilize walker, cane, or other assistive device, Utilize gait belt for transfers/ambulation    Mentation Interventions: Bed/chair exit alarm    Medication Interventions: Bed/chair exit alarm, Patient to call before getting OOB, Teach patient to arise slowly    Elimination Interventions: Call light in reach, Bed/chair exit alarm, Patient to call for help with toileting needs    History of Falls Interventions: Bed/chair exit alarm, Door open when patient unattended, Room close to nurse's station, Utilize gait belt for transfer/ambulation         Problem: Patient Education: Go to Patient Education Activity  Goal: Patient/Family Education  Outcome: Progressing Towards Goal     Problem: Pressure Injury - Risk of  Goal: *Prevention of pressure injury  Description  Document Rom Scale and appropriate interventions in the flowsheet. Outcome: Progressing Towards Goal  Note:   Pressure Injury Interventions:  Sensory Interventions: Assess changes in LOC, Avoid rigorous massage over bony prominences, Float heels, Keep linens dry and wrinkle-free, Minimize linen layers, Turn and reposition approx.  every two hours (pillows and wedges if needed)    Moisture Interventions: Absorbent underpads, Apply protective barrier, creams and emollients, Check for incontinence Q2 hours and as needed, Minimize layers    Activity Interventions: Increase time out of bed, Pressure redistribution bed/mattress(bed type), PT/OT evaluation    Mobility Interventions: Float heels, Pressure redistribution bed/mattress (bed type)    Nutrition Interventions: Offer support with meals,snacks and hydration    Friction and Shear Interventions: Lift team/patient mobility team                Problem: Patient Education: Go to Patient Education Activity  Goal: Patient/Family Education  Outcome: Progressing Towards Goal     Problem: Patient Education: Go to Patient Education Activity  Goal: Patient/Family Education  Outcome: Progressing Towards Goal     Problem: Patient Education: Go to Patient Education Activity  Goal: Patient/Family Education  Outcome: Progressing Towards Goal     Problem: Altered Thought Process (Adult/Pediatric)  Goal: *STG: Participates in treatment plan  Outcome: Progressing Towards Goal  Goal: *STG: Remains safe in hospital  Outcome: Progressing Towards Goal  Goal: *STG: Seeks staff when feelings of anxiety and fear arise  Outcome: Progressing Towards Goal  Goal: *STG: Complies with medication therapy  Outcome: Progressing Towards Goal  Goal: *STG: Attends activities and groups  Outcome: Progressing Towards Goal  Goal: *STG: Decreased delusional thinking  Outcome: Progressing Towards Goal  Goal: *STG: Decreased hallucinations  Outcome: Progressing Towards Goal  Goal: *STG: Absence of lethality  Outcome: Progressing Towards Goal  Goal: *STG: Demonstrates ability to understand and use improved judgment in daily activities and relationships  Outcome: Progressing Towards Goal  Goal: *LTG: Returns to baseline functioning  Outcome: Progressing Towards Goal  Goal: Interventions  Outcome: Progressing Towards Goal     Problem: Patient Education: Go to Patient Education Activity  Goal: Patient/Family Education  Outcome: Progressing Towards Goal

## 2019-09-15 NOTE — DISCHARGE INSTRUCTIONS
HOSPITALIST DISCHARGE INSTRUCTIONS    NAME: Dinorah Estevez   :  1931   MRN:  [id number]     Date:     9/15/2019    ADMIT DATE: 2019     DISCHARGE DATE: 9/15/2019     PRINCIPAL ADMITTING DIAGNOSIS:  Encephalopathy acute [G93.40]    DISCHARGE DIAGNOSES:  Principal Problem:    TIA (transient ischemic attack) (2016)    Paroxysmal A-fib ([facility] (2016)    RA (rheumatoid arthritis) ([facility] (2016)    COPD (chronic obstructive pulmonary disease) ([facility] (2016)    Hypertension (2018)    Malignant neoplasm of upper lobe of left lung ([facility] (2018)    Chronic pain syndrome     Physical debility     DNR (do not resuscitate)     MEDICATIONS:    · It is important that medications are taken exactly as they are prescribed on the discharge medication instructions and keep them your  in the bottles provided by the pharmacist.   · Keep a list of the medication names, dosages, and times to be taken at all times. · Do not take other medications without consulting your doctor. Recommended diet:  Regular Diet    Recommended activity: Activity as tolerated    Post discharge care:    Notify follow up health care provider or return to the emergency department if you cannot get hold of your doctor if you feel worse or experience symptoms similar to those that brought you to hospital    Follow-up Information     Follow up With Specialties Details Why Contact Info    Marylen Baumann, MD Internal Medicine Call to schedule follow up review [address] [facility] [phone]      Romie Crawford, [address] [place] [address] Psychology Call to schedule re-evaluation for her memory [address]  [phone]      Jaimie Kelsey DO Cardiology Call to confirm follow up for cardiology or follow up as already scheduled [phone]            Information obtained by :   I understand that if any problems occur once I am at home I am to contact my physician and I understand and acknowledge receipt of the instructions indicated above.                                                                                                                                            Physician's or R.N.'s Signature                                                                  Date/Time                                                                                                                                              Patient or Representative Signature                                                          Date/Time

## 2019-09-15 NOTE — PROGRESS NOTES
Transition of Care Plan to SNF/Rehab    SNF/Rehab Transition:  Patient has been accepted to [facility] and meets criteria for admission. Patient will transported by daughter and expected to leave at 2:30PM.    Communication to Patient/Family:  Met with patient and contacted daughter via telephone (identified care giver) and they are agreeable to the transition plan. Communication to SNF/Rehab:  Bedside RN, Bri Chen, has been notified to update the transition plan to the facility and call report (phone number [phone]   Discharge information has been updated on the AVS.     Discharge instructions to be fax'd to facility at [facility] # [phone]). SNF/Rehab Transition:  Patient to follow-up with Home Health: At [address] if indicated  PCP/Specialist: Dr. Elizabeth Solo Management: none    Reviewed and confirmed with facility,[facility] Jessica Call) they can manage the patient care needs for the following:     Judy Aquino with (X) only those applicable:    Medication:  []  Medications will be available at the facility  []  IV Antibiotics N/A  [x]  Controlled Substance - hard copy to be sent with patient   []  Weekly Labs   Documents:  [x] Hard RX  [] MAR  [] Kardex  [x] AVS  []Transfer Summary  [x]Discharge   Equipment:  []  CPAP/BiPAP  []  Wound Vacuum  []  Pereira or Urinary Device  []  PICC/Central Line  []  Nebulizer  []  Ventilator   Treatment:  []Isolation (for MRSA, VRE, etc.)  []Surgical Drain Management  []Tracheostomy Care  [x]Dressing Changes  []Dialysis with transportation and chair time N/A.  []PEG Care  []Oxygen  []Daily Weights for Heart Failure   Dietary:   []Any diet limitations  []Tube Feedings   []Total Parenteral Management (TPN)   Eligible for Medicaid Long Term Services and Supports  Yes:  [] Eligible for medical assistance or will become eligible within 180 days and UAI completed. [] Provider/Patient and/or support system has requested screening.   [] UAI copy provided to patient or responsible party, N/A.  [] UAI unavailable at discharge will send once processed to SNF provider. [] UAI unavailable at discharged mailed to patient  No:   [x] Private pay and is not financially eligible for Medicaid within the next 180 days. [] Reside out-of-state.   [] A residents of a state owned/operated facility that is licensed  by [employer] or [facility]  [] Enrollment in [facility] hospice services  [] [address] [place] [address]  [] Patient /Family declines to have screening completed or provide financial information for screening     Financial Resources:  Medicaid    [] Initiated and application pending   [x] Full coverage     Advanced Care Plan:  []Surrogate Decision Maker of Care  []POA  [x]Communicated Code Status : DDNR   Other

## 2019-09-15 NOTE — PROGRESS NOTES
Problem: Mobility Impaired (Adult and Pediatric)  Goal: *Acute Goals and Plan of Care (Insert Text)  Description  FUNCTIONAL STATUS PRIOR TO ADMISSION: Patient was modified independent using a Rolling walker for functional mobility. HOME SUPPORT PRIOR TO ADMISSION: The patient lived with family members on the first floor of a two story home. Physical Therapy Goals  Initiated 9/9/2019  1. Patient will move from supine to sit and sit to supine  in bed with supervision/set-up within 7 day(s). 2.  Patient will transfer from bed to chair and chair to bed with supervision/set-up using the least restrictive device within 7 day(s). 3.  Patient will perform sit to stand with supervision/set-up within 7 day(s). 4.  Patient will ambulate with supervision/set-up for 15 feet with the least restrictive device within 7 day(s). 5.  Patient will ascend/descend 2 stairs with 1 handrail(s) with moderate assistance  within 7 day(s). Note:   PHYSICAL THERAPY TREATMENT  Patient: Ginette Lara (77 y.o. female)  Date: 9/15/2019  Diagnosis: Encephalopathy acute [G93.40]  Acute encephalopathy [G93.40] TIA (transient ischemic attack)       Precautions: Fall, Skin  Chart, physical therapy assessment, plan of care and goals were reviewed. ASSESSMENT  Based on the objective data described below, pt confused with fear of falling. Max encouragement to to come to sitting EOB with min A x1 Mod A to come to standing initially with posterior lean, improved to CGA/Min x1 during gait training using RW for steadying, with redirection. pt demonstrates slow dinorah no LOB. Pt declined to sit in chair and assisted BTB with Min aA    Current Level of Function Impacting Discharge (mobility/balance): impaired balance, cognitive deficits              PLAN :  Patient continues to benefit from skilled intervention to address the above impairments. Continue treatment per established plan of care. to address goals.     Recommendation for discharge: (in order for the patient to meet his/her long term goals)  Therapy up to 5 days/week in SNF setting    This discharge recommendation:  Has been made in collaboration with the attending provider and/or case management    Equipment recommendations for successful discharge (if) home: to be determined by rehab facility       SUBJECTIVE:   Patient stated Angel Smiley am so afraid, you need to have another person here.     OBJECTIVE DATA SUMMARY:   Critical Behavior:  Neurologic State: Alert  Orientation Level: Oriented to person, Disoriented to place, Disoriented to situation, Disoriented to time  Cognition: Follows commands  Safety/Judgement: Lack of insight into deficits  Functional Mobility Training:  Bed Mobility:     Supine to Sit: Minimum assistance  Sit to Supine: Minimum assistance           Transfers:  Sit to Stand: Moderate assistance;Assist x2; Additional time  Stand to Sit: Contact guard assistance;Assist x1;Additional time                             Balance:  Sitting: Intact; High guard  Standing: With support; Impaired  Standing - Static: Constant support; Fair  Standing - Dynamic : Fair  Ambulation/Gait Training:  Distance (ft): 20 Feet (ft)     Ambulation - Level of Assistance: Minimal assistance;Assist x1;Additional time        Gait Abnormalities: Decreased step clearance; Step to gait; Path deviations        Base of Support: Narrowed     Speed/Ev: Slow                       Stairs: Therapeutic Exercises:     Pain Rating:  Denies pain    Activity Tolerance:   Fair  Please refer to the flowsheet for vital signs taken during this treatment.     After treatment patient left in no apparent distress:   Supine in bed, Call bell within reach, Bed / chair alarm activated and Side rails x 3    COMMUNICATION/COLLABORATION:   The patients plan of care was discussed with: Registered Nurse John Yañez   Time Calculation: 25 mins

## 2019-09-17 ENCOUNTER — PATIENT OUTREACH (OUTPATIENT)
Dept: CASE MANAGEMENT | Age: 84
End: 2019-09-17

## 2019-09-17 ENCOUNTER — NURSE NAVIGATOR (OUTPATIENT)
Dept: PALLATIVE CARE | Age: 84
End: 2019-09-17

## 2019-09-17 NOTE — PROGRESS NOTES
Palliative Medicine - RN Transitions of Care Note      Hospital Admitted To: [address] [place] [address]  Reason for Hospitalization: Acute Encephalopathy  Admission Date: 9/7/19  Discharge Date: 9/15/19    RRAT score:  27  Medical History:     Past Medical History:   Diagnosis Date    AAA (abdominal aortic aneurysm) (HCC)     Arthritis     ra, osteoporosis, osteoarthritis    Atrial fibrillation ([facility]     COPD     HTN (hypertension)     Lung cancer ([facility]     RA (rheumatoid arthritis) ([facility]     Smoker     TIA (transient ischemic attack)     TIA 9/30/16       This represents Transitions of Care b/c NN spoke with patient and/or caregiver within 2 business days of discharge. Pt's TCM follow up appt is scheduled with Dr. Giovanni Nowak on Thursday  @ 3:30 pm which is within 4 days of discharge. (canceled)    Called daughter on 9/17/19 and verified with 2 identifiers. Patient is currently at [facility] for Rehab and daughter would like to cancel upcoming appt. She has a care plan meeting with the facility next Tuesday and she will call Palliative after that to arrange an appt. Currently she does not know how long she will be in rehab. Patient presenting symptoms Delerium    Course of current Hospitalization (referenced by Dr. Manny Trujillo note):Hospital Course:      Ms. Cindy Giordano is a 80 y.o. admitted to [facility] and treated for the following:     TIA (transient ischemic attack) (9/30/2016): this was suspected. Seen by neurology and Psychology. MRI brain neg for any acute changes. Her encephalopathy has resolved and she has not needed a sitter for almost 2 days now. Continue Asprin. Being discharged to [facility] for continued therapy. Out patient follow up with psychology and cardiology     Paroxysmal A-fib ([facility] (9/30/2016): rate has remained in sinus. No longer on chronic anticoagulation as she is a poor candidate. Continue Asprin. Follow up with cardiology     RA (rheumatoid arthritis) ([facility] (9/30/2016): stable.  Continue Methotrexate, gabapentin taper and chronic oxycontin     COPD (chronic obstructive pulmonary disease) ([facility] (9/30/2016): currently stable. Not wheezing. Continue PRN Duoneb     Hypertension (8/25/2018): BP controlled. 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 She has previously completed radiation to chest and there is no concurrent chemotherapy, given her poor performance status.  Her disease has responded to radiation based on serial imaging. Follow up with Dr Yg Gutierrez for continued surveillance. She is not a good candidate for palliative chemotherapy, given her poor performance status     Chronic pain syndrome: continue current pain regimen     Acute encephalopathy (9/7/2019)/ Anxiety: resolved. Possibly due to TIA vs other. No other reversible cause established. Seen by palliative care and psychology. On chronic Aprazolam for anxiety. Out patient follow up      Diagnosed with Acute Encephalopathy. Admitted to Hospitalist Service with consults from Neurology, Palliative Care and psychology. Consults recommended: See above    Significant Lab Findings:  Lab Results   Component Value Date/Time    WBC 8.1 09/11/2019 02:21 AM    HGB 9.9 (L) 09/11/2019 02:21 AM    PLATELET 131 21/99/2560 02:21 AM     Lab Results   Component Value Date/Time    Sodium 143 09/11/2019 02:21 AM    Potassium 3.7 09/11/2019 02:21 AM    Chloride 106 09/11/2019 02:21 AM    CO2 29 09/11/2019 02:21 AM    BUN 13 09/11/2019 02:21 AM    Creatinine 0.52 (L) 09/11/2019 02:21 AM    Calcium 8.5 09/11/2019 02:21 AM    Magnesium 1.7 09/11/2019 02:21 AM    Phosphorus 2.8 09/11/2019 02:21 AM      Lab Results   Component Value Date/Time    AST (SGOT) 23 09/07/2019 07:39 PM    Alk.  phosphatase 63 09/07/2019 07:39 PM    Protein, total 7.1 09/07/2019 07:39 PM    Albumin 3.1 (L) 09/07/2019 07:39 PM    Globulin 4.0 09/07/2019 07:39 PM     Lab Results   Component Value Date/Time    INR 1.1 09/07/2019 07:39 PM    Prothrombin time 10.9 09/07/2019 07:39 PM    aPTT 28.7 07/20/2019 07:30 PM      Lab Results   Component Value Date/Time    Iron 14 (L) 11/28/2018 12:58 AM    TIBC 143 (L) 11/28/2018 12:58 AM    Iron % saturation 10 (L) 11/28/2018 12:58 AM    Ferritin 139 11/28/2018 12:58 AM        Medication Reconciliation completed: yes     New medications at discharge include   START taking these medications     Details   polyethylene glycol (MIRALAX) 17 gram packet Take 1 Packet by mouth nightly for 30 days. Qty: 30 Packet, Refills: 0       senna-docusate (PERICOLACE) 8.6-50 mg per tablet Take 1 Tab by mouth two (2) times a day for 30 days. Qty: 60 Tab, Refills: 0       aspirin 81 mg chewable tablet Take 1 Tab by mouth daily for 30 days. Qty: 30 Tab, Refills: 0       albuterol-ipratropium (DUO-NEB) 2.5 mg-0.5 mg/3 ml nebu 3 mL by Nebulization route every six (6) hours as needed for Other (SOB) for up to 10 doses. Qty: 30 Nebule, Refills: 0       gabapentin (NEURONTIN) 100 mg capsule Take 2 Caps by mouth two (2) times a day for 10 days, THEN 1 Cap two (2) times a day for 10 days. Max Daily Amount: 400 mg. Indications: Neuropathic Pain  Qty: 60 Cap, Refills: 0       Prescription Medication total: 13 (pharmacy consult for polypharm needed?) No     If multiple admissions, ED visits, check and reviewed :  yes    Barriers to Care: none     Support System consists of: Family, and patient being discharged to [facility] for continued therapy. Out patient follow up with psychology and cardiology    Previous Use of [address], if so what agency? At [facility] Directive on file in EMR?  Yes     Future Plan for Patient to include communication plan: F/U with Cardiology, Palliative, and Psychology    Adherence to previous treatment and likelihood for f/u: Positive    Past Hospitalizations/ED visits last 12 months?   hospitalized three times, 3 known ER visits in last 12 months.

## 2019-09-20 ENCOUNTER — NURSE NAVIGATOR (OUTPATIENT)
Dept: PALLATIVE CARE | Age: 84
End: 2019-09-20

## 2019-09-20 NOTE — CDMP QUERY
This patient with a history of paroxysmal atrial fibrillation was admitted for 
TIA. Patient is no longer on chronic anticoagulation since she is a poor 
candidate due to GI bleedingand will continue aspirin. After study, can you please specify the 
suspected cause of the TIA as: 
Cerebral embolism without infarction from atrial fibrillation TIA only Other Explanation No suspected cause can be provided for the TIA Risk factors: history of paroxysmal atrial fibrillation, not on anticoagulants due to history of GI bleed Clinical Indicators: Pt presents with confusion and garbled speech. Neuro and psych consults were completed, MRI negative for acute changes. Will continue ASA and follow with cardiology Treatment: continue ASA, follow up with cardiology, psych, and neurology Thank you for your time. Nat Persaud RN, BSN 
[phone]

## 2019-09-20 NOTE — PROGRESS NOTES
Palliative Medicine  Nursing Note  [phone]  Fax [phone]      Telephone Call  Patient Name: Bernice De La Rosa  YOB: 1931 9/20/2019        Primary Decision Maker (Active): Raquel Medellin - Daughter - [phone]   Advance Care Planning 9/13/2019   Patient's Healthcare Decision Maker is: Legal Next of Devin 69   Primary Decision Maker Name -   Confirm Advance Directive None   Patient Would Like to Complete Advance Directive Unable   Does the patient have other document types Do Not Resuscitate     Returned call to Jayden Montalvo, Ms. Kimball daughter. She shared that her mother called her from [facility] complaining of increased pain and that they had discontinued her Oxycodone IR as D/C hospital instructions limited it to 15 doses. She asked if Palliative could fax over office note with med list for assistance. Dr. Giovanni Nowak last H&P faxed [phone] to [facility] with medication list that had been utilized to manage patient's pain for many months.     Shannon Song, RN  Palliative Medicine

## 2019-09-24 ENCOUNTER — PATIENT OUTREACH (OUTPATIENT)
Dept: CASE MANAGEMENT | Age: 84
End: 2019-09-24

## 2019-09-24 NOTE — PROGRESS NOTES
Community Care Team Documentation for Patient in [facility]  Subsequent Follow up     Patient remains at [facility] ([facility]). See previous [facility] Team notes. Spoke with SNF team.   PT/OT update: Currently ambulating 60ft. Barrier: Loss of balance and low activity tolerance. Sitting on EOB 7 minutes. LOS/ Disposition:  Care plan meeting being held today regarding disposition and LOS. Goal to return home with grandson and his family. Home health TBD. Medications were not reconciled and general patient assessment was not completed during this skilled nursing facility outreach.

## 2019-09-29 NOTE — PROGRESS NOTES
[facility] [place] [facility] [place] [address] [facility]  Ulises Barahona: [phone]  F: [phone]     Reason for Visit:   Dimas Gonzalez is a 80 y.o. female who is seen for follow up of non small cell lung cancer. Treatment History:   · CTA Chest and CT A/P 11/24/2018:  7.3 cm left upper lobe pulmonary mass is compatible with malignancy. Associated cortical destruction of the left fourth posterior rib. 2.6 cm right adrenal nodule. Indeterminate bilateral renal lesions. 5.4 cm abdominal aortic aneurysm, with no evidence of rupture. Mild diffuse urinary bladder wall thickening. · CT guided lung biopsy 11/27/2018: squamous cell carcinoma, PDL1 5%  · MRI Brain 11/28/2018: No evidence of acute intracranial abnormality. No evidence of intracranial metastases. · PET/CT 12/4/2018: Large hypermetabolic left upper lobe mass. There is metastatic disease present. Mildly hypermetabolic likely neoplastic spiculated nodule in the right upper lobe. Subtly hypermetabolic foci of irregularity in the left chest wall particularly at the T4 level on the left, T11 and T10 abnormalities on the left as well. Possible right hilar metastatic focus. No infradiaphragmatic metastatic disease is identified. · Stage ANDREY (cT4 cN0 M1 ) Non-small cell lung cancer  · Radiation to chest with Dr. Abel Sanchez from 12/27/2018 - 2/2019    History of Present Illness:   She was hospitalized last month for TIA and encephalopathy. She was discharged to SNF. She has not been participating in rehab, so apparently they are planning to discharge her soon. However, her daughter reports that the rehab center feels she needs 24 hour care. She is worried about how she would be able to care for her at home, trying to figure out long-term placement options. Patient reports that she has been having a lot of back pain and dyspnea. No foot pain. She is accompanied by her daughter today.      PAST HISTORY: The following sections were reviewed and updated in the EMR as appropriate: PMH, SH, FH, Medications, Allergies. Allergies   Allergen Reactions    Codeine Nausea and Vomiting      Review of Systems: A complete review of systems was obtained, reviewed, and scanned into the EMR. Pertinent findings reviewed above. Physical Exam:     Visit Vitals  BP 98/49 (BP 1 Location: Left arm, BP Patient Position: Sitting)   Pulse 64   Temp 96.9 °F (36.1 °C) (Temporal)   Resp 20   SpO2 (!) 88% Comment: 3 liters     ECOG PS: 3-4  General: No distress, frail, elderly female  Eyes: PERRLA, anicteric sclerae  HENT: Atraumatic, OP clear  Neck: Supple  Lymphatic: No cervical, supraclavicular, or inguinal adenopathy  Respiratory: CTAB, normal respiratory effort  CV: Normal rate, regular rhythm, no murmurs, no peripheral edema  GI: Soft, nontender, nondistended, no masses, no hepatomegaly, no splenomegaly  MS: Sitting in wheelchair  Skin: No rashes, ecchymoses, or petechiae. Normal temperature, turgor, and texture. Psych: Alert, oriented, appropriate affect, poor judgment/insight, significant dementia    Results:     Lab Results   Component Value Date/Time    WBC 8.1 09/11/2019 02:21 AM    HGB 9.9 (L) 09/11/2019 02:21 AM    HCT 30.0 (L) 09/11/2019 02:21 AM    PLATELET 402 83/14/1844 02:21 AM    MCV 87.0 09/11/2019 02:21 AM    ABS.  NEUTROPHILS 9.6 (H) 09/07/2019 07:39 PM     Lab Results   Component Value Date/Time    Sodium 143 09/11/2019 02:21 AM    Potassium 3.7 09/11/2019 02:21 AM    Chloride 106 09/11/2019 02:21 AM    CO2 29 09/11/2019 02:21 AM    Glucose 99 09/11/2019 02:21 AM    BUN 13 09/11/2019 02:21 AM    Creatinine 0.52 (L) 09/11/2019 02:21 AM    GFR est AA >60 09/11/2019 02:21 AM    GFR est non-AA >60 09/11/2019 02:21 AM    Calcium 8.5 09/11/2019 02:21 AM    Glucose (POC) 124 (H) 09/07/2019 07:17 PM    Creatinine (POC) 0.7 06/19/2019 01:32 PM     Lab Results   Component Value Date/Time    Bilirubin, total 0.6 09/07/2019 07:39 PM    ALT (SGPT) 13 09/07/2019 07:39 PM    AST (SGOT) 23 09/07/2019 07:39 PM    Alk. phosphatase 63 09/07/2019 07:39 PM    Protein, total 7.1 09/07/2019 07:39 PM    Albumin 3.1 (L) 09/07/2019 07:39 PM    Globulin 4.0 09/07/2019 07:39 PM       CT chest 3/13/2019: Significant decrease in size in the left upper lobe mass, stable associated bone findings. Nonvisualization of the right upper lobe lesion at this time. CT C/A/P 6/19/2019: Further decrease in the posttreatment changes left upper lobe lesion. No new findings otherwise. CT C/A/P 10/1/2019:  1. Continued interval decrease in size of posterior left upper lung lesion, now  measuring 3.2 cm x 1.9 cm.  2. Left posterior fourth rib marking pattern is less conspicuous as compared to  prior CT dated March 2019.  3. New left lower lobe peribronchial thickening and patchy airspace disease. Recommend short-term follow-up to confirm complete resolution. Bone scan 10/1/2019:  1. No evidence of osseous metastatic disease. 2. Abnormal radiotracer uptake in the right foot may be due to fracture and  should be correlated clinically. 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 No concurrent chemotherapy, given her poor performance status. Her disease has responded to radiation based on serial imaging. She is now being followed with surveillance. Her recent CT looks good, continued improvement. She is not a good candidate for palliative systemic therapy, given her progressively worsening performance status and dementia. I have previously recommended hospice, and the patient has adamantly declined. We discussed this again today, as the patient's daughter is struggling and looking for more support. The daughter had many questions about hospice, but the patient cried hysterically whenever the word was mentioned. Her dementia makes these conversations very difficult. They plan to discuss options with her nursing home, and with palliative care. I recommended that we stop surveillance for her lung cancer, as she would not be a candidate for treatment should we find recurrence/progression. The daughter is agreeable. We discussed that if she is doing well in 3 months, we can certainly change our mind and follow up with scans at that time. 2) Anemia of chronic disease  Monitor      3) COPD  Pulmonary following     4) Afib  On Eliquis. Cardiology following.     5) RA  On Methotrexate per rheumatology. 6) Cancer pain  Continue Oxycodone PRN. Management per Dr. Kimmie Gilmore. 7) Dementia  Progressive. 8) Weight loss  On Dexamethasone daily per Dr. Kimmie Gilmore.      Plan:     · Follow up with palliative care  · Consider hospice care  · Daughter looking into nursing home placement        Signed By: Magda Diaz MD

## 2019-10-01 ENCOUNTER — HOSPITAL ENCOUNTER (OUTPATIENT)
Dept: CT IMAGING | Age: 84
Discharge: HOME | End: 2019-10-01
Attending: INTERNAL MEDICINE
Payer: MEDICARE

## 2019-10-01 ENCOUNTER — HOSPITAL ENCOUNTER (OUTPATIENT)
Dept: NUCLEAR MEDICINE | Age: 84
Discharge: HOME | End: 2019-10-01
Attending: INTERNAL MEDICINE
Payer: MEDICARE

## 2019-10-01 DIAGNOSIS — C34.12 MALIGNANT NEOPLASM OF UPPER LOBE OF LEFT LUNG (HCC): ICD-10-CM

## 2019-10-01 PROCEDURE — 74011636320 HC RX REV CODE- 636/320: Performed by: RADIOLOGY

## 2019-10-01 PROCEDURE — 78306 BONE IMAGING WHOLE BODY: CPT

## 2019-10-01 PROCEDURE — 74177 CT ABD & PELVIS W/CONTRAST: CPT

## 2019-10-01 PROCEDURE — 71260 CT THORAX DX C+: CPT

## 2019-10-01 PROCEDURE — 74011000258 HC RX REV CODE- 258: Performed by: RADIOLOGY

## 2019-10-01 RX ORDER — SODIUM CHLORIDE 0.9 % (FLUSH) 0.9 %
10 SYRINGE (ML) INJECTION
Status: COMPLETED | OUTPATIENT
Start: 2019-10-01 | End: 2019-10-01

## 2019-10-01 RX ADMIN — IOHEXOL 50 ML: 240 INJECTION, SOLUTION INTRATHECAL; INTRAVASCULAR; INTRAVENOUS; ORAL at 13:38

## 2019-10-01 RX ADMIN — IOPAMIDOL 100 ML: 755 INJECTION, SOLUTION INTRAVENOUS at 13:38

## 2019-10-01 RX ADMIN — Medication 10 ML: at 13:38

## 2019-10-01 RX ADMIN — SODIUM CHLORIDE 100 ML: 900 INJECTION, SOLUTION INTRAVENOUS at 13:38

## 2019-10-03 ENCOUNTER — OFFICE VISIT (OUTPATIENT)
Dept: ONCOLOGY | Age: 84
End: 2019-10-03

## 2019-10-03 ENCOUNTER — TELEPHONE (OUTPATIENT)
Dept: PALLATIVE CARE | Age: 84
End: 2019-10-03

## 2019-10-03 VITALS
TEMPERATURE: 96.9 F | RESPIRATION RATE: 20 BRPM | OXYGEN SATURATION: 88 % | DIASTOLIC BLOOD PRESSURE: 49 MMHG | HEART RATE: 64 BPM | SYSTOLIC BLOOD PRESSURE: 98 MMHG

## 2019-10-03 DIAGNOSIS — C34.12 MALIGNANT NEOPLASM OF UPPER LOBE OF LEFT LUNG (HCC): Primary | ICD-10-CM

## 2019-10-03 NOTE — PROGRESS NOTES
Breana Suarez is a 80 y.o. female follow up for lung cancer. 1. Have you been to the ER, urgent care clinic since your last visit? Hospitalized since your last visit? Yes- [facility] for stroke. 2. Have you seen or consulted any other health care providers outside of the [address] [place] [address] since your last visit? Include any pap smears or colon screening.  no

## 2019-10-03 NOTE — TELEPHONE ENCOUNTER
[address] Work  [employer]  [phone]    Note:  Kristy Pascual returned a call to pt's daughter today:    · Daughter with pt as pt saw Ayanna Cao 60 today. · Pt stays in bed now  · Not participating in rehab so they must d/c her at [facility] and they will help her apply for Medicaid  · Had mini-stroke and was in hospital at [facility] for a week then to rehab  · Before that she could remember things, but now she has very poor STM, such as what she just ate or if she had PT today. · Seems out of it  · ONC thought she should see PM MD and consider hospice for home or LTC  · Income too high for regular m'caid, but nh can help her apply for LTC m'caid. Urged they apply now explaining the 6 week time frame for qualification  · Discussed parameters of hospice at home; suggested LTC may be better due to inability of family to be home with her    Plan:  Follow up with daughter re: care provision plan to offer problem-solving, resource information, and emotional support. Length of Call: 20 min.     Nkechi Tam, FIL[place]ERTOW, LCSW  Palliative   Palliative Medicine  [phone]

## 2019-10-04 ENCOUNTER — PATIENT OUTREACH (OUTPATIENT)
Dept: CASE MANAGEMENT | Age: 84
End: 2019-10-04

## 2019-10-04 NOTE — PROGRESS NOTES
[facility] Care Team Documentation for Patient in [facility]  Subsequent Follow up     Patient remains at [facility] ([facility]). See previous [facility] Team notes. Spoke with SNF team.  SNF Medical update: Pateint had some chest congestion on 10/2, O2 2L continuous, chest x-ray pending results. PT/OT update: Currently patient has poor motivation and participation, slow gait. LOS/ Disposition: SNF will schedule careplan meeting with the family. Therapy suggests 24/7 care for patient. Discharge in 2 weeks. Medications were not reconciled and general patient assessment was not completed during this skilled nursing facility outreach. Edna Rea, NICOLASAN, [address] Team  [phone]

## 2019-10-08 ENCOUNTER — PATIENT OUTREACH (OUTPATIENT)
Dept: CASE MANAGEMENT | Age: 84
End: 2019-10-08

## 2019-10-08 NOTE — PROGRESS NOTES
Community Care Team Documentation for Patient in [facility]  Subsequent Follow up     [employer] ([facility]). See previous [facility] Team notes. Spoke with SNF team. Pt readmitted yesterday 10/7 to [place] [id number] due to fever, SOB and loose stool. Pt admitted with pneumonia. CCT to sign off at this time. Medications were not reconciled and general patient assessment was not completed during this skilled nursing facility outreach.

## 2019-10-14 ENCOUNTER — TELEPHONE (OUTPATIENT)
Dept: INTERNAL MEDICINE CLINIC | Age: 84
End: 2019-10-14

## 2019-10-15 NOTE — TELEPHONE ENCOUNTER
Ga Paredes, with [facility], and advised that patient is being followed by Dr. Jenny Martinez through palliative care.

## 2019-10-16 ENCOUNTER — TELEPHONE (OUTPATIENT)
Dept: PALLATIVE CARE | Age: 84
End: 2019-10-16

## 2019-10-16 NOTE — TELEPHONE ENCOUNTER
Palliative Medicine  Nursing Note  [phone] ([phone]  Fax [phone]      Telephone Call  Patient Name: Cindy Giron  YOB: 1931    10/16/2019        Primary Decision Maker (Active): Onofre Donaldson - Daughter - [phone]   Advance Care Planning 9/13/2019   Patient's Healthcare Decision Maker is: Legal Next of Devin 69   Primary Decision Maker Name -   Confirm Advance Directive None   Patient Would Like to Complete Advance Directive Unable   Does the patient have other document types Do Not Resuscitate     Returned call to Misty Cortez with [facility]. She discussed that patient is in a lot of pain, she is not getting out of bed, and is unable to bear weight on right leg. She has a wound near her ankle that has serosanguinous drainage that they have been cleaning and bandaging. Florentinojuan antoniodave Susan stated that since Monday patient has been complaining of pain to back, right hand, and to right leg. She has been receiving Oxycontin 10mg at bedtime with Oxycodone 5mg every 3 hours as needed throughout the day, but patient and daughter both state that it is not working. [facility] briefly discussed hospice and Chen Davis said the daughter was open to it, but was not sure of insurance issues. Discussed with Chen Davis that Palliative provider will be informed and pain and hospice to be addressed. She verbalized understanding. Call placed to patients daughter, Dianne Narayanan. She stated that patient is currently taking Oxycontin 10mg every 12 hours and Oxycodone 5mg 2 tabs every 3 hours without much relief. She says her mom moans throughout the day even after she takes her short acting. She shared that she is not eating and is only drinking a few sips for last 2 days. Discussed Hospice and daughter is interested in their services and would like information session. She was unsure if she should take her mother to the ED [facility].   Discussed it was a personal decision based on what her mother would want for her goals of care. She verbalized understanding. Call placed to Dr. Vikki Montes who instructed to re-start Dexamethasone 2mg tabs in AM, and increase Oxycodone to 3 tabs every 3 hours, and Oxycontin 20mg tonight. Call Bj Darden to inform of the medication changes. She verbalized understanding.     Yanely Dominguez RN  Palliative Medicine

## 2019-10-16 NOTE — TELEPHONE ENCOUNTER
Reva Rosas with [facility] [address] is calling to speak to nurse regarding patient and orders, etc.  States patient is in A LOT of pain. Advised would have our RN KJ call her back to discuss.

## 2019-10-17 ENCOUNTER — TELEPHONE (OUTPATIENT)
Dept: PALLATIVE CARE | Age: 84
End: 2019-10-17

## 2019-10-17 DIAGNOSIS — M25.50 CHRONIC PAIN OF MULTIPLE JOINTS: ICD-10-CM

## 2019-10-17 DIAGNOSIS — R53.81 PHYSICAL DEBILITY: ICD-10-CM

## 2019-10-17 DIAGNOSIS — C34.12 MALIGNANT NEOPLASM OF UPPER LOBE OF LEFT LUNG (HCC): Primary | Chronic | ICD-10-CM

## 2019-10-17 DIAGNOSIS — G89.29 CHRONIC PAIN OF MULTIPLE JOINTS: ICD-10-CM

## 2019-10-17 DIAGNOSIS — R53.1 WEAKNESS: ICD-10-CM

## 2019-10-17 NOTE — TELEPHONE ENCOUNTER
Palliative Medicine  Nursing Note  [phone] ([phone]  Fax [phone]      Telephone Call  Patient Name: Hammad Fernando  YOB: 1931    10/17/2019        Primary Decision Maker (Active): Alma Pereira - Daughter - [phone]   Advance Care Planning 9/13/2019   Patient's Healthcare Decision Maker is: Legal Next of Devin 69   Primary Decision Maker Name -   Confirm Advance Directive None   Patient Would Like to Complete Advance Directive Unable   Does the patient have other document types Do Not Resuscitate     Call placed to [facility], but they are at capacity in patient's zip code. Call Gerson Grater, patient's daughter and left voice mail regarding [facility] being at capacity and referral placed to [facility]. Call placed to [facility] and provided verbal referral.  Paper work: H&P, order, and demographics faxed to their office at [phone].       Sacha Renae RN  Palliative Medicine

## 2019-10-24 ENCOUNTER — TELEPHONE (OUTPATIENT)
Dept: PALLATIVE CARE | Age: 84
End: 2019-10-24

## 2019-10-24 NOTE — TELEPHONE ENCOUNTER
Palliative Medicine  Nursing Note  [phone]  Fax [phone]      Telephone Call  Patient Name: Jennifer Maki  YOB: 1931    10/24/2019        Primary Decision Maker (Active): Flor Calvin - Daughter - [phone]   Advance Care Planning 9/13/2019   Patient's Healthcare Decision Maker is: Legal Next of Devin 69   Primary Decision Maker Name -   Confirm Advance Directive None   Patient Would Like to Complete Advance Directive Unable   Does the patient have other document types Do Not Resuscitate     Call placed to [facility] to inquire if patient has signed onto their services. Hollie stated that it's been difficult to get in touch with the family, and then working with the family to schedule a date and time. Shared that Mrs. Angel Whipple, daughter of Ms. Kimball, has called Palliative with prescription need. Hollie stated she will call Mrs. Angel Whipple now and discuss admitting patient as soon as possible to help with medication/symptom needs. Appreciate her assistance.     Anastasia Garcia, RN  Palliative Medicine

## 2019-10-24 NOTE — TELEPHONE ENCOUNTER
Kannan Houston states to advise nurse that Diana Liang is meeting with patient tomorrow at 11:00am.  Advised nurse would call back with any questions.

## 2019-10-24 NOTE — TELEPHONE ENCOUNTER
Ms. Michael Diaz calling to schedule appt with Dr. Chilango Sherman, appt scheduled for 11/14/19 at 10:30am at [facility]. Also Ms. Michael Diaz needs to discuss medication refills with nurse. States she needs everything refilled, mainly the 2 pain medications. Advised nurse would call her back to discuss.

## 2019-10-25 ENCOUNTER — TELEPHONE (OUTPATIENT)
Dept: PALLATIVE CARE | Age: 84
End: 2019-10-25

## 2019-10-25 NOTE — TELEPHONE ENCOUNTER
Returned call to [facility]. I informed her Dr Venkatesh Colindres is out of the office until November 11th. I advised to assign her with the Medical Director. I will let Tee Villanueva know.

## 2020-02-13 ENCOUNTER — APPOINTMENT (OUTPATIENT)
Dept: GENERAL RADIOLOGY | Age: 85
DRG: 543 | End: 2020-02-13
Attending: EMERGENCY MEDICINE
Payer: MEDICARE

## 2020-02-13 ENCOUNTER — HOSPITAL ENCOUNTER (INPATIENT)
Age: 85
LOS: 4 days | Discharge: NURSING, REHAB OR LONG-TERM CARE | DRG: 543 | End: 2020-02-17
Attending: EMERGENCY MEDICINE | Admitting: INTERNAL MEDICINE
Payer: MEDICARE

## 2020-02-13 ENCOUNTER — HOSPITAL ENCOUNTER (INPATIENT)
Dept: GENERAL RADIOLOGY | Age: 85
Discharge: HOME | DRG: 543 | End: 2020-02-13
Attending: ORTHOPAEDIC SURGERY
Payer: MEDICARE

## 2020-02-13 ENCOUNTER — APPOINTMENT (OUTPATIENT)
Dept: GENERAL RADIOLOGY | Age: 85
DRG: 543 | End: 2020-02-13
Attending: ORTHOPAEDIC SURGERY
Payer: MEDICARE

## 2020-02-13 ENCOUNTER — APPOINTMENT (OUTPATIENT)
Dept: CT IMAGING | Age: 85
DRG: 543 | End: 2020-02-13
Attending: EMERGENCY MEDICINE
Payer: MEDICARE

## 2020-02-13 DIAGNOSIS — F41.9 ANXIETY: ICD-10-CM

## 2020-02-13 DIAGNOSIS — S32.409A CLOSED DISPLACED FRACTURE OF ACETABULUM, UNSPECIFIED PORTION OF ACETABULUM, UNSPECIFIED LATERALITY, INITIAL ENCOUNTER (HCC): Primary | ICD-10-CM

## 2020-02-13 DIAGNOSIS — S32.9XXA CLOSED NONDISPLACED FRACTURE OF PELVIS, UNSPECIFIED PART OF PELVIS, INITIAL ENCOUNTER (HCC): ICD-10-CM

## 2020-02-13 LAB
ALBUMIN SERPL-MCNC: 2 G/DL (ref 3.5–5)
ALBUMIN/GLOB SERPL: 0.4 {RATIO} (ref 1.1–2.2)
ALP SERPL-CCNC: 68 U/L (ref 45–117)
ALT SERPL-CCNC: 19 U/L (ref 12–78)
ANION GAP SERPL CALC-SCNC: 6 MMOL/L (ref 5–15)
AST SERPL-CCNC: 52 U/L (ref 15–37)
BASOPHILS # BLD: 0.1 K/UL (ref 0–0.1)
BASOPHILS NFR BLD: 0 % (ref 0–1)
BILIRUB SERPL-MCNC: 1.2 MG/DL (ref 0.2–1)
BUN SERPL-MCNC: 17 MG/DL (ref 6–20)
BUN/CREAT SERPL: 20 (ref 12–20)
CALCIUM SERPL-MCNC: 8.1 MG/DL (ref 8.5–10.1)
CHLORIDE SERPL-SCNC: 104 MMOL/L (ref 97–108)
CO2 SERPL-SCNC: 27 MMOL/L (ref 21–32)
COMMENT, HOLDF: NORMAL
CREAT SERPL-MCNC: 0.84 MG/DL (ref 0.55–1.02)
DIFFERENTIAL METHOD BLD: ABNORMAL
EOSINOPHIL # BLD: 0 K/UL (ref 0–0.4)
EOSINOPHIL NFR BLD: 0 % (ref 0–7)
ERYTHROCYTE [DISTWIDTH] IN BLOOD BY AUTOMATED COUNT: 17.4 % (ref 11.5–14.5)
GLOBULIN SER CALC-MCNC: 5.3 G/DL (ref 2–4)
GLUCOSE SERPL-MCNC: 110 MG/DL (ref 65–100)
HCT VFR BLD AUTO: 36.8 % (ref 35–47)
HGB BLD-MCNC: 11.6 G/DL (ref 11.5–16)
IMM GRANULOCYTES # BLD AUTO: 0.2 K/UL (ref 0–0.04)
IMM GRANULOCYTES NFR BLD AUTO: 1 % (ref 0–0.5)
LYMPHOCYTES # BLD: 1.7 K/UL (ref 0.8–3.5)
LYMPHOCYTES NFR BLD: 10 % (ref 12–49)
MCH RBC QN AUTO: 27.6 PG (ref 26–34)
MCHC RBC AUTO-ENTMCNC: 31.5 G/DL (ref 30–36.5)
MCV RBC AUTO: 87.4 FL (ref 80–99)
MONOCYTES # BLD: 0.9 K/UL (ref 0–1)
MONOCYTES NFR BLD: 5 % (ref 5–13)
NEUTS SEG # BLD: 13.8 K/UL (ref 1.8–8)
NEUTS SEG NFR BLD: 84 % (ref 32–75)
NRBC # BLD: 0 K/UL (ref 0–0.01)
NRBC BLD-RTO: 0 PER 100 WBC
PLATELET # BLD AUTO: 312 K/UL (ref 150–400)
PMV BLD AUTO: 9.2 FL (ref 8.9–12.9)
POTASSIUM SERPL-SCNC: 4.3 MMOL/L (ref 3.5–5.1)
PROT SERPL-MCNC: 7.3 G/DL (ref 6.4–8.2)
RBC # BLD AUTO: 4.21 M/UL (ref 3.8–5.2)
SAMPLES BEING HELD,HOLD: NORMAL
SODIUM SERPL-SCNC: 137 MMOL/L (ref 136–145)
WBC # BLD AUTO: 16.5 K/UL (ref 3.6–11)

## 2020-02-13 PROCEDURE — 96372 THER/PROPH/DIAG INJ SC/IM: CPT

## 2020-02-13 PROCEDURE — 36415 COLL VENOUS BLD VENIPUNCTURE: CPT

## 2020-02-13 PROCEDURE — 74011000258 HC RX REV CODE- 258: Performed by: INTERNAL MEDICINE

## 2020-02-13 PROCEDURE — 80053 COMPREHEN METABOLIC PANEL: CPT

## 2020-02-13 PROCEDURE — 65270000029 HC RM PRIVATE

## 2020-02-13 PROCEDURE — 85025 COMPLETE CBC W/AUTO DIFF WBC: CPT

## 2020-02-13 PROCEDURE — 72131 CT LUMBAR SPINE W/O DYE: CPT

## 2020-02-13 PROCEDURE — 72170 X-RAY EXAM OF PELVIS: CPT

## 2020-02-13 PROCEDURE — 72192 CT PELVIS W/O DYE: CPT

## 2020-02-13 PROCEDURE — 72020 X-RAY EXAM OF SPINE 1 VIEW: CPT

## 2020-02-13 PROCEDURE — 96376 TX/PRO/DX INJ SAME DRUG ADON: CPT

## 2020-02-13 PROCEDURE — 96374 THER/PROPH/DIAG INJ IV PUSH: CPT

## 2020-02-13 PROCEDURE — 93005 ELECTROCARDIOGRAM TRACING: CPT

## 2020-02-13 PROCEDURE — 72190 X-RAY EXAM OF PELVIS: CPT

## 2020-02-13 PROCEDURE — 99284 EMERGENCY DEPT VISIT MOD MDM: CPT

## 2020-02-13 PROCEDURE — 74011250636 HC RX REV CODE- 250/636: Performed by: EMERGENCY MEDICINE

## 2020-02-13 PROCEDURE — 74011250637 HC RX REV CODE- 250/637: Performed by: INTERNAL MEDICINE

## 2020-02-13 PROCEDURE — 73551 X-RAY EXAM OF FEMUR 1: CPT

## 2020-02-13 PROCEDURE — 74011250636 HC RX REV CODE- 250/636: Performed by: PHYSICIAN ASSISTANT

## 2020-02-13 RX ORDER — MORPHINE SULFATE 2 MG/ML
2 INJECTION, SOLUTION INTRAMUSCULAR; INTRAVENOUS
Status: DISCONTINUED | OUTPATIENT
Start: 2020-02-13 | End: 2020-02-13

## 2020-02-13 RX ORDER — ACETAMINOPHEN 325 MG/1
650 TABLET ORAL
Status: DISCONTINUED | OUTPATIENT
Start: 2020-02-13 | End: 2020-02-17 | Stop reason: HOSPADM

## 2020-02-13 RX ORDER — NALOXONE HYDROCHLORIDE 0.4 MG/ML
0.4 INJECTION, SOLUTION INTRAMUSCULAR; INTRAVENOUS; SUBCUTANEOUS
Status: DISCONTINUED | OUTPATIENT
Start: 2020-02-13 | End: 2020-02-17 | Stop reason: HOSPADM

## 2020-02-13 RX ORDER — HYDROMORPHONE HYDROCHLORIDE 2 MG/ML
1 INJECTION, SOLUTION INTRAMUSCULAR; INTRAVENOUS; SUBCUTANEOUS
Status: DISCONTINUED | OUTPATIENT
Start: 2020-02-13 | End: 2020-02-14

## 2020-02-13 RX ORDER — MORPHINE SULFATE 2 MG/ML
2 INJECTION, SOLUTION INTRAMUSCULAR; INTRAVENOUS ONCE
Status: COMPLETED | OUTPATIENT
Start: 2020-02-13 | End: 2020-02-13

## 2020-02-13 RX ORDER — MORPHINE SULFATE 15 MG/1
15 TABLET, FILM COATED, EXTENDED RELEASE ORAL EVERY 12 HOURS
COMMUNITY
End: 2020-02-17

## 2020-02-13 RX ORDER — HYDROMORPHONE HYDROCHLORIDE 1 MG/ML
0.5 INJECTION, SOLUTION INTRAMUSCULAR; INTRAVENOUS; SUBCUTANEOUS
Status: DISCONTINUED | OUTPATIENT
Start: 2020-02-13 | End: 2020-02-14

## 2020-02-13 RX ORDER — SODIUM CHLORIDE 0.9 % (FLUSH) 0.9 %
5-40 SYRINGE (ML) INJECTION AS NEEDED
Status: DISCONTINUED | OUTPATIENT
Start: 2020-02-13 | End: 2020-02-17 | Stop reason: HOSPADM

## 2020-02-13 RX ORDER — ALPRAZOLAM 0.25 MG/1
0.25 TABLET ORAL 2 TIMES DAILY
Status: DISCONTINUED | OUTPATIENT
Start: 2020-02-14 | End: 2020-02-17 | Stop reason: HOSPADM

## 2020-02-13 RX ORDER — SODIUM CHLORIDE 0.9 % (FLUSH) 0.9 %
5-40 SYRINGE (ML) INJECTION EVERY 8 HOURS
Status: DISCONTINUED | OUTPATIENT
Start: 2020-02-13 | End: 2020-02-17 | Stop reason: HOSPADM

## 2020-02-13 RX ORDER — MORPHINE SULFATE 2 MG/ML
2 INJECTION, SOLUTION INTRAMUSCULAR; INTRAVENOUS
Status: COMPLETED | OUTPATIENT
Start: 2020-02-13 | End: 2020-02-13

## 2020-02-13 RX ORDER — AMOXICILLIN 250 MG
1 CAPSULE ORAL
COMMUNITY

## 2020-02-13 RX ORDER — TRAMADOL HYDROCHLORIDE 50 MG/1
25 TABLET ORAL
Status: DISCONTINUED | OUTPATIENT
Start: 2020-02-13 | End: 2020-02-17 | Stop reason: HOSPADM

## 2020-02-13 RX ORDER — AMOXICILLIN 250 MG
1 CAPSULE ORAL
Status: DISCONTINUED | OUTPATIENT
Start: 2020-02-13 | End: 2020-02-15

## 2020-02-13 RX ORDER — MORPHINE SULFATE 2 MG/ML
1 INJECTION, SOLUTION INTRAMUSCULAR; INTRAVENOUS
Status: DISCONTINUED | OUTPATIENT
Start: 2020-02-13 | End: 2020-02-13

## 2020-02-13 RX ORDER — DEXTROSE MONOHYDRATE AND SODIUM CHLORIDE 5; .9 G/100ML; G/100ML
50 INJECTION, SOLUTION INTRAVENOUS CONTINUOUS
Status: DISCONTINUED | OUTPATIENT
Start: 2020-02-13 | End: 2020-02-15

## 2020-02-13 RX ORDER — ONDANSETRON 2 MG/ML
4 INJECTION INTRAMUSCULAR; INTRAVENOUS
Status: DISCONTINUED | OUTPATIENT
Start: 2020-02-13 | End: 2020-02-17 | Stop reason: HOSPADM

## 2020-02-13 RX ORDER — ALPRAZOLAM 0.25 MG/1
0.25 TABLET ORAL 2 TIMES DAILY
Status: ON HOLD | COMMUNITY
End: 2020-02-17 | Stop reason: SDUPTHER

## 2020-02-13 RX ORDER — MORPHINE SULFATE 15 MG/1
15 TABLET, FILM COATED, EXTENDED RELEASE ORAL EVERY 12 HOURS
Status: DISCONTINUED | OUTPATIENT
Start: 2020-02-13 | End: 2020-02-17

## 2020-02-13 RX ADMIN — MORPHINE SULFATE 15 MG: 15 TABLET, FILM COATED, EXTENDED RELEASE ORAL at 23:18

## 2020-02-13 RX ADMIN — TRAMADOL HYDROCHLORIDE 25 MG: 50 TABLET, FILM COATED ORAL at 22:32

## 2020-02-13 RX ADMIN — MORPHINE SULFATE 2 MG: 2 INJECTION, SOLUTION INTRAMUSCULAR; INTRAVENOUS at 18:21

## 2020-02-13 RX ADMIN — MORPHINE SULFATE 2 MG: 2 INJECTION, SOLUTION INTRAMUSCULAR; INTRAVENOUS at 15:21

## 2020-02-13 RX ADMIN — HYDROMORPHONE HYDROCHLORIDE 1 MG: 2 INJECTION, SOLUTION INTRAMUSCULAR; INTRAVENOUS; SUBCUTANEOUS at 21:11

## 2020-02-13 RX ADMIN — ALPRAZOLAM 0.25 MG: 0.25 TABLET ORAL at 23:18

## 2020-02-13 RX ADMIN — Medication 10 ML: at 23:21

## 2020-02-13 RX ADMIN — DEXTROSE MONOHYDRATE AND SODIUM CHLORIDE 50 ML/HR: 5; .9 INJECTION, SOLUTION INTRAVENOUS at 23:20

## 2020-02-13 RX ADMIN — SODIUM CHLORIDE 1000 ML: 900 INJECTION, SOLUTION INTRAVENOUS at 19:30

## 2020-02-13 RX ADMIN — MORPHINE SULFATE 2 MG: 2 INJECTION, SOLUTION INTRAMUSCULAR; INTRAVENOUS at 20:27

## 2020-02-13 NOTE — ED TRIAGE NOTES
EMS reports pt normally does not get up without assistance but today attempted to get up on her own without any assistance or walker and had a GLF onto carpeted floor. Pt now complains of lower back pain. Currently on hospice due to hx of lung cancer. Takes oxycodone for chronic pain.

## 2020-02-13 NOTE — ED PROVIDER NOTES
80 y.o. female with past medical history significant for HTN, TIA, Atrial fibrillation, Arthritis, COPD, and AAA who presents from home via EMS for evaluation s/p GLF. Of note, patient uses walker and wheelchair to get around at home at baseline. Patient states that Melissa Lira was trying to get up today then she fell on the ground and was not able to get up\" prompting her to \"crawl to the phone\" and call 911. Of note, patient states that \"she fell on her back and did not injure her head or have LOC. \" Patient presents to [facility] for evaluation s/p GLF. Patient endorses accompanying lower back pain, leg pain, right hip pain, and abdominal pain. Patient denies head or neck injury. Patient denies headache, fever, numbness. There are no other acute medical concerns at this time. Social hx: Former smoker, No alcohol use, No illicit drug use. Patient lives with her son at home. PCP: Alexandra Chandler MD    Note written by Monalisa Osman, as dictated by Maxime Doshi MD 2:43 PM      The history is provided by the patient and medical records. No  was used.         Past Medical History:   Diagnosis Date    AAA (abdominal aortic aneurysm) (HCC)     Arthritis     ra, osteoporosis, osteoarthritis    Atrial fibrillation (HCC)     COPD     HTN (hypertension)     Lung cancer (HCC)     RA [facility]umatoid [facility]) [facility]     Smoker     TIA (transient ischemic attack)     TIA 9/30/16       Past Surgical History:   Procedure Laterality Date    HX CHOLECYSTECTOMY      HX HYSTERECTOMY      HX ORTHOPAEDIC      carpal tunnel, wrist surgery    DE INS NEW/RPLCMT PRM PM W/TRANSV ELTRD ATRIAL&VENT N/A 7/26/2019    INSERT PPM DUAL performed by Cherry Mehta MD at [facility]         Family History:   Problem Relation Age of Onset    Hypertension Father        Social History     Socioeconomic History    Marital status: SINGLE     Spouse name: Not on file    Number of children: Not on file    Years of education: Not on file    Highest education level: Not on file   Occupational History    Not on file   Social Needs    Financial resource strain: Not on file    Food insecurity:     Worry: Not on file     Inability: Not on file    Transportation needs:     Medical: Not on file     Non-medical: Not on file   Tobacco Use    Smoking status: Former Smoker     Years: 50.00     Last attempt to quit: 2018     Years since quittin.4    Smokeless tobacco: Never Used   Substance and Sexual Activity    Alcohol use: No    Drug use: No    Sexual activity: Not Currently   Lifestyle    Physical activity:     Days per week: Not on file     Minutes per session: Not on file    Stress: Not on file   Relationships    Social connections:     Talks on phone: Not on file     Gets together: Not on file     Attends Cheondoism service: Not on file     Active member of club or organization: Not on file     Attends meetings of clubs or organizations: Not on file     Relationship status: Not on file    Intimate partner violence:     Fear of current or ex partner: Not on file     Emotionally abused: Not on file     Physically abused: Not on file     Forced sexual activity: Not on file   Other Topics Concern    Not on file   Social History Narrative    Not on file         ALLERGIES: Codeine    Review of Systems   Eyes: Negative for visual disturbance. Respiratory: Negative for cough, shortness of breath and wheezing. Cardiovascular: Negative for leg swelling. Gastrointestinal: Negative for diarrhea. Musculoskeletal: Negative for neck stiffness. Right hip pain   Skin: Negative for rash. Neurological: Negative. Negative for syncope. Psychiatric/Behavioral: Negative for confusion. All other systems reviewed and are negative.       Vitals:    20 1339 20 1415   BP: 120/64    Pulse: 81    Resp: 18    Temp: 98.2 °F (36.8 °C)    SpO2: 94% 94%   Weight: 60.8 kg (134 lb)    Height: 5' 4\" (1.626 m) Physical Exam  Constitutional:       General: She is not in acute distress. Appearance: She is well-developed. HENT:      Head: Normocephalic. Eyes:      Pupils: Pupils are equal, round, and reactive to light. Neck:      Musculoskeletal: Normal range of motion. Cardiovascular:      Rate and Rhythm: Normal rate. Heart sounds: No murmur. Pulmonary:      Effort: Pulmonary effort is normal.      Breath sounds: Normal breath sounds. Abdominal:      Palpations: Abdomen is soft. Tenderness: There is abdominal tenderness. Comments: Mild periumbilical tenderness. Musculoskeletal:      Comments: Pain on range of motion to the right hip. Lower lumbar tenderness. Skin:     General: Skin is warm and dry. Capillary Refill: Capillary refill takes less than 2 seconds. Neurological:      Mental Status: She is alert. Psychiatric:         Behavior: Behavior normal.     Note written by Monalisa Blackwell, as dictated by Dashawn Ng MD 2:43 PM      MDM       Procedures  ED EKG interpretation:  Rhythm: normal sinus rhythm; and regular . Rate (approx.): 96 bpm; ST/T wave: nonspecific ST changes. Note written by Monalisa Blackwell, as dictated by Dashawn Ng MD 2:43 PM       Hospitalist Perfect Serve for Admission  8:08 PM    ED Room Number: ER12/12  Patient Name and age:  Kalyan Romero 80 y.o.  female  Working Diagnosis:   1. Closed displaced fracture of acetabulum, unspecified portion of acetabulum, unspecified laterality, initial encounter ([address]    2. Closed nondisplaced fracture of pelvis, unspecified part of pelvis, initial encounter ([address]      Readmission: no  Isolation Requirements:  no  Recommended Level of Care:  med/surg  Code Status:  Full Code  Department:[facility] - [phone]  Other:  Raffi Ray. Has acetabular and ramus fx. Ortho consulted . They dont'think surgery is possible. Advised hospitalist admit.

## 2020-02-14 ENCOUNTER — APPOINTMENT (OUTPATIENT)
Dept: GENERAL RADIOLOGY | Age: 85
DRG: 543 | End: 2020-02-14
Attending: INTERNAL MEDICINE
Payer: MEDICARE

## 2020-02-14 PROBLEM — T14.8XXA HEMATOMA: Status: RESOLVED | Noted: 2019-07-20 | Resolved: 2020-02-14

## 2020-02-14 PROBLEM — D64.9 ANEMIA: Status: RESOLVED | Noted: 2018-12-04 | Resolved: 2020-02-14
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
## 2020-02-14 PROBLEM — C34.90 LUNG CANCER (HCC): Status: ACTIVE | Noted: 2020-02-14

## 2020-02-14 LAB
ANION GAP SERPL CALC-SCNC: 5 MMOL/L (ref 5–15)
ATRIAL RATE: 96 BPM
BASOPHILS # BLD: 0.1 K/UL (ref 0–0.1)
BASOPHILS NFR BLD: 1 % (ref 0–1)
BNP SERPL-MCNC: 4123 PG/ML
BUN SERPL-MCNC: 17 MG/DL (ref 6–20)
BUN/CREAT SERPL: 22 (ref 12–20)
CALCIUM SERPL-MCNC: 7.3 MG/DL (ref 8.5–10.1)
CALCULATED P AXIS, ECG09: 76 DEGREES
CALCULATED R AXIS, ECG10: 13 DEGREES
CALCULATED T AXIS, ECG11: 75 DEGREES
CHLORIDE SERPL-SCNC: 108 MMOL/L (ref 97–108)
CO2 SERPL-SCNC: 26 MMOL/L (ref 21–32)
CREAT SERPL-MCNC: 0.77 MG/DL (ref 0.55–1.02)
DIAGNOSIS, 93000: NORMAL
DIFFERENTIAL METHOD BLD: ABNORMAL
EOSINOPHIL # BLD: 0.1 K/UL (ref 0–0.4)
EOSINOPHIL NFR BLD: 1 % (ref 0–7)
ERYTHROCYTE [DISTWIDTH] IN BLOOD BY AUTOMATED COUNT: 17.4 % (ref 11.5–14.5)
GLUCOSE SERPL-MCNC: 104 MG/DL (ref 65–100)
HCT VFR BLD AUTO: 33.4 % (ref 35–47)
HGB BLD-MCNC: 10.5 G/DL (ref 11.5–16)
IMM GRANULOCYTES # BLD AUTO: 0.1 K/UL (ref 0–0.04)
IMM GRANULOCYTES NFR BLD AUTO: 1 % (ref 0–0.5)
LYMPHOCYTES # BLD: 1.8 K/UL (ref 0.8–3.5)
LYMPHOCYTES NFR BLD: 18 % (ref 12–49)
MCH RBC QN AUTO: 27.5 PG (ref 26–34)
MCHC RBC AUTO-ENTMCNC: 31.4 G/DL (ref 30–36.5)
MCV RBC AUTO: 87.4 FL (ref 80–99)
MONOCYTES # BLD: 0.8 K/UL (ref 0–1)
MONOCYTES NFR BLD: 8 % (ref 5–13)
NEUTS SEG # BLD: 7.4 K/UL (ref 1.8–8)
NEUTS SEG NFR BLD: 71 % (ref 32–75)
NRBC # BLD: 0 K/UL (ref 0–0.01)
NRBC BLD-RTO: 0 PER 100 WBC
P-R INTERVAL, ECG05: 198 MS
PLATELET # BLD AUTO: 294 K/UL (ref 150–400)
PMV BLD AUTO: 9.5 FL (ref 8.9–12.9)
POTASSIUM SERPL-SCNC: 3.9 MMOL/L (ref 3.5–5.1)
Q-T INTERVAL, ECG07: 358 MS
QRS DURATION, ECG06: 84 MS
QTC CALCULATION (BEZET), ECG08: 452 MS
RBC # BLD AUTO: 3.82 M/UL (ref 3.8–5.2)
SODIUM SERPL-SCNC: 139 MMOL/L (ref 136–145)
VENTRICULAR RATE, ECG03: 96 BPM
WBC # BLD AUTO: 10.3 K/UL (ref 3.6–11)

## 2020-02-14 PROCEDURE — 51798 US URINE CAPACITY MEASURE: CPT

## 2020-02-14 PROCEDURE — 74011250636 HC RX REV CODE- 250/636: Performed by: PHYSICIAN ASSISTANT

## 2020-02-14 PROCEDURE — 74011250637 HC RX REV CODE- 250/637: Performed by: INTERNAL MEDICINE

## 2020-02-14 PROCEDURE — 74011250636 HC RX REV CODE- 250/636: Performed by: INTERNAL MEDICINE

## 2020-02-14 PROCEDURE — 71045 X-RAY EXAM CHEST 1 VIEW: CPT

## 2020-02-14 PROCEDURE — 77030041247 HC PROTECTOR HEEL HEELMEDIX MDII -B

## 2020-02-14 PROCEDURE — 65270000029 HC RM PRIVATE

## 2020-02-14 PROCEDURE — 80048 BASIC METABOLIC PNL TOTAL CA: CPT

## 2020-02-14 PROCEDURE — 97163 PT EVAL HIGH COMPLEX 45 MIN: CPT

## 2020-02-14 PROCEDURE — 36415 COLL VENOUS BLD VENIPUNCTURE: CPT

## 2020-02-14 PROCEDURE — 74011000258 HC RX REV CODE- 258: Performed by: INTERNAL MEDICINE

## 2020-02-14 PROCEDURE — 97165 OT EVAL LOW COMPLEX 30 MIN: CPT

## 2020-02-14 PROCEDURE — 85025 COMPLETE CBC W/AUTO DIFF WBC: CPT

## 2020-02-14 PROCEDURE — 77030019905 HC CATH URETH INTMIT MDII -A

## 2020-02-14 PROCEDURE — 83880 ASSAY OF NATRIURETIC PEPTIDE: CPT

## 2020-02-14 PROCEDURE — 94760 N-INVAS EAR/PLS OXIMETRY 1: CPT

## 2020-02-14 PROCEDURE — 97530 THERAPEUTIC ACTIVITIES: CPT

## 2020-02-14 PROCEDURE — 97535 SELF CARE MNGMENT TRAINING: CPT

## 2020-02-14 RX ORDER — IPRATROPIUM BROMIDE AND ALBUTEROL SULFATE 2.5; .5 MG/3ML; MG/3ML
3 SOLUTION RESPIRATORY (INHALATION)
Status: DISCONTINUED | OUTPATIENT
Start: 2020-02-14 | End: 2020-02-17 | Stop reason: HOSPADM

## 2020-02-14 RX ORDER — HYDROMORPHONE HYDROCHLORIDE 2 MG/ML
2 INJECTION, SOLUTION INTRAMUSCULAR; INTRAVENOUS; SUBCUTANEOUS
Status: DISCONTINUED | OUTPATIENT
Start: 2020-02-14 | End: 2020-02-17 | Stop reason: HOSPADM

## 2020-02-14 RX ORDER — MORPHINE SULFATE 15 MG/1
15 TABLET ORAL
Status: DISCONTINUED | OUTPATIENT
Start: 2020-02-14 | End: 2020-02-15

## 2020-02-14 RX ADMIN — HYDROMORPHONE HYDROCHLORIDE 2 MG: 2 INJECTION, SOLUTION INTRAMUSCULAR; INTRAVENOUS; SUBCUTANEOUS at 10:54

## 2020-02-14 RX ADMIN — HYDROMORPHONE HYDROCHLORIDE 1 MG: 2 INJECTION, SOLUTION INTRAMUSCULAR; INTRAVENOUS; SUBCUTANEOUS at 07:18

## 2020-02-14 RX ADMIN — HYDROMORPHONE HYDROCHLORIDE 1 MG: 2 INJECTION, SOLUTION INTRAMUSCULAR; INTRAVENOUS; SUBCUTANEOUS at 03:12

## 2020-02-14 RX ADMIN — Medication 10 ML: at 21:07

## 2020-02-14 RX ADMIN — ALPRAZOLAM 0.25 MG: 0.25 TABLET ORAL at 09:14

## 2020-02-14 RX ADMIN — HYDROMORPHONE HYDROCHLORIDE 2 MG: 2 INJECTION, SOLUTION INTRAMUSCULAR; INTRAVENOUS; SUBCUTANEOUS at 14:06

## 2020-02-14 RX ADMIN — Medication 10 ML: at 16:24

## 2020-02-14 RX ADMIN — HYDROMORPHONE HYDROCHLORIDE 1 MG: 2 INJECTION, SOLUTION INTRAMUSCULAR; INTRAVENOUS; SUBCUTANEOUS at 00:16

## 2020-02-14 RX ADMIN — MORPHINE SULFATE 15 MG: 15 TABLET, FILM COATED, EXTENDED RELEASE ORAL at 21:07

## 2020-02-14 RX ADMIN — Medication 10 ML: at 06:00

## 2020-02-14 RX ADMIN — MORPHINE SULFATE 15 MG: 15 TABLET, FILM COATED, EXTENDED RELEASE ORAL at 09:14

## 2020-02-14 RX ADMIN — DEXTROSE MONOHYDRATE AND SODIUM CHLORIDE 50 ML/HR: 5; .9 INJECTION, SOLUTION INTRAVENOUS at 19:37

## 2020-02-14 RX ADMIN — HYDROMORPHONE HYDROCHLORIDE 2 MG: 2 INJECTION, SOLUTION INTRAMUSCULAR; INTRAVENOUS; SUBCUTANEOUS at 18:32

## 2020-02-14 RX ADMIN — ALPRAZOLAM 0.25 MG: 0.25 TABLET ORAL at 20:17

## 2020-02-14 RX ADMIN — MORPHINE SULFATE 15 MG: 15 TABLET ORAL at 16:20

## 2020-02-14 NOTE — H&P
[facility] [address]  HISTORY AND PHYSICAL    Name:  Tate Barrow  MR#:  [id number]  :  1931  ACCOUNT #:  [de-identified]     DATE OF SERVICE:  2020    PRIMARY CARE PHYSICIAN:  Sonali Christianson MD    CHIEF COMPLAINT:  Fall. HISTORY OF PRESENT ILLNESS: Patient's daughter at the bedside contributed to the history. An 19-year-old female with a past medical history significant for primary hypertension, transient ischemic attack, atrial fibrillation, not on chronic anticoagulation, arthritis, COPD, and abdominal aortic aneurysm, was brought to the emergency room from home via EMS for a fall. Patient stated that she normally mobilizes with the aid of a wheelchair or walker at home. However, whilst trying to push herself up to the walker tripped and fell backwards on the afternoon of the emergency room presentation, 2020. Thereafter,  patient noted excruciating right leg and hip pain. The patient denied hitting her head with the fall. Patient also denied any associated headaches, dizziness, visual deficits, chest pains, palpitations, shortness of breath, cough, sputum production, fevers, chills, nausea, vomiting, abdominal pain, bladder or bowel irregularities. PAST MEDICAL HISTORY:  1.  Primary hypertension. 2.  Transient ischemic attack. 3.  Atrial fibrillation. 4.  Abdominal aortic aneurysm. 5.  COPD. 6.  Lung cancer. 7.  Rheumatoid arthritis. PAST SURGICAL HISTORY:  1. Cholecystectomy. 2.  Hysterectomy. 3.  Carpal tunnel surgery. 4.  Pacemaker placement. ALLERGIES:  CODEINE. HOME MEDICATIONS:  1. Tylenol 500 mg p.o. every 6 hours as needed for pain. 2.  Xanax 0.25 mg p.o. twice a day. 3.  Morphine CR 15 mg p.o. every 12 hours. 4.  Senna/docusate 8.6/50 mg one tablet p.o. twice a day as needed for constipation. SOCIAL HISTORY:  Significant for living at home. Mobilizes with the aid of wheelchair and front-wheeled walker.   Is assisted with activities and instrumental activities of daily living by her nephew. Is a former smoker. Denies any alcohol use disorder. FAMILY HISTORY:  Reviewed and positive for hypertension on the paternal side of the family. No family history of premature coronary artery disease or death. No family history of malignancy. REVIEW OF SYSTEMS:  A complete system review conducted, essentially negative. Otherwise, as outlined in the history of the presenting illness. PHYSICAL EXAMINATION:  GENERAL:  The patient was awake, alert but in distress due to the severe pain. VITAL SIGNS:  Blood pressure of 128/89, heart rate 106, respiratory rate 18, afebrile, saturating 94% on room air. HEAD:  Normocephalic. Pupils equal and reactive to light without any nystagmus. ENT:  Ear, nose, and throat clear. NECK:  No jugular venous distention. No carotid bruits. CARDIOVASCULAR:  S1, S2 present. RESPIRATORY:  Lungs with decreased air entry at the bases without any crepitations or rhonchi. GENITOURINARY:  Bowel sounds present. The abdomen is soft, nontender. No rebound, no guarding. GENITOURINARY:  No suprapubic or flank tenderness. MUSCULOSKELETAL:  No asymmetry of the extremities, however, decreased range of motion at the bilateral hip joints. About 1+ bipedal edema. SKIN:  No rashes, petechiae, or ecchymosis. CNS:  Awake, alert; oriented to place, date, person. LABORATORY DATA/RADIOGRAPHIC FINDINGS:  CT of the lumbar spine with multiple age indeterminate compression deformities in the lumbar spine, prominent secondary degenerative changes, probably chronic. CT of the pelvis with acute fractures of the right inferior pubic ramus and acetabulum on the right, mild displacement. CBC with a WBC of 16.5, 84% polys, hemoglobin 11.6, hematocrit 36.8 with a platelet count of 657. MCV of 87.4. Metabolic panel with a sodium of 137, potassium 4.3, chloride of 104, bicarb 27, BUN of 17, creatinine of 0.84.   Glucose of 110, calcium of 8.1 with an albumin of 2. An ALT of 19 with an AST of 62, alkaline phosphatase of 68. A 12-lead EKG personally reviewed that showed sinus rhythm with some premature supraventricular complexes at 96 per minute. ASSESSMENT AND PLAN:  1.  Musculoskeletal:  Closed nondisplaced pelvic fracture and closed displaced acetabular fracture secondary to fall at home. Probable severe degenerative disk disease with chronic lumbar compression deformities. Probable underlying severe osteoporosis. The patient has been seen by the Orthopedic Surgical Service with recommendations for conservative therapy at this time. For follow up imaging. Physical and occupational therapy evaluations. 2.  Infectious disease:  Leukocytosis with  left shift without any other stigmata of sepsis. Will review urinalysis and chest x-ray. Will monitor off anti-infective therapy for now. 3.  Cardiovascular:  Primary hypertension. Paroxysmal atrial fibrillation. History of pacemaker placement. No acute ischemia noted. 4.  Respiratory:  Chronic obstructive pulmonary disease with prior tobacco use. No acute exacerbation. Oxygen as needed, nebulizers, inhalers and incentive spirometry. 5.  Vascular:  History of abdominal aortic aneurysm. 6.  Rheumatology:  History of rheumatoid arthritis, treated with methotrexate and prednisone in the past.  7.  Prophylaxis for deep venous thrombosis. 8.  Directives:  Full code with a very guarded prognosis. To be readdressed with the patient and family within 24 hours.     In summary, an 80-year-old female with a past medical history significant for primary hypertension, transient ischemic attack, paroxysmal atrial fibrillation, pacemaker placement, osteo and rheumatoid arthritis, COPD with prior tobacco use and abdominal aortic aneurysm, is being admitted to the inpatient level for fall with closed nondisplaced pelvic fracture and closed displaced acetabular fracture with probable underlying severe osteoporosis and degenerative disk disease. This  patient is at increased risk for further decompensation and will benefit from ongoing close monitoring with optimization of pain control, physical and occupational therapy evaluations and care management consult for post acute care facility placement for ongoing therapies. The entire admission plan discussed in detail with the patient and patient's daughter, Edith Baker, who was at the bedside and can be reached at cell [phone]. All questions and concerns were addressed. The patient's functional status prior to admission significant for being able to mobilize with the aid of a front-wheeled walker and wheelchair. Total time for admission, 45 minutes, of which at least 50% of the time was spent in plan of care and counseling of the patient.       Amber Palacios MD      [id number]  D:  02/13/2020 22:27  T:  02/14/2020 1:19  JOB #:  [id number]

## 2020-02-14 NOTE — PROGRESS NOTES
Ortho Progress Note        S:  Sitting up in bed. Pain controlled. O:  I examined pt's RLE. + knee pain with flexion, + R groin pain with rotation of the hip. NVI distally BLE's.    A:    1. Right minimally displaced Acetabular fracture  2. Right Pubic Rami fracture    P:  Non operative management. TTWB RLE only  Discussed with pt and daughter at bedside. F/u outpatient in 10-14 days. Dr. Rosalind Scott agrees with plan. Thank you for allowing us to take part in this patients care.     JAVIER Gale-C  Orthopaedic Surgery PA

## 2020-02-14 NOTE — PROGRESS NOTES
Images and records reviewed. Patient with right pelvic fracture with both column T type acetabulum fracture without significant displacement. With fracture pattern, age, arthritis and medical comorbidities recommend non-operative management. Toe touch weight bearing right leg. PT/OT   Pelvic xrays AP and judet views for surveillance of the fracture in the out patient setting. Will need follow up in 1 week for surveillance xrays. Vitamin D and calcium   Ortho team to see in the AM.      Bon Taylor MD      [facility]  Yifan Crow MD  [facility] [phone]  Medical Staff : Drake Man/  Office : [phone] ext [phone]

## 2020-02-14 NOTE — PROGRESS NOTES
Occupational Therapy:  02/14/20    Orders received, chart reviewed and patient evaluated by occupational therapy. Pending progression with skilled acute occupational therapy, recommend:  Therapy up to 5 days/week in SNF setting pending pt/family goals of care    Recommend with nursing patient to complete as able in order to maintain strength, endurance and independence: elevate HOB/modified chair position for meals and as tolerated, bedpan for toileting. Thank you for your assistance. Full evaluation to follow.      Thank you,   Caridad Browne, OTR/L

## 2020-02-14 NOTE — PROGRESS NOTES
Spoke with Dr. Porsha Kimball regarding pain management for patient. Orders for dilaudid and morphine are ok to alternate every two hours if need and patient is stable with systolic blood pressure over 100 and appropriate respiratory rate.

## 2020-02-14 NOTE — PROGRESS NOTES
[facility]: MED RECONCILIATION      Medications added:   MS Contin 15mg BID  Xanax 0.25mg BID  Senna-Docusate    Medications removed:  Duo-neb 3ml every 6 hrs prn  Dexamethasone 2mg daily before breakfast  Folic acid 1mg daily  Methotrexate 20mg every saturday    Information obtained from: Daughter, patient, Mayra Calles PMP    Allergies: Codeine    Prior to Admission Medications:     Medication Documentation Review Audit       Reviewed by Mitzi Willams, PHARMD (Pharmacist) on 02/13/20 at 2058      Medication Sig Documenting Provider Last Dose Status Taking?   acetaminophen (TYLENOL) 500 mg tablet Take 500 mg by mouth every six (6) hours as needed for Pain. Provider, Historical  Active    ALPRAZolam (XANAX) 0.25 mg tablet Take 0.25 mg by mouth two (2) times a day. Provider, Historical  Active Yes   morphine CR (MS CONTIN) 15 mg CR tablet Take 15 mg by mouth every twelve (12) hours. Provider, Historical 2/13/2020 AM Active Yes   senna-docusate (SENNA WITH DOCUSATE SODIUM) 8.6-50 mg per tablet Take 1 Tab by mouth two (2) times daily as needed for Constipation. Provider, Historical  Active Yes                    Jasiel Kumar.  Gerson Plate

## 2020-02-14 NOTE — CDMP QUERY
Good Afternoon, This patient admitted Pelvic fracture and closed acetabular fracture after fall. Pt is also noted to have probable underlying severe osteoporosis. If possible, please document in progress notes and d/c summary if you are evaluating and /or treating any of the following: 
 
? Fracture d/t fall and osteoporosis ? Fracture d/t fall only ? Other, please specify ? Clinically unable to determine The medical record reflects the following: 
  Risk Factors: elderly, chronic lumbar compression fractures, Lung Ca, decreased mobility , uses walker or WC at home. Clinical Indicators: Tripped and feel backwards, MD notes Probable severe osteoporosis, Xray with nondisplaced right acetabular fx and nondisplaced right superior and inferior pubic rami fractures. Treatment: Non surgical maangement, pain control, Ortho consulted , TTWB RLE only Thank you, [place], [place] ,[place], [place]

## 2020-02-14 NOTE — WOUND CARE
Wound care Specialty bed request per nursing. Order placed to [facility] staff aware of order placed.   
 
[facility]

## 2020-02-14 NOTE — CONSULTS
ORTHOPEDIC SURGERY CONSULT    Subjective:     Date of Consultation:  2020    Referring Physician:  Dr. Aviva Jeffery is a 80 y.o. female who is being seen for right acetabular and inferior pubic rami fracture. She has a significant past medical history of chronic pain, Lung Ca with metastases, AAA, anemia RA, TIA, and COPD. She presents from home after falling yesterday. Her history of very confusing, but states she tripped over something at home and fell today landing on her right side. She uses a walker and wheelchair at baseline for ambulation. She complained of severe right leg and anterior hip pain at the time. She was then brought to [facility] after the fall. It is unclear where she lives, but sounds like she was pseudo independent. She denies head trauma.          Patient Active Problem List    Diagnosis Date Noted    Pelvic fracture ([facility] [address] 2020    Anxiety 09/15/2019    DNR (do not resuscitate)     Acute right hip pain     Chronic pain syndrome     Physical debility     Goals of care, counseling/discussion     Hematoma 2019    Fall 2019    Abdominal aortic aneurysm (AAA) without rupture ([address] 2018    Anemia 2018    Malignant neoplasm of upper lobe of left lung ([address] 2018    Tibial plateau fracture     Tobacco abuse 2018    Hypertension 2018    TIA (transient ischemic attack) 2016    Slurred speech 2016    Paroxysmal A-fib ([address] 2016    RA (rheumatoid arthritis) ([address] 2016    COPD (chronic obstructive pulmonary disease) ([address] 2016     Family History   Problem Relation Age of Onset    Hypertension Father       Social History     Tobacco Use    Smoking status: Former Smoker     Years: 50.00     Last attempt to quit: 2018     Years since quittin.4    Smokeless tobacco: Never Used   Substance Use Topics    Alcohol use: No     Past Medical History:   Diagnosis Date    AAA (abdominal aortic aneurysm) (HCC)     Arthritis     ra, osteoporosis, osteoarthritis    Atrial fibrillation (HCC)     COPD     HTN (hypertension)     Lung cancer (HCC)     RA [facility]rheumatoid arthritis) ([facility] [address] [facility]     Smoker     TIA (transient ischemic attack)     TIA 9/30/16      Past Surgical History:   Procedure Laterality Date    HX CHOLECYSTECTOMY      HX HYSTERECTOMY      HX ORTHOPAEDIC      carpal tunnel, wrist surgery    HI INS NEW/RPLCMT PRM PM W/TRANSV ELTRD ATRIAL&VENT N/A 7/26/2019    INSERT PPM DUAL performed by Senait Matute MD at [address] [place] [address] LAB      Prior to Admission medications    Medication Sig Start Date End Date Taking? Authorizing Provider   albuterol-ipratropium (DUO-NEB) 2.5 mg-0.5 mg/3 ml nebu 3 mL by Nebulization route every six (6) hours as needed for Other (SOB) for up to 10 doses. 9/15/19   Yashira Hunt MD   acetaminophen (TYLENOL) 500 mg tablet Take 500 mg by mouth every six (6) hours as needed for Pain. Provider, Historical   folic acid (FOLVITE) 1 mg tablet Take 1 mg by mouth daily. Provider, Historical   dexAMETHasone (DECADRON) 2 mg tablet take 1 tablet by mouth once daily after breakfast 7/15/19   Deena Reyes MD   methotrexate (RHEUMATREX) 2.5 mg tablet Take 8 Tabs by mouth Every Saturday. The patient takes 8 tablets which is 20 mg every Saturday 5/25/19   Luis Manuel Grace MD   cholecalciferol (VITAMIN D3) 50,000 unit capsule Take 50,000 Units by mouth Every Friday. Takes on Fridays    Provider, Historical     No current facility-administered medications for this encounter. Current Outpatient Medications   Medication Sig    albuterol-ipratropium (DUO-NEB) 2.5 mg-0.5 mg/3 ml nebu 3 mL by Nebulization route every six (6) hours as needed for Other (SOB) for up to 10 doses.  acetaminophen (TYLENOL) 500 mg tablet Take 500 mg by mouth every six (6) hours as needed for Pain.  folic acid (FOLVITE) 1 mg tablet Take 1 mg by mouth daily.     dexAMETHasone (DECADRON) 2 mg tablet take 1 tablet by mouth once daily after breakfast    methotrexate (RHEUMATREX) 2.5 mg tablet Take 8 Tabs by mouth Every Saturday. The patient takes 8 tablets which is 20 mg every Saturday    cholecalciferol (VITAMIN D3) 50,000 unit capsule Take 50,000 Units by mouth Every Friday. Takes on      Allergies   Allergen Reactions    Codeine Nausea and Vomiting        Review of Systems:  Pertinent items are noted in HPI. Objective:     Patient Vitals for the past 8 hrs:   BP Temp Pulse Resp SpO2 Height Weight   20 1415     94 %     20 1339 120/64 98.2 °F (36.8 °C) 81 18 94 % 5' 4\" (1.626 m) 60.8 kg (134 lb)     Temp (24hrs), Av.2 °F (36.8 °C), Min:98.2 °F (36.8 °C), Max:98.2 °F (36.8 °C)        EXAM: GEN: Well developed and nurtured in NAD  PSYCH:  AAO x 4  MUSC/NEURO: Difficult to exam.  Right hip: pain to palpation anywhere about the right hip and femur. I can not get her supine. There is palpable dorsalis pedis pulse in right foot. Did not test ROM of the hip due to fx. There is no pain with flexion, ER or extension of right arm. No deformity of RUE. Pain to palpation globally in the thoracic and lumbar spine. No palpable stepoff. DF and PF intact BL LE.      IMAGING:  IMPRESSION  IMPRESSION: Acute Fractures right inferior pubic ramus and acetabulum on the  right. Minimal displacement. IMPRESSION: Multiple compression deformities in the lumbar spine, alignment is  satisfactory with no significant canal compromise suspected. Age is  undetermined, most are likely chronic. Prominent secondary degenerative changes.       Data Review   Recent Results (from the past 24 hour(s))   CBC WITH AUTOMATED DIFF    Collection Time: 20  7:29 PM   Result Value Ref Range    WBC 16.5 (H) 3.6 - 11.0 K/uL    RBC 4.21 3.80 - 5.20 M/uL    HGB 11.6 11.5 - 16.0 g/dL    HCT 36.8 35.0 - 47.0 %    MCV 87.4 80.0 - 99.0 FL    MCH 27.6 26.0 - 34.0 PG    MCHC 31.5 30.0 - 36.5 g/dL    RDW 17.4 (H) 11.5 - 14.5 %    PLATELET 162 378 - 173 K/uL    MPV 9.2 8.9 - 12.9 FL    NRBC 0.0 0  WBC    ABSOLUTE NRBC 0.00 0.00 - 0.01 K/uL    NEUTROPHILS 84 (H) 32 - 75 %    LYMPHOCYTES 10 (L) 12 - 49 %    MONOCYTES 5 5 - 13 %    EOSINOPHILS 0 0 - 7 %    BASOPHILS 0 0 - 1 %    IMMATURE GRANULOCYTES 1 (H) 0.0 - 0.5 %    ABS. NEUTROPHILS 13.8 (H) 1.8 - 8.0 K/UL    ABS. LYMPHOCYTES 1.7 0.8 - 3.5 K/UL    ABS. MONOCYTES 0.9 0.0 - 1.0 K/UL    ABS. EOSINOPHILS 0.0 0.0 - 0.4 K/UL    ABS. BASOPHILS 0.1 0.0 - 0.1 K/UL    ABS. IMM. GRANS. 0.2 (H) 0.00 - 0.04 K/UL    DF AUTOMATED     METABOLIC PANEL, COMPREHENSIVE    Collection Time: 02/13/20  7:29 PM   Result Value Ref Range    Sodium 137 136 - 145 mmol/L    Potassium 4.3 3.5 - 5.1 mmol/L    Chloride 104 97 - 108 mmol/L    CO2 27 21 - 32 mmol/L    Anion gap 6 5 - 15 mmol/L    Glucose 110 (H) 65 - 100 mg/dL    BUN 17 6 - 20 MG/DL    Creatinine 0.84 0.55 - 1.02 MG/DL    BUN/Creatinine ratio 20 12 - 20      GFR est AA >60 >60 ml/min/1.73m2    GFR est non-AA >60 >60 ml/min/1.73m2    Calcium 8.1 (L) 8.5 - 10.1 MG/DL    Bilirubin, total 1.2 (H) 0.2 - 1.0 MG/DL    ALT (SGPT) 19 12 - 78 U/L    AST (SGOT) 52 (H) 15 - 37 U/L    Alk. phosphatase 68 45 - 117 U/L    Protein, total 7.3 6.4 - 8.2 g/dL    Albumin 2.0 (L) 3.5 - 5.0 g/dL    Globulin 5.3 (H) 2.0 - 4.0 g/dL    A-G Ratio 0.4 (L) 1.1 - 2.2     SAMPLES BEING HELD    Collection Time: 02/13/20  7:29 PM   Result Value Ref Range    SAMPLES BEING HELD SST.RED.EUSEBIO     COMMENT        Add-on orders for these samples will be processed based on acceptable specimen integrity and analyte stability, which may vary by analyte.    EKG, 12 LEAD, INITIAL    Collection Time: 02/13/20  7:42 PM   Result Value Ref Range    Ventricular Rate 96 BPM    Atrial Rate 96 BPM    P-R Interval 198 ms    QRS Duration 84 ms    Q-T Interval 358 ms    QTC Calculation (Bezet) 452 ms    Calculated P Axis 76 degrees    Calculated R Axis 13 degrees    Calculated T Axis 75 degrees    Diagnosis       Sinus rhythm with premature supraventricular complexes  ST & T wave abnormality, consider lateral ischemia  Abnormal ECG  When compared with ECG of 07-SEP-2019 19:37,  QT has lengthened           Assessment/Plan:   A: 1. [place] T type acetabular fracture with less than 2mm of displacement, right hip  2. Inferior pubic rami fracture, right pelvis  3. L1-L5 compression fractures age indeterminate    P: 1. Pelvic/acetabular fracture: Will treat these fractures nonoperative. Explained to the patient tonight. Will allow her to be TTWB. Pain control. Full length femur xray. Will be difficult to mobilize. Will need SNF  2. Compression fractures: old. Treat with bracing if needed, but suspect chronic pain  3. Lung Ca: ?oncology can see while in house if hospitalist desires, but no surgical intervention needed. Discussed case with Dr. Itz Hedrick who agrees with plan.     Janett Loredo, [place]   Orthopaedic Surgery PA  [address] [facility]

## 2020-02-14 NOTE — ED NOTES
Verbal shift change report given to Scott Roman RN (oncoming nurse) by Fabian Francois RN (offgoing nurse). Report included the following information SBAR, Kardex, ED Summary, Intake/Output, MAR, Recent Results and Med Rec Status. Brittany as primary RN. Initial assessment completed. VSS. Pt complaints of back and hip pain. Plan for the evening discussed with pt and she verbalized understanding. Bed locked and in low position with call bell within reach. Instructed pt to press call davis when needed. White board updated with this RN's name. 2000  Pt calling out in pain. She states her arm was hurting her. Informed her it was from her BP cuff. 2015  Pt continues to call out in pain. Requested pain med from Dr. Brian Olivarez. 2048  TRANSFER - OUT REPORT:    Verbal report given to Anastasia Lee RN (name) on Ambika Aguilera  being transferred to [facility] (unit) for routine progression of care       Report consisted of patients Situation, Background, Assessment and   Recommendations(SBAR). Information from the following report(s) SBAR, Kardex, ED Summary, Procedure Summary, Intake/Output, MAR, Recent Results, Med Rec Status and Cardiac Rhythm NSR was reviewed with the receiving nurse. Lines:   Peripheral IV 73/74/12 Left Basilic (Active)   Site Assessment Clean, dry, & intact 2/13/2020  7:35 PM   Phlebitis Assessment 0 2/13/2020  7:35 PM   Infiltration Assessment 0 2/13/2020  7:35 PM   Dressing Status Clean, dry, & intact 2/13/2020  7:35 PM   Dressing Type Tape;Transparent 2/13/2020  7:35 PM   Hub Color/Line Status Pink;Flushed;Patent 2/13/2020  7:35 PM   Action Taken Blood drawn 2/13/2020  7:35 PM        Opportunity for questions and clarification was provided. Patient transported with:   [employer]    2115  Dilaudid 1 mg administered for pain. At pt's request, orange juice given.

## 2020-02-14 NOTE — PROGRESS NOTES
Ty Echeverria lisa [address]  [address] [facility], [address] [place] [address]  [phone]      Medical Progress Note      NAME: Selwyn Warren   :  1931  MRM:  [id number]    Date/Time: 2020  11:19 AM         Subjective:     Chief Complaint:  \"The pain is bad\"     Pt seen and examined. Primary complaint is pain in the pelvis and back. Per daughter, she is on hospice at home. She has been able to get up to the bathroom with assistance however, now pt is TTWB status so daughter does not think she can take her home. ROS:  (bold if positive, if negative)      Pain        Objective:       Vitals:          Last 24hrs VS reviewed since prior progress note.  Most recent are:    Visit Vitals  /70 (BP 1 Location: Right arm, BP Patient Position: At rest)   Pulse 90   Temp 98.9 °F (37.2 °C)   Resp 16   Ht 5' 4\" (1.626 m)   Wt 60.8 kg (134 lb)   SpO2 90%   BMI 23.00 kg/m²     SpO2 Readings from Last 6 Encounters:   20 90%   10/03/19 (!) 88%   09/15/19 92%   19 93%   19 95%   19 96%            Intake/Output Summary (Last 24 hours) at 2020 1119  Last data filed at 2020 5098  Gross per 24 hour   Intake    Output 0 ml   Net 0 ml          Exam:     Physical Exam:    Gen:  Well-developed, well-nourished, in no acute distress  HEENT:  Pink conjunctivae, PERRL, hearing intact to voice, moist mucous membranes  Neck:  Supple, without masses, thyroid non-tender  Resp:  No accessory muscle use, clear breath sounds without wheezes rales or rhonchi  Card:  No murmurs, normal S1, S2 without thrills, bruits or peripheral edema  Abd:  Soft, non-tender, non-distended, normoactive bowel sounds are present  Musc:  No cyanosis or clubbing  Skin:  No rashes or ulcers, skin turgor is good  Neuro:  Cranial nerves 3-12 are grossly intact  Psych:  Good insight, oriented to person, place and time, alert      Medications Reviewed: (see below)    Lab Data Reviewed: (see below)    ______________________________________________________________________    Medications:     Current Facility-Administered Medications   Medication Dose Route Frequency    morphine IR (MS IR) tablet 15 mg  15 mg Oral Q4H PRN    HYDROmorphone (PF) (DILAUDID) injection 2 mg  2 mg IntraVENous Q3H PRN    sodium chloride (NS) flush 5-40 mL  5-40 mL IntraVENous Q8H    sodium chloride (NS) flush 5-40 mL  5-40 mL IntraVENous PRN    dextrose 5% and 0.9% NaCl infusion  50 mL/hr IntraVENous CONTINUOUS    acetaminophen (TYLENOL) tablet 650 mg  650 mg Oral Q4H PRN    ondansetron (ZOFRAN) injection 4 mg  4 mg IntraVENous Q4H PRN    traMADol (ULTRAM) tablet 25 mg  25 mg Oral Q6H PRN    naloxone (NARCAN) injection 0.4 mg  0.4 mg IntraVENous EVERY 2 MINUTES AS NEEDED    ALPRAZolam (XANAX) tablet 0.25 mg  0.25 mg Oral BID    senna-docusate (PERICOLACE) 8.6-50 mg per tablet 1 Tab  1 Tab Oral BID PRN    morphine CR (MS CONTIN) tablet 15 mg  15 mg Oral Q12H            Lab Review:     Recent Labs     02/14/20  0342 02/13/20  1929   WBC 10.3 16.5*   HGB 10.5* 11.6   HCT 33.4* 36.8    312     Recent Labs     02/14/20  0342 02/13/20  1929    137   K 3.9 4.3    104   CO2 26 27   * 110*   BUN 17 17   CREA 0.77 0.84   CA 7.3* 8.1*   ALB  --  2.0*   SGOT  --  52*   ALT  --  19     No components found for: [place]         Assessment / Plan:   Pelvic/acetabular fracture - management as per ortho. Non-surgical management at this time. Will; need to optimize pain control as chronically on pain meds. Also now TTWB so will be difficult to dispo to home as daughter does not think she can assist her to the bathroom and unable to pay for caregivers.  If goes to SNF under hospice, will need to be self pay for room and board so this is unlikely an option as well   -CM consulted to assist with dispo   -optimize pain control   -appreciate ortho assistance; TTWB   -PT/OT consulted     Lung CA    -currently on home hospice. See discussion above   -DNR     PAF   -not on meds; likely as on hospice. Monitor     HTN   -not on meds; likely as on hospice. Monitor     RA   -not on meds; likely as on hospice. Monitor     COPD   -not on meds; likely as on hospice.  Monitor   -duonebs PRN     Total time spent with patient: [id number] [address] discussed with: Patient, Family, Care Manager, Consultant/Specialist and >50% of time spent in counseling and coordination of care    Discussed:  Code Status, Care Plan and D/C Planning    Prophylaxis:  Lovenox    Disposition:  TBD           ___________________________________________________    Attending Physician: Obdulia Rodriguez MD

## 2020-02-14 NOTE — ROUTINE PROCESS
TRANSFER - IN REPORT: 
 
Verbal report received from [facility] (name) on Rosangela Long  being received from ED (unit) for routine progression of care Report consisted of patients Situation, Background, Assessment and  
Recommendations(SBAR). Information from the following report(s) SBAR, Kardex and MAR was reviewed with the receiving nurse. Opportunity for questions and clarification was provided. Assessment completed upon patients arrival to unit and care assumed.

## 2020-02-14 NOTE — PROGRESS NOTES
Problem: Self Care Deficits Care Plan (Adult)  Goal: *Acute Goals and Plan of Care (Insert Text)  Description  FUNCTIONAL STATUS PRIOR TO ADMISSION: Per daughter, pt was home with hospice, mostly bed-bound, but was able to get up to chair with assistance using RW. Patient required assistance with dressing, bathing (sponge bathing), and toileting but was able to perform her grooming and self feeding. HOME SUPPORT: The patient lived with her grandson and his wife and in-laws. Patient had home care staff come to assist in morning and afternoon for ADLs. Occupational Therapy Goals  Initiated 2/14/2020  1. Patient will tolerate sitting EOB unsupported with supervision for 10 minutes in preparation for OOB ADLs within 7 day(s). 2.  Patient will perform grooming, seated, with minimal assistance within 7 day(s). 3.  Patient will perform upper body dressing with minimal assistance/contact guard assist within 7 day(s). 4.  Patient will perform rolling in bed to assist with toileting hygiene with minimal assistance within 7 day(s). 5.  Patient will participate in upper extremity therapeutic exercise/activities with minimal assistance for 5 minutes within 7 day(s). Outcome: Progressing Towards Goal     OCCUPATIONAL THERAPY EVALUATION  Patient: Aura Rojo (12 y.o. female)  Date: 2/14/2020  Primary Diagnosis: Pelvic fracture ([facility] [address] [S32. 9XXA]        Precautions:   (P) Bed Alarm, Fall, TTWB, Skin    ASSESSMENT  Based on the objective data described below, the patient presents with generalized weakness, high anxiety, confusion, impaired balance, decreased UE ROM, decreased activity tolerance, and impaired ADL performance and mobility following admission for pelvic fx sustained from GLF at home. Patient is very reluctant to participate this session and benefits from additional encouragement from daughter present in room.  She is able to tolerate sitting EOB for >10 minutes with initially poor sitting balance, but improving to be able to perform light grooming with occasional CGA to min A. She perseverates on laying down throughout session and benefits from having goal (timer set on phone) for activity. At this time patient is functioning below her baseline and will benefit from continued skilled services to address above mentioned deficits impacting ADL performance and safety. Current Level of Function Impacting Discharge (ADLs/self-care): overall max to total care    Functional Outcome Measure: The patient scored Total: 10/100 on the Barthel Index outcome measure which is indicative of 90% impaired ability to care for basic self needs/dependency on others; inferred 100% dependency on others for instrumental ADLs. Other factors to consider for discharge: WB status; high anxiety; confusion; assist x2 for bed mobility     Patient will benefit from skilled therapy intervention to address the above noted impairments. PLAN :  Recommendations and Planned Interventions: self care training, functional mobility training, therapeutic exercise, balance training, therapeutic activities, cognitive retraining, endurance activities, neuromuscular re-education, patient education, home safety training and family training/education    Frequency/Duration: Patient will be followed by occupational therapy 3 times a week to address goals. Recommendation for discharge: (in order for the patient to meet his/her long term goals)  Therapy up to 5 days/week in SNF setting    This discharge recommendation:  Has been made in collaboration with the attending provider and/or case management    IF patient discharges home will need the following DME: patient owns DME required for discharge       SUBJECTIVE:   Patient stated no, please please let me sit down, I'm not supposed to be standing up.  -while seated EOB    OBJECTIVE DATA SUMMARY:   HISTORY:   Past Medical History:   Diagnosis Date    AAA (abdominal aortic aneurysm) (HCC)     Arthritis     ra, osteoporosis, osteoarthritis    Atrial fibrillation (HCC)     COPD     HTN (hypertension)     Lung cancer (HCC)     RA (rheumatoid arthritis) ([facility] [address]     Smoker     TIA (transient ischemic attack)     TIA 9/30/16     Past Surgical History:   Procedure Laterality Date    HX CHOLECYSTECTOMY      HX HYSTERECTOMY      HX ORTHOPAEDIC      carpal tunnel, wrist surgery    [place] INS NEW/RPLCMT PRM PM W/TRANSV ELTRD ATRIAL&VENT N/A 7/26/2019    INSERT PPM DUAL performed by Estefania Lopez MD at [facility]       Expanded or extensive additional review of patient history:     Home Situation  Home Environment: Private residence  Wheelchair Ramp: Yes  One/Two Story Residence: Two story, live on 1st floor  Living Alone: No  Support Systems: Family member(s), Home care staff(lives with grandson, his wife and in-laws)  Patient Expects to be Discharged to[de-identified] (P) Skilled nursing facility  Tub or Shower Type: Shower    Hand dominance: Right    EXAMINATION OF PERFORMANCE DEFICITS:  Cognitive/Behavioral Status:  Neurologic State: Confused  Orientation Level: Oriented to person  Cognition: Decreased attention/concentration;Decreased command following  Perception: Cues to maintain midline in sitting; Tactile;Verbal  Perseveration: (perseverates on wanting to sit/lay back down)  Safety/Judgement: Decreased awareness of environment;Decreased awareness of need for assistance;Decreased awareness of need for safety;Decreased insight into deficits    Range of Motion:  AROM: Generally decreased, functional In bilateral UEs    Strength:  Strength: Generally decreased, functional    Coordination:  Coordination: Generally decreased, functional  Fine Motor Skills-Upper: Left Impaired;Right Impaired    Gross Motor Skills-Upper: Left Intact; Right Intact    Tone:  Tone: Normal    Balance:  Sitting: Impaired  Sitting - Static: Fair (occasional)(initially total support but then placed in position she held)  Sitting - Dynamic: Poor (constant support)  Standing: (NT)    Functional Mobility and Transfers for ADLs:  Bed Mobility:  Rolling: Total assistance;Assist x2  Supine to Sit: Total assistance;Assist x2  Sit to Supine: Total assistance;Assist x2  Scooting: Total assistance;Assist x2    Transfers:  Sit to Stand: (NT today - patient screaming and highly anxious)    ADL Assessment:  Feeding: Setup;Supervision(in supported sitting)    Oral Facial Hygiene/Grooming: Moderate assistance(2/2 decreased ROM, poor endurance, imp. sitting balance)    Bathing: Maximum assistance(infer based on observations)    Upper Body Dressing: Minimum assistance    Lower Body Dressing: Total assistance    Toileting: Total assistance    ADL Intervention and task modifications:  Grooming  Grooming Assistance: Moderate assistance  Position Performed: Seated edge of bed  Washing Face: Contact guard assistance(intermittently for stability)  Brushing/Combing Hair: Moderate assistance(assist for top and back of head)  Cues: Physical assistance; Tactile cues provided;Verbal cues provided;Visual cues provided    Upper [address]: Moderate assistance(assist to tie and pull up to shoulders)  Pullover Shirt: Moderate assistance(to doff; pulls overhead from back)  Cues: Physical assistance; Tactile cues provided;Verbal cues provided;Visual cues provided    Lower Body Dressing Assistance  Socks: Total assistance (dependent)  Position Performed: Supine  Cues: Don;Doff;Physical assistance    Cognitive Retraining  Safety/Judgement: Decreased awareness of environment;Decreased awareness of need for assistance;Decreased awareness of need for safety;Decreased insight into deficits     Instructed patient to increase time sitting with HOB elevated for pulmonary hygiene, skin integrity, and to increase endurance in preparation for ADLs. Instructed patient to have HOB elevated for all meals.      Functional Measure:  Barthel Index:    Bathin  Bladder: 0  Bowels: 5  Groomin  Dressin  Feedin  Mobility: 0  Stairs: 0  Toilet Use: 0  Transfer (Bed to Chair and Back): 0  Total: 10/100        The Barthel ADL Index: Guidelines  1. The index should be used as a record of what a patient does, not as a record of what a patient could do. 2. The main aim is to establish degree of independence from any help, physical or verbal, however minor and for whatever reason. 3. The need for supervision renders the patient not independent. 4. A patient's performance should be established using the best available evidence. Asking the patient, friends/relatives and nurses are the usual sources, but direct observation and common sense are also important. However direct testing is not needed. 5. Usually the patient's performance over the preceding 24-48 hours is important, but occasionally longer periods will be relevant. 6. Middle categories imply that the patient supplies over 50 per cent of the effort. 7. Use of aids to be independent is allowed. Zuri Mccormick., Barthel, D.W. (0523). Functional evaluation: the Barthel Index. [facility] (14)2. Priscilla Mcleod, ALFONSO, [place] Downeylander., Debra Slater., [place], [address] [place] [address] (). Measuring the change indisability after inpatient rehabilitation; comparison of the responsiveness of the Barthel Index and Functional [place] Measure. Journal of Neurology, Neurosurgery, and Psychiatry, 66(4), 565-615. Jennifer Gimenez, [place].JOSLYN, SANDIP Underwood, & Mike Yeager M.A. (2004.) Assessment of post-stroke quality of life in cost-effectiveness studies: The usefulness of the Barthel Index and the EuroQoL-5D.  Quality of Life Research, 15, 207-23     Occupational Therapy Evaluation Charge Determination   History Examination Decision-Making   LOW Complexity : Brief history review  HIGH Complexity : 5 or more performance deficits relating to physical, cognitive , or psychosocial skils that result in activity limitations and / or participation restrictions HIGH Complexity : Patient presents with comorbidities that affect occupational performance. Signifigant modification of tasks or assistance (eg, physical or verbal) with assessment (s) is necessary to enable patient to complete evaluation       Based on the above components, the patient evaluation is determined to be of the following complexity level: LOW   Pain Rating:  Pt vocalizing pain with activity    Activity Tolerance:   Fair  Please refer to the flowsheet for vital signs taken during this treatment. After treatment patient left in no apparent distress:    Supine in bed, Heels elevated for pressure relief, Call bell within reach, Caregiver / family present and Side rails x 3    COMMUNICATION/EDUCATION:   The patients plan of care was discussed with: Physical Therapist and Registered Nurse. Home safety education was provided and the patient/caregiver indicated understanding., Patient/family have participated as able in goal setting and plan of care. and Patient/family agree to work toward stated goals and plan of care. This patients plan of care is appropriate for delegation to [facility].     Thank you for this referral.  Chris Arnett OT  Time Calculation: 32 mins

## 2020-02-14 NOTE — PROGRESS NOTES
Problem: Mobility Impaired (Adult and Pediatric)  Goal: *Acute Goals and Plan of Care (Insert Text)  Description  FUNCTIONAL STATUS PRIOR TO ADMISSION: At baseline patient uses 3 L oxygen, was on home hospice, and essentially bed bound, occasional amb with assistance    HOME SUPPORT PRIOR TO ADMISSION: The patient lived at grandson's home, ramp to enter, Has hospice in am and private duty care in the afternoon, dgt helps at night. Physical Therapy Goals  Initiated 2/14/2020  1. Patient will move from supine to sit and sit to supine , scoot up and down and roll side to side in bed with moderate assistance x 2  within 7 day(s). 2.  Patient will transfer from bed to chair and chair to bed with total assistance of osorio lift using the least restrictive device within 7 day(s). 3.  Patient will perform basic AROM BLE and BUE exercises in supine with verbal cues and minimal assist x 1 within 7 days. Note:   PHYSICAL THERAPY EVALUATION  Patient: Krishna Quinones (44 y.o. female)  Date: 2/14/2020  Primary Diagnosis: Pelvic fracture ([facility] [address] [S32. 9XXA]        Precautions:  (P) Bed Alarm, Fall, TTWB, Skin      ASSESSMENT  Based on the objective data described below, the patient presents with high anxiety, complaints of intense pain with any touch to BLE or movement, decreased ROM and strength x 4 extremities, poor initial sitting balance and poor endurance. Prior to this GLF and sustaining pelvic and acetabular fractures patient was essentially bed bound. She was not to get OOB unassisted and hasn't for the last 6 months but then got up without waiting for assistance and sustained GLF and fractures. Pelvic R inf. pubic rami non displaced and r acetabular fracture mildly displaced. Radiograph of entire femur negative for other fractures. Patient does have h/o lung CA with mets, osteoporosis, multiple lumbar compression fractures, COPD. Patient has orders for TTWB RLE. Patient was on home hospice prior to admission. Since adm they have discharged their service. She also had private duty care in the afternoons for several hours while family at work. Dgt and grandson care for her in evenings. Patient literally screamed and yelled while PT/OT provided total assist via draw pad to get patient to edge of bed. Patient sat for 13 minutes initially with total support for balance but then PT had patient support herself in sitting. Patient complained the entire time and without dgt's insistence patient would have refused therapy. Patient has orders for TTWB but unsure that she can maintain that and at risk for worsening acetabular fracture if too much WBing. Dgt cannot care for patient at home. Patient's goals of care are challenging but family wishes for patient to attempt PT/OT daily. Goal is for patient to transition to [facility] and then possibly [facility] per CM. PT will follow daily for bed mob and sitting balance to start. Patient is not mobilizing in supine whatsoever and has skin breakdown left lateral ankle. Recommend turn team, Prevalon boots, specialty air mattress. Followed up with RN again in the afternoon on status of air mattress and contacted charge RN to assist RN get items in place. Patient has long recovery ahead and prognosis is poor. Current Level of Function Impacting Discharge (mobility/balance): total assistance x 2    Functional Outcome Measure: The patient scored 10/100 on the Barthel Index outcome measure. Patient will benefit from skilled therapy intervention to address the above noted impairments. PLAN :  Recommendations and Planned Interventions: bed mobility training, transfer training, therapeutic exercises, patient and family training/education, and therapeutic activities      Frequency/Duration: Patient will be followed by physical therapy:  6 times a week to address goals.     Recommendation for discharge: (in order for the patient to meet his/her long term goals)  Therapy up to 5 days/week in SNF setting    This discharge recommendation:  Has been made in collaboration with the attending provider and/or case management    IF patient discharges home will need the following DME: hospital bed, mechanical lift, and wheelchair         SUBJECTIVE:   Patient stated No! Stop! You are killing me!     OBJECTIVE DATA SUMMARY:   HISTORY:    Past Medical History:   Diagnosis Date    AAA (abdominal aortic aneurysm) (HCC)     Arthritis     ra, osteoporosis, osteoarthritis    Atrial fibrillation (HCC)     COPD     HTN (hypertension)     Lung cancer (HCC)     RA (rheumatoid arthritis) ([facility] [address]     Smoker     TIA (transient ischemic attack)     TIA 9/30/16     Past Surgical History:   Procedure Laterality Date    HX CHOLECYSTECTOMY      HX HYSTERECTOMY      HX ORTHOPAEDIC      carpal tunnel, wrist surgery    [place] INS NEW/RPLCMT PRM PM W/TRANSV ELTRD ATRIAL&VENT N/A 7/26/2019    INSERT PPM DUAL performed by Rosa Buck MD at [facility]       Personal factors and/or comorbidities impacting plan of care: see above    Home Situation  Home Environment: Private residence  Wheelchair Ramp: Yes  One/Two Story Residence: Two story, live on 1st floor  Living Alone: No  Support Systems: Family member(s), Home care staff(lives with grandson, his wife and in-laws)  Patient Expects to be Discharged to[de-identified] (P) Skilled nursing facility  Tub or Shower Type: Shower    EXAMINATION/PRESENTATION/DECISION MAKING:   Critical Behavior:  Neurologic State: Confused  Orientation Level: Oriented to person  Cognition: Decreased attention/concentration, Decreased command following  Safety/Judgement: Decreased awareness of environment, Decreased awareness of need for assistance, Decreased awareness of need for safety, Decreased insight into deficits  Hearing: [place]     Skin:  thin frail skin , breakdown left lateral ankle    Range Of Motion:  AROM: Generally decreased, functional                       Strength: Strength: Generally decreased, functional                    Tone & Sensation:   Tone: Normal                              Coordination:  Coordination: Generally decreased, functional       Functional Mobility:  Bed Mobility:  Rolling: Total assistance;Assist x2  Supine to Sit: Total assistance;Assist x2  Sit to Supine: Total assistance;Assist x2  Scooting: Total assistance;Assist x2  Transfers:  NT today - patient screaming and highly anxious    Balance:   Sitting: Impaired  Sitting - Static: Fair (occasional)(initially total support but then placed in position she held)  Sitting - Dynamic: Poor (constant support)  Standing: (NT)  Ambulation/Gait Training:    Gait Description (WDL): (non amb at this time)     Right Side Weight Bearing: Toe touch      Therapeutic Exercises:   Sat EOB 13 minutes, attempted ankle pumps without participation    Functional Measure:  Barthel Index:    Bathin  Bladder: 0  Bowels: 5  Groomin  Dressin  Feedin  Mobility: 0  Stairs: 0  Toilet Use: 0  Transfer (Bed to Chair and Back): 0  Total: 10/100       The Barthel ADL Index: Guidelines  1. The index should be used as a record of what a patient does, not as a record of what a patient could do. 2. The main aim is to establish degree of independence from any help, physical or verbal, however minor and for whatever reason. 3. The need for supervision renders the patient not independent. 4. A patient's performance should be established using the best available evidence. Asking the patient, friends/relatives and nurses are the usual sources, but direct observation and common sense are also important. However direct testing is not needed. 5. Usually the patient's performance over the preceding 24-48 hours is important, but occasionally longer periods will be relevant. 6. Middle categories imply that the patient supplies over 50 per cent of the effort. 7. Use of aids to be independent is allowed.     Darryle Chant., Barthel, LEIDA (1965). Functional evaluation: the Barthel Index. [facility] (14)2. ALFONSO Patel, Hadley Joaquin., Edwige Bangura., Silverio Ramirez, [address] (1999). Measuring the change indisability after inpatient rehabilitation; comparison of the responsiveness of the Barthel Index and Functional [place] Measure. Journal of Neurology, Neurosurgery, and Psychiatry, 66(4), 671-829. MICHAEL Bryson, SANDIP Underwood, & Radhika Cabrera M.A. (2004.) Assessment of post-stroke quality of life in cost-effectiveness studies: The usefulness of the Barthel Index and the EuroQoL-5D. Quality of Life Research, 15, 851-15           Physical Therapy Evaluation Charge Determination   History Examination Presentation Decision-Making   HIGH Complexity :3+ comorbidities / personal factors will impact the outcome/ POC  HIGH Complexity : 4+ Standardized tests and measures addressing body structure, function, activity limitation and / or participation in recreation  HIGH Complexity : Unstable and unpredictable characteristics  Other outcome measures Barthel Index  HIGH       Based on the above components, the patient evaluation is determined to be of the following complexity level: HIGH     Pain Rating:  10/10 - premedicated for tx with IV pain med    Activity Tolerance:   Poor but VSS with activity  Please refer to the flowsheet for vital signs taken during this treatment. After treatment patient left in no apparent distress:   Supine in bed, Heels elevated for pressure relief, Call bell within reach, Bed / chair alarm activated, Caregiver / family present, and Side rails x 3    COMMUNICATION/EDUCATION:   The patients plan of care was discussed with: Occupational Therapist, Registered Nurse, Physician, and . Patient is unable to participate in goal setting and plan of care.     Thank you for this referral.  Radha Gonzalez, PT, DPT   Time Calculation: 50 mins

## 2020-02-14 NOTE — PROGRESS NOTES
2/14/2020   4:41 PM  Spoke with daughter and she is planning to visit [facility] this weekend as we have not heard back yet. She is aware that [facility] has accepted patient through note in [address] [facility]. She continues to prefer [facility] and wants to do what she can to obtain an answer before agreeing to transfer to [id number] [place] [address]. 2/14/2020  2:18 PM  Spoke with Santiago Lisa at [facility] who assured this  that she would send a final answer through [email or web address] but that the review team is looking at the medicals now. She said that there is a concern since patient had been home with hospice before, but knows that [facility] has been dc, and will place answer through [facility]. This  spoke with daughter again for review of other options and she said that if she was not accepted by [facility] she would consider [facility] but patient has been there before and she had a poor experience. Agreed for medicals to be sent there for alternate plan but wants to be sure to hear back from [facility] as well. Medicals forwarded to Katelyn Yang through [facility]. 2/14/2020    12:44 PM  Met with patient and her daughter with whom she lives. Reviewed history of the past 6 months and stays at [address] [facility] of [facility] prior to her going to daughter's home with Hospice in October. Daughter would like her to go to [facility] which is close to their home, now that she has sustained a fracture and is needing more in the way of therapy and interventions than can be provided at home. [facility] has discharged her from their services and referral has been sent through [email or web address] after obtaining choice letter from daughter. Other options identified with SNF list and she said she wants to see what Zeina Cai says before she identifies another option for choice.   Reviewed if Medicaid has been applied for and she said that it has, the I was done in the home and she is working with the worker now that she has found the life insurance policy information. Working towards [address] [place] [address] if she leaves the SNF after rehab, or remain there under LTC. Care Management Interventions  PCP Verified by CM:  Yes  Mode of Transport at Discharge: BLS  Transition of Care Consult (CM Consult): SNF  Partner SNF: No  Reason Why Partner SNF Not Chosen: Location  Discharge Durable Medical Equipment: No  Physical Therapy Consult: Yes  Occupational Therapy Consult: Yes  Speech Therapy Consult: No  Current Support Network: Relative's Home  Confirm Follow Up Transport: Other (see comment)  The Patient and/or Patient Representative was Provided with a Choice of Provider and Agrees with the Discharge Plan?: Yes  Freedom of Choice List was Provided with Basic Dialogue that Supports the Patient's Individualized Plan of Care/Goals, Treatment Preferences and Shares the Quality Data Associated with the Providers?: Yes  The [employer] Information Provided?: No

## 2020-02-15 PROCEDURE — 51798 US URINE CAPACITY MEASURE: CPT

## 2020-02-15 PROCEDURE — 74011250637 HC RX REV CODE- 250/637: Performed by: INTERNAL MEDICINE

## 2020-02-15 PROCEDURE — 74011250636 HC RX REV CODE- 250/636: Performed by: INTERNAL MEDICINE

## 2020-02-15 PROCEDURE — 77030038269 HC DRN EXT URIN PURWCK BARD -A

## 2020-02-15 PROCEDURE — 65270000029 HC RM PRIVATE

## 2020-02-15 PROCEDURE — 94760 N-INVAS EAR/PLS OXIMETRY 1: CPT

## 2020-02-15 RX ORDER — POLYETHYLENE GLYCOL 3350 17 G/17G
17 POWDER, FOR SOLUTION ORAL DAILY
Status: DISCONTINUED | OUTPATIENT
Start: 2020-02-15 | End: 2020-02-17 | Stop reason: HOSPADM

## 2020-02-15 RX ORDER — FUROSEMIDE 20 MG/1
20 TABLET ORAL DAILY
Status: DISCONTINUED | OUTPATIENT
Start: 2020-02-15 | End: 2020-02-16

## 2020-02-15 RX ORDER — MORPHINE SULFATE 15 MG/1
30 TABLET ORAL
Status: DISCONTINUED | OUTPATIENT
Start: 2020-02-15 | End: 2020-02-17 | Stop reason: HOSPADM

## 2020-02-15 RX ORDER — AMOXICILLIN 250 MG
1 CAPSULE ORAL DAILY
Status: DISCONTINUED | OUTPATIENT
Start: 2020-02-15 | End: 2020-02-17 | Stop reason: HOSPADM

## 2020-02-15 RX ADMIN — MORPHINE SULFATE 15 MG: 15 TABLET, FILM COATED, EXTENDED RELEASE ORAL at 22:05

## 2020-02-15 RX ADMIN — POLYETHYLENE GLYCOL 3350 17 G: 17 POWDER, FOR SOLUTION ORAL at 11:57

## 2020-02-15 RX ADMIN — HYDROMORPHONE HYDROCHLORIDE 2 MG: 2 INJECTION, SOLUTION INTRAMUSCULAR; INTRAVENOUS; SUBCUTANEOUS at 22:41

## 2020-02-15 RX ADMIN — HYDROMORPHONE HYDROCHLORIDE 2 MG: 2 INJECTION, SOLUTION INTRAMUSCULAR; INTRAVENOUS; SUBCUTANEOUS at 03:32

## 2020-02-15 RX ADMIN — Medication 10 ML: at 05:29

## 2020-02-15 RX ADMIN — FUROSEMIDE 20 MG: 20 TABLET ORAL at 08:06

## 2020-02-15 RX ADMIN — MORPHINE SULFATE 15 MG: 15 TABLET, FILM COATED, EXTENDED RELEASE ORAL at 08:06

## 2020-02-15 RX ADMIN — Medication 10 ML: at 15:50

## 2020-02-15 RX ADMIN — Medication 10 ML: at 18:59

## 2020-02-15 RX ADMIN — ALPRAZOLAM 0.25 MG: 0.25 TABLET ORAL at 08:06

## 2020-02-15 RX ADMIN — ONDANSETRON 4 MG: 2 INJECTION INTRAMUSCULAR; INTRAVENOUS at 08:38

## 2020-02-15 RX ADMIN — ALPRAZOLAM 0.25 MG: 0.25 TABLET ORAL at 22:05

## 2020-02-15 RX ADMIN — Medication 10 ML: at 08:42

## 2020-02-15 RX ADMIN — HYDROMORPHONE HYDROCHLORIDE 2 MG: 2 INJECTION, SOLUTION INTRAMUSCULAR; INTRAVENOUS; SUBCUTANEOUS at 08:38

## 2020-02-15 RX ADMIN — Medication 10 ML: at 15:02

## 2020-02-15 RX ADMIN — ONDANSETRON 4 MG: 2 INJECTION INTRAMUSCULAR; INTRAVENOUS at 03:37

## 2020-02-15 RX ADMIN — Medication 10 ML: at 22:05

## 2020-02-15 RX ADMIN — HYDROMORPHONE HYDROCHLORIDE 2 MG: 2 INJECTION, SOLUTION INTRAMUSCULAR; INTRAVENOUS; SUBCUTANEOUS at 15:50

## 2020-02-15 RX ADMIN — HYDROMORPHONE HYDROCHLORIDE 2 MG: 2 INJECTION, SOLUTION INTRAMUSCULAR; INTRAVENOUS; SUBCUTANEOUS at 18:58

## 2020-02-15 RX ADMIN — DOCUSATE SODIUM 50MG AND SENNOSIDES 8.6MG 1 TABLET: 8.6; 5 TABLET, FILM COATED ORAL at 11:57

## 2020-02-15 NOTE — PROGRESS NOTES
Ortho Progress Note           S:  Sitting up in bed. Pain controlled. Pt asleep but awakes to verbal stimuli.      O:  I examined pt's RLE. + knee pain with flexion, + R groin pain with rotation of the hip. NVI distally BLE's. Lumbar pain to palpation L2-4 region.      A:    1. Right minimally displaced Acetabular fracture  2. Right Pubic Rami fracture  3. Likely new L3 compression fracture     P:  Non operative management. TTWB RLE only  Discussed with pt and daughter at bedside. F/u outpatient in 10-14 days.   Discuss kyphoplasty if back pain becomes debilitating.     Dr. Priyank Osullivan agrees with plan.       Thank you for allowing us to take part in this patients care.     Laura Uribe PA-C  Orthopaedic Surgery PA

## 2020-02-15 NOTE — PROGRESS NOTES
Ty Linderelsen [facility]  [address] [facility], [address] [place] [address]  [phone]      Medical Progress Note      NAME: Bronwyn Ashby   :  1931  MRM:  [id number]    Date/Time: 2/15/2020  11:19 AM         Subjective:     Chief Complaint:  \"The pain is still bad\"     Pt seen and examined. Still having severe pain despite adjustments in regimen. Also with nausea this AM. Last BM prior to admission. Passing flatus. ROS:  (bold if positive, if negative)      Pain   Constipation        Objective:       Vitals:          Last 24hrs VS reviewed since prior progress note.  Most recent are:    Visit Vitals  /68 (BP 1 Location: Right arm, BP Patient Position: At rest)   Pulse 78   Temp 98.3 °F (36.8 °C)   Resp 18   Ht 5' 4\" (1.626 m)   Wt 60.8 kg (134 lb)   SpO2 95%   BMI 23.00 kg/m²     SpO2 Readings from Last 6 Encounters:   02/15/20 95%   10/03/19 (!) 88%   09/15/19 92%   19 93%   19 95%   19 96%    O2 Flow Rate (L/min): 2 l/min       Intake/Output Summary (Last 24 hours) at 2/15/2020 1026  Last data filed at 2/15/2020 0437  Gross per 24 hour   Intake 1249.67 ml   Output 800 ml   Net 449.67 ml          Exam:     Physical Exam:    Gen:  Well-developed, well-nourished, in no acute distress  HEENT:  Pink conjunctivae, PERRL, hearing intact to voice, moist mucous membranes  Neck:  Supple, without masses, thyroid non-tender  Resp:  No accessory muscle use, clear breath sounds without wheezes rales or rhonchi  Card:  No murmurs, normal S1, S2 without thrills, bruits or peripheral edema  Abd: NORMOACTIVE BS Soft, non-tender, non-distended  Musc:  No cyanosis or clubbing  Skin:  No rashes or ulcers, skin turgor is good  Neuro:  Cranial nerves 3-12 are grossly intact  Psych:  Good insight, oriented to person, place and time, alert      Medications Reviewed: (see below)    Lab Data Reviewed: (see below)    ______________________________________________________________________    Medications:     Current Facility-Administered Medications   Medication Dose Route Frequency    furosemide (LASIX) tablet 20 mg  20 mg Oral DAILY    morphine IR (MS IR) tablet 30 mg  30 mg Oral Q4H PRN    senna-docusate (PERICOLACE) 8.6-50 mg per tablet 1 Tab  1 Tab Oral DAILY    polyethylene glycol (MIRALAX) packet 17 g  17 g Oral DAILY    HYDROmorphone (PF) (DILAUDID) injection 2 mg  2 mg IntraVENous Q3H PRN    albuterol-ipratropium (DUO-NEB) 2.5 MG-0.5 MG/3 ML  3 mL Nebulization Q4H PRN    sodium chloride (NS) flush 5-40 mL  5-40 mL IntraVENous Q8H    sodium chloride (NS) flush 5-40 mL  5-40 mL IntraVENous PRN    acetaminophen (TYLENOL) tablet 650 mg  650 mg Oral Q4H PRN    ondansetron (ZOFRAN) injection 4 mg  4 mg IntraVENous Q4H PRN    traMADol (ULTRAM) tablet 25 mg  25 mg Oral Q6H PRN    naloxone (NARCAN) injection 0.4 mg  0.4 mg IntraVENous EVERY 2 MINUTES AS NEEDED    ALPRAZolam (XANAX) tablet 0.25 mg  0.25 mg Oral BID    morphine CR (MS CONTIN) tablet 15 mg  15 mg Oral Q12H            Lab Review:     Recent Labs     02/14/20  0342 02/13/20 1929   WBC 10.3 16.5*   HGB 10.5* 11.6   HCT 33.4* 36.8    312     Recent Labs     02/14/20  0342 02/13/20 1929    137   K 3.9 4.3    104   CO2 26 27   * 110*   BUN 17 17   CREA 0.77 0.84   CA 7.3* 8.1*   ALB  --  2.0*   SGOT  --  52*   ALT  --  19     No components found for: [place]         Assessment / Plan:   Pelvic/acetabular fracture - management as per ortho. Non-surgical management at this time. Will; need to optimize pain control as chronically on pain meds. Also now TTWB so will be difficult to dispo to home as daughter does not think she can assist her to the bathroom and unable to pay for caregivers.  If goes to SNF under hospice, will need to be self pay for room and board so this is unlikely an option as well   -CM consulted to assist with dispo; likely will d/c to SNF off hospice   -optimize pain control; currently on 2mg IV dilaudid PRN. Increase morphine IR to 30mg in attempt to optimize PO regimen for discharge   -appreciate ortho assistance; TTWB   -PT/OT on board     Lung CA    -previously was on hospice but will be disenrolled so she can d/c to SNF   -remains DNR     PAF   -not on meds    HTN   -not on meds    RA   -not on meds     COPD   -not on meds; duonebs PRN     Constipation - no e/o obstruction. Normoactive BS. Passing flatus. Likely 2/2 narcotics  -add senna/docusate/Miralax      Hypoxia - noted to be hypoxic by nursing on 2/14. Now on O2. CXR obtained without acute findings, but proBNP elevated. Suspect mildly volume overloaded and likely atelectasis contributing.  Also may be slightly hypoventilating when receives pain meds  -start diuresis   -strict I's and O's   -continuous pulse ox    Total time spent with patient: 28 [address] [facility] discussed with: Patient, Family, Care Manager, Consultant/Specialist and >50% of time spent in counseling and coordination of care    Discussed:  Code Status, Care Plan and D/C Planning    Prophylaxis:  Lovenox    Disposition:  SNF            ___________________________________________________    Attending Physician: Adam Zabala MD

## 2020-02-15 NOTE — PROGRESS NOTES
Bedside shift change report given to [facility] (oncoming nurse) by Wayne (offgoing nurse). Report included the following information SBAR, Kardex, ED Summary, Intake/Output and MAR.

## 2020-02-15 NOTE — PROGRESS NOTES
PHYSICAL THERAPY NOTE:   Spoke with RN before 1st attempt at treatment with nurse recommending to wait until patient receives further pain medication due to pain level is at 8/10. Patient presents sleeping soundly during 2nd attempt at treatment, patient's daughter present. Pt unable to be aroused and would not be able to safely participate with therapy. Pt's daughter stating \"I'm going to leave soon because I don't think she knows I'm here. \"   Will continue to follow and complete treatment when patient is alert and pain level is appropriate. Thank you.   [facility]

## 2020-02-15 NOTE — PROGRESS NOTES
2/15/2020  8:42 AM  Referral still pending for [employer] (pt preference) for SNF, but not likely to receive a response until Monday. [facility] has accepted. Pt requires 3 midnight stay under Medicare requirements (earliest DC 2/16/2020).

## 2020-02-16 LAB
ANION GAP SERPL CALC-SCNC: 4 MMOL/L (ref 5–15)
APPEARANCE UR: CLEAR
BACTERIA URNS QL MICRO: NEGATIVE /HPF
BILIRUB UR QL: NEGATIVE
BNP SERPL-MCNC: 4625 PG/ML
BUN SERPL-MCNC: 15 MG/DL (ref 6–20)
BUN/CREAT SERPL: 19 (ref 12–20)
CALCIUM SERPL-MCNC: 7.1 MG/DL (ref 8.5–10.1)
CHLORIDE SERPL-SCNC: 106 MMOL/L (ref 97–108)
CO2 SERPL-SCNC: 27 MMOL/L (ref 21–32)
COLOR UR: ABNORMAL
CREAT SERPL-MCNC: 0.79 MG/DL (ref 0.55–1.02)
EPITH CASTS URNS QL MICRO: ABNORMAL /LPF
GLUCOSE SERPL-MCNC: 97 MG/DL (ref 65–100)
GLUCOSE UR STRIP.AUTO-MCNC: NEGATIVE MG/DL
HGB UR QL STRIP: NEGATIVE
HYALINE CASTS URNS QL MICRO: ABNORMAL /LPF (ref 0–5)
KETONES UR QL STRIP.AUTO: NEGATIVE MG/DL
LEUKOCYTE ESTERASE UR QL STRIP.AUTO: ABNORMAL
MAGNESIUM SERPL-MCNC: 1.6 MG/DL (ref 1.6–2.4)
NITRITE UR QL STRIP.AUTO: NEGATIVE
PH UR STRIP: 5 [PH] (ref 5–8)
POTASSIUM SERPL-SCNC: 4 MMOL/L (ref 3.5–5.1)
PROT UR STRIP-MCNC: NEGATIVE MG/DL
RBC #/AREA URNS HPF: ABNORMAL /HPF (ref 0–5)
SODIUM SERPL-SCNC: 137 MMOL/L (ref 136–145)
SP GR UR REFRACTOMETRY: 1.01 (ref 1–1.03)
UA: UC IF INDICATED,UAUC: ABNORMAL
UROBILINOGEN UR QL STRIP.AUTO: 0.2 EU/DL (ref 0.2–1)
WBC URNS QL MICRO: ABNORMAL /HPF (ref 0–4)

## 2020-02-16 PROCEDURE — 87077 CULTURE AEROBIC IDENTIFY: CPT

## 2020-02-16 PROCEDURE — 74011250637 HC RX REV CODE- 250/637: Performed by: INTERNAL MEDICINE

## 2020-02-16 PROCEDURE — 87186 SC STD MICRODIL/AGAR DIL: CPT

## 2020-02-16 PROCEDURE — 94760 N-INVAS EAR/PLS OXIMETRY 1: CPT

## 2020-02-16 PROCEDURE — 65270000029 HC RM PRIVATE

## 2020-02-16 PROCEDURE — 81001 URINALYSIS AUTO W/SCOPE: CPT

## 2020-02-16 PROCEDURE — 83880 ASSAY OF NATRIURETIC PEPTIDE: CPT

## 2020-02-16 PROCEDURE — 83735 ASSAY OF MAGNESIUM: CPT

## 2020-02-16 PROCEDURE — 80048 BASIC METABOLIC PNL TOTAL CA: CPT

## 2020-02-16 PROCEDURE — 87086 URINE CULTURE/COLONY COUNT: CPT

## 2020-02-16 PROCEDURE — 36415 COLL VENOUS BLD VENIPUNCTURE: CPT

## 2020-02-16 PROCEDURE — 77010033678 HC OXYGEN DAILY

## 2020-02-16 PROCEDURE — 74011250636 HC RX REV CODE- 250/636: Performed by: INTERNAL MEDICINE

## 2020-02-16 RX ORDER — MAGNESIUM SULFATE 1 G/100ML
1 INJECTION INTRAVENOUS ONCE
Status: COMPLETED | OUTPATIENT
Start: 2020-02-16 | End: 2020-02-16

## 2020-02-16 RX ORDER — FUROSEMIDE 20 MG/1
20 TABLET ORAL 2 TIMES DAILY
Status: DISCONTINUED | OUTPATIENT
Start: 2020-02-16 | End: 2020-02-17 | Stop reason: HOSPADM

## 2020-02-16 RX ADMIN — MORPHINE SULFATE 30 MG: 15 TABLET ORAL at 13:32

## 2020-02-16 RX ADMIN — POLYETHYLENE GLYCOL 3350 17 G: 17 POWDER, FOR SOLUTION ORAL at 08:46

## 2020-02-16 RX ADMIN — HYDROMORPHONE HYDROCHLORIDE 2 MG: 2 INJECTION, SOLUTION INTRAMUSCULAR; INTRAVENOUS; SUBCUTANEOUS at 04:52

## 2020-02-16 RX ADMIN — Medication 10 ML: at 07:55

## 2020-02-16 RX ADMIN — MORPHINE SULFATE 15 MG: 15 TABLET, FILM COATED, EXTENDED RELEASE ORAL at 08:24

## 2020-02-16 RX ADMIN — MORPHINE SULFATE 30 MG: 15 TABLET ORAL at 01:08

## 2020-02-16 RX ADMIN — FUROSEMIDE 20 MG: 20 TABLET ORAL at 08:24

## 2020-02-16 RX ADMIN — Medication 10 ML: at 13:33

## 2020-02-16 RX ADMIN — Medication 10 ML: at 22:08

## 2020-02-16 RX ADMIN — MORPHINE SULFATE 15 MG: 15 TABLET, FILM COATED, EXTENDED RELEASE ORAL at 22:08

## 2020-02-16 RX ADMIN — MORPHINE SULFATE 30 MG: 15 TABLET ORAL at 17:35

## 2020-02-16 RX ADMIN — ALPRAZOLAM 0.25 MG: 0.25 TABLET ORAL at 08:24

## 2020-02-16 RX ADMIN — DOCUSATE SODIUM 50MG AND SENNOSIDES 8.6MG 1 TABLET: 8.6; 5 TABLET, FILM COATED ORAL at 08:24

## 2020-02-16 RX ADMIN — HYDROMORPHONE HYDROCHLORIDE 2 MG: 2 INJECTION, SOLUTION INTRAMUSCULAR; INTRAVENOUS; SUBCUTANEOUS at 07:55

## 2020-02-16 RX ADMIN — ALPRAZOLAM 0.25 MG: 0.25 TABLET ORAL at 22:08

## 2020-02-16 RX ADMIN — MAGNESIUM SULFATE HEPTAHYDRATE 1 G: 1 INJECTION, SOLUTION INTRAVENOUS at 08:24

## 2020-02-16 NOTE — PROGRESS NOTES
Problem: Pressure Injury - Risk of  Goal: *Prevention of pressure injury  Description  Document Rom Scale and appropriate interventions in the flowsheet. Outcome: Progressing Towards Goal  Note: Pressure Injury Interventions:  Sensory Interventions: Assess changes in LOC    Moisture Interventions: Absorbent underpads    Activity Interventions: Pressure redistribution bed/mattress(bed type)    Mobility Interventions: HOB 30 degrees or less    Nutrition Interventions: Offer support with meals,snacks and hydration    Friction and Shear Interventions: Lift sheet                Problem: Patient Education: Go to Patient Education Activity  Goal: Patient/Family Education  Outcome: Progressing Towards Goal     Problem: Patient Education: Go to Patient Education Activity  Goal: Patient/Family Education  Outcome: Progressing Towards Goal     Problem: Patient Education: Go to Patient Education Activity  Goal: Patient/Family Education  Outcome: Progressing Towards Goal     Problem: Falls - Risk of  Goal: *Absence of Falls  Description  Document Miguel Fall Risk and appropriate interventions in the flowsheet.   Outcome: Progressing Towards Goal  Note: Fall Risk Interventions:       Mentation Interventions: Bed/chair exit alarm    Medication Interventions: Bed/chair exit alarm    Elimination Interventions: Call light in reach    History of Falls Interventions: Bed/chair exit alarm         Problem: Patient Education: Go to Patient Education Activity  Goal: Patient/Family Education  Outcome: Progressing Towards Goal

## 2020-02-16 NOTE — PROGRESS NOTES
Ty Echeverria lisa [place] [facility]  [address] [facility], [address] [place] [address]  [phone]      Medical Progress Note      NAME: Lula Saucedo   :  1931  MRM:  [id number]    Date/Time: 2020  11:19 AM         Subjective:     Chief Complaint:  Pt somnolent but arousable. Per RN this has been the case all day. When she arouses she states the \"pain is so bad\", but per RN also complaints of 10/10 pain when her leg is just gently brushed so difficult to determine pain     ROS:  (bold if positive, if negative)      Pain        Objective:       Vitals:          Last 24hrs VS reviewed since prior progress note. Most recent are:    Visit Vitals  BP 93/44 (BP 1 Location: Right arm, BP Patient Position: At rest)   Pulse 96   Temp 98.2 °F (36.8 °C)   Resp 16   Ht 5' 4\" (1.626 m)   Wt 56.2 kg (124 lb)   SpO2 95%   BMI 21.28 kg/m²     SpO2 Readings from Last 6 Encounters:   20 95%   10/03/19 (!) 88%   09/15/19 92%   19 93%   19 95%   19 96%    O2 Flow Rate (L/min): 2 l/min       Intake/Output Summary (Last 24 hours) at 2020 1453  Last data filed at 2020 1412  Gross per 24 hour   Intake 470 ml   Output 1150 ml   Net -680 ml          Exam:     Physical Exam:    Gen:  Well-developed, well-nourished, in no acute distress  HEENT:  Pink conjunctivae, PERRL, hearing intact to voice, moist mucous membranes  Neck:  Supple, without masses, thyroid non-tender  Resp:  No accessory muscle use, clear breath sounds without wheezes rales or rhonchi  Card:  No murmurs, normal S1, S2 without thrills, bruits or peripheral edema  Abd: NORMOACTIVE BS Soft, non-tender, non-distended  Musc:  No cyanosis or clubbing  Skin:  No rashes or ulcers, skin turgor is good  Neuro:  Cranial nerves 3-12 are grossly intact  Psych: Poor insight.  SOMNOLENT but arouses     Medications Reviewed: (see below)    Lab Data Reviewed: (see below)    ______________________________________________________________________    Medications:     Current Facility-Administered Medications   Medication Dose Route Frequency    furosemide (LASIX) tablet 20 mg  20 mg Oral BID    morphine IR (MS IR) tablet 30 mg  30 mg Oral Q4H PRN    senna-docusate (PERICOLACE) 8.6-50 mg per tablet 1 Tab  1 Tab Oral DAILY    polyethylene glycol (MIRALAX) packet 17 g  17 g Oral DAILY    HYDROmorphone (PF) (DILAUDID) injection 2 mg  2 mg IntraVENous Q3H PRN    albuterol-ipratropium (DUO-NEB) 2.5 MG-0.5 MG/3 ML  3 mL Nebulization Q4H PRN    sodium chloride (NS) flush 5-40 mL  5-40 mL IntraVENous Q8H    sodium chloride (NS) flush 5-40 mL  5-40 mL IntraVENous PRN    acetaminophen (TYLENOL) tablet 650 mg  650 mg Oral Q4H PRN    ondansetron (ZOFRAN) injection 4 mg  4 mg IntraVENous Q4H PRN    traMADol (ULTRAM) tablet 25 mg  25 mg Oral Q6H PRN    naloxone (NARCAN) injection 0.4 mg  0.4 mg IntraVENous EVERY 2 MINUTES AS NEEDED    ALPRAZolam (XANAX) tablet 0.25 mg  0.25 mg Oral BID    morphine CR (MS CONTIN) tablet 15 mg  15 mg Oral Q12H            Lab Review:     Recent Labs     02/14/20 0342 02/13/20 1929   WBC 10.3 16.5*   HGB 10.5* 11.6   HCT 33.4* 36.8    312     Recent Labs     02/16/20  0509 02/14/20 0342 02/13/20 1929    139 137   K 4.0 3.9 4.3    108 104   CO2 27 26 27   GLU 97 104* 110*   BUN 15 17 17   CREA 0.79 0.77 0.84   CA 7.1* 7.3* 8.1*   MG 1.6  --   --    ALB  --   --  2.0*   SGOT  --   --  52*   ALT  --   --  19     No components found for: [place]         Assessment / Plan:   Pelvic/acetabular fracture - management as per ortho. Non-surgical management at this time. Will; need to optimize pain control as chronically on pain meds. Also now TTWB so will be difficult to dispo to home as daughter does not think she can assist her to the bathroom and unable to pay for caregivers.  If goes to SNF under hospice, will need to be self pay for room and board so this is unlikely an option as well   -CM consulted to assist with dispo; likely will d/c to SNF off hospice as family cannot afford to pay room and board for SNF if remains on hospice   -pain has been difficult to assess. Suspect confounded slightly by her mental status but may need to increase basal morphine to 30mg BID. At this point degree of somnolence is making me reluctant to do so. Will ask palliative to assist as pain has been difficult to control   -appreciate ortho assistance; TTWB; they have signed off as no plans for surgery   -PT/OT on board but ability to participate has been limited     Lung CA    -previously was on hospice but will be disenrolled so she can d/c to SNF   -remains DNR   -will ask palliative to assist with pain control     PAF   -not on meds    HTN   -not on meds    RA   -not on meds     COPD   -not on meds; duonebs PRN     Constipation - no e/o obstruction. Normoactive BS. Passing flatus. Likely 2/2 narcotics  -senna/docusate/Miralax      Hypoxia - noted to be hypoxic by nursing on 2/14. Now on O2. CXR obtained without acute findings, but proBNP elevated. Suspect mildly volume overloaded and likely atelectasis contributing.  Also may be slightly hypoventilating when receives pain meds  -continue diuresis   -strict I's and O's   -continuous pulse ox    Total time spent with patient: [address] discussed with: Patient, Family, Care Manager, Consultant/Specialist and >50% of time spent in counseling and coordination of care    Discussed:  Code Status, Care Plan and D/C Planning    Prophylaxis:  Lovenox    Disposition:  SNF            ___________________________________________________    Attending Physician: Denton Arthur MD

## 2020-02-16 NOTE — PROGRESS NOTES
Spiritual Care Assessment/Progress Note  [address]      NAME: Kirby Carroll      MRN: [id number]  AGE: 80 y.o. SEX: female  Moravian Affiliation: Cheondoism   Language: English     2/16/2020     Total Time (in minutes): 6     Spiritual Assessment begun in [facility] 4[facility] POST SURG ORT 1 through conversation with:         [x]Patient        [] Family    [] Friend(s)        Reason for Consult: Palliative Care, Initial/Spiritual Assessment     Spiritual beliefs: (Please include comment if needed)     [] Identifies with a reji tradition:         [] Supported by a reji community:            [] Claims no spiritual orientation:           [] Seeking spiritual identity:                [] Adheres to an individual form of spirituality:           [x] Not able to assess:                           Identified resources for coping:      [] Prayer                               [] Music                  [] Guided Imagery     [] Family/friends                 [] Pet visits     [] Devotional reading                         [x] Unknown     [] Other:                                           Interventions offered during this visit: (See comments for more details)    Patient Interventions:  Other (comment)(Unable to assess)           Plan of Care:     [] Support spiritual and/or cultural needs    [] Support AMD and/or advance care planning process      [] Support grieving process   [] Coordinate Rites and/or Rituals    [] Coordination with community clergy   [] No spiritual needs identified at this time   [] Detailed Plan of Care below (See Comments)  [] Make referral to Music Therapy  [] Make referral to Pet Therapy     [] Make referral to Addiction services  [] Make referral to [facility]  [] Make referral to Spiritual Care Partner  [] No future visits requested        [x] Follow up visits as needed     Comments:  visited patient, Dylon Hunter, for a palliative care initial spiritual assessment on the Post. Surgical Ortho unit. Kait Farnsworth was lying in bed and appeared to be resting comfortably when the  came into the room. No family was present at this time and Kait Farnsworth did not become alert to the 's voice.  left a spiritual care card on Gabriela's bedside table.  will follow up as able and/or needed  [employer]. Michele Silva.      Paging Service: [phone])

## 2020-02-16 NOTE — PROGRESS NOTES
PHYSICAL THERAPY NOTE:  Spoke with RN prior to attempt at treatment, patient had been provided with pain medication and cleared to participate. Pt awake screaming \"Don't touch me\" over and over. Explained to patient benefits of mobility as well as issues with immobility while hospitalized. Pt yelling \"I don't care, I'm going home. \"   RN present in room for placement of soft folded sheet between Patient's knees  to avoid pressure sores. Pt very vocal although appeared more comfortable with padding between LE's. Will continue to follow and complete treatment as able. Thank you. Yuli BROWN.

## 2020-02-16 NOTE — PROGRESS NOTES
Pt. Sachi Reinoso after attempts of PT this morning. Refusing physical exam although moving BLE's without difficulty in bed and NVI distally. Pedal pulses = BLE's. Acetabular fracture, Rami fx and compression fx all non operative. Daughter agrees with plan. Outpatient f/u as needed. Pt. Will need pain control and PT as tolerated. TTWB RLE only for 8-12 weeks. Please reconsult as needed. Dr. Tram Banks agrees with plan. Thank you for allowing us to take part in this patients care.       JENNI HenryC  Orthopaedic Surgery PA

## 2020-02-17 ENCOUNTER — APPOINTMENT (OUTPATIENT)
Dept: GENERAL RADIOLOGY | Age: 85
DRG: 543 | End: 2020-02-17
Attending: HOSPITALIST
Payer: MEDICARE

## 2020-02-17 VITALS
RESPIRATION RATE: 16 BRPM | DIASTOLIC BLOOD PRESSURE: 70 MMHG | HEIGHT: 64 IN | BODY MASS INDEX: 20.78 KG/M2 | SYSTOLIC BLOOD PRESSURE: 109 MMHG | WEIGHT: 121.69 LBS | OXYGEN SATURATION: 97 % | TEMPERATURE: 97.3 F | HEART RATE: 78 BPM

## 2020-02-17 LAB
ANION GAP SERPL CALC-SCNC: 6 MMOL/L (ref 5–15)
BASOPHILS # BLD: 0.1 K/UL (ref 0–0.1)
BASOPHILS NFR BLD: 1 % (ref 0–1)
BNP SERPL-MCNC: 4210 PG/ML
BUN SERPL-MCNC: 17 MG/DL (ref 6–20)
BUN/CREAT SERPL: 22 (ref 12–20)
CALCIUM SERPL-MCNC: 7.3 MG/DL (ref 8.5–10.1)
CHLORIDE SERPL-SCNC: 103 MMOL/L (ref 97–108)
CO2 SERPL-SCNC: 28 MMOL/L (ref 21–32)
CREAT SERPL-MCNC: 0.77 MG/DL (ref 0.55–1.02)
DIFFERENTIAL METHOD BLD: ABNORMAL
EOSINOPHIL # BLD: 0.5 K/UL (ref 0–0.4)
EOSINOPHIL NFR BLD: 4 % (ref 0–7)
ERYTHROCYTE [DISTWIDTH] IN BLOOD BY AUTOMATED COUNT: 17.5 % (ref 11.5–14.5)
GLUCOSE SERPL-MCNC: 95 MG/DL (ref 65–100)
HCT VFR BLD AUTO: 32.4 % (ref 35–47)
HGB BLD-MCNC: 9.7 G/DL (ref 11.5–16)
IMM GRANULOCYTES # BLD AUTO: 0.1 K/UL (ref 0–0.04)
IMM GRANULOCYTES NFR BLD AUTO: 1 % (ref 0–0.5)
LYMPHOCYTES # BLD: 2.6 K/UL (ref 0.8–3.5)
LYMPHOCYTES NFR BLD: 20 % (ref 12–49)
MAGNESIUM SERPL-MCNC: 1.7 MG/DL (ref 1.6–2.4)
MCH RBC QN AUTO: 27.2 PG (ref 26–34)
MCHC RBC AUTO-ENTMCNC: 29.9 G/DL (ref 30–36.5)
MCV RBC AUTO: 90.8 FL (ref 80–99)
MONOCYTES # BLD: 1 K/UL (ref 0–1)
MONOCYTES NFR BLD: 8 % (ref 5–13)
NEUTS SEG # BLD: 8.6 K/UL (ref 1.8–8)
NEUTS SEG NFR BLD: 66 % (ref 32–75)
NRBC # BLD: 0 K/UL (ref 0–0.01)
NRBC BLD-RTO: 0 PER 100 WBC
PLATELET # BLD AUTO: 259 K/UL (ref 150–400)
PMV BLD AUTO: 9.5 FL (ref 8.9–12.9)
POTASSIUM SERPL-SCNC: 3.8 MMOL/L (ref 3.5–5.1)
RBC # BLD AUTO: 3.57 M/UL (ref 3.8–5.2)
SODIUM SERPL-SCNC: 137 MMOL/L (ref 136–145)
WBC # BLD AUTO: 12.9 K/UL (ref 3.6–11)

## 2020-02-17 PROCEDURE — 83735 ASSAY OF MAGNESIUM: CPT

## 2020-02-17 PROCEDURE — 85025 COMPLETE CBC W/AUTO DIFF WBC: CPT

## 2020-02-17 PROCEDURE — 94640 AIRWAY INHALATION TREATMENT: CPT

## 2020-02-17 PROCEDURE — 71045 X-RAY EXAM CHEST 1 VIEW: CPT

## 2020-02-17 PROCEDURE — 80048 BASIC METABOLIC PNL TOTAL CA: CPT

## 2020-02-17 PROCEDURE — 94664 DEMO&/EVAL PT USE INHALER: CPT

## 2020-02-17 PROCEDURE — 94760 N-INVAS EAR/PLS OXIMETRY 1: CPT

## 2020-02-17 PROCEDURE — 74011250637 HC RX REV CODE- 250/637: Performed by: INTERNAL MEDICINE

## 2020-02-17 PROCEDURE — 74011000250 HC RX REV CODE- 250: Performed by: INTERNAL MEDICINE

## 2020-02-17 PROCEDURE — 74011250637 HC RX REV CODE- 250/637: Performed by: HOSPITALIST

## 2020-02-17 PROCEDURE — 36415 COLL VENOUS BLD VENIPUNCTURE: CPT

## 2020-02-17 PROCEDURE — 83880 ASSAY OF NATRIURETIC PEPTIDE: CPT

## 2020-02-17 RX ORDER — ACETAMINOPHEN 325 MG/1
650 TABLET ORAL EVERY 6 HOURS
Status: DISCONTINUED | OUTPATIENT
Start: 2020-02-17 | End: 2020-02-17 | Stop reason: HOSPADM

## 2020-02-17 RX ORDER — ALPRAZOLAM 0.25 MG/1
0.25 TABLET ORAL 2 TIMES DAILY
Qty: 10 TAB | Refills: 0 | Status: SHIPPED | OUTPATIENT
Start: 2020-02-17

## 2020-02-17 RX ORDER — TRAMADOL HYDROCHLORIDE 50 MG/1
25 TABLET ORAL
Qty: 20 TAB | Refills: 0 | Status: SHIPPED | OUTPATIENT
Start: 2020-02-17 | End: 2020-02-28

## 2020-02-17 RX ORDER — IPRATROPIUM BROMIDE AND ALBUTEROL SULFATE 2.5; .5 MG/3ML; MG/3ML
3 SOLUTION RESPIRATORY (INHALATION)
Qty: 30 NEBULE | Refills: 0 | Status: SHIPPED
Start: 2020-02-17

## 2020-02-17 RX ORDER — MORPHINE SULFATE 15 MG/1
15 TABLET, FILM COATED, EXTENDED RELEASE ORAL EVERY 8 HOURS
Qty: 15 TAB | Refills: 0 | Status: SHIPPED | OUTPATIENT
Start: 2020-02-17 | End: 2020-02-22

## 2020-02-17 RX ORDER — FUROSEMIDE 40 MG/1
40 TABLET ORAL DAILY
Qty: 30 TAB | Refills: 0 | Status: ON HOLD
Start: 2020-02-17 | End: 2020-02-26

## 2020-02-17 RX ORDER — ACETAMINOPHEN 500 MG
650 TABLET ORAL EVERY 6 HOURS
Qty: 30 TAB | Refills: 0 | Status: SHIPPED
Start: 2020-02-17

## 2020-02-17 RX ORDER — MORPHINE SULFATE 15 MG/1
15 TABLET, FILM COATED, EXTENDED RELEASE ORAL EVERY 8 HOURS
Status: DISCONTINUED | OUTPATIENT
Start: 2020-02-17 | End: 2020-02-17 | Stop reason: HOSPADM

## 2020-02-17 RX ORDER — POLYETHYLENE GLYCOL 3350 17 G/17G
17 POWDER, FOR SOLUTION ORAL DAILY
Qty: 30 PACKET | Refills: 0 | Status: SHIPPED
Start: 2020-02-18

## 2020-02-17 RX ADMIN — ACETAMINOPHEN 650 MG: 325 TABLET ORAL at 12:36

## 2020-02-17 RX ADMIN — MORPHINE SULFATE 15 MG: 15 TABLET, FILM COATED, EXTENDED RELEASE ORAL at 15:09

## 2020-02-17 RX ADMIN — Medication 10 ML: at 15:12

## 2020-02-17 RX ADMIN — Medication 10 ML: at 06:00

## 2020-02-17 RX ADMIN — MORPHINE SULFATE 30 MG: 15 TABLET ORAL at 09:22

## 2020-02-17 RX ADMIN — MORPHINE SULFATE 30 MG: 15 TABLET ORAL at 03:56

## 2020-02-17 RX ADMIN — ALPRAZOLAM 0.25 MG: 0.25 TABLET ORAL at 09:22

## 2020-02-17 RX ADMIN — POLYETHYLENE GLYCOL 3350 17 G: 17 POWDER, FOR SOLUTION ORAL at 09:23

## 2020-02-17 RX ADMIN — FUROSEMIDE 20 MG: 20 TABLET ORAL at 12:37

## 2020-02-17 RX ADMIN — DOCUSATE SODIUM 50MG AND SENNOSIDES 8.6MG 1 TABLET: 8.6; 5 TABLET, FILM COATED ORAL at 09:22

## 2020-02-17 RX ADMIN — IPRATROPIUM BROMIDE AND ALBUTEROL SULFATE 3 ML: .5; 3 SOLUTION RESPIRATORY (INHALATION) at 11:59

## 2020-02-17 NOTE — DISCHARGE SUMMARY
Discharge Summary     Patient:  Yesy Jay       MRN: [id number]       YOB: 1931       Age: 80 y.o. Date of admission:  2/13/2020    Date of discharge:  2/17/2020    Primary care provider: Dr. Stacy Read MD    Admitting provider:  Milly Barroso MD    Discharging provider:  Miracle Duckworth MD - [phone]  If unavailable, call [phone] and ask the  to page the triage hospitalist.    Consultations  · [address]  · IP CONSULT TO HOSPITALIST  · IP CONSULT TO PALLIATIVE CARE - PROVIDER    Procedures  · * No surgery found *    Discharge destination: SNF. The patient is stable for discharge. Admission diagnosis  · Pelvic fracture (HCC) [S32. 9XXA]    Current Discharge Medication List      START taking these medications    Details   albuterol-ipratropium (DUO-NEB) 2.5 mg-0.5 mg/3 ml nebu 3 mL by Nebulization route every four (4) hours as needed for Wheezing. Qty: 30 Nebule, Refills: 0      furosemide (LASIX) 40 mg tablet Take 1 Tab by mouth daily. Qty: 30 Tab, Refills: 0      polyethylene glycol (MIRALAX) 17 gram packet Take 1 Packet by mouth daily. Qty: 30 Packet, Refills: 0      traMADol (ULTRAM) 50 mg tablet Take 0.5 Tabs by mouth every six (6) hours as needed for Pain for up to 10 days. Max Daily Amount: 100 mg. Qty: 20 Tab, Refills: 0    Associated Diagnoses: Closed displaced fracture of acetabulum, unspecified portion of acetabulum, unspecified laterality, initial encounter ([facility] [address]         CONTINUE these medications which have CHANGED    Details   ALPRAZolam (XANAX) 0.25 mg tablet Take 1 Tab by mouth two (2) times a day. Max Daily Amount: 0.5 mg.  Qty: 10 Tab, Refills: 0    Associated Diagnoses: Anxiety      acetaminophen (TYLENOL) 500 mg tablet Take 1.5 Tabs by mouth every six (6) hours.   Qty: 30 Tab, Refills: 0      morphine CR (MS CONTIN) 15 mg CR tablet Take 1 Tab by mouth every eight (8) hours for 5 days. Max Daily Amount: 45 mg.  Qty: 15 Tab, Refills: 0    Associated Diagnoses: Closed nondisplaced fracture of pelvis, unspecified part of pelvis, initial encounter ([facility] [address]         CONTINUE these medications which have NOT CHANGED    Details   senna-docusate (SENNA WITH DOCUSATE SODIUM) 8.6-50 mg per tablet Take 1 Tab by mouth two (2) times daily as needed for Constipation. Follow-up Information     Follow up With Specialties Details Why Contact Info    Moiz Hendrickson MD Internal Medicine In 1 month Discharge follow up  [address] [facility]  [phone]      Cyndy Mackey MD Orthopedic Surgery In 2 weeks follow up as outpatient  [address] [address]  [phone]            Final discharge diagnoses and brief hospital course  Please also refer to the admission H&P for details on the presenting problem. 81 yo female with h/o chronic pain from RA, PAF, COPD and lung cancer admitted for pubic rami and nondisplaced acetabular fracture. Prior to admission pt was living at home and enrolled in hospice. She was able to do little other than transfer with assistance to the bathroom. Pelvic/acetabular fracture  Multiple Chronic Lumbar Compression Fx, not new  - likely due to fall and Osteoporosis due to advanced age  - Non-surgical  Management   - management as per ortho. Non-surgical management at this time. Will; need to optimize pain control as chronically on pain meds. -  Toe-touch WB only  -  MS Contin q8  -  Tramadol PRN  -  PT/OT   - outpatient Ortho follow up in 2 weeks      Lung CA    -previously was on hospice but will be disenrolled so she can d/c to SNF   -remains DNR      PAF   - NSR, stable     HTN   - stable, not on BP meds     RA   -pain management      COPD   -not on meds; duonebs PRN      Constipation - no e/o obstruction. Normoactive BS. Passing flatus. Likely 2/2 narcotics  -senna/docusate/Miralax      Low o2 sat readings due to cold fingers and poor  on pulse-ox  - not in any respiratory distrest  - CXR: mild left effusion   - c/w diuresis  - off o2 now, Sat >90% goal     FOLLOW-UP CARE RECOMMENDATIONS:    It is very important that you keep follow-up appointment(s). Bring discharge papers, medication list (and/or medication bottles) to follow-up appointments for review by outpatient provider(s). FOLLOW-UP TESTS RECOMMENDED:   As above    ONGOING TREATMENT PLAN: repeat Xray in 2 weeks with Orthopedics    PENDING TEST RESULTS:  At the time of discharge the following test results are still pending: NONE. Please review these results as they become available. Specific symptoms to watch for: chest pain, shortness of breath, fever, chills, nausea, vomiting, diarrhea, change in mentation, falling, weakness, bleeding.     DIET:  Regular     ACTIVITY:  TTWB RLE only    WOUND CARE: NONE    EQUIPMENT needed:  NONE    INCIDENTAL FINDINGS:  NONE    GOALS OF CARE:  X  Eventual return to home/independent/assisted living     Long term SNF      Hospice     No rehospitalization     Patient condition at discharge:   Functional status    Poor    X  Deconditioned      Independent   Cognition    Lucid   X  Forgetful (some sensescence)     Dementia   Catheters/lines (plus indication)    Pereira     PICC      PEG    X     Code status    Full code    X  DNR        Physical examination at discharge  Visit Vitals  /58 (BP 1 Location: Right arm, BP Patient Position: Lying left side)   Pulse 79   Temp 97.4 °F (36.3 °C)   Resp 14   Ht 5' 4\" (1.626 m)   Wt 55.2 kg (121 lb 11.1 oz)   SpO2 96%   BMI 20.89 kg/m²     Awake and alert  Not oriented  Lung Clear  CVS: RRR  Abd: soft NT ND  Ext: no edema, Decreased ROM in RLE due to pain      Pertinent imaging studies:    Recent Labs     02/17/20 0403   WBC 12.9*   HGB 9.7*   HCT 32.4*        Recent Labs     02/17/20 0403 02/16/20  0509    137   K 3.8 4.0    106   CO2 28 27   BUN 17 15   CREA 0.77 0.79   GLU 95 97   CA 7.3* 7.1*   MG 1.7 1.6     No results for input(s): SGOT, GPT, AP, TBIL, TP, ALB, GLOB, GGT, AML, LPSE in the last 72 hours. No lab exists for component: AMYP, HLPSE  No results for input(s): INR, PTP, APTT, INREXT in the last 72 hours. No results for input(s): FE, TIBC, PSAT, FERR in the last 72 hours. No results for input(s): PH, PCO2, PO2 in the last 72 hours. No results for input(s): CPK, CKMB in the last 72 hours. No lab exists for component: TROPONINI  No components found for: [place]    Chronic Diagnoses:    Problem List as of 2/17/2020 Date Reviewed: 2/14/2020          Codes Class Noted - Resolved    Lung cancer [facility]) ICD-10-CM: C34.90  ICD-9-CM: 162.9  2/14/2020 - Present        Pelvic fracture ([facility] ICD-10-CM: S32. 9XXA  ICD-9-CM: 808.8  2/13/2020 - Present        Anxiety ICD-10-CM: F41.9  ICD-9-CM: 300.00  9/15/2019 - Present        DNR (do not resuscitate) ICD-10-CM: Z66  ICD-9-CM: V49.86  Unknown - Present        Chronic pain syndrome (Chronic) ICD-10-CM: G89.4  ICD-9-CM: 338.4  Unknown - Present        Physical debility ICD-10-CM: R53.81  ICD-9-CM: 799.3  Unknown - Present        Goals of care, counseling/discussion ICD-10-CM: Z71.89  ICD-9-CM: V65.49  Unknown - Present        Abdominal aortic aneurysm (AAA) without rupture ([facility] ICD-10-CM: I71.4  ICD-9-CM: 441.4  12/4/2018 - Present        Malignant neoplasm of upper lobe of left lung ([facility] (Chronic) ICD-10-CM: C34.12  ICD-9-CM: 162.3  11/27/2018 - Present        Tibial plateau fracture [facility]-AW: Q37.272N  ICD-9-CM: 823.00  8/25/2018 - Present        Tobacco abuse (Chronic) ICD-10-CM: Z72.0  ICD-9-CM: 305.1  8/25/2018 - Present        Hypertension (Chronic) ICD-10-CM: I10  ICD-9-CM: 401.9  8/25/2018 - Present        TIA (transient ischemic attack) ICD-10-CM: G45.9  ICD-9-CM: 435.9  9/30/2016 - Present        Paroxysmal A-fib ([address] (Chronic) ICD-10-CM: I48.0  ICD-9-CM: 427.31  9/30/2016 - Present        RA (rheumatoid arthritis) (HCC) (Chronic) ICD-10-CM: M06.9  ICD-9-CM: 714.0  9/30/2016 - Present        COPD (chronic obstructive pulmonary disease) (HCC) (Chronic) ICD-10-CM: J44.9  ICD-9-CM: 496  9/30/2016 - Present        RESOLVED: Acute encephalopathy ICD-10-CM: G93.40  ICD-9-CM: 348.30  9/7/2019 - 9/10/2019        RESOLVED: Acute right hip pain ICD-10-CM: M25.551  ICD-9-CM: 719.45  Unknown - 2/14/2020        RESOLVED: Hematoma ICD-10-CM: T14. 8XXA  ICD-9-CM: 924.9  7/20/2019 - 2/14/2020        RESOLVED: Leukocytosis ICD-10-CM: N57.685  ICD-9-CM: 288.60  7/20/2019 - 9/15/2019        RESOLVED: Fall ICD-10-CM: W19. Myrl Mon  ICD-9-CM: E888.9  7/20/2019 - 2/14/2020        RESOLVED: Anemia ICD-10-CM: D64.9  ICD-9-CM: 285.9  12/4/2018 - 2/14/2020        RESOLVED: Lactic acid acidosis ICD-10-CM: E87.2  ICD-9-CM: 276.2  11/24/2018 - 11/29/2018        RESOLVED: SIRS (systemic inflammatory response syndrome) ([facility] ICD-10-CM: R65.10  ICD-9-CM: 995.90  11/24/2018 - 12/3/2018        RESOLVED: Slurred speech ICD-10-CM: R47.81  ICD-9-CM: 784.59  9/30/2016 - 2/14/2020              Time spent on discharge related activities today greater than 30 minutes.       Signed:  Miri Gore MD                 Hospitalist, Internal Medicine      Cc: Gee Banda MD

## 2020-02-17 NOTE — PROGRESS NOTES
Problem: Pressure Injury - Risk of  Goal: *Prevention of pressure injury  Description  Document Rom Scale and appropriate interventions in the flowsheet. Outcome: Progressing Towards Goal  Note: Pressure Injury Interventions:  Sensory Interventions: Assess changes in LOC    Moisture Interventions: Absorbent underpads    Activity Interventions: Increase time out of bed    Mobility Interventions: HOB 30 degrees or less    Nutrition Interventions: Document food/fluid/supplement intake    Friction and Shear Interventions: Lift sheet                Problem: Patient Education: Go to Patient Education Activity  Goal: Patient/Family Education  Outcome: Progressing Towards Goal     Problem: Patient Education: Go to Patient Education Activity  Goal: Patient/Family Education  Outcome: Progressing Towards Goal     Problem: Patient Education: Go to Patient Education Activity  Goal: Patient/Family Education  Outcome: Progressing Towards Goal     Problem: Falls - Risk of  Goal: *Absence of Falls  Description  Document Miguel Fall Risk and appropriate interventions in the flowsheet.   Outcome: Progressing Towards Goal  Note: Fall Risk Interventions:       Mentation Interventions: Bed/chair exit alarm    Medication Interventions: Bed/chair exit alarm    Elimination Interventions: Call light in reach    History of Falls Interventions: Bed/chair exit alarm         Problem: Patient Education: Go to Patient Education Activity  Goal: Patient/Family Education  Outcome: Progressing Towards Goal

## 2020-02-17 NOTE — PROGRESS NOTES
Nutrition Assessment:    RECOMMENDATIONS/INTERVENTION(S):   1. Continue current diet (mechanical soft)- texture per SLP    2. Recommend ONS to promote intake and healthy wt  Ensure Enlive BID provides 700kcal, 40g protein    3. Monitor wt, intake, GI function, POC, labs      ASSESSMENT:   2/17: 79yo F admitted with pelvic fracture. PMHx: HTN, TIA, afib, arthritis, COPD, abd aortic aneurysm, lung CA. Note- pt was enrolled in hospice at home PTA. Pt seen for low BMI. Pt reports good appetite PTA of 3 meals/day. She states she had eggs at bfast today. C/o back and leg pain throughout visit. Pt then fell asleep/stopped responding during visit. Per RN and tech - 50-60% bfast intake this morning. Ensure untouched. Pt requires assistance to promote intake. Will send Ensure Enlive BID to promote intake. BMI 20.9 (low per age); EEN +250 to promote healthy wt. CBW 121lb, per EMR pt has lost 13lb since Sept (9.7% in 5 months). LBM unknown. L ankle PI noted. Labs: Ca 7.3L  Meds: lasix (available, held), miralax, pericolace        Diet Order: Mechanical soft  % Eaten:    Patient Vitals for the past 72 hrs:   % Diet Eaten   02/16/20 1412 30 %   02/16/20 1103 10 %   02/16/20 0824 10 %   02/15/20 1821 25 %   02/15/20 1322 50 %   02/15/20 0813 0 %       Pertinent Medications: [x] Reviewed    Labs: [x] Reviewed    Anthropometrics: Height: 5' 4\" (162.6 cm) Weight: 55.2 kg (121 lb 11.1 oz)    IBW (%IBW):   ( ) UBW (%UBW):   (  %)      BMI: Body mass index is 20.89 kg/m². This BMI is indicative of:   [x] Underweight - per age    [] Normal    [] Overweight    []  Obesity    []  Extreme Obesity (BMI>40)  Estimated Nutrition Needs (Based on): 2501 Kcals/day(REE x1.3 + 250) , 66 g(1.0-1.2 g/kg) Protein  Carbohydrate:  At Least 130 g/day  Fluids: 1507 mL/day (1 ml/kcal)    Last BM: unknown   []Active     []Hyperactive  []Hypoactive       [] Absent   BS  Skin:    [] Intact   [] Incision  [] Breakdown   [] DTI   [] Tears/Excoriation/Abrasion  []Edema [x] Other:  L ankle PI; gluteal fold erythema   Wt Readings from Last 30 Encounters:   02/17/20 55.2 kg (121 lb 11.1 oz)   09/11/19 60.8 kg (134 lb)   07/25/19 57 kg (125 lb 10.6 oz)   05/29/19 56.2 kg (123 lb 12.8 oz)   02/28/19 50.7 kg (111 lb 12.8 oz)   02/11/19 52.6 kg (116 lb)   02/05/19 54.6 kg (120 lb 6.4 oz)   12/04/18 52.6 kg (116 lb)   12/04/18 54.9 kg (121 lb)   11/24/18 52.6 kg (116 lb)   11/28/18 52.6 kg (115 lb 15.4 oz)   08/25/18 61.2 kg (135 lb)   08/27/18 61.2 kg (135 lb)   10/04/16 64 kg (141 lb)   10/01/16 65.8 kg (145 lb)   09/13/13 67.4 kg (148 lb 8 oz)      NUTRITION DIAGNOSES:   Problem:  Underweight     Etiology: related to inability to consume sufficient energy to meet EEN     Signs/Symptoms: as evidenced by BMI 20.9      NUTRITION INTERVENTIONS:  Meals/Snacks: General/healthful diet   Supplements: Commercial supplement              GOAL:   Intake >50% meals +ONS with wt gain 0.5lb/wk over next 2-3 days    Cultural, Mormonism, or Ethnic Dietary Needs: None     EDUCATION & DISCHARGE NEEDS:    [x] None Identified   [] Identified and Education Provided/Documented   [] Identified and Pt declined/was not appropriate      [] Interdisciplinary Care Plan Reviewed/Documented    [x] Discharge Needs: Regular diet; mechanical soft/texture per SLP   [] No Nutrition Related Discharge Needs    NUTRITION RISK:   Pt Is At Nutrition Risk  [x]     No Nutrition Risk Identified  []       PT SEEN FOR:    []  MD Consult: []Calorie Count      []Diabetic Diet Education        []Diet Education     []Electrolyte Management     []General Nutrition Management and Supplements     []Management of Tube Feeding     []TPN Recommendations    []  RN Referral:  []MST score >=2     []Enteral/Parenteral Nutrition PTA     []Pregnant: Gestational DM or Multigestation                 [] Pressure Ulcer    [x]  Low BMI      []  Length of Stay       [] Dysphagia Diet         [] Ventilator  [] Follow-up     Previous Recommendations:   [] Implemented          [] Not Implemented          [x] Not Applicable    Previous Goal:   [] Met              [] Progressing Towards Goal              [] Not Progressing Towards Goal   [x] Not Applicable            Cherylene Chock,   Pager [phone]  Phone [phone]

## 2020-02-17 NOTE — WOUND CARE
Wound care consult: 
Initial visit for skin assessment, alert, painful in lower legs- on low air loss surface- medication for pain per staff. Assessment Sacral area intact, lower legs intact  With scattered bruising- skin thin and fragile. Left ankle- 0.5x0.5 cm area dry intact scab, appears chronic and resolving. Prerna wound intact - no redness. Unable to assess left hip due to refusal to reposition in bed due to pain. Staff advised and will assess when able to reposition. Heels intact. Recommendations/Plan Turn, reposition every 2 hours as tolerated, float heels-  Low air loss surface in use. Incontinent care  Apply Zinc to all open areas, moisture barrier as needed. Will follow, reconsult as needed.  
[address] [facility]

## 2020-02-17 NOTE — PROGRESS NOTES
2/17/2020 12:17 PM  CM verified patient has a qualifying hospital stay for [facility].  NICOLASA WayW

## 2020-02-17 NOTE — PROGRESS NOTES
Pt is aware of discharge. Admission Doc uncompleted because pt is confused. Report called to Nupur, and given to Vikki Claros LPN.

## 2020-02-17 NOTE — PROGRESS NOTES
Spiritual Care Assessment/Progress Note  [address]      NAME: Selwyn Warren      MRN: [id number]  AGE: 80 y.o.  SEX: female  Zoroastrianism Affiliation: Confucianist   Language: English     2/17/2020     Total Time (in minutes): 15     Spiritual Assessment begun in [facility] POST SURG ORT 1 through conversation with:         [x]Patient        [] Family    [] Friend(s)        Reason for Consult: Palliative Care, Initial/Spiritual Assessment     Spiritual beliefs: (Please include comment if needed)     [x] Identifies with a reji tradition:         [] Supported by a reji community:            [] Claims no spiritual orientation:           [] Seeking spiritual identity:                [] Adheres to an individual form of spirituality:           [] Not able to assess:                           Identified resources for coping:      [x] Prayer                               [x] Music                  [] Guided Imagery     [x] Family/friends                 [] Pet visits     [] Devotional reading                         [] Unknown     [] Other:                                               Interventions offered during this visit: (See comments for more details)    Patient Interventions: Affirmation of emotions/emotional suffering, Catharsis/review of pertinent events in supportive environment, Prayer (actual), End of life issues discussed           Plan of Care:     [x] Support spiritual and/or cultural needs    [] Support AMD and/or advance care planning process      [] Support grieving process   [] Coordinate Rites and/or Rituals    [] Coordination with community clergy   [] No spiritual needs identified at this time   [] Detailed Plan of Care below (See Comments)  [] Make referral to Music Therapy  [] Make referral to Pet Therapy     [] Make referral to Addiction services  [] Make referral to [facility]  [] Make referral to Spiritual Care Partner  [] No future visits requested        [x] Follow up visits as needed     Comments:  visited patient, Matt Carrizales, for a palliative care initial spiritual assessment on the [facility] Surgical unit. Gabriela was awake, lying in bed, and appeared to be uncomfortable. No family was present at this time.  introduced herself and extended support through active listening and pastoral presence. Matt Carrizales greeted the  warmly and made good eye contact. She openly shared how uncomfortable she was feeing right now and requested assistance contacting her nurse.  provided assistance and remained with Matt Carrizales until her nurse and tech came in to address her needs.  continued to be a supportive presence as Matt Carrizales provided life review and also discussed her end-of-life. Matt Carrizales noted that she is ready die and is looking forward to seeing family in [facility]. She is most looking forward to seeing her son who passed away a few years ago.  continued to be a supportive presence as Matt Carrizales shared other thoughts, feelings, and concerns at this time. She requested prayer and  provided prayer and comforting touch. Gabriela's nurse and tech came in shortly after to help with other care needs.  will follow up as able and/or needed  Page Brown. Olinda March.      Paging Service: [phone])

## 2020-02-17 NOTE — DISCHARGE INSTRUCTIONS
Discharging provider: Stephie Landa MD    Primary care provider: Veronica Hart MD      [address] COURSE:    81 yo female with h/o chronic pain from RA, PAF, COPD and lung cancer admitted for pubic rami and nondisplaced acetabular fracture. Prior to admission pt was living at home and enrolled in hospice. She was able to do little other than transfer with assistance to the bathroom. Pelvic/acetabular fracture   - management as per ortho. Non-surgical management at this time. Will; need to optimize pain control as chronically on pain meds. -  Toe-touch WB only  -  MS Contin q8  -  Tramadol PRN  -  PT/OT   - outpatient Ortho follow up in 2 weeks      Lung CA    -previously was on hospice but will be disenrolled so she can d/c to SNF   -remains DNR      PAF   - NSR, stable     HTN   - stable, not on BP meds     RA   -pain management      COPD   -not on meds; duonebs PRN      Constipation - no e/o obstruction. Normoactive BS. Passing flatus. Likely 2/2 narcotics  -senna/docusate/Miralax      Low o2 sat readings due to cold fingers and poor  on pulse-ox  - not in any respiratory distrest  - CXR: mild left effusion   - c/w diuresis  - off o2 now, Sat >90% goal     FOLLOW-UP CARE RECOMMENDATIONS:    APPOINTMENTS:  Follow-up Information     Follow up With Specialties Details Why Contact Info    Veronica Hart MD Internal Medicine In 1 month Discharge follow up  [address]      Goran Henning MD Orthopedic Surgery In 2 weeks follow up as outpatient  [address] [facility]  [phone]              It is very important that you keep follow-up appointment(s). Bring discharge papers, medication list (and/or medication bottles) to follow-up appointments for review by outpatient provider(s).     FOLLOW-UP TESTS RECOMMENDED:   As above    ONGOING TREATMENT PLAN: repeat Xray in 2 weeks with Orthopedics    PENDING TEST RESULTS:  At the time of discharge the following test results are still pending: NONE. Please review these results as they become available. Specific symptoms to watch for: chest pain, shortness of breath, fever, chills, nausea, vomiting, diarrhea, change in mentation, falling, weakness, bleeding. DIET:  Regular     ACTIVITY:  TTWB RLE only    WOUND CARE: NONE    EQUIPMENT needed:  NONE    INCIDENTAL FINDINGS:  NONE    GOALS OF CARE:  X  Eventual return to home/independent/assisted living     Long term SNF      Hospice     No rehospitalization     Patient condition at discharge:   Functional status    Poor    X  Deconditioned      Independent   Cognition    Lucid   X  Forgetful (some sensescence)     Dementia   Catheters/lines (plus indication)    Pereira     PICC      PEG    X     Code status    Full code    X  DNR    . . . . . . . . . . . . . . . . . . . . . . . . . . . . . . . . . . . . . . . . . . . . . . . . . . . . . . . . . . . . . . . . . . . . . . . Redd Griffin CHRONIC MEDICAL CONDITIONS:  Problem List as of 2/17/2020 Date Reviewed: 2/14/2020          Codes Class Noted - Resolved    Lung cancer [facility]) ICD-10-CM: C34.90  ICD-9-CM: 162.9  2/14/2020 - Present        Pelvic fracture ([facility] ICD-10-CM: S32. 9XXA  ICD-9-CM: 808.8  2/13/2020 - Present        Anxiety ICD-10-CM: F41.9  ICD-9-CM: 300.00  9/15/2019 - Present        DNR (do not resuscitate) ICD-10-CM: Z66  ICD-9-CM: V49.86  Unknown - Present        Chronic pain syndrome (Chronic) ICD-10-CM: G89.4  ICD-9-CM: 338.4  Unknown - Present        Physical debility ICD-10-CM: R53.81  ICD-9-CM: 799.3  Unknown - Present        Goals of care, counseling/discussion ICD-10-CM: Z71.89  ICD-9-CM: V65.49  Unknown - Present        Abdominal aortic aneurysm (AAA) without rupture ([facility] ICD-10-CM: I71.4  ICD-9-CM: 441.4  12/4/2018 - Present        Malignant neoplasm of upper lobe of left lung ([facility] (Chronic) ICD-10-CM: C34.12  ICD-9-CM: 162.3 11/27/2018 - Present        Tibial plateau fracture [facility]-38-ML: S82.143A  ICD-9-CM: 823.00  8/25/2018 - Present        Tobacco abuse (Chronic) ICD-10-CM: Z72.0  ICD-9-CM: 305.1  8/25/2018 - Present        Hypertension (Chronic) ICD-10-CM: I10  ICD-9-CM: 401.9  8/25/2018 - Present        TIA (transient ischemic attack) ICD-10-CM: G45.9  ICD-9-CM: 435.9  9/30/2016 - Present        Paroxysmal A-fib (HCC) (Chronic) ICD-10-CM: I48.0  ICD-9-CM: 427.31  9/30/2016 - Present        RA (rheumatoid arthritis) (HCC) (Chronic) ICD-10-CM: M06.9  ICD-9-CM: 714.0  9/30/2016 - Present        COPD (chronic obstructive pulmonary disease) (HCC) (Chronic) ICD-10-CM: J44.9  ICD-9-CM: 496  9/30/2016 - Present        RESOLVED: Acute encephalopathy ICD-10-CM: G93.40  ICD-9-CM: 348.30  9/7/2019 - 9/10/2019        RESOLVED: Acute right hip pain ICD-10-CM: M25.551  ICD-9-CM: 719.45  Unknown - 2/14/2020        RESOLVED: Hematoma ICD-10-CM: T14. 8XXA  ICD-9-CM: 924.9  7/20/2019 - 2/14/2020        RESOLVED: Leukocytosis ICD-10-CM: L56.875  ICD-9-CM: 288.60  7/20/2019 - 9/15/2019        RESOLVED: Fall ICD-10-CM: W19. Selinda Helene  ICD-9-CM: E888.9  7/20/2019 - 2/14/2020        RESOLVED: Anemia ICD-10-CM: D64.9  ICD-9-CM: 285.9  12/4/2018 - 2/14/2020        RESOLVED: Lactic acid acidosis ICD-10-CM: E87.2  ICD-9-CM: 276.2  11/24/2018 - 11/29/2018        RESOLVED: SIRS (systemic inflammatory response syndrome) ([facility] ICD-10-CM: R65.10  ICD-9-CM: 995.90  11/24/2018 - 12/3/2018        RESOLVED: Slurred speech ICD-10-CM: R47.81  ICD-9-CM: 784.59  9/30/2016 - 2/14/2020

## 2020-02-17 NOTE — PROGRESS NOTES
Problem: Pressure Injury - Risk of  Goal: *Prevention of pressure injury  Description  Document Rom Scale and appropriate interventions in the flowsheet. Outcome: Progressing Towards Goal  Note: Pressure Injury Interventions:  Sensory Interventions: Assess changes in LOC    Moisture Interventions: Absorbent underpads    Activity Interventions: Pressure redistribution bed/mattress(bed type)    Mobility Interventions: HOB 30 degrees or less    Nutrition Interventions: Offer support with meals,snacks and hydration    Friction and Shear Interventions: Lift sheet                Problem: Falls - Risk of  Goal: *Absence of Falls  Description  Document Miguel Fall Risk and appropriate interventions in the flowsheet.   Outcome: Progressing Towards Goal  Note: Fall Risk Interventions:       Mentation Interventions: Bed/chair exit alarm    Medication Interventions: Bed/chair exit alarm    Elimination Interventions: Call light in reach    History of Falls Interventions: Bed/chair exit alarm

## 2020-02-17 NOTE — PROGRESS NOTES
2/17/2020   3:33 PM  Transition of Care Plan to SNF/Rehab    SNF/Rehab Transition:  Patient has been accepted to [address] and meets criteria for admission. Patient will transported by [facility] and expected to leave at 3:30. Communication to Patient/Family:  Met with patient and daughter, Elva Ceja, and they are agreeable to the transition plan. Communication to SNF/Rehab:  Bedside RN, Carmelo Mills, has been notified to update the transition plan to the facility and call report. Mari Arnold Discharge information has been updated on the AVS.         Nursing Please include all hard scripts for controlled substances, med rec and dc summary, and AVS in packet. Reviewed and confirmed with facility, [facility], can manage the patient care needs for the following:     SNF/Rehab Transition:  Patient to follow-up with Home Health if needed  PCP/Specialist: Dr. Migdalia Oviedo [phone]    Roberto Bowen with (X) only those applicable:    Medication:  [x]  Medications will be available at the facility  []  IV Antibiotics   []  Controlled Substance - hard copy to be sent with patient   []  Weekly Labs   Documents:  [x] Hard RX  [x] MAR  [] Kardex  [x] AVS  []Transfer Summary  [x]Discharge   Equipment:  []  CPAP/BiPAP  []  Wound Vacuum  []  Pereira or Urinary Device  []  PICC/Central Line  []  Nebulizer  []  Ventilator   Treatment:  []Isolation (for MRSA, VRE, etc.)  []Surgical Drain Management  []Tracheostomy Care  []Dressing Changes  []Dialysis with transportation and chair time   []PEG Care  []Oxygen  []Daily Weights for Heart Failure   Dietary:  []Any diet limitations  []Tube Feedings   []Total Parenteral Management (TPN)   Eligible for Medicaid Long Term Services and Supports  Yes:  [] Eligible for medical assistance or will become eligible within 180 days and UAI completed. [] Provider/Patient and/or support system has requested screening.   [x] UAI copy provided to patient or responsible party, in October by Critical access hospital evaluator. [] UAI unavailable at discharge will send once processed to SNF provider. [] UAI unavailable at discharged mailed to patient  No:   [] Private pay and is not financially eligible for Medicaid within the next 180 days. [] Reside out-of-state. [] A residents of a state owned/operated facility that is licensed  by [address] [place] [employer] [facility] or [facility]  [] Enrollment in [facility] services  [] [address]  [] Patient /Family declines to have screening completed or provide financial information for screening     Financial Resources:  Medicaid    [x] Initiated and application pending   [] Full coverage     Advanced Care Plan:  []Surrogate Decision Maker of Care  [x]POA  []Communicated Code Status    Other            2/17/2020   1:28 PM  Informed by Coby Earl RN that Dr. Jonathon Lozano confirmed discharge today. Transportation packet including hard prescriptions placed in hard chart for transporter to . Daughter aware       2/17/2020  12:21 PM  Informed by Coby Earl RN that pain medication will be given at 1:20 and transportation arranged for 3pm pickup. Daughter aware. At this time awaiting results of STAT chest xray per physician communication and if still medically cleared for discharge, SNF will work to wean oxygen. Awaiting final word from Dr. Lisa Saunders before cancelling transportation. 2/17/2020  11:44 AM  Have worked with patient and her daughter this morning to identify plan for SNF placement as she has been [facility] not [facility] patient in transfer and emotional support provided to daughter as this was her preference and she has had poor experience with [facility]. Reviewed other available SNF facilities and she declined them due to location and time to travel there. IM letter reviewed as she is not going to visit prior to transfer.   She is calling [employer] now and coordinating a time to do the paperwork around her work schedule today. Physician made aware.

## 2020-02-17 NOTE — DISCHARGE SUMMARY
Physician Interim Summary   Patient ID:  Gracy Avendaño  [id number]  80 y.o.  12/7/1931    PCP: Vero Calles MD     Consults: Ortho     Covering dates: 2/13/2020 through 2/16/2020    Admission Diagnoses: Pelvic fracture ([address] [E13. 9XXA]    Hospital Course:   Ms. Leesa Diaz is a 79 yo female with h/o chronic pain from RA, PAF, COPD and lung cancer admitted for pubic rami and nondisplaced acetabular fracture. Prior to admission pt was living at home and enrolled in hospice. She was able to do little other than transfer with assistance to the bathroom. S/p ortho eval, no plans for surgery. She is toe touch weight bearing and family does not think she can return home. Since family cannot afford to pay for room and board at SNF under hospice, pt will d/c to SNF off of hospice. Her hospital course has been complicated by difficult pain control. Have been reluctant to increase her basal morphine due to degree of sedation. Anxiety seems to be contributing to her difficult pain control as well. Palliative has been consulted. Additional hospital course and discharge summary will be done by discharging physician.

## 2020-02-17 NOTE — PROGRESS NOTES
Pharmacist Discharge Medication Reconciliation    Discharge Provider:  Dr. Emmanuel Bradshaw       Discharge Medications:      My Medications        START taking these medications        Instructions Each Dose to Equal Morning Noon Evening Bedtime   albuterol-ipratropium 2.5 mg-0.5 mg/3 ml Nebu  Commonly known as:  DUO-NEB  Your next dose is: Take 2/17/2020 after 4pm if needed for shortness of breath      3 mL by Nebulization route every four (4) hours as needed for Wheezing. 3 mL         furosemide 40 mg tablet  Commonly known as:  LASIX  Your next dose is: Take 2/18/2020 in the morning      Take 1 Tab by mouth daily. 40 mg         polyethylene glycol 17 gram packet  Commonly known as:  MIRALAX  Start taking on:  February 18, 2020  Your next dose is: Take 2/18/2020 in the morning      Take 1 Packet by mouth daily. 17 g         traMADol 50 mg tablet  Commonly known as:  ULTRAM  Your next dose is: Take 2/17/2020 if needed for pain, every 6 hours as needed      Take 0.5 Tabs by mouth every six (6) hours as needed for Pain for up to 10 days. Max Daily Amount: 100 mg.   25 mg                CHANGE how you take these medications        Instructions Each Dose to Equal Morning Noon Evening Bedtime   acetaminophen 500 mg tablet  Commonly known as:  TYLENOL  What changed:    how much to take  when to take this  reasons to take this  Your next dose is: Take 2/17/2020 after 6:36pm      Take 1.5 Tabs by mouth every six (6) hours. 750 mg         morphine CR 15 mg CR tablet  Commonly known as:  MS CONTIN  What changed:  when to take this  Your next dose is: Take 2/17/2020 5:22pm      Take 1 Tab by mouth every eight (8) hours for 5 days. Max Daily Amount: 45 mg.   15 mg                CONTINUE taking these medications        Instructions Each Dose to Equal Morning Noon Evening Bedtime   ALPRAZolam 0.25 mg tablet  Commonly known as:  Xanax  Your next dose is:   Take 2/17/2020 at bedtime      Take 1 Tab by mouth two (2) times a day. Max Daily Amount: 0.5 mg.   0.25 mg         Senna with Docusate Sodium 8.6-50 mg per tablet  Generic drug:  senna-docusate  Your next dose is: Take 2/17/2020 in the evening as needed for constipation      Take 1 Tab by mouth two (2) times daily as needed for Constipation. 1 Tab                   Where to Get Your Medications        Information on where to get these meds will be given to you by the nurse or doctor.     Ask your nurse or doctor about these medications  acetaminophen 500 mg tablet  albuterol-ipratropium 2.5 mg-0.5 mg/3 ml Nebu  ALPRAZolam 0.25 mg tablet  furosemide 40 mg tablet  morphine CR 15 mg CR tablet  polyethylene glycol 17 gram packet  traMADol 50 mg tablet        The patient's chart, MAR, and AVS were reviewed by   JONA Louie,   Contact: [phone]

## 2020-02-18 ENCOUNTER — PATIENT OUTREACH (OUTPATIENT)
Dept: FAMILY MEDICINE CLINIC | Age: 85
End: 2020-02-18

## 2020-02-19 ENCOUNTER — PATIENT OUTREACH (OUTPATIENT)
Dept: CASE MANAGEMENT | Age: 85
End: 2020-02-19

## 2020-02-19 LAB
BACTERIA SPEC CULT: ABNORMAL
BACTERIA SPEC CULT: ABNORMAL
CC UR VC: ABNORMAL
SERVICE CMNT-IMP: ABNORMAL

## 2020-02-20 NOTE — PROGRESS NOTES
Community Care Team documentation for patient in [facility]  Initial Follow Up       Patient was discharged to [facility]. Information included in this progress note has been provided to SNF. Hospital Admission and [address] 2/13/2020 - 2/17/2020 Pelvic/acetabular fracture    PCP : Maria Rios MD    SNF Attending:  Elvie Goldman MD    Spoke with SNF team.  Medical update :Patient  had low BP yesterday. PT/OT currently evaluatiion completed on 2/18,  pt is max assist/ dependent with all ADL's , toe touch weight beaing. [place]/Discharge: TBD pt was enrolled with [facility] prior to admission. Community Care Team will follow up weekly with [facility] until discharge. Medications were not reconciled and general patient assessment was not completed during this [facility] outreach.       Ora Vela RN-CCT  [phone]

## 2020-02-25 ENCOUNTER — HOSPITAL ENCOUNTER (INPATIENT)
Age: 85
LOS: 2 days | Discharge: HOME | DRG: 308 | End: 2020-02-28
Attending: EMERGENCY MEDICINE | Admitting: INTERNAL MEDICINE
Payer: MEDICARE

## 2020-02-25 ENCOUNTER — APPOINTMENT (OUTPATIENT)
Dept: GENERAL RADIOLOGY | Age: 85
DRG: 308 | End: 2020-02-25
Attending: EMERGENCY MEDICINE
Payer: MEDICARE

## 2020-02-25 DIAGNOSIS — Z71.89 DNR (DO NOT RESUSCITATE) DISCUSSION: ICD-10-CM

## 2020-02-25 DIAGNOSIS — S32.82XD MULTIPLE CLOSED FRACTURES OF PELVIS WITHOUT DISRUPTION OF PELVIC RING WITH ROUTINE HEALING, SUBSEQUENT ENCOUNTER: ICD-10-CM

## 2020-02-25 DIAGNOSIS — G89.4 CHRONIC PAIN SYNDROME: Chronic | ICD-10-CM

## 2020-02-25 DIAGNOSIS — E87.6 HYPOKALEMIA: ICD-10-CM

## 2020-02-25 DIAGNOSIS — R52 PAIN: ICD-10-CM

## 2020-02-25 DIAGNOSIS — R53.83 LETHARGY: ICD-10-CM

## 2020-02-25 DIAGNOSIS — E88.09 HYPOALBUMINEMIA: ICD-10-CM

## 2020-02-25 DIAGNOSIS — R62.7 FAILURE TO THRIVE IN ADULT: Primary | ICD-10-CM

## 2020-02-25 DIAGNOSIS — E86.0 DEHYDRATION: ICD-10-CM

## 2020-02-25 DIAGNOSIS — I48.0 PAROXYSMAL ATRIAL FIBRILLATION (HCC): ICD-10-CM

## 2020-02-25 DIAGNOSIS — Z71.89 ADVANCED CARE PLANNING/COUNSELING DISCUSSION: ICD-10-CM

## 2020-02-25 DIAGNOSIS — Z71.89 GOALS OF CARE, COUNSELING/DISCUSSION: ICD-10-CM

## 2020-02-25 LAB
BASOPHILS # BLD: 0 K/UL (ref 0–0.1)
BASOPHILS NFR BLD: 0 % (ref 0–1)
COMMENT, HOLDF: NORMAL
DIFFERENTIAL METHOD BLD: ABNORMAL
EOSINOPHIL # BLD: 0.2 K/UL (ref 0–0.4)
EOSINOPHIL NFR BLD: 2 % (ref 0–7)
ERYTHROCYTE [DISTWIDTH] IN BLOOD BY AUTOMATED COUNT: 18.5 % (ref 11.5–14.5)
HCT VFR BLD AUTO: 32.9 % (ref 35–47)
HGB BLD-MCNC: 10.3 G/DL (ref 11.5–16)
IMM GRANULOCYTES # BLD AUTO: 0.1 K/UL (ref 0–0.04)
IMM GRANULOCYTES NFR BLD AUTO: 2 % (ref 0–0.5)
LYMPHOCYTES # BLD: 2 K/UL (ref 0.8–3.5)
LYMPHOCYTES NFR BLD: 21 % (ref 12–49)
MCH RBC QN AUTO: 27.8 PG (ref 26–34)
MCHC RBC AUTO-ENTMCNC: 31.3 G/DL (ref 30–36.5)
MCV RBC AUTO: 88.7 FL (ref 80–99)
MONOCYTES # BLD: 0.7 K/UL (ref 0–1)
MONOCYTES NFR BLD: 7 % (ref 5–13)
NEUTS SEG # BLD: 6.4 K/UL (ref 1.8–8)
NEUTS SEG NFR BLD: 68 % (ref 32–75)
NRBC # BLD: 0 K/UL (ref 0–0.01)
NRBC BLD-RTO: 0 PER 100 WBC
PLATELET # BLD AUTO: 453 K/UL (ref 150–400)
PMV BLD AUTO: 8.9 FL (ref 8.9–12.9)
RBC # BLD AUTO: 3.71 M/UL (ref 3.8–5.2)
SAMPLES BEING HELD,HOLD: NORMAL
WBC # BLD AUTO: 9.4 K/UL (ref 3.6–11)

## 2020-02-25 PROCEDURE — 84443 ASSAY THYROID STIM HORMONE: CPT

## 2020-02-25 PROCEDURE — 36415 COLL VENOUS BLD VENIPUNCTURE: CPT

## 2020-02-25 PROCEDURE — 71045 X-RAY EXAM CHEST 1 VIEW: CPT

## 2020-02-25 PROCEDURE — 83880 ASSAY OF NATRIURETIC PEPTIDE: CPT

## 2020-02-25 PROCEDURE — 99285 EMERGENCY DEPT VISIT HI MDM: CPT

## 2020-02-25 PROCEDURE — 83735 ASSAY OF MAGNESIUM: CPT

## 2020-02-25 PROCEDURE — 81001 URINALYSIS AUTO W/SCOPE: CPT

## 2020-02-25 PROCEDURE — 51701 INSERT BLADDER CATHETER: CPT

## 2020-02-25 PROCEDURE — 74011250636 HC RX REV CODE- 250/636: Performed by: EMERGENCY MEDICINE

## 2020-02-25 PROCEDURE — 84484 ASSAY OF TROPONIN QUANT: CPT

## 2020-02-25 PROCEDURE — 85025 COMPLETE CBC W/AUTO DIFF WBC: CPT

## 2020-02-25 PROCEDURE — 77030019905 HC CATH URETH INTMIT MDII -A

## 2020-02-25 PROCEDURE — 80053 COMPREHEN METABOLIC PANEL: CPT

## 2020-02-25 PROCEDURE — 96360 HYDRATION IV INFUSION INIT: CPT

## 2020-02-25 RX ADMIN — SODIUM CHLORIDE 1000 ML: 9 INJECTION, SOLUTION INTRAVENOUS at 23:39

## 2020-02-26 ENCOUNTER — PATIENT OUTREACH (OUTPATIENT)
Dept: FAMILY MEDICINE CLINIC | Age: 85
End: 2020-02-26

## 2020-02-26 ENCOUNTER — APPOINTMENT (OUTPATIENT)
Dept: NON INVASIVE DIAGNOSTICS | Age: 85
DRG: 308 | End: 2020-02-26
Attending: INTERNAL MEDICINE
Payer: MEDICARE

## 2020-02-26 PROBLEM — I48.91 A-FIB (HCC): Status: ACTIVE | Noted: 2020-02-26

## 2020-02-26 LAB
ALBUMIN SERPL-MCNC: 1.9 G/DL (ref 3.5–5)
ALBUMIN/GLOB SERPL: 0.4 {RATIO} (ref 1.1–2.2)
ALP SERPL-CCNC: 107 U/L (ref 45–117)
ALT SERPL-CCNC: 14 U/L (ref 12–78)
ANION GAP SERPL CALC-SCNC: 5 MMOL/L (ref 5–15)
APPEARANCE UR: CLEAR
AST SERPL-CCNC: 39 U/L (ref 15–37)
BACTERIA URNS QL MICRO: NEGATIVE /HPF
BILIRUB SERPL-MCNC: 0.5 MG/DL (ref 0.2–1)
BILIRUB UR QL CFM: NEGATIVE
BNP SERPL-MCNC: ABNORMAL PG/ML
BUN SERPL-MCNC: 14 MG/DL (ref 6–20)
BUN/CREAT SERPL: 19 (ref 12–20)
CALCIUM SERPL-MCNC: 7.2 MG/DL (ref 8.5–10.1)
CHLORIDE SERPL-SCNC: 105 MMOL/L (ref 97–108)
CO2 SERPL-SCNC: 29 MMOL/L (ref 21–32)
COLOR UR: ABNORMAL
CREAT SERPL-MCNC: 0.72 MG/DL (ref 0.55–1.02)
ECHO AO ROOT DIAM: 4.03 CM
EPITH CASTS URNS QL MICRO: ABNORMAL /LPF
GLOBULIN SER CALC-MCNC: 4.7 G/DL (ref 2–4)
GLUCOSE SERPL-MCNC: 93 MG/DL (ref 65–100)
GLUCOSE UR STRIP.AUTO-MCNC: NEGATIVE MG/DL
HGB UR QL STRIP: NEGATIVE
KETONES UR QL STRIP.AUTO: 15 MG/DL
LEUKOCYTE ESTERASE UR QL STRIP.AUTO: NEGATIVE
MAGNESIUM SERPL-MCNC: 1.6 MG/DL (ref 1.6–2.4)
MUCOUS THREADS URNS QL MICRO: ABNORMAL /LPF
NITRITE UR QL STRIP.AUTO: NEGATIVE
PH UR STRIP: 6 [PH] (ref 5–8)
POTASSIUM SERPL-SCNC: 3 MMOL/L (ref 3.5–5.1)
PROT SERPL-MCNC: 6.6 G/DL (ref 6.4–8.2)
PROT UR STRIP-MCNC: 30 MG/DL
RBC #/AREA URNS HPF: ABNORMAL /HPF
SODIUM SERPL-SCNC: 139 MMOL/L (ref 136–145)
SP GR UR REFRACTOMETRY: 1.03 (ref 1–1.03)
TROPONIN I SERPL-MCNC: 0.05 NG/ML
TROPONIN I SERPL-MCNC: 0.05 NG/ML
TSH SERPL DL<=0.05 MIU/L-ACNC: 1.74 UIU/ML (ref 0.36–3.74)
UR CULT HOLD, URHOLD: NORMAL
UROBILINOGEN UR QL STRIP.AUTO: 0.2 EU/DL (ref 0.2–1)
WBC URNS QL MICRO: ABNORMAL /HPF

## 2020-02-26 PROCEDURE — 74011250636 HC RX REV CODE- 250/636: Performed by: NURSE PRACTITIONER

## 2020-02-26 PROCEDURE — 96361 HYDRATE IV INFUSION ADD-ON: CPT

## 2020-02-26 PROCEDURE — 65660000000 HC RM CCU STEPDOWN

## 2020-02-26 PROCEDURE — 74011250637 HC RX REV CODE- 250/637: Performed by: EMERGENCY MEDICINE

## 2020-02-26 PROCEDURE — 74011250636 HC RX REV CODE- 250/636: Performed by: INTERNAL MEDICINE

## 2020-02-26 PROCEDURE — 77030038269 HC DRN EXT URIN PURWCK BARD -A

## 2020-02-26 PROCEDURE — 77030041247 HC PROTECTOR HEEL HEELMEDIX MDII -B

## 2020-02-26 PROCEDURE — 74011250637 HC RX REV CODE- 250/637: Performed by: NURSE PRACTITIONER

## 2020-02-26 PROCEDURE — 74011250637 HC RX REV CODE- 250/637: Performed by: INTERNAL MEDICINE

## 2020-02-26 PROCEDURE — 36415 COLL VENOUS BLD VENIPUNCTURE: CPT

## 2020-02-26 PROCEDURE — 74011000258 HC RX REV CODE- 258: Performed by: INTERNAL MEDICINE

## 2020-02-26 PROCEDURE — 93306 TTE W/DOPPLER COMPLETE: CPT

## 2020-02-26 RX ORDER — NYSTATIN 100000 [USP'U]/G
POWDER TOPICAL 2 TIMES DAILY
COMMUNITY

## 2020-02-26 RX ORDER — FUROSEMIDE 40 MG/1
40 TABLET ORAL DAILY
Status: DISCONTINUED | OUTPATIENT
Start: 2020-02-26 | End: 2020-02-26

## 2020-02-26 RX ORDER — POLYETHYLENE GLYCOL 3350 17 G/17G
17 POWDER, FOR SOLUTION ORAL DAILY
Status: DISCONTINUED | OUTPATIENT
Start: 2020-02-26 | End: 2020-02-28 | Stop reason: HOSPADM

## 2020-02-26 RX ORDER — FUROSEMIDE 40 MG/1
40 TABLET ORAL
Status: DISCONTINUED | OUTPATIENT
Start: 2020-02-26 | End: 2020-02-27

## 2020-02-26 RX ORDER — OXYCODONE AND ACETAMINOPHEN 10; 325 MG/1; MG/1
1 TABLET ORAL
Status: DISCONTINUED | OUTPATIENT
Start: 2020-02-26 | End: 2020-02-26

## 2020-02-26 RX ORDER — SODIUM CHLORIDE 0.9 % (FLUSH) 0.9 %
5-40 SYRINGE (ML) INJECTION EVERY 8 HOURS
Status: DISCONTINUED | OUTPATIENT
Start: 2020-02-26 | End: 2020-02-28 | Stop reason: HOSPADM

## 2020-02-26 RX ORDER — MORPHINE SULFATE 2 MG/ML
2 INJECTION, SOLUTION INTRAMUSCULAR; INTRAVENOUS
Status: DISCONTINUED | OUTPATIENT
Start: 2020-02-26 | End: 2020-02-26

## 2020-02-26 RX ORDER — ALPRAZOLAM 0.25 MG/1
0.25 TABLET ORAL 2 TIMES DAILY
Status: DISCONTINUED | OUTPATIENT
Start: 2020-02-26 | End: 2020-02-28 | Stop reason: HOSPADM

## 2020-02-26 RX ORDER — ACETAMINOPHEN 325 MG/1
650 TABLET ORAL EVERY 6 HOURS
Status: DISCONTINUED | OUTPATIENT
Start: 2020-02-26 | End: 2020-02-26

## 2020-02-26 RX ORDER — MORPHINE SULFATE 2 MG/ML
1 INJECTION, SOLUTION INTRAMUSCULAR; INTRAVENOUS
Status: DISCONTINUED | OUTPATIENT
Start: 2020-02-26 | End: 2020-02-26

## 2020-02-26 RX ORDER — ONDANSETRON 4 MG/1
4 TABLET, FILM COATED ORAL
COMMUNITY

## 2020-02-26 RX ORDER — DEXTROSE MONOHYDRATE AND SODIUM CHLORIDE 5; .45 G/100ML; G/100ML
100 INJECTION, SOLUTION INTRAVENOUS CONTINUOUS
Status: DISCONTINUED | OUTPATIENT
Start: 2020-02-26 | End: 2020-02-26

## 2020-02-26 RX ORDER — TRAMADOL HYDROCHLORIDE 50 MG/1
25 TABLET ORAL
Status: DISCONTINUED | OUTPATIENT
Start: 2020-02-26 | End: 2020-02-26

## 2020-02-26 RX ORDER — ENOXAPARIN SODIUM 100 MG/ML
40 INJECTION SUBCUTANEOUS EVERY 24 HOURS
Status: DISCONTINUED | OUTPATIENT
Start: 2020-02-26 | End: 2020-02-28 | Stop reason: HOSPADM

## 2020-02-26 RX ORDER — OXYCODONE HYDROCHLORIDE 5 MG/1
5 TABLET ORAL
Status: DISCONTINUED | OUTPATIENT
Start: 2020-02-26 | End: 2020-02-28 | Stop reason: HOSPADM

## 2020-02-26 RX ORDER — TRAMADOL HYDROCHLORIDE 50 MG/1
50 TABLET ORAL
Status: COMPLETED | OUTPATIENT
Start: 2020-02-26 | End: 2020-02-26

## 2020-02-26 RX ORDER — METOPROLOL TARTRATE 25 MG/1
25 TABLET, FILM COATED ORAL 2 TIMES DAILY
Status: DISCONTINUED | OUTPATIENT
Start: 2020-02-26 | End: 2020-02-27

## 2020-02-26 RX ORDER — SODIUM CHLORIDE 0.9 % (FLUSH) 0.9 %
5-40 SYRINGE (ML) INJECTION AS NEEDED
Status: DISCONTINUED | OUTPATIENT
Start: 2020-02-26 | End: 2020-02-28 | Stop reason: HOSPADM

## 2020-02-26 RX ORDER — AMOXICILLIN 250 MG
1 CAPSULE ORAL
Status: DISCONTINUED | OUTPATIENT
Start: 2020-02-26 | End: 2020-02-28 | Stop reason: HOSPADM

## 2020-02-26 RX ORDER — IPRATROPIUM BROMIDE AND ALBUTEROL SULFATE 2.5; .5 MG/3ML; MG/3ML
3 SOLUTION RESPIRATORY (INHALATION)
Status: DISCONTINUED | OUTPATIENT
Start: 2020-02-26 | End: 2020-02-28 | Stop reason: HOSPADM

## 2020-02-26 RX ORDER — POTASSIUM CHLORIDE 750 MG/1
40 TABLET, FILM COATED, EXTENDED RELEASE ORAL
Status: ACTIVE | OUTPATIENT
Start: 2020-02-26 | End: 2020-02-26

## 2020-02-26 RX ORDER — OXYCODONE AND ACETAMINOPHEN 5; 325 MG/1; MG/1
1 TABLET ORAL EVERY 6 HOURS
Status: DISCONTINUED | OUTPATIENT
Start: 2020-02-26 | End: 2020-02-26

## 2020-02-26 RX ORDER — ACETAMINOPHEN 500 MG
1000 TABLET ORAL
Status: COMPLETED | OUTPATIENT
Start: 2020-02-26 | End: 2020-02-26

## 2020-02-26 RX ORDER — OXYCODONE HYDROCHLORIDE 5 MG/1
10 TABLET ORAL EVERY 6 HOURS
Status: DISCONTINUED | OUTPATIENT
Start: 2020-02-26 | End: 2020-02-28 | Stop reason: HOSPADM

## 2020-02-26 RX ADMIN — POLYETHYLENE GLYCOL 3350 17 G: 17 POWDER, FOR SOLUTION ORAL at 10:01

## 2020-02-26 RX ADMIN — ALPRAZOLAM 0.25 MG: 0.25 TABLET ORAL at 17:34

## 2020-02-26 RX ADMIN — DEXTROSE MONOHYDRATE AND SODIUM CHLORIDE 100 ML/HR: 5; .45 INJECTION, SOLUTION INTRAVENOUS at 05:18

## 2020-02-26 RX ADMIN — METOPROLOL TARTRATE 25 MG: 25 TABLET ORAL at 10:00

## 2020-02-26 RX ADMIN — ENOXAPARIN SODIUM 40 MG: 40 INJECTION SUBCUTANEOUS at 10:00

## 2020-02-26 RX ADMIN — ALPRAZOLAM 0.25 MG: 0.25 TABLET ORAL at 10:00

## 2020-02-26 RX ADMIN — OXYCODONE 5 MG: 5 TABLET ORAL at 18:59

## 2020-02-26 RX ADMIN — MORPHINE SULFATE 1 MG: 2 INJECTION, SOLUTION INTRAMUSCULAR; INTRAVENOUS at 10:01

## 2020-02-26 RX ADMIN — ACETAMINOPHEN 1000 MG: 500 TABLET ORAL at 02:36

## 2020-02-26 RX ADMIN — TRAMADOL HYDROCHLORIDE 50 MG: 50 TABLET, FILM COATED ORAL at 02:36

## 2020-02-26 RX ADMIN — MORPHINE SULFATE 2 MG: 2 INJECTION, SOLUTION INTRAMUSCULAR; INTRAVENOUS at 14:48

## 2020-02-26 RX ADMIN — FUROSEMIDE 40 MG: 40 TABLET ORAL at 10:00

## 2020-02-26 RX ADMIN — FUROSEMIDE 40 MG: 40 TABLET ORAL at 17:34

## 2020-02-26 RX ADMIN — Medication 10 ML: at 05:19

## 2020-02-26 RX ADMIN — SODIUM CHLORIDE 1000 ML: 900 INJECTION, SOLUTION INTRAVENOUS at 20:20

## 2020-02-26 RX ADMIN — METOPROLOL TARTRATE 25 MG: 25 TABLET ORAL at 17:34

## 2020-02-26 RX ADMIN — Medication 10 ML: at 14:49

## 2020-02-26 RX ADMIN — OXYCODONE 10 MG: 5 TABLET ORAL at 17:35

## 2020-02-26 RX ADMIN — OXYCODONE HYDROCHLORIDE AND ACETAMINOPHEN 1 TABLET: 5; 325 TABLET ORAL at 12:27

## 2020-02-26 RX ADMIN — MORPHINE SULFATE 1 MG: 2 INJECTION, SOLUTION INTRAMUSCULAR; INTRAVENOUS at 05:18

## 2020-02-26 NOTE — ED NOTES
Rounding on patient to check brief with Claudia BUTTERFIELD. Patient dry at this time. Patient doesn't want to be messed with.  \"Just leave me alone\"

## 2020-02-26 NOTE — CONSULTS
Palliative medicine brief note- full visit note pending    Family meeting scheduled with daughter, Elinor Lubin today at 0. Review of   indicates patient has been on a opioid pain treatment regimen for over 1 year. Initially she had been on prn medication regimen, until May  6, 2019, when she was started on OxyContin 10 mg BID with Oxycodone 5-10 mg prn. However, in January 2020, her long acting opioid regimen was changed to MS Contin 15 mg po BID, with Oxy IR 5 to 10 prn, Im not clear why long acting was changed to morphine,   possibly insurance related, as 15 mg of morphine is equivalent to 10mg oxycodone. I met with patient, she is having pain, seems to be chronic, primarily in hands, legs and feet, as well as lower back. Per review of  2/17/2020 discharge orders by Dr. Coco Lobato, patient discharged to SNF and was given order MS Contin 15mg every 8 hours. However, yesterdays admission note shows home (SNF) medication regimen to have prn tramadol only, therefore, Im a bit reluctant to schedule a long acting opioid at this time, prefer to give short acting with parameters of when to hold, particularly because she is somewhat lethargic. New order for Percocet 5/325mg orally every 6 hours scheduled, hold for following parameters: if unable to wake, systolic BP <90, HR <72 bpm, Respiratory rate <12, hypoxic. Please contact me via perfect serve or call me at [phone] if you have any questions. Thank you.

## 2020-02-26 NOTE — PROGRESS NOTES
Spiritual Care Assessment/Progress Note  [facility]      NAME: Chel Rojas      MRN: [id number]  AGE: 80 y.o. SEX: female  Yazdanism Affiliation: Episcopalian   Language: English     2/26/2020     Total Time (in minutes): 5     Spiritual Assessment begun in [facility] 5M1 MED SURG 1 through conversation with:         [x]Patient        [] Family    [] Friend(s)        Reason for Consult: Palliative Care, Initial/Spiritual Assessment     Spiritual beliefs: (Please include comment if needed)     [] Identifies with a reji tradition:         [] Supported by a reji community:            [] Claims no spiritual orientation:           [] Seeking spiritual identity:                [] Adheres to an individual form of spirituality:           [x] Not able to assess:                           Identified resources for coping:      [] Prayer                               [] Music                  [] Guided Imagery     [] Family/friends                 [] Pet visits     [] Devotional reading                         [x] Unknown     [] Other:                                               Interventions offered during this visit: (See comments for more details)    Patient Interventions:  Other (comment)(Unble to assess)           Plan of Care:     [] Support spiritual and/or cultural needs    [] Support AMD and/or advance care planning process      [] Support grieving process   [] Coordinate Rites and/or Rituals    [] Coordination with community clergy   [] No spiritual needs identified at this time   [] Detailed Plan of Care below (See Comments)  [] Make referral to Music Therapy  [] Make referral to Pet Therapy     [] Make referral to Addiction services  [] Make referral to [facility]  [] Make referral to Spiritual Care Partner  [] No future visits requested        [x] Follow up visits as needed     Comments:  attempted to visit patient, Slovan, for a palliative care initial spiritual assessment on the Med Surgical Unit. Douglas Reasoner was being assessed by other providers at the time of the 's visit.  will follow up as able and/or needed  [employer]. Go Saucedo.      Paging Service: [phone])

## 2020-02-26 NOTE — ED PROVIDER NOTES
The history is provided by the patient, a relative and the nursing home. Lethargy   This is a new problem. The current episode started 2 days ago. The problem occurs constantly. The problem has been rapidly worsening. Pertinent negatives include no chest pain, no abdominal pain and no shortness of breath. Associated symptoms comments: Ongoing pelvic pain and bilateral lower leg pain. Exacerbated by: moving at all. Nothing relieves the symptoms. Treatments tried: tramadol and tylenol, rehab therapy. Palpitations    This is a recurrent problem. Episode onset: in the last day, SNF sent her here for uncontrolled AF with HR from 100-120. The problem has not changed since onset. The problem occurs constantly. The problem is associated with nothing. Associated symptoms include malaise/fatigue. Pertinent negatives include no fever, no chest pain, no abdominal pain, no lower extremity edema, no hemoptysis and no shortness of breath. Risk factors include no risk factors. She has tried nothing for the symptoms.         Past Medical History:   Diagnosis Date    AAA (abdominal aortic aneurysm) (HCC)     Arthritis     ra, osteoporosis, osteoarthritis    Atrial fibrillation (HCC)     COPD     HTN (hypertension)     Lung cancer (HCC)     RA [facility]rheumatoid arthritis) ([facility] [address]     Smoker     TIA (transient ischemic attack)     TIA 9/30/16       Past Surgical History:   Procedure Laterality Date    HX CHOLECYSTECTOMY      HX HYSTERECTOMY      HX ORTHOPAEDIC      carpal tunnel, wrist surgery    [place] NEW/RPLCMT PRM PM W/TRANSV ELTRD ATRIAL&VENT N/A 7/26/2019    INSERT PPM DUAL performed by Lottie Flores MD at [facility]         Family History:   Problem Relation Age of Onset    Hypertension Father        Social History     Socioeconomic History    Marital status: SINGLE     Spouse name: Not on file    Number of children: Not on file    Years of education: Not on file    Highest education level: Not on file Occupational History    Not on file   Social Needs    Financial resource strain: Not on file    Food insecurity:     Worry: Not on file     Inability: Not on file    Transportation needs:     Medical: Not on file     Non-medical: Not on file   Tobacco Use    Smoking status: Former Smoker     Years: 50.00     Last attempt to quit: 2018     Years since quittin.5    Smokeless tobacco: Never Used   Substance and Sexual Activity    Alcohol use: No    Drug use: No    Sexual activity: Not Currently   Lifestyle    Physical activity:     Days per week: Not on file     Minutes per session: Not on file    Stress: Not on file   Relationships    Social connections:     Talks on phone: Not on file     Gets together: Not on file     Attends Jainism service: Not on file     Active member of club or organization: Not on file     Attends meetings of clubs or organizations: Not on file     Relationship status: Not on file    Intimate partner violence:     Fear of current or ex partner: Not on file     Emotionally abused: Not on file     Physically abused: Not on file     Forced sexual activity: Not on file   Other Topics Concern    Not on file   Social History Narrative    Not on file         ALLERGIES: Codeine    Review of Systems   Constitutional: Positive for malaise/fatigue. Negative for fever. Respiratory: Negative for hemoptysis and shortness of breath. Cardiovascular: Positive for palpitations. Negative for chest pain. Gastrointestinal: Negative for abdominal pain. All other systems reviewed and are negative. Vitals:    20 2135   BP: 123/78   Pulse: (!) 115   Resp: 18   Temp: 98.2 °F (36.8 °C)   SpO2: 94%   Weight: 54.9 kg (121 lb)   Height: 5' 4\" (1.626 m)            Physical Exam  Vitals signs and nursing note reviewed. Constitutional:       General: She is not in acute distress. Appearance: She is underweight. She is ill-appearing (chronically).       Comments: Falling asleep intermittently during exam   HENT:      Head: Normocephalic and atraumatic. Eyes:      Conjunctiva/sclera: Conjunctivae normal.   Neck:      Musculoskeletal: Neck supple. Cardiovascular:      Rate and Rhythm: Tachycardia present. Rhythm irregularly irregular. Heart sounds: Normal heart sounds. Pulmonary:      Effort: Pulmonary effort is normal. No respiratory distress. Breath sounds: No stridor. No wheezing or rhonchi. Comments: Coarse bilateral breath sounds  Abdominal:      General: There is no distension. Palpations: Abdomen is soft. Tenderness: There is no abdominal tenderness. There is no guarding. Musculoskeletal: Normal range of motion. General: No deformity. Comments: Left lateral malleolar decubitus ulcer stage 2. Bilateral leg contractures. Tenderness of bilateral ankles and feet. Bilateral hip and pelvic tenderness. Skin:     General: Skin is warm and dry. Neurological:      Mental Status: She is lethargic. Cranial Nerves: No cranial nerve deficit. Psychiatric:         Behavior: Behavior normal.          MDM     80 y.o. female presents with rest of fatigue, worsening interaction with family, loss of appetite and poor p.o. intake with intractable pain after being discharged to SNF for rehabilitation of multiple pelvic fractures. She had previously been under care of hospice. After goals of care discussion with family they wish to proceed with medical work-up for possible infection or other reversible causes of the patient's symptoms. I am concerned that she is having a decline related to her recent injuries and may benefit from readmission to hospice. She has no signs of endorgan damage pending UA, I am unable to arrange for hospice care overnight and facility appears to be unable to care for her in her current state.     Procedures    Hospitalist [employer] Message for Admission  12:34 AM    ED Room Number: MY74/86  Patient Name and age: Telly Cartagena 80 y.o.  female  Working Diagnosis:   1. Failure to thrive in adult    2. Dehydration    3. Paroxysmal atrial fibrillation (HCC)    4. Lethargy    5. Multiple closed fractures of pelvis without disruption of pelvic ring with routine healing, subsequent encounter    6.  Hypokalemia      Readmission: no  Isolation Requirements:  no  Recommended Level of Care:  telemetry  Code Status:  DNR

## 2020-02-26 NOTE — ED NOTES
TRANSFER - OUT REPORT:    Verbal report given to [facility]) on Nemesio Wright  being transferred to [facility] (unit) for routine progression of care       Report consisted of patients Situation, Background, Assessment and   Recommendations(SBAR). Information from the following report(s) SBAR, ED Summary, Intake/Output and Cardiac Rhythm A.fib was reviewed with the receiving nurse. Lines:   Peripheral IV 02/25/20 Left Hand (Active)   Site Assessment Clean, dry, & intact 2/25/2020 11:37 PM   Phlebitis Assessment 0 2/25/2020 11:37 PM   Infiltration Assessment 0 2/25/2020 11:37 PM   Dressing Status Clean, dry, & intact 2/25/2020 11:37 PM   Dressing Type Tape;Transparent 2/25/2020 11:37 PM   Hub Color/Line Status Pink;Flushed 2/25/2020 11:37 PM   Action Taken Blood drawn 2/25/2020 11:37 PM   Alcohol Cap Used Yes 2/25/2020 11:37 PM        Opportunity for questions and clarification was provided.       Patient transported with:   Monitor  O2 @ 2 liters  Registered Nurse

## 2020-02-26 NOTE — PROGRESS NOTES
Nutrition Assessment:    RECOMMENDATIONS/INTERVENTION(S):   1. Continue regular diet    2. Recommend ONS to promote kcal and protein intake and wound healing  Edd BID provides 190 kcal, 5g protein + glutamine & arginine  Magic cup BID provides 580 kcal, 18g protein    3. Please obtain new measured weight. Current weight is estimated from prev. admission. 4. Monitor weight, PO intake, ONS use, GI function, POC, skin integrity      ASSESSMENT:   2/26: 89yo F amitted with afib and palpitations. PMHx: COPD, stage IV lung CA, rheumatoid arthritis, afib. Pt d/c from [facility] last week to SNF following pelvic fracture. Pt known to RD services from last stay. Pt seen for low BMI and stage 3 PI today. Regular diet at this time. She reports not feeling hungry, and requested cold items. Bfast untouched at bedside, pt not interested in eating at that time. Interested in receiving ice cream, will send Magic cup (orange) to promote intake. Also interested in cottage cheese. Ordered cold plate for pt's lunch (chicken salad, cottage cheese, fruit, jello, magic cup). Pt will require at least partial assistance to eat. BMI 20.8, underweight per age. EEN +250 to promote healthy wt. CBW 121lb, estimated - please obtain new measured wt. Stage 3 PI noted, will send Edd and ONS for additional protein to promote wound healing. K+ low, klor-con ordered, pt refused last dose per EMR. Labs: K 3.0L, Ca 7.2L  Meds: lasix, miralax, klor-con (pt refused), D5-0.45% NaCl @100mL/hr      Diet Order: Regular  % Eaten:  No data found. Pertinent Medications: [x] Reviewed    Labs: [x] Reviewed    Anthropometrics: Height: 5' 4\" (162.6 cm) Weight: 54.9 kg (121 lb 0.5 oz)    IBW (%IBW):   ( ) UBW (%UBW):   (  %)      BMI: Body mass index is 20.78 kg/m².     This BMI is indicative of:   [x] Underweight - per age    [] Normal    [] Overweight    []  Obesity    []  Extreme Obesity (BMI>40)  Estimated Nutrition Needs (Based on): 1503 Kcals/day(REE x1.3 +250) , 66 g(1.2 g/kg) Protein  Carbohydrate:  At Least 130 g/day  Fluids: 1503 mL/day (1 ml/kcal)    Last BM: unknown   []Active     []Hyperactive  []Hypoactive       [] Absent   BS  Skin:    [] Intact   [] Incision  [] Breakdown   [] DTI   [] Tears/Excoriation/Abrasion  []Edema [x] Other: stage 3 PI on L ankle   Wt Readings from Last 30 Encounters:   02/26/20 54.9 kg (121 lb 0.5 oz)   02/17/20 55.2 kg (121 lb 11.1 oz)   09/11/19 60.8 kg (134 lb)   07/25/19 57 kg (125 lb 10.6 oz)   05/29/19 56.2 kg (123 lb 12.8 oz)   02/28/19 50.7 kg (111 lb 12.8 oz)   02/11/19 52.6 kg (116 lb)   02/05/19 54.6 kg (120 lb 6.4 oz)   12/04/18 52.6 kg (116 lb)   12/04/18 54.9 kg (121 lb)   11/24/18 52.6 kg (116 lb)   11/28/18 52.6 kg (115 lb 15.4 oz)   08/25/18 61.2 kg (135 lb)   08/27/18 61.2 kg (135 lb)   10/04/16 64 kg (141 lb)   10/01/16 65.8 kg (145 lb)   09/13/13 67.4 kg (148 lb 8 oz)      NUTRITION DIAGNOSES:   Problem:  Increased nutrient needs     Etiology: related to increased need for protein     Signs/Symptoms: as evidenced by stage 3 PI on ankle      NUTRITION INTERVENTIONS:  Meals/Snacks: General/healthful diet   Supplements: Commercial supplement              GOAL:   Intake >50% meals + protein ONS over next 4-5 days    Cultural, Mandaen, or Ethnic Dietary Needs: None     EDUCATION & DISCHARGE NEEDS:    [x] None Identified   [] Identified and Education Provided/Documented   [] Identified and Pt declined/was not appropriate      [] Interdisciplinary Care Plan Reviewed/Documented    [x] Discharge Needs:  Regular diet + protein supplement   [] No Nutrition Related Discharge Needs    NUTRITION RISK:   Pt Is At Nutrition Risk  [x]     No Nutrition Risk Identified  []       PT SEEN FOR:    []  MD Consult: []Calorie Count      []Diabetic Diet Education        []Diet Education     []Electrolyte Management     []General Nutrition Management and Supplements     []Management of Tube Feeding     []TPN Recommendations    [x]  RN Referral:  []MST score >=2     []Enteral/Parenteral Nutrition PTA     []Pregnant: Gestational DM or Multigestation                 [x] Pressure Ulcer    [x]  Low BMI      []  Length of Stay       [] Dysphagia Diet         [] Ventilator  []  Follow-up     Previous Recommendations:   [] Implemented          [] Not Implemented          [x] Not Applicable    Previous Goal:   [] Met              [] Progressing Towards Goal              [] Not Progressing Towards Goal   [x] Not Applicable            Dickson Paget,   Pager [phone]  Phone [phone]

## 2020-02-26 NOTE — ED NOTES
Rounding on patient and noted to have c/o pain in her legs and hip. Requested pain medication for patient. Patient requested to be left alone to sleep and under the covers. Angry affect when going to do anything to help make her comfortable. Providing pain medication per MAR. Will continue to monitor patient.

## 2020-02-26 NOTE — ED NOTES
Patient's daughter Veronique Goss [phone] left phone number for us to call her with updated. Called and spoke to Veronique Goss and informed that patient was going to be admitted and she can call back in the morning to find out what room when she wakes up.

## 2020-02-26 NOTE — WOUND CARE
Wound care consult: 
Initial visit for skin and wound assessment present on admission Alert, painful- family at bedside Pain with any repositioning Assessment All skin folds and bony prominences assessed, turned with staff assistance. Skin thin and fragile, scattered areas of bruising on lower arms and legs- heels intact- red and blanching, Left ankle- 1x1x0.5 cm full thickness stage 3 pressure injury- yellow base, present on admission Incontinent of urine and stool- soiled diaper removed, partial thickness skin loss across the bilateral buttocks and sacral area extending in the tana rectal perineal area from incontinent episodes - painful to patient Treatment Incontinent care given- Zinc applied Repositioned in bed Heels floated Recommendations/Plan Turn, reposition every 2 hours as tolerated, float heels Incontinent care  Apply Zinc to all open areas, moisture barrier as needed. Low air loss surface ordered Will follow, reconsult as needed.  
[address] [facility]

## 2020-02-26 NOTE — ED TRIAGE NOTES
Patient from the [address] [place] [address]. Way   Hx of pelvic fracture / and no surgery   24 g r forearm   Irregular HR hx of af. Fib   Patient was hospice and then taken off to do conservative treatment. Decreased urine output and is alert and oriented x2 on apap and tramdol. apap last given @ 1700. Reported one day of tachycardia with runs of vtach en route Has reported increased shortness of breath and has been hallucinating. Patient was given 30 mg IV torodol. 8/10 PAIN in the right hip. Hx of lung cancer. Does not normally wear oxygen and was placed on 2 L NC for sats were 90 on room air. Staff denies cough cold fever or chills. Reported family is en route.

## 2020-02-26 NOTE — ED NOTES
Called the facility and spoke to Maki the RN that works with the patient. Reported that patient has increased HR from 110-133 before pain was out of control. Physician wanted the patient to be evaluated. Patient's last dose of tramadol was 1630 50 mg and 750 mg of apap at 1700.    9:58 PM  Dr. Kristy Ludwig made aware of family being present.

## 2020-02-26 NOTE — H&P
[address] [facility] Adult  Hospitalist Group  History and Physical    Primary Care Provider: Gee Banda MD  Date of Service:  2/26/2020    Subjective:     Bronwyn Ashby is a 80 y.o. female with past medical history of COPD, stage IV lung cancer, rheumatoid arthritis, A. fib not on anticoagulation, AAA and others listed below sent from SNF for palpitation. Patient discharged 10 days ago to SNF after she was admitted for pelvic fracture which deemed to be nonoperable per orthopedic surgery. Prior to that,  patient was at home with home hospice. Patient appears to be lethargic and could not provide any information. According to ED documentation, she has tachycardia and increased shortness of breath and hallucination in the last couple of days. She has no cough, chills or fever. Review of Systems:  Patient denied chest pain or shortness of breath. The rest of the system could not be completed at the patient is lethargic and answers intermittently       Past Medical History:   Diagnosis Date    AAA (abdominal aortic aneurysm) (HCC)     Arthritis     ra, osteoporosis, osteoarthritis    [facility] [address]     COPD     HTN (hypertension)     Lung cancer ([facility] [address]     RA (rheumatoid arthritis) ([facility] [address]     Smoker     TIA (transient ischemic attack)     TIA 9/30/16      Past Surgical History:   Procedure Laterality Date    HX CHOLECYSTECTOMY      HX HYSTERECTOMY      HX ORTHOPAEDIC      carpal tunnel, wrist surgery    [place] INS NEW/RPLCMT PRM PM W/TRANSV ELTRD ATRIAL&VENT N/A 7/26/2019    INSERT PPM DUAL performed by Wong Rudd MD at [facility]     Prior to Admission medications    Medication Sig Start Date End Date Taking? Authorizing Provider   ALPRAZolam Cm Jacky) 0.25 mg tablet Take 1 Tab by mouth two (2) times a day. Max Daily Amount: 0.5 mg. 2/17/20   Rosy Neal MD   acetaminophen (TYLENOL) 500 mg tablet Take 1.5 Tabs by mouth every six (6) hours.  2/17/20 Matty Castro MD   albuterol-ipratropium (DUO-NEB) 2.5 mg-0.5 mg/3 ml nebu 3 mL by Nebulization route every four (4) hours as needed for Wheezing. 2/17/20   Matty Castro MD   furosemide (LASIX) 40 mg tablet Take 1 Tab by mouth daily. 2/17/20   Matty Castro MD   polyethylene glycol (MIRALAX) 17 gram packet Take 1 Packet by mouth daily. 2/18/20   Mitch Valdovinos MD   traMADol (ULTRAM) 50 mg tablet Take 0.5 Tabs by mouth every six (6) hours as needed for Pain for up to 10 days. Max Daily Amount: 100 mg. 2/17/20 2/27/20  Matty Castro MD   senna-docusate (SENNA WITH DOCUSATE SODIUM) 8.6-50 mg per tablet Take 1 Tab by mouth two (2) times daily as needed for Constipation. Provider, Historical     Allergies   Allergen Reactions    Codeine Nausea and Vomiting      Family History   Problem Relation Age of Onset    Hypertension Father         SOCIAL HISTORY:  Patient resides at [facility]  Patient ambulates  walker   Smoking history:  former  Alcohol history: no        Objective:       Physical Exam:   Physical Exam  Constitutional:       General: She is not in acute distress. Appearance: She is not ill-appearing, toxic-appearing or diaphoretic. Comments: Lethargic   Eyes:      General: No scleral icterus. Right eye: No discharge. Left eye: No discharge. Pupils: Pupils are equal, round, and reactive to light. Neck:      Musculoskeletal: Normal range of motion. No neck rigidity or muscular tenderness. Vascular: No carotid bruit. Cardiovascular:      Rate and Rhythm: Tachycardia present. Rhythm irregular. Heart sounds: No murmur. No friction rub. No gallop. Pulmonary:      Effort: Pulmonary effort is normal. No respiratory distress. Breath sounds: Normal breath sounds. No stridor. Abdominal:      General: Abdomen is flat. Bowel sounds are normal. There is no distension. Palpations: There is no mass. Tenderness:  There is no abdominal tenderness. There is no left CVA tenderness, guarding or rebound. Hernia: No hernia is present. Musculoskeletal: Normal range of motion. General: No swelling, tenderness or signs of injury. Skin:     General: Skin is warm and dry. Coloration: Skin is not jaundiced or pale. Findings: No bruising or erythema. Neurological:      General: No focal deficit present. Mental Status: She is alert. Cranial Nerves: No cranial nerve deficit. Sensory: No sensory deficit. Motor: No weakness. Coordination: Coordination normal.   Psychiatric:         Mood and Affect: Mood normal.       Cap refill: Brisk, less than 3 seconds  Pulses: 2+, symmetric in all extremities    ECG: A. fib    Data Review: All diagnostic labs and studies have been reviewed. Chest x-ray . No acute abnormality    Assessment:     A fib with RVR  -Patient referred to the ED from SNF for A. fib with RVR  -HR between 100-110 at this time  - start low-dose of metoprolol  -Telemetry monitoring.    - Patient not a candidate for NOAC due to risk of fall    Elevated  troponin  -Likely due to demand ischemia  -Patient denied any chest pain  - get serial troponin, get 2D echo  -cardiology consult in place    Hypokalemia  -Monitor and correct electrolytes, telemetry monitoring    Pelvic fracture  -Nonsurgical conservative management per  orthopedic surgery  -Patient continued to have is not well controlled  -provide adequate analgesia    Lung CA  -Non-small cell lung CA stage IV  -Patient has been on home hospice until she went to SNF  -IP consult for hospice care in place             1  FUNCTIONAL STATUS PRIOR TO HOSPITALIZATION (including history of recent falls) Patient's functional status has been poor and declining after she sustained a pelvic fracture.       Signed By: Ulysess Goldmann, MD     February 26, 2020

## 2020-02-26 NOTE — CONSULTS
Palliative Medicine Consult  Carlos Manuel: [phone] ([phone])    Patient Name: Soila Lund  YOB: 1931    Date of Initial Consult: 2020  Reason for Consult: Goals of care discussion and assistance with symptom management  Requesting Provider: Dr. Viral Marte  Primary Care Physician: Juan Pablo Bishop MD     SUMMARY:   Soila Lund is a 80 y.o. with a past history of COPD, AAA, HTN, TIA, stage IV lung CA, afib s/p PPM, arthritis, RA, who was [place] on [place] with primary diagnosis of atrial fib with RVR. Patient presented to the ED with CC of lethargy x2 days and uncontrolled A. fib with heart rate 100120. Current medical issues leading to Palliative Medicine involvement include: Goals of care discussion and setting of advanced age, altered mental status, pain, debility and poor intake. Prior to recent hospitalization, 2020, patient had been on hospice services with [facility], until she a GLF resulting in hospital admission for inoperable pubic rami fracture, from which she was discharged to skilled nursing facility    [place]: She is , had 2 children, her son Nicky King  . She lives with grandson and his family (wife and 2 young children, 4yo and baby). Daughter lives next door. Daughter works full-time, her  is retired and is able to assist some. Patient had been on [facility] hospice services from 2019 up until last hospitalization,      reviewed: long hx of opioid use  Chronic pain medication regimen on Hospice: MS Contin 15 mg every 8 hours scheduled with oxycodone 5 mg, 1-2 every 4 hours as needed  No signs of overuse, misuse or diversion. PALLIATIVE DIAGNOSES:   1. Advance care planning discussion  2. Goals of care discussion  3. DNR discussion  4. Pain   5. Hypoalbuminemia       PLAN:   1.  Prior to visit completed extensive chart review, I also spoke with attending Dr. Viral Marte, at which time she requested assistance with symptom management. 2. Initially I met with patient, no family at bedside. Patient initially appeared to be sleeping but did wake easily, became somewhat restless told she was in a lot of pain, she was unable to describe the pain however she was able to rate pain, see palliative ESAS, she was not able to provide ROS,  Some  confusion  3. Chronic pain--> reports having \"a lot of pain\" becomes tearful, stated pain in right leg, unable to provide more detailed information other than pain level of 8/10. Review of  revealed patient had been on MS contin 15mg orally every 8 hours scheduled with oxycodone IR 5mg every 4 hours as needed since January 2020. she had remained on the MS Contin through previous hospitalization and even had oders to continue in SNF, however, review of home medications in ER physician note, seems patient might have been taken off MS Contin and placed on prn tramadiol. Initially I made adjustments to the patients treatment plan, but she was still complaining of severe pain at time I met with daughter. Daughter stated primary goals if comfort, does not want patient to suffer. · New order for Oxycodone IR 10mg orally every 6 hours given, oxycodone 5mg orally every 4 hours as needed for mod to severe pain. · \Palliaitve will fu tomorrow 2/27 to evaluate effectiveness of adjustments made to pain treatment plan. 4. Goals of care--> Daughter indicates she wants to focus on comfort,  wants to bring the patient back home with [facility]. She is planning on hiring a caregiver to help over the weekend. I placed order with case management to consult [facility]. Daughter stated that she needs a Hospital bed. · Though daughter wants to bring patient home with Hospice, she does NOT want to stop medications or other treatments until discharge.     5. Code status--> DNR; completed DDNR on file signed by patient's daughter in 2/2019, though this DDNR does not actually have the year it was signed documented. May consider obtaining a new DDNR  6. ACP--> No AMD on file, Patient is  and single. She has 1 living child, daughter, Janine Sheets, who is legal NOK/health care decision maker. 7. Hypoalbuminemia- Severe, Albumin 1.9, patient with poor intake PTA, daughter reports associated weight loss. GOC are for comfort. 8. Initial consult note routed to primary continuity provider and/or primary health care team members  9. Communicated plan of care with: Palliative IDT, [facility] Team, RN and attending Dr. Yvonne Mendes / TREATMENT PREFERENCES:     GOALS OF CARE:  Patient/Health Care Proxy Stated Goals: Comfort    TREATMENT PREFERENCES:   Code Status: DNR    Advance Care Planning:  [x] [facility] Interdisciplinary Team has updated the ACP Navigator with [place] 8 and Patient Capacity      Primary Decision Maker (Active): Oral Eliseo - Daughter - [phone]  Advance Care Planning 9/13/2019   Patient's Healthcare Decision Maker is: Legal Next of Kin   Primary Decision Maker Name -   Confirm Advance Directive None   Patient Would Like to Complete Advance Directive Unable   Does the patient have other document types Do Not Resuscitate             Other Instructions:   Artificially Administered Nutrition: No feeding tube     Other:    As far as possible, the palliative care team has discussed with patient / health care proxy about goals of care / treatment preferences for patient.      HISTORY:     History obtained from: medical records, patient/ family    CHIEF COMPLAINT:I hurt so bad    HPI/SUBJECTIVE:    The patient is:   [] Verbal and participatory  [x] Non-participatory due to: d/t confusion, patient unable to provide ROS, however, she is able to tell me that she hurts , knows that she has been on medicine in the past, but does not recall what medication regimen was, she was but unable to provide     See ESAS    Clinical Pain Assessment (nonverbal scale for severity on nonverbal patients):   Clinical Pain Assessment  Severity: 8  Location: right leg  Character: patient unable to provide this iformation  Duration: patient unable to provide this iformation  Effect: patient unable to provide this iformation  Factors: patient unable to provide this iformation  Frequency: patient unable to provide this iformation     Activity (Movement): Restless, excessive activity and/or withdrawal reflexes    Duration: for how long has pt been experiencing pain (e.g., 2 days, 1 month, years)  Frequency: how often pain is an issue (e.g., several times per day, once every few days, constant)     FUNCTIONAL ASSESSMENT:     Palliative Performance Scale (PPS):  PPS: 30       PSYCHOSOCIAL/SPIRITUAL SCREENING:     Palliative IDT has assessed this patient for cultural preferences / practices and a referral made as appropriate to needs (Cultural Services, Patient Advocacy, Ethics, etc.)    Any spiritual / Quaker concerns:  [] Yes /  [x] No    Caregiver Burnout: Not currently burned out, but is at risk  [] Yes /  [] No /  [] No Caregiver Present      Anticipatory grief assessment:   [x] Normal  / [] Maladaptive       ESAS Anxiety: Anxiety: 2    ESAS Depression:          REVIEW OF SYSTEMS:     Positive and pertinent negative findings in ROS are noted above in HPI. The following systems were [x] reviewed / [] unable to be reviewed as noted in HPI  Other findings are noted below. Systems: constitutional, ears/nose/mouth/throat, respiratory, gastrointestinal, genitourinary, musculoskeletal, integumentary, neurologic, psychiatric, endocrine. Positive findings noted below.   Modified ESAS Completed by: provider   Fatigue: 10 Drowsiness: 4     Pain: 8   Anxiety: 2 Nausea: 0   Anorexia: 10 Dyspnea: 0                    PHYSICAL EXAM:     From RN flow sheet:  Wt Readings from Last 3 Encounters:   02/26/20 121 lb 0.5 oz (54.9 kg)   02/17/20 121 lb 11.1 oz (55.2 kg)   09/11/19 134 lb (60.8 kg)     Blood pressure 120/60, pulse 79, temperature 97.8 °F (36.6 °C), resp. rate 18, height 5' 4\" (1.626 m), weight 121 lb 0.5 oz (54.9 kg), SpO2 97 %. Pain Scale 1: Numeric (0 - 10)  Pain Intensity 1: 0     Pain Location 1: Back  Pain Orientation 1: Posterior  Pain Description 1: Aching  Pain Intervention(s) 1: Medication (see MAR)  Last bowel movement, if known:     Constitutional: Elderly, weak,  frail,  Thin, fatigued, uncomfortable  Eyes: pupils equal, anicteric  ENMT: no nasal discharge, dry  mucous membranes  Cardiovascular: regular rhythm, distal pulses intact  Respiratory: breathing not labored, symmetric  Gastrointestinal: soft non-tender, +bowel sounds  Musculoskeletal: no deformity, no tenderness to palpation  Skin: warm, dry  Neurologic: oriented to self and daughter, not able to tell me she is in hospital, unable to provide ROS confused, fatigued, intermittently  following simple commands, weakly   Psychiatric: restricted affect, no hallucinations  Other:       HISTORY:     Active Problems:    A-fib ([facility] [address] (2/26/2020)      Past Medical History:   Diagnosis Date    AAA (abdominal aortic aneurysm) (HCC)     Arthritis     ra, osteoporosis, osteoarthritis    Atrial fibrillation (HCC)     COPD     HTN (hypertension)     Lung cancer ([facility] [facility]     RA (rheumatoid arthritis) ([facility] [address]     Smoker     TIA (transient ischemic attack)     TIA 9/30/16      Past Surgical History:   Procedure Laterality Date    HX CHOLECYSTECTOMY      HX HYSTERECTOMY      HX ORTHOPAEDIC      carpal tunnel, wrist surgery    ND INS NEW/RPLCMT PRM PM W/TRANSV ELTRD ATRIAL&VENT N/A 7/26/2019    INSERT PPM DUAL performed by Tate Santa MD at [address] [facility]      Family History   Problem Relation Age of Onset    Hypertension Father       History reviewed, no pertinent family history.   Social History     Tobacco Use    Smoking status: Former Smoker     Years: 50.00     Last attempt to quit: 2018     Years since quittin.5    Smokeless tobacco: Never Used   Substance Use Topics    Alcohol use: No     Allergies   Allergen Reactions    Codeine Nausea and Vomiting      Current Facility-Administered Medications   Medication Dose Route Frequency    albuterol-ipratropium (DUO-NEB) 2.5 MG-0.5 MG/3 ML  3 mL Nebulization Q4H PRN    ALPRAZolam (XANAX) tablet 0.25 mg  0.25 mg Oral BID    senna-docusate (PERICOLACE) 8.6-50 mg per tablet 1 Tab  1 Tab Oral BID PRN    polyethylene glycol (MIRALAX) packet 17 g  17 g Oral DAILY    sodium chloride (NS) flush 5-40 mL  5-40 mL IntraVENous Q8H    sodium chloride (NS) flush 5-40 mL  5-40 mL IntraVENous PRN    enoxaparin (LOVENOX) injection 40 mg  40 mg SubCUTAneous Q24H    metoprolol tartrate (LOPRESSOR) tablet 25 mg  25 mg Oral BID    potassium chloride SR (KLOR-CON 10) tablet 40 mEq  40 mEq Oral NOW    furosemide (LASIX) tablet 40 mg  40 mg Oral ACB&D    oxyCODONE IR (ROXICODONE) tablet 5 mg  5 mg Oral Q4H PRN    zinc oxide (DESITIN) 13 % topical cream   Topical PRN    oxyCODONE IR (ROXICODONE) tablet 10 mg  10 mg Oral Q6H          LAB AND IMAGING FINDINGS:     Lab Results   Component Value Date/Time    WBC 9.4 2020 11:32 PM    HGB 10.3 (L) 2020 11:32 PM    PLATELET 972 (H)  11:32 PM     Lab Results   Component Value Date/Time    Sodium 139 2020 11:32 PM    Potassium 3.0 (L) 2020 11:32 PM    Chloride 105 2020 11:32 PM    CO2 29 2020 11:32 PM    BUN 14 2020 11:32 PM    Creatinine 0.72 2020 11:32 PM    Calcium 7.2 (L) 2020 11:32 PM    Magnesium 1.6 2020 11:32 PM    Phosphorus 2.8 2019 02:21 AM      Lab Results   Component Value Date/Time    AST (SGOT) 39 (H) 2020 11:32 PM    Alk.  phosphatase 107 2020 11:32 PM    Protein, total 6.6 2020 11:32 PM    Albumin 1.9 (L) 2020 11:32 PM    Globulin 4.7 (H) 2020 11:32 PM     Lab Results   Component Value Date/Time    INR 1.1 09/07/2019 07:39 PM    Prothrombin time 10.9 09/07/2019 07:39 PM    aPTT 28.7 07/20/2019 07:30 PM      Lab Results   Component Value Date/Time    Iron 14 (L) 11/28/2018 12:58 AM    TIBC 143 (L) 11/28/2018 12:58 AM    Iron % saturation 10 (L) 11/28/2018 12:58 AM    Ferritin 139 11/28/2018 12:58 AM      No results found for: PH, PCO2, PO2  No components found for: GLPOC   No results found for: CPK, CKMB             Total time: 100 min  Counseling / coordination time, spent as noted above: 80 min  > 50% counseling / coordination?: yes    Prolonged service was provided for  [x]30 min   [x]75 min in face to face time in the presence of the patient, spent as noted above. Time Start: 1030 Time End: 1100   Time Start: 1844  End: 1645  Note: this can only be billed with  (initial) or 21 [phone] (follow up). If multiple start / stop times, list each separately.

## 2020-02-26 NOTE — CONSULTS
Usman Nava MD [facility]  Office [phone]      Date of  Admission: 2/25/2020  9:26 PM       Assessment/Plan:   · History of Afib: noted to be 's on tele. Had not been on any AV jordon agents PTA per chart review. Not on anticoagulation due to previous falls and fractures. BP currently stable. Would add low dose BB if going back to SNF as she was sent here due to tachycardia and palpitations. If she is to return home with Hospice for comfort care the would defer as only 1 HR recorded at 120. · Elevated troponin: esentially flat. Would not continue to trend. · Elevated pBNP. By exam not in acute exacerbation. Lasix po has been increased. · Stage IV lung ca: palliative medicine to see given mulitple comorbidities. · COPD:  · S/P PPM for symptomatic bradycardia:             Pt personally seen and examined. Chart reviewed. Agree with advanced NP's history, exam and  A/P with changes/additons. Afib with labile HR/not on anticoagulation  Palliative care consulted      [facility]. MD, [facility]      Thank you for allowing us to participate in Kim Sims care. We will be happy to follow along. Please call for any questions. Consult requested by Tete Salguero MD for *A-fib [facility]) [I48.91]    Subjective:  Kim Sims is a 80 y.o.   female  with PMH of  history of COPD, stage IV lung cancer, rheumatoid arthritis, A. fib not on anticoagulation 2/2 previous falls with gluteal hematoma and now recent pelvic fracture , s/p PPM for bradycardia, AAA sent from SNF for palpitations. Patient ws discharged 10 days ago to SNF after she was admitted for pelvic fracture which deemed to be nonoperable per orthopedic surgery. Prior to that,  patient was at home with home hospice. Pt is a poor historian and unable to tell me anything other than she is cold and in pain.      Cardiology asked to evaluate for a fib with elevated troponin. The patient denies chest pain, dependent edema, diaphoresis, HERMAN, orthopnea, palpitations, PND, shortness of breath, claudication or syncope. No bleeding. LABS:   Troponin:    0.05 X 2    ProBNP:  25, 046  XR Results (most recent):  Results from Hospital Encounter encounter on 02/25/20   XR CHEST PORT    Narrative Clinical indication: Cough and weakness. Portable AP semiupright view of the chest is obtained, comparison February 17, 2020. Chronic left basilar changes stable. Normal size heart. Cardiac pacemaker. Impression IMPRESSION: No change. Cardiographics:   Telemetry: a fib     Cardiac Testing/ Procedures:   7/26/19: Implantation of a  Medtronic dual-chamber pacemaker per Dr. Tracey Martellape    07/20/19   ECHO ADULT COMPLETE 07/25/2019 7/25/2019    Narrative · Left Ventricle: Normal systolic function (ejection fraction normal). Upper normal wall thickness. Estimated left ventricular ejection fraction   is 61 - 65%. · Mitral Valve: Mild mitral annular calcification. Mild mitral valve   regurgitation. · Tricuspid Valve: Mild to moderate tricuspid valve regurgitation is   present. · Pulmonic Valve: Mild pulmonic valve regurgitation is present. · Pericardium: Pericardial effusion.         Signed by: Wendy Bravo MD            Patient Active Problem List    Diagnosis Date Noted    A-fib [facility]) 02/26/2020    Lung cancer ([address] 02/14/2020    Pelvic fracture ([address] 02/13/2020    Anxiety 09/15/2019    DNR (do not resuscitate)     Chronic pain syndrome     Physical debility     Goals of care, counseling/discussion     Abdominal aortic aneurysm (AAA) without rupture ([address] 12/04/2018    Malignant neoplasm of upper lobe of left lung ([address] 11/27/2018    Tibial plateau fracture 29/56/1052    Tobacco abuse 08/25/2018    Hypertension 08/25/2018    TIA (transient ischemic attack) 09/30/2016    Paroxysmal A-fib ([address] 09/30/2016    RA (rheumatoid arthritis) ([facility] 09/30/2016    COPD (chronic obstructive pulmonary disease) ([facility] 09/30/2016      Past Medical History:   Diagnosis Date    AAA (abdominal aortic aneurysm) (HCC)     Arthritis     ra, osteoporosis, osteoarthritis    Atrial fibrillation (HCC)     COPD     HTN (hypertension)     Lung cancer (HCC)     RA (rheumatoid arthritis) ([facility]     Smoker     TIA (transient ischemic attack)     TIA 9/30/16      Past Surgical History:   Procedure Laterality Date    HX CHOLECYSTECTOMY      HX HYSTERECTOMY      HX ORTHOPAEDIC      carpal tunnel, wrist surgery    CO INS NEW/RPLCMT PRM PM W/TRANSV ELTRD ATRIAL&VENT N/A 7/26/2019    INSERT PPM DUAL performed by Bharati Low MD at [facility]     Allergies   Allergen Reactions    Codeine Nausea and Vomiting      Current Facility-Administered Medications   Medication Dose Route Frequency    albuterol-ipratropium (DUO-NEB) 2.5 MG-0.5 MG/3 ML  3 mL Nebulization Q4H PRN    ALPRAZolam (XANAX) tablet 0.25 mg  0.25 mg Oral BID    traMADol (ULTRAM) tablet 25 mg  25 mg Oral Q6H PRN    senna-docusate (PERICOLACE) 8.6-50 mg per tablet 1 Tab  1 Tab Oral BID PRN    polyethylene glycol (MIRALAX) packet 17 g  17 g Oral DAILY    sodium chloride (NS) flush 5-40 mL  5-40 mL IntraVENous Q8H    sodium chloride (NS) flush 5-40 mL  5-40 mL IntraVENous PRN    enoxaparin (LOVENOX) injection 40 mg  40 mg SubCUTAneous Q24H    metoprolol tartrate (LOPRESSOR) tablet 25 mg  25 mg Oral BID    oxyCODONE-acetaminophen (PERCOCET 10)  mg per tablet 1 Tab  1 Tab Oral Q6H PRN    morphine injection 2 mg  2 mg IntraVENous Q4H PRN    potassium chloride SR (KLOR-CON 10) tablet 40 mEq  40 mEq Oral NOW    furosemide (LASIX) tablet 40 mg  40 mg Oral ACB&D    morphine injection 1 mg  1 mg IntraVENous Q4H PRN          Review of Symptoms:  Constitutional: positive for malaise  ENT: negative   Respiratory: negative for dyspnea on exertion  Gastrointestinal: poor appetite Genitourinary: denies dysuria   Musculoskeletal:positive for back pain and foot pain   Neurological: positive for memory problems     Other systems reviewed and negative except as above. Social History     Socioeconomic History    Marital status: SINGLE     Spouse name: Not on file    Number of children: Not on file    Years of education: Not on file    Highest education level: Not on file   Tobacco Use    Smoking status: Former Smoker     Years: 50.00     Last attempt to quit: 2018     Years since quittin.5    Smokeless tobacco: Never Used   Substance and Sexual Activity    Alcohol use: No    Drug use: No    Sexual activity: Not Currently     Family History   Problem Relation Age of Onset    Hypertension Father          Physical Exam    Visit Vitals  /80   Pulse (!) 113   Temp 97.4 °F (36.3 °C)   Resp 18   Ht 5' 4\" (1.626 m)   Wt 121 lb 0.5 oz (54.9 kg)   SpO2 94%   BMI 20.78 kg/m²     General: awake, alert,  No acute distress   HEENT Exam:     Normocephalic, atraumatic. Sclera anicteric . Neck: supple  Lung Exam:     Not dyspneic at rest . Respirations unlabored. O2 @  Sat:  . Lungs:  scattered rhonchi. Heart Exam:       PMI nondisplaced. Rate: Rhythm: irregularly irregular 2/6 systolic murmur. No JVD, brisk carotid upstroke, no carotid bruits, No HJR      Abdomen Exam:      obese, soft, nontender. + Bowel sounds. No palpable masses; organomegaly. No bruits or pulsatile mass. : voiding    Extremities Exam:      Atraumatic. No edema.     Vascular Exam:      radial, brachial, dorsalis pedis, posterior tibial, are equal and strong bilaterally     Neuro exam:  No focal deficits   Psy Exam: Normal affect, does not appear anxious or agitated        Recent Results (from the past 12 hour(s))   METABOLIC PANEL, COMPREHENSIVE    Collection Time: 20 11:32 PM   Result Value Ref Range    Sodium 139 136 - 145 mmol/L    Potassium 3.0 (L) 3.5 - 5.1 mmol/L    Chloride 105 97 - 108 mmol/L    CO2 29 21 - 32 mmol/L    Anion gap 5 5 - 15 mmol/L    Glucose 93 65 - 100 mg/dL    BUN 14 6 - 20 MG/DL    Creatinine 0.72 0.55 - 1.02 MG/DL    BUN/Creatinine ratio 19 12 - 20      GFR est AA >60 >60 ml/min/1.73m2    GFR est non-AA >60 >60 ml/min/1.73m2    Calcium 7.2 (L) 8.5 - 10.1 MG/DL    Bilirubin, total 0.5 0.2 - 1.0 MG/DL    ALT (SGPT) 14 12 - 78 U/L    AST (SGOT) 39 (H) 15 - 37 U/L    Alk. phosphatase 107 45 - 117 U/L    Protein, total 6.6 6.4 - 8.2 g/dL    Albumin 1.9 (L) 3.5 - 5.0 g/dL    Globulin 4.7 (H) 2.0 - 4.0 g/dL    A-G Ratio 0.4 (L) 1.1 - 2.2     CBC WITH AUTOMATED DIFF    Collection Time: 02/25/20 11:32 PM   Result Value Ref Range    WBC 9.4 3.6 - 11.0 K/uL    RBC 3.71 (L) 3.80 - 5.20 M/uL    HGB 10.3 (L) 11.5 - 16.0 g/dL    HCT 32.9 (L) 35.0 - 47.0 %    MCV 88.7 80.0 - 99.0 FL    MCH 27.8 26.0 - 34.0 PG    MCHC 31.3 30.0 - 36.5 g/dL    RDW 18.5 (H) 11.5 - 14.5 %    PLATELET 474 (H) 240 - 400 K/uL    MPV 8.9 8.9 - 12.9 FL    NRBC 0.0 0  WBC    ABSOLUTE NRBC 0.00 0.00 - 0.01 K/uL    NEUTROPHILS 68 32 - 75 %    LYMPHOCYTES 21 12 - 49 %    MONOCYTES 7 5 - 13 %    EOSINOPHILS 2 0 - 7 %    BASOPHILS 0 0 - 1 %    IMMATURE GRANULOCYTES 2 (H) 0.0 - 0.5 %    ABS. NEUTROPHILS 6.4 1.8 - 8.0 K/UL    ABS. LYMPHOCYTES 2.0 0.8 - 3.5 K/UL    ABS. MONOCYTES 0.7 0.0 - 1.0 K/UL    ABS. EOSINOPHILS 0.2 0.0 - 0.4 K/UL    ABS. BASOPHILS 0.0 0.0 - 0.1 K/UL    ABS. IMM.  GRANS. 0.1 (H) 0.00 - 0.04 K/UL    DF AUTOMATED     MAGNESIUM    Collection Time: 02/25/20 11:32 PM   Result Value Ref Range    Magnesium 1.6 1.6 - 2.4 mg/dL   TSH 3RD GENERATION    Collection Time: 02/25/20 11:32 PM   Result Value Ref Range    TSH 1.74 0.36 - 3.74 uIU/mL   SAMPLES BEING HELD    Collection Time: 02/25/20 11:32 PM   Result Value Ref Range    SAMPLES BEING HELD 1BLU,1RED     COMMENT        Add-on orders for these samples will be processed based on acceptable specimen integrity and analyte stability, which may vary by analyte. TROPONIN I    Collection Time: 02/25/20 11:32 PM   Result Value Ref Range    Troponin-I, Qt. 0.05 (H) <0.05 ng/mL   NT-PRO BNP    Collection Time: 02/25/20 11:32 PM   Result Value Ref Range    NT pro-BNP 25,046 (H) <450 PG/ML   URINALYSIS W/MICROSCOPIC    Collection Time: 02/25/20 11:56 PM   Result Value Ref Range    Color DARK YELLOW      Appearance CLEAR CLEAR      Specific gravity 1.030 1.003 - 1.030      pH (UA) 6.0 5.0 - 8.0      Protein 30 (A) NEG mg/dL    Glucose NEGATIVE  NEG mg/dL    Ketone 15 (A) NEG mg/dL    Blood NEGATIVE  NEG      Urobilinogen 0.2 0.2 - 1.0 EU/dL    Nitrites NEGATIVE  NEG      Leukocyte Esterase NEGATIVE  NEG      WBC 5-10 /hpf    RBC 0-5 /hpf    Epithelial cells FEW /lpf    Bacteria NEGATIVE  /hpf    Mucus 2+ /lpf   URINE CULTURE HOLD SAMPLE    Collection Time: 02/25/20 11:56 PM   Result Value Ref Range    Urine culture hold        Urine on hold in Microbiology dept for 2 days. If unpreserved urine is submitted, it cannot be used for addtional testing after 24 hours, recollection will be required.    BILIRUBIN, CONFIRM    Collection Time: 02/25/20 11:56 PM   Result Value Ref Range    Bilirubin UA, confirm NEGATIVE      TROPONIN I    Collection Time: 02/26/20  5:30 AM   Result Value Ref Range    Troponin-I, Qt. 0.05 (H) <0.05 ng/mL   ECHO ADULT COMPLETE    Collection Time: 02/26/20  9:11 AM   Result Value Ref Range    Ao Root D 4.03 cm       [facility]

## 2020-02-26 NOTE — ROUTINE PROCESS
Bedside shift change report given to [address] [place] [address] (oncoming nurse) by Brannon Ty (offgoing nurse). Report included the following information SBAR, Kardex, Procedure Summary, Intake/Output, MAR, Accordion, Recent Results and Med Rec Status.

## 2020-02-26 NOTE — ACP (ADVANCE CARE PLANNING)
Advance Care Planning Note     Name: Ambika Aguilera   YOB: 1931   MRN: [id number]   Admission Date: 2/25/2020  9:26 PM     Date of discussion:  2/26/2020         Active Diagnoses: Active Problems:    A-fib ([address] (2/26/2020)           These active diagnoses are of sufficient risk that focused discussion on advance care planning is indicated in order to allow the patient to thoughtfully consider personal goals of care; and, if situations arise that prevent the ability to personally give input, to ensure appropriate representation of their personal desires for different levels and aggressiveness of care. Discussion:     Persons present and participating in discussion: Patient and patient's daughter    Discussion: Spoke at length with daughter and pt re: goals of care. Pt's daughter does NOT want her to go back to a SNF and now would like her home but still contending with how to do this as she is at work during the day. Pt was previously on hospice and would like to resume home hospice, but daughter is trying to determine finances in terms of funding a more round the clock caregiver while she is at work. Palliative care has been consulted to assist with pain meds and dispo as well. Discussed this with pt, daughter, CM, palliative care, RN       Time Spent:     Total time spent face-to-face in education and discussion: 45 minutes.              Waylon Avila MD

## 2020-02-27 LAB
ALBUMIN SERPL-MCNC: 1.8 G/DL (ref 3.5–5)
ALBUMIN/GLOB SERPL: 0.4 {RATIO} (ref 1.1–2.2)
ALP SERPL-CCNC: 101 U/L (ref 45–117)
ALT SERPL-CCNC: 12 U/L (ref 12–78)
ANION GAP SERPL CALC-SCNC: 4 MMOL/L (ref 5–15)
AST SERPL-CCNC: 34 U/L (ref 15–37)
BASOPHILS # BLD: 0.1 K/UL (ref 0–0.1)
BASOPHILS NFR BLD: 1 % (ref 0–1)
BILIRUB SERPL-MCNC: 0.4 MG/DL (ref 0.2–1)
BNP SERPL-MCNC: ABNORMAL PG/ML
BUN SERPL-MCNC: 12 MG/DL (ref 6–20)
BUN/CREAT SERPL: 17 (ref 12–20)
CALCIUM SERPL-MCNC: 6.8 MG/DL (ref 8.5–10.1)
CHLORIDE SERPL-SCNC: 105 MMOL/L (ref 97–108)
CO2 SERPL-SCNC: 28 MMOL/L (ref 21–32)
CREAT SERPL-MCNC: 0.71 MG/DL (ref 0.55–1.02)
DIFFERENTIAL METHOD BLD: ABNORMAL
EOSINOPHIL # BLD: 1.2 K/UL (ref 0–0.4)
EOSINOPHIL NFR BLD: 10 % (ref 0–7)
ERYTHROCYTE [DISTWIDTH] IN BLOOD BY AUTOMATED COUNT: 18.6 % (ref 11.5–14.5)
GLOBULIN SER CALC-MCNC: 4.4 G/DL (ref 2–4)
GLUCOSE SERPL-MCNC: 90 MG/DL (ref 65–100)
HCT VFR BLD AUTO: 32.3 % (ref 35–47)
HGB BLD-MCNC: 9.7 G/DL (ref 11.5–16)
IMM GRANULOCYTES # BLD AUTO: 0.1 K/UL (ref 0–0.04)
IMM GRANULOCYTES NFR BLD AUTO: 1 % (ref 0–0.5)
LYMPHOCYTES # BLD: 2.4 K/UL (ref 0.8–3.5)
LYMPHOCYTES NFR BLD: 21 % (ref 12–49)
MAGNESIUM SERPL-MCNC: 1.4 MG/DL (ref 1.6–2.4)
MCH RBC QN AUTO: 27.4 PG (ref 26–34)
MCHC RBC AUTO-ENTMCNC: 30 G/DL (ref 30–36.5)
MCV RBC AUTO: 91.2 FL (ref 80–99)
MONOCYTES # BLD: 0.8 K/UL (ref 0–1)
MONOCYTES NFR BLD: 7 % (ref 5–13)
NEUTS SEG # BLD: 6.9 K/UL (ref 1.8–8)
NEUTS SEG NFR BLD: 60 % (ref 32–75)
NRBC # BLD: 0 K/UL (ref 0–0.01)
NRBC BLD-RTO: 0 PER 100 WBC
PLATELET # BLD AUTO: 381 K/UL (ref 150–400)
PMV BLD AUTO: 8.8 FL (ref 8.9–12.9)
POTASSIUM SERPL-SCNC: 2.9 MMOL/L (ref 3.5–5.1)
PROT SERPL-MCNC: 6.2 G/DL (ref 6.4–8.2)
RBC # BLD AUTO: 3.54 M/UL (ref 3.8–5.2)
RBC MORPH BLD: ABNORMAL
SODIUM SERPL-SCNC: 137 MMOL/L (ref 136–145)
WBC # BLD AUTO: 11.5 K/UL (ref 3.6–11)

## 2020-02-27 PROCEDURE — 83880 ASSAY OF NATRIURETIC PEPTIDE: CPT

## 2020-02-27 PROCEDURE — 65660000000 HC RM CCU STEPDOWN

## 2020-02-27 PROCEDURE — 36415 COLL VENOUS BLD VENIPUNCTURE: CPT

## 2020-02-27 PROCEDURE — 74011250637 HC RX REV CODE- 250/637: Performed by: INTERNAL MEDICINE

## 2020-02-27 PROCEDURE — 74011250636 HC RX REV CODE- 250/636: Performed by: INTERNAL MEDICINE

## 2020-02-27 PROCEDURE — 77030038269 HC DRN EXT URIN PURWCK BARD -A

## 2020-02-27 PROCEDURE — 77010033678 HC OXYGEN DAILY

## 2020-02-27 PROCEDURE — 74011250637 HC RX REV CODE- 250/637: Performed by: NURSE PRACTITIONER

## 2020-02-27 PROCEDURE — 85025 COMPLETE CBC W/AUTO DIFF WBC: CPT

## 2020-02-27 PROCEDURE — 80053 COMPREHEN METABOLIC PANEL: CPT

## 2020-02-27 PROCEDURE — 83735 ASSAY OF MAGNESIUM: CPT

## 2020-02-27 PROCEDURE — 94760 N-INVAS EAR/PLS OXIMETRY 1: CPT

## 2020-02-27 RX ORDER — FUROSEMIDE 40 MG/1
40 TABLET ORAL DAILY
Status: DISCONTINUED | OUTPATIENT
Start: 2020-02-28 | End: 2020-02-27

## 2020-02-27 RX ORDER — CALCIUM GLUCONATE 20 MG/ML
2 INJECTION, SOLUTION INTRAVENOUS ONCE
Status: COMPLETED | OUTPATIENT
Start: 2020-02-27 | End: 2020-02-27

## 2020-02-27 RX ORDER — POTASSIUM CHLORIDE 750 MG/1
40 TABLET, FILM COATED, EXTENDED RELEASE ORAL
Status: COMPLETED | OUTPATIENT
Start: 2020-02-27 | End: 2020-02-27

## 2020-02-27 RX ORDER — MAGNESIUM SULFATE HEPTAHYDRATE 40 MG/ML
2 INJECTION, SOLUTION INTRAVENOUS ONCE
Status: COMPLETED | OUTPATIENT
Start: 2020-02-27 | End: 2020-02-27

## 2020-02-27 RX ORDER — METOPROLOL TARTRATE 25 MG/1
12.5 TABLET, FILM COATED ORAL 2 TIMES DAILY
Status: DISCONTINUED | OUTPATIENT
Start: 2020-02-27 | End: 2020-02-28

## 2020-02-27 RX ADMIN — POTASSIUM CHLORIDE 40 MEQ: 750 TABLET, FILM COATED, EXTENDED RELEASE ORAL at 09:18

## 2020-02-27 RX ADMIN — Medication 10 ML: at 05:44

## 2020-02-27 RX ADMIN — OXYCODONE 10 MG: 5 TABLET ORAL at 05:43

## 2020-02-27 RX ADMIN — ALPRAZOLAM 0.25 MG: 0.25 TABLET ORAL at 18:07

## 2020-02-27 RX ADMIN — ENOXAPARIN SODIUM 40 MG: 40 INJECTION SUBCUTANEOUS at 09:18

## 2020-02-27 RX ADMIN — POLYETHYLENE GLYCOL 3350 17 G: 17 POWDER, FOR SOLUTION ORAL at 09:18

## 2020-02-27 RX ADMIN — MAGNESIUM SULFATE HEPTAHYDRATE 2 G: 40 INJECTION, SOLUTION INTRAVENOUS at 09:19

## 2020-02-27 RX ADMIN — CALCIUM GLUCONATE 2 G: 20 INJECTION, SOLUTION INTRAVENOUS at 09:19

## 2020-02-27 RX ADMIN — METOPROLOL TARTRATE 12.5 MG: 25 TABLET ORAL at 09:20

## 2020-02-27 RX ADMIN — OXYCODONE 5 MG: 5 TABLET ORAL at 10:55

## 2020-02-27 RX ADMIN — OXYCODONE 10 MG: 5 TABLET ORAL at 18:08

## 2020-02-27 RX ADMIN — OXYCODONE 10 MG: 5 TABLET ORAL at 01:06

## 2020-02-27 RX ADMIN — Medication 10 ML: at 22:00

## 2020-02-27 RX ADMIN — OXYCODONE 10 MG: 5 TABLET ORAL at 12:31

## 2020-02-27 RX ADMIN — ALPRAZOLAM 0.25 MG: 0.25 TABLET ORAL at 09:18

## 2020-02-27 NOTE — PROGRESS NOTES
Interdisciplinary team rounds were held 2/27/2020 with the following team members:Care Management, Nursing, Nutrition, Pharmacy, Physical Therapy and Physician . Plan of care discussed. See clinical pathway and/or care plan for interventions and desired outcomes.     Plan home with hospice once resources are set up

## 2020-02-27 NOTE — CONSULTS
Palliative Medicine Consult  Carlos Manuel: [phone])    Patient Name: Kendall Craven  YOB: 1931    Date of Initial Consult: 2020  Reason for Consult: Goals of care discussion and assistance with symptom management  Requesting Provider: Dr. Annie Amaya  Primary Care Physician: Roberto Sinclair MD     SUMMARY:   Kendall Craven is a 80 y.o. with a past history of COPD, AAA, HTN, TIA, stage IV lung CA, afib s/p PPM, arthritis, RA, who was admitted on 2020 from the [facility] with primary diagnosis of atrial fib with RVR. Patient presented to the ED with CC of lethargy x2 days and uncontrolled A. fib with heart rate 100120. Current medical issues leading to Palliative Medicine involvement include: Goals of care discussion and setting of advanced age, altered mental status, pain, debility and poor intake. Prior to recent hospitalization, 2020, patient had been on hospice services with [facility], until she a GLF resulting in hospital admission for inoperable pubic rami fracture, from which she was discharged to skilled nursing facility    [facility] FACILITY: She is , had 2 children, her son Gina Mistry  . She lives with grandson and his family (wife and 2 young children, 4yo and baby). Daughter lives next door. Daughter works full-time, her  is retired and is able to assist some. Patient had been on [facility] hospice services from 2019 up until last hospitalization,      reviewed: long hx of opioid use  Chronic pain medication regimen on Hospice: MS Contin 15 mg every 8 hours scheduled with oxycodone 5 mg, 1-2 every 4 hours as needed  No signs of overuse, misuse or diversion. PALLIATIVE DIAGNOSES:   1. Advance care planning discussion  2. Goals of care discussion  3. DNR discussion  4. Pain   5. Hypoalbuminemia       PLAN:   1. Prior to visit completed chart review   2. I met with patient, no family at bedside.  Patient was awake, able to answer yes no questions, oriented to self and hospitalization, though is not able to tell me why she is in hospital.  3. Chronic pain--> Unresolved per patient, today she continues to report uncontrolled pain (see palliative ESAS) despite having scheduled oxycodone IR adjustments made to her pain treatment regimen almost 24 hours ago. · Current pain treatment plan is as follows: oxycodone IR 10mg orally every 6 hours scheduled (started 2/26 at 1700 hours), with a break through pain medicine Oxycodone IR 5 mg orally every 4 hours as needed, she has only used x 2 over past 24 hours. · Patient has not had a significant use of prn oxycodone over past 24 hours, therefore, I do not recommend adjusting pain medication regimen at this time. · Palliative medicine team will follow up tomorrow 2/28  4. Goals of care--> Daughter not at bedside, however, per our discussion on 2/26, daughter wanted to bring patient home to her sons house, where she had been living, with the support of Hospice. · Daughter verbalized interest in [facility], as this is the same Hospice provider patient had previously been on. Per unit CM Aman note today 2/27, she received consult order and has been in contact with the daughter regarding dispo plans. · Though daughter wants to bring patient home with Hospice, she does NOT want to stop medications or other treatments until discharge. 5. Code status--> DNR; completed DDNR on file signed by patient's daughter in 2/2019, though this DDNR does not actually have the year it was signed documented. May consider obtaining a new DDNR  6. ACP--> No AMD on file, Patient is  and single. She has 1 living child, daughter, Thang Melchor, who is legal NOK/health care decision maker. 7. Hypoalbuminemia- Severe, Albumin 1.9, patient continues to have poor  intake PTA, daughter reports associated weight loss. GOC are for comfort.    8. Initial consult note routed to primary continuity provider and/or primary health care team members  9. Communicated plan of care with: Palliative Sanya STEWARD 192 Team, RN      GOALS OF CARE / TREATMENT PREFERENCES:     GOALS OF CARE:  Patient/Health Care Proxy Stated Goals: Comfort    TREATMENT PREFERENCES:   Code Status: DNR    Advance Care Planning:  [x] [facility] Interdisciplinary Team has updated the ACP Navigator with [address] and Patient Capacity      Primary Decision Maker (Active): Tomasa Womakc - [phone]  Advance Care Planning 9/13/2019   Patient's Healthcare Decision Maker is: Legal Next of Devin 69   Primary Decision Maker Name -   Confirm Advance Directive None   Patient Would Like to Complete Advance Directive Unable   Does the patient have other document types Do Not Resuscitate       Medical Interventions: Limited additional interventions     Other Instructions:   Artificially Administered Nutrition: No feeding tube     Other:    As far as possible, the palliative care team has discussed with patient / health care proxy about goals of care / treatment preferences for patient.      HISTORY:     History obtained from: medical records/patient    CHIEF COMPLAINT: I dont feel good    HPI/SUBJECTIVE:    The patient is:   [] Verbal and participatory  [x] Non-participatory due to: d/t confusion, patient unable to provide ROS, however, she is able to tell me that does not feel good, has pain, doesn't recall having had pain medicine, but stated that it isnt working    See ESAS    Clinical Pain Assessment (nonverbal scale for severity on nonverbal patients):   Clinical Pain Assessment  Severity: 8  Location: back and hips  Character: patient unable to provide this information  Duration: patient unable to provide this information  Effect: patient unable to provide this information  Factors: patient unable to provide this information  Frequency: patient unable to provide this information     Activity (Movement): Lying quietly, normal position    Duration: for how long has pt been experiencing pain (e.g., 2 days, 1 month, years)  Frequency: how often pain is an issue (e.g., several times per day, once every few days, constant)     FUNCTIONAL ASSESSMENT:     Palliative Performance Scale (PPS):  PPS: 30       PSYCHOSOCIAL/SPIRITUAL SCREENING:     Palliative IDT has assessed this patient for cultural preferences / practices and a referral made as appropriate to needs (Cultural Services, Patient Advocacy, Ethics, etc.)    Any spiritual / Voodoo concerns:  [] Yes /  [x] No    Caregiver Burnout: Not currently burned out, but is at risk  [] Yes /  [] No /  [] No Caregiver Present      Anticipatory grief assessment:   [x] Normal  / [] Maladaptive       ESAS Anxiety: Anxiety: 2    ESAS Depression:          REVIEW OF SYSTEMS:     Positive and pertinent negative findings in ROS are noted above in HPI. The following systems were [] reviewed / [x] unable to be reviewed as noted in HPI  Other findings are noted below. Systems: constitutional, ears/nose/mouth/throat, respiratory, gastrointestinal, genitourinary, musculoskeletal, integumentary, neurologic, psychiatric, endocrine. Positive findings noted below. Modified ESAS Completed by: provider   Fatigue: 6 Drowsiness: 0     Pain: 8   Anxiety: 2 Nausea: 0   Anorexia: 9 Dyspnea: 0           Stool Occurrence(s): 1        PHYSICAL EXAM:     From RN flow sheet:  Wt Readings from Last 3 Encounters:   02/27/20 127 lb 8 oz (57.8 kg)   02/17/20 121 lb 11.1 oz (55.2 kg)   09/11/19 134 lb (60.8 kg)     Blood pressure 109/59, pulse 79, temperature 97.8 °F (36.6 °C), resp. rate 22, height 5' 4\" (1.626 m), weight 127 lb 8 oz (57.8 kg), SpO2 100 %.     Pain Scale 1: Numeric (0 - 10)  Pain Intensity 1: 10     Pain Location 1: Back  Pain Orientation 1: Posterior  Pain Description 1: Sharp  Pain Intervention(s) 1: Medication (see MAR)  Last bowel movement, if known:     Constitutional: Elderly, weak, frail,  Thin, fatigued, uncomfortable  Eyes: pupils equal, anicteric  ENMT: no nasal discharge, dry  mucous membranes  Cardiovascular: regular rhythm, distal pulses intact  Respiratory: breathing not labored, symmetric  Gastrointestinal: soft non-tender, +bowel sounds  Musculoskeletal: no deformity, no tenderness to palpation  Skin: warm, dry  Neurologic: oriented to self, not able to tell me she is in hospital, unable to provide ROS confused, fatigued, intermittently  following simple commands, weakly   Psychiatric: restricted affect, no hallucinations  Other:       HISTORY:     Active Problems:    COPD (chronic obstructive pulmonary disease) ([facility] (2016)      Hypertension (2018)      Malignant neoplasm of upper lobe of left lung (HCC) (2018)      Chronic pain syndrome ()      DNR (do not resuscitate) ()      Pelvic fracture ([facility] (2020)      A-fib ([facility] (2020)      Past Medical History:   Diagnosis Date    AAA (abdominal aortic aneurysm) (HCC)     Arthritis     ra, osteoporosis, osteoarthritis    Atrial fibrillation (HCC)     COPD     HTN (hypertension)     Lung cancer ([facility]     RA (rheumatoid arthritis) ([facility]     Smoker     TIA (transient ischemic attack)     TIA 16      Past Surgical History:   Procedure Laterality Date    HX CHOLECYSTECTOMY      HX HYSTERECTOMY      HX ORTHOPAEDIC      carpal tunnel, wrist surgery    [place] NEW/RPLCMT PRM PM W/TRANSV ELTRD ATRIAL&VENT N/A 2019    INSERT PPM DUAL performed by Sabrina Ortiz MD at [facility]      Family History   Problem Relation Age of Onset    Hypertension Father       History reviewed, no pertinent family history.   Social History     Tobacco Use    Smoking status: Former Smoker     Years: 50.00     Last attempt to quit: 2018     Years since quittin.5    Smokeless tobacco: Never Used   Substance Use Topics    Alcohol use: No     Allergies   Allergen Reactions    Codeine Nausea and Vomiting Current Facility-Administered Medications   Medication Dose Route Frequency    metoprolol tartrate (LOPRESSOR) tablet 12.5 mg  12.5 mg Oral BID    albuterol-ipratropium (DUO-NEB) 2.5 MG-0.5 MG/3 ML  3 mL Nebulization Q4H PRN    ALPRAZolam (XANAX) tablet 0.25 mg  0.25 mg Oral BID    senna-docusate (PERICOLACE) 8.6-50 mg per tablet 1 Tab  1 Tab Oral BID PRN    polyethylene glycol (MIRALAX) packet 17 g  17 g Oral DAILY    sodium chloride (NS) flush 5-40 mL  5-40 mL IntraVENous Q8H    sodium chloride (NS) flush 5-40 mL  5-40 mL IntraVENous PRN    enoxaparin (LOVENOX) injection 40 mg  40 mg SubCUTAneous Q24H    oxyCODONE IR (ROXICODONE) tablet 5 mg  5 mg Oral Q4H PRN    zinc oxide (DESITIN) 13 % topical cream   Topical PRN    oxyCODONE IR (ROXICODONE) tablet 10 mg  10 mg Oral Q6H          LAB AND IMAGING FINDINGS:     Lab Results   Component Value Date/Time    WBC 11.5 (H) 02/27/2020 05:52 AM    HGB 9.7 (L) 02/27/2020 05:52 AM    PLATELET 015 38/34/4846 05:52 AM     Lab Results   Component Value Date/Time    Sodium 137 02/27/2020 05:52 AM    Potassium 2.9 (L) 02/27/2020 05:52 AM    Chloride 105 02/27/2020 05:52 AM    CO2 28 02/27/2020 05:52 AM    BUN 12 02/27/2020 05:52 AM    Creatinine 0.71 02/27/2020 05:52 AM    Calcium 6.8 (L) 02/27/2020 05:52 AM    Magnesium 1.4 (L) 02/27/2020 05:52 AM    Phosphorus 2.8 09/11/2019 02:21 AM      Lab Results   Component Value Date/Time    AST (SGOT) 34 02/27/2020 05:52 AM    Alk.  phosphatase 101 02/27/2020 05:52 AM    Protein, total 6.2 (L) 02/27/2020 05:52 AM    Albumin 1.8 (L) 02/27/2020 05:52 AM    Globulin 4.4 (H) 02/27/2020 05:52 AM     Lab Results   Component Value Date/Time    INR 1.1 09/07/2019 07:39 PM    Prothrombin time 10.9 09/07/2019 07:39 PM    aPTT 28.7 07/20/2019 07:30 PM      Lab Results   Component Value Date/Time    Iron 14 (L) 11/28/2018 12:58 AM    TIBC 143 (L) 11/28/2018 12:58 AM    Iron % saturation 10 (L) 11/28/2018 12:58 AM    Ferritin 139 11/28/2018 12:58 AM      No results found for: PH, PCO2, PO2  No components found for: GLPOC   No results found for: CPK, CKMB             Total time: 25  min  Counseling / coordination time, spent as noted above: 15 min  > 50% counseling / coordination?: yes    Prolonged service was provided for  []30 min   []75 min in face to face time in the presence of the patient, spent as noted above. Time Start:Time End:    Time Start:   End:   Note: this can only be billed with 91905 (initial) or [phone] (follow up). If multiple start / stop times, list each separately.

## 2020-02-27 NOTE — PROGRESS NOTES
Ty Echeverria [facility]  [address] [facility], [address] [place] [address]  [phone]      Medical Progress Note      NAME: Curt Blake   :  1931  MRM:  [id number]    Date/Time: 2020  3:38 PM         Subjective:     Chief Complaint:  Nickierosy Rose treated me so bad at that place I was at\"     Pt seen and examined. Slightly confused this AM. Does not recall that she is in the hospital or at [address] [place] [address]. She c/o pain    ROS:  (bold if positive, if negative)      Pain        Objective:       Vitals:          Last 24hrs VS reviewed since prior progress note. Most recent are:    Visit Vitals  /59   Pulse 88   Temp 97.8 °F (36.6 °C)   Resp 22   Ht 5' 4\" (1.626 m)   Wt 57.8 kg (127 lb 8 oz)   SpO2 100%   BMI 21.89 kg/m²     SpO2 Readings from Last 6 Encounters:   20 100%   20 97%   10/03/19 (!) 88%   09/15/19 92%   19 93%   19 95%    O2 Flow Rate (L/min): 2 l/min       Intake/Output Summary (Last 24 hours) at 2020 1538  Last data filed at 2020 0445  Gross per 24 hour   Intake    Output 800 ml   Net -800 ml          Exam:     Physical Exam:    Gen:  Well-developed, well-nourished, in no acute distress  HEENT:  Pink conjunctivae, PERRL, hearing intact to voice, moist mucous membranes  Neck:  Supple, without masses, thyroid non-tender  Resp:  No accessory muscle use, clear breath sounds without wheezes rales or rhonchi  Card:  No murmurs, normal S1, S2 without thrills, bruits or peripheral edema  Abd:  Soft, non-tender, non-distended, normoactive bowel sounds are present  Musc:  No cyanosis or clubbing  Skin:  No rashes or ulcers, skin turgor is good  Neuro:  Cranial nerves 3-12 are grossly intact  Psych:   Moderate insight     Medications Reviewed: (see below)    Lab Data Reviewed: (see below)    ______________________________________________________________________    Medications:     Current Facility-Administered Medications   Medication Dose Route Frequency  metoprolol tartrate (LOPRESSOR) tablet 12.5 mg  12.5 mg Oral BID    albuterol-ipratropium (DUO-NEB) 2.5 MG-0.5 MG/3 ML  3 mL Nebulization Q4H PRN    ALPRAZolam (XANAX) tablet 0.25 mg  0.25 mg Oral BID    senna-docusate (PERICOLACE) 8.6-50 mg per tablet 1 Tab  1 Tab Oral BID PRN    polyethylene glycol (MIRALAX) packet 17 g  17 g Oral DAILY    sodium chloride (NS) flush 5-40 mL  5-40 mL IntraVENous Q8H    sodium chloride (NS) flush 5-40 mL  5-40 mL IntraVENous PRN    enoxaparin (LOVENOX) injection 40 mg  40 mg SubCUTAneous Q24H    oxyCODONE IR (ROXICODONE) tablet 5 mg  5 mg Oral Q4H PRN    zinc oxide (DESITIN) 13 % topical cream   Topical PRN    oxyCODONE IR (ROXICODONE) tablet 10 mg  10 mg Oral Q6H            Lab Review:     Recent Labs     02/27/20  0552 02/25/20  2332   WBC 11.5* 9.4   HGB 9.7* 10.3*   HCT 32.3* 32.9*    453*     Recent Labs     02/27/20  0552 02/25/20  2332    139   K 2.9* 3.0*    105   CO2 28 29   GLU 90 93   BUN 12 14   CREA 0.71 0.72   CA 6.8* 7.2*   MG 1.4* 1.6   ALB 1.8* 1.9*   SGOT 34 39*   ALT 12 14     No components found for: GLPOC         Assessment / Plan:   Hypotension - likely 2/2 diuresis, metoprolol and pain meds  -hold parameters on metoprolol   -stop lasix  -continue pain meds as goals are comfort     A-fib with RVR - initially pt admitted for RVR but now plans to return to home with home hospice. Suspect metoprolol will likely cause more problems with BP but dose reduced and will watch on reduced dose.  If BP's still marginal would just stop at discharge  -no indication for NOAC due to fall risk, age and goals of care => hospice    Hypokalemia, magnesemia, calcemia -   -repleted; likely 2/2 diuresis, poor PO intake    COPD (chronic obstructive pulmonary disease) (HCC) (9/30/2016)  -nebs PRN       Malignant neoplasm of upper lobe of left lung ([address] (11/27/2018)  -pt previously on home hospice which was revoked due to dispo/care issues during last hospitalization. Plan is now to return on home hospice with additional caregiver support       Chronic pain syndrome ()  -appreciate palliative eval; continue scheduled oxycodone       Pelvic fracture (HCC) (2/13/2020)  -non-operative. Pain control.  Hospice    Stage 3 Pressure ulcer, Left ankle, confirmed POA     Total time spent with patient: 35 minutes     Care Plan discussed with: Patient, RN, CM     Discussed:  Care plan     Prophylaxis:  Lovenox     Disposition:  Home with hospice       ___________________________________________________    Attending Physician: Brock Oliveira MD

## 2020-02-27 NOTE — PROGRESS NOTES
Reason for Readmission:      
      
RUR Score/Risk Level: PCP: First and Last name:   
 Name of Practice:  
 Are you a current patient: Yes/No:  
 Approximate date of last visit:  
 
Is a Care Conference indicated:  
 
 
Did you attend your follow up appointment (s): If not, why not: 
 
    
Resources/supports as identified by patient/family:    
    
Top Challenges facing patient (as identified by patient/family and CM): Finances/Medication cost?      
Transportation Support system or lack thereof? Living arrangements? Self-care/ADLs/Cognition? Current Advanced Directive/Advance Care Plan:   
        
Plan for utilizing home health:    
          
Transition of Care Plan:    Based on readmission, the patient's previous Plan of Care 
 has been evaluated and/or modified. The current Transition of Care Plan is:

## 2020-02-27 NOTE — ADVANCED PRACTICE NURSE
Chart reviewed. Pt hypotensive overnight. BB dose has been reduced by 50%. Visit Vitals  BP 99/60 (BP 1 Location: Right arm, BP Patient Position: At rest)   Pulse 69   Temp 97.7 °F (36.5 °C)   Resp 18   Ht 5' 4\" (1.626 m)   Wt 121 lb 0.5 oz (54.9 kg)   SpO2 99%   BMI 20.78 kg/m²     HR improved.

## 2020-02-27 NOTE — PROGRESS NOTES
CM Note:  Spoke with Dr. Fani Figueroa and Mag Neumann from [facility]. He had talked to pt's daughter about needing extra help with sitters. She stated she is all set for this weekend and has applied for extra Medicaid benefits. The plan is to d/c to her grandson's home tomorrow. A referral was called to [facility] to set up stretcher transport. Pt  time is 11 a.m. [facility] notified.   Ruslan GALICIA

## 2020-02-27 NOTE — ROUTINE PROCESS
Bedside shift change report given to Cristiana Gale RN (oncoming nurse) by Elvis Whittington RN (offgoing nurse). Report included the following information SBAR, Kardex and MAR.

## 2020-02-27 NOTE — PROGRESS NOTES
CM Note:  Met with pt for d/c planning. She was unable to answer several details and requested I call her dtr, Janessa Daniels ([phone]). Spoke with pt's dtr. Reason for Readmission:  A-fib  Admitted 2/13-17/20 for pelvic fx and was d/c'd to [facility] Score/Risk Level:     Level I    PCP: First and Last name:  Jeri Altman   Name of Practice:  Unable to recall   Are you a current patient: Yes/No: yes   Approximate date of last visit: unable to recall    Is a Care Conference indicated: no      Did you attend your follow up appointment (s): If not, why not: Pt was to f/u with PCP in 1 mo and with ortho in 2 weeks. She was readmitted before she was scheduled for an appointment. Resources/supports as identified by patient/family:   Daughter and grandson       Top Challenges facing patient (as identified by patient/family and CM): Finances/Medication cost?    No problem. Gets her medications from [facility] or will get them through [facility]. Transportation      Will need stretcher transport  Support system or lack thereof? See above   Living arrangements? Plan to go to grandson's home with [facility]. She will stay on the ground floor; there is a ramp entry. Self-care/ADLs/Cognition? Pt alert and oriented. She needs help with all ADL's. Current Advanced Directive/Advance Care Plan:  Not on file; daughter wants to have one done           Plan for utilizing home health:   none             Transition of Care Plan:    Based on readmission, the patient's previous Plan of Care   has been evaluated and/or modified. The current Transition of Care Plan is:      1. D/c to pt's grandson's home   2. [address] Tania Mcdonald, RN    Care Management Interventions  PCP Verified by CM: Yes(Dr. Jeri Altman.)  Current Support Network: Relative's Home  Confirm Follow Up Transport: Other (see comment)  The Plan for Transition of Care is Related to the Following Treatment Goals : referral sent to [facility]  The Patient and/or Patient Representative was Provided with a Choice of Provider and Agrees with the Discharge Plan?: Yes  Name of the Patient Representative Who was Provided with a Choice of Provider and Agrees with the Discharge Plan: Erica Yo, genny  Freedom of Choice List was Provided with Basic Dialogue that Supports the Patient's Individualized Plan of Care/Goals, Treatment Preferences and Shares the Quality Data Associated with the Providers?: No  Discharge Location  Discharge Placement: Home

## 2020-02-27 NOTE — PROGRESS NOTES
Bedside and Verbal shift change report given to Beatriz Tidwell RN (oncoming nurse) by Elle Power RN and Marsha Vila RN (offgoing nurse). Report included the following information SBAR, Kardex, Intake/Output, MAR, Accordion and Recent Results.

## 2020-02-28 VITALS
TEMPERATURE: 97.8 F | SYSTOLIC BLOOD PRESSURE: 102 MMHG | OXYGEN SATURATION: 96 % | HEART RATE: 93 BPM | BODY MASS INDEX: 20.78 KG/M2 | DIASTOLIC BLOOD PRESSURE: 57 MMHG | HEIGHT: 64 IN | RESPIRATION RATE: 20 BRPM | WEIGHT: 121.69 LBS

## 2020-02-28 PROCEDURE — 74011250637 HC RX REV CODE- 250/637: Performed by: NURSE PRACTITIONER

## 2020-02-28 PROCEDURE — 74011250637 HC RX REV CODE- 250/637: Performed by: INTERNAL MEDICINE

## 2020-02-28 PROCEDURE — 74011250636 HC RX REV CODE- 250/636: Performed by: INTERNAL MEDICINE

## 2020-02-28 RX ORDER — OXYCODONE HYDROCHLORIDE 10 MG/1
10 TABLET ORAL EVERY 6 HOURS
Qty: 45 TAB | Refills: 0 | Status: SHIPPED | OUTPATIENT
Start: 2020-02-28 | End: 2020-03-02

## 2020-02-28 RX ORDER — OXYCODONE HYDROCHLORIDE 5 MG/1
5 TABLET ORAL
Qty: 30 TAB | Refills: 0 | Status: SHIPPED | OUTPATIENT
Start: 2020-02-28 | End: 2020-03-02

## 2020-02-28 RX ADMIN — OXYCODONE 10 MG: 5 TABLET ORAL at 01:34

## 2020-02-28 RX ADMIN — Medication 10 ML: at 06:33

## 2020-02-28 RX ADMIN — ENOXAPARIN SODIUM 40 MG: 40 INJECTION SUBCUTANEOUS at 09:00

## 2020-02-28 RX ADMIN — OXYCODONE 10 MG: 5 TABLET ORAL at 10:51

## 2020-02-28 RX ADMIN — ALPRAZOLAM 0.25 MG: 0.25 TABLET ORAL at 08:59

## 2020-02-28 RX ADMIN — OXYCODONE 10 MG: 5 TABLET ORAL at 06:32

## 2020-02-28 RX ADMIN — POLYETHYLENE GLYCOL 3350 17 G: 17 POWDER, FOR SOLUTION ORAL at 09:00

## 2020-02-28 NOTE — PROGRESS NOTES
Pharmacist Discharge Medication Reconciliation    Discharge Provider:  Dr. Staci Givens        Discharge Medications:      My Medications        START taking these medications        Instructions Each Dose to Equal Morning Noon Evening Bedtime   * oxyCODONE IR 10 mg Tab immediate release tablet  Commonly known as:  ROXICODONE    Your last dose was: Your next dose is: Take 1 Tab by mouth every six (6) hours for 3 days. Max Daily Amount: 40 mg.   10 mg                 * oxyCODONE IR 5 mg immediate release tablet  Commonly known as:  ROXICODONE    Your last dose was: Your next dose is: Take 1 Tab by mouth every four (4) hours as needed for Pain for up to 3 days. Max Daily Amount: 30 mg.   5 mg                       * This list has 2 medication(s) that are the same as other medications prescribed for you. Read the directions carefully, and ask your doctor or other care provider to review them with you. CONTINUE taking these medications        Instructions Each Dose to Equal Morning Noon Evening Bedtime   acetaminophen 500 mg tablet  Commonly known as:  TYLENOL    Your last dose was: Your next dose is: Take 1.5 Tabs by mouth every six (6) hours. 750 mg                 albuterol-ipratropium 2.5 mg-0.5 mg/3 ml Nebu  Commonly known as:  DUO-NEB    Your last dose was: Your next dose is:          3 mL by Nebulization route every four (4) hours as needed for Wheezing. 3 mL                 ALPRAZolam 0.25 mg tablet  Commonly known as:  Xanax    Your last dose was: Your next dose is: Take 1 Tab by mouth two (2) times a day. Max Daily Amount: 0.5 mg.   0.25 mg                 nystatin powder  Commonly known as:  MYCOSTATIN    Your last dose was: Your next dose is:          Apply  to affected area two (2) times a day. Apply to breast folds                  ondansetron hcl 4 mg tablet  Commonly known as:  ZOFRAN    Your last dose was:       Your next dose is: Take 4 mg by mouth every six (6) hours as needed for Nausea or Vomiting. 4 mg                 polyethylene glycol 17 gram packet  Commonly known as:  MIRALAX    Your last dose was: Your next dose is: Take 1 Packet by mouth daily. 17 g                 Senna with Docusate Sodium 8.6-50 mg per tablet  Generic drug:  senna-docusate    Your last dose was: Your next dose is: Take 1 Tab by mouth two (2) times daily as needed for Constipation. 1 Tab                        STOP taking these medications      traMADol 50 mg tablet  Commonly known as:  ULTRAM                  Where to Get Your Medications        Information on where to get these meds will be given to you by the nurse or doctor.     Ask your nurse or doctor about these medications  oxyCODONE IR 10 mg Tab immediate release tablet  oxyCODONE IR 5 mg immediate release tablet              The patient's chart, MAR, and AVS were reviewed by   Narayan West, Jacqueline, BCPS  Contact: [phone]

## 2020-02-28 NOTE — PALLIATIVE CARE DISCHARGE
Goals of Care/Treatment Preferences The Palliative Medicine team was consulted as part of your/your loved one's care in the hospital. Our team is a supportive service; we strive to relieve suffering and improve quality of life. We reviewed advance care planning information, which includes the following: 
  
 
Patient/Health Care Proxy Stated Goals: Comfort We reviewed / discussed your code status as: DNR 
   Full Code means perform CPR in the event of cardiac arrest. 
    DNR means do NOT perform CPR in the event of cardiac arrest. 
    Partial Code means you have specific preferences, please discuss with your healthcare team. 
    Carlitos Robbins means this issue was not addressed / resolved during your stay Medical Interventions: Limited additional interventions Other Instructions: You will be returning home with support from [facility]. You have a Durable Do Not Resuscitate Order in place, which should travel with you. When you are in a facility, this form should be placed on your chart. Once you are home, it is recommended that the [facility] form be placed in a visible location such as on the refrigerator or bedroom door. Artificially Administered Nutrition: No feeding tube Because of the importance of this information, we are providing you with a printed copy to share with other healthcare providers after this hospitalization is complete.

## 2020-02-28 NOTE — PROGRESS NOTES
CM Note:  Pt d/c'd to home today to be transported at 11 a.m by NATHAN carvalho. She will be admitted to [facility] today. Family and hospice aware. Nursing notified of  time.   GRAYSON Mercer

## 2020-02-28 NOTE — DISCHARGE INSTRUCTIONS
HOSPITALIST DISCHARGE INSTRUCTIONS  NAME: Ayleen Lei   :  1931   MRN:  [id number]     Date/Time:  2020 8:58 AM    ADMIT DATE: 2020     DISCHARGE DATE: 2020     ADMITTING DIAGNOSIS:  Pelvic fracture   Atrial fibrillation   Chronic pain     DISCHARGE DIAGNOSIS:  As above     MEDICATIONS:     · It is important that you take the medication exactly as they are prescribed. · Keep your medication in the bottles provided by the pharmacist and keep a list of the medication names, dosages, and times to be taken in your wallet. · Do not take other medications without consulting your doctor. Pain Management: per above medications    What to do at Home    Recommended diet:  Resume previous diet    Recommended activity: Toe touch weight bearing     If you experience any of the following symptoms then please call your primary care physician or return to the emergency room if you cannot get hold of your doctor:  Fever, chills, nausea, vomiting, diarrhea, change in mentation, falling, bleeding, shortness of breath, chest pain   Information obtained by :  I understand that if any problems occur once I am at home I am to contact my physician. I understand and acknowledge receipt of the instructions indicated above.                                                                                                                                            Physician's or R.N.'s Signature                                                                  Date/Time                                                                                                                                              Patient or Representative Signature                                                          Date/Time

## 2020-02-28 NOTE — ADVANCED PRACTICE NURSE
Chart reviewed: BB, loops stopped 2/2 hypotension. Upon discharge pt will return home with Hospice. Cardiology will sign off. Pls call if needed.

## 2020-02-28 NOTE — PROGRESS NOTES
Spiritual Care Assessment/Progress Note  [address]      NAME: Rosangela Long      MRN: [id number]  AGE: 80 y.o.  SEX: female  Pentecostalism Affiliation: Latter day   Language: English     2/28/2020     Total Time (in minutes): 13     Spiritual Assessment begun in [facility] 5M1 MED SURG 1 through conversation with:         [x]Patient        [] Family    [] Friend(s)        Reason for Consult: Palliative Care, Initial/Spiritual Assessment     Spiritual beliefs: (Please include comment if needed)     [x] Identifies with a reji tradition:   Confucianism        [] Supported by a reji community:            [] Claims no spiritual orientation:           [] Seeking spiritual identity:                [] Adheres to an individual form of spirituality:           [] Not able to assess:                           Identified resources for coping:      [] Prayer                               [] Music                  [] Guided Imagery     [] Family/friends                 [] Pet visits     [] Devotional reading                         [] Unknown     [] Other:                                             Interventions offered during this visit: (See comments for more details)    Patient Interventions: Affirmation of emotions/emotional suffering, Affirmation of reji, Iconic (affirming the presence of God/Higher Power), Initial/Spiritual assessment, patient floor, Prayer (assurance of), Prayer (actual)           Plan of Care:     [] Support spiritual and/or cultural needs    [] Support AMD and/or advance care planning process      [] Support grieving process   [] Coordinate Rites and/or Rituals    [] Coordination with community clergy   [] No spiritual needs identified at this time   [] Detailed Plan of Care below (See Comments)  [] Make referral to Music Therapy  [] Make referral to Pet Therapy     [] Make referral to Addiction services  [] Make referral to [facility]  [] Make referral to Spiritual Care Partner  [] No future visits requested        [x] Follow up visits as needed     Comments: Initial Palliative Care Spiritual Assessment on 5 Med Surg. Miss AMANDA Pulido recognized me from previous visits. She was a bit uncomfortable, helped her with the blankets. Staff came in to work with her. Provided brief spiritual support and prayer. She is being discharged shortly. Provided assurance of prayer.   Visited by: Waleska Mc., MS., [address] [facility] [place] ([id number])

## 2020-02-28 NOTE — ROUTINE PROCESS
Bedside shift change report given to Emeka Guthrie RN (oncoming nurse) by Israel Perdomo RN (offgoing nurse). Report included the following information SBAR, Kardex and MAR.

## 2020-03-04 NOTE — CDMP QUERY
Patient was admitted with AFib. Wound care note on 02/26 stated, \"Left ankle- 1x1x0.5 cm full thickness stage 3 pressure injury. After study, do agree with the above diagnosis of a stage III pressure ulcer to the left ankle?   
 
=> Stage 3 Pressure ulcer, Left ankle, confirmed POA 
=> Stage 3 Pressure ulcer, Left ankle, not confirmed  
=> Other please specify_____________ 
=> Clinically unable to determine The medical record reflects the following: 
  Risk Factors: 80 yr. Old female admitted with atrial fib with RVR and non small cell lung cancer stage IV. Clinical Indicators: Wound care note on 02/26 stated, \"Left ankle- 1x1x0.5 cm full thickness stage 3 pressure injury- yellow base, present on admission. \" Treatment: Wound care consult, wound care, reposition, heels floated, turn Q 2 hrs Thank You, Leopold Rouse RN, Clinical  
[phone]

## 2020-03-11 NOTE — DISCHARGE SUMMARY
Physician Discharge Summary     Patient ID:  Ayleen Lei  [id number]  80 y.o.  12/7/1931    Admit date: 2/25/2020    Discharge date and time: 2/28/2020     Admission Diagnoses: A-fib [facility]) [I48.91]    Discharge Diagnoses/Hospital Course   Hypotension - likely 2/2 diuresis, metoprolol and pain meds. Pt is going to d/c to home hospice and risk of hypotension with metoprolol is more likely to do harm than increased HR. Goals are comfort at this time     A-fib with RVR - initially pt admitted for RVR but now plans to return to home with home hospice. Suspect metoprolol will likely cause more problems with BP. Was unable to even tolerate 12.5mg dose      Hypokalemia, magnesemia, calcemia - repleted likely 2/2 diuresis, poor PO intake     COPD (chronic obstructive pulmonary disease) (HCC) (9/30/2016) - nebs PRN      Malignant neoplasm of upper lobe of left lung ([address] (11/27/2018) - pt previously on home hospice which was revoked due to dispo/care issues during last hospitalization. Plan is now to return on home hospice with additional caregiver support      Chronic pain syndrome () - s/p palliative eval; continue scheduled oxycodone      Pelvic fracture ([address] (2/13/2020) - non-operative. Pain control. Hospice     Stage 3 Pressure ulcer, Left ankle, confirmed POA      PCP: Alexandra Chandler MD     Consults: Palliative, cardiology     Discharge Exam:  Visit Vitals  /57 (BP 1 Location: Right arm, BP Patient Position: At rest)   Pulse 93   Temp 97.8 °F (36.6 °C)   Resp 20   Ht 5' 4\" (1.626 m)   Wt 55.2 kg (121 lb 11.1 oz)   SpO2 96%   BMI 20.89 kg/m²     Gen:  Well-developed, well-nourished, in no acute distress  HEENT:  Pink conjunctivae, PERRL, hearing intact to voice, moist mucous membranes  Neck:  Supple, without masses, thyroid non-tender  Resp:  No accessory muscle use, clear breath sounds without wheezes rales or rhonchi  Card:  Irregularly irregular.  No murmurs, normal S1, S2 without thrills, bruits or peripheral edema  Abd:  Soft, non-tender, non-distended, normoactive bowel sounds are present  Musc:  No cyanosis or clubbing  Skin:  No rashes or ulcers, skin turgor is good  Neuro:  Cranial nerves 3-12 are grossly intact  Psych: Moderate insight     Disposition: Hospice     Patient Instructions:   Discharge Medication List as of 2/28/2020 10:41 AM      START taking these medications    Details   !! oxyCODONE IR (ROXICODONE) 10 mg tab immediate release tablet Take 1 Tab by mouth every six (6) hours for 3 days. Max Daily Amount: 40 mg., Print, Disp-45 Tab, R-0      !! oxyCODONE IR (ROXICODONE) 5 mg immediate release tablet Take 1 Tab by mouth every four (4) hours as needed for Pain for up to 3 days. Max Daily Amount: 30 mg., Print, Disp-30 Tab, R-0       !! - Potential duplicate medications found. Please discuss with provider. CONTINUE these medications which have NOT CHANGED    Details   nystatin (MYCOSTATIN) powder Apply  to affected area two (2) times a day. Apply to breast folds , Historical Med      ondansetron hcl (ZOFRAN) 4 mg tablet Take 4 mg by mouth every six (6) hours as needed for Nausea or Vomiting., Historical Med      ALPRAZolam (XANAX) 0.25 mg tablet Take 1 Tab by mouth two (2) times a day. Max Daily Amount: 0.5 mg., Print, Disp-10 Tab, R-0      acetaminophen (TYLENOL) 500 mg tablet Take 1.5 Tabs by mouth every six (6) hours. , No Print, Disp-30 Tab, R-0      albuterol-ipratropium (DUO-NEB) 2.5 mg-0.5 mg/3 ml nebu 3 mL by Nebulization route every four (4) hours as needed for Wheezing., No Print, Disp-30 Nebule, R-0      polyethylene glycol (MIRALAX) 17 gram packet Take 1 Packet by mouth daily. , No Print, Disp-30 Packet, R-0      senna-docusate (SENNA WITH DOCUSATE SODIUM) 8.6-50 mg per tablet Take 1 Tab by mouth two (2) times daily as needed for Constipation. , Historical Med         STOP taking these medications       traMADol (ULTRAM) 50 mg tablet Comments:   Reason for Stopping: Activity: Activity as tolerated  Diet: Comfort feeding  Wound Care: None needed    Follow-up with Alberta Garcia MD    Approximate time spent in patient care on day of discharge: 35 minutes     Signed:  Pete Mireles MD  3/11/2020  5:03 PM

## 2020-04-29 ENCOUNTER — PATIENT OUTREACH (OUTPATIENT)
Dept: OTHER | Age: 85
End: 2020-04-29

## 2020-04-29 PROBLEM — Z51.5 HOSPICE CARE PATIENT: Status: ACTIVE | Noted: 2020-04-29

## 2022-03-19 PROBLEM — I71.40 ABDOMINAL AORTIC ANEURYSM (AAA) WITHOUT RUPTURE (HCC): Status: ACTIVE | Noted: 2018-12-04

## 2022-03-19 PROBLEM — Z51.5 HOSPICE CARE PATIENT: Status: ACTIVE | Noted: 2020-04-29

## 2022-03-19 PROBLEM — F41.9 ANXIETY: Status: ACTIVE | Noted: 2019-09-15

## 2022-03-19 PROBLEM — I48.91 A-FIB (HCC): Status: ACTIVE | Noted: 2020-02-26

## 2022-03-19 PROBLEM — S32.9XXA PELVIC FRACTURE (HCC): Status: ACTIVE | Noted: 2020-02-13

## 2022-03-19 PROBLEM — C34.90 LUNG CANCER (HCC): Status: ACTIVE | Noted: 2020-02-14

## 2022-03-19 PROBLEM — Z72.0 TOBACCO ABUSE: Status: ACTIVE | Noted: 2018-08-25

## 2022-03-20 PROBLEM — C34.12 MALIGNANT NEOPLASM OF UPPER LOBE OF LEFT LUNG (HCC): Status: ACTIVE | Noted: 2018-11-27

## 2022-03-20 PROBLEM — S82.143A TIBIAL PLATEAU FRACTURE: Status: ACTIVE | Noted: 2018-08-25

## 2022-03-20 PROBLEM — I10 HYPERTENSION: Status: ACTIVE | Noted: 2018-08-25

## 2023-05-11 RX ORDER — NYSTATIN 100000 [USP'U]/G
POWDER TOPICAL 2 TIMES DAILY
COMMUNITY

## 2023-05-11 RX ORDER — ALPRAZOLAM 0.25 MG/1
TABLET ORAL 2 TIMES DAILY
COMMUNITY
Start: 2020-02-17

## 2023-05-11 RX ORDER — IPRATROPIUM BROMIDE AND ALBUTEROL SULFATE 2.5; .5 MG/3ML; MG/3ML
SOLUTION RESPIRATORY (INHALATION) EVERY 4 HOURS PRN
COMMUNITY
Start: 2020-02-17

## 2023-05-11 RX ORDER — ONDANSETRON 4 MG/1
TABLET, FILM COATED ORAL EVERY 6 HOURS PRN
COMMUNITY

## 2023-05-11 RX ORDER — AMOXICILLIN 250 MG
1 CAPSULE ORAL 2 TIMES DAILY PRN
COMMUNITY

## 2023-05-11 RX ORDER — POLYETHYLENE GLYCOL 3350 17 G/17G
1 POWDER, FOR SOLUTION ORAL DAILY
COMMUNITY
Start: 2020-02-18

## 2023-05-11 RX ORDER — ACETAMINOPHEN 500 MG
TABLET ORAL EVERY 6 HOURS
COMMUNITY
Start: 2020-02-17

## 2024-03-07 NOTE — PROGRESS NOTES
Autumn Simental is a 59 y.o. female evaluated via telephone on 7/26/2021. Consent:  She and/or health care decision maker is aware that that she may receive a bill for this telephone service, depending on her insurance coverage, and has provided verbal consent to proceed: Yes      Documentation:  I communicated with the patient and/or health care decision maker about follow up. She went to Dr. Nicole Moreno last week. She had irregular rhythm last week, plans for cardioversion. She is continuing current meds. No further skin lesions. Hypertension  Compliant with medications. No adverse effects from medication. No lightheadedness, palpitations, or chest pain. Diabetes Mellitus  Has been compliant with medications. No side effects of medications since last visit. No polyuria, polydipsia, or vision changes since last visit. No symptomatic episodes of hypoglycemia. Lowest reading in the 70s  Lab Results   Component Value Date    LABA1C 9.5 06/15/2021     No results found for: EAG  Average running about 120s. LDL 38, trigylcerides 127,    TSH wnl. GFR stable. Details of this discussion including any medical advice provided: PLAN:    Continue follow up with Cardiology. Continue current meds. A1C elevated. MUST work on diet and exercise, take meds as directed, will follow closely. Labs reviewed and discussed. Blood pressure is stable. Continue current medications. Monitor ambulatory bp readings, if persistently >140/90, return to clinic. I affirm this is a Patient Initiated Episode with a Patient who has not had a related appointment within my department in the past 7 days or scheduled within the next 24 hours.     Patient identification was verified at the start of the visit: Yes    Total Time: minutes: 5-10 minutes    The visit was conducted pursuant to the emergency declaration under the [address] [facility] [address] waiver authority and the Problem: Self Care Deficits Care Plan (Adult)  Goal: *Acute Goals and Plan of Care (Insert Text)  Description  FUNCTIONAL STATUS PRIOR TO ADMISSION: Patient was modified independent using a Rolling walker for functional mobility. HOME SUPPORT PRIOR TO ADMISSION: The patient lived with family members on the first floor of a two story home. Occupational Therapy Goals  Initiated 9/9/2019  1. Patient will perform grooming with supervision/set-up within 7 day(s). 2.  Patient will perform upper body dressing and bathing with supervision/set-up within 7 day(s). 3.  Patient will perform lower body dressing and bathing with moderate assistance  within 7 day(s). 4.  Patient will perform toilet transfers with moderate assistance  within 7 day(s). 5.  Patient will perform all aspects of toileting with moderate assistance  within 7 day(s). 6.  Patient will participate in upper extremity therapeutic exercise/activities with supervision/set-up for 10 minutes within 7 day(s). 7.  Patient will utilize energy conservation techniques during functional activities with verbal cues within 7 day(s). Outcome: Progressing Towards Goal   OCCUPATIONAL THERAPY TREATMENT  Patient: Brandon Mak (18 y.o. female)  Date: 9/12/2019  Diagnosis: Encephalopathy acute [G93.40]  Acute encephalopathy [G93.40] TIA (transient ischemic attack)       Precautions: Fall, Skin  Chart, occupational therapy assessment, plan of care, and goals were reviewed. ASSESSMENT  Based on the objective data described below, pt received supine in bed refusing to get up. With increase encouragement and re-direction (2/2 increase confusion). Pt able to get to EOB at [address] Sit to stand at mod assist x 2, took a few steps then pt was refusing to walk and needed someone to bring chair immediately behind her 2/2 wanting to sit and refusing to walk.   Seated in chair, pt set-up for lunch and appeared less anxious and forgot that she didn't want to sit Coronavirus Preparedness and Response Supplemental Appropriations Act. Patient identification was verified, and a caregiver was present when appropriate. The patient was located in a state where the provider was credentialed to provide care.     Note: not billable if this call serves to triage the patient into an appointment for the relevant concern      Jerome Monroy MD up in chair. Overall, if family wants to bring pt home, they will need 24/7 assistance. Uncertain if family will be able to safely transfer or care for her at her current level of function. Current Level of Function Impacting Discharge (ADLs): mod to max assist level     Other factors to consider for discharge: confusion, level of care needed to take home         PLAN :  Patient continues to benefit from skilled intervention to address the above impairments. Continue treatment per established plan of care. to address goals. Recommend with staff: Mickey Pinzon for meals    Recommend next OT session: functional transfers, self-care tasks    Recommendation for discharge: (in order for the patient to meet his/her long term goals)  Therapy up to 5 days/week in SNF setting    This discharge recommendation:  A follow-up discussion with the attending provider and/or case management is planned    Equipment recommendations for successful discharge (if) home: to be determined by rehab facility       SUBJECTIVE:   Patient stated I don't want to get up.     OBJECTIVE DATA SUMMARY:   Cognitive/Behavioral Status:  Neurologic State: Confused  Orientation Level: Oriented to person;Disoriented to time;Disoriented to situation;Disoriented to place  Cognition: Impaired decision making;Poor safety awareness        Safety/Judgement: Lack of insight into deficits    Functional Mobility and Transfers for ADLs:  Bed Mobility:  Supine to Sit: Stand-by assistance    Transfers:  Sit to Stand: Moderate assistance;Assist x2     Bed to Chair: Moderate assistance;Assist x2    Balance:  Sitting: Intact  Standing: Impaired; With support  Standing - Static: Poor    ADL Intervention:  Feeding  Feeding Assistance: Set-up                        Lower Body Dressing Assistance  Socks:  Moderate assistance;Minimum assistance  Position Performed: Seated edge of bed         Cognitive Retraining  Safety/Judgement: Lack of insight into deficits    Therapeutic Exercises:       Pain:  No c/o pain    Activity Tolerance:   Fair  Please refer to the flowsheet for vital signs taken during this treatment.     After treatment patient left in no apparent distress:   Sitting in chair and 1:1 care partner     COMMUNICATION/COLLABORATION:   The patients plan of care was discussed with: Physical Therapy Assistant and Registered Nurse    Luis Armando Rios, OTR/L  Time Calculation: 23 mins No

## 2024-03-25 NOTE — PROGRESS NOTES
[facility]: MED RECONCILIATION    Comments/Recommendations:   Medication reconciliation completed using transfer paperwork from the [facility]. Medications added:     Nystatin powder  ondansetron    Medications removed:    Furosemide     Medications adjusted:    none    Information obtained from: transfer paperwork       Allergies: Codeine    Prior to Admission Medications:     Medication Documentation Review Audit       Reviewed by Narayan Snyder (Pharmacist) on 02/26/20 at 1040      Medication Sig Documenting Provider Last Dose Status Taking?   acetaminophen (TYLENOL) 500 mg tablet Take 1.5 Tabs by mouth every six (6) hours. Alessandro Oliva MD  Active Yes   albuterol-ipratropium (DUO-NEB) 2.5 mg-0.5 mg/3 ml nebu 3 mL by Nebulization route every four (4) hours as needed for Wheezing. Alessandro Oliva MD  Active Yes   ALPRAZolam [place]) 0.25 mg tablet Take 1 Tab by mouth two (2) times a day. Max Daily Amount: 0.5 mg. Alessandro Oliva MD  Active Yes   nystatin (MYCOSTATIN) powder Apply  to affected area two (2) times a day. Apply to breast folds  Provider, Historical  Active Yes   ondansetron hcl (ZOFRAN) 4 mg tablet Take 4 mg by mouth every six (6) hours as needed for Nausea or Vomiting. Provider, Historical  Active Yes   polyethylene glycol (MIRALAX) 17 gram packet Take 1 Packet by mouth daily. Alessandro Oliva MD  Active Yes   senna-docusate (SENNA WITH DOCUSATE SODIUM) 8.6-50 mg per tablet Take 1 Tab by mouth two (2) times daily as needed for Constipation. Provider, Historical  Active Yes   traMADol (ULTRAM) 50 mg tablet Take 0.5 Tabs by mouth every six (6) hours as needed for Pain for up to 10 days. Max Daily Amount: 100 mg.  Alessandro Oliva MD  Active Yes                      Thank you,   Narayan West, PharmD, BCPS   Contact: [phone] contact guard

## (undated) DEVICE — REM POLYHESIVE ADULT PATIENT RETURN ELECTRODE: Brand: VALLEYLAB

## (undated) DEVICE — MEDI-TRACE CADENCE ADULT, DEFIBRILLATION ELECTRODE -RTS  (10 PR/PK) - PHYSIO-CONTROL: Brand: MEDI-TRACE CADENCE

## (undated) DEVICE — ZIP 8I SURGICAL SKIN CLOSURE DEVICE: Brand: ZIP 8I SURGICAL SKIN CLOSURE DEVICE

## (undated) DEVICE — SUTURE ETHBND EXCEL SZ 2 L30IN NONABSORBABLE GRN L40MM V-37 MX69G

## (undated) DEVICE — SUTURE VCRL SZ 2-0 L36IN ABSRB UD L40MM CT 1/2 CIR J957H

## (undated) DEVICE — SUTURE MCRYL 3-0 L27IN ABSRB VLT SH L26MM 1/2 CIR Y316H

## (undated) DEVICE — PACEMAKER PACK: Brand: MEDLINE INDUSTRIES, INC.

## (undated) DEVICE — INTRO SHTH 7FR 13X20CM -- TEARAWAY

## (undated) DEVICE — 3M™ IOBAN™ 2 ANTIMICROBIAL INCISE DRAPE 6640EZ: Brand: IOBAN™ 2

## (undated) DEVICE — DRSG AQUACEL SURG 3.5X6IN -- CONVERT TO ITEM 369227

## (undated) DEVICE — SLING ORTHOPEDIC PCH UNIV 19.5X9 IN 2-39 IN ARM W/ FOAM STRP